# Patient Record
Sex: FEMALE | Race: OTHER | Employment: OTHER | URBAN - METROPOLITAN AREA
[De-identification: names, ages, dates, MRNs, and addresses within clinical notes are randomized per-mention and may not be internally consistent; named-entity substitution may affect disease eponyms.]

---

## 2017-10-23 ENCOUNTER — HOSPITAL ENCOUNTER (EMERGENCY)
Age: 52
Discharge: HOME | End: 2017-10-23
Attending: EMERGENCY MEDICINE
Payer: MEDICARE

## 2017-10-23 ENCOUNTER — APPOINTMENT (OUTPATIENT)
Dept: GENERAL RADIOLOGY | Age: 52
End: 2017-10-23
Attending: PHYSICIAN ASSISTANT
Payer: MEDICARE

## 2017-10-23 VITALS
DIASTOLIC BLOOD PRESSURE: 117 MMHG | SYSTOLIC BLOOD PRESSURE: 152 MMHG | HEIGHT: 64 IN | TEMPERATURE: 98.6 F | WEIGHT: 217 LBS | BODY MASS INDEX: 37.05 KG/M2 | HEART RATE: 85 BPM | OXYGEN SATURATION: 98 % | RESPIRATION RATE: 16 BRPM

## 2017-10-23 DIAGNOSIS — R03.0 ELEVATED BLOOD PRESSURE READING: ICD-10-CM

## 2017-10-23 DIAGNOSIS — M54.41 RIGHT-SIDED LOW BACK PAIN WITH RIGHT-SIDED SCIATICA, UNSPECIFIED CHRONICITY: ICD-10-CM

## 2017-10-23 DIAGNOSIS — M79.671 RIGHT FOOT PAIN: Primary | ICD-10-CM

## 2017-10-23 DIAGNOSIS — F17.200 NICOTINE DEPENDENCE, UNCOMPLICATED, UNSPECIFIED NICOTINE PRODUCT TYPE: ICD-10-CM

## 2017-10-23 PROCEDURE — 73630 X-RAY EXAM OF FOOT: CPT

## 2017-10-23 PROCEDURE — 72100 X-RAY EXAM L-S SPINE 2/3 VWS: CPT

## 2017-10-23 PROCEDURE — 99284 EMERGENCY DEPT VISIT MOD MDM: CPT

## 2017-10-23 PROCEDURE — 77030036687 HC SHOE PSTOP S2SG -A

## 2017-10-23 PROCEDURE — 74011250637 HC RX REV CODE- 250/637: Performed by: PHYSICIAN ASSISTANT

## 2017-10-23 PROCEDURE — 74011250637 HC RX REV CODE- 250/637: Performed by: EMERGENCY MEDICINE

## 2017-10-23 RX ORDER — HYDROCODONE BITARTRATE AND ACETAMINOPHEN 5; 325 MG/1; MG/1
1 TABLET ORAL
Qty: 6 TAB | Refills: 0 | Status: SHIPPED | OUTPATIENT
Start: 2017-10-23 | End: 2018-01-11

## 2017-10-23 RX ORDER — PREDNISONE 20 MG/1
60 TABLET ORAL DAILY
Qty: 15 TAB | Refills: 0 | Status: SHIPPED | OUTPATIENT
Start: 2017-10-23 | End: 2017-10-28

## 2017-10-23 RX ORDER — PREDNISONE 20 MG/1
60 TABLET ORAL DAILY
Qty: 15 TAB | Refills: 0 | Status: SHIPPED | OUTPATIENT
Start: 2017-10-23 | End: 2017-10-23

## 2017-10-23 RX ORDER — LISINOPRIL 10 MG/1
10 TABLET ORAL 3 TIMES DAILY
COMMUNITY
End: 2017-12-16 | Stop reason: CLARIF

## 2017-10-23 RX ORDER — METHOCARBAMOL 750 MG/1
750 TABLET, FILM COATED ORAL 4 TIMES DAILY
Qty: 20 TAB | Refills: 0 | Status: SHIPPED | OUTPATIENT
Start: 2017-10-23 | End: 2018-01-11

## 2017-10-23 RX ORDER — HYDROCODONE BITARTRATE AND ACETAMINOPHEN 5; 325 MG/1; MG/1
1 TABLET ORAL
Status: COMPLETED | OUTPATIENT
Start: 2017-10-23 | End: 2017-10-23

## 2017-10-23 RX ORDER — LISINOPRIL 5 MG/1
10 TABLET ORAL
Status: COMPLETED | OUTPATIENT
Start: 2017-10-23 | End: 2017-10-23

## 2017-10-23 RX ADMIN — LISINOPRIL 10 MG: 5 TABLET ORAL at 13:42

## 2017-10-23 RX ADMIN — HYDROCODONE BITARTRATE AND ACETAMINOPHEN 1 TABLET: 5; 325 TABLET ORAL at 13:48

## 2017-10-23 NOTE — DISCHARGE INSTRUCTIONS
Learning About Relief for Back Pain  What is back tension and strain? Back strain happens when you overstretch, or pull, a muscle in your back. You may hurt your back in an accident or when you exercise or lift something. Most back pain will get better with rest and time. You can take care of yourself at home to help your back heal.  What can you do first to relieve back pain? When you first feel back pain, try these steps:  · Walk. Take a short walk (10 to 20 minutes) on a level surface (no slopes, hills, or stairs) every 2 to 3 hours. Walk only distances you can manage without pain, especially leg pain. · Relax. Find a comfortable position for rest. Some people are comfortable on the floor or a medium-firm bed with a small pillow under their head and another under their knees. Some people prefer to lie on their side with a pillow between their knees. Don't stay in one position for too long. · Try heat or ice. Try using a heating pad on a low or medium setting, or take a warm shower, for 15 to 20 minutes every 2 to 3 hours. Or you can buy single-use heat wraps that last up to 8 hours. You can also try an ice pack for 10 to 15 minutes every 2 to 3 hours. You can use an ice pack or a bag of frozen vegetables wrapped in a thin towel. There is not strong evidence that either heat or ice will help, but you can try them to see if they help. You may also want to try switching between heat and cold. · Take pain medicine exactly as directed. ¨ If the doctor gave you a prescription medicine for pain, take it as prescribed. ¨ If you are not taking a prescription pain medicine, ask your doctor if you can take an over-the-counter medicine. What else can you do? · Stretch and exercise. Exercises that increase flexibility may relieve your pain and make it easier for your muscles to keep your spine in a good, neutral position. And don't forget to keep walking. · Do self-massage.  You can use self-massage to unwind after work or school or to energize yourself in the morning. You can easily massage your feet, hands, or neck. Self-massage works best if you are in comfortable clothes and are sitting or lying in a comfortable position. Use oil or lotion to massage bare skin. · Reduce stress. Back pain can lead to a vicious [place]: Distress about the pain tenses the muscles in your back, which in turn causes more pain. Learn how to relax your mind and your muscles to lower your stress. Where can you learn more? Go to http://molina-ofelia.info/. Enter I133 in the search box to learn more about \"Learning About Relief for Back Pain. \"  Current as of: March 21, 2017  Content Version: 11.3  © 8616-8352 [employer]. Care instructions adapted under license by [employer] (which disclaims liability or warranty for this information). If you have questions about a medical condition or this instruction, always ask your healthcare professional. [place] [address] any warranty or liability for your use of this information. Back Stretches: Exercises  Your Care Instructions  Here are some examples of exercises for stretching your back. Start each exercise slowly. Ease off the exercise if you start to have pain. Your doctor or physical therapist will tell you when you can start these exercises and which ones will work best for you. How to do the exercises  Overhead stretch    1. Stand comfortably with your feet shoulder-width apart. 2. Looking straight ahead, raise both arms over your head and reach toward the ceiling. Do not allow your head to tilt back. 3. Hold for 15 to 30 seconds, then lower your arms to your sides. 4. Repeat 2 to 4 times. Side stretch    1. Stand comfortably with your feet shoulder-width apart. 2. Raise one arm over your head, and then lean to the other side.   3. Slide your hand down your leg as you let the weight of your arm gently stretch your side muscles. Hold for 15 to 30 seconds. 4. Repeat 2 to 4 times on each side. Press-up    1. Lie on your stomach, supporting your body with your forearms. 2. Press your elbows down into the floor to raise your upper back. As you do this, relax your stomach muscles and allow your back to arch without using your back muscles. As your press up, do not let your hips or pelvis come off the floor. 3. Hold for 15 to 30 seconds, then relax. 4. Repeat 2 to 4 times. Relax and rest    1. Lie on your back with a rolled towel under your neck and a pillow under your knees. Extend your arms comfortably to your sides. 2. Relax and breathe normally. 3. Remain in this position for about 10 minutes. 4. If you can, do this 2 or 3 times each day. Follow-up care is a key part of your treatment and safety. Be sure to make and go to all appointments, and call your doctor if you are having problems. It's also a good idea to know your test results and keep a list of the medicines you take. Where can you learn more? Go to http://molinaAmiareofelia.info/. Enter D523 in the search box to learn more about \"Back Stretches: Exercises. \"  Current as of: March 21, 2017  Content Version: 11.3  © 9564-8189 [employer]. Care instructions adapted under license by [employer] (which disclaims liability or warranty for this information). If you have questions about a medical condition or this instruction, always ask your healthcare professional. [place] [address] any warranty or liability for your use of this information. Elevated Blood Pressure: Care Instructions  Your Care Instructions    Blood pressure is a measure of how hard the blood pushes against the walls of your arteries. It's normal for blood pressure to go up and down throughout the day. But if it stays up over time, you have high blood pressure. Two numbers tell you your blood pressure.  The first number is the systolic pressure. It shows how hard the blood pushes when your heart is pumping. The second number is the diastolic pressure. It shows how hard the blood pushes between heartbeats, when your heart is relaxed and filling with blood. An ideal blood pressure in adults is less than 120/80 (say \"120 over 80\"). High blood pressure is 140/90 or higher. You have high blood pressure if your top number is 140 or higher or your bottom number is 90 or higher, or both. The main test for high blood pressure is simple, fast, and painless. To diagnose high blood pressure, your doctor will test your blood pressure at different times. After testing your blood pressure, your doctor may ask you to test it again when you are home. If you are diagnosed with high blood pressure, you can work with your doctor to make a long-term plan to manage it. Follow-up care is a key part of your treatment and safety. Be sure to make and go to all appointments, and call your doctor if you are having problems. It's also a good idea to know your test results and keep a list of the medicines you take. How can you care for yourself at home? · Do not smoke. Smoking increases your risk for heart attack and stroke. If you need help quitting, talk to your doctor about stop-smoking programs and medicines. These can increase your chances of quitting for good. · Stay at a healthy weight. · Try to limit how much sodium you eat to less than 2,300 milligrams (mg) a day. Your doctor may ask you to try to eat less than 1,500 mg a day. · Be physically active. Get at least 30 minutes of exercise on most days of the week. Walking is a good choice. You also may want to do other activities, such as running, swimming, cycling, or playing tennis or team sports. · Avoid or limit alcohol. Talk to your doctor about whether you can drink any alcohol. · Eat plenty of fruits, vegetables, and low-fat dairy products. Eat less saturated and total fats.   · Learn how to check your blood pressure at home. When should you call for help? Call your doctor now or seek immediate medical care if:  · Your blood pressure is much higher than normal (such as 180/110 or higher). · You think high blood pressure is causing symptoms such as:  ¨ Severe headache. ¨ Blurry vision. Watch closely for changes in your health, and be sure to contact your doctor if:  · You do not get better as expected. Where can you learn more? Go to http://molina-ofelia.info/. Enter O163 in the search box to learn more about \"Elevated Blood Pressure: Care Instructions. \"  Current as of: April 3, 2017  Content Version: 11.3  © 4748-5750 [employer]. Care instructions adapted under license by [employer] (which disclaims liability or warranty for this information). If you have questions about a medical condition or this instruction, always ask your healthcare professional. [place] [address] any warranty or liability for your use of this information. Learning About Benefits From Quitting Smoking  How does quitting smoking make you healthier? If you're thinking about quitting smoking, you may have a few reasons to be smoke-free. Your health may be one of them. · When you quit smoking, you lower your risks for cancer, lung disease, heart attack, stroke, blood vessel disease, and blindness from macular degeneration. · When you're smoke-free, you get sick less often, and you heal faster. You are less likely to get colds, flu, bronchitis, and pneumonia. · As a nonsmoker, you may find that your mood is better and you are less stressed. When and how will you feel healthier? Quitting has real health benefits that start from day 1 of being smoke-free. And the longer you stay smoke-free, the healthier you get and the better you feel. The first hours  · After just 20 minutes, your blood pressure and heart rate go down.  That means there's less stress on your heart and blood vessels. · Within 12 hours, the level of carbon monoxide in your blood drops back to normal. That makes room for more oxygen. With more oxygen in your body, you may notice that you have more energy than when you smoked. After 2 weeks  · Your lungs start to work better. · Your risk of heart attack starts to drop. After 1 month  · When your lungs are clear, you cough less and breathe deeper, so it's easier to be active. · Your sense of taste and smell return. That means you can enjoy food more than you have since you started smoking. Over the years  · After 1 year, your risk of heart disease is half what it would be if you kept smoking. · After 5 years, your risk of stroke starts to shrink. Within a few years after that, it's about the same as if you'd never smoked. · After 10 years, your risk of dying from lung cancer is cut by about half. And your risk for many other types of cancer is lower too. How would quitting help others in your life? When you quit smoking, you improve the health of everyone who now breathes in your smoke. · Their heart, lung, and cancer risks drop, much like yours. · They are sick less. For babies and small children, living smoke-free means they're less likely to have ear infections, pneumonia, and bronchitis. · If you're a woman who is or will be pregnant someday, quitting smoking means a healthier . · Children who are close to you are less likely to become adult smokers. Where can you learn more? Go to http://molina-ofelia.info/. Enter [id number] [id number] [id number] in the search box to learn more about \"Learning About Benefits From Quitting Smoking. \"  Current as of: 2017  Content Version: 11.3  © 2245-9236 [employer]. Care instructions adapted under license by [employer] (which disclaims liability or warranty for this information).  If you have questions about a medical condition or this instruction, always ask your healthcare professional. [address] any warranty or liability for your use of this information. We hope that we have addressed all of your medical concerns. The examination and treatment you received in the Emergency Department were for an emergent problem and were not intended as complete care. It is important that you follow up with your healthcare provider(s) for ongoing care. If your symptoms worsen or do not improve as expected, and you are unable to reach your usual health care provider(s), you should return to the Emergency Department. Today's healthcare is undergoing tremendous change, and patient satisfaction surveys are one of the many tools to assess the quality of medical care. You may receive a survey from the [employer] regarding your experience in the Emergency Department. I hope that your experience has been completely positive, particularly the medical care that I provided. As such, please participate in the survey; anything less than excellent does not meet my expectations or intentions. [facility] and [address] [place] [address] participate in nationally recognized quality of care measures. If your blood pressure is greater than 120/80, as reported below, we urge that you seek medical care to address the potential of high blood pressure, commonly known as hypertension. Hypertension can be hereditary or can be caused by certain medical conditions, pain, stress, or \"white coat syndrome. \"       Please make an appointment with your health care provider(s) for follow up of your Emergency Department visit. VITALS:   Patient Vitals for the past 8 hrs:   Temp Pulse Resp BP SpO2   10/23/17 1330 - 85 16 (!) 152/117 98 %   10/23/17 1214 98.6 °F (37 °C) 93 18 (!) 143/103 96 %          Thank you for allowing us to provide you with medical care today. We realize that you have many choices for your emergency care needs.   Please choose us in the future for any continued health care needs. Simone Pepper, [address]: [phone]            No results found for this or any previous visit (from the past 24 hour(s)). Xr Spine Lumb 2 Or 3 V    Result Date: 10/23/2017  EXAM:  XR SPINE LUMB 2 OR 3 V INDICATION:  Right low back pain. COMPARISON: CT 9/29/2012. TECHNIQUE: 3 views lumbar spine. FINDINGS: Surgical clips are noted in the right upper abdomen. Vertebral body heights and intervertebral disc spaces are maintained. There is no abnormality in alignment. The sacroiliac joints are unremarkable. IMPRESSION: No acute abnormality. Xr Foot Rt Min 3 V    Result Date: 10/23/2017  EXAM:  XR FOOT RT MIN 3 V INDICATION:   Right foot third digit pain for one month. COMPARISON:  None. FINDINGS:  Three views of the right foot demonstrate no fracture or other acute osseous or articular abnormality. The soft tissues are within normal limits. IMPRESSION:  No acute abnormality. Foot Pain: Care Instructions  Your Care Instructions  Foot injuries that cause pain and swelling are fairly common. Almost all sports or home repair projects can cause a misstep that ends up as foot pain. Normal wear and tear, especially as you get older, also can cause foot pain. Most minor foot injuries will heal on their own, and home treatment is usually all you need to do. If you have a severe injury, you may need tests and treatment. Follow-up care is a key part of your treatment and safety. Be sure to make and go to all appointments, and call your doctor if you are having problems. Its also a good idea to know your test results and keep a list of the medicines you take. How can you care for yourself at home? · Take pain medicines exactly as directed. ¨ If the doctor gave you a prescription medicine for pain, take it as prescribed.   ¨ If you are not taking a prescription pain medicine, ask your doctor if you can take an over-the-counter medicine. · Rest and protect your foot. Take a break from any activity that may cause pain. · Put ice or a cold pack on your foot for 10 to 20 minutes at a time. Put a thin cloth between the ice and your skin. · Prop up the sore foot on a pillow when you ice it or anytime you sit or lie down during the next 3 days. Try to keep it above the level of your heart. This will help reduce swelling. · Your doctor may recommend that you wrap your foot with an elastic bandage. Keep your foot wrapped for as long as your doctor advises. · If your doctor recommends crutches, use them as directed. · Wear roomy footwear. · As soon as pain and swelling end, begin gentle exercises of your foot. Your doctor can tell you which exercises will help. When should you call for help? Call 911 anytime you think you may need emergency care. For example, call if:  · Your foot turns pale, white, blue, or cold. Call your doctor now or seek immediate medical care if:  · You cannot move or stand on your foot. · Your foot looks twisted or out of its normal position. · Your foot is not stable when you step down. · You have signs of infection, such as:  ¨ Increased pain, swelling, warmth, or redness. ¨ Red streaks leading from the sore area. ¨ Pus draining from a place on your foot. ¨ A fever. · Your foot is numb or tingly. Watch closely for changes in your health, and be sure to contact your doctor if:  · You do not get better as expected. · You have bruises from an injury that last longer than 2 weeks. Where can you learn more? Go to http://molina-ofelia.info/. Enter B666 in the search box to learn more about \"Foot Pain: Care Instructions. \"  Current as of: March 21, 2017  Content Version: 11.3  © 2394-1148 [employer].  Care instructions adapted under license by [employer] (which disclaims liability or warranty for this information). If you have questions about a medical condition or this instruction, always ask your healthcare professional. [address] any warranty or liability for your use of this information. Sciatica: Care Instructions  Your Care Instructions    Sciatica (say \"sbq-GB-dy-kuh\") is an irritation of one of the sciatic nerves, which come from the spinal cord in the lower back. The sciatic nerves and their branches extend down through the buttock to the foot. Sciatica can develop when an injured disc in the back presses against a spinal nerve root. Its main symptom is pain, numbness, or weakness that is often worse in the leg or foot than in the back. Sciatica often will improve and go away with time. Early treatment usually includes medicines and exercises to relieve pain. Follow-up care is a key part of your treatment and safety. Be sure to make and go to all appointments, and call your doctor if you are having problems. It's also a good idea to know your test results and keep a list of the medicines you take. How can you care for yourself at home? · Take pain medicines exactly as directed. ¨ If the doctor gave you a prescription medicine for pain, take it as prescribed. ¨ If you are not taking a prescription pain medicine, ask your doctor if you can take an over-the-counter medicine. · Use heat or ice to relieve pain. ¨ To apply heat, put a warm water bottle, heating pad set on low, or warm cloth on your back. Do not go to sleep with a heating pad on your skin. ¨ To use ice, put ice or a cold pack on the area for 10 to 20 minutes at a time. Put a thin cloth between the ice and your skin. · Avoid sitting if possible, unless it feels better than standing. · Alternate lying down with short walks. Increase your walking distance as you are able to without making your symptoms worse. · Do not do anything that makes your symptoms worse. When should you call for help?   Call [phone] anytime you think you may need emergency care. For example, call if:  · You are unable to move a leg at all. Call your doctor now or seek immediate medical care if:  · You have new or worse symptoms in your legs or buttocks. Symptoms may include:  ¨ Numbness or tingling. ¨ Weakness. ¨ Pain. · You lose bladder or bowel control. Watch closely for changes in your health, and be sure to contact your doctor if:  · You are not getting better as expected. Where can you learn more? Go to http://molina-ofelia.info/. Enter [phone] in the search box to learn more about \"Sciatica: Care Instructions. \"  Current as of: March 21, 2017  Content Version: 11.3  © 5422-6498 [employer]. Care instructions adapted under license by [employer] (which disclaims liability or warranty for this information). If you have questions about a medical condition or this instruction, always ask your healthcare professional. [place] [address] any warranty or liability for your use of this information.

## 2017-10-23 NOTE — ED PROVIDER NOTES
HPI Comments: 47 y.o. female with no significant past medical history who presents from home via private vehicle with chief complaint of right foot pain and back pain. Pt reports that for the past month she has had right foot pain and lower back pain. Her foot pain is located on the top of her foot and is exacerbated by weight baring and ambulation. She has tried over the counter Ibuprofen at home for her Sx with little relief. She denies numbness or tingling, weakness, urinary or stool incontinence, injury or trauma, fever, chills, nausea, vomiting, abdominal pain, CP, SOB. There are no other acute medical concerns at this time. Social Hx: Pt admits to smoking cigarettes daily and to smoking marijuana occasionally. PCP: Heather Nava MD  Note written by hayley Shipman, as dictated by Autumn Monterroso PA-C 12:30 PM          The history is provided by the patient.         Past Medical History:   Diagnosis Date    Cavernous hemangioma of intracranial structure ([facility] [address] 5/2/2011    Cavernous hemangiomas     Depressive disorder, not elsewhere classified 10/1/2013    anxiety    Hypertension     Hypertension     Ill-defined condition     fibromyalgia    Migraine 5/2/2011    Seizures ([facility] [address]     Unspecified sleep apnea     does not use c-pap       Past Surgical History:   Procedure Laterality Date    HX CHOLECYSTECTOMY      HX HEENT      brain surgery 1997/2006- h/o cavernous hemangiomas    HX ORTHOPAEDIC      disc fusion 2010, right knee cyst removed    HX OTHER SURGICAL      kidney stones removal - basket removal & lazer removal    NEUROLOGICAL PROCEDURE UNLISTED      SURGERY,BRAIN/SPINE,W/COMPUTER           Family History:   Problem Relation Age of Onset    Cancer Mother      breast    Cancer Father     Migraines Father     Diabetes Father        Social History     Social History    Marital status: SINGLE     Spouse name: N/A    Number of children: N/A    Years of education: N/A Occupational History    Not on file. Social History Main Topics    Smoking status: Current Every Day Smoker     Packs/day: 0.25    Smokeless tobacco: Never Used    Alcohol use No    Drug use: No    Sexual activity: Not on file     Other Topics Concern    Not on file     Social History Narrative         ALLERGIES: Latex, natural rubber; Codeine; Contrast agent [iodine]; Imitrex [sumatriptan]; and Percocet [oxycodone-acetaminophen]    Review of Systems   Constitutional: Negative for chills and fever. HENT: Negative for congestion, ear pain, rhinorrhea, sneezing and sore throat. Eyes: Negative for photophobia, pain, redness and visual disturbance. Respiratory: Negative for chest tightness and shortness of breath. Cardiovascular: Negative for chest pain and leg swelling. Gastrointestinal: Negative for abdominal pain, nausea and vomiting. Genitourinary: Negative for difficulty urinating, dysuria, flank pain and frequency. Musculoskeletal: Positive for back pain and myalgias. Negative for neck pain and neck stiffness. Skin: Negative for rash and wound. Neurological: Negative for dizziness, syncope, weakness, light-headedness and headaches. Hematological: Negative for adenopathy. Vitals:    10/23/17 1214 10/23/17 1330   BP: (!) 143/103 (!) 152/117   Pulse: 93 85   Resp: 18 16   Temp: 98.6 °F (37 °C)    SpO2: 96% 98%   Weight: 98.4 kg (217 lb)    Height: 5' 4\" (1.626 m)             Physical Exam   Constitutional: She is oriented to person, place, and time. She appears well-developed and well-nourished. No distress. Above average bmi female   HENT:   Head: Normocephalic and atraumatic. Right Ear: External ear normal.   Left Ear: External ear normal.   Nose: Nose normal.   Mouth/Throat: Oropharynx is clear and moist.   Eyes: EOM are normal. Pupils are equal, round, and reactive to light. Neck: Neck supple.    Cardiovascular: Normal rate, regular rhythm, normal heart sounds and intact distal pulses. Exam reveals no gallop and no friction rub. No murmur heard. Pulmonary/Chest: Effort normal and breath sounds normal. No stridor. No respiratory distress. She has no wheezes. She has no rales. She exhibits no tenderness. Abdominal: Soft. Bowel sounds are normal. She exhibits no distension and no mass. There is no tenderness. There is no rebound and no guarding. Musculoskeletal: Normal range of motion. She exhibits tenderness. She exhibits no edema or deformity. Back:diffuse right lumbar TTP  to midline and throughout no swelling or step off. No discoloration. No deformity or lesions. Neg SLR neg EHL neg GEORGE. Ambulates without assistance. Distal n/v intact. Cap refill brisk  Right foot with diffuse lateral foot TTP. No swelling. No discoloration or deformity range of motion intact. Cap refill brisk. Normothermic no lesions. Neurological: She is alert and oriented to person, place, and time. No cranial nerve deficit. Coordination normal.   Skin: No rash noted. No erythema. No pallor. Psychiatric: She has a normal mood and affect. Her behavior is normal.   Nursing note and vitals reviewed. MDM  Number of Diagnoses or Management Options  Elevated blood pressure reading:   Nicotine dependence, uncomplicated, unspecified nicotine product type:   Right foot pain:   Right-sided low back pain with right-sided sciatica, unspecified chronicity:      Amount and/or Complexity of Data Reviewed  Tests in the radiology section of CPT®: ordered and reviewed  Review and summarize past medical records: yes  Independent visualization of images, tracings, or specimens: yes    Patient Progress  Patient progress: stable    ED Course       Procedures    12:45 PM  Discussed pt, sx, hx and current findings with Dr Jd Nelson. He is in agreement with plan and will see pt  Jennifer Caldera.  DES Pepper    1:20 PM  Discussed with patient at length the need for blood pressure re-evaluation and management with primary care. Discussed the long term sequelae for elevated blood pressure including blindness, CVA, MI, and renal failure/ dialysis. Pt agrees to follow up as directed. Pt did not take her medications today. Will give dose of regular bp meds  ANAI Falcon       LABORATORY TESTS:  No results found for this or any previous visit (from the past 12 hour(s)). IMAGING RESULTS:    Xr Spine Lumb 2 Or 3 V    Result Date: 10/23/2017  EXAM:  XR SPINE LUMB 2 OR 3 V INDICATION:  Right low back pain. COMPARISON: CT 9/29/2012. TECHNIQUE: 3 views lumbar spine. FINDINGS: Surgical clips are noted in the right upper abdomen. Vertebral body heights and intervertebral disc spaces are maintained. There is no abnormality in alignment. The sacroiliac joints are unremarkable. IMPRESSION: No acute abnormality. Xr Foot Rt Min 3 V    Result Date: 10/23/2017  EXAM:  XR FOOT RT MIN 3 V INDICATION:   Right foot third digit pain for one month. COMPARISON:  None. FINDINGS:  Three views of the right foot demonstrate no fracture or other acute osseous or articular abnormality. The soft tissues are within normal limits. IMPRESSION:  No acute abnormality. MEDICATIONS GIVEN:  Medications   lisinopril (PRINIVIL, ZESTRIL) tablet 10 mg (10 mg Oral Given 10/23/17 1342)   HYDROcodone-acetaminophen (NORCO) 5-325 mg per tablet 1 Tab (1 Tab Oral Given 10/23/17 1348)       IMPRESSION:  1. Right foot pain    2. Right-sided low back pain with right-sided sciatica, unspecified chronicity    3. Nicotine dependence, uncomplicated, unspecified nicotine product type    4. Elevated blood pressure reading        PLAN:  1. Discharge Medication List as of 10/23/2017  1:33 PM      START taking these medications    Details   methocarbamol (ROBAXIN-750) 750 mg tablet Take 1 Tab by mouth four (4) times daily. , Print, Disp-20 Tab, R-0      HYDROcodone-acetaminophen (NORCO) 5-325 mg per tablet Take 1 Tab by mouth every four (4) hours as needed for Pain. Max Daily Amount: 6 Tabs., Print, Disp-6 Tab, R-0      predniSONE (DELTASONE) 20 mg tablet Take 3 Tabs by mouth daily for 5 days. , Normal, Disp-15 Tab, R-0         CONTINUE these medications which have NOT CHANGED    Details   lisinopril (PRINIVIL, ZESTRIL) 10 mg tablet Take 10 mg by mouth three (3) times daily. , Historical Med      traZODone (DESYREL) 100 mg tablet Take 200 mg by mouth nightly., Historical Med      ALPRAZolam (XANAX) 1 mg tablet Take 1 mg by mouth three (3) times daily as needed. Historical Med, 1 mg           2. Follow-up Information     Follow up With Details Comments Contact Info    Gilda Coley MD Schedule an appointment as soon as possible for a visit 2-4 days for recheck [address] [facility]  [phone]      Gwendolyn Hernández MD Schedule an appointment as soon as possible for a visit 2-4 days for recheck [address]  [phone]          Return to ED if worse   1:55 PM  Pt has been reexamined. Pt has no new complaints, changes or physical findings. Care plan outlined and precautions discussed. All available results were reviewed with pt. All medications were reviewed with pt. All of pt's questions and concerns were addressed. Pt agrees to F/U as instructed and agrees to return to ED upon further deterioration. Pt is ready to go home.   ANAI Andrews

## 2017-12-16 ENCOUNTER — HOSPITAL ENCOUNTER (EMERGENCY)
Age: 52
Discharge: HOME | End: 2017-12-16
Attending: EMERGENCY MEDICINE
Payer: MEDICARE

## 2017-12-16 VITALS
OXYGEN SATURATION: 99 % | RESPIRATION RATE: 16 BRPM | TEMPERATURE: 98.2 F | HEIGHT: 64 IN | HEART RATE: 97 BPM | BODY MASS INDEX: 39.44 KG/M2 | WEIGHT: 231 LBS | DIASTOLIC BLOOD PRESSURE: 90 MMHG | SYSTOLIC BLOOD PRESSURE: 131 MMHG

## 2017-12-16 DIAGNOSIS — L05.01 PILONIDAL ABSCESS: Primary | ICD-10-CM

## 2017-12-16 PROCEDURE — 75810000289 HC I&D ABSCESS SIMP/COMP/MULT

## 2017-12-16 PROCEDURE — 74011000250 HC RX REV CODE- 250: Performed by: PHYSICIAN ASSISTANT

## 2017-12-16 PROCEDURE — 74011250637 HC RX REV CODE- 250/637: Performed by: PHYSICIAN ASSISTANT

## 2017-12-16 PROCEDURE — 90715 TDAP VACCINE 7 YRS/> IM: CPT | Performed by: PHYSICIAN ASSISTANT

## 2017-12-16 PROCEDURE — 90471 IMMUNIZATION ADMIN: CPT

## 2017-12-16 PROCEDURE — 77030019895 HC PCKNG STRP IODO -A

## 2017-12-16 PROCEDURE — 74011250636 HC RX REV CODE- 250/636: Performed by: PHYSICIAN ASSISTANT

## 2017-12-16 PROCEDURE — 99283 EMERGENCY DEPT VISIT LOW MDM: CPT

## 2017-12-16 RX ORDER — VARENICLINE TARTRATE 1 MG/1
1 TABLET, FILM COATED ORAL DAILY
COMMUNITY
End: 2019-05-30

## 2017-12-16 RX ORDER — IBUPROFEN 800 MG/1
800 TABLET ORAL
COMMUNITY
End: 2018-05-23

## 2017-12-16 RX ORDER — HYDROCODONE BITARTRATE AND ACETAMINOPHEN 5; 325 MG/1; MG/1
1 TABLET ORAL
Qty: 10 TAB | Refills: 0 | Status: SHIPPED | OUTPATIENT
Start: 2017-12-16 | End: 2018-01-11

## 2017-12-16 RX ORDER — DULOXETIN HYDROCHLORIDE 30 MG/1
30 CAPSULE, DELAYED RELEASE ORAL
COMMUNITY
End: 2019-05-30

## 2017-12-16 RX ORDER — HYDROCODONE BITARTRATE AND ACETAMINOPHEN 5; 325 MG/1; MG/1
1 TABLET ORAL
Status: COMPLETED | OUTPATIENT
Start: 2017-12-16 | End: 2017-12-16

## 2017-12-16 RX ORDER — BUPIVACAINE HYDROCHLORIDE 5 MG/ML
10 INJECTION, SOLUTION EPIDURAL; INTRACAUDAL ONCE
Status: COMPLETED | OUTPATIENT
Start: 2017-12-16 | End: 2017-12-16

## 2017-12-16 RX ORDER — DOXYCYCLINE HYCLATE 100 MG
100 TABLET ORAL 2 TIMES DAILY
Qty: 20 TAB | Refills: 0 | Status: SHIPPED | OUTPATIENT
Start: 2017-12-16 | End: 2017-12-26

## 2017-12-16 RX ORDER — LOSARTAN POTASSIUM AND HYDROCHLOROTHIAZIDE 25; 100 MG/1; MG/1
1 TABLET ORAL
COMMUNITY
End: 2020-03-11

## 2017-12-16 RX ORDER — TOPIRAMATE 25 MG/1
25 TABLET ORAL
COMMUNITY

## 2017-12-16 RX ORDER — TIZANIDINE 4 MG/1
4 TABLET ORAL 3 TIMES DAILY
COMMUNITY
End: 2020-06-12

## 2017-12-16 RX ORDER — ONDANSETRON 4 MG/1
4 TABLET, ORALLY DISINTEGRATING ORAL
Status: COMPLETED | OUTPATIENT
Start: 2017-12-16 | End: 2017-12-16

## 2017-12-16 RX ORDER — DULOXETIN HYDROCHLORIDE 60 MG/1
120 CAPSULE, DELAYED RELEASE ORAL
COMMUNITY
End: 2022-04-21

## 2017-12-16 RX ADMIN — ONDANSETRON 4 MG: 4 TABLET, ORALLY DISINTEGRATING ORAL at 15:27

## 2017-12-16 RX ADMIN — TETANUS TOXOID, REDUCED DIPHTHERIA TOXOID AND ACELLULAR PERTUSSIS VACCINE, ADSORBED 0.5 ML: 5; 2.5; 8; 8; 2.5 SUSPENSION INTRAMUSCULAR at 15:27

## 2017-12-16 RX ADMIN — BUPIVACAINE HYDROCHLORIDE 50 MG: 5 INJECTION, SOLUTION EPIDURAL; INTRACAUDAL at 15:27

## 2017-12-16 RX ADMIN — HYDROCODONE BITARTRATE AND ACETAMINOPHEN 1 TABLET: 5; 325 TABLET ORAL at 15:27

## 2017-12-16 NOTE — ED PROVIDER NOTES
HPI Comments: 47 y.o. female with past medical history significant for Cavernous hemangiomas, migraines, HTN, seizures, and fibromyalgia who presents from home via private vehicle with chief complaint of skin problem. Pt reports a suspected boil in her sacral area that has worsened over the last 2 days. Pt states she has been doing warm compresses with no relief. Pt reports this morning the pain was causing nausea and vomiting, stating she can't sit anymore. Pt denies any history of similar symptoms. She specifically denies any fevers, chills, chest pain, abd pain, urinary sx, shortness of breath, headache, diarrhea, sweating or weight loss. She is unsure of her last tdap  There are no other acute medical concerns at this time. Social hx: Current smoker; No EtOH use  PCP: Suni Storey MD    Note written by Kenna Quick, as dictated by AANI Cardoza 2:52 PM        The history is provided by the patient. No  was used.         Past Medical History:   Diagnosis Date    Cavernous hemangioma of intracranial structure ([address] 5/2/2011    Cavernous hemangiomas     Depressive disorder, not elsewhere classified 10/1/2013    anxiety    Hypertension     Hypertension     Ill-defined condition     fibromyalgia    Migraine 5/2/2011    Seizures ([address]     Unspecified sleep apnea     does not use c-pap       Past Surgical History:   Procedure Laterality Date    HX CHOLECYSTECTOMY      HX HEENT      brain surgery 1997/2006- h/o cavernous hemangiomas    HX ORTHOPAEDIC      disc fusion 2010, right knee cyst removed    HX OTHER SURGICAL      kidney stones removal - basket removal & lazer removal    NEUROLOGICAL PROCEDURE UNLISTED      SURGERY,BRAIN/SPINE,W/COMPUTER           Family History:   Problem Relation Age of Onset    Cancer Mother      breast    Cancer Father     Migraines Father     Diabetes Father        Social History     Social History    Marital status: SINGLE Spouse name: N/A    Number of children: N/A    Years of education: N/A     Occupational History    Not on file. Social History Main Topics    Smoking status: Current Every Day Smoker     Packs/day: 0.25    Smokeless tobacco: Never Used    Alcohol use No    Drug use: No    Sexual activity: Not on file     Other Topics Concern    Not on file     Social History Narrative         ALLERGIES: Latex, natural rubber; Codeine; Contrast agent [iodine]; Imitrex [sumatriptan]; and Percocet [oxycodone-acetaminophen]    Review of Systems   Constitutional: Negative for chills and fever. HENT: Negative for congestion, rhinorrhea, sneezing and sore throat. Eyes: Negative for redness and visual disturbance. Respiratory: Negative for shortness of breath. Cardiovascular: Negative for chest pain and leg swelling. Gastrointestinal: Positive for nausea and vomiting. Negative for abdominal pain and diarrhea. Genitourinary: Negative for difficulty urinating and frequency. Musculoskeletal: Negative for back pain, myalgias and neck stiffness. Skin: Positive for color change and wound. Negative for rash. Neurological: Negative for dizziness, syncope, weakness and headaches. Hematological: Negative for adenopathy. Vitals:    12/16/17 1414 12/16/17 1631   BP: 124/86 131/90   Pulse: (!) 120 97   Resp: 14 16   Temp: 98.5 °F (36.9 °C) 98.2 °F (36.8 °C)   SpO2: 95% 99%   Weight: 104.8 kg (231 lb)    Height: 5' 4\" (1.626 m)             Physical Exam   Constitutional: She is oriented to person, place, and time. She appears well-developed and well-nourished. No distress. Markedly above average bmi female    HENT:   Head: Normocephalic and atraumatic. Right Ear: External ear normal.   Left Ear: External ear normal.   Eyes: EOM are normal. Pupils are equal, round, and reactive to light. Neck: Neck supple. Cardiovascular: Normal rate, regular rhythm, normal heart sounds and intact distal pulses.   Exam reveals no gallop and no friction rub. No murmur heard. Pulmonary/Chest: Effort normal and breath sounds normal. No stridor. No respiratory distress. She has no wheezes. She has no rales. She exhibits no tenderness. Abdominal: Soft. Bowel sounds are normal. She exhibits no distension and no mass. There is no tenderness. There is no rebound and no guarding. Musculoskeletal: Normal range of motion. She exhibits no edema, tenderness or deformity. Neurological: She is alert and oriented to person, place, and time. No cranial nerve deficit. Coordination normal.   Skin: No rash noted. There is erythema. No pallor. Swelling, erythema and fluctuance to sacral areas . + markedly ttp. No drainage currently   Psychiatric: She has a normal mood and affect. Her behavior is normal.   Nursing note and vitals reviewed. MDM  Number of Diagnoses or Management Options  Pilonidal abscess:      Amount and/or Complexity of Data Reviewed  Obtain history from someone other than the patient: yes (mother)  Review and summarize past medical records: yes  Independent visualization of images, tracings, or specimens: yes    Patient Progress  Patient progress: stable    ED Course       Procedures    3:00 Pm  Discussed pt, sx, hx and current findings with Dr Zenia Cummins. He is in agreement with plan. Will I&D abscess  Tierney Lucas. DES Pepper    Procedure Note - Incision and Drainage:   4:15 PM  Performed by: Tierney Lucas. DES Pepper  Complexity: complex  Skin prepped with Hibiclens. Sterile field established. Anesthesia achieved with 10 mLs of Bupivacaine 0.5% without epinephrine using a local infiltration. Abscess to sacral areas was incised with # 11 blade, and 5 mLs of bloody, purulent malodorous drainage was expressed. Wound probed and irrigated. Area was packed using 1/2 inch iodoform gauze. Sterile dressing applied.     Estimated blood loss: less than 1mL  The procedure took 18 minutes, and pt tolerated moderately. LABORATORY TESTS:  No results found for this or any previous visit (from the past 12 hour(s)). IMAGING RESULTS:    No results found. MEDICATIONS GIVEN:  Medications   HYDROcodone-acetaminophen (NORCO) 5-325 mg per tablet 1 Tab (1 Tab Oral Given 12/16/17 1527)   ondansetron (ZOFRAN ODT) tablet 4 mg (4 mg Oral Given 12/16/17 1527)   bupivacaine (PF) (MARCAINE) 0.5 % (5 mg/mL) injection 50 mg (50 mg Infiltration Given by Provider 12/16/17 1527)   diph,Pertuss(AC),Tet Vac-PF (BOOSTRIX) suspension 0.5 mL (0.5 mL IntraMUSCular Given 12/16/17 1527)       IMPRESSION:  1. Pilonidal abscess        PLAN:  1. Discharge Medication List as of 12/16/2017  4:24 PM      START taking these medications    Details   !! HYDROcodone-acetaminophen (NORCO) 5-325 mg per tablet Take 1 Tab by mouth every four (4) hours as needed for Pain. Max Daily Amount: 6 Tabs., Print, Disp-10 Tab, R-0      doxycycline (VIBRA-TABS) 100 mg tablet Take 1 Tab by mouth two (2) times a day for 10 days. , Print, Disp-20 Tab, R-0       !! - Potential duplicate medications found. Please discuss with provider. CONTINUE these medications which have NOT CHANGED    Details   !! DULoxetine (CYMBALTA) 30 mg capsule Take 30 mg by mouth daily. , Historical Med      !! DULoxetine (CYMBALTA) 60 mg capsule Take 60 mg by mouth daily. , Historical Med      losartan-hydroCHLOROthiazide (HYZAAR) 100-25 mg per tablet Take 1 Tab by mouth daily. , Historical Med      topiramate (TOPAMAX) 25 mg tablet Take 25 mg by mouth two (2) times a day., Historical Med      tiZANidine (ZANAFLEX) 4 mg capsule Take 4 mg by mouth three (3) times daily. , Historical Med      varenicline (CHANTIX) 1 mg tablet Take 1 mg by mouth two (2) times daily (after meals). , Historical Med      ibuprofen (MOTRIN) 800 mg tablet Take 800 mg by mouth., Historical Med      ALPRAZolam (XANAX) 1 mg tablet Take 1 mg by mouth three (3) times daily as needed. Historical Med, 1 mg methocarbamol (ROBAXIN-750) 750 mg tablet Take 1 Tab by mouth four (4) times daily. , Print, Disp-20 Tab, R-0      !! HYDROcodone-acetaminophen (NORCO) 5-325 mg per tablet Take 1 Tab by mouth every four (4) hours as needed for Pain. Max Daily Amount: 6 Tabs., Print, Disp-6 Tab, R-0      traZODone (DESYREL) 100 mg tablet Take 200 mg by mouth nightly., Historical Med       !! - Potential duplicate medications found. Please discuss with provider. 2.   Follow-up Information     Follow up With Details Comments Contact Info    Delfina Tineo MD Schedule an appointment as soon as possible for a visit 2 days for recheck and packing removal Eris Clarke MD Schedule an appointment as soon as possible for a visit 2-4 days for recheck and packing removal [address]   [phone]          Return to ED if worse     4:16 PM  Pt has been reexamined. Pt has no new complaints, changes or physical findings. Care plan outlined and precautions discussed. All available results were reviewed with pt. All medications were reviewed with pt. All of pt's questions and concerns were addressed. Pt agrees to F/U as instructed and agrees to return to ED upon further deterioration. Pt is ready to go home.   ANAI Reed

## 2017-12-16 NOTE — ED TRIAGE NOTES
Pt has a raised area on her sacral area and has been doing warm compresses, pt states it has gotten worse the past 2 days

## 2017-12-16 NOTE — ED NOTES
Pt resting comfortably. No change in status. VSS. Hourly rounding complete. Will continue to monitor patient. Pt instructed to call if they need assistance. Call bell within reach. Patient discharged by MD. Papers given and questions answered. Home with family.

## 2017-12-16 NOTE — DISCHARGE INSTRUCTIONS
Pilonidal Abscess: Care Instructions  Your Care Instructions    A pilonidal abscess is an infection caused by an ingrown hair. The abscess occurs in the area of the tailbone and the top of the buttocks. The infection causes a pocket of pus to form. It can be quite painful. Your doctor may have opened and drained the abscess. You can take care of yourself at home to help the area heal. In some cases, the abscess returns. Your doctor may suggest surgery to remove the site of the infection if it comes back. You may have had a sedative to help you relax. You may be unsteady after having sedation. It can take a few hours for the medicine's effects to wear off. Common side effects of sedation include nausea, vomiting, and feeling sleepy or tired. The doctor has checked you carefully, but problems can develop later. If you notice any problems or new symptoms, get medical treatment right away. Follow-up care is a key part of your treatment and safety. Be sure to make and go to all appointments, and call your doctor if you are having problems. It's also a good idea to know your test results and keep a list of the medicines you take. How can you care for yourself at home? · If the doctor gave you a sedative:  ¨ For 24 hours, don't do anything that requires attention to detail. It takes time for the medicine's effects to completely wear off. ¨ For your safety, do not drive or operate any machinery that could be dangerous. Wait until the medicine wears off and you can think clearly and react easily. · If your doctor prescribed antibiotics, take them exactly as directed. Do not stop taking them just because you feel better. You need to take the full course of antibiotics. · Be safe with medicines. Take pain medicines exactly as directed. ¨ If the doctor gave you a prescription medicine for pain, take it as prescribed.   ¨ If you are not taking a prescription pain medicine, ask your doctor if you can take an over-the-counter medicine. · If your doctor opened and drained your abscess, you may have gauze or other packing material inside your wound. Follow all instructions from your doctor on how to care for your wound. · Keep the area of your wound very clean. Use wet cotton balls, a warm washcloth, or baby wipes. Clean the area gently, especially after a bowel movement. When should you call for help? Call 911 anytime you think you may need emergency care. For example, call if:  ? · You have trouble breathing. ? · You passed out (lost consciousness). ?Call your doctor now or seek immediate medical care if:  ? · You have new or worse nausea or vomiting. ? · You have symptoms of infection, such as:  ¨ Increased pain, swelling, warmth, or redness. ¨ Red streaks leading from the area. ¨ Pus draining from the area. ¨ A fever. ? Watch closely for changes in your health, and be sure to contact your doctor if:  ? · You do not get better as expected. Where can you learn more? Go to http://molina-ofelia.info/. Enter [id number] in the search box to learn more about \"Pilonidal Abscess: Care Instructions. \"  Current as of: October 13, 2016  Content Version: 11.4  © 1809-2275 [employer]. Care instructions adapted under license by [employer] (which disclaims liability or warranty for this information). If you have questions about a medical condition or this instruction, always ask your healthcare professional. [place] [address] any warranty or liability for your use of this information. We hope that we have addressed all of your medical concerns. The examination and treatment you received in the Emergency Department were for an emergent problem and were not intended as complete care. It is important that you follow up with your healthcare provider(s) for ongoing care.  If your symptoms worsen or do not improve as expected, and you are unable to reach your usual health care provider(s), you should return to the Emergency Department. Today's healthcare is undergoing tremendous change, and patient satisfaction surveys are one of the many tools to assess the quality of medical care. You may receive a survey from the [employer] regarding your experience in the Emergency Department. I hope that your experience has been completely positive, particularly the medical care that I provided. As such, please participate in the survey; anything less than excellent does not meet my expectations or intentions. [facility] and [facility] participate in nationally recognized quality of care measures. If your blood pressure is greater than 120/80, as reported below, we urge that you seek medical care to address the potential of high blood pressure, commonly known as hypertension. Hypertension can be hereditary or can be caused by certain medical conditions, pain, stress, or \"white coat syndrome. \"       Please make an appointment with your health care provider(s) for follow up of your Emergency Department visit. VITALS:   Patient Vitals for the past 8 hrs:   Temp Pulse Resp BP SpO2   12/16/17 1414 98.5 °F (36.9 °C) (!) 120 14 124/86 95 %          Thank you for allowing us to provide you with medical care today. We realize that you have many choices for your emergency care needs. Please choose us in the future for any continued health care needs. Brett Pepper, [address]: [phone]            No results found for this or any previous visit (from the past 24 hour(s)). No results found.

## 2018-01-11 ENCOUNTER — HOSPITAL ENCOUNTER (OUTPATIENT)
Dept: PREADMISSION TESTING | Age: 53
Discharge: HOME | End: 2018-01-11
Payer: MEDICARE

## 2018-01-11 VITALS
SYSTOLIC BLOOD PRESSURE: 119 MMHG | HEART RATE: 101 BPM | OXYGEN SATURATION: 96 % | WEIGHT: 237.66 LBS | TEMPERATURE: 99.1 F | RESPIRATION RATE: 24 BRPM | DIASTOLIC BLOOD PRESSURE: 74 MMHG | BODY MASS INDEX: 40.57 KG/M2 | HEIGHT: 64 IN

## 2018-01-11 LAB
ANION GAP SERPL CALC-SCNC: 8 MMOL/L (ref 5–15)
ATRIAL RATE: 95 BPM
BUN SERPL-MCNC: 14 MG/DL (ref 6–20)
BUN/CREAT SERPL: 14 (ref 12–20)
CALCIUM SERPL-MCNC: 9.5 MG/DL (ref 8.5–10.1)
CALCULATED P AXIS, ECG09: 55 DEGREES
CALCULATED R AXIS, ECG10: -3 DEGREES
CALCULATED T AXIS, ECG11: 34 DEGREES
CHLORIDE SERPL-SCNC: 101 MMOL/L (ref 97–108)
CO2 SERPL-SCNC: 30 MMOL/L (ref 21–32)
CREAT SERPL-MCNC: 1.02 MG/DL (ref 0.55–1.02)
DIAGNOSIS, 93000: NORMAL
GLUCOSE SERPL-MCNC: 100 MG/DL (ref 65–100)
P-R INTERVAL, ECG05: 154 MS
POTASSIUM SERPL-SCNC: 4.2 MMOL/L (ref 3.5–5.1)
Q-T INTERVAL, ECG07: 382 MS
QRS DURATION, ECG06: 88 MS
QTC CALCULATION (BEZET), ECG08: 480 MS
SODIUM SERPL-SCNC: 139 MMOL/L (ref 136–145)
VENTRICULAR RATE, ECG03: 95 BPM

## 2018-01-11 PROCEDURE — 93005 ELECTROCARDIOGRAM TRACING: CPT

## 2018-01-11 PROCEDURE — 36415 COLL VENOUS BLD VENIPUNCTURE: CPT | Performed by: SURGERY

## 2018-01-11 PROCEDURE — 80048 BASIC METABOLIC PNL TOTAL CA: CPT | Performed by: SURGERY

## 2018-01-11 RX ORDER — ONDANSETRON 4 MG/1
4 TABLET, ORALLY DISINTEGRATING ORAL
COMMUNITY
End: 2018-05-10

## 2018-01-11 RX ORDER — HYDROCODONE BITARTRATE AND ACETAMINOPHEN 10; 325 MG/1; MG/1
1 TABLET ORAL
COMMUNITY
End: 2018-01-16

## 2018-01-11 RX ORDER — DOXYCYCLINE 100 MG/1
100 CAPSULE ORAL 2 TIMES DAILY
COMMUNITY
End: 2018-05-10

## 2018-01-11 RX ORDER — BUSPIRONE HYDROCHLORIDE 15 MG/1
TABLET ORAL
COMMUNITY
End: 2019-04-29

## 2018-01-11 NOTE — PERIOP NOTES
[facility] PREOPERATIVE INSTRUCTIONS    Surgery Date:   1/16/18      Surgery arrival time given by surgeon: NO  (If [facility] staff will call you between 3pm - 7pm the day before surgery with your arrival time. If your surgery is on a Monday, we will call you the preceding Friday. Please call [phone] after 7pm if you did not receive your arrival time.)  1. Report to the 2nd Floor Admitting Desk on the day of your surgery. Bring your insurance card, photo identification, and any copayment (if applicable). 2. You must have a responsible adult to drive you home and stay with you the first 24 hours after surgery if you are going home the same day of your surgery. 3. Nothing to eat or drink after midnight the night before surgery. This means NO water, gum, mints, coffee, juice, etc.    4. MEDICATIONS TO TAKE THE MORNING OF SURGERY WITH A SIP OF WATER:  Alprazolam, tizanidine, topiramate  5. If needed, take hydrocodone-acetaminophen & ondansetron   6. No alcoholic beverages 24 hours before and after your surgery. 7. If you are being admitted to the hospital, please leave personal belongings/luggage in your car until you have an assigned hospital room number. ( The hospital discharge time is 12 PM NOON. Your adult  should be at the hospital prior to the noon discharge time unless otherwise instructed.)   8. STOP Aspirin and/or any non-steroidal anti-inflammatory drugs (i.e. Ibuprofen, Naproxen, Advil, Aleve) as directed by your surgeon. Stop herbal supplements 1 week prior to surgery. 9. Wear comfortable clothes. Wear your glasses instead of contacts. Please leave all money, jewelry and valuables at home. No make-up, particularly mascara, the day of surgery. 10.  REMOVE ALL body piercings, rings, and jewelry and leave at home. Wear your hair loose or down, no pony-tails, buns, or any metal hair clips. 11. If you shower the morning of surgery, please do not apply any lotions, powders, or deodorants afterwards. Do not shave any body area within 24 hours of your surgery. 12. Please follow all instructions to avoid any potential surgical cancellation. 13. Should your physical condition change, (i.e. fever, cold, flu, etc.) please notify your surgeon as soon as possible. 14. It is important to be on time. If a situation occurs where you may be delayed, please call:  [phone] / [phone] on the day of surgery. 15. The [facility] staff can be reached at [phone]. 16. Special instructions: Free  parking 7am-5pm  17. PLEASE TAKE YOUR losartan-hydrochlorothiazide around 8pm on Monday (1/15)  The patient was contacted in person. She  verbalizes understanding of all instructions does not need reinforcement.

## 2018-01-15 ENCOUNTER — ANESTHESIA EVENT (OUTPATIENT)
Dept: SURGERY | Age: 53
End: 2018-01-15
Payer: MEDICARE

## 2018-01-15 NOTE — PERIOP NOTES
Pt verbalized concern during pre-op phone call re: latex allergy. Was told she would be first case to avoid potential contact with allergy causing substances. Pt re-assured OR uses latex free equipment.

## 2018-01-16 ENCOUNTER — ANESTHESIA (OUTPATIENT)
Dept: SURGERY | Age: 53
End: 2018-01-16
Payer: MEDICARE

## 2018-01-16 ENCOUNTER — HOSPITAL ENCOUNTER (OUTPATIENT)
Age: 53
Setting detail: OUTPATIENT SURGERY
Discharge: HOME | End: 2018-01-16
Attending: SURGERY | Admitting: SURGERY
Payer: MEDICARE

## 2018-01-16 VITALS
OXYGEN SATURATION: 96 % | SYSTOLIC BLOOD PRESSURE: 93 MMHG | TEMPERATURE: 98.1 F | HEART RATE: 91 BPM | DIASTOLIC BLOOD PRESSURE: 61 MMHG | RESPIRATION RATE: 11 BRPM

## 2018-01-16 DIAGNOSIS — L05.01 PILONIDAL CYST WITH ABSCESS: Primary | ICD-10-CM

## 2018-01-16 PROCEDURE — 76060000033 HC ANESTHESIA 1 TO 1.5 HR: Performed by: SURGERY

## 2018-01-16 PROCEDURE — 74011250636 HC RX REV CODE- 250/636

## 2018-01-16 PROCEDURE — 76010000149 HC OR TIME 1 TO 1.5 HR: Performed by: SURGERY

## 2018-01-16 PROCEDURE — 76210000026 HC REC RM PH II 1 TO 1.5 HR: Performed by: SURGERY

## 2018-01-16 PROCEDURE — 77030008684 HC TU ET CUF COVD -B: Performed by: NURSE ANESTHETIST, CERTIFIED REGISTERED

## 2018-01-16 PROCEDURE — 77030026438 HC STYL ET INTUB CARD -A: Performed by: NURSE ANESTHETIST, CERTIFIED REGISTERED

## 2018-01-16 PROCEDURE — 77030003029 HC SUT VCRL J&J -B: Performed by: SURGERY

## 2018-01-16 PROCEDURE — 77030032060 HC PWDR HEMSTAT ARISTA ASRB 3GM BARD -C: Performed by: SURGERY

## 2018-01-16 PROCEDURE — 77030003028 HC SUT VCRL J&J -A: Performed by: SURGERY

## 2018-01-16 PROCEDURE — 88304 TISSUE EXAM BY PATHOLOGIST: CPT | Performed by: SURGERY

## 2018-01-16 PROCEDURE — 74011250636 HC RX REV CODE- 250/636: Performed by: ANESTHESIOLOGY

## 2018-01-16 PROCEDURE — 74011250637 HC RX REV CODE- 250/637: Performed by: SURGERY

## 2018-01-16 PROCEDURE — 77030013079 HC BLNKT BAIR HGGR 3M -A: Performed by: ANESTHESIOLOGY

## 2018-01-16 PROCEDURE — 77030002916 HC SUT ETHLN J&J -A: Performed by: SURGERY

## 2018-01-16 PROCEDURE — 77030020782 HC GWN BAIR PAWS FLX 3M -B

## 2018-01-16 PROCEDURE — C9290 INJ, BUPIVACAINE LIPOSOME: HCPCS | Performed by: SURGERY

## 2018-01-16 PROCEDURE — 77030018836 HC SOL IRR NACL ICUM -A: Performed by: SURGERY

## 2018-01-16 PROCEDURE — 74011250636 HC RX REV CODE- 250/636: Performed by: SURGERY

## 2018-01-16 PROCEDURE — 74011000250 HC RX REV CODE- 250: Performed by: SURGERY

## 2018-01-16 PROCEDURE — 77030011640 HC PAD GRND REM COVD -A: Performed by: SURGERY

## 2018-01-16 PROCEDURE — 74011000250 HC RX REV CODE- 250

## 2018-01-16 PROCEDURE — 77030032490 HC SLV COMPR SCD KNE COVD -B: Performed by: SURGERY

## 2018-01-16 PROCEDURE — 77030019908 HC STETH ESOPH SIMS -A: Performed by: NURSE ANESTHETIST, CERTIFIED REGISTERED

## 2018-01-16 RX ORDER — DIPHENHYDRAMINE HYDROCHLORIDE 50 MG/ML
12.5 INJECTION, SOLUTION INTRAMUSCULAR; INTRAVENOUS AS NEEDED
Status: DISCONTINUED | OUTPATIENT
Start: 2018-01-16 | End: 2018-01-16 | Stop reason: HOSPADM

## 2018-01-16 RX ORDER — LIDOCAINE HYDROCHLORIDE 10 MG/ML
0.1 INJECTION, SOLUTION EPIDURAL; INFILTRATION; INTRACAUDAL; PERINEURAL AS NEEDED
Status: DISCONTINUED | OUTPATIENT
Start: 2018-01-16 | End: 2018-01-16 | Stop reason: HOSPADM

## 2018-01-16 RX ORDER — SODIUM CHLORIDE 0.9 % (FLUSH) 0.9 %
5-10 SYRINGE (ML) INJECTION AS NEEDED
Status: DISCONTINUED | OUTPATIENT
Start: 2018-01-16 | End: 2018-01-16 | Stop reason: HOSPADM

## 2018-01-16 RX ORDER — KETOROLAC TROMETHAMINE 30 MG/ML
INJECTION, SOLUTION INTRAMUSCULAR; INTRAVENOUS AS NEEDED
Status: DISCONTINUED | OUTPATIENT
Start: 2018-01-16 | End: 2018-01-16 | Stop reason: HOSPADM

## 2018-01-16 RX ORDER — SODIUM CHLORIDE 0.9 % (FLUSH) 0.9 %
5-10 SYRINGE (ML) INJECTION EVERY 8 HOURS
Status: DISCONTINUED | OUTPATIENT
Start: 2018-01-16 | End: 2018-01-16 | Stop reason: HOSPADM

## 2018-01-16 RX ORDER — HYDROMORPHONE HYDROCHLORIDE 2 MG/ML
0.5 INJECTION, SOLUTION INTRAMUSCULAR; INTRAVENOUS; SUBCUTANEOUS
Status: DISCONTINUED | OUTPATIENT
Start: 2018-01-16 | End: 2018-01-16 | Stop reason: HOSPADM

## 2018-01-16 RX ORDER — POLYETHYLENE GLYCOL 3350 17 G/17G
17 POWDER, FOR SOLUTION ORAL DAILY
Qty: 7 PACKET | Refills: 0 | Status: SHIPPED | OUTPATIENT
Start: 2018-01-16 | End: 2018-05-10

## 2018-01-16 RX ORDER — ROCURONIUM BROMIDE 10 MG/ML
INJECTION, SOLUTION INTRAVENOUS AS NEEDED
Status: DISCONTINUED | OUTPATIENT
Start: 2018-01-16 | End: 2018-01-16 | Stop reason: HOSPADM

## 2018-01-16 RX ORDER — SODIUM CHLORIDE, SODIUM LACTATE, POTASSIUM CHLORIDE, CALCIUM CHLORIDE 600; 310; 30; 20 MG/100ML; MG/100ML; MG/100ML; MG/100ML
100 INJECTION, SOLUTION INTRAVENOUS CONTINUOUS
Status: DISCONTINUED | OUTPATIENT
Start: 2018-01-16 | End: 2018-01-16 | Stop reason: HOSPADM

## 2018-01-16 RX ORDER — LIDOCAINE HYDROCHLORIDE 20 MG/ML
INJECTION, SOLUTION EPIDURAL; INFILTRATION; INTRACAUDAL; PERINEURAL AS NEEDED
Status: DISCONTINUED | OUTPATIENT
Start: 2018-01-16 | End: 2018-01-16 | Stop reason: HOSPADM

## 2018-01-16 RX ORDER — CEFAZOLIN SODIUM IN 0.9 % NACL 2 G/50 ML
2 INTRAVENOUS SOLUTION, PIGGYBACK (ML) INTRAVENOUS ONCE
Status: COMPLETED | OUTPATIENT
Start: 2018-01-16 | End: 2018-01-16

## 2018-01-16 RX ORDER — HYDROCODONE BITARTRATE AND ACETAMINOPHEN 5; 325 MG/1; MG/1
1-2 TABLET ORAL
Qty: 40 TAB | Refills: 0 | Status: SHIPPED | OUTPATIENT
Start: 2018-01-16 | End: 2018-05-10

## 2018-01-16 RX ORDER — PROPOFOL 10 MG/ML
INJECTION, EMULSION INTRAVENOUS AS NEEDED
Status: DISCONTINUED | OUTPATIENT
Start: 2018-01-16 | End: 2018-01-16 | Stop reason: HOSPADM

## 2018-01-16 RX ORDER — SUCCINYLCHOLINE CHLORIDE 20 MG/ML
INJECTION INTRAMUSCULAR; INTRAVENOUS AS NEEDED
Status: DISCONTINUED | OUTPATIENT
Start: 2018-01-16 | End: 2018-01-16 | Stop reason: HOSPADM

## 2018-01-16 RX ORDER — FENTANYL CITRATE 50 UG/ML
INJECTION, SOLUTION INTRAMUSCULAR; INTRAVENOUS AS NEEDED
Status: DISCONTINUED | OUTPATIENT
Start: 2018-01-16 | End: 2018-01-16 | Stop reason: HOSPADM

## 2018-01-16 RX ORDER — ONDANSETRON 2 MG/ML
INJECTION INTRAMUSCULAR; INTRAVENOUS AS NEEDED
Status: DISCONTINUED | OUTPATIENT
Start: 2018-01-16 | End: 2018-01-16 | Stop reason: HOSPADM

## 2018-01-16 RX ORDER — HYDROMORPHONE HYDROCHLORIDE 2 MG/ML
.25-1 INJECTION, SOLUTION INTRAMUSCULAR; INTRAVENOUS; SUBCUTANEOUS
Status: DISCONTINUED | OUTPATIENT
Start: 2018-01-16 | End: 2018-01-16 | Stop reason: HOSPADM

## 2018-01-16 RX ORDER — MIDAZOLAM HYDROCHLORIDE 1 MG/ML
INJECTION, SOLUTION INTRAMUSCULAR; INTRAVENOUS AS NEEDED
Status: DISCONTINUED | OUTPATIENT
Start: 2018-01-16 | End: 2018-01-16 | Stop reason: HOSPADM

## 2018-01-16 RX ORDER — ONDANSETRON 2 MG/ML
4 INJECTION INTRAMUSCULAR; INTRAVENOUS AS NEEDED
Status: DISCONTINUED | OUTPATIENT
Start: 2018-01-16 | End: 2018-01-16 | Stop reason: HOSPADM

## 2018-01-16 RX ORDER — HYDROCODONE BITARTRATE AND ACETAMINOPHEN 5; 325 MG/1; MG/1
2 TABLET ORAL ONCE
Status: COMPLETED | OUTPATIENT
Start: 2018-01-16 | End: 2018-01-16

## 2018-01-16 RX ORDER — HYDROMORPHONE HYDROCHLORIDE 2 MG/ML
INJECTION, SOLUTION INTRAMUSCULAR; INTRAVENOUS; SUBCUTANEOUS AS NEEDED
Status: DISCONTINUED | OUTPATIENT
Start: 2018-01-16 | End: 2018-01-16 | Stop reason: HOSPADM

## 2018-01-16 RX ORDER — SODIUM CHLORIDE, SODIUM LACTATE, POTASSIUM CHLORIDE, CALCIUM CHLORIDE 600; 310; 30; 20 MG/100ML; MG/100ML; MG/100ML; MG/100ML
125 INJECTION, SOLUTION INTRAVENOUS CONTINUOUS
Status: DISCONTINUED | OUTPATIENT
Start: 2018-01-16 | End: 2018-01-16 | Stop reason: HOSPADM

## 2018-01-16 RX ADMIN — HYDROMORPHONE HYDROCHLORIDE 1 MG: 2 INJECTION, SOLUTION INTRAMUSCULAR; INTRAVENOUS; SUBCUTANEOUS at 16:55

## 2018-01-16 RX ADMIN — LIDOCAINE HYDROCHLORIDE 40 MG: 20 INJECTION, SOLUTION EPIDURAL; INFILTRATION; INTRACAUDAL; PERINEURAL at 16:42

## 2018-01-16 RX ADMIN — SUCCINYLCHOLINE CHLORIDE 100 MG: 20 INJECTION INTRAMUSCULAR; INTRAVENOUS at 16:42

## 2018-01-16 RX ADMIN — MIDAZOLAM HYDROCHLORIDE 3 MG: 1 INJECTION, SOLUTION INTRAMUSCULAR; INTRAVENOUS at 16:32

## 2018-01-16 RX ADMIN — HYDROMORPHONE HYDROCHLORIDE 1 MG: 2 INJECTION, SOLUTION INTRAMUSCULAR; INTRAVENOUS; SUBCUTANEOUS at 17:57

## 2018-01-16 RX ADMIN — MIDAZOLAM HYDROCHLORIDE 2 MG: 1 INJECTION, SOLUTION INTRAMUSCULAR; INTRAVENOUS at 16:33

## 2018-01-16 RX ADMIN — KETOROLAC TROMETHAMINE 15 MG: 30 INJECTION, SOLUTION INTRAMUSCULAR; INTRAVENOUS at 17:34

## 2018-01-16 RX ADMIN — FENTANYL CITRATE 50 MCG: 50 INJECTION, SOLUTION INTRAMUSCULAR; INTRAVENOUS at 16:37

## 2018-01-16 RX ADMIN — ONDANSETRON 4 MG: 2 INJECTION INTRAMUSCULAR; INTRAVENOUS at 16:56

## 2018-01-16 RX ADMIN — ROCURONIUM BROMIDE 5 MG: 10 INJECTION, SOLUTION INTRAVENOUS at 16:42

## 2018-01-16 RX ADMIN — PROPOFOL 200 MG: 10 INJECTION, EMULSION INTRAVENOUS at 16:42

## 2018-01-16 RX ADMIN — FENTANYL CITRATE 100 MCG: 50 INJECTION, SOLUTION INTRAMUSCULAR; INTRAVENOUS at 16:32

## 2018-01-16 RX ADMIN — SODIUM CHLORIDE, SODIUM LACTATE, POTASSIUM CHLORIDE, AND CALCIUM CHLORIDE 100 ML/HR: 600; 310; 30; 20 INJECTION, SOLUTION INTRAVENOUS at 13:26

## 2018-01-16 RX ADMIN — CEFAZOLIN 2 G: 1 INJECTION, POWDER, FOR SOLUTION INTRAMUSCULAR; INTRAVENOUS; PARENTERAL at 16:50

## 2018-01-16 RX ADMIN — HYDROMORPHONE HYDROCHLORIDE 0.5 MG: 2 INJECTION, SOLUTION INTRAMUSCULAR; INTRAVENOUS; SUBCUTANEOUS at 18:10

## 2018-01-16 RX ADMIN — FENTANYL CITRATE 100 MCG: 50 INJECTION, SOLUTION INTRAMUSCULAR; INTRAVENOUS at 16:34

## 2018-01-16 RX ADMIN — HYDROMORPHONE HYDROCHLORIDE 0.5 MG: 2 INJECTION INTRAMUSCULAR; INTRAVENOUS; SUBCUTANEOUS at 14:50

## 2018-01-16 RX ADMIN — SODIUM CHLORIDE, SODIUM LACTATE, POTASSIUM CHLORIDE, AND CALCIUM CHLORIDE: 600; 310; 30; 20 INJECTION, SOLUTION INTRAVENOUS at 17:30

## 2018-01-16 RX ADMIN — HYDROMORPHONE HYDROCHLORIDE 1 MG: 2 INJECTION, SOLUTION INTRAMUSCULAR; INTRAVENOUS; SUBCUTANEOUS at 17:45

## 2018-01-16 RX ADMIN — HYDROCODONE BITARTRATE AND ACETAMINOPHEN 2 TABLET: 5; 325 TABLET ORAL at 19:20

## 2018-01-16 NOTE — ANESTHESIA PREPROCEDURE EVALUATION
Anesthetic History          Comments: Awareness during colonoscopy     Review of Systems / Medical History  Patient summary reviewed and pertinent labs reviewed    Pulmonary        Sleep apnea: No treatment  Smoker (recently quit)         Neuro/Psych     seizures (last seizure 4 years ago): well controlled    Headaches and psychiatric history (anxiety/depression)     Cardiovascular    Hypertension: well controlled              Exercise tolerance: <4 METS     GI/Hepatic/Renal     GERD (no symptoms recently)    Renal disease: stones       Endo/Other          Pertinent negatives: Diabetes: borderline.    Other Findings              Physical Exam    Airway  Mallampati: II  TM Distance: 4 - 6 cm  Neck ROM: normal range of motion   Mouth opening: Normal     Cardiovascular    Rhythm: regular  Rate: normal         Dental         Pulmonary  Breath sounds clear to auscultation               Abdominal         Other Findings            Anesthetic Plan    ASA: 2  Anesthesia type: general          Induction: Intravenous  Anesthetic plan and risks discussed with: Patient

## 2018-01-16 NOTE — ANESTHESIA POSTPROCEDURE EVALUATION
Post-Anesthesia Evaluation and Assessment    Patient: Suman Altamirano MRN: [id number]  SSN: [id number]    YOB: 1965  Age: 47 y.o. Sex: female       Cardiovascular Function/Vital Signs  Visit Vitals    BP 98/54    Pulse 91    Temp 36.7 °C (98.1 °F)    Resp 17    SpO2 99%       Patient is status post general anesthesia for Procedure(s):  PILONIDAL CYSTECTOMY ( LATEX ALLERGY). Nausea/Vomiting: None    Postoperative hydration reviewed and adequate. Pain:  Pain Scale 1: Numeric (0 - 10) (01/16/18 1839)  Pain Intensity 1: 5 (01/16/18 1839)   Managed    Neurological Status:   Neuro (WDL): Within Defined Limits (01/16/18 1828)  Neuro  Neurologic State: Alert (01/16/18 1828)  Orientation Level: Oriented X4 (01/16/18 1828)  Cognition: Follows commands (01/16/18 1828)  Speech: Clear (01/16/18 1828)  Size L Pupil (mm): 3 (01/16/18 1317)  Assessment R Pupil: Brisk (01/16/18 1317)  Size R Pupil (mm): 3 (01/16/18 1317)  LUE Motor Response: Purposeful (01/16/18 1828)  LLE Motor Response: Purposeful (01/16/18 1828)  RUE Motor Response: Purposeful (01/16/18 1828)  RLE Motor Response: Purposeful (01/16/18 1828)   At baseline    Mental Status and Level of Consciousness: Arousable    Pulmonary Status:   O2 Device: Room air (01/16/18 1828)   Adequate oxygenation and airway patent    Complications related to anesthesia: None    Post-anesthesia assessment completed.  No concerns    Signed By: Yusef Painting MD     January 16, 2018

## 2018-01-16 NOTE — IP AVS SNAPSHOT
[address] 
[phone] Patient: Phong Zaragoza MRN: [id number] :[id number] About your hospitalization You were admitted on:  2018 You last received care in the:  [facility] PACU You were discharged on:  2018 Why you were hospitalized Your primary diagnosis was:  Not on File Follow-up Information Follow up With Details Comments Contact Info Mamta Rodriguez MD Schedule an appointment as soon as possible for a visit  [address] [facility] [address] 
[phone] Discharge Orders None A check sandra indicates which time of day the medication should be taken. My Medications START taking these medications Instructions Each Dose to Equal  
 Morning Noon Evening Bedtime  
 docusate sodium 50 mg capsule Commonly known as:  Arlean Lavender Your last dose was: Your next dose is: Take 2 Caps by mouth two (2) times daily as needed for up to 90 days. Indications: constipation 100 mg HYDROcodone-acetaminophen 5-325 mg per tablet Commonly known as:  Geri Arrow Replaces:  HYDROcodone-acetaminophen  mg tablet Your last dose was: Your next dose is: Take 1-2 Tabs by mouth every four (4) hours as needed for Pain (Acute post-operative pain). Max Daily Amount: 12 Tabs. Indications: Pain, pilonidal cyst excision 1-2 Tab  
    
   
   
   
  
 polyethylene glycol 17 gram packet Commonly known as:  Ernestene Score Your last dose was: Your next dose is: Take 1 Packet by mouth daily. Indications: constipation, If constipation last more than 24-48 hours, despite colace use. 17 g CONTINUE taking these medications Instructions Each Dose to Equal  
 Morning Noon Evening Bedtime ALPRAZolam 1 mg tablet Commonly known as:  Julianna Lee Your last dose was: Your next dose is: Take 1 mg by mouth two (2) times a day. 1 mg  
    
   
   
   
  
 busPIRone 15 mg tablet Commonly known as:  BUSPAR Your last dose was: Your next dose is: Take 7.5 mg by mouth nightly. 7.5 mg  
    
   
   
   
  
 CHANTIX 1 mg tablet Generic drug:  varenicline Your last dose was: Your next dose is: Take 1 mg by mouth two (2) times daily (after meals). 1 mg  
    
   
   
   
  
 doxycycline 100 mg capsule Commonly known as:  Barbara Moss Your last dose was: Your next dose is: Take 100 mg by mouth two (2) times a day. 100 mg * DULoxetine 30 mg capsule Commonly known as:  CYMBALTA Your last dose was: Your next dose is: Take 30 mg by mouth nightly. 30 mg  
    
   
   
   
  
 * DULoxetine 60 mg capsule Commonly known as:  CYMBALTA Your last dose was: Your next dose is: Take 60 mg by mouth nightly. 60 mg  
    
   
   
   
  
 ibuprofen 800 mg tablet Commonly known as:  MOTRIN Your last dose was: Your next dose is: Take 800 mg by mouth. 800 mg  
    
   
   
   
  
 losartan-hydroCHLOROthiazide 100-25 mg per tablet Commonly known as:  HYZAAR Your last dose was: Your next dose is: Take 1 Tab by mouth nightly. 1 Tab  
    
   
   
   
  
 ondansetron 4 mg disintegrating tablet Commonly known as:  ZOFRAN ODT Your last dose was: Your next dose is: Take 4 mg by mouth every eight (8) hours as needed for Nausea. 4 mg  
    
   
   
   
  
 tiZANidine 4 mg capsule Commonly known as:  Trace Contreras Your last dose was: Your next dose is: Take 4 mg by mouth three (3) times daily. 4 mg TOPAMAX 25 mg tablet Generic drug:  topiramate Your last dose was: Your next dose is: Take 25 mg by mouth two (2) times a day. 25 mg  
    
   
   
   
  
 traZODone 100 mg tablet Commonly known as:  Chas Rosales Your last dose was: Your next dose is: Take 200 mg by mouth nightly. 1-3 tabs per night  
 200 mg * Notice: This list has 2 medication(s) that are the same as other medications prescribed for you. Read the directions carefully, and ask your doctor or other care provider to review them with you. STOP taking these medications HYDROcodone-acetaminophen  mg tablet Commonly known as:  Luster Payor Replaced by:  HYDROcodone-acetaminophen 5-325 mg per tablet Where to Get Your Medications Information on where to get these meds will be given to you by the nurse or doctor. ! Ask your nurse or doctor about these medications  
  docusate sodium 50 mg capsule HYDROcodone-acetaminophen 5-325 mg per tablet  
 polyethylene glycol 17 gram packet Discharge Instructions 450[address] [facility] 951:  [phone]. Call their office (or call my office) if there is no direct coordination/communication by the next morning. Gross wound Dimensions:  7.5cm total length (craniocaudal). No undermining. Clean surgical wound. Instructions:  Have the patient shower and get the wound and packing wet. The patient can clean their skin with mild soap and water (or dermaclens). Remove the midline dressings, if not removed by patient in the shower. After 30 minutes of wetting the dressings, perform the wound care. Be prepared to medicate the patient for pain control if needed. 1) Moisten new packing with normal saline. Wring out dressing, unravel it, and place it on a clean surface. 2) Remove the dressing that is in the wound. 3) With an cotton-tipped applicator, place saline moistened packing until the wound is full. 4) Cover the wound with 4x4s, an ABD and appropriate tape. Thank you in advance! Yahaira Yoon Pilonidal Cyst Excision: What to Expect at Home Your Recovery The amount of time it will take for you to heal depends on the way your surgery was done. If the cut (incision) was closed with stitches, it will probably take about 4 weeks to completely heal. If your incision is left open, it may take from a few weeks to several months to heal. After the incision has healed, you will have a scar where the cyst was removed. This will fade and become softer with time. Most people can go back to work and most activities after 2 to 4 weeks. Until you have completely healed, you will need to avoid strenuous exercise and activities that require long periods of sitting. This care sheet gives you a general idea about how long it will take for you to recover. But each person recovers at a different pace. Follow the steps below to get better as quickly as possible. How can you care for yourself at home? Activity ? · Rest when you feel tired. Getting enough sleep will help you recover. ? · Try to walk each day. Start by walking a little more than you did the day before. Bit by bit, increase the amount you walk. Walking boosts blood flow and helps prevent pneumonia and constipation. ? · Shower as usual. Rosy Jodie the area around your incision dry with a towel when you are done. Avoid baths until the wound is completely healed. Keep the area dry and clean. ? · Ask your doctor when you can drive again. ? · Avoid sitting for a long time or sitting on hard surfaces until your incision has healed. ? · Most people are able to return to work within 2 to 4 weeks after surgery. Diet ? · You can eat your normal diet. If your stomach is upset, try bland, low-fat foods like plain rice, broiled chicken, toast, and yogurt. ? · Drink plenty of fluids (unless your doctor tells you not to). ? · You may notice that your bowel movements are not regular right after your surgery. This is common. Try to avoid constipation and straining with bowel movements. You may want to take a fiber supplement every day. If you have not had a bowel movement after a couple of days, ask your doctor about taking a mild laxative. Medicines ? · Your doctor will tell you if and when you can restart your medicines. He or she will also give you instructions about taking any new medicines. ? · If you take blood thinners, such as warfarin (Coumadin), clopidogrel (Plavix), or aspirin, be sure to talk to your doctor. He or she will tell you if and when to start taking those medicines again. Make sure that you understand exactly what your doctor wants you to do. ? · Take pain medicines exactly as directed. ¨ If the doctor gave you a prescription medicine for pain, take it as prescribed. ¨ If you are not taking a prescription pain medicine, ask your doctor if you can take an over-the-counter medicine. ? · If your doctor prescribed antibiotics, take them as directed. Do not stop taking them just because you feel better. You need to take the full course of antibiotics. ? · If you think your pain medicine is making you sick to your stomach: 
¨ Take your medicine after meals (unless your doctor has told you not to). ¨ Ask your doctor for a different pain medicine. Incision care ? · If your incision was closed with stitches: 
¨ Wash the area daily with warm, soapy water and pat it dry. Don't use hydrogen peroxide or alcohol, which can slow healing. ¨ You may cover the area with a gauze bandage if it weeps or rubs against clothing. Change the bandage every day. ¨ Keep the area clean and dry. ? · If your incision was left open to heal, change the bandage, called a dressing, as instructed by your doctor. ¨ Dressing changes may hurt at first. Taking pain medicine about half an hour before you change the dressing can help. ¨ If your dressing sticks to your wound, try soaking the dressing in warm water for about 10 minutes before you remove it. You can do this in the shower or by placing a wet washcloth over the dressing. ¨ You may notice greenish gray fluid from your wound as you start to heal. This is normal. It is a sign that your wound is healing. Other instructions ? · Use a doughnut cushion if sitting is uncomfortable. ? · Keep the area around your wound free from hair. Ask your doctor what method of hair removal will work best.  
Follow-up care is a key part of your treatment and safety. Be sure to make and go to all appointments, and call your doctor if you are having problems. It's also a good idea to know your test results and keep a list of the medicines you take. When should you call for help? Call 911 anytime you think you may need emergency care. For example, call if: 
? · You passed out (lost consciousness). ? · You have sudden chest pain and shortness of breath, or you cough up blood. ? · You have severe belly pain. ?Call your doctor now or seek immediate medical care if: 
? · You have pain that does not get better after you take your pain medicine. ? · Your incision was closed with stitches and the stitches come loose, or your incision comes open. ? · Bright red blood has soaked through the bandage over your incision. ? · You have signs of infection, such as: 
¨ Increased pain, swelling, warmth, or redness. ¨ Red streaks leading from the incision. ¨ Pus draining from the incision. ¨ A fever. ? Watch closely for changes in your health, and be sure to contact your doctor if you have any problems. Where can you learn more? Go to http://molina-ofelia.info/. Enter C004 in the search box to learn more about \"Pilonidal Cyst Excision: What to Expect at Home. \" 
 Current as of: October 13, 2016 Content Version: 11.4 © 9718-8121 [address] [employer]. Care instructions adapted under license by [employer] (which disclaims liability or warranty for this information). If you have questions about a medical condition or this instruction, always ask your healthcare professional. [address] any warranty or liability for your use of this information. DISCHARGE SUMMARY from your Nurse The following personal items collected during your admission are returned to you:  
Dental Appliance: Dental Appliances: None Vision: Visual Aid: None Hearing Aid:   
Jewelry: Jewelry: None Clothing: Clothing:  (street clothes to preop locker) Other Valuables: Other Valuables: None Valuables sent to safe:   
 
PATIENT INSTRUCTIONS: 
 
After general anesthesia or intravenous sedation, for 24 hours or while taking prescription Narcotics: · Limit your activities · Do not drive and operate hazardous machinery · Do not make important personal or business decisions · Do  not drink alcoholic beverages · If you have not urinated within 8 hours after discharge, please contact your surgeon on call. Report the following to your surgeon: 
· Excessive pain, swelling, redness or odor of or around the surgical area · Temperature over 100.5 · Nausea and vomiting lasting longer than 4 hours or if unable to take medications · Any signs of decreased circulation or nerve impairment to extremity: change in color, persistent  numbness, tingling, coldness or increase pain · Any questions [address] Breathing deep and coughing are very important exercises to do after surgery. Deep breathing and coughing open the little air tubes and air sacks in your lungs. You take deep breaths every day. You may not even notice - it is just something you do when you sigh or yawn. It is a natural exercise you do to keep these air passages open. After surgery, take deep breaths and cough, on purpose. Coughing and deep breathing help prevent bronchitis and pneumonia after surgery. If you had chest or belly surgery, use a pillow as a \"hug sara\" and hold it tightly to your chest or belly when you cough. DIRECTIONS: 
6. Take 10 to 15 slow deep breaths every hour while awake. 7. Breathe in deeply, and hold it for 2 seconds. 8. Exhale slowly through puckered lips, like blowing up a balloon. 9. After every 4th or 5th deep breath, hug your pillow to your chest or belly and give a hard, deep cough. Yes, it will probably hurt. But doing this exercise is very important part of healing after surgery. Take your pain medicine to help you do this exercise without too much pain. IF YOU HAVE BEEN DIAGNOSED WITH SLEEP APNEA, PLEASE USE YOUR SLEEP APNEA DEVICE OR CPAP MACHINE WHEN YOU INTEND TO NAP AFTER TAKING PAIN MEDICATION. Ankle Pumps Ankle pumps increase the circulation of oxygenated blood to your lower extremities and decrease your risk for circulation problems such as blood clots. They also stretch the muscles, tendons and ligaments in your foot and ankle, and prevent joint contracture in the ankle and foot, especially after surgeries on the legs. It is important to do ankle pump exercises regularly after surgery because immobility increases your risk for developing a blood clot. Your doctor may also have you take an Aspirin for the next few days as well. If your doctor did not ask you to take an Aspirin, consult with him before starting Aspirin therapy on your own. Slowly point your foot forward, feeling the muscles on the top of your lower leg stretch, and hold this position for 5 seconds. Next, pull your foot back toward you as far as possible, stretching the calf muscles, and hold that position for 5 seconds. Repeat with the other foot. Perform 10 repetitions every hour while awake for both ankles if possible (down and then up with the foot once is one repetition). You should feel gentle stretching of the muscles in your lower leg when doing this exercise. If you feel pain, or your range of motion is limited, don't  Push too hard. Only go the limit your joint and muscles will let you go. If you have increasing pain, progressively worsening leg warmth or swelling, STOP the exercise and call your doctor. Below is information about the medications your doctor is prescribing after your visit: 
 
 
1. In your internet browser, go to [email or web address] 2. Click on the First Time User? Click Here link in the Sign In box. You will see the New Member Sign Up page. 3. Enter your [employer] Access Code exactly as it appears below. You will not need to use this code after youve completed the sign-up process. If you do not sign up before the expiration date, you must request a new code. · [employer] Access Code: [email or web address] Expires: 1/21/2018 12:33 PM 
 
4. Enter the last four digits of your Social Security Number (xxxx) and Date of Birth (mm/dd/yyyy) as indicated and click Submit. You will be taken to the next sign-up page. 5. Create a Organically Maidt ID. This will be your [employer] login ID and cannot be changed, so think of one that is secure and easy to remember. 6. Create a [employer] password. You can change your password at any time. 7. Enter your Password Reset Question and Answer. This can be used at a later time if you forget your password. 8. Enter your e-mail address. You will receive e-mail notification when new information is available in [address] 9. Click Sign Up. You can now view and download portions of your medical record. 10. Click the Download Summary menu link to download a portable copy of your medical information. If you have questions, please visit the Frequently Asked Questions section of the [employer] website. Remember, [employer] is NOT to be used for urgent needs. For medical emergencies, dial 911. Now available from your iPhone and Android! Providers Seen During Your Hospitalization Provider Specialty Primary office phone Luis Eduardo Peter, [address] [facility] [phone] Your Primary Care Physician (PCP) Primary Care Physician Office Phone Office Fax Bear, 0[address] [facility] [phone] You are allergic to the following Allergen Reactions Latex, Natural Rubber Rash Aspirin Other (comments) Excessive bleeding Codeine Palpitations Contrast Agent (Iodine) Rash Imitrex (Sumatriptan) Palpitations Other (comments)  
 thougt she was having an MI Percocet (Oxycodone-Acetaminophen) Palpitations Recent Documentation OB Status Smoking Status Postmenopausal Current Every Day Smoker Emergency Contacts Name Discharge Info Relation Home Work Mobile Bessy Ochoa CAREGIVER [3] Daughter [21] [phone] Patient Belongings The following personal items are in your possession at time of discharge: 
  Dental Appliances: None  Visual Aid: None      Home Medications: None   Jewelry: None  Clothing:  (street clothes to preop locker)    Other Valuables: None Please provide this summary of care documentation to your next provider. Signatures-by signing, you are acknowledging that this After Visit Summary has been reviewed with you and you have received a copy. Patient Signature:  ____________________________________________________________ Date:  ____________________________________________________________  
  
[facility] Justin Provider Signature:  ____________________________________________________________ Date:  ____________________________________________________________

## 2018-01-16 NOTE — H&P
Assessment:     Pilonidal cyst with recurrent abscess    Plan:     Excision of pilonidal cyst     January 16, 2018          General Surgery History and Physical    Subjective:      Xavier Zamora is a 47 y.o.  female who presents with pilonidal cyst, abscess. Please see prior paper h/p for full details.       Past Medical History:   Diagnosis Date    Adverse effect of anesthesia     pt states she could feel colonoscopy & tonsillectomy but could not tell providers she could feel it    Autoimmune disease ([facility] [address]     fibromyalgia    Cavernous hemangioma of intracranial structure ([facility] [address] 5/2/2011    Cavernous hemangiomas     Depressive disorder, not elsewhere classified 10/1/2013    anxiety    Hypertension     Migraine 5/2/2011    Seizures ([facility] [address] 2012    last seizure grand mal (born w/seizure disorder)    Unspecified sleep apnea     does not use c-pap     Past Surgical History:   Procedure Laterality Date    HX CHOLECYSTECTOMY      HX HEENT  2012    tonsillectomy    HX ORTHOPAEDIC      disc fusion 2010, right knee cyst removed    HX OTHER SURGICAL      kidney stones removal - basket removal & lazer removal    NEUROLOGICAL PROCEDURE UNLISTED  1996, 2006    Cavernous brain angioma(s) removed    SURGERY,BRAIN/SPINE,W/COMPUTER        Family History   Problem Relation Age of Onset    Cancer Mother      breast    Cancer Father     Migraines Father     Diabetes Father      Social History     Social History    Marital status: SINGLE     Spouse name: N/A    Number of children: N/A    Years of education: N/A     Social History Main Topics    Smoking status: Current Every Day Smoker     Packs/day: 0.25    Smokeless tobacco: Never Used      Comment: on chantix, smokes 1-2 cigarettes a day    Alcohol use No    Drug use: No    Sexual activity: Not Asked     Other Topics Concern    None     Social History Narrative      Current Facility-Administered Medications   Medication Dose Route Frequency    lidocaine (PF) (XYLOCAINE) 10 mg/mL (1 %) injection 0.1 mL  0.1 mL SubCUTAneous PRN    lactated Ringers infusion  100 mL/hr IntraVENous CONTINUOUS    sodium chloride (NS) flush 5-10 mL  5-10 mL IntraVENous Q8H    sodium chloride (NS) flush 5-10 mL  5-10 mL IntraVENous PRN    sodium chloride (NS) flush 5-10 mL  5-10 mL IntraVENous Q8H    sodium chloride (NS) flush 5-10 mL  5-10 mL IntraVENous PRN    lidocaine (PF) (XYLOCAINE) 10 mg/mL (1 %) injection 0.1 mL  0.1 mL SubCUTAneous PRN    ceFAZolin in 0.9% NS (ANCEF) IVPB soln 2 g  2 g IntraVENous ONCE    HYDROmorphone (DILAUDID) injection 0.5 mg  0.5 mg IntraVENous Q20MIN PRN      Allergies   Allergen Reactions    Latex, Natural Rubber Rash    Aspirin Other (comments)     Excessive bleeding      Codeine Palpitations    Contrast Agent [Iodine] Rash    Imitrex [Sumatriptan] Palpitations and Other (comments)     thougt she was having an MI    Percocet [Oxycodone-Acetaminophen] Palpitations       Review of Systems:     []     Unable to obtain  ROS due to  []    mental status change  []    sedated   []    intubated   [x]    Total of 12 system negative, unless specified below or in HPI:  Constitutional: negative fever, negative chills, negative weight loss  Eyes:   negative visual changes  ENT:   negative sore throat, tongue or lip swelling  Respiratory:  negative cough, negative dyspnea  Cards:  negative for chest pain, palpitations, lower extremity edema  GI:   negative for nausea, vomiting, diarrhea, and abdominal pain  :  negative for frequency, dysuria  Integument:  See HPI  Heme:  negative for easy bruising and gum/nose bleeding  Musculoskel: negative for myalgias,  back pain and muscle weakness  Neuro:  negative for headaches, dizziness, vertigo  Psych:  negative for feelings of anxiety, depression     Objective:      Patient Vitals for the past 8 hrs:   BP Temp Pulse Resp SpO2   18 1311 113/62 97.5 °F (36.4 °C) 99 13 99 %       Temp (24hrs), Av.5 °F (36.4 °C), Min:97.5 °F (36.4 °C), Max:97.5 °F (36.4 °C)      Physical Exam:  General:  Alert, cooperative, no distress, appears stated age. Eyes:  Conjunctivae/corneas clear. PERRL, EOMs intact. Nose: Nares normal. Septum midline. Mucosa normal. No drainage or sinus tenderness. Mouth/Throat: Lips, mucosa, and tongue normal. Teeth and gums normal.   Neck: Supple, symmetrical, trachea midline, no adenopathy, thyroid: no enlargment/tenderness/nodules, no carotid bruit and no JVD. Back:   Symmetric, no curvature. ROM normal. No CVA tenderness. Lungs:   Clear to auscultation bilaterally. Heart:  Regular rate and rhythm, S1, S2 normal, no murmur, click, rub or gallop. Abdomen:   Soft, non-tender. Bowel sounds normal. No masses,  No organomegaly. Extremities: Extremities normal, atraumatic, no cyanosis or edema. Pulses: 2+ and symmetric all extremities. Skin: Skin color, texture, turgor normal.  Stigmata of drainage of gluteal abscess. Midline pilonidal sinus. Lymph nodes: Cervical, supraclavicular, and axillary nodes normal.     Labs: No results for input(s): WBC, HGB, HCT, PLT, HGBEXT, HCTEXT, PLTEXT in the last 72 hours. No results for input(s): NA, K, CL, CO2, GLU, BUN, CREA, CA, MG, PHOS, ALB, TBIL, TBILI, SGOT, ALT in the last 72 hours. No results for input(s): INR in the last 72 hours.     No lab exists for component: INREXT

## 2018-01-16 NOTE — IP AVS SNAPSHOT
[facility] 
 
 
 [address] [place] [address] 
[phone] Patient: Kemi Damon MRN: [id number] :1965 A check sandra indicates which time of day the medication should be taken. My Medications START taking these medications Instructions Each Dose to Equal  
 Morning Noon Evening Bedtime  
 docusate sodium 50 mg capsule Commonly known as:  Malena Yancey Your last dose was: Your next dose is: Take 2 Caps by mouth two (2) times daily as needed for up to 90 days. Indications: constipation 100 mg HYDROcodone-acetaminophen 5-325 mg per tablet Commonly known as:  1463 Saad Cook Replaces:  HYDROcodone-acetaminophen  mg tablet Your last dose was: Your next dose is: Take 1-2 Tabs by mouth every four (4) hours as needed for Pain (Acute post-operative pain). Max Daily Amount: 12 Tabs. Indications: Pain, pilonidal cyst excision 1-2 Tab  
    
   
   
   
  
 polyethylene glycol 17 gram packet Commonly known as:  Bryan Mcrae Your last dose was: Your next dose is: Take 1 Packet by mouth daily. Indications: constipation, If constipation last more than 24-48 hours, despite colace use. 17 g CONTINUE taking these medications Instructions Each Dose to Equal  
 Morning Noon Evening Bedtime ALPRAZolam 1 mg tablet Commonly known as:  Rannie Manual Your last dose was: Your next dose is: Take 1 mg by mouth two (2) times a day. 1 mg  
    
   
   
   
  
 busPIRone 15 mg tablet Commonly known as:  BUSPAR Your last dose was: Your next dose is: Take 7.5 mg by mouth nightly. 7.5 mg  
    
   
   
   
  
 CHANTIX 1 mg tablet Generic drug:  varenicline Your last dose was: Your next dose is: Take 1 mg by mouth two (2) times daily (after meals). 1 mg  
    
   
   
   
  
 doxycycline 100 mg capsule Commonly known as:  Leroy Vieyra Your last dose was: Your next dose is: Take 100 mg by mouth two (2) times a day. 100 mg * DULoxetine 30 mg capsule Commonly known as:  CYMBALTA Your last dose was: Your next dose is: Take 30 mg by mouth nightly. 30 mg  
    
   
   
   
  
 * DULoxetine 60 mg capsule Commonly known as:  CYMBALTA Your last dose was: Your next dose is: Take 60 mg by mouth nightly. 60 mg  
    
   
   
   
  
 ibuprofen 800 mg tablet Commonly known as:  MOTRIN Your last dose was: Your next dose is: Take 800 mg by mouth. 800 mg  
    
   
   
   
  
 losartan-hydroCHLOROthiazide 100-25 mg per tablet Commonly known as:  HYZAAR Your last dose was: Your next dose is: Take 1 Tab by mouth nightly. 1 Tab  
    
   
   
   
  
 ondansetron 4 mg disintegrating tablet Commonly known as:  ZOFRAN ODT Your last dose was: Your next dose is: Take 4 mg by mouth every eight (8) hours as needed for Nausea. 4 mg  
    
   
   
   
  
 tiZANidine 4 mg capsule Commonly known as:  Norberto Dela Cruz Your last dose was: Your next dose is: Take 4 mg by mouth three (3) times daily. 4 mg  
    
   
   
   
  
 TOPAMAX 25 mg tablet Generic drug:  topiramate Your last dose was: Your next dose is: Take 25 mg by mouth two (2) times a day. 25 mg  
    
   
   
   
  
 traZODone 100 mg tablet Commonly known as:  Jeane Friedman Your last dose was: Your next dose is: Take 200 mg by mouth nightly. 1-3 tabs per night  
 200 mg * Notice: This list has 2 medication(s) that are the same as other medications prescribed for you.  Read the directions carefully, and ask your doctor or other care provider to review them with you. STOP taking these medications HYDROcodone-acetaminophen  mg tablet Commonly known as:  Clemetine Trey Replaced by:  HYDROcodone-acetaminophen 5-325 mg per tablet Where to Get Your Medications Information on where to get these meds will be given to you by the nurse or doctor. ! Ask your nurse or doctor about these medications  
  docusate sodium 50 mg capsule HYDROcodone-acetaminophen 5-325 mg per tablet  
 polyethylene glycol 17 gram packet

## 2018-01-17 NOTE — DISCHARGE INSTRUCTIONS
[address] [address]:  [phone]. Call their office (or call my office) if there is no direct coordination/communication by the next morning. Gross wound Dimensions:  7.5cm total length (craniocaudal). No undermining. Clean surgical wound. Instructions:  Have the patient shower and get the wound and packing wet. The patient can clean their skin with mild soap and water (or dermaclens). Remove the midline dressings, if not removed by patient in the shower. After 30 minutes of wetting the dressings, perform the wound care. Be prepared to medicate the patient for pain control if needed. 1) Moisten new packing with normal saline. Wring out dressing, unravel it, and place it on a clean surface. 2) Remove the dressing that is in the wound. 3) With an cotton-tipped applicator, place saline moistened packing until the wound is full. 4) Cover the wound with 4x4s, an ABD and appropriate tape. Thank you in advance! Jarvis Telles    Pilonidal Cyst Excision: What to Expect at Home  Your Recovery  The amount of time it will take for you to heal depends on the way your surgery was done. If the cut (incision) was closed with stitches, it will probably take about 4 weeks to completely heal. If your incision is left open, it may take from a few weeks to several months to heal. After the incision has healed, you will have a scar where the cyst was removed. This will fade and become softer with time. Most people can go back to work and most activities after 2 to 4 weeks. Until you have completely healed, you will need to avoid strenuous exercise and activities that require long periods of sitting. This care sheet gives you a general idea about how long it will take for you to recover. But each person recovers at a different pace. Follow the steps below to get better as quickly as possible. How can you care for yourself at home? Activity  ? · Rest when you feel tired.  Getting enough sleep will help you recover. ? · Try to walk each day. Start by walking a little more than you did the day before. Bit by bit, increase the amount you walk. Walking boosts blood flow and helps prevent pneumonia and constipation. ? · Shower as usual. Yuri Chandler the area around your incision dry with a towel when you are done. Avoid baths until the wound is completely healed. Keep the area dry and clean. ? · Ask your doctor when you can drive again. ? · Avoid sitting for a long time or sitting on hard surfaces until your incision has healed. ? · Most people are able to return to work within 2 to 4 weeks after surgery. Diet  ? · You can eat your normal diet. If your stomach is upset, try bland, low-fat foods like plain rice, broiled chicken, toast, and yogurt. ? · Drink plenty of fluids (unless your doctor tells you not to). ? · You may notice that your bowel movements are not regular right after your surgery. This is common. Try to avoid constipation and straining with bowel movements. You may want to take a fiber supplement every day. If you have not had a bowel movement after a couple of days, ask your doctor about taking a mild laxative. Medicines  ? · Your doctor will tell you if and when you can restart your medicines. He or she will also give you instructions about taking any new medicines. ? · If you take blood thinners, such as warfarin (Coumadin), clopidogrel (Plavix), or aspirin, be sure to talk to your doctor. He or she will tell you if and when to start taking those medicines again. Make sure that you understand exactly what your doctor wants you to do. ? · Take pain medicines exactly as directed. ¨ If the doctor gave you a prescription medicine for pain, take it as prescribed. ¨ If you are not taking a prescription pain medicine, ask your doctor if you can take an over-the-counter medicine. ? · If your doctor prescribed antibiotics, take them as directed.  Do not stop taking them just because you feel better. You need to take the full course of antibiotics. ? · If you think your pain medicine is making you sick to your stomach:  ¨ Take your medicine after meals (unless your doctor has told you not to). ¨ Ask your doctor for a different pain medicine. Incision care  ? · If your incision was closed with stitches:  ¨ Wash the area daily with warm, soapy water and pat it dry. Don't use hydrogen peroxide or alcohol, which can slow healing. ¨ You may cover the area with a gauze bandage if it weeps or rubs against clothing. Change the bandage every day. ¨ Keep the area clean and dry. ? · If your incision was left open to heal, change the bandage, called a dressing, as instructed by your doctor. ¨ Dressing changes may hurt at first. Taking pain medicine about half an hour before you change the dressing can help. ¨ If your dressing sticks to your wound, try soaking the dressing in warm water for about 10 minutes before you remove it. You can do this in the shower or by placing a wet washcloth over the dressing. ¨ You may notice greenish gray fluid from your wound as you start to heal. This is normal. It is a sign that your wound is healing. Other instructions  ? · Use a doughnut cushion if sitting is uncomfortable. ? · Keep the area around your wound free from hair. Ask your doctor what method of hair removal will work best.   Follow-up care is a key part of your treatment and safety. Be sure to make and go to all appointments, and call your doctor if you are having problems. It's also a good idea to know your test results and keep a list of the medicines you take. When should you call for help? Call 911 anytime you think you may need emergency care. For example, call if:  ? · You passed out (lost consciousness). ? · You have sudden chest pain and shortness of breath, or you cough up blood. ? · You have severe belly pain.    ?Call your doctor now or seek immediate medical care if:  ? · You have pain that does not get better after you take your pain medicine. ? · Your incision was closed with stitches and the stitches come loose, or your incision comes open. ? · Bright red blood has soaked through the bandage over your incision. ? · You have signs of infection, such as:  ¨ Increased pain, swelling, warmth, or redness. ¨ Red streaks leading from the incision. ¨ Pus draining from the incision. ¨ A fever. ? Watch closely for changes in your health, and be sure to contact your doctor if you have any problems. Where can you learn more? Go to http://molina-ofelia.info/. Enter C004 in the search box to learn more about \"Pilonidal Cyst Excision: What to Expect at Home. \"  Current as of: October 13, 2016  Content Version: 11.4  © 2925-0162 [employer]. Care instructions adapted under license by [employer] (which disclaims liability or warranty for this information). If you have questions about a medical condition or this instruction, always ask your healthcare professional. [address] [address] any warranty or liability for your use of this information. DISCHARGE SUMMARY from your Nurse    The following personal items collected during your admission are returned to you:   Dental Appliance: Dental Appliances: None  Vision: Visual Aid: None  Hearing Aid:    Jewelry: Jewelry: None  Clothing: Clothing:  (street clothes to preop locker)  Other Valuables: Other Valuables: None  Valuables sent to safe:      PATIENT INSTRUCTIONS:    After general anesthesia or intravenous sedation, for 24 hours or while taking prescription Narcotics:  · Limit your activities  · Do not drive and operate hazardous machinery  · Do not make important personal or business decisions  · Do  not drink alcoholic beverages  · If you have not urinated within 8 hours after discharge, please contact your surgeon on call.     Report the following to your surgeon:  · Excessive pain, swelling, redness or odor of or around the surgical area  · Temperature over 100.5  · Nausea and vomiting lasting longer than 4 hours or if unable to take medications  · Any signs of decreased circulation or nerve impairment to extremity: change in color, persistent  numbness, tingling, coldness or increase pain  · Any questions    COUGH AND DEEP BREATHE    Breathing deep and coughing are very important exercises to do after surgery. Deep breathing and coughing open the little air tubes and air sacks in your lungs. You take deep breaths every day. You may not even notice - it is just something you do when you sigh or yawn. It is a natural exercise you do to keep these air passages open. After surgery, take deep breaths and cough, on purpose. Coughing and deep breathing help prevent bronchitis and pneumonia after surgery. If you had chest or belly surgery, use a pillow as a \"hug sara\" and hold it tightly to your chest or belly when you cough. DIRECTIONS:  6. Take 10 to 15 slow deep breaths every hour while awake. 7. Breathe in deeply, and hold it for 2 seconds. 8. Exhale slowly through puckered lips, like blowing up a balloon. 9. After every 4th or 5th deep breath, hug your pillow to your chest or belly and give a hard, deep cough. Yes, it will probably hurt. But doing this exercise is very important part of healing after surgery. Take your pain medicine to help you do this exercise without too much pain. IF YOU HAVE BEEN DIAGNOSED WITH SLEEP APNEA, PLEASE USE YOUR SLEEP APNEA DEVICE OR CPAP MACHINE WHEN YOU INTEND TO NAP AFTER TAKING PAIN MEDICATION. Ankle Pumps    Ankle pumps increase the circulation of oxygenated blood to your lower extremities and decrease your risk for circulation problems such as blood clots.  They also stretch the muscles, tendons and ligaments in your foot and ankle, and prevent joint contracture in the ankle and foot, especially after surgeries on the legs.    It is important to do ankle pump exercises regularly after surgery because immobility increases your risk for developing a blood clot. Your doctor may also have you take an Aspirin for the next few days as well. If your doctor did not ask you to take an Aspirin, consult with him before starting Aspirin therapy on your own. Slowly point your foot forward, feeling the muscles on the top of your lower leg stretch, and hold this position for 5 seconds. Next, pull your foot back toward you as far as possible, stretching the calf muscles, and hold that position for 5 seconds. Repeat with the other foot. Perform 10 repetitions every hour while awake for both ankles if possible (down and then up with the foot once is one repetition). You should feel gentle stretching of the muscles in your lower leg when doing this exercise. If you feel pain, or your range of motion is limited, don't  Push too hard. Only go the limit your joint and muscles will let you go. If you have increasing pain, progressively worsening leg warmth or swelling, STOP the exercise and call your doctor. Below is information about the medications your doctor is prescribing after your visit:    Other information in your discharge envelope:  []     PRESCRIPTIONS  []     PHYSICAL THERAPY PRESCRIPTION  []     APPOINTMENT CARDS  []     Regional Anesthesia Pamphlet for block or block with On-Q Catheter from Anesthesia Service  []     Medical device information sheets/pamphlets from their    []     School/work excuse note. []     /parent work excuse note. These are general instructions for a healthy lifestyle:    *  Please give a list of your current medications to your Primary Care Provider. *  Please update this list whenever your medications are discontinued, doses are      changed, or new medications (including over-the-counter products) are added.   *  Please carry medication information at all times in case of emergency situations. About Smoking  No smoking / No tobacco products / Avoid exposure to second hand smoke    Surgeon General's Warning:  Quitting smoking now greatly reduces serious risk to your health. Obesity, smoking, and sedentary lifestyle greatly increases your risk for illness and disease. A healthy diet, regular physical exercise & weight monitoring are important for maintaining a healthy lifestyle. Congestive Heart Failure  You may be retaining fluid if you have a history of heart failure or if you experience any of the following symptoms:  Weight gain of 3 pounds or more overnight or 5 pounds in a week, increased swelling in our hands or feet or shortness of breath while lying flat in bed. Please call your doctor as soon as you notice any of these symptoms; do not wait until your next office visit. Recognize signs and symptoms of STROKE:  F - face looks uneven  A - arms unable to move or move even  S - speech slurred or non-existent  T - time-call 911 as soon as signs and symptoms begin-DO NOT go         Back to bed or wait to see if you get better-TIME IS BRAIN. Warning signs of HEART ATTACK  Call 911 if you have these symptoms    · Chest discomfort. Most heart attacks involve discomfort in the center of the chest that lasts more than a few minutes, or that goes away and comes back. It can feel like uncomfortable pressure, squeezing, fullness, or pain. · Discomfort in other areas of the upper body. Symptoms can include pain or discomfort in one or both        Arms, the back, neck, jaw, or stomach. ·  Shortness of breath with or without chest discomfort. · Other signs may include breaking out in a cold sweat, nausea, or lightheadedness    Don't wait more than five minutes to call 911 - MINUTES MATTER! Fast action can save your life. Calling 911 is almost always the fastest way to get lifesaving treatment.   Emergency Medical Services staff can begin treatment when they arrive - up to an hour sooner than if someone gets to the hospital by car. [facility] MEDICATION AND SIDE EFFECT GUIDE    The Anshul Brogeronimo MEDICATION AND SIDE EFFECT GUIDE was provided to the PATIENT AND CARE PROVIDER.   Information provided includes instruction about drug purpose and common side effects for the following medications:    · Geri Arrow  · Ernmadelineene Score  · Delano Duarte

## 2018-01-19 RX ORDER — HYDROCODONE BITARTRATE AND ACETAMINOPHEN 10; 325 MG/1; MG/1
1-2 TABLET ORAL
Qty: 40 TAB | Refills: 0 | Status: SHIPPED | OUTPATIENT
Start: 2018-01-19 | End: 2018-05-10

## 2018-01-24 NOTE — BRIEF OP NOTE
BRIEF OPERATIVE NOTE    Date of Procedure: 1/16/2018   Preoperative Diagnosis: PILONIDAL CYST  Postoperative Diagnosis: PILONIDAL CYST    Procedure(s):  PILONIDAL CYSTECTOMY ( LATEX ALLERGY)  Surgeon(s) and Role:     * Bridgette Mccormick MD - Primary         Assistant Staff: None      Surgical Staff:  Circ-1: Paolo Kc RN  Circ-Relief: Dalila Dexter RN  Scrub Tech-1: Thierno Stoner  Scrub Tech-Relief: Rj Pralissett  Surg Asst-1: Franca Dill  Surg Asst-Relief: Nemesio Morales  Float Staff: Bette Galicia  Event Time In   Incision Start 1704   Incision Close 5745     Anesthesia: General   Estimated Blood Loss: MIn  Specimens:   ID Type Source Tests Collected by Time Destination   1 : PILONADAL CYST Preservative Buttock  Bridgette Mccormick MD 1/16/2018 1717 Pathology      Findings: large single midline sinus with chronic midline cyst.  Complications: none  Implants: * No implants in log *

## 2018-01-24 NOTE — OP NOTES
OPERATIVE NOTE    Date of Procedure: 1/16/2018   Preoperative Diagnosis: PILONIDAL CYST  Postoperative Diagnosis: PILONIDAL CYST    Procedure(s):  PILONIDAL CYSTECTOMY ( LATEX ALLERGY)  Surgeon(s) and Role:     * Laci Coates MD - Primary         Assistant Staff: None      Surgical Staff:  Circ-1: Pal Mead RN  Circ-Relief: Belgica Dexter RN  Scrub Tech-1: Saw Ware  Scrub Tech-Relief: Rolyshahla Azalia  Surg Asst-1: Ciera Payne  Surg Asst-Relief: Jose Alejandro Dudley  Float Staff: Isis Gomes  Event Time In   Incision Start 1704   Incision Close 6629     Anesthesia: General   Estimated Blood Loss: MIn  Specimens:   ID Type Source Tests Collected by Time Destination   1 : Mookie Lou MD 1/16/2018 1713 Pathology      Findings: large single midline sinus with chronic midline cyst.  Complications: none  Implants: * No implants in log *    Indication:  Please see paper H/P. Descriptio nof procedure: The patient was brought to the operating room and underwent general anesthesia and endotracheal intubation. All appropriate monitoring devices were placed. The patient was placed in the prone jackknife position and the buttocks were gently spread with tape. All pressure points were padded and then the presacral region was prepped and draped in the usual sterile fashion. A time out was completed verifying patient, procedure, site, positioning and any needed special equipment prior to beginning this procedure. Pre-operative antibiotics were administered within 30 minutes of skin incision. A flexible metal probe was inserted and the midline tract was readily identified. 4 cc of hydrogen peroxide mixed with methylene blue was injected into the sinus and allowed to instill for a few minutes. An off-midine elliptical skin incision was made with a knife around the opening and the entire tract. This was deepened through subcutaneous tissues using a knife.  The incision was continued down to presacral fascia, ensuring no dye was identified outside of the specimen. Tract excision was based on the preferential infusion of methylene blue and hydrogen peroxide to the left of his midline. The pilonidal sinus tract and all blue stained tissue was excised cleanly in its entirety. Hemostasis was achieved with electrocautery. The wound measured approximately 7.5 cm craniocaudal, 4 cm down to presacral fascia, and 3cm in horizontal dimension. A local advancement z- flap was made from right side gluteal tissue to close the lower pole off midline. The adjacent subcutaneous dead space was gently apposed to completely close the dead space with three 2-0 vicryl interrupted sutures. The remaining upper pole wound was marsupialized to abbreviate dead space. The wound was dressed 2 inch yao packing, 4x4s and cloth tape. The patient tolerated the procedure well, reversed from general anesthesia and extubated. She was taken to the postanesthesia care unit in stable condition. I went directly to the waiting area to discuss my findings with her mother in the waiting area.     lAen Galindo MD

## 2018-03-02 ENCOUNTER — HOSPITAL ENCOUNTER (OUTPATIENT)
Dept: MRI IMAGING | Age: 53
Discharge: HOME | End: 2018-03-02
Attending: FAMILY MEDICINE
Payer: MEDICARE

## 2018-03-02 VITALS — BODY MASS INDEX: 38.96 KG/M2 | WEIGHT: 227 LBS

## 2018-03-02 DIAGNOSIS — R41.3 MEMORY LOSS: ICD-10-CM

## 2018-03-02 PROCEDURE — 70553 MRI BRAIN STEM W/O & W/DYE: CPT

## 2018-03-02 PROCEDURE — A9576 INJ PROHANCE MULTIPACK: HCPCS | Performed by: RADIOLOGY

## 2018-03-02 PROCEDURE — 74011250636 HC RX REV CODE- 250/636: Performed by: RADIOLOGY

## 2018-03-02 RX ADMIN — GADOTERIDOL 20 ML: 279.3 INJECTION, SOLUTION INTRAVENOUS at 16:09

## 2018-04-16 ENCOUNTER — HOSPITAL ENCOUNTER (OUTPATIENT)
Dept: ULTRASOUND IMAGING | Age: 53
Discharge: HOME | End: 2018-04-16
Attending: FAMILY MEDICINE
Payer: MEDICARE

## 2018-04-16 DIAGNOSIS — E04.1 THYROID NODULE: ICD-10-CM

## 2018-04-16 PROCEDURE — 76536 US EXAM OF HEAD AND NECK: CPT

## 2018-04-18 ENCOUNTER — HOSPITAL ENCOUNTER (OUTPATIENT)
Dept: MAMMOGRAPHY | Age: 53
Discharge: HOME | End: 2018-04-18
Attending: FAMILY MEDICINE
Payer: MEDICARE

## 2018-04-18 DIAGNOSIS — Z12.39 BREAST SCREENING: ICD-10-CM

## 2018-04-18 PROCEDURE — 77067 SCR MAMMO BI INCL CAD: CPT

## 2018-04-27 ENCOUNTER — HOSPITAL ENCOUNTER (OUTPATIENT)
Dept: GENERAL RADIOLOGY | Age: 53
Discharge: HOME | End: 2018-04-27
Attending: SURGERY
Payer: MEDICARE

## 2018-04-27 DIAGNOSIS — K44.9 HIATAL HERNIA: ICD-10-CM

## 2018-04-27 PROCEDURE — 74247 XR UPPER GI W KUB AIR CONT: CPT

## 2018-05-09 NOTE — PERIOP NOTES
Notified Cherelle Adams at [facility] scheduling of Latex allergy. DOS: 5/18/18.

## 2018-05-10 ENCOUNTER — HOSPITAL ENCOUNTER (OUTPATIENT)
Dept: PREADMISSION TESTING | Age: 53
Discharge: HOME | End: 2018-05-10
Payer: MEDICARE

## 2018-05-10 VITALS
SYSTOLIC BLOOD PRESSURE: 134 MMHG | DIASTOLIC BLOOD PRESSURE: 93 MMHG | HEART RATE: 103 BPM | HEIGHT: 64 IN | OXYGEN SATURATION: 96 % | BODY MASS INDEX: 41.06 KG/M2 | TEMPERATURE: 99 F | RESPIRATION RATE: 22 BRPM | WEIGHT: 240.52 LBS

## 2018-05-10 LAB
ALBUMIN SERPL-MCNC: 3.7 G/DL (ref 3.5–5)
ALBUMIN/GLOB SERPL: 0.9 {RATIO} (ref 1.1–2.2)
ALP SERPL-CCNC: 105 U/L (ref 45–117)
ALT SERPL-CCNC: 27 U/L (ref 12–78)
ANION GAP SERPL CALC-SCNC: 8 MMOL/L (ref 5–15)
AST SERPL-CCNC: 18 U/L (ref 15–37)
ATRIAL RATE: 92 BPM
BASOPHILS # BLD: 0.1 K/UL (ref 0–0.1)
BASOPHILS NFR BLD: 1 % (ref 0–1)
BILIRUB SERPL-MCNC: 0.1 MG/DL (ref 0.2–1)
BUN SERPL-MCNC: 15 MG/DL (ref 6–20)
BUN/CREAT SERPL: 14 (ref 12–20)
CALCIUM SERPL-MCNC: 9.3 MG/DL (ref 8.5–10.1)
CALCULATED P AXIS, ECG09: 48 DEGREES
CALCULATED R AXIS, ECG10: -10 DEGREES
CALCULATED T AXIS, ECG11: 19 DEGREES
CHLORIDE SERPL-SCNC: 100 MMOL/L (ref 97–108)
CO2 SERPL-SCNC: 30 MMOL/L (ref 21–32)
CREAT SERPL-MCNC: 1.08 MG/DL (ref 0.55–1.02)
DIAGNOSIS, 93000: NORMAL
DIFFERENTIAL METHOD BLD: NORMAL
EOSINOPHIL # BLD: 0.3 K/UL (ref 0–0.4)
EOSINOPHIL NFR BLD: 4 % (ref 0–7)
ERYTHROCYTE [DISTWIDTH] IN BLOOD BY AUTOMATED COUNT: 11.7 % (ref 11.5–14.5)
GLOBULIN SER CALC-MCNC: 3.9 G/DL (ref 2–4)
GLUCOSE SERPL-MCNC: 114 MG/DL (ref 65–100)
HCT VFR BLD AUTO: 40.1 % (ref 35–47)
HGB BLD-MCNC: 12.9 G/DL (ref 11.5–16)
IMM GRANULOCYTES # BLD: 0 K/UL (ref 0–0.04)
IMM GRANULOCYTES NFR BLD AUTO: 0 % (ref 0–0.5)
LYMPHOCYTES # BLD: 2.3 K/UL (ref 0.8–3.5)
LYMPHOCYTES NFR BLD: 29 % (ref 12–49)
MCH RBC QN AUTO: 29.7 PG (ref 26–34)
MCHC RBC AUTO-ENTMCNC: 32.2 G/DL (ref 30–36.5)
MCV RBC AUTO: 92.4 FL (ref 80–99)
MONOCYTES # BLD: 0.6 K/UL (ref 0–1)
MONOCYTES NFR BLD: 8 % (ref 5–13)
NEUTS SEG # BLD: 4.6 K/UL (ref 1.8–8)
NEUTS SEG NFR BLD: 58 % (ref 32–75)
NRBC # BLD: 0 K/UL (ref 0–0.01)
NRBC BLD-RTO: 0 PER 100 WBC
P-R INTERVAL, ECG05: 172 MS
PLATELET # BLD AUTO: 296 K/UL (ref 150–400)
PMV BLD AUTO: 10.2 FL (ref 8.9–12.9)
POTASSIUM SERPL-SCNC: 3.5 MMOL/L (ref 3.5–5.1)
PROT SERPL-MCNC: 7.6 G/DL (ref 6.4–8.2)
Q-T INTERVAL, ECG07: 392 MS
QRS DURATION, ECG06: 94 MS
QTC CALCULATION (BEZET), ECG08: 484 MS
RBC # BLD AUTO: 4.34 M/UL (ref 3.8–5.2)
SODIUM SERPL-SCNC: 138 MMOL/L (ref 136–145)
VENTRICULAR RATE, ECG03: 92 BPM
WBC # BLD AUTO: 7.8 K/UL (ref 3.6–11)

## 2018-05-10 PROCEDURE — 93005 ELECTROCARDIOGRAM TRACING: CPT

## 2018-05-10 PROCEDURE — 80053 COMPREHEN METABOLIC PANEL: CPT | Performed by: ANESTHESIOLOGY

## 2018-05-10 PROCEDURE — 36415 COLL VENOUS BLD VENIPUNCTURE: CPT | Performed by: ANESTHESIOLOGY

## 2018-05-10 PROCEDURE — 85025 COMPLETE CBC W/AUTO DIFF WBC: CPT | Performed by: ANESTHESIOLOGY

## 2018-05-10 RX ORDER — PANTOPRAZOLE SODIUM 40 MG/1
40 GRANULE, DELAYED RELEASE ORAL 2 TIMES DAILY
COMMUNITY
End: 2018-07-17

## 2018-05-10 RX ORDER — OMEPRAZOLE 20 MG/1
20 CAPSULE, DELAYED RELEASE ORAL DAILY
Status: ON HOLD | COMMUNITY
End: 2018-05-18

## 2018-05-10 NOTE — PERIOP NOTES
[address] [place], [place] [address]  Pre-Admission Testing [phone]    Pre-Operative Instructions    Your procedure is scheduled for Friday, May 18th. We will call you the afternoon before surgery with your arrival time. If you have not received your arrival time by 7p.m., you may call us at [phone].  Please report to the 2nd Floor Admitting Desk on the day of your surgery. Bring your insurance card, photo identification, and applicable copayment.  If you are being admitted to the hospital, please leave your overnight bag in your car until you are taken to your hospital room. On the day you go home, expect to be discharged by noon. Please have your  arrive prior to noon so you are not delayed.  You may not have anything to eat or drink after midnight the night before surgery.  With as little water as possible, take these medications the morning of surgery:  Xanax, tizanidine, topamax, & chantix   Do not drink alcoholic beverages 24 hours before and after your surgery.  Do not take Aspirin and/or any non-steroidal anti-inflammatory drugs beginning today until after your surgery. This includes Ibuprofen, Motrin, Advil, Naproxen, & Aleve. You may resume post-operatively as directed by your surgeon.  You may take Tylenol for pain or discomfort.  Wear comfortable clothes. Please leave all money, jewelry and valuables at home. No make-up, particularly mascara, the day of surgery.  Remove all jewelry, including body piercings, and leave at home. Wear your hair loose or down, no pony-tails, buns, or any metal hair clips. If you shower the morning of surgery, please do not apply any lotions, powders, or deodorants afterwards. Do not shave any part of your body within 24 hours of surgery.  Should you become ill in the days before your surgery, even with a simple cold, please notify your surgeons office immediately.    Please follow all instructions to avoid any potential surgical cancellation.  If a situation occurs where you may be delayed the day of your surgery, please call us at [phone].  We offer free  parking 7a. m.-5p.m.  Please bring the Medication Sheet with you on the day of your procedure.  The patient was contacted in person. She verbalized understanding of all instructions does not need reinforcement.     Please call PAT when you get home today to complete medication list.

## 2018-05-10 NOTE — PERIOP NOTES
Pt called back to update me on the medications that she takes. She confirmed that her losartan is accurate. She is also taking 2 PPIs. I instructed her to call her PCP & ask if she should continue taking both. I told her to take the protonix the DOS & depending on what her  PCP decides, she can take both. Pt verbalizes understanding.

## 2018-05-11 NOTE — PERIOP NOTES
I spoke to Rohit Lama at the [address] [place] [address] desk and she is now aware of the patient having an EGD with  in the OR and will coordinate obtaining the equipment needed with Endoscopy.

## 2018-05-17 ENCOUNTER — ANESTHESIA EVENT (OUTPATIENT)
Dept: SURGERY | Age: 53
DRG: 327 | End: 2018-05-17
Payer: MEDICARE

## 2018-05-18 ENCOUNTER — ANESTHESIA (OUTPATIENT)
Dept: SURGERY | Age: 53
DRG: 327 | End: 2018-05-18
Payer: MEDICARE

## 2018-05-18 ENCOUNTER — HOSPITAL ENCOUNTER (INPATIENT)
Age: 53
LOS: 5 days | Discharge: HOME | DRG: 327 | End: 2018-05-23
Attending: SURGERY | Admitting: SURGERY
Payer: MEDICARE

## 2018-05-18 DIAGNOSIS — K44.9 HIATAL HERNIA: Primary | ICD-10-CM

## 2018-05-18 PROCEDURE — 77030036733 HC ENDOLP LIG VCRL SUT J&J -C: Performed by: SURGERY

## 2018-05-18 PROCEDURE — 77030016151 HC PROTCTR LNS DFOG COVD -B: Performed by: SURGERY

## 2018-05-18 PROCEDURE — 36415 COLL VENOUS BLD VENIPUNCTURE: CPT | Performed by: ANESTHESIOLOGY

## 2018-05-18 PROCEDURE — 77030018390 HC SPNG HEMSTAT2 J&J -B: Performed by: SURGERY

## 2018-05-18 PROCEDURE — 77030018684: Performed by: SURGERY

## 2018-05-18 PROCEDURE — 76060000036 HC ANESTHESIA 2.5 TO 3 HR: Performed by: SURGERY

## 2018-05-18 PROCEDURE — 0BUT4JZ SUPPLEMENT DIAPHRAGM WITH SYNTHETIC SUBSTITUTE, PERCUTANEOUS ENDOSCOPIC APPROACH: ICD-10-PCS | Performed by: SURGERY

## 2018-05-18 PROCEDURE — 77030034849: Performed by: SURGERY

## 2018-05-18 PROCEDURE — 77030011640 HC PAD GRND REM COVD -A: Performed by: SURGERY

## 2018-05-18 PROCEDURE — 77030002912 HC SUT ETHBND J&J -A: Performed by: SURGERY

## 2018-05-18 PROCEDURE — 74011250636 HC RX REV CODE- 250/636: Performed by: ANESTHESIOLOGY

## 2018-05-18 PROCEDURE — 77030020747 HC TU INSUF ENDOSC TELE -A: Performed by: SURGERY

## 2018-05-18 PROCEDURE — 77030008606 HC TRCR ENDOSC KII AMR -B: Performed by: SURGERY

## 2018-05-18 PROCEDURE — 76010000132 HC OR TIME 2.5 TO 3 HR: Performed by: SURGERY

## 2018-05-18 PROCEDURE — 76210000017 HC OR PH I REC 1.5 TO 2 HR: Performed by: SURGERY

## 2018-05-18 PROCEDURE — 77030022703 HC LIGASURE  BLNT LAPSCP SEAL COVD -E: Performed by: SURGERY

## 2018-05-18 PROCEDURE — 77030008771 HC TU NG SALEM SUMP -A: Performed by: ANESTHESIOLOGY

## 2018-05-18 PROCEDURE — 77030012406 HC DRN WND PENRS BARD -A: Performed by: SURGERY

## 2018-05-18 PROCEDURE — 77030002933 HC SUT MCRYL J&J -A: Performed by: SURGERY

## 2018-05-18 PROCEDURE — 77030025927 HC STPLR FIX SECURSTRP J&J -F: Performed by: SURGERY

## 2018-05-18 PROCEDURE — 74011250636 HC RX REV CODE- 250/636: Performed by: SURGERY

## 2018-05-18 PROCEDURE — 74011000250 HC RX REV CODE- 250: Performed by: SURGERY

## 2018-05-18 PROCEDURE — 74011250637 HC RX REV CODE- 250/637: Performed by: SURGERY

## 2018-05-18 PROCEDURE — 74011250636 HC RX REV CODE- 250/636

## 2018-05-18 PROCEDURE — 77030019908 HC STETH ESOPH SIMS -A: Performed by: ANESTHESIOLOGY

## 2018-05-18 PROCEDURE — 65270000029 HC RM PRIVATE

## 2018-05-18 PROCEDURE — 74011000250 HC RX REV CODE- 250

## 2018-05-18 PROCEDURE — 77030031139 HC SUT VCRL2 J&J -A: Performed by: SURGERY

## 2018-05-18 PROCEDURE — 77030037186 HC VLV ENDOSC STRL DEFENDO DISP MVAT -A: Performed by: SURGERY

## 2018-05-18 PROCEDURE — C9113 INJ PANTOPRAZOLE SODIUM, VIA: HCPCS | Performed by: SURGERY

## 2018-05-18 PROCEDURE — 77030008684 HC TU ET CUF COVD -B: Performed by: ANESTHESIOLOGY

## 2018-05-18 PROCEDURE — 77030020256 HC SOL INJ NACL 0.9%  500ML: Performed by: SURGERY

## 2018-05-18 PROCEDURE — C9290 INJ, BUPIVACAINE LIPOSOME: HCPCS | Performed by: SURGERY

## 2018-05-18 PROCEDURE — 0DV44ZZ RESTRICTION OF ESOPHAGOGASTRIC JUNCTION, PERCUTANEOUS ENDOSCOPIC APPROACH: ICD-10-PCS | Performed by: SURGERY

## 2018-05-18 PROCEDURE — 0DJ08ZZ INSPECTION OF UPPER INTESTINAL TRACT, VIA NATURAL OR ARTIFICIAL OPENING ENDOSCOPIC: ICD-10-PCS | Performed by: SURGERY

## 2018-05-18 PROCEDURE — 86900 BLOOD TYPING SEROLOGIC ABO: CPT | Performed by: ANESTHESIOLOGY

## 2018-05-18 PROCEDURE — 77030013079 HC BLNKT BAIR HGGR 3M -A: Performed by: ANESTHESIOLOGY

## 2018-05-18 PROCEDURE — 77030008756 HC TU IRR SUC STRY -B: Performed by: SURGERY

## 2018-05-18 PROCEDURE — C1781 MESH (IMPLANTABLE): HCPCS | Performed by: SURGERY

## 2018-05-18 PROCEDURE — 77030020263 HC SOL INJ SOD CL0.9% LFCR 1000ML: Performed by: SURGERY

## 2018-05-18 PROCEDURE — 77030035043 HC TRCR ENDOSC OPTCL BLDLSS COVD -B: Performed by: SURGERY

## 2018-05-18 PROCEDURE — 77030020782 HC GWN BAIR PAWS FLX 3M -B

## 2018-05-18 DEVICE — BIO-A TISSUE REINFORCEMENT 7CMX10CM
Type: IMPLANTABLE DEVICE | Site: ESOPHAGUS | Status: FUNCTIONAL
Brand: GORE BIO-A TISSUE REINFORCEMENT

## 2018-05-18 RX ORDER — TOPIRAMATE 25 MG/1
25 TABLET ORAL 2 TIMES DAILY
Status: DISCONTINUED | OUTPATIENT
Start: 2018-05-18 | End: 2018-05-23 | Stop reason: HOSPADM

## 2018-05-18 RX ORDER — SODIUM CHLORIDE, SODIUM LACTATE, POTASSIUM CHLORIDE, CALCIUM CHLORIDE 600; 310; 30; 20 MG/100ML; MG/100ML; MG/100ML; MG/100ML
INJECTION, SOLUTION INTRAVENOUS
Status: DISCONTINUED | OUTPATIENT
Start: 2018-05-18 | End: 2018-05-18 | Stop reason: HOSPADM

## 2018-05-18 RX ORDER — SODIUM CHLORIDE 0.9 % (FLUSH) 0.9 %
5-10 SYRINGE (ML) INJECTION AS NEEDED
Status: DISCONTINUED | OUTPATIENT
Start: 2018-05-18 | End: 2018-05-18 | Stop reason: HOSPADM

## 2018-05-18 RX ORDER — ROCURONIUM BROMIDE 10 MG/ML
INJECTION, SOLUTION INTRAVENOUS AS NEEDED
Status: DISCONTINUED | OUTPATIENT
Start: 2018-05-18 | End: 2018-05-18 | Stop reason: HOSPADM

## 2018-05-18 RX ORDER — LIDOCAINE HYDROCHLORIDE 10 MG/ML
0.1 INJECTION, SOLUTION EPIDURAL; INFILTRATION; INTRACAUDAL; PERINEURAL AS NEEDED
Status: DISCONTINUED | OUTPATIENT
Start: 2018-05-18 | End: 2018-05-18 | Stop reason: HOSPADM

## 2018-05-18 RX ORDER — SODIUM CHLORIDE, SODIUM LACTATE, POTASSIUM CHLORIDE, CALCIUM CHLORIDE 600; 310; 30; 20 MG/100ML; MG/100ML; MG/100ML; MG/100ML
125 INJECTION, SOLUTION INTRAVENOUS CONTINUOUS
Status: DISCONTINUED | OUTPATIENT
Start: 2018-05-18 | End: 2018-05-18 | Stop reason: HOSPADM

## 2018-05-18 RX ORDER — ONDANSETRON 2 MG/ML
INJECTION INTRAMUSCULAR; INTRAVENOUS AS NEEDED
Status: DISCONTINUED | OUTPATIENT
Start: 2018-05-18 | End: 2018-05-18 | Stop reason: HOSPADM

## 2018-05-18 RX ORDER — FENTANYL CITRATE 50 UG/ML
INJECTION, SOLUTION INTRAMUSCULAR; INTRAVENOUS AS NEEDED
Status: DISCONTINUED | OUTPATIENT
Start: 2018-05-18 | End: 2018-05-18 | Stop reason: HOSPADM

## 2018-05-18 RX ORDER — SODIUM CHLORIDE, SODIUM LACTATE, POTASSIUM CHLORIDE, CALCIUM CHLORIDE 600; 310; 30; 20 MG/100ML; MG/100ML; MG/100ML; MG/100ML
75 INJECTION, SOLUTION INTRAVENOUS CONTINUOUS
Status: DISCONTINUED | OUTPATIENT
Start: 2018-05-18 | End: 2018-05-19

## 2018-05-18 RX ORDER — DIPHENHYDRAMINE HCL 25 MG
25 CAPSULE ORAL
Status: DISCONTINUED | OUTPATIENT
Start: 2018-05-18 | End: 2018-05-23 | Stop reason: HOSPADM

## 2018-05-18 RX ORDER — NEOSTIGMINE METHYLSULFATE 1 MG/ML
INJECTION INTRAVENOUS AS NEEDED
Status: DISCONTINUED | OUTPATIENT
Start: 2018-05-18 | End: 2018-05-18 | Stop reason: HOSPADM

## 2018-05-18 RX ORDER — HYDROMORPHONE HYDROCHLORIDE 2 MG/ML
.5-1 INJECTION, SOLUTION INTRAMUSCULAR; INTRAVENOUS; SUBCUTANEOUS
Status: DISCONTINUED | OUTPATIENT
Start: 2018-05-18 | End: 2018-05-18 | Stop reason: HOSPADM

## 2018-05-18 RX ORDER — CEFAZOLIN SODIUM/WATER 2 G/20 ML
2 SYRINGE (ML) INTRAVENOUS ONCE
Status: COMPLETED | OUTPATIENT
Start: 2018-05-18 | End: 2018-05-18

## 2018-05-18 RX ORDER — TRAZODONE HYDROCHLORIDE 100 MG/1
300 TABLET ORAL
Status: DISCONTINUED | OUTPATIENT
Start: 2018-05-18 | End: 2018-05-23 | Stop reason: HOSPADM

## 2018-05-18 RX ORDER — MIDAZOLAM HYDROCHLORIDE 1 MG/ML
INJECTION, SOLUTION INTRAMUSCULAR; INTRAVENOUS AS NEEDED
Status: DISCONTINUED | OUTPATIENT
Start: 2018-05-18 | End: 2018-05-18 | Stop reason: HOSPADM

## 2018-05-18 RX ORDER — ENOXAPARIN SODIUM 100 MG/ML
40 INJECTION SUBCUTANEOUS EVERY 24 HOURS
Status: DISCONTINUED | OUTPATIENT
Start: 2018-05-18 | End: 2018-05-18 | Stop reason: DRUGHIGH

## 2018-05-18 RX ORDER — KETOROLAC TROMETHAMINE 30 MG/ML
15 INJECTION, SOLUTION INTRAMUSCULAR; INTRAVENOUS EVERY 6 HOURS
Status: DISCONTINUED | OUTPATIENT
Start: 2018-05-19 | End: 2018-05-23 | Stop reason: HOSPADM

## 2018-05-18 RX ORDER — LIDOCAINE HYDROCHLORIDE 20 MG/ML
INJECTION, SOLUTION EPIDURAL; INFILTRATION; INTRACAUDAL; PERINEURAL AS NEEDED
Status: DISCONTINUED | OUTPATIENT
Start: 2018-05-18 | End: 2018-05-18 | Stop reason: HOSPADM

## 2018-05-18 RX ORDER — DULOXETIN HYDROCHLORIDE 30 MG/1
60 CAPSULE, DELAYED RELEASE ORAL
Status: DISCONTINUED | OUTPATIENT
Start: 2018-05-18 | End: 2018-05-23 | Stop reason: HOSPADM

## 2018-05-18 RX ORDER — DOCUSATE SODIUM 100 MG/1
100 CAPSULE, LIQUID FILLED ORAL 2 TIMES DAILY
Status: DISCONTINUED | OUTPATIENT
Start: 2018-05-18 | End: 2018-05-23 | Stop reason: HOSPADM

## 2018-05-18 RX ORDER — VARENICLINE TARTRATE 0.5 MG/1
1 TABLET, FILM COATED ORAL
Status: DISCONTINUED | OUTPATIENT
Start: 2018-05-19 | End: 2018-05-23 | Stop reason: HOSPADM

## 2018-05-18 RX ORDER — DULOXETIN HYDROCHLORIDE 30 MG/1
30 CAPSULE, DELAYED RELEASE ORAL
Status: DISCONTINUED | OUTPATIENT
Start: 2018-05-18 | End: 2018-05-23 | Stop reason: HOSPADM

## 2018-05-18 RX ORDER — TIZANIDINE 2 MG/1
4 TABLET ORAL 3 TIMES DAILY
Status: DISCONTINUED | OUTPATIENT
Start: 2018-05-18 | End: 2018-05-23 | Stop reason: HOSPADM

## 2018-05-18 RX ORDER — ONDANSETRON 2 MG/ML
4 INJECTION INTRAMUSCULAR; INTRAVENOUS AS NEEDED
Status: DISCONTINUED | OUTPATIENT
Start: 2018-05-18 | End: 2018-05-18 | Stop reason: HOSPADM

## 2018-05-18 RX ORDER — BUSPIRONE HYDROCHLORIDE 5 MG/1
15 TABLET ORAL
Status: DISCONTINUED | OUTPATIENT
Start: 2018-05-18 | End: 2018-05-23 | Stop reason: HOSPADM

## 2018-05-18 RX ORDER — HYDROMORPHONE HCL IN 0.9% NACL 15 MG/30ML
PATIENT CONTROLLED ANALGESIA VIAL INTRAVENOUS CONTINUOUS
Status: DISCONTINUED | OUTPATIENT
Start: 2018-05-18 | End: 2018-05-21

## 2018-05-18 RX ORDER — EPHEDRINE SULFATE 50 MG/ML
INJECTION, SOLUTION INTRAVENOUS AS NEEDED
Status: DISCONTINUED | OUTPATIENT
Start: 2018-05-18 | End: 2018-05-18 | Stop reason: HOSPADM

## 2018-05-18 RX ORDER — DIAZEPAM 2 MG/1
2 TABLET ORAL
Status: DISCONTINUED | OUTPATIENT
Start: 2018-05-18 | End: 2018-05-23 | Stop reason: HOSPADM

## 2018-05-18 RX ORDER — KETOROLAC TROMETHAMINE 30 MG/ML
30 INJECTION, SOLUTION INTRAMUSCULAR; INTRAVENOUS
Status: COMPLETED | OUTPATIENT
Start: 2018-05-18 | End: 2018-05-18

## 2018-05-18 RX ORDER — SODIUM CHLORIDE 0.9 % (FLUSH) 0.9 %
5-10 SYRINGE (ML) INJECTION EVERY 8 HOURS
Status: DISCONTINUED | OUTPATIENT
Start: 2018-05-18 | End: 2018-05-18 | Stop reason: HOSPADM

## 2018-05-18 RX ORDER — HYDROMORPHONE HYDROCHLORIDE 2 MG/ML
INJECTION, SOLUTION INTRAMUSCULAR; INTRAVENOUS; SUBCUTANEOUS AS NEEDED
Status: DISCONTINUED | OUTPATIENT
Start: 2018-05-18 | End: 2018-05-18 | Stop reason: HOSPADM

## 2018-05-18 RX ORDER — ALPRAZOLAM 0.5 MG/1
1 TABLET ORAL
Status: DISCONTINUED | OUTPATIENT
Start: 2018-05-18 | End: 2018-05-23 | Stop reason: HOSPADM

## 2018-05-18 RX ORDER — GLYCOPYRROLATE 0.2 MG/ML
INJECTION INTRAMUSCULAR; INTRAVENOUS AS NEEDED
Status: DISCONTINUED | OUTPATIENT
Start: 2018-05-18 | End: 2018-05-18 | Stop reason: HOSPADM

## 2018-05-18 RX ORDER — CALCIUM CARBONATE 200(500)MG
200 TABLET,CHEWABLE ORAL
Status: DISCONTINUED | OUTPATIENT
Start: 2018-05-18 | End: 2018-05-23 | Stop reason: HOSPADM

## 2018-05-18 RX ORDER — SUCCINYLCHOLINE CHLORIDE 20 MG/ML
INJECTION INTRAMUSCULAR; INTRAVENOUS AS NEEDED
Status: DISCONTINUED | OUTPATIENT
Start: 2018-05-18 | End: 2018-05-18 | Stop reason: HOSPADM

## 2018-05-18 RX ORDER — ENOXAPARIN SODIUM 100 MG/ML
40 INJECTION SUBCUTANEOUS EVERY 12 HOURS
Status: DISCONTINUED | OUTPATIENT
Start: 2018-05-18 | End: 2018-05-21

## 2018-05-18 RX ORDER — PROPOFOL 10 MG/ML
INJECTION, EMULSION INTRAVENOUS AS NEEDED
Status: DISCONTINUED | OUTPATIENT
Start: 2018-05-18 | End: 2018-05-18 | Stop reason: HOSPADM

## 2018-05-18 RX ADMIN — LIDOCAINE HYDROCHLORIDE 100 MG: 20 INJECTION, SOLUTION EPIDURAL; INFILTRATION; INTRACAUDAL; PERINEURAL at 13:19

## 2018-05-18 RX ADMIN — TIZANIDINE 4 MG: 2 TABLET ORAL at 21:27

## 2018-05-18 RX ADMIN — BUSPIRONE HYDROCHLORIDE 15 MG: 5 TABLET ORAL at 21:27

## 2018-05-18 RX ADMIN — HYDROMORPHONE HYDROCHLORIDE 0.5 MG: 2 INJECTION INTRAMUSCULAR; INTRAVENOUS; SUBCUTANEOUS at 16:46

## 2018-05-18 RX ADMIN — DULOXETINE HYDROCHLORIDE 60 MG: 30 CAPSULE, DELAYED RELEASE ORAL at 21:29

## 2018-05-18 RX ADMIN — PANTOPRAZOLE SODIUM 40 MG: 40 INJECTION, POWDER, FOR SOLUTION INTRAVENOUS at 21:25

## 2018-05-18 RX ADMIN — SUCCINYLCHOLINE CHLORIDE 100 MG: 20 INJECTION INTRAMUSCULAR; INTRAVENOUS at 13:19

## 2018-05-18 RX ADMIN — SODIUM CHLORIDE, SODIUM LACTATE, POTASSIUM CHLORIDE, AND CALCIUM CHLORIDE 125 ML/HR: 600; 310; 30; 20 INJECTION, SOLUTION INTRAVENOUS at 11:16

## 2018-05-18 RX ADMIN — EPHEDRINE SULFATE 10 MG: 50 INJECTION, SOLUTION INTRAVENOUS at 13:30

## 2018-05-18 RX ADMIN — SODIUM CHLORIDE, SODIUM LACTATE, POTASSIUM CHLORIDE, CALCIUM CHLORIDE: 600; 310; 30; 20 INJECTION, SOLUTION INTRAVENOUS at 13:24

## 2018-05-18 RX ADMIN — HYDROMORPHONE HYDROCHLORIDE 0.5 MG: 2 INJECTION, SOLUTION INTRAMUSCULAR; INTRAVENOUS; SUBCUTANEOUS at 14:57

## 2018-05-18 RX ADMIN — FENTANYL CITRATE 50 MCG: 50 INJECTION, SOLUTION INTRAMUSCULAR; INTRAVENOUS at 13:40

## 2018-05-18 RX ADMIN — SODIUM CHLORIDE, SODIUM LACTATE, POTASSIUM CHLORIDE, AND CALCIUM CHLORIDE: 600; 310; 30; 20 INJECTION, SOLUTION INTRAVENOUS at 15:16

## 2018-05-18 RX ADMIN — FENTANYL CITRATE 50 MCG: 50 INJECTION, SOLUTION INTRAMUSCULAR; INTRAVENOUS at 13:19

## 2018-05-18 RX ADMIN — TOPIRAMATE 25 MG: 25 TABLET, FILM COATED ORAL at 22:13

## 2018-05-18 RX ADMIN — Medication 2 G: at 13:23

## 2018-05-18 RX ADMIN — HYDROMORPHONE HYDROCHLORIDE 0.5 MG: 2 INJECTION, SOLUTION INTRAMUSCULAR; INTRAVENOUS; SUBCUTANEOUS at 15:12

## 2018-05-18 RX ADMIN — DOCUSATE SODIUM 100 MG: 100 CAPSULE, LIQUID FILLED ORAL at 21:27

## 2018-05-18 RX ADMIN — ROCURONIUM BROMIDE 10 MG: 10 INJECTION, SOLUTION INTRAVENOUS at 13:19

## 2018-05-18 RX ADMIN — HYDROMORPHONE HYDROCHLORIDE 0.5 MG: 2 INJECTION INTRAMUSCULAR; INTRAVENOUS; SUBCUTANEOUS at 16:18

## 2018-05-18 RX ADMIN — NEOSTIGMINE METHYLSULFATE 3 MG: 1 INJECTION INTRAVENOUS at 15:52

## 2018-05-18 RX ADMIN — EPHEDRINE SULFATE 10 MG: 50 INJECTION, SOLUTION INTRAVENOUS at 13:23

## 2018-05-18 RX ADMIN — EPHEDRINE SULFATE 20 MG: 50 INJECTION, SOLUTION INTRAVENOUS at 13:58

## 2018-05-18 RX ADMIN — SODIUM CHLORIDE, SODIUM LACTATE, POTASSIUM CHLORIDE, AND CALCIUM CHLORIDE: 600; 310; 30; 20 INJECTION, SOLUTION INTRAVENOUS at 13:59

## 2018-05-18 RX ADMIN — DULOXETINE HYDROCHLORIDE 30 MG: 30 CAPSULE, DELAYED RELEASE ORAL at 21:27

## 2018-05-18 RX ADMIN — MIDAZOLAM HYDROCHLORIDE 2 MG: 1 INJECTION, SOLUTION INTRAMUSCULAR; INTRAVENOUS at 13:11

## 2018-05-18 RX ADMIN — FENTANYL CITRATE 50 MCG: 50 INJECTION, SOLUTION INTRAMUSCULAR; INTRAVENOUS at 14:13

## 2018-05-18 RX ADMIN — PROPOFOL 160 MG: 10 INJECTION, EMULSION INTRAVENOUS at 13:19

## 2018-05-18 RX ADMIN — PROPOFOL 50 MG: 10 INJECTION, EMULSION INTRAVENOUS at 15:12

## 2018-05-18 RX ADMIN — ROCURONIUM BROMIDE 20 MG: 10 INJECTION, SOLUTION INTRAVENOUS at 13:26

## 2018-05-18 RX ADMIN — Medication: at 17:10

## 2018-05-18 RX ADMIN — TRAZODONE HYDROCHLORIDE 300 MG: 100 TABLET ORAL at 21:28

## 2018-05-18 RX ADMIN — SODIUM CHLORIDE, SODIUM LACTATE, POTASSIUM CHLORIDE, AND CALCIUM CHLORIDE 75 ML/HR: 600; 310; 30; 20 INJECTION, SOLUTION INTRAVENOUS at 21:26

## 2018-05-18 RX ADMIN — PROPOFOL 50 MG: 10 INJECTION, EMULSION INTRAVENOUS at 15:17

## 2018-05-18 RX ADMIN — FENTANYL CITRATE 50 MCG: 50 INJECTION, SOLUTION INTRAMUSCULAR; INTRAVENOUS at 13:50

## 2018-05-18 RX ADMIN — ALPRAZOLAM 1 MG: 0.5 TABLET ORAL at 21:27

## 2018-05-18 RX ADMIN — ONDANSETRON 4 MG: 2 INJECTION INTRAMUSCULAR; INTRAVENOUS at 15:06

## 2018-05-18 RX ADMIN — PROPOFOL 50 MG: 10 INJECTION, EMULSION INTRAVENOUS at 15:23

## 2018-05-18 RX ADMIN — PROPOFOL 30 MG: 10 INJECTION, EMULSION INTRAVENOUS at 15:34

## 2018-05-18 RX ADMIN — KETOROLAC TROMETHAMINE 30 MG: 30 INJECTION, SOLUTION INTRAMUSCULAR; INTRAVENOUS at 17:41

## 2018-05-18 RX ADMIN — GLYCOPYRROLATE 0.4 MG: 0.2 INJECTION INTRAMUSCULAR; INTRAVENOUS at 15:52

## 2018-05-18 RX ADMIN — SODIUM CHLORIDE, POTASSIUM CHLORIDE, SODIUM LACTATE AND CALCIUM CHLORIDE 125 ML/HR: 600; 310; 30; 20 INJECTION, SOLUTION INTRAVENOUS at 17:00

## 2018-05-18 RX ADMIN — FENTANYL CITRATE 50 MCG: 50 INJECTION, SOLUTION INTRAMUSCULAR; INTRAVENOUS at 13:11

## 2018-05-18 NOTE — PERIOP NOTES
1757  Pt awake, ON bed, HOB 40. Pt off monitors for transport to room 421. All belongings sent. TRANSFER - OUT REPORT:    Verbal report given to Fabiola Duval RN on Tere Weiner  being transferred to [facility] for routine post - op       Report consisted of patients Situation, Background, Assessment and   Recommendations(SBAR). Information from the following report(s) SBAR, OR Summary, Intake/Output and MAR was reviewed with the receiving nurse. Lines:   Peripheral IV 05/18/18 Left Forearm (Active)   Site Assessment Clean, dry, & intact 5/18/2018  5:38 PM   Phlebitis Assessment 0 5/18/2018  5:38 PM   Infiltration Assessment 0 5/18/2018  5:38 PM   Dressing Status Clean, dry, & intact 5/18/2018  5:38 PM   Dressing Type Transparent 5/18/2018  5:38 PM   Hub Color/Line Status Pink;Clotted 5/18/2018  5:38 PM   Action Taken Open ports on tubing capped 5/18/2018 11:15 AM   Alcohol Cap Used Yes 5/18/2018  4:13 PM       Peripheral IV 05/18/18 Right Arm (Active)   Site Assessment Clean, dry, & intact 5/18/2018  5:38 PM   Phlebitis Assessment 0 5/18/2018  5:38 PM   Infiltration Assessment 0 5/18/2018  5:38 PM   Dressing Status Clean, dry, & intact 5/18/2018  4:13 PM   Dressing Type Transparent 5/18/2018  5:38 PM   Hub Color/Line Status Green; Infusing 5/18/2018  5:38 PM   Alcohol Cap Used Yes 5/18/2018  5:38 PM        Opportunity for questions and clarification was provided.       Patient transported with:   Registered Nurse

## 2018-05-18 NOTE — OP NOTES
OPERATIVE NOTE    Date of Procedure: 5/18/2018   Preoperative Diagnosis: PARAESOPHAGEAL HERNIA  Postoperative Diagnosis: PARAESOPHAGEAL HERNIA, GASTRITIS    Procedure(s):  LAPAROSCOPIC PARAESOPHAGEAL HERNIA  REPAIR WITH MESH, NISSEN FUNDOPLICATION,EGD (LATEX ALLERGY)  ESOPHAGOGASTRODUODENOSCOPY (EGD)  Surgeon(s) and Role:  Panel 1:     * Henrry Posadas MD - Primary    Panel 2:     * Grant Moreira MD - Primary    Surgical Staff:  Circ-1: Fredrick Perez RN  Endoscopy Technician-1: Ankita Roth  Scrub Tech-1: Carolyn Roldan. Trev  Surg Asst-1: Tanner Thompson  Endoscopy RN-1: Shana Titus RN  Float Staff: Gabriella Pa  Event Time In   Incision Start 1346   Incision Close 1558     Anesthesia: General   Estimated Blood Loss: 100cc  Specimens: * No specimens in log *   Findings: Sliding hiatal hernia, gastritis in stomach   Complications: none  Implants:     Implant Name Type Inv. Item Serial No.  Lot No. LRB No. Used Action   MESH SHT BIO ABSORBABLE 7X10CM --  - [id number]   MESH SHT BIO ABSORBABLE 7X10CM --  [id number] [employer] NA N/A 1 Implanted       INDICATION:  See Paper H/P. PROCEDURE:  The patient was placed on the operating table and secured in supine position. Sequential compression devices were applied.  General anesthesia was induced successfully. A time-out completed, verifying the correct the patient, procedure site, positioning, implants, and/or special equipment prior to beginning the case.    Orogastric tube was used to decompress the stomach and urinary catheter placed.  A foot board was placed and the patient was secure to the bed protecting a soft bend position of the knees.  The abdomen was prepped and draped in the usual sterile fashion.   The bed anchor of the liver retractor was placed under the right axilla of the patient, allowing visual clearance of the liver retractor post prior to draping.  At no time was the retractor in contact with the patient.  Through a left side, mid-axillary subcostal 5mm incision, Veress needle insufflation was established and maintained at 15mm Hg. The abdomen was then entered under direct camera vision with a 5 mm blunt tissue dissecting port.  The remaining ports were placed in the following fashion under direct endoscopic vision: Left side, midclavicular subcostal 5 mm port, subxiphoid 5 mm port, 8 mm midline port directly adjacent to the base of the falciform ligament, hands breath caudal from the subxiphoid port, and right side 5 mm midaxillary subcostal port.  No injuries were noted during port placement or during initial entry.       The patient was placed in steep reverse Trendelenburg position. Liver retractor was introduced in the right subcostal port to elevate the left lobe of liver and expose the hiatus. The hiatus revealed anterior dimple and posterior body of the stomach within the hiatus.  Pars flaccida and gastrohepatic ligament were opened with electrocautery. She appeared to have a replaced hepatic artery within the substance of pars flaccida which was not transected. The right heena was identified, opened and the plane between the right heena - esophagus and the crural desiccation visualized. Dissection continued to peritoneum anterior over phrenoesophageal hiatus to left heena. Avascular plane was opened bluntly. Dissection was then carried to crux of the crura. Short gastric vessels adjacent to the reduced cardia were taken gently with a vessel sealing device, being careful not to tear vascular connections to the spleen. This plane was opened to the posterior gastroesophageal junction and met with the previous right sided dissection. The left heena was then similarly dissected, periotoneal covering left intact and the phrenoesophageal ligament was completely divided anteriorly.  At this point, the paraesophageal hernia contents were gently pulled down from the chest below the diaphragm.  The vagus nerves were identified and protected. The esophagus was gently elevated with a closed grasper and dissection continued until the esophagus was fully mobilized. The orogastric tube was removed. A penrose catheter was then passed under the esophagus and controlled with a Vicryl endo-loop. The esophagus was encircled to include the vagus nerves. At least 2 cm of intraperitoneal esophagus was reduced and laid in the peritoneal cavity without tension. Dr. Ashly Morton performed intraoperative endoscopy and will dictate his portion of the case separately. 42F Bougie was passed by Anesthesia transorally into the stomach. The crura was re-approximated posteriorly with three 0 Ethibond sutures. The bioabsorbable mesh was prepared and passed into the field. The mesh was tacked into place around the crura with absorbable tacks avoiding vena cava and aorta. Fundus was passed through the retroesophageal window, crossing midline for a 360 degree loose wrap. A shoe-shine technique was performed, allowing the proposed wrap position to lie in position without manipulation. A 2.5 cm wrap was secured in place with three interrupted 0 Ethi-bond sutures, with the first and second sutures incorporating some esophageal muscle. The bougie was removed under direct vision. The liver retractor was removed under direct visualization and the liver inspected for any bleeding. There was venous ooze from a rent in the upper pole of the spleen, which was controlled with pressure. Hemostasis was satisfactory. Ports were removed and replaced sequentially looking for bleeding. The gallbladder, instruments and ports were removed from the field and pneumoperitoneum terminally released. The skin incisions were all closed with 4-0 monocryl, steristrips and tegaderm.  The patient tolerated the procedure well and was taken to the postanesthesia care unit in stable and satisfactory condition.  I discussed the findings of the surgery with her mother in the recovery area.     Conception MD Reynaldo

## 2018-05-18 NOTE — PROGRESS NOTES
[facility] Dosing Services:     Enoxaparin by Dr. Gallo Him made for this 47 y.o. female, for prophylaxis. Wt Readings from Last 1 Encounters:   05/10/18 109.1 kg (240 lb 8.4 oz)       Ht Readings from Last 1 Encounters:   05/10/18 162.6 cm (64\")      Previous Dose 40 mg every 24 hours   Creatinine Clearance   >100 ml/min    Creatinine Lab Results   Component Value Date/Time    Creatinine 1.08 (H) 05/10/2018 02:15 PM    Creatinine (POC) 0.7 09/30/2013 12:55 PM       Platelet Lab Results   Component Value Date/Time    PLATELET 827 49/13/1971 02:15 PM      H/H Lab Results   Component Value Date/Time    HGB 12.9 05/10/2018 02:15 PM          Pharmacist made change to enoxaparin therapy based on:  [ X ] BMI: dose changed to: 40 mg every 12 hours  BMI 41.3    - Pharmacy to automatically make dose adjustment for renal dysfunction (creatinine clearance less than 30 mL/min)  - Pharmacy to automatically make dose adjustment for obesity (BMI greater than 40)  - Pharmacy to make dose rounding adjustments per [facility] dose adjustment scale. Pharmacy to monitor patients progress. Will make dose adjustment as needed per changing renal function. Will communicate further recommendations regarding patients anticoagulation therapy with prescriber. Signed Matilde Santana .  Contact information: [phone]

## 2018-05-18 NOTE — ANESTHESIA POSTPROCEDURE EVALUATION
Post-Anesthesia Evaluation and Assessment    Patient: Slick Jorgensen MRN: [id number]  SSN: [id number]    YOB: 1965  Age: 47 y.o. Sex: female       Cardiovascular Function/Vital Signs  Visit Vitals    BP (!) 141/94    Pulse (!) 104    Temp 36.9 °C (98.4 °F)    Resp 19    SpO2 94%       Patient is status post general anesthesia for Procedure(s):  LAPAROSCOPIC PARAESOPHAGEAL HERNIA  REPAIR WITH MESH, NISSEN FUNDOPLICATION,EGD (LATEX ALLERGY)  ESOPHAGOGASTRODUODENOSCOPY (EGD). Nausea/Vomiting: None    Postoperative hydration reviewed and adequate. Pain:  Pain Scale 1: Numeric (0 - 10) (05/18/18 1621)  Pain Intensity 1: 8 (05/18/18 1621)   Managed    Neurological Status:   Neuro (WDL): Within Defined Limits (05/18/18 1621)  Neuro  LUE Motor Response: Purposeful (05/18/18 1621)  LLE Motor Response: Purposeful (05/18/18 1621)  RUE Motor Response: Purposeful (05/18/18 1621)  RLE Motor Response: Purposeful (05/18/18 1621)   At baseline    Mental Status and Level of Consciousness: Arousable    Pulmonary Status:   O2 Device: Nasal cannula (05/18/18 1621)   Adequate oxygenation and airway patent    Complications related to anesthesia: None    Post-anesthesia assessment completed.  No concerns    Signed By: Carolyn Dai MD     May 18, 2018

## 2018-05-18 NOTE — BRIEF OP NOTE
BRIEF OPERATIVE NOTE    Date of Procedure: 5/18/2018   Preoperative Diagnosis: PARAESOPHAGEAL HERNIA  Postoperative Diagnosis: PARAESOPHAGEAL HERNIA, GASTRITIS    Procedure(s):  LAPAROSCOPIC PARAESOPHAGEAL HERNIA  REPAIR WITH MESH, NISSEN FUNDOPLICATION,EGD (LATEX ALLERGY)  ESOPHAGOGASTRODUODENOSCOPY (EGD)  Surgeon(s) and Role:  Panel 1:     * Belgica Meidna MD - Primary    Panel 2:     * Carolyn Nielsen MD - Primary    Surgical Staff:  Circ-1: Keren Villarreal RN  Endoscopy Technician-1: Inna Antonio  Scrub Tech-1: Molly Null. Trev  Surg Asst-1: Altagracia Goyal  Endoscopy RN-1: Dragan Alfonso RN  Float Staff: Zoe Prudent  Event Time In   Incision Start 1346   Incision Close 1558     Anesthesia: General   Estimated Blood Loss: 100cc  Specimens: * No specimens in log *   Findings: Sliding hiatal hernia, gastritis in stomach   Complications: none  Implants:   Implant Name Type Inv.  Item Serial No.  Lot No. LRB No. Used Action   MESH SHT BIO ABSORBABLE 7X10CM --  - [id number]   MESH SHT BIO ABSORBABLE 7X10CM --  [id number]  [employer] NA N/A 1 Implanted

## 2018-05-18 NOTE — OP NOTES
Date: 05/18/18     Pre-operative Diagnosis: Hiatal hernia     Post-operative Diagnosis:  Hiatal hernia     Procedure: Esophagogastroduodenoscopy     Surgeon: Fang Momin MD     Assistant:  None     Anesthesia: General     Estimated Blood Loss: 0 cc     Findings: Gastritis in the cardia and body of the stomach. No lesions in the esophagus or duodenum.      Specimen: None         Indication: 47year-old female with a hiatal hernia. EGD was requested at time of surgery by Dr. Lynnette Shultz.          Procedure: The patient was in the OR with Dr. Lynnette Shultz laparoscopic repair of a hiatal hernia and Nissen fundoplication. The gastroscope was passed under direct vision into the mouth and esophagus. The esophagus was normal without ulcers or esophagitis. The scope was passed into the stomach and the stomach was insufflated. There was evidence of gastritis in the cardia and body of the stomach. The scope was advanced into the duodenum, no ulcers were visible. The stomach was suctioned and the gastroscope was withdrawn.  The patient remained in the OR with Dr. Daniel Reynoso MD

## 2018-05-18 NOTE — IP AVS SNAPSHOT
[address] [facility] 
 
 
 [address] 
[phone] Patient: Julianna Marroquin MRN: [id number] :1965 About your hospitalization You were admitted on:  May 18, 2018 You last received care in the:  [facility] 4M POST SURG ORT 2 You were discharged on:  May 23, 2018 Why you were hospitalized Your primary diagnosis was:  Not on File Your diagnoses also included:  Hiatal Hernia Follow-up Information Follow up With Details Comments Contact Info [place]   [address] [address] 
[phone] Callie Thorpe MD Schedule an appointment as soon as possible for a visit in 2 weeks  [address] [facility] [address] 
[phone] Discharge Orders None A check sandra indicates which time of day the medication should be taken. My Medications START taking these medications Instructions Each Dose to Equal  
 Morning Noon Evening Bedtime HYDROmorphone 2 mg tablet Commonly known as:  DILAUDID Your last dose was: Your next dose is: Take 1-2 Tabs by mouth every four (4) hours as needed. Max Daily Amount: 24 mg.  
 2-4 mg CONTINUE taking these medications Instructions Each Dose to Equal  
 Morning Noon Evening Bedtime ALPRAZolam 1 mg tablet Commonly known as:  Will Julian Your last dose was: Your next dose is: Take 1 mg by mouth three (3) times daily as needed. 1 mg  
    
   
   
   
  
 busPIRone 15 mg tablet Commonly known as:  BUSPAR Your last dose was: Your next dose is: Take  by mouth nightly. CHANTIX 1 mg tablet Generic drug:  varenicline Your last dose was: Your next dose is: Take 1 mg by mouth two (2) times daily (after meals). 1 mg * DULoxetine 30 mg capsule Commonly known as:  CYMBALTA Your last dose was: Your next dose is: Take 30 mg by mouth nightly. 30 mg  
    
   
   
   
  
 * DULoxetine 60 mg capsule Commonly known as:  CYMBALTA Your last dose was: Your next dose is: Take 60 mg by mouth nightly. 60 mg  
    
   
   
   
  
 losartan-hydroCHLOROthiazide 100-25 mg per tablet Commonly known as:  HYZAAR Your last dose was: Your next dose is: Take 1 Tab by mouth nightly. 1 Tab  
    
   
   
   
  
 pantoprazole 40 mg granules for oral suspension Commonly known as:  PROTONIX Your last dose was: Your next dose is:    
   
   
 40 mg two (2) times a day. 40 mg STOOL SOFTENER 50 mg capsule Generic drug:  docusate sodium Your last dose was: Your next dose is: Take 50 mg by mouth nightly. 50 mg  
    
   
   
   
  
 tiZANidine 4 mg capsule Commonly known as:  Eugena Babinski Your last dose was: Your next dose is: Take 4 mg by mouth three (3) times daily. 4 mg  
    
   
   
   
  
 TOPAMAX 25 mg tablet Generic drug:  topiramate Your last dose was: Your next dose is: Take 25 mg by mouth two (2) times a day. 25 mg  
    
   
   
   
  
 traZODone 100 mg tablet Commonly known as:  Kat Lohman Your last dose was: Your next dose is: Take 300 mg by mouth nightly. 1-3 tabs per night  
 300 mg * Notice: This list has 2 medication(s) that are the same as other medications prescribed for you. Read the directions carefully, and ask your doctor or other care provider to review them with you. STOP taking these medications   
 ibuprofen 800 mg tablet Commonly known as:  MOTRIN Where to Get Your Medications Information on where to get these meds will be given to you by the nurse or doctor. ! Ask your nurse or doctor about these medications HYDROmorphone 2 mg tablet Opioid Education Prescription Opioids: What You Need to Know: 
 
Prescription opioids can be used to help relieve moderate-to-severe pain and are often prescribed following a surgery or injury, or for certain health conditions. These medications can be an important part of treatment but also come with serious risks. Opioids are strong pain medicines. Examples include hydrocodone, oxycodone, fentanyl, and morphine. Heroin is an example of an illegal opioid. It is important to work with your health care provider to make sure you are getting the safest, most effective care. WHAT ARE THE RISKS AND SIDE EFFECTS OF OPIOID USE? Prescription opioids carry serious risks of addiction and overdose, especially with prolonged use. An opioid overdose, often marked by slow breathing, can cause sudden death. The use of prescription opioids can have a number of side effects as well, even when taken as directed. · Tolerance-meaning you might need to take more of a medication for the same pain relief · Physical dependence-meaning you have symptoms of withdrawal when the medication is stopped. Withdrawal symptoms can include nausea, sweating, chills, diarrhea, stomach cramps, and muscle aches. Withdrawal can last up to several weeks, depending on which drug you took and how long you took it. · Increased sensitivity to pain · Constipation · Nausea, vomiting, and dry mouth · Sleepiness and dizziness · Confusion · Depression · Low levels of testosterone that can result in lower sex drive, energy, and strength · Itching and sweating RISKS ARE GREATER WITH:      
· History of drug misuse, substance use disorder, or overdose · Mental health conditions (such as depression or anxiety) · Sleep apnea · Older age (72 years or older) · Pregnancy Avoid alcohol while taking prescription opioids. Also, unless specifically advised by your health care provider, medications to avoid include: · Benzodiazepines (such as Xanax or Valium) · Muscle relaxants (such as Soma or Flexeril) · Hypnotics (such as Ambien or Lunesta) · Other prescription opioids KNOW YOUR OPTIONS Talk to your health care provider about ways to manage your pain that don't involve prescription opioids. Some of these options may actually work better and have fewer risks and side effects. Options may include: 
· Pain relievers such as acetaminophen, ibuprofen, and naproxen · Some medications that are also used for depression or seizures · Physical therapy and exercise · Counseling to help patients learn how to cope better with triggers of pain and stress. · Application of heat or cold compress · Massage therapy · Relaxation techniques Be Informed Make sure you know the name of your medication, how much and how often to take it, and its potential risks & side effects. IF YOU ARE PRESCRIBED OPIOIDS FOR PAIN: 
· Never take opioids in greater amounts or more often than prescribed. Remember the goal is not to be pain-free but to manage your pain at a tolerable level. · Follow up with your primary care provider to: · Work together to create a plan on how to manage your pain. · Talk about ways to help manage your pain that don't involve prescription opioids. · Talk about any and all concerns and side effects. · Help prevent misuse and abuse. · Never sell or share prescription opioids · Help prevent misuse and abuse. · Store prescription opioids in a secure place and out of reach of others (this may include visitors, children, friends, and family).  
· Safely dispose of unused/unwanted prescription opioids: Find your community drug take-back program or your pharmacy mail-back program, or flush them down the toilet, following guidance from the Food and Drug Administration (www.fda.gov/Drugs/ResourcesForYou). · Visit www.cdc.gov/drugoverdose to learn about the risks of opioid abuse and overdose. · If you believe you may be struggling with addiction, tell your health care provider and ask for guidance or call Balbina Inman at 3-474-155-WFKP. Discharge Instructions Patient Discharge Instructions [facility] / [id number] : 1965 Admitted 2018 Discharged: 2018 Take Home Medications · It is important that you take the medication exactly as they are prescribed. · Keep your medication in the bottles provided by the pharmacist and keep a list of the medication names, dosages, and times to be taken in your wallet. · Do not take other medications without consulting your doctor. · Do not drive, drink alcohol, or operate machinery when taking sedating pain medication. · Take colace daily while on pain medication to help prevent constipation. You may take over the counter laxatives such as Dulcolax or Miralax as needed for constipation What to do at [facility] Recommended diet: Full liquid diet Recommended activity: No heavy lifting or strenuous activity for 6 weeks. You may shower. You may drive once pain has improved and you no longer require pain medication. Follow up with Dr. Dajuan Ferraro in 2 weeks. Call [facility] to make an appointment. Call Dr. Dajuan Ferraro or go to the ER if you develop worsening pain, fever, vomiting, or any other symptoms that concern you. [facility] Announce[facility] We are excited to announce that we are making your provider's discharge notes available to you in [place].   You will see these notes when they are completed and signed by the physician that discharged you from your recent hospital stay. If you have any questions or concerns about any information you see in [employer], please call the Health Information Department where you were seen or reach out to your Primary Care Provider for more information about your plan of care. Introducing [facility]! Roxana Masters introduces [employer] [place] patient portal. Now you can access parts of your medical record, email your doctor's office, and request medication refills online. 1. In your internet browser, go to [email or web address] 2. Click on the First Time User? Click Here link in the Sign In box. You will see the New Member Sign Up page. 3. Enter your [employer] [place] Access Code exactly as it appears below. You will not need to use this code after youve completed the sign-up process. If you do not sign up before the expiration date, you must request a new code. · [employer] [place] Access Code: [email or web address] Expires: 7/31/2018  5:31 AM 
 
4. Enter the last four digits of your Social Security Number (xxxx) and Date of Birth (mm/dd/yyyy) as indicated and click Submit. You will be taken to the next sign-up page. 5. Create a [employer] [place] ID. This will be your [employer] [place] login ID and cannot be changed, so think of one that is secure and easy to remember. 6. Create a [employer] [place] password. You can change your password at any time. 7. Enter your Password Reset Question and Answer. This can be used at a later time if you forget your password. 8. Enter your e-mail address. You will receive e-mail notification when new information is available in [address] 9. Click Sign Up. You can now view and download portions of your medical record. 10. Click the Download Summary menu link to download a portable copy of your medical information. If you have questions, please visit the Frequently Asked Questions section of the [employer] [place] website. Remember, [employer] [place] is NOT to be used for urgent needs. For medical emergencies, dial 911. Now available from your iPhone and Android! Introducing [employer] As a [facility] patient, I wanted to make you aware of our electronic visit tool called [employer]. [employer] 24/7 allows you to connect within minutes with a medical provider 24 hours a day, seven days a week via a mobile device or tablet or logging into a secure website from your computer. You can access [employer] from anywhere in the [place]. A virtual visit might be right for you when you have a simple condition and feel like you just dont want to get out of bed, or cant get away from work for an appointment, when your regular [facility] provider is not available (evenings, weekends or holidays), or when youre out of town and need minor care. Electronic visits cost only $49 and if the [employer] 24/7 provider determines a prescription is needed to treat your condition, one can be electronically transmitted to a nearby pharmacy*. Please take a moment to enroll today if you have not already done so. The enrollment process is free and takes just a few minutes. To enroll, please download the [facility] 24/7 jacquelin to your tablet or phone, or visit [email or web address] to enroll on your computer. And, as an [address] [place] [address] patient with a [employer] account, the results of your visits will be scanned into your electronic medical record and your primary care provider will be able to view the scanned results. We urge you to continue to see your regular [facility] provider for your ongoing medical care. And while your primary care provider may not be the one available when you seek a [employer] virtual visit, the peace of mind you get from getting a real diagnosis real time can be priceless. For more information on [employer], view our Frequently Asked Questions (FAQs) at [email or web address]. Sincerely, 
 
Amita Taylor MD 
Chief Medical Officer Ziyad Cook *:  certain medications cannot be prescribed via [employer] Providers Seen During Your Hospitalization Provider Specialty Primary office phone Dustin Mckeon, [address] [facility] Surgery [phone] Your Primary Care Physician (PCP) Primary Care Physician Office Phone Office Fabouchra Sifuentes, [address] [facility] [phone] You are allergic to the following Allergen Reactions Latex, Natural Rubber Rash Aspirin Other (comments) Excessive bleeding Codeine Palpitations Contrast Agent (Iodine) Rash Imitrex (Sumatriptan) Palpitations Other (comments)  
 thougt she was having an MI Percocet (Oxycodone-Acetaminophen) Palpitations Recent Documentation Weight BMI OB Status Smoking Status 117.7 kg 44.54 kg/m2 Postmenopausal Current Every Day Smoker Emergency Contacts Name Discharge Info Relation Home Work Mobile Trisha Suarez DISCHARGE CAREGIVER [3] Mother [14] [phone] Patient Belongings The following personal items are in your possession at time of discharge: 
  Dental Appliances: None  Visual Aid: Glasses, At bedside, With patient   Hearing Aids/Status: Does not own  Home Medications: None   Jewelry: None  Clothing: Other (comment) (street clothes to PACU)    Other Valuables: None Please provide this summary of care documentation to your next provider. Signatures-by signing, you are acknowledging that this After Visit Summary has been reviewed with you and you have received a copy. Patient Signature:  ____________________________________________________________ Date:  ____________________________________________________________  
  
Lainey Villanueva Provider Signature:  ____________________________________________________________ Date:  ____________________________________________________________

## 2018-05-19 LAB
ABO + RH BLD: NORMAL
ANION GAP SERPL CALC-SCNC: 6 MMOL/L (ref 5–15)
BASOPHILS # BLD: 0 K/UL (ref 0–0.1)
BASOPHILS NFR BLD: 0 % (ref 0–1)
BLOOD GROUP ANTIBODIES SERPL: NORMAL
BUN SERPL-MCNC: 22 MG/DL (ref 6–20)
BUN/CREAT SERPL: 19 (ref 12–20)
CALCIUM SERPL-MCNC: 8.5 MG/DL (ref 8.5–10.1)
CHLORIDE SERPL-SCNC: 98 MMOL/L (ref 97–108)
CO2 SERPL-SCNC: 31 MMOL/L (ref 21–32)
CREAT SERPL-MCNC: 1.17 MG/DL (ref 0.55–1.02)
DIFFERENTIAL METHOD BLD: ABNORMAL
EOSINOPHIL # BLD: 0.1 K/UL (ref 0–0.4)
EOSINOPHIL NFR BLD: 1 % (ref 0–7)
ERYTHROCYTE [DISTWIDTH] IN BLOOD BY AUTOMATED COUNT: 12.3 % (ref 11.5–14.5)
GLUCOSE SERPL-MCNC: 132 MG/DL (ref 65–100)
HCT VFR BLD AUTO: 34.7 % (ref 35–47)
HGB BLD-MCNC: 11.3 G/DL (ref 11.5–16)
IMM GRANULOCYTES # BLD: 0 K/UL (ref 0–0.04)
IMM GRANULOCYTES NFR BLD AUTO: 0 % (ref 0–0.5)
LYMPHOCYTES # BLD: 2.4 K/UL (ref 0.8–3.5)
LYMPHOCYTES NFR BLD: 26 % (ref 12–49)
MAGNESIUM SERPL-MCNC: 1.7 MG/DL (ref 1.6–2.4)
MCH RBC QN AUTO: 30.4 PG (ref 26–34)
MCHC RBC AUTO-ENTMCNC: 32.6 G/DL (ref 30–36.5)
MCV RBC AUTO: 93.3 FL (ref 80–99)
MONOCYTES # BLD: 0.7 K/UL (ref 0–1)
MONOCYTES NFR BLD: 7 % (ref 5–13)
NEUTS SEG # BLD: 5.9 K/UL (ref 1.8–8)
NEUTS SEG NFR BLD: 65 % (ref 32–75)
NRBC # BLD: 0 K/UL (ref 0–0.01)
NRBC BLD-RTO: 0 PER 100 WBC
PLATELET # BLD AUTO: 245 K/UL (ref 150–400)
PMV BLD AUTO: 9.8 FL (ref 8.9–12.9)
POTASSIUM SERPL-SCNC: 2.7 MMOL/L (ref 3.5–5.1)
POTASSIUM SERPL-SCNC: 2.7 MMOL/L (ref 3.5–5.1)
RBC # BLD AUTO: 3.72 M/UL (ref 3.8–5.2)
SODIUM SERPL-SCNC: 135 MMOL/L (ref 136–145)
SPECIMEN EXP DATE BLD: NORMAL
WBC # BLD AUTO: 9.1 K/UL (ref 3.6–11)

## 2018-05-19 PROCEDURE — 85025 COMPLETE CBC W/AUTO DIFF WBC: CPT | Performed by: SURGERY

## 2018-05-19 PROCEDURE — 84132 ASSAY OF SERUM POTASSIUM: CPT | Performed by: SURGERY

## 2018-05-19 PROCEDURE — 74011250636 HC RX REV CODE- 250/636: Performed by: SURGERY

## 2018-05-19 PROCEDURE — 77030027138 HC INCENT SPIROMETER -A

## 2018-05-19 PROCEDURE — 83735 ASSAY OF MAGNESIUM: CPT | Performed by: SURGERY

## 2018-05-19 PROCEDURE — 74011250637 HC RX REV CODE- 250/637: Performed by: SURGERY

## 2018-05-19 PROCEDURE — C9113 INJ PANTOPRAZOLE SODIUM, VIA: HCPCS | Performed by: SURGERY

## 2018-05-19 PROCEDURE — 65270000029 HC RM PRIVATE

## 2018-05-19 PROCEDURE — 36415 COLL VENOUS BLD VENIPUNCTURE: CPT | Performed by: SURGERY

## 2018-05-19 PROCEDURE — 80048 BASIC METABOLIC PNL TOTAL CA: CPT | Performed by: SURGERY

## 2018-05-19 RX ORDER — DEXTROSE, SODIUM CHLORIDE, AND POTASSIUM CHLORIDE 5; .9; .15 G/100ML; G/100ML; G/100ML
75 INJECTION INTRAVENOUS CONTINUOUS
Status: DISCONTINUED | OUTPATIENT
Start: 2018-05-19 | End: 2018-05-22

## 2018-05-19 RX ORDER — POTASSIUM CHLORIDE 14.9 MG/ML
20 INJECTION INTRAVENOUS
Status: COMPLETED | OUTPATIENT
Start: 2018-05-19 | End: 2018-05-19

## 2018-05-19 RX ORDER — MAGNESIUM SULFATE HEPTAHYDRATE 40 MG/ML
2 INJECTION, SOLUTION INTRAVENOUS ONCE
Status: COMPLETED | OUTPATIENT
Start: 2018-05-19 | End: 2018-05-19

## 2018-05-19 RX ADMIN — ALPRAZOLAM 1 MG: 0.5 TABLET ORAL at 21:58

## 2018-05-19 RX ADMIN — VARENICLINE TARTRATE 1 MG: 0.5 TABLET, FILM COATED ORAL at 08:30

## 2018-05-19 RX ADMIN — MAGNESIUM SULFATE HEPTAHYDRATE 2 G: 40 INJECTION, SOLUTION INTRAVENOUS at 15:11

## 2018-05-19 RX ADMIN — TIZANIDINE 4 MG: 2 TABLET ORAL at 21:50

## 2018-05-19 RX ADMIN — TRAZODONE HYDROCHLORIDE 300 MG: 100 TABLET ORAL at 23:04

## 2018-05-19 RX ADMIN — KETOROLAC TROMETHAMINE 15 MG: 30 INJECTION, SOLUTION INTRAMUSCULAR; INTRAVENOUS at 05:21

## 2018-05-19 RX ADMIN — KETOROLAC TROMETHAMINE 15 MG: 30 INJECTION, SOLUTION INTRAMUSCULAR; INTRAVENOUS at 12:11

## 2018-05-19 RX ADMIN — POTASSIUM CHLORIDE 20 MEQ: 200 INJECTION, SOLUTION INTRAVENOUS at 15:11

## 2018-05-19 RX ADMIN — KETOROLAC TROMETHAMINE 15 MG: 30 INJECTION, SOLUTION INTRAMUSCULAR; INTRAVENOUS at 18:14

## 2018-05-19 RX ADMIN — TIZANIDINE 4 MG: 2 TABLET ORAL at 17:56

## 2018-05-19 RX ADMIN — DULOXETINE HYDROCHLORIDE 60 MG: 30 CAPSULE, DELAYED RELEASE ORAL at 21:47

## 2018-05-19 RX ADMIN — DOCUSATE SODIUM 100 MG: 100 CAPSULE, LIQUID FILLED ORAL at 08:30

## 2018-05-19 RX ADMIN — PANTOPRAZOLE SODIUM 40 MG: 40 INJECTION, POWDER, FOR SOLUTION INTRAVENOUS at 08:32

## 2018-05-19 RX ADMIN — DEXTROSE MONOHYDRATE, SODIUM CHLORIDE, AND POTASSIUM CHLORIDE 125 ML/HR: 50; 9; 1.49 INJECTION, SOLUTION INTRAVENOUS at 15:09

## 2018-05-19 RX ADMIN — VARENICLINE TARTRATE 1 MG: 0.5 TABLET, FILM COATED ORAL at 17:57

## 2018-05-19 RX ADMIN — ALPRAZOLAM 1 MG: 0.5 TABLET ORAL at 08:31

## 2018-05-19 RX ADMIN — BUSPIRONE HYDROCHLORIDE 15 MG: 5 TABLET ORAL at 21:49

## 2018-05-19 RX ADMIN — POTASSIUM CHLORIDE 20 MEQ: 200 INJECTION, SOLUTION INTRAVENOUS at 19:02

## 2018-05-19 RX ADMIN — DIAZEPAM 2 MG: 2 TABLET ORAL at 17:56

## 2018-05-19 RX ADMIN — TOPIRAMATE 25 MG: 25 TABLET, FILM COATED ORAL at 08:30

## 2018-05-19 RX ADMIN — POTASSIUM CHLORIDE 20 MEQ: 200 INJECTION, SOLUTION INTRAVENOUS at 17:09

## 2018-05-19 RX ADMIN — TIZANIDINE 4 MG: 2 TABLET ORAL at 08:29

## 2018-05-19 RX ADMIN — TOPIRAMATE 25 MG: 25 TABLET, FILM COATED ORAL at 21:50

## 2018-05-19 RX ADMIN — DULOXETINE HYDROCHLORIDE 30 MG: 30 CAPSULE, DELAYED RELEASE ORAL at 21:47

## 2018-05-19 RX ADMIN — SODIUM CHLORIDE 1000 ML: 900 INJECTION, SOLUTION INTRAVENOUS at 10:04

## 2018-05-19 RX ADMIN — KETOROLAC TROMETHAMINE 15 MG: 30 INJECTION, SOLUTION INTRAMUSCULAR; INTRAVENOUS at 23:03

## 2018-05-19 RX ADMIN — DOCUSATE SODIUM 100 MG: 100 CAPSULE, LIQUID FILLED ORAL at 17:58

## 2018-05-19 RX ADMIN — DIAZEPAM 2 MG: 2 TABLET ORAL at 10:34

## 2018-05-19 NOTE — PROGRESS NOTES
Primary Nurse Rylee Steiner and Bridgette Edwards, RN performed a dual skin assessment on this patient No impairment noted  Tyree score is 23

## 2018-05-19 NOTE — PROGRESS NOTES
Hypokalemia Note:    Notified by lab of critical result potassium 2.7, however results obtained while saline running at 250 mL/hr. Charge recommended retaking potassium after 1 hour SL. Recheck yielded same results. Recheck obtained 12:41, resulted at 1402 after phone call to lab by JESSIE BRUNO notified. Orders obtained for IV potassium X3 doses, IV magnesium, change in IV fluids and follow up labs tomorrow AM. See orders.

## 2018-05-19 NOTE — PROGRESS NOTES
Bedside and Verbal shift change report given to Rubi Clark (oncoming nurse) by Kelsey Palomino RN (offgoing nurse). Report included the following information SBAR, Kardex, Procedure Summary, Intake/Output, MAR and Recent Results.

## 2018-05-19 NOTE — PROGRESS NOTES
Bedside and Verbal shift change report given to SNOW Brice (oncoming nurse) by Ashley Del Cid RN (offgoing nurse). Report included the following information SBAR, Kardex, Procedure Summary, Intake/Output, MAR and Recent Results.

## 2018-05-19 NOTE — PROGRESS NOTES
General Surgery Progress Note      S: Having left neck/shoulder pain. No nausea, feeling thirsty. Patient Vitals for the past 24 hrs:   Temp Pulse Resp BP SpO2   05/19/18 0729 99.7 °F (37.6 °C) 93 15 93/59 98 %   05/19/18 0305 98.6 °F (37 °C) 85 18 93/64 94 %   05/18/18 2355 98.1 °F (36.7 °C) 86 18 (!) 89/55 92 %   05/18/18 2122 98.3 °F (36.8 °C) 94 16 92/66 91 %   05/18/18 2021 98.1 °F (36.7 °C) 98 16 (!) 87/57 93 %   05/18/18 1924 98.4 °F (36.9 °C) 100 16 90/54 94 %   05/18/18 1814 98.1 °F (36.7 °C) 99 16 (!) 84/53 98 %   05/18/18 1740 - 99 15 101/59 93 %   05/18/18 1730 - (!) 103 17 98/70 93 %   05/18/18 1725 - 97 9 104/63 94 %   05/18/18 1721 98 °F (36.7 °C) - - - -   05/18/18 1715 - (!) 104 19 (!) 141/94 94 %   05/18/18 1710 - (!) 101 11 101/56 96 %   05/18/18 1705 - 98 10 98/62 95 %   05/18/18 1700 - 97 12 102/56 95 %   05/18/18 1645 - 98 11 - 90 %   05/18/18 1640 - (!) 102 18 115/69 94 %   05/18/18 1635 - 95 9 (!) 83/52 95 %   05/18/18 1630 - 98 11 107/59 95 %   05/18/18 1625 - 93 10 113/60 95 %   05/18/18 1620 - 98 17 114/64 94 %   05/18/18 1615 - (!) 101 17 126/70 96 %   05/18/18 1610 98.4 °F (36.9 °C) 100 18 124/63 95 %   05/18/18 1605 - - - 107/68 92 %   05/18/18 1604 - - - 122/76 -   05/18/18 1123 98.9 °F (37.2 °C) (!) 102 16 125/73 96 %           Date 05/18/18 0700 - 05/19/18 0659 05/19/18 0700 - 05/20/18 0659   Shift 9690-6623 7570-9815 24 Hour Total 7549-5240 2384-4953 24 Hour Total   I  N  T  A  K  E   I.V. 2000 2000         Volume (lactated Ringers infusion) 2000 2000       Shift Total 2000 2000      O  U  T  P  U  T   Urine 315 300 615         Urine Output 165  165         Urine Output (mL) (Urinary Catheter 05/18/18 [facility]) 150 300 450       Blood 20  20         Estimated Blood Loss 20  20       Shift Total 335 300 635      NET 1665 -300 1365      Weight (kg)                 Physical Exam:      General: NAD, A&Ox3  Resp: CTAB  CV: RRR  Abdomen: soft, appropriately tender, non-distended. Incisions with dressing from OR in place, no soilage  Extremity: No edema    Lab Results   Component Value Date/Time    WBC 9.1 05/19/2018 02:11 AM    Hemoglobin (POC) 15.0 09/30/2013 12:55 PM    HGB 11.3 (L) 05/19/2018 02:11 AM    Hematocrit (POC) 44 09/30/2013 12:55 PM    HCT 34.7 (L) 05/19/2018 02:11 AM    PLATELET 165 18/10/8197 02:11 AM    MCV 93.3 05/19/2018 02:11 AM       Lab Results   Component Value Date/Time    Sodium 138 05/10/2018 02:15 PM    Potassium 3.5 05/10/2018 02:15 PM    Chloride 100 05/10/2018 02:15 PM    CO2 30 05/10/2018 02:15 PM    Anion gap 8 05/10/2018 02:15 PM    Glucose 114 (H) 05/10/2018 02:15 PM    BUN 15 05/10/2018 02:15 PM    Creatinine 1.08 (H) 05/10/2018 02:15 PM    BUN/Creatinine ratio 14 05/10/2018 02:15 PM    GFR est AA >60 05/10/2018 02:15 PM    GFR est non-AA 53 (L) 05/10/2018 02:15 PM    Calcium 9.3 05/10/2018 02:15 PM        Lab Results   Component Value Date/Time    INR 1.0 06/22/2013 07:00 PM    INR 1.0 09/28/2012 10:55 PM    Prothrombin time 10.4 06/22/2013 07:00 PM    Prothrombin time 10.4 09/28/2012 10:55 PM           A/P:  47year-old female POD 1 s/p laparoscopic repair of a paraesophageal hernia with mesh, Nissen fundoplication, EGD. Continue current pain regimen  IS  Clear liquid diet  Maintain garcia for monitoring urine output, oliguria overnight.  Will bolus 1L NS now  BMP ordered  PT ordered  Lovenox for DVT chemoprophylaxis      Eduardo Del Rio MD

## 2018-05-20 LAB
ANION GAP SERPL CALC-SCNC: 6 MMOL/L (ref 5–15)
BUN SERPL-MCNC: 19 MG/DL (ref 6–20)
BUN/CREAT SERPL: 20 (ref 12–20)
CALCIUM SERPL-MCNC: 8.4 MG/DL (ref 8.5–10.1)
CHLORIDE SERPL-SCNC: 100 MMOL/L (ref 97–108)
CO2 SERPL-SCNC: 28 MMOL/L (ref 21–32)
CREAT SERPL-MCNC: 0.93 MG/DL (ref 0.55–1.02)
GLUCOSE SERPL-MCNC: 120 MG/DL (ref 65–100)
MAGNESIUM SERPL-MCNC: 2.3 MG/DL (ref 1.6–2.4)
POTASSIUM SERPL-SCNC: 3.5 MMOL/L (ref 3.5–5.1)
SODIUM SERPL-SCNC: 134 MMOL/L (ref 136–145)

## 2018-05-20 PROCEDURE — 97161 PT EVAL LOW COMPLEX 20 MIN: CPT | Performed by: PHYSICAL THERAPIST

## 2018-05-20 PROCEDURE — 74011250636 HC RX REV CODE- 250/636: Performed by: SURGERY

## 2018-05-20 PROCEDURE — 65270000029 HC RM PRIVATE

## 2018-05-20 PROCEDURE — 80048 BASIC METABOLIC PNL TOTAL CA: CPT | Performed by: SURGERY

## 2018-05-20 PROCEDURE — 74011250637 HC RX REV CODE- 250/637: Performed by: SURGERY

## 2018-05-20 PROCEDURE — C9113 INJ PANTOPRAZOLE SODIUM, VIA: HCPCS | Performed by: SURGERY

## 2018-05-20 PROCEDURE — 36415 COLL VENOUS BLD VENIPUNCTURE: CPT | Performed by: SURGERY

## 2018-05-20 PROCEDURE — 97116 GAIT TRAINING THERAPY: CPT | Performed by: PHYSICAL THERAPIST

## 2018-05-20 PROCEDURE — 83735 ASSAY OF MAGNESIUM: CPT | Performed by: SURGERY

## 2018-05-20 PROCEDURE — 77010033678 HC OXYGEN DAILY

## 2018-05-20 RX ADMIN — KETOROLAC TROMETHAMINE 15 MG: 30 INJECTION, SOLUTION INTRAMUSCULAR; INTRAVENOUS at 23:20

## 2018-05-20 RX ADMIN — TIZANIDINE 4 MG: 2 TABLET ORAL at 22:07

## 2018-05-20 RX ADMIN — PANTOPRAZOLE SODIUM 40 MG: 40 INJECTION, POWDER, FOR SOLUTION INTRAVENOUS at 09:22

## 2018-05-20 RX ADMIN — BUSPIRONE HYDROCHLORIDE 15 MG: 5 TABLET ORAL at 22:07

## 2018-05-20 RX ADMIN — DEXTROSE MONOHYDRATE, SODIUM CHLORIDE, AND POTASSIUM CHLORIDE 125 ML/HR: 50; 9; 1.49 INJECTION, SOLUTION INTRAVENOUS at 01:44

## 2018-05-20 RX ADMIN — KETOROLAC TROMETHAMINE 15 MG: 30 INJECTION, SOLUTION INTRAMUSCULAR; INTRAVENOUS at 05:28

## 2018-05-20 RX ADMIN — DOCUSATE SODIUM 100 MG: 100 CAPSULE, LIQUID FILLED ORAL at 17:31

## 2018-05-20 RX ADMIN — TRAZODONE HYDROCHLORIDE 300 MG: 100 TABLET ORAL at 22:29

## 2018-05-20 RX ADMIN — KETOROLAC TROMETHAMINE 15 MG: 30 INJECTION, SOLUTION INTRAMUSCULAR; INTRAVENOUS at 17:31

## 2018-05-20 RX ADMIN — TIZANIDINE 4 MG: 2 TABLET ORAL at 17:31

## 2018-05-20 RX ADMIN — DEXTROSE MONOHYDRATE, SODIUM CHLORIDE, AND POTASSIUM CHLORIDE 125 ML/HR: 50; 9; 1.49 INJECTION, SOLUTION INTRAVENOUS at 11:29

## 2018-05-20 RX ADMIN — DEXTROSE MONOHYDRATE, SODIUM CHLORIDE, AND POTASSIUM CHLORIDE 125 ML/HR: 50; 9; 1.49 INJECTION, SOLUTION INTRAVENOUS at 22:31

## 2018-05-20 RX ADMIN — DOCUSATE SODIUM 100 MG: 100 CAPSULE, LIQUID FILLED ORAL at 09:24

## 2018-05-20 RX ADMIN — VARENICLINE TARTRATE 1 MG: 0.5 TABLET, FILM COATED ORAL at 17:31

## 2018-05-20 RX ADMIN — DULOXETINE HYDROCHLORIDE 30 MG: 30 CAPSULE, DELAYED RELEASE ORAL at 22:14

## 2018-05-20 RX ADMIN — TOPIRAMATE 25 MG: 25 TABLET, FILM COATED ORAL at 09:23

## 2018-05-20 RX ADMIN — Medication: at 02:06

## 2018-05-20 RX ADMIN — TIZANIDINE 4 MG: 2 TABLET ORAL at 09:24

## 2018-05-20 RX ADMIN — ALPRAZOLAM 1 MG: 0.5 TABLET ORAL at 22:29

## 2018-05-20 RX ADMIN — VARENICLINE TARTRATE 1 MG: 0.5 TABLET, FILM COATED ORAL at 09:24

## 2018-05-20 RX ADMIN — ANTACID TABLETS 200 MG: 500 TABLET, CHEWABLE ORAL at 22:04

## 2018-05-20 RX ADMIN — DULOXETINE HYDROCHLORIDE 60 MG: 30 CAPSULE, DELAYED RELEASE ORAL at 22:14

## 2018-05-20 RX ADMIN — TOPIRAMATE 25 MG: 25 TABLET, FILM COATED ORAL at 22:10

## 2018-05-20 RX ADMIN — KETOROLAC TROMETHAMINE 15 MG: 30 INJECTION, SOLUTION INTRAMUSCULAR; INTRAVENOUS at 11:29

## 2018-05-20 NOTE — PROGRESS NOTES
Problem: Mobility Impaired (Adult and Pediatric)  Goal: *Acute Goals and Plan of Care (Insert Text)  Physical Therapy Goals  Initiated 5/20/2018  1. Patient will move from supine to sit and sit to supine  in bed with modified independence within 7 day(s). 2.  Patient will transfer from bed to chair and chair to bed with modified independence using the least restrictive device within 7 day(s). 3.  Patient will perform sit to stand with modified independence within 7 day(s). 4.  Patient will ambulate with modified independence for 150 feet with the least restrictive device within 7 day(s). 5.  Patient will ascend/descend 4 stairs with single handrail(s) with modified independence within 7 day(s). physical Therapy EVALUATION  Patient: Mary Webber (55 y.o. female)  Date: 5/20/2018  Primary Diagnosis: PARAESOPHAGEAL HERNIA  Hiatal hernia  Procedure(s) (LRB):  LAPAROSCOPIC PARAESOPHAGEAL HERNIA  REPAIR WITH MESH, NISSEN FUNDOPLICATION,EGD (LATEX ALLERGY) (N/A)  ESOPHAGOGASTRODUODENOSCOPY (EGD) (N/A) 2 Days Post-Op   Precautions: latex allergy      ASSESSMENT :  Based on the objective data described below, the patient presents with reservation for mobility but proceeds with encouragement. POD#2 s/p hernia repair, and pt also endorses acute right shoulder pain post-operatively. ( 9/10 incisional pain, 10/10 right shoulder pain, and 8/10 migraine headache post session). She is able to complete bed mobility/transition to EOB with CGA/min assist with cue for breathing, and posture. Able to come to stance with CGA using RW. Ambulated ~ 80 feet with RW without difficulty with directional cues. Received on 2LNCO2 SPO2 97%, ambulated on RN with SPO2 >93% during ambulation. HR 79--> 90 bpm. Pt agreed to remain OOB in chair for a short duration due to buttocks hair removal  procedure several months ago ( healing). Provided cushion for comfort, call bell and phone within reach.  Continues to be on clear liquid diet at this time, but breakfast ordered while sitting. 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 Anticipate progressive mobilization and encouraged pulmonary IS use while OOB. No anticipated DME, and pt has no step entrance with GLF apt access. Will need to consider assistance with care of her mother if she is not at baseline functional capacity at discharge. Patient will benefit from skilled intervention to address the above impairments. Patients rehabilitation potential is considered to be Good  Factors which may influence rehabilitation potential include:   []         None noted  []         Mental ability/status  []         Medical condition  [x]         Home/family situation and support systems  []         Safety awareness  [x]         Pain tolerance/management  []         Other:      PLAN :  Recommendations and Planned Interventions:  [x]           Bed Mobility Training             []    Neuromuscular Re-Education  [x]           Transfer Training                   []    Orthotic/Prosthetic Training  [x]           Gait Training                         []    Modalities  []           Therapeutic Exercises           []    Edema Management/Control  [x]           Therapeutic Activities            [x]    Patient and Family Training/Education  []           Other (comment):    Frequency/Duration: Patient will be followed by physical therapy  5 times a week to address goals. Discharge Recommendations: Home  Further Equipment Recommendations for Discharge: None new     SUBJECTIVE:   Patient stated I walked to the bathroom yesterday with the RN, but I didn't sit up due to short term comfort with sitting due to buttocks sx. Rosa Calzada    OBJECTIVE DATA SUMMARY:   HISTORY:    Past Medical History:   Diagnosis Date    Cavernous hemangioma of intracranial structure ([facility] [address] 5/2/2011    Depressive disorder, not elsewhere classified 10/1/2013    anxiety    Fibromyalgia     fibromyalgia    Hiatal hernia with gastroesophageal reflux 04/2018    Hypertension     Incomplete right bundle branch block (RBBB) determined by electrocardiography 05/10/2018    Migraine 5/2/2011    Seizures ([address] 2012    last seizure grand mal (born w/seizure disorder)    Thyroid nodule     Unspecified sleep apnea     does not use c-pap     Past Surgical History:   Procedure Laterality Date    HX CHOLECYSTECTOMY      HX HEENT  2012    tonsillectomy    HX LITHOTRIPSY      HX ORTHOPAEDIC      disc fusion 2010, right knee cyst removed   [address] [facility], 2006    Cavernous brain angioma(s) removed    SURGERY,BRAIN/SPINE,W/COMPUTER       Prior Level of Function/Home Situation: Independent  Personal factors and/or comorbidities impacting plan of care: Pt moving slow, but anticipate return to functional baseline. 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     Home Situation  Home Environment: Apartment  # Steps to Enter: 0  Rails to Enter: No  Wheelchair Ramp: No  One/Two Story Residence: One story  Living Alone: No  Support Systems: Family member(s)  Patient Expects to be Discharged to[de-identified] Apartment  Current DME Used/Available at Home: Grab bars, Crutches, Cane, straight  Tub or Shower Type: Tub/Shower combination    EXAMINATION/PRESENTATION/DECISION MAKING:   Critical Behavior:  Neurologic State: Alert, Appropriate for age  Orientation Level: Appropriate for age, Oriented X4  Cognition: Appropriate decision making, Appropriate for age attention/concentration, Appropriate safety awareness  Safety/Judgement: Awareness of environment, Insight into deficits  Hearing:   Auditory  Auditory Impairment: None  Hearing Aids/Status: Does not own  Skin:  Intact exposed areas  Edema: none significant  Range Of Motion:  AROM: Generally decreased, functional      Strength:    Strength: Generally decreased, functional   Tone & Sensation:   Tone: Normal   Sensation: Intact    Coordination:  Coordination: Within functional limits  Vision:   Tracking: Able to track stimulus in all quadrants w/o difficulty  Functional Mobility:  Bed Mobility:  Rolling: Contact guard assistance  Supine to Sit: Moderate assistance;Assist x1;Additional time  Scooting: Contact guard assistance; Additional time  Transfers:  Sit to Stand: Minimum assistance; Additional time;Assist x1  Stand to Sit: Minimum assistance  Bed to Chair: Minimum assistance  Balance:   Sitting: Intact  Standing: Intact; With support  Ambulation/Gait Training:  Distance (ft): 80 Feet (ft)  Assistive Device: Walker, rolling;Gait belt  Ambulation - Level of Assistance: Contact guard assistance  Gait Description (WDL): Exceptions to WDL  Gait Abnormalities: Antalgic;Decreased step clearance  Base of Support: Narrowed  Speed/Rossana: Slow  Step Length: Left shortened;Right shortened                  Stairs: Deferred        Functional Measure:  Tinetti test:    Sitting Balance: 1  Arises: 1  Attempts to Rise: 2  Immediate Standing Balance: 1  Standing Balance: 1  Nudged: 1  Eyes Closed: 1  Turn 360 Degrees - Continuous/Discontinuous: 0  Turn 360 Degrees - Steady/Unsteady: 0  Sitting Down: 1  Balance Score: 9  Indication of Gait: 1  R Step Length/Height: 1  L Step Length/Height: 1  R Foot Clearance: 1  L Foot Clearance: 1  Step Symmetry: 1  Step Continuity: 1  Path: 1  Trunk: 0  Walking Time: 1  Gait Score: 9  Total Score: 18       Tinetti Test and G-code impairment scale:  Percentage of Impairment CH    0%   CI    1-19% CJ    20-39% CK    40-59% CL    60-79% CM    80-99% CN     100%   Tinetti  Score 0-28 28 23-27 17-22 12-16 6-11 1-5 0       Tinetti Tool Score Risk of Falls  <19 = High Fall Risk  19-24 = Moderate Fall Risk  25-28 = Low Fall Risk  Tinetti ME. Performance-Oriented Assessment of Mobility Problems in Elderly Patients. [address] 67; [id number].  (Scoring Description: PT Bulletin Feb. 10, 1993)    Older adults: Sheila Navas al, 2009; n = [address] [facility] elderly evaluated with ABC, MAUREEN, ADL, and IADL)  · Mean MAUREEN score for males aged 69-68 years = 26.21(3.40)  · Mean MAUREEN score for females age 69-68 years = 25.16(4.30)  · Mean MAUREEN score for males over 80 years = 23.29(6.02)  · Mean MAUREEN score for females over 80 years = 17.20(8.32)       G codes: In compliance with CMSs Claims Based Outcome Reporting, the following G-code set was chosen for this patient based on their primary functional limitation being treated: The outcome measure chosen to determine the severity of the functional limitation was the Tinetti Testwith a score of 18/28 which was correlated with the impairment scale. ? Mobility - Walking and Moving Around:     - CURRENT STATUS: CJ - 20%-39% impaired, limited or restricted    - GOAL STATUS: CI - 1%-19% impaired, limited or restricted    - D/C STATUS:  ---------------To be determined---------------      Physical Therapy Evaluation Charge Determination   History Examination Presentation Decision-Making   LOW Complexity : Zero comorbidities / personal factors that will impact the outcome / POC LOW Complexity : 1-2 Standardized tests and measures addressing body structure, function, activity limitation and / or participation in recreation  LOW Complexity : Stable, uncomplicated  LOW Complexity : FOTO score of       Based on the above components, the patient evaluation is determined to be of the following complexity level: LOW     Pain:  Pain Scale 1: Numeric (0 - 10)  Pain Intensity 1: 6  Pain Location 1: Abdomen  Pain Orientation 1: Anterior  Pain Description 1: Aching  Pain Intervention(s) 1: Encouraged PCA  Activity Tolerance: Tolerated activity fairly well despite pain. Required prompts to remain attentive to task due to conversation. Please refer to the flowsheet for vital signs taken during this treatment.   After treatment:   [x]         Patient left in no apparent distress sitting up in chair  [] Patient left in no apparent distress in bed  [x]         Call bell left within reach  [x]         Nursing notified  []         Caregiver present  []         Bed alarm activated    COMMUNICATION/EDUCATION:   The patients plan of care was discussed with: Registered Nurse. [x]         Fall prevention education was provided and the patient/caregiver indicated understanding. [x]         Patient/family have participated as able in goal setting and plan of care. [x]         Patient/family agree to work toward stated goals and plan of care. []         Patient understands intent and goals of therapy, but is neutral about his/her participation. []         Patient is unable to participate in goal setting and plan of care.     Thank you for this referral.  Chiqui Tavares, PT   Time Calculation: 30 mins

## 2018-05-20 NOTE — PROGRESS NOTES
Tolerating liquids, no reflux. Bloated. Passing flatus. Urine output low and dark per nursing. Visit Vitals    /58 (BP 1 Location: Left arm, BP Patient Position: At rest)    Pulse 78    Temp 97.9 °F (36.6 °C)    Resp 16    Wt 117.7 kg (259 lb 7.7 oz)    SpO2 94%    BMI 44.54 kg/m2     Abdomen with steristrips distended with tympany. POD2 Paraesophageal hernia, gastritis. Hypokalemia. Obesity. Clears  Continue clear liquids oral intake with pills. Ambulate. If urine ouput increases to 30/h over next several hours, d/c garcia this afternoon.     Maria Esther Regalado MD

## 2018-05-21 LAB
ANION GAP SERPL CALC-SCNC: 5 MMOL/L (ref 5–15)
BUN SERPL-MCNC: 15 MG/DL (ref 6–20)
BUN/CREAT SERPL: 17 (ref 12–20)
CALCIUM SERPL-MCNC: 8.3 MG/DL (ref 8.5–10.1)
CHLORIDE SERPL-SCNC: 104 MMOL/L (ref 97–108)
CO2 SERPL-SCNC: 27 MMOL/L (ref 21–32)
CREAT SERPL-MCNC: 0.88 MG/DL (ref 0.55–1.02)
GLUCOSE SERPL-MCNC: 112 MG/DL (ref 65–100)
MAGNESIUM SERPL-MCNC: 2.1 MG/DL (ref 1.6–2.4)
POTASSIUM SERPL-SCNC: 3.7 MMOL/L (ref 3.5–5.1)
SODIUM SERPL-SCNC: 136 MMOL/L (ref 136–145)

## 2018-05-21 PROCEDURE — 65270000029 HC RM PRIVATE

## 2018-05-21 PROCEDURE — 74011250636 HC RX REV CODE- 250/636: Performed by: SURGERY

## 2018-05-21 PROCEDURE — 97116 GAIT TRAINING THERAPY: CPT

## 2018-05-21 PROCEDURE — 80048 BASIC METABOLIC PNL TOTAL CA: CPT | Performed by: SURGERY

## 2018-05-21 PROCEDURE — 74011250637 HC RX REV CODE- 250/637: Performed by: SURGERY

## 2018-05-21 PROCEDURE — 83735 ASSAY OF MAGNESIUM: CPT | Performed by: SURGERY

## 2018-05-21 PROCEDURE — 74011250636 HC RX REV CODE- 250/636: Performed by: PHYSICIAN ASSISTANT

## 2018-05-21 PROCEDURE — 97530 THERAPEUTIC ACTIVITIES: CPT

## 2018-05-21 PROCEDURE — 74011250637 HC RX REV CODE- 250/637: Performed by: PHYSICIAN ASSISTANT

## 2018-05-21 RX ORDER — HYDROCODONE BITARTRATE AND ACETAMINOPHEN 7.5; 325 MG/1; MG/1
1 TABLET ORAL
Status: DISCONTINUED | OUTPATIENT
Start: 2018-05-21 | End: 2018-05-22

## 2018-05-21 RX ORDER — HYDROMORPHONE HYDROCHLORIDE 2 MG/ML
0.5 INJECTION, SOLUTION INTRAMUSCULAR; INTRAVENOUS; SUBCUTANEOUS
Status: DISCONTINUED | OUTPATIENT
Start: 2018-05-21 | End: 2018-05-23 | Stop reason: HOSPADM

## 2018-05-21 RX ORDER — HYDROCODONE BITARTRATE AND ACETAMINOPHEN 7.5; 325 MG/1; MG/1
2 TABLET ORAL
Status: DISCONTINUED | OUTPATIENT
Start: 2018-05-21 | End: 2018-05-22

## 2018-05-21 RX ADMIN — KETOROLAC TROMETHAMINE 15 MG: 30 INJECTION, SOLUTION INTRAMUSCULAR; INTRAVENOUS at 12:50

## 2018-05-21 RX ADMIN — HYDROCODONE BITARTRATE AND ACETAMINOPHEN 1 TABLET: 7.5; 325 TABLET ORAL at 12:53

## 2018-05-21 RX ADMIN — TIZANIDINE 4 MG: 2 TABLET ORAL at 17:11

## 2018-05-21 RX ADMIN — VARENICLINE TARTRATE 1 MG: 0.5 TABLET, FILM COATED ORAL at 18:47

## 2018-05-21 RX ADMIN — LOSARTAN POTASSIUM: 50 TABLET, FILM COATED ORAL at 21:34

## 2018-05-21 RX ADMIN — DULOXETINE HYDROCHLORIDE 60 MG: 30 CAPSULE, DELAYED RELEASE ORAL at 21:34

## 2018-05-21 RX ADMIN — DEXTROSE MONOHYDRATE, SODIUM CHLORIDE, AND POTASSIUM CHLORIDE 75 ML/HR: 50; 9; 1.49 INJECTION, SOLUTION INTRAVENOUS at 17:15

## 2018-05-21 RX ADMIN — HYDROCODONE BITARTRATE AND ACETAMINOPHEN 2 TABLET: 7.5; 325 TABLET ORAL at 17:11

## 2018-05-21 RX ADMIN — VARENICLINE TARTRATE 1 MG: 0.5 TABLET, FILM COATED ORAL at 12:49

## 2018-05-21 RX ADMIN — HYDROCODONE BITARTRATE AND ACETAMINOPHEN 2 TABLET: 7.5; 325 TABLET ORAL at 21:34

## 2018-05-21 RX ADMIN — DEXTROSE MONOHYDRATE, SODIUM CHLORIDE, AND POTASSIUM CHLORIDE 125 ML/HR: 50; 9; 1.49 INJECTION, SOLUTION INTRAVENOUS at 07:25

## 2018-05-21 RX ADMIN — DOCUSATE SODIUM 100 MG: 100 CAPSULE, LIQUID FILLED ORAL at 17:11

## 2018-05-21 RX ADMIN — TIZANIDINE 4 MG: 2 TABLET ORAL at 10:18

## 2018-05-21 RX ADMIN — DULOXETINE HYDROCHLORIDE 30 MG: 30 CAPSULE, DELAYED RELEASE ORAL at 21:33

## 2018-05-21 RX ADMIN — KETOROLAC TROMETHAMINE 15 MG: 30 INJECTION, SOLUTION INTRAMUSCULAR; INTRAVENOUS at 06:19

## 2018-05-21 RX ADMIN — TOPIRAMATE 25 MG: 25 TABLET, FILM COATED ORAL at 10:18

## 2018-05-21 RX ADMIN — TRAZODONE HYDROCHLORIDE 300 MG: 100 TABLET ORAL at 21:46

## 2018-05-21 RX ADMIN — TIZANIDINE 4 MG: 2 TABLET ORAL at 21:33

## 2018-05-21 RX ADMIN — ALPRAZOLAM 1 MG: 0.5 TABLET ORAL at 21:40

## 2018-05-21 RX ADMIN — BUSPIRONE HYDROCHLORIDE 15 MG: 5 TABLET ORAL at 21:34

## 2018-05-21 RX ADMIN — KETOROLAC TROMETHAMINE 15 MG: 30 INJECTION, SOLUTION INTRAMUSCULAR; INTRAVENOUS at 18:50

## 2018-05-21 RX ADMIN — TOPIRAMATE 25 MG: 25 TABLET, FILM COATED ORAL at 21:34

## 2018-05-21 NOTE — PROGRESS NOTES
NUTRITION education       Nutrition Assessment:   Received MD consult for Post nissen diet education. Author and patient discussed texture restrictions, potential gastric irritants, acceptable foods, methods for texture modification and nutrition supplement. Answered all patient questions. Provided patient with pureed diet handout (with Nissen modifications). Patient was understanding and receptive. Anticipate good compliance. Nutrition Diagnoses:  Nutrition/food-related knowledge deficit RT new esophageal surgery AEB patient needing diet education     Intervention:   1. Brief Nutrition education (E - 1.2) including identification of full liquids/purreed foods, food to avoid and acceptable food substitutions . Monitoring/Evaluation: Pt expressed understanding of education topics and acknowledged ability in applying diet goals. Pt answered questions posed to demonstrate learning.      Kaylee Laguerre, [address] [place] [address]  Pager [phone]   Office [phone]

## 2018-05-21 NOTE — PROGRESS NOTES
General Surgery Daily Progress Note    Patient: Arcelia Sinclair MRN: [id number]  SSN: [id number]    YOB: 1965  Age: 47 y.o. Sex: female      Admit Date: 5/18/2018    Subjective:   Pain controlled with PCA. No N/V. Tolerating clears.  + flatus, no BM      Current Facility-Administered Medications   Medication Dose Route Frequency    HYDROcodone-acetaminophen (NORCO) 7.5-325 mg per tablet 1 Tab  1 Tab Oral Q4H PRN    HYDROcodone-acetaminophen (NORCO) 7.5-325 mg per tablet 2 Tab  2 Tab Oral Q4H PRN    HYDROmorphone (DILAUDID) injection 0.5 mg  0.5 mg IntraVENous Q4H PRN    dextrose 5% - 0.9% NaCl with KCl 20 mEq/L infusion  75 mL/hr IntraVENous CONTINUOUS    ALPRAZolam (XANAX) tablet 1 mg  1 mg Oral TID PRN    busPIRone (BUSPAR) tablet 15 mg  15 mg Oral QHS    docusate sodium (COLACE) capsule 100 mg  100 mg Oral BID    DULoxetine (CYMBALTA) capsule 30 mg  30 mg Oral QHS    DULoxetine (CYMBALTA) capsule 60 mg  60 mg Oral QHS    tiZANidine (ZANAFLEX) tablet 4 mg  4 mg Oral TID    topiramate (TOPAMAX) tablet 25 mg  25 mg Oral BID    traZODone (DESYREL) tablet 300 mg  300 mg Oral DAILY PRN    varenicline (CHANTIX) 0.5 mg tablet 1 mg  1 mg Oral BIDPC    calcium carbonate (TUMS) chewable tablet 200 mg [elemental]  200 mg Oral Q6H PRN    ketorolac (TORADOL) injection 15 mg  15 mg IntraVENous Q6H    diphenhydrAMINE (BENADRYL) capsule 25 mg  25 mg Oral Q6H PRN    pantoprazole (PROTONIX) 40 mg in sodium chloride 0.9% 10 mL injection  40 mg IntraVENous DAILY    diazePAM (VALIUM) tablet 2 mg  2 mg Oral Q6H PRN    losartan/hydroCHLOROthiazide (HYZAAR) 100/25 mg   Oral QHS        Objective:   05/21 0701 - 05/21 1900  In: -   Out: 900 [Urine:900]  05/19 1901 - 05/21 0700  In: 5983 [I.V.:5983]  Out: 1900 [Urine:1900]  Patient Vitals for the past 8 hrs:   BP Temp Pulse Resp SpO2   05/21/18 1226 131/89 98.3 °F (36.8 °C) 86 16 94 %   05/21/18 0733 134/85 98.4 °F (36.9 °C) 93 16 94 %       Physical Exam:  General: Alert, cooperative, NAD  Lungs: Unlabored  Heart:  Regular rate and  rhythm  Abdomen: Soft, ATTPender, distended. + bowel sounds. Incisions c/d/i. Extremities: Warm, moves all, no edema  Skin:  Warm and dry, no rash    Labs: Recent Labs      05/19/18   0211   WBC  9.1   HGB  11.3*   HCT  34.7*   PLT  245     Recent Labs      05/21/18   0240   NA  136   K  3.7   CL  104   CO2  27   GLU  112*   BUN  15   CREA  0.88   CA  8.3*   MG  2.1       Assessment / Plan:   · POD#3 lap paraesophageal hernia repair with mesh, lap Nissen, intra-op EGD  · Advance to full liquids which she will continues for 3 weeks. Nutritionist consult for diet education  · D/c PCA, PO pain meds  · Excellent UOP, d/c garcia. Continue I/O monitoring. · OOB, encourage IS  · Patient declines Lovenox. We discussed indication and risks/benefits. She states she does not want it. Advised her to get OOB often, use SCDs. She verbalized understanding. · Possible d/c tomorrow if tolerating diet     Dr. Malik Erm updated. Plan discussed.

## 2018-05-21 NOTE — PROGRESS NOTES
Problem: Mobility Impaired (Adult and Pediatric)  Goal: *Acute Goals and Plan of Care (Insert Text)  Physical Therapy Goals  Initiated 5/20/2018  1. Patient will move from supine to sit and sit to supine  in bed with modified independence within 7 day(s). 2.  Patient will transfer from bed to chair and chair to bed with modified independence using the least restrictive device within 7 day(s). 3.  Patient will perform sit to stand with modified independence within 7 day(s). 4.  Patient will ambulate with modified independence for 150 feet with the least restrictive device within 7 day(s). 5.  Patient will ascend/descend 4 stairs with single handrail(s) with modified independence within 7 day(s). physical Therapy TREATMENT  Patient: Jorge Huang (84 y.o. female)  Date: 5/21/2018  Diagnosis: PARAESOPHAGEAL HERNIA  Hiatal hernia <principal problem not specified>  Procedure(s) (LRB):  LAPAROSCOPIC PARAESOPHAGEAL HERNIA  REPAIR WITH MESH, NISSEN FUNDOPLICATION,EGD (LATEX ALLERGY) (N/A)  ESOPHAGOGASTRODUODENOSCOPY (EGD) (N/A) 3 Days Post-Op  Precautions:    Chart, physical therapy assessment, plan of care and goals were reviewed. ASSESSMENT:  Pt comes to sit with CGA. Pt to stand with CGA. Pt ambulated 200ft with RW CGA with second person for IV pole. Pt requested back to bed needing CGA to min assist.Pt progressing with mobility. Continue goals. Progression toward goals:  [x]    Improving appropriately and progressing toward goals  []    Improving slowly and progressing toward goals  []    Not making progress toward goals and plan of care will be adjusted     PLAN:  Patient continues to benefit from skilled intervention to address the above impairments. Continue treatment per established plan of care.   Discharge Recommendations:  Home Health  Further Equipment Recommendations for Discharge:  TBD     SUBJECTIVE:       OBJECTIVE DATA SUMMARY:   Critical Behavior:  Neurologic State: Alert  Orientation Level: Oriented X4  Cognition: Appropriate decision making, Appropriate for age attention/concentration, Appropriate safety awareness, Follows commands  Safety/Judgement: Awareness of environment, Insight into deficits  Functional Mobility Training:  Bed Mobility:     Supine to Sit: Contact guard assistance  Sit to Supine: Contact guard assistance;Minimum assistance           Transfers:  Sit to Stand: Contact guard assistance  Stand to Sit: Contact guard assistance                             Balance:  Sitting: Intact  Standing: Intact; With support  Ambulation/Gait Training:  Distance (ft): 200 Feet (ft)  Assistive Device: Walker, rolling;Gait belt  Ambulation - Level of Assistance: Contact guard assistance                 Base of Support: Narrowed     Speed/Rossana: Pace decreased (<100 feet/min)  Step Length: Right shortened;Left shortened                Pain:  Pain Scale 1: Numeric (0 - 10)  Pain Intensity 1: 0  Pain Location 1: Abdomen  Pain Orientation 1: Anterior  Pain Description 1: Aching  Pain Intervention(s) 1: Medication (see MAR)  Activity Tolerance:   Pt tolerated treatment well. Please refer to the flowsheet for vital signs taken during this treatment.   After treatment:   []    Patient left in no apparent distress sitting up in chair  [x]    Patient left in no apparent distress in bed  []    Call bell left within reach  []    Nursing notified  []    Caregiver present  []    Bed alarm activated    COMMUNICATION/COLLABORATION:   The patients plan of care was discussed with: Physical Therapist    Glenn Amin PTA   Time Calculation: 23 mins

## 2018-05-21 NOTE — PROGRESS NOTES
Bedside and Verbal shift change report given to Devi Gomez (oncoming nurse) by Linda Francis (offgoing nurse). Report included the following information SBAR and Kardex.

## 2018-05-21 NOTE — PROGRESS NOTES
5/21/2018  4:07 PM  Reason for Admission:   Hiatal hernia                   RRAT Score:          8           Plan for utilizing home health:     None indicated                     Likelihood of Readmission:  green                         Transition of Care Plan:                   CM met with pt for assessment. Demographics and PCP were confirmed. Pt is a 47year old,  female who lives in an apartment with her mother (0 interior or exterior steps). PTA, pt was able to complete ADLs without the use of DME. Pt has prescription drug coverage, and prefers [facility] [address]. Pt indicated no concerns re returning home during recovery. No discharge service needs indicated. Janet Baez MA    Care Management Interventions  PCP Verified by CM: Yes José Faustin  Palliative Care Criteria Met (RRAT>21 & CHF Dx)?: No  Mode of Transport at Discharge:  Other (see comment) (Mother)  MyChart Signup: No  Discharge Durable Medical Equipment: No  Physical Therapy Consult: Yes  Occupational Therapy Consult: No  Speech Therapy Consult: No  Current Support Network: Relative's Home  Confirm Follow Up Transport: Family  Plan discussed with Pt/Family/Caregiver: Yes  Discharge Location  Discharge Placement: Home with family assistance

## 2018-05-21 NOTE — PROGRESS NOTES
Problem: Falls - Risk of  Goal: *Absence of Falls  Document Olga Fall Risk and appropriate interventions in the flowsheet.    Outcome: Progressing Towards Goal  Fall Risk Interventions:  Mobility Interventions: Bed/chair exit alarm, Patient to call before getting OOB, PT Consult for mobility concerns, Utilize walker, cane, or other assitive device         Medication Interventions: Bed/chair exit alarm, Patient to call before getting OOB    Elimination Interventions: Bed/chair exit alarm, Call light in reach, Patient to call for help with toileting needs

## 2018-05-22 ENCOUNTER — APPOINTMENT (OUTPATIENT)
Dept: GENERAL RADIOLOGY | Age: 53
DRG: 327 | End: 2018-05-22
Attending: PHYSICIAN ASSISTANT
Payer: MEDICARE

## 2018-05-22 LAB
ANION GAP SERPL CALC-SCNC: 5 MMOL/L (ref 5–15)
BUN SERPL-MCNC: 11 MG/DL (ref 6–20)
BUN/CREAT SERPL: 11 (ref 12–20)
CALCIUM SERPL-MCNC: 8.6 MG/DL (ref 8.5–10.1)
CHLORIDE SERPL-SCNC: 107 MMOL/L (ref 97–108)
CO2 SERPL-SCNC: 27 MMOL/L (ref 21–32)
CREAT SERPL-MCNC: 1.02 MG/DL (ref 0.55–1.02)
ERYTHROCYTE [DISTWIDTH] IN BLOOD BY AUTOMATED COUNT: 12 % (ref 11.5–14.5)
GLUCOSE SERPL-MCNC: 108 MG/DL (ref 65–100)
HCT VFR BLD AUTO: 29.5 % (ref 35–47)
HGB BLD-MCNC: 9.3 G/DL (ref 11.5–16)
MAGNESIUM SERPL-MCNC: 2 MG/DL (ref 1.6–2.4)
MCH RBC QN AUTO: 30.1 PG (ref 26–34)
MCHC RBC AUTO-ENTMCNC: 31.5 G/DL (ref 30–36.5)
MCV RBC AUTO: 95.5 FL (ref 80–99)
NRBC # BLD: 0 K/UL (ref 0–0.01)
NRBC BLD-RTO: 0 PER 100 WBC
PLATELET # BLD AUTO: 231 K/UL (ref 150–400)
PMV BLD AUTO: 9.6 FL (ref 8.9–12.9)
POTASSIUM SERPL-SCNC: 3.7 MMOL/L (ref 3.5–5.1)
RBC # BLD AUTO: 3.09 M/UL (ref 3.8–5.2)
SODIUM SERPL-SCNC: 139 MMOL/L (ref 136–145)
WBC # BLD AUTO: 5.5 K/UL (ref 3.6–11)

## 2018-05-22 PROCEDURE — 74011250636 HC RX REV CODE- 250/636: Performed by: PHYSICIAN ASSISTANT

## 2018-05-22 PROCEDURE — 65270000029 HC RM PRIVATE

## 2018-05-22 PROCEDURE — 97530 THERAPEUTIC ACTIVITIES: CPT

## 2018-05-22 PROCEDURE — 85027 COMPLETE CBC AUTOMATED: CPT | Performed by: PHYSICIAN ASSISTANT

## 2018-05-22 PROCEDURE — 51798 US URINE CAPACITY MEASURE: CPT

## 2018-05-22 PROCEDURE — 73000 X-RAY EXAM OF COLLAR BONE: CPT

## 2018-05-22 PROCEDURE — 83735 ASSAY OF MAGNESIUM: CPT | Performed by: PHYSICIAN ASSISTANT

## 2018-05-22 PROCEDURE — 80048 BASIC METABOLIC PNL TOTAL CA: CPT | Performed by: PHYSICIAN ASSISTANT

## 2018-05-22 PROCEDURE — 97116 GAIT TRAINING THERAPY: CPT

## 2018-05-22 PROCEDURE — 74011250636 HC RX REV CODE- 250/636: Performed by: SURGERY

## 2018-05-22 PROCEDURE — 36415 COLL VENOUS BLD VENIPUNCTURE: CPT | Performed by: PHYSICIAN ASSISTANT

## 2018-05-22 PROCEDURE — 74011250637 HC RX REV CODE- 250/637: Performed by: SURGERY

## 2018-05-22 PROCEDURE — 74011250637 HC RX REV CODE- 250/637: Performed by: PHYSICIAN ASSISTANT

## 2018-05-22 RX ORDER — FACIAL-BODY WIPES
10 EACH TOPICAL DAILY
Status: DISCONTINUED | OUTPATIENT
Start: 2018-05-22 | End: 2018-05-23 | Stop reason: HOSPADM

## 2018-05-22 RX ORDER — HYDROMORPHONE HYDROCHLORIDE 2 MG/1
4 TABLET ORAL
Status: DISCONTINUED | OUTPATIENT
Start: 2018-05-22 | End: 2018-05-23 | Stop reason: HOSPADM

## 2018-05-22 RX ORDER — PANTOPRAZOLE SODIUM 40 MG/1
40 TABLET, DELAYED RELEASE ORAL
Status: DISCONTINUED | OUTPATIENT
Start: 2018-05-23 | End: 2018-05-23 | Stop reason: HOSPADM

## 2018-05-22 RX ORDER — LIDOCAINE 50 MG/G
1 PATCH TOPICAL EVERY 24 HOURS
Status: DISCONTINUED | OUTPATIENT
Start: 2018-05-22 | End: 2018-05-23 | Stop reason: HOSPADM

## 2018-05-22 RX ORDER — HYDROMORPHONE HYDROCHLORIDE 2 MG/1
2 TABLET ORAL
Status: DISCONTINUED | OUTPATIENT
Start: 2018-05-22 | End: 2018-05-23 | Stop reason: HOSPADM

## 2018-05-22 RX ADMIN — KETOROLAC TROMETHAMINE 15 MG: 30 INJECTION, SOLUTION INTRAMUSCULAR; INTRAVENOUS at 01:01

## 2018-05-22 RX ADMIN — DULOXETINE HYDROCHLORIDE 30 MG: 30 CAPSULE, DELAYED RELEASE ORAL at 22:07

## 2018-05-22 RX ADMIN — HYDROMORPHONE HYDROCHLORIDE 2 MG: 2 TABLET ORAL at 13:49

## 2018-05-22 RX ADMIN — HYDROCODONE BITARTRATE AND ACETAMINOPHEN 2 TABLET: 7.5; 325 TABLET ORAL at 01:06

## 2018-05-22 RX ADMIN — TOPIRAMATE 25 MG: 25 TABLET, FILM COATED ORAL at 09:51

## 2018-05-22 RX ADMIN — VARENICLINE TARTRATE 1 MG: 0.5 TABLET, FILM COATED ORAL at 18:00

## 2018-05-22 RX ADMIN — HYDROCODONE BITARTRATE AND ACETAMINOPHEN 2 TABLET: 7.5; 325 TABLET ORAL at 05:40

## 2018-05-22 RX ADMIN — BUSPIRONE HYDROCHLORIDE 15 MG: 5 TABLET ORAL at 22:07

## 2018-05-22 RX ADMIN — DULOXETINE HYDROCHLORIDE 60 MG: 30 CAPSULE, DELAYED RELEASE ORAL at 22:11

## 2018-05-22 RX ADMIN — DOCUSATE SODIUM 100 MG: 100 CAPSULE, LIQUID FILLED ORAL at 09:51

## 2018-05-22 RX ADMIN — HYDROMORPHONE HYDROCHLORIDE 2 MG: 2 TABLET ORAL at 09:52

## 2018-05-22 RX ADMIN — TIZANIDINE 4 MG: 2 TABLET ORAL at 22:07

## 2018-05-22 RX ADMIN — HYDROMORPHONE HYDROCHLORIDE 4 MG: 2 TABLET ORAL at 22:06

## 2018-05-22 RX ADMIN — HYDROMORPHONE HYDROCHLORIDE 2 MG: 2 TABLET ORAL at 18:00

## 2018-05-22 RX ADMIN — TIZANIDINE 4 MG: 2 TABLET ORAL at 17:06

## 2018-05-22 RX ADMIN — TIZANIDINE 4 MG: 2 TABLET ORAL at 09:51

## 2018-05-22 RX ADMIN — VARENICLINE TARTRATE 1 MG: 0.5 TABLET, FILM COATED ORAL at 09:51

## 2018-05-22 RX ADMIN — HYDROMORPHONE HYDROCHLORIDE 2 MG: 2 TABLET ORAL at 18:07

## 2018-05-22 RX ADMIN — LOSARTAN POTASSIUM: 50 TABLET, FILM COATED ORAL at 22:06

## 2018-05-22 RX ADMIN — DEXTROSE MONOHYDRATE, SODIUM CHLORIDE, AND POTASSIUM CHLORIDE 75 ML/HR: 50; 9; 1.49 INJECTION, SOLUTION INTRAVENOUS at 07:02

## 2018-05-22 RX ADMIN — TOPIRAMATE 25 MG: 25 TABLET, FILM COATED ORAL at 22:08

## 2018-05-22 RX ADMIN — METHYLNALTREXONE BROMIDE 12 MG: 12 INJECTION, SOLUTION SUBCUTANEOUS at 11:28

## 2018-05-22 RX ADMIN — BISACODYL 10 MG: 10 SUPPOSITORY RECTAL at 11:28

## 2018-05-22 RX ADMIN — ALPRAZOLAM 1 MG: 0.5 TABLET ORAL at 09:57

## 2018-05-22 RX ADMIN — KETOROLAC TROMETHAMINE 15 MG: 30 INJECTION, SOLUTION INTRAMUSCULAR; INTRAVENOUS at 05:37

## 2018-05-22 NOTE — PROGRESS NOTES
General Surgery Daily Progress Note    Patient: Arcelia Sinclair MRN: [id number]  SSN: [id number]    YOB: 1965  Age: 47 y.o. Sex: female      Admit Date: 5/18/2018    Subjective:   States pain is not well controlled with Norco. + flatus, no BM. Tolerating full liquids. C/o mild left clavicle pain. Denies any injury.      Current Facility-Administered Medications   Medication Dose Route Frequency    [START ON 5/23/2018] pantoprazole (PROTONIX) tablet 40 mg  40 mg Oral ACB    bisacodyl (DULCOLAX) suppository 10 mg  10 mg Rectal DAILY    HYDROmorphone (DILAUDID) tablet 2 mg  2 mg Oral Q4H PRN    HYDROmorphone (DILAUDID) tablet 4 mg  4 mg Oral Q4H PRN    methylnaltrexone (RELISTOR) injection 12 mg  12 mg SubCUTAneous DAILY    HYDROmorphone (DILAUDID) injection 0.5 mg  0.5 mg IntraVENous Q4H PRN    ALPRAZolam (XANAX) tablet 1 mg  1 mg Oral TID PRN    busPIRone (BUSPAR) tablet 15 mg  15 mg Oral QHS    docusate sodium (COLACE) capsule 100 mg  100 mg Oral BID    DULoxetine (CYMBALTA) capsule 30 mg  30 mg Oral QHS    DULoxetine (CYMBALTA) capsule 60 mg  60 mg Oral QHS    tiZANidine (ZANAFLEX) tablet 4 mg  4 mg Oral TID    topiramate (TOPAMAX) tablet 25 mg  25 mg Oral BID    traZODone (DESYREL) tablet 300 mg  300 mg Oral DAILY PRN    varenicline (CHANTIX) 0.5 mg tablet 1 mg  1 mg Oral BIDPC    calcium carbonate (TUMS) chewable tablet 200 mg [elemental]  200 mg Oral Q6H PRN    ketorolac (TORADOL) injection 15 mg  15 mg IntraVENous Q6H    diphenhydrAMINE (BENADRYL) capsule 25 mg  25 mg Oral Q6H PRN    diazePAM (VALIUM) tablet 2 mg  2 mg Oral Q6H PRN    losartan/hydroCHLOROthiazide (HYZAAR) 100/25 mg   Oral QHS        Objective:   05/22 0701 - 05/22 1900  In: 113.8 [I.V.:113.8]  Out: 100 [Urine:100]  05/20 1901 - 05/22 0700  In: 8203.4 [I.V.:8203.4]  Out: 1575 [Urine:1575]  Patient Vitals for the past 8 hrs:   BP Temp Pulse Resp SpO2   05/22/18 0808 114/87 97.6 °F (36.4 °C) 71 16 96 %   05/22/18 0354 112/78 97.6 °F (36.4 °C) 71 16 95 %       Physical Exam:  General: Alert, cooperative, NAD. Mild tenderness left medial clavicle. No swelling, redness, induration, deformity. Lungs: Unlabored  Heart:  Regular rate and  rhythm  Abdomen: Soft, ATTP, distended. + bowel sounds. Incisions c/d/i. Extremities: Warm, moves all, no edema  Skin:  Warm and dry, no rash    Labs:   Recent Labs      05/22/18   0356   WBC  5.5   HGB  9.3*   HCT  29.5*   PLT  231     Recent Labs      05/22/18   0356   NA  139   K  3.7   CL  107   CO2  27   GLU  108*   BUN  11   CREA  1.02   CA  8.6   MG  2.0       Assessment / Plan:   · POD#4 lap paraesophageal hernia repair with mesh, lap Nissen, intra-op EGD  · Full liquid diet x 3 weeks  · Change PO pain meds to Dilaudid   · OOB, encourage IS  · Continue colace. Add Relistor, MS Dulcolax. · D/c today vs tomorrow provided pain is controlled. Dr. Graceann Jeans updated. Plan discussed.

## 2018-05-22 NOTE — PROGRESS NOTES
Problem: Mobility Impaired (Adult and Pediatric)  Goal: *Acute Goals and Plan of Care (Insert Text)  Physical Therapy Goals  Initiated 5/20/2018  1. Patient will move from supine to sit and sit to supine  in bed with modified independence within 7 day(s). 2.  Patient will transfer from bed to chair and chair to bed with modified independence using the least restrictive device within 7 day(s). 3.  Patient will perform sit to stand with modified independence within 7 day(s). 4.  Patient will ambulate with modified independence for 150 feet with the least restrictive device within 7 day(s). 5.  Patient will ascend/descend 4 stairs with single handrail(s) with modified independence within 7 day(s). physical Therapy TREATMENT  Patient: Josefina Abbott (14 y.o. female)  Date: 5/22/2018  Diagnosis: PARAESOPHAGEAL HERNIA  Hiatal hernia <principal problem not specified>  Procedure(s) (LRB):  LAPAROSCOPIC PARAESOPHAGEAL HERNIA  REPAIR WITH MESH, NISSEN FUNDOPLICATION,EGD (LATEX ALLERGY) (N/A)  ESOPHAGOGASTRODUODENOSCOPY (EGD) (N/A) 4 Days Post-Op  Precautions:   falls  Chart, physical therapy assessment, plan of care and goals were reviewed. ASSESSMENT:  Pt Amelia Brunson ambulates increased distance and requires SBA to contact guard assist for flat surface gait, transfers, and bed mobility. She demonstrates good motivation to improve mobility and tolerates treatment without complaints. She continues to report L clavicle pain that is more severe than incisional pain. Pt educated regarding recommendation for RW use at home. Progression toward goals:  []    Improving appropriately and progressing toward goals  [x]    Improving slowly and progressing toward goals  []    Not making progress toward goals and plan of care will be adjusted     PLAN:  Patient continues to benefit from skilled intervention to address the above impairments. Continue treatment per established plan of care.   Discharge Recommendations:  Home Health  Further Equipment Recommendations for Discharge:  Rolling walker with 5\" wheels     SUBJECTIVE:   Patient stated I want to keep going, re: increased ambulation distance    OBJECTIVE DATA SUMMARY:   Critical Behavior:  Neurologic State: Alert  Orientation Level: Oriented X4  Cognition: Appropriate decision making, Appropriate for age attention/concentration, Appropriate safety awareness, Follows commands  Safety/Judgement: Awareness of environment, Insight into deficits  Functional Mobility Training:  Bed Mobility:     Supine to Sit: Supervision  Sit to Supine: Supervision  Scooting: Supervision        Transfers:  Sit to Stand: Stand-by assistance  Stand to Sit: Stand-by assistance                             Balance:  Sitting: Intact  Standing: With support  Standing - Static: Good  Standing - Dynamic : Good  Ambulation/Gait Training:  Distance (ft): 250 Feet (ft)  Assistive Device: Walker, rolling;Gait belt  Ambulation - Level of Assistance: Stand-by assistance        Gait Abnormalities: Antalgic              Speed/Rossana: Pace decreased (<100 feet/min)                     No loss of balance; appropriate obstacle negotiation. Stairs: reports no stairs to enter or within home. Pain:  Pain Scale 1: Numeric (0 - 10)  Pain Intensity 1: 7  Pain Location 1: L clavicle/shoulder. Reports unchanged with RW use. Pain Orientation 1: Mid;Upper  Pain Description 1: Aching  Pain Intervention(s) 1: Medication (see MAR)  Activity Tolerance:   SpO2 98% with activity using room air. Please refer to the flowsheet for vital signs taken during this treatment.   After treatment:   []    Patient left in no apparent distress sitting up in chair  [x]    Patient left in no apparent distress in bed  [x]    Call bell left within reach  [x]    Nursing notified  []    Caregiver present  [x]    Bed alarm activated    COMMUNICATION/COLLABORATION:   The patients plan of care was discussed with: Registered Nurse    Gil Wang Lester, PT, DPT   Time Calculation: 25 mins

## 2018-05-22 NOTE — PROGRESS NOTES
5/22/2018 5:30PM Order for home health received. Met with pt to discuss. Pt reported she has had home health in the past through [facility] and requested to use to this agency again. Referral sent to [facility] via [employer]. Pt reported her mother would be able to transport her home in the evening of 5/23.     5/22/2018  2:22 PM Order for rw received. Pt's rw order will need to come from pt's pcp due to pt's insurance, [employer] [employer]. CM contacted Dr. Potter  office and requested rw order. CM will follow.  RENE Jordan

## 2018-05-22 NOTE — PROGRESS NOTES
Bedside shift change report given to Nicholas Rice RN (oncoming nurse) by Juan Paul RN (offgoing nurse). Report included the following information SBAR, Kardex, Procedure Summary, Intake/Output, MAR and Recent Results.

## 2018-05-23 VITALS
DIASTOLIC BLOOD PRESSURE: 99 MMHG | HEART RATE: 94 BPM | BODY MASS INDEX: 44.54 KG/M2 | RESPIRATION RATE: 17 BRPM | OXYGEN SATURATION: 98 % | WEIGHT: 259.48 LBS | TEMPERATURE: 98.6 F | SYSTOLIC BLOOD PRESSURE: 171 MMHG

## 2018-05-23 PROCEDURE — 74011250636 HC RX REV CODE- 250/636: Performed by: PHYSICIAN ASSISTANT

## 2018-05-23 PROCEDURE — 74011250637 HC RX REV CODE- 250/637: Performed by: SURGERY

## 2018-05-23 PROCEDURE — 97116 GAIT TRAINING THERAPY: CPT

## 2018-05-23 PROCEDURE — 97530 THERAPEUTIC ACTIVITIES: CPT

## 2018-05-23 PROCEDURE — 74011250636 HC RX REV CODE- 250/636: Performed by: SURGERY

## 2018-05-23 PROCEDURE — 74011250637 HC RX REV CODE- 250/637: Performed by: PHYSICIAN ASSISTANT

## 2018-05-23 RX ORDER — HYDROMORPHONE HYDROCHLORIDE 2 MG/1
2-4 TABLET ORAL
Qty: 30 TAB | Refills: 0 | Status: SHIPPED | OUTPATIENT
Start: 2018-05-23 | End: 2019-04-29

## 2018-05-23 RX ADMIN — PANTOPRAZOLE SODIUM 40 MG: 40 TABLET, DELAYED RELEASE ORAL at 06:46

## 2018-05-23 RX ADMIN — ALPRAZOLAM 1 MG: 0.5 TABLET ORAL at 01:06

## 2018-05-23 RX ADMIN — HYDROMORPHONE HYDROCHLORIDE 4 MG: 2 TABLET ORAL at 16:54

## 2018-05-23 RX ADMIN — BISACODYL 10 MG: 10 SUPPOSITORY RECTAL at 10:43

## 2018-05-23 RX ADMIN — HYDROMORPHONE HYDROCHLORIDE 4 MG: 2 TABLET ORAL at 12:34

## 2018-05-23 RX ADMIN — TIZANIDINE 4 MG: 2 TABLET ORAL at 08:16

## 2018-05-23 RX ADMIN — HYDROMORPHONE HYDROCHLORIDE 4 MG: 2 TABLET ORAL at 08:16

## 2018-05-23 RX ADMIN — HYDROMORPHONE HYDROCHLORIDE 4 MG: 2 TABLET ORAL at 03:47

## 2018-05-23 RX ADMIN — DOCUSATE SODIUM 100 MG: 100 CAPSULE, LIQUID FILLED ORAL at 08:16

## 2018-05-23 RX ADMIN — TRAZODONE HYDROCHLORIDE 300 MG: 100 TABLET ORAL at 01:05

## 2018-05-23 RX ADMIN — VARENICLINE TARTRATE 1 MG: 0.5 TABLET, FILM COATED ORAL at 08:16

## 2018-05-23 RX ADMIN — TOPIRAMATE 25 MG: 25 TABLET, FILM COATED ORAL at 08:16

## 2018-05-23 RX ADMIN — KETOROLAC TROMETHAMINE 15 MG: 30 INJECTION, SOLUTION INTRAMUSCULAR; INTRAVENOUS at 01:05

## 2018-05-23 RX ADMIN — METHYLNALTREXONE BROMIDE 12 MG: 12 INJECTION, SOLUTION SUBCUTANEOUS at 10:43

## 2018-05-23 NOTE — PROGRESS NOTES
General Surgery Daily Progress Note    Patient: Ggii Kemp MRN: [id number]  SSN: [id number]    YOB: 1965  Age: 47 y.o. Sex: female      Admit Date: 5/18/2018    Subjective:   Pain controlled, clavicle pain improved. Tolerating fulls. BM yesterday.      Current Facility-Administered Medications   Medication Dose Route Frequency    pantoprazole (PROTONIX) tablet 40 mg  40 mg Oral ACB    bisacodyl (DULCOLAX) suppository 10 mg  10 mg Rectal DAILY    HYDROmorphone (DILAUDID) tablet 2 mg  2 mg Oral Q4H PRN    HYDROmorphone (DILAUDID) tablet 4 mg  4 mg Oral Q4H PRN    methylnaltrexone (RELISTOR) injection 12 mg  12 mg SubCUTAneous DAILY    lidocaine (LIDODERM) 5 % patch 1 Patch  1 Patch TransDERmal Q24H    HYDROmorphone (DILAUDID) injection 0.5 mg  0.5 mg IntraVENous Q4H PRN    ALPRAZolam (XANAX) tablet 1 mg  1 mg Oral TID PRN    busPIRone (BUSPAR) tablet 15 mg  15 mg Oral QHS    docusate sodium (COLACE) capsule 100 mg  100 mg Oral BID    DULoxetine (CYMBALTA) capsule 30 mg  30 mg Oral QHS    DULoxetine (CYMBALTA) capsule 60 mg  60 mg Oral QHS    tiZANidine (ZANAFLEX) tablet 4 mg  4 mg Oral TID    topiramate (TOPAMAX) tablet 25 mg  25 mg Oral BID    traZODone (DESYREL) tablet 300 mg  300 mg Oral DAILY PRN    varenicline (CHANTIX) 0.5 mg tablet 1 mg  1 mg Oral BIDPC    calcium carbonate (TUMS) chewable tablet 200 mg [elemental]  200 mg Oral Q6H PRN    ketorolac (TORADOL) injection 15 mg  15 mg IntraVENous Q6H    diphenhydrAMINE (BENADRYL) capsule 25 mg  25 mg Oral Q6H PRN    diazePAM (VALIUM) tablet 2 mg  2 mg Oral Q6H PRN    losartan/hydroCHLOROthiazide (HYZAAR) 100/25 mg   Oral QHS        Objective:   05/23 0701 - 05/23 1900  In: -   Out: 700 [Urine:700]  05/21 1901 - 05/23 0700  In: 1316.3 [I.V.:1316.3]  Out: 1000 [Urine:1000]  Patient Vitals for the past 8 hrs:   BP Temp Pulse Resp SpO2   05/23/18 0937 (!) 151/102 - - - -   05/23/18 0840 (!) 167/102 98.2 °F (36.8 °C) 88 17 94 % 05/23/18 0507 139/86 98 °F (36.7 °C) 83 16 97 %       Physical Exam:  General: Alert, cooperative, NAD  Lungs: Unlabored  Heart:  Regular rate and  rhythm  Abdomen: Soft, ATTP, distended. + bowel sounds. Incisions c/d/i. Extremities: Warm, moves all, no edema  Skin:  Warm and dry, no rash    Labs:   Recent Labs      05/22/18   0356   WBC  5.5   HGB  9.3*   HCT  29.5*   PLT  231     Recent Labs      05/22/18   0356   NA  139   K  3.7   CL  107   CO2  27   GLU  108*   BUN  11   CREA  1.02   CA  8.6   MG  2.0       Assessment / Plan:   · POD#5 lap paraesophageal hernia repair with mesh, lap Nissen, intra-op EGD  · Full liquid diet x 3 weeks  · PO Dilaudid   · OOB, encourage IS  · Clavicle X-ray negative, no s/s infection at site. ?sternoclavicular arthritis. F/u with PCP   · Continue bowel regimen   · D/c today     Dr. Jovan Stein updated. Plan discussed.

## 2018-05-23 NOTE — ADT AUTH CERT NOTES
Per message from : There was another h&P scanned in today at 1006. That has the past medical history on it. I have left a message with Dr. Melina Carrera office to see what CPT codes were used. Thanks   Jaimecortes De Jo Webster Name: [address]                              [address] [place], [address] [facility]                          [id number]                     Patient Demographics      Patient Name Sex  Address Phone     Dolores Precise Female 1965 [address]  [address] [place] [address] [phone] (Home) *[facility] Account      Name Acct ID Class Status Primary Coverage     Dolores Precise [id number] INPATIENT Open HU[employer]NA MEDICARE - [id number] [facility] PPO/PFFS              Guarantor Account (for Hospital Account [de-identified])      Name Relation to Pt Service Area Active? Acct Type     Dolores Precise Self [facility] Yes Personal/Family     Address Phone           [address]  [address], [address] [facility] [phone](S)                Coverage Information (for Hospital Account [de-identified])      F/O Payor/Plan Subscriber  Subscriber Sex Precert #     HU[employer]NA MEDICARE/BSHSI HUMANA MEDICARE CHOICE PPO/PFFS 67 [id number]      Subscriber Subscriber #     Dolores Lobato [id number]     [facility] # Group Name     [id number] [facility] PFFS     Address Phone     [address] [facility] [address] [facility]      Policy Number Status Effective Date Benefits Phone     [id number] -  -     Auth/Cert     [facility]#[id number]              Admission Information - Patient Record Only      Arrival Date/Time:  Admit Date/Time: 2018 1035 IP Adm.  Date/Time: 2018 1643     Admission Type: Elective Point of Origin: Non-health Care Facility/self Admit Category: Unknown     Means of Arrival:  Primary Service: Surgery Secondary Service: N/A     Transfer Source:  Service Area: [address] [place] [address] Unit: [facility] 4M POST SURG ORT 2     Admit Provider: Julissa Oliveira Silvia Bolden MD Attending Provider: Martín Duque MD Referring Provider:        Admission Information      Attending Provider Admission Dx Admitted On     Martín Duque MD  05/18/18     Service Isolation Code Status     SURGERY  Prior     Allergies           Latex, Natural Rubber, Aspirin, Codeine, Contrast Agent [Iodine], Imitrex [Sumatriptan], Percocet [Oxycodone-acetaminophen]         Admission Information      Unit/Bed: [facility] 4M POST SURG ORT 2/01 Service: SURGERY     Admitting provider: Martín Duque MD Phone: [phone]     Attending provider: Martín Duque MD Phone: [phone]     PCP: Jade Bryant MD Phone: [phone]     Admission dx: PARAESOPHAGEAL HERNIA Patient class:  I     Admission type: EL

## 2018-05-23 NOTE — PROGRESS NOTES
5/23/2018 5:07 PM RW order received and sent to [facility]. 5/23/2018 4:54 PM Spoke with Kelly Cabrera at Dr. [place] office rw order will be faxed over but pt must see Dr. Smitha Evans within 30 days. CM made pt's appt for 6/11 at 11:30am, appt added to pt's avs and discussed with pt.     5/23/2018 4:48 PM Spoke with Helga at [facility] office, rw order will be sent. RW was delivered to pt as pt is ready for discharge today. 5/23/2018  3:06 PM Spoke with Kelly Cabrera at Dr. Kain Guerra office again, who requested explanation for why pt needs rw. CM explained PT recommendations, Kelly Cabrera reported she will fax over rw order to CM.      5/23/2018 1:40 PM Called and spoke with Leonardo Hernández at Dr. Kain Guerra office and left message again regarding pt's rw. CM will follow up.     5/23/2018 11:59 AM [facility] accepted pt. Awaiting rw order from Dr. Kain Guerra office. 5/23/2018  8:53 AM RW order not received from Dr. Kain Guerra office. CM called again and left message for Dr. Smitha Evans requesting rw order. CM will follow up.  RENE Hudson

## 2018-05-23 NOTE — PROGRESS NOTES
Bedside shift change report given to dallas johnson RN (oncoming nurse) by Abhinav Trevizo RN (offgoing nurse). Report included the following information SBAR, Kardex, Procedure Summary, Intake/Output, MAR and Recent Results.

## 2018-05-23 NOTE — DISCHARGE SUMMARY
Surgery Discharge Summary     Patient ID:  Slick Jorgensen  [id number]  28 y.o.  1965    Admit date: 5/18/2018    Discharge date and time: 5/23/2018    Admission Diagnoses:    Patient Active Problem List   Diagnosis Code    Seizure ([facility] [address] R56.9    Migraine G43.909    Cavernous hemangioma of intracranial structure ([facility] [address] D18.02    Slurred speech R47.81    Depressive disorder, not elsewhere classified F32.9    Leukocytosis, unspecified D72.829    Hypokalemia E87.6    Hiatal hernia K44.9       Discharge Diagnoses: There are no discharge diagnoses documented for the most recent discharge. Patient Active Problem List   Diagnosis Code    Seizure ([facility] [address] R56.9    Migraine G43.909    Cavernous hemangioma of intracranial structure ([facility] [address] D18.02    Slurred speech R47.81    Depressive disorder, not elsewhere classified F32.9    Leukocytosis, unspecified D72.829    Hypokalemia E87.6    Hiatal hernia K44.9       Procedures for this admission: Procedure(s):  LAPAROSCOPIC PARAESOPHAGEAL HERNIA  REPAIR WITH MESH, NISSEN FUNDOPLICATION,EGD (LATEX ALLERGY)  ESOPHAGOGASTRODUODENOSCOPY (EGD)    Hospital Course: Ms. Rosaura Bolden underwent lap paraesophageal hernia repair with mesh, Nissen, EGD on DOA without apparent complication. She had an uneventful postoperative course and was discharged home on full liquid diet. Disposition: home    Discharged Condition : stable    Instructions: Follow-up in office  in 1-2 weeks.               Signed:  ANAI Lehman  5/23/2018  3:50 PM

## 2018-05-23 NOTE — PROGRESS NOTES
Bedside and Verbal shift change report given to Iris Meneses RN (oncoming nurse) by BINU Stahl (offgoing nurse). Report given with SBAR, Kardex, OR Summary, Intake/Output, MAR and Recent Results.

## 2018-05-23 NOTE — DISCHARGE INSTRUCTIONS
Patient Discharge Instructions    [facility] / [id number] : 1965    Admitted 2018 Discharged: 2018     Take Home Medications       · It is important that you take the medication exactly as they are prescribed. · Keep your medication in the bottles provided by the pharmacist and keep a list of the medication names, dosages, and times to be taken in your wallet. · Do not take other medications without consulting your doctor. · Do not drive, drink alcohol, or operate machinery when taking sedating pain medication. · Take colace daily while on pain medication to help prevent constipation. You may take over the counter laxatives such as Dulcolax or Miralax as needed for constipation      What to do at Home    Recommended diet: Full liquid diet    Recommended activity: No heavy lifting or strenuous activity for 6 weeks. You may shower. You may drive once pain has improved and you no longer require pain medication. Follow up with Dr. Abbey Oropeza in 2 weeks. Call [facility] to make an appointment. Call Dr. Abbey Oropeza or go to the ER if you develop worsening pain, fever, vomiting, or any other symptoms that concern you.

## 2018-05-23 NOTE — PROGRESS NOTES
Problem: Mobility Impaired (Adult and Pediatric)  Goal: *Acute Goals and Plan of Care (Insert Text)  Physical Therapy Goals  Initiated 5/20/2018  1. Patient will move from supine to sit and sit to supine  in bed with modified independence within 7 day(s). 2.  Patient will transfer from bed to chair and chair to bed with modified independence using the least restrictive device within 7 day(s). 3.  Patient will perform sit to stand with modified independence within 7 day(s). 4.  Patient will ambulate with modified independence for 150 feet with the least restrictive device within 7 day(s). 5.  Patient will ascend/descend 4 stairs with single handrail(s) with modified independence within 7 day(s). physical Therapy TREATMENT  Patient: Stepan Jorge (38 y.o. female)  Date: 5/23/2018  Diagnosis: PARAESOPHAGEAL HERNIA  Hiatal hernia <principal problem not specified>  Procedure(s) (LRB):  LAPAROSCOPIC PARAESOPHAGEAL HERNIA  REPAIR WITH MESH, NISSEN FUNDOPLICATION,EGD (LATEX ALLERGY) (N/A)  ESOPHAGOGASTRODUODENOSCOPY (EGD) (N/A) 5 Days Post-Op  Precautions:   Universal  Chart, physical therapy assessment, plan of care and goals were reviewed. ASSESSMENT:  Pt Amelia Brunson ambulates increased distance, requires no more that supervision for functional mobility as below, and tolerates activity without complaints. She demonstrates appropriate assistive device use and mobility techniques for transfers, bed mobility, and ambulation. Pt cleared for discharge from PT perspective. Progression toward goals:  [x]    Improving appropriately and progressing toward goals  []    Improving slowly and progressing toward goals  []    Not making progress toward goals and plan of care will be adjusted     PLAN:  Patient continues to benefit from skilled intervention to address the above impairments. Continue treatment per established plan of care.   Discharge Recommendations:  Home Health  Further Equipment Recommendations for Discharge:  No changes     SUBJECTIVE:   Patient stated I can't wait to sleep in my own bed.     OBJECTIVE DATA SUMMARY:   Critical Behavior:  Neurologic State: Alert  Orientation Level: Oriented X4  Cognition: Appropriate decision making, Appropriate safety awareness, Appropriate for age attention/concentration, Follows commands  Safety/Judgement: Awareness of environment, Insight into deficits  Functional Mobility Training:  Bed Mobility:     Supine to Sit: Modified independent (flat head of bed; log roll)  Sit to Supine: Modified independent  Scooting: Modified independent        Transfers:  Sit to Stand: Modified independent  Stand to Sit: Modified independent                             Balance:  Sitting: Intact  Standing: With support  Standing - Static: Good  Standing - Dynamic : Good  Ambulation/Gait Training:  Distance (ft): 350 Feet (ft)  Assistive Device: Walker, rolling;Gait belt  Ambulation - Level of Assistance: Supervision;Modified independent                                             Appropriate device techniques, no loss of balance. Pain:  Pain Scale 1: Numeric (0 - 10)  Pain Intensity 1: 7  Pain Location 1: Abdomen  Pain Orientation 1: Anterior  Pain Description 1: Aching;Constant  Pain Intervention(s) 1: Medication (see MAR) ; rest, repositioned, RN aware  Activity Tolerance:   Good for activities above. SpO2 95% using room air with activity. Please refer to the flowsheet for vital signs taken during this treatment.   After treatment:   []    Patient left in no apparent distress sitting up in chair  [x]    Patient left in no apparent distress in bed  [x]    Call bell left within reach  [x]    Nursing notified  [x]    Caregiver present  []    Bed alarm activated    COMMUNICATION/COLLABORATION:   The patients plan of care was discussed with: Registered Nurse    Madyson Santiago, PT, DPT   Time Calculation: 28 mins

## 2018-07-17 ENCOUNTER — HOSPITAL ENCOUNTER (EMERGENCY)
Age: 53
Discharge: HOME | End: 2018-07-17
Attending: EMERGENCY MEDICINE
Payer: MEDICARE

## 2018-07-17 VITALS
SYSTOLIC BLOOD PRESSURE: 131 MMHG | HEART RATE: 96 BPM | WEIGHT: 259 LBS | HEIGHT: 64 IN | BODY MASS INDEX: 44.22 KG/M2 | DIASTOLIC BLOOD PRESSURE: 91 MMHG | OXYGEN SATURATION: 95 % | TEMPERATURE: 98.8 F | RESPIRATION RATE: 18 BRPM

## 2018-07-17 DIAGNOSIS — K62.5 RECTAL BLEEDING: Primary | ICD-10-CM

## 2018-07-17 LAB
ABO + RH BLD: NORMAL
ALBUMIN SERPL-MCNC: 3.5 G/DL (ref 3.5–5)
ALBUMIN/GLOB SERPL: 0.8 {RATIO} (ref 1.1–2.2)
ALP SERPL-CCNC: 98 U/L (ref 45–117)
ALT SERPL-CCNC: 28 U/L (ref 12–78)
ANION GAP SERPL CALC-SCNC: 10 MMOL/L (ref 5–15)
APPEARANCE UR: CLEAR
APTT PPP: 30.3 SEC (ref 22.1–32)
AST SERPL-CCNC: 17 U/L (ref 15–37)
BACTERIA URNS QL MICRO: NEGATIVE /HPF
BASOPHILS # BLD: 0.1 K/UL (ref 0–0.1)
BASOPHILS NFR BLD: 1 % (ref 0–1)
BILIRUB SERPL-MCNC: 0.2 MG/DL (ref 0.2–1)
BILIRUB UR QL: NEGATIVE
BLOOD GROUP ANTIBODIES SERPL: NORMAL
BUN SERPL-MCNC: 15 MG/DL (ref 6–20)
BUN/CREAT SERPL: 16 (ref 12–20)
CALCIUM SERPL-MCNC: 9.4 MG/DL (ref 8.5–10.1)
CHLORIDE SERPL-SCNC: 100 MMOL/L (ref 97–108)
CO2 SERPL-SCNC: 27 MMOL/L (ref 21–32)
COLOR UR: NORMAL
CREAT SERPL-MCNC: 0.91 MG/DL (ref 0.55–1.02)
DIFFERENTIAL METHOD BLD: NORMAL
EOSINOPHIL # BLD: 0.3 K/UL (ref 0–0.4)
EOSINOPHIL NFR BLD: 3 % (ref 0–7)
EPITH CASTS URNS QL MICRO: NORMAL /LPF
ERYTHROCYTE [DISTWIDTH] IN BLOOD BY AUTOMATED COUNT: 13 % (ref 11.5–14.5)
GLOBULIN SER CALC-MCNC: 4.6 G/DL (ref 2–4)
GLUCOSE SERPL-MCNC: 88 MG/DL (ref 65–100)
GLUCOSE UR STRIP.AUTO-MCNC: NEGATIVE MG/DL
HCT VFR BLD AUTO: 36.6 % (ref 35–47)
HEMOCCULT STL QL: NEGATIVE
HGB BLD-MCNC: 11.9 G/DL (ref 11.5–16)
HGB UR QL STRIP: NEGATIVE
HYALINE CASTS URNS QL MICRO: NORMAL /LPF (ref 0–5)
IMM GRANULOCYTES # BLD: 0 K/UL (ref 0–0.04)
IMM GRANULOCYTES NFR BLD AUTO: 0 % (ref 0–0.5)
INR PPP: 1 (ref 0.9–1.1)
KETONES UR QL STRIP.AUTO: NEGATIVE MG/DL
LEUKOCYTE ESTERASE UR QL STRIP.AUTO: NEGATIVE
LYMPHOCYTES # BLD: 3.1 K/UL (ref 0.8–3.5)
LYMPHOCYTES NFR BLD: 30 % (ref 12–49)
MCH RBC QN AUTO: 29.4 PG (ref 26–34)
MCHC RBC AUTO-ENTMCNC: 32.5 G/DL (ref 30–36.5)
MCV RBC AUTO: 90.4 FL (ref 80–99)
MONOCYTES # BLD: 0.8 K/UL (ref 0–1)
MONOCYTES NFR BLD: 7 % (ref 5–13)
NEUTS SEG # BLD: 6 K/UL (ref 1.8–8)
NEUTS SEG NFR BLD: 58 % (ref 32–75)
NITRITE UR QL STRIP.AUTO: NEGATIVE
NRBC # BLD: 0 K/UL (ref 0–0.01)
NRBC BLD-RTO: 0 PER 100 WBC
PH UR STRIP: 6.5 [PH] (ref 5–8)
PLATELET # BLD AUTO: 321 K/UL (ref 150–400)
PMV BLD AUTO: 10.2 FL (ref 8.9–12.9)
POTASSIUM SERPL-SCNC: 3.5 MMOL/L (ref 3.5–5.1)
PROT SERPL-MCNC: 8.1 G/DL (ref 6.4–8.2)
PROT UR STRIP-MCNC: NEGATIVE MG/DL
PROTHROMBIN TIME: 9.8 SEC (ref 9–11.1)
RBC # BLD AUTO: 4.05 M/UL (ref 3.8–5.2)
RBC #/AREA URNS HPF: NORMAL /HPF (ref 0–5)
SODIUM SERPL-SCNC: 137 MMOL/L (ref 136–145)
SP GR UR REFRACTOMETRY: 1.02 (ref 1–1.03)
SPECIMEN EXP DATE BLD: NORMAL
THERAPEUTIC RANGE,PTTT: NORMAL SECS (ref 58–77)
UR CULT HOLD, URHOLD: NORMAL
UROBILINOGEN UR QL STRIP.AUTO: 0.2 EU/DL (ref 0.2–1)
WBC # BLD AUTO: 10.2 K/UL (ref 3.6–11)
WBC URNS QL MICRO: NORMAL /HPF (ref 0–4)

## 2018-07-17 PROCEDURE — 74011250636 HC RX REV CODE- 250/636: Performed by: PHYSICIAN ASSISTANT

## 2018-07-17 PROCEDURE — 80053 COMPREHEN METABOLIC PANEL: CPT | Performed by: PHYSICIAN ASSISTANT

## 2018-07-17 PROCEDURE — 99283 EMERGENCY DEPT VISIT LOW MDM: CPT

## 2018-07-17 PROCEDURE — 36415 COLL VENOUS BLD VENIPUNCTURE: CPT | Performed by: PHYSICIAN ASSISTANT

## 2018-07-17 PROCEDURE — 82272 OCCULT BLD FECES 1-3 TESTS: CPT | Performed by: PHYSICIAN ASSISTANT

## 2018-07-17 PROCEDURE — 85730 THROMBOPLASTIN TIME PARTIAL: CPT | Performed by: PHYSICIAN ASSISTANT

## 2018-07-17 PROCEDURE — 85025 COMPLETE CBC W/AUTO DIFF WBC: CPT | Performed by: PHYSICIAN ASSISTANT

## 2018-07-17 PROCEDURE — 96374 THER/PROPH/DIAG INJ IV PUSH: CPT

## 2018-07-17 PROCEDURE — 86900 BLOOD TYPING SEROLOGIC ABO: CPT | Performed by: PHYSICIAN ASSISTANT

## 2018-07-17 PROCEDURE — 74011250637 HC RX REV CODE- 250/637: Performed by: PHYSICIAN ASSISTANT

## 2018-07-17 PROCEDURE — 85610 PROTHROMBIN TIME: CPT | Performed by: PHYSICIAN ASSISTANT

## 2018-07-17 PROCEDURE — 81001 URINALYSIS AUTO W/SCOPE: CPT | Performed by: PHYSICIAN ASSISTANT

## 2018-07-17 RX ORDER — MORPHINE SULFATE 4 MG/ML
4 INJECTION INTRAVENOUS
Status: COMPLETED | OUTPATIENT
Start: 2018-07-17 | End: 2018-07-17

## 2018-07-17 RX ORDER — BUTALBITAL, ACETAMINOPHEN AND CAFFEINE 50; 325; 40 MG/1; MG/1; MG/1
1 TABLET ORAL
Status: COMPLETED | OUTPATIENT
Start: 2018-07-17 | End: 2018-07-17

## 2018-07-17 RX ORDER — PANTOPRAZOLE SODIUM 20 MG/1
20 TABLET, DELAYED RELEASE ORAL DAILY
Qty: 20 TAB | Refills: 0 | Status: SHIPPED | OUTPATIENT
Start: 2018-07-17 | End: 2019-05-30

## 2018-07-17 RX ADMIN — MORPHINE SULFATE 4 MG: 4 INJECTION INTRAVENOUS at 20:23

## 2018-07-17 RX ADMIN — BUTALBITAL, ACETAMINOPHEN, AND CAFFEINE 1 TABLET: 50; 325; 40 TABLET ORAL at 19:37

## 2018-07-17 NOTE — ED TRIAGE NOTES
\"I have hemorrhoids and I'm taking stool softeners and miralax. I had a hiatal hernia surgery done here in May. My stool is very black, but for the past two days when I go to the bathroom I have seen bright read blood with clots. I also have a migraine headache because I'm stressing myself out. \"

## 2018-07-17 NOTE — ED PROVIDER NOTES
HPI Comments: Edison Hu is a 47 y.o. female  who presents by private vehicle to ER with c/o Patient presents with:  Rectal Bleeding. Patient presents with complaints of rectal bleeding x 2 days. Patient reports history of hemorrhoids, patient reports hemorrhoids have improved with use of preparation H wipes, Patient with bright red blood with clots for 2 days, patient also reports stools have been dark. Patient has been taking miralax and stool softeners since hiatal hernia surgery in May. Patient reports LUQ abdominal pain. Denies nausea, vomiting or fever. She specifically denies any fevers, chills, nausea, vomiting, chest pain, shortness of breath, headache, rash, diarrhea,  Urinary changes, sweating or weight loss. 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)  No date: Thyroid nodule  No date: Unspecified sleep apnea      Comment: does not use c-pap   PSHx significant for: Past Surgical History:  No date: HX CHOLECYSTECTOMY  2012: HX HEENT      Comment: tonsillectomy  No date: HX LITHOTRIPSY  No date: HX ORTHOPAEDIC      Comment: disc fusion 2010, right knee cyst removed  1996, 2006: NEUROLOGICAL PROCEDURE UNLISTED      Comment: Cavernous brain angioma(s) removed  No date: SURGERY,BRAIN/SPINE,W/COMPUTER  Social Hx: Tobacco use: Smoking status: Current Every Day Smoker                                                   Packs/day: 0.00      Years: 0.00      Smokeless status: Never Used                      Comment: on chantix, smokes 1-2 cigarettes a day  ; EtOH use: The patient states she drinks 0 per week.; Illicit Drug use: Allergies:   -- Latex, Natural Rubber -- Rash   -- Aspirin -- Other (comments)    --  Excessive bleeding   -- Codeine -- Palpitations   -- Contrast Agent (Iodine) -- Rash   -- Imitrex (Sumatriptan) -- Palpitations and Other (comments)    --  thougt she was having an MI   -- Percocet (Oxycodone-Acetaminophen) -- Palpitations    There are no other complaints, changes or physical findings at this time. Patient is a 47 y.o. female presenting with anal bleeding. The history is provided by the patient. Rectal Bleeding    This is a new problem. The current episode started 2 days ago. The stool is described as blood tinged and formed. Associated symptoms include abdominal pain. Pertinent negatives include no flatus, no dysuria, no abdominal distention, no chills, no fever, no nausea, no back pain, no vomiting, no diarrhea and no constipation. Risk factors include obesity. She has tried oral laxatives for the symptoms. Her past medical history is significant for abdominal surgery. Her past medical history does not include dementia, neuromuscular disease, irritable bowel syndrome, neurologic disease, small bowel obstruction, nursing home resident, endocrine disease or metabolic disease.         Past Medical History:   Diagnosis Date    Cavernous hemangioma of intracranial structure ([facility] [address] 5/2/2011    Depressive disorder, not elsewhere classified 10/1/2013    anxiety    Fibromyalgia     fibromyalgia    Hiatal hernia with gastroesophageal reflux 04/2018    Hypertension     Incomplete right bundle branch block (RBBB) determined by electrocardiography 05/10/2018    Migraine 5/2/2011    Seizures ([facility] [address] 2012    last seizure grand mal (born w/seizure disorder)    Thyroid nodule     Unspecified sleep apnea     does not use c-pap       Past Surgical History:   Procedure Laterality Date    HX CHOLECYSTECTOMY      HX HEENT  2012 tonsillectomy    HX LITHOTRIPSY      HX ORTHOPAEDIC      disc fusion 2010, right knee cyst removed    NEUROLOGICAL PROCEDURE UNLISTED  1996, 2006    Cavernous brain angioma(s) removed    SURGERY,BRAIN/SPINE,W/COMPUTER           Family History:   Problem Relation Age of Onset    Cancer Mother      breast    Breast Cancer Mother     Cancer Father     Migraines Father     Diabetes Father        Social History     Social History    Marital status: SINGLE     Spouse name: N/A    Number of children: N/A    Years of education: N/A     Occupational History    Not on file. Social History Main Topics    Smoking status: Current Every Day Smoker    Smokeless tobacco: Never Used      Comment: on chantix, smokes 1-2 cigarettes a day    Alcohol use No    Drug use: No    Sexual activity: Not on file     Other Topics Concern    Not on file     Social History Narrative         ALLERGIES: Latex, natural rubber; Aspirin; Codeine; Contrast agent [iodine]; Imitrex [sumatriptan]; and Percocet [oxycodone-acetaminophen]    Review of Systems   Constitutional: Negative. Negative for chills and fever. HENT: Negative. Eyes: Negative. Respiratory: Negative. Cardiovascular: Negative. Gastrointestinal: Positive for abdominal pain and anal bleeding. Negative for abdominal distention, constipation, diarrhea, flatus, nausea and vomiting. Endocrine: Negative. Genitourinary: Negative. Negative for dysuria. Musculoskeletal: Negative. Negative for back pain. Skin: Negative. Allergic/Immunologic: Negative. Neurological: Negative. Hematological: Negative. Psychiatric/Behavioral: Negative. All other systems reviewed and are negative. Vitals:    07/17/18 1806   BP: 123/82   Pulse: 96   Resp: 18   Temp: 98.8 °F (37.1 °C)   SpO2: 96%   Weight: 117.5 kg (259 lb)   Height: 5' 4\" (1.626 m)            Physical Exam   Constitutional: She is oriented to person, place, and time.  She appears well-developed. HENT:   Head: Normocephalic and atraumatic. Right Ear: External ear normal.   Left Ear: External ear normal.   Nose: Nose normal.   Mouth/Throat: Oropharynx is clear and moist. No oropharyngeal exudate. Eyes: Conjunctivae, EOM and lids are normal. Right eye exhibits no discharge. Left eye exhibits no discharge. Neck: Normal range of motion. No tracheal deviation present. No thyromegaly present. Cardiovascular: Normal rate, regular rhythm, normal heart sounds and intact distal pulses. Pulmonary/Chest: Effort normal and breath sounds normal.   Abdominal: Soft. Normal appearance and bowel sounds are normal. There is tenderness in the left upper quadrant. Genitourinary: Rectal exam shows external hemorrhoid. Pelvic exam was performed with patient in the knee-chest position. Genitourinary Comments: No stool in rectal vault on ALFREDO   Musculoskeletal: Normal range of motion. Neurological: She is alert and oriented to person, place, and time. Skin: Skin is warm and dry. Psychiatric: She has a normal mood and affect. Judgment normal.        MDM  Number of Diagnoses or Management Options  Rectal bleeding:   Diagnosis management comments: Assesment/Plan- 47 y.o. Patient presents with:  Rectal Bleeding  differential includes: hemorrhoids, anal fissure, gi bleed. Labs and imaging reviewed with no acute findings, negative hemoccult, stable hemoglobin. Patient is well appearing, afebrile and tolerating PO. Discharge home with strict return precautions. Recommend GI, PCP follow up. Patient educated on reasons to return to the ED.          Amount and/or Complexity of Data Reviewed  Clinical lab tests: reviewed and ordered  Tests in the medicine section of CPT®: ordered and reviewed  Discuss the patient with other providers: yes (Attending- Dr. Stanley Vidal who also saw patient and agrees with plan)          ED Course       Procedures

## 2018-07-18 NOTE — PROGRESS NOTES
7/18/2018  9:29 AM  Case management note    Received referral for GI follow up. Made appointment for July 30 with  Dr. Isaiah Bach, patient had been seen in office before. When notified patient she stated she did not have money for copay. We discussed setting up payment plans, she declined and stated she would call them  Patient had Hgb of 11.9.  Cancel appointment due to patient stating she could not afford, that's why she came to ER thinking she could get colonoscopy and take to MD.  Tray Shelton, YOLIRT

## 2018-07-18 NOTE — DISCHARGE INSTRUCTIONS
Lower Gastrointestinal Bleeding: Care Instructions  Your Care Instructions    The digestive or gastrointestinal tract goes from the mouth to the anus. It is often called the GI tract. Bleeding in the lower GI tract can happen anywhere in your small or large intestine. It can also happen in your rectum or anus. In some cases, it is caused by an infection, cancer, or inflammatory bowel disease. Or it may be caused by hemorrhoids, diverticulitis, or clotting problems. Light bleeding may not cause any symptoms at first. But if you continue to bleed for a while, you may feel very weak or tired. Sudden, heavy bleeding means you need to see a doctor right away. This kind of bleeding can be very dangerous. But it can usually be cured or controlled. The doctor may do some tests to find the cause of your bleeding. Follow-up care is a key part of your treatment and safety. Be sure to make and go to all appointments, and call your doctor if you are having problems. It's also a good idea to know your test results and keep a list of the medicines you take. How can you care for yourself at home? · Be safe with medicines. Take your medicines exactly as prescribed. Call your doctor if you think you are having a problem with your medicine. You will get more details on the specific medicines your doctor prescribes. · Do not take aspirin or other anti-inflammatory medicines, such as naproxen (Aleve) or ibuprofen (Advil, Motrin), without talking to your doctor first. Ask your doctor if it is okay to use acetaminophen (Tylenol). · Do not drink alcohol. · The bleeding may make you lose iron. So it's important to eat foods that have a lot of iron. These include red meat, shellfish, poultry, and eggs. They also include beans, raisins, whole-grain breads, and leafy green vegetables. If you want help planning meals, you can meet with a dietitian. When should you call for help?   Call 911 anytime you think you may need emergency care. For example, call if:    · You have sudden, severe belly pain.     · You vomit blood or what looks like coffee grounds.     · You passed out (lost consciousness).     · Your stools are maroon or very bloody.    Call your doctor now or seek immediate medical care if:    · You are dizzy or lightheaded, or you feel like you may faint.     · Your stools are black and look like tar, or they have streaks of blood.     · You have belly pain.     · You vomit or have nausea.    Watch closely for changes in your health, and be sure to contact your doctor if you do not get better as expected. Where can you learn more? Go to http://molina-ofelia.info/. Enter F521 in the search box to learn more about \"Lower Gastrointestinal Bleeding: Care Instructions. \"  Current as of: November 20, 2017  Content Version: 11.7  © 8858-[address]. Care instructions adapted under license by [employer] (which disclaims liability or warranty for this information). If you have questions about a medical condition or this instruction, always ask your healthcare professional. [place] [address] any warranty or liability for your use of this information. We hope that we have addressed all of your medical concerns. The examination and treatment you received in the Emergency Department were for an emergent problem and were not intended as complete care. It is important that you follow up with your healthcare provider(s) for ongoing care. If your symptoms worsen or do not improve as expected, and you are unable to reach your usual health care provider(s), you should return to the Emergency Department. Today's healthcare is undergoing tremendous change, and patient satisfaction surveys are one of the many tools to assess the quality of medical care. You may receive a survey from the [employer] regarding your experience in the Emergency Department.   I hope that your experience has been completely positive, particularly the medical care that I provided. As such, please participate in the survey; anything less than excellent does not meet my expectations or intentions. [facility] participate in nationally recognized quality of care measures. If your blood pressure is greater than 120/80, as reported below, we urge that you seek medical care to address the potential of high blood pressure, commonly known as hypertension. Hypertension can be hereditary or can be caused by certain medical conditions, pain, stress, or \"white coat syndrome. \"       Please make an appointment with your health care provider(s) for follow up of your Emergency Department visit. VITALS:   Patient Vitals for the past 8 hrs:   Temp Pulse Resp BP SpO2   07/17/18 1936 - - - 127/90 96 %   07/17/18 1806 98.8 °F (37.1 °C) 96 18 123/82 96 %          Thank you for allowing us to provide you with medical care today. We realize that you have many choices for your emergency care needs. Please choose us in the future for any continued health care needs. Apoorva Rodriguez, [employer].   Office: [phone]            Recent Results (from the past 24 hour(s))   CBC WITH AUTOMATED DIFF    Collection Time: 07/17/18  6:56 PM   Result Value Ref Range    WBC 10.2 3.6 - 11.0 K/uL    RBC 4.05 3.80 - 5.20 M/uL    HGB 11.9 11.5 - 16.0 g/dL    HCT 36.6 35.0 - 47.0 %    MCV 90.4 80.0 - 99.0 FL    MCH 29.4 26.0 - 34.0 PG    MCHC 32.5 30.0 - 36.5 g/dL    RDW 13.0 11.5 - 14.5 %    PLATELET 105 161 - 705 K/uL    MPV 10.2 8.9 - 12.9 FL    NRBC 0.0 0  WBC    ABSOLUTE NRBC 0.00 0.00 - 0.01 K/uL    NEUTROPHILS 58 32 - 75 %    LYMPHOCYTES 30 12 - 49 %    MONOCYTES 7 5 - 13 %    EOSINOPHILS 3 0 - 7 %    BASOPHILS 1 0 - 1 %    IMMATURE GRANULOCYTES 0 0.0 - 0.5 %    ABS.  NEUTROPHILS 6.0 1.8 - 8.0 K/UL ABS. LYMPHOCYTES 3.1 0.8 - 3.5 K/UL    ABS. MONOCYTES 0.8 0.0 - 1.0 K/UL    ABS. EOSINOPHILS 0.3 0.0 - 0.4 K/UL    ABS. BASOPHILS 0.1 0.0 - 0.1 K/UL    ABS. IMM. GRANS. 0.0 0.00 - 0.04 K/UL    DF AUTOMATED     METABOLIC PANEL, COMPREHENSIVE    Collection Time: 07/17/18  6:56 PM   Result Value Ref Range    Sodium 137 136 - 145 mmol/L    Potassium 3.5 3.5 - 5.1 mmol/L    Chloride 100 97 - 108 mmol/L    CO2 27 21 - 32 mmol/L    Anion gap 10 5 - 15 mmol/L    Glucose 88 65 - 100 mg/dL    BUN 15 6 - 20 MG/DL    Creatinine 0.91 0.55 - 1.02 MG/DL    BUN/Creatinine ratio 16 12 - 20      GFR est AA >60 >60 ml/min/1.73m2    GFR est non-AA >60 >60 ml/min/1.73m2    Calcium 9.4 8.5 - 10.1 MG/DL    Bilirubin, total 0.2 0.2 - 1.0 MG/DL    ALT (SGPT) 28 12 - 78 U/L    AST (SGOT) 17 15 - 37 U/L    Alk.  phosphatase 98 45 - 117 U/L    Protein, total 8.1 6.4 - 8.2 g/dL    Albumin 3.5 3.5 - 5.0 g/dL    Globulin 4.6 (H) 2.0 - 4.0 g/dL    A-G Ratio 0.8 (L) 1.1 - 2.2     PROTHROMBIN TIME + INR    Collection Time: 07/17/18  6:56 PM   Result Value Ref Range    INR 1.0 0.9 - 1.1      Prothrombin time 9.8 9.0 - 11.1 sec   PTT    Collection Time: 07/17/18  6:56 PM   Result Value Ref Range    aPTT 30.3 22.1 - 32.0 sec    aPTT, therapeutic range     58.0 - 77.0 SECS   OCCULT BLOOD, STOOL    Collection Time: 07/17/18  7:03 PM   Result Value Ref Range    Occult blood, stool NEGATIVE  NEG     URINALYSIS W/MICROSCOPIC    Collection Time: 07/17/18  7:03 PM   Result Value Ref Range    Color YELLOW/STRAW      Appearance CLEAR CLEAR      Specific gravity 1.017 1.003 - 1.030      pH (UA) 6.5 5.0 - 8.0      Protein NEGATIVE  NEG mg/dL    Glucose NEGATIVE  NEG mg/dL    Ketone NEGATIVE  NEG mg/dL    Bilirubin NEGATIVE  NEG      Blood NEGATIVE  NEG      Urobilinogen 0.2 0.2 - 1.0 EU/dL    Nitrites NEGATIVE  NEG      Leukocyte Esterase NEGATIVE  NEG      WBC 0-4 0 - 4 /hpf    RBC 0-5 0 - 5 /hpf    Epithelial cells FEW FEW /lpf    Bacteria NEGATIVE  NEG /hpf Hyaline cast 2-5 0 - 5 /lpf   URINE CULTURE HOLD SAMPLE    Collection Time: 07/17/18  7:03 PM   Result Value Ref Range    Urine culture hold        URINE ON HOLD IN MICROBIOLOGY DEPT FOR 3 DAYS. IF UNPRESERVED URINE IS SUBMITTED, IT CANNOT BE USED FOR ADDITIONAL TESTING AFTER 24 HRS, RECOLLECTION WILL BE REQUIRED. No results found.

## 2019-04-18 ENCOUNTER — HOSPITAL ENCOUNTER (EMERGENCY)
Age: 54
Discharge: HOME | End: 2019-04-18
Attending: EMERGENCY MEDICINE
Payer: MEDICARE

## 2019-04-18 ENCOUNTER — APPOINTMENT (OUTPATIENT)
Dept: CT IMAGING | Age: 54
End: 2019-04-18
Attending: EMERGENCY MEDICINE
Payer: MEDICARE

## 2019-04-18 VITALS
RESPIRATION RATE: 12 BRPM | WEIGHT: 260 LBS | BODY MASS INDEX: 44.39 KG/M2 | DIASTOLIC BLOOD PRESSURE: 98 MMHG | OXYGEN SATURATION: 99 % | SYSTOLIC BLOOD PRESSURE: 141 MMHG | HEART RATE: 76 BPM | HEIGHT: 64 IN

## 2019-04-18 DIAGNOSIS — E87.6 HYPOKALEMIA: ICD-10-CM

## 2019-04-18 DIAGNOSIS — G43.809 OTHER MIGRAINE WITHOUT STATUS MIGRAINOSUS, NOT INTRACTABLE: Primary | ICD-10-CM

## 2019-04-18 LAB
ANION GAP SERPL CALC-SCNC: 0 MMOL/L (ref 5–15)
BASOPHILS # BLD: 0.1 K/UL (ref 0–0.1)
BASOPHILS NFR BLD: 1 % (ref 0–1)
BUN SERPL-MCNC: 14 MG/DL (ref 6–20)
BUN/CREAT SERPL: 10 (ref 12–20)
CALCIUM SERPL-MCNC: 9.5 MG/DL (ref 8.5–10.1)
CHLORIDE SERPL-SCNC: 104 MMOL/L (ref 97–108)
CO2 SERPL-SCNC: 32 MMOL/L (ref 21–32)
COMMENT, HOLDF: NORMAL
CREAT SERPL-MCNC: 1.38 MG/DL (ref 0.55–1.02)
DIFFERENTIAL METHOD BLD: NORMAL
EOSINOPHIL # BLD: 0.1 K/UL (ref 0–0.4)
EOSINOPHIL NFR BLD: 2 % (ref 0–7)
ERYTHROCYTE [DISTWIDTH] IN BLOOD BY AUTOMATED COUNT: 12.7 % (ref 11.5–14.5)
GLUCOSE SERPL-MCNC: 90 MG/DL (ref 65–100)
HCT VFR BLD AUTO: 40.7 % (ref 35–47)
HGB BLD-MCNC: 13.1 G/DL (ref 11.5–16)
IMM GRANULOCYTES # BLD AUTO: 0 K/UL (ref 0–0.04)
IMM GRANULOCYTES NFR BLD AUTO: 0 % (ref 0–0.5)
LYMPHOCYTES # BLD: 2.7 K/UL (ref 0.8–3.5)
LYMPHOCYTES NFR BLD: 33 % (ref 12–49)
MAGNESIUM SERPL-MCNC: 2.1 MG/DL (ref 1.6–2.4)
MCH RBC QN AUTO: 28.3 PG (ref 26–34)
MCHC RBC AUTO-ENTMCNC: 32.2 G/DL (ref 30–36.5)
MCV RBC AUTO: 87.9 FL (ref 80–99)
MONOCYTES # BLD: 0.6 K/UL (ref 0–1)
MONOCYTES NFR BLD: 7 % (ref 5–13)
NEUTS SEG # BLD: 4.6 K/UL (ref 1.8–8)
NEUTS SEG NFR BLD: 57 % (ref 32–75)
NRBC # BLD: 0 K/UL (ref 0–0.01)
NRBC BLD-RTO: 0 PER 100 WBC
PLATELET # BLD AUTO: 326 K/UL (ref 150–400)
PMV BLD AUTO: 9.9 FL (ref 8.9–12.9)
POTASSIUM SERPL-SCNC: 3.1 MMOL/L (ref 3.5–5.1)
RBC # BLD AUTO: 4.63 M/UL (ref 3.8–5.2)
SAMPLES BEING HELD,HOLD: NORMAL
SODIUM SERPL-SCNC: 136 MMOL/L (ref 136–145)
TROPONIN I SERPL-MCNC: <0.05 NG/ML
WBC # BLD AUTO: 8 K/UL (ref 3.6–11)

## 2019-04-18 PROCEDURE — 99284 EMERGENCY DEPT VISIT MOD MDM: CPT

## 2019-04-18 PROCEDURE — 94762 N-INVAS EAR/PLS OXIMTRY CONT: CPT

## 2019-04-18 PROCEDURE — 93005 ELECTROCARDIOGRAM TRACING: CPT

## 2019-04-18 PROCEDURE — 80048 BASIC METABOLIC PNL TOTAL CA: CPT

## 2019-04-18 PROCEDURE — 96374 THER/PROPH/DIAG INJ IV PUSH: CPT

## 2019-04-18 PROCEDURE — 70450 CT HEAD/BRAIN W/O DYE: CPT

## 2019-04-18 PROCEDURE — 36415 COLL VENOUS BLD VENIPUNCTURE: CPT

## 2019-04-18 PROCEDURE — 84484 ASSAY OF TROPONIN QUANT: CPT

## 2019-04-18 PROCEDURE — 85025 COMPLETE CBC W/AUTO DIFF WBC: CPT

## 2019-04-18 PROCEDURE — 96361 HYDRATE IV INFUSION ADD-ON: CPT

## 2019-04-18 PROCEDURE — 96375 TX/PRO/DX INJ NEW DRUG ADDON: CPT

## 2019-04-18 PROCEDURE — 74011250637 HC RX REV CODE- 250/637: Performed by: PHYSICIAN ASSISTANT

## 2019-04-18 PROCEDURE — 74011250636 HC RX REV CODE- 250/636: Performed by: PHYSICIAN ASSISTANT

## 2019-04-18 PROCEDURE — 83735 ASSAY OF MAGNESIUM: CPT

## 2019-04-18 RX ORDER — PROCHLORPERAZINE EDISYLATE 5 MG/ML
10 INJECTION INTRAMUSCULAR; INTRAVENOUS
Status: COMPLETED | OUTPATIENT
Start: 2019-04-18 | End: 2019-04-18

## 2019-04-18 RX ORDER — KETOROLAC TROMETHAMINE 30 MG/ML
30 INJECTION, SOLUTION INTRAMUSCULAR; INTRAVENOUS
Status: COMPLETED | OUTPATIENT
Start: 2019-04-18 | End: 2019-04-18

## 2019-04-18 RX ORDER — POTASSIUM CHLORIDE 750 MG/1
40 TABLET, FILM COATED, EXTENDED RELEASE ORAL
Status: COMPLETED | OUTPATIENT
Start: 2019-04-18 | End: 2019-04-18

## 2019-04-18 RX ORDER — BUTALBITAL, ACETAMINOPHEN AND CAFFEINE 300; 40; 50 MG/1; MG/1; MG/1
1-2 CAPSULE ORAL
Qty: 15 CAP | Refills: 0 | Status: SHIPPED | OUTPATIENT
Start: 2019-04-18 | End: 2019-04-29

## 2019-04-18 RX ORDER — DIPHENHYDRAMINE HYDROCHLORIDE 50 MG/ML
25 INJECTION, SOLUTION INTRAMUSCULAR; INTRAVENOUS
Status: COMPLETED | OUTPATIENT
Start: 2019-04-18 | End: 2019-04-18

## 2019-04-18 RX ORDER — POTASSIUM CHLORIDE 750 MG/1
20 TABLET, FILM COATED, EXTENDED RELEASE ORAL 2 TIMES DAILY
Qty: 8 TAB | Refills: 0 | Status: SHIPPED | OUTPATIENT
Start: 2019-04-18 | End: 2019-04-20

## 2019-04-18 RX ADMIN — SODIUM CHLORIDE 1000 ML: 900 INJECTION, SOLUTION INTRAVENOUS at 20:34

## 2019-04-18 RX ADMIN — PROCHLORPERAZINE EDISYLATE 10 MG: 5 INJECTION INTRAMUSCULAR; INTRAVENOUS at 20:34

## 2019-04-18 RX ADMIN — DIPHENHYDRAMINE HYDROCHLORIDE 25 MG: 50 INJECTION, SOLUTION INTRAMUSCULAR; INTRAVENOUS at 20:34

## 2019-04-18 RX ADMIN — KETOROLAC TROMETHAMINE 30 MG: 30 INJECTION, SOLUTION INTRAMUSCULAR at 20:34

## 2019-04-18 RX ADMIN — POTASSIUM CHLORIDE 40 MEQ: 750 TABLET, EXTENDED RELEASE ORAL at 20:43

## 2019-04-18 NOTE — ED PROVIDER NOTES
 
 
Pt has a hx of migraines, has had 2 AVM Hemorrhages,  Woke up this AM having blurred vision, balance problems, HA, dizziness and generalized weakness. She slept 2:30 AM this morning and woke up with sx at 10:30 AM. Pt is under extreme stress and has chest palpitations. Hx of hernia repair last summer. -------------------------------------------------------------------- 
 
47 y.o. female with past medical history significant for Migraines, Cavernous hemangioma, HTN, Anxiety, who presents from home accompanied by her Mother with chief complaint of Migraine. Pt presents with multiple complaints. She reports a gradually worsening, constant migraine since this morning. Pt reports a history of migraines, but notes her current pain feels different than that compared to previous migraines. Pt associated her migraine with dizziness, nausea and photophobia. She reports taking 800 mg Ibuprofen at home with out any relief, not currently on any chronic medications for management of her migraines. Pt notes history of two brain surgeries to remove cavernous brain angiomas that were performed in [place], last surgery was ~12 years ago. She also complains of left sided back pain that has been progressively worsening for the past 2.5 weeks.  Pt notes she has told her PCP about the pain after onset, but notes she was unable to prescribe her \"pain medication. \" Pt was referred to a specialist, but states she does not have funds to be seen by specialists at this time and states she is unhappy with her care from her PCP. She notes the pain is exacerbated with movement, but denies any recent heavy lifting or injuries to contribute to the pain. She also complains of recent intermittent heart palpitations \"for years\" which is associated with stress and admits to increased stressors in her life currently, but denies SI/HI or self-harmful thoughts and does not wish to speak to a counselor at this time. She has chronic R leg weakness/tingling since her last brain surgery and because of this does not drive. She denies new numbness, tingling or weakness, bowel/bladder dysfunction or saddle anesthesia. Pt specifically denies any fever, chills, cough, congestion, shortness of breath, chest pain, vomiting, diarrhea, dysuria, or urinary frequency. There are no other acute medical concerns at this time. PMHx: Significant for Migraines, Cavernous hemangioma, sleep apnea, HTN, Anxiety, hiatal hernia, GERD, fibromyalgia PSHx: Significant for brain surgery x2, cholecystectomy, spinal fusion in 2010, Cavernous brain angioma(s) removed in 1996 and 2006, tonsillectomy Social Hx: positive tobacco use, negative EtOH use, negative Illicit Drug use PCP: Parul Aceves MD 
 
Note written by Sabrina Pichardo, as dictated by Sasha Freedman PA-C 8:07 PM 
 
The history is provided by the patient. No  was used. Past Medical History:  
Diagnosis Date  Cavernous hemangioma of intracranial structure ([facility] [address] 5/2/2011  Depressive disorder, not elsewhere classified 10/1/2013  
 anxiety  Fibromyalgia   
 fibromyalgia  Hiatal hernia with gastroesophageal reflux 04/2018  Hypertension  Incomplete right bundle branch block (RBBB) determined by electrocardiography 05/10/2018  Migraine 5/2/2011  Seizures ([facility] [address] 2012  
 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   
 does not use c-pap Past Surgical History:  
Procedure Laterality Date  HX CHOLECYSTECTOMY  HX HEENT  2012  
 tonsillectomy  HX LITHOTRIPSY  HX ORTHOPAEDIC    
 disc fusion 2010, right knee cyst removed [address], 2006 Cavernous brain angioma(s) removed  SURGERY,BRAIN/SPINE,W/COMPUTER Family History:  
Problem Relation Age of Onset  Cancer Mother   
     breast  
 Breast Cancer Mother  Cancer Father  Migraines Father  Diabetes Father Social History Socioeconomic History  Marital status: SINGLE Spouse name: Not on file  Number of children: Not on file  Years of education: Not on file  Highest education level: Not on file Occupational History  Not on file Social Needs  Financial resource strain: Not on file  Food insecurity:  
  Worry: Not on file Inability: Not on file  Transportation needs:  
  Medical: Not on file Non-medical: Not on file Tobacco Use  Smoking status: Current Every Day Smoker  Smokeless tobacco: Never Used  Tobacco comment: on chantix, smokes 1-2 cigarettes a day Substance and Sexual Activity  Alcohol use: No  
 Drug use: No  
 Sexual activity: Not on file Lifestyle  Physical activity:  
  Days per week: Not on file Minutes per session: Not on file  Stress: Not on file Relationships  Social connections:  
  Talks on phone: Not on file Gets together: Not on file Attends [employer] service: Not on file Active member of club or organization: Not on file Attends meetings of clubs or organizations: Not on file Relationship status: Not on file  Intimate partner violence:  
  Fear of current or ex partner: Not on file Emotionally abused: Not on file Physically abused: Not on file Forced sexual activity: Not on file Other Topics Concern  Not on file Social History Narrative  Not on file ALLERGIES: Latex, natural rubber; Aspirin; Codeine; Contrast agent [iodine]; Imitrex [sumatriptan]; and Percocet [oxycodone-acetaminophen] Review of Systems Constitutional: Negative for chills and fever. HENT: Negative for congestion. Eyes: Positive for photophobia. Negative for visual disturbance. Respiratory: Negative for cough and shortness of breath. Cardiovascular: Positive for palpitations. Negative for chest pain. Gastrointestinal: Positive for abdominal distention, anal bleeding and nausea. Negative for abdominal pain, diarrhea and vomiting. Genitourinary: Negative for dysuria and frequency. Musculoskeletal: Positive for back pain. Neurological: Positive for dizziness and headaches. Negative for weakness and numbness. All other systems reviewed and are negative. There were no vitals filed for this visit. Physical Exam  
Constitutional: She is oriented to person, place, and time. She appears well-developed and well-nourished. She is active. Non-toxic appearance. No distress. WF, elevated BMI HENT:  
Head: Normocephalic and atraumatic. Eyes: Pupils are equal, round, and reactive to light. Conjunctivae and EOM are normal. Right eye exhibits no discharge. Left eye exhibits no discharge. + photophobia Neck: Normal range of motion and full passive range of motion without pain. Neck supple. No tracheal tenderness present. No meningismus Cardiovascular: Normal rate, regular rhythm, normal heart sounds, intact distal pulses and normal pulses. Exam reveals no gallop and no friction rub. No murmur heard. Pulmonary/Chest: Effort normal and breath sounds normal. No respiratory distress. She has no wheezes. She has no rales. She exhibits no tenderness. Abdominal: Soft. Bowel sounds are normal. She exhibits no distension. There is no tenderness. There is no rebound and no guarding. Musculoskeletal: Normal range of motion. She exhibits no edema or tenderness. Neurological: She is alert and oriented to person, place, and time. She has normal strength. No cranial nerve deficit or sensory deficit. Coordination normal.  
Skin: Skin is warm, dry and intact. Capillary refill takes less than 2 seconds. No abrasion and no rash noted. She is not diaphoretic. No erythema. Psychiatric: She has a normal mood and affect. Her speech is normal and behavior is normal. Cognition and memory are normal.  
Nursing note and vitals reviewed. MDM Number of Diagnoses or Management Options Hypokalemia:  
Other migraine without status migrainosus, not intractable:  
Diagnosis management comments:  
Ddx: migraine, tension headache, ICH Amount and/or Complexity of Data Reviewed Review and summarize past medical records: yes Discuss the patient with other providers: yes Patient Progress Patient progress: stable Procedures The patient was seen by the supervising physician who was the provider in triage. I discussed patient's PMH, exam findings as well as careplan with the attending who agrees with care plan. Glendy Cealya PA-C 
 
EKG interpretation:  
Rhythm: normal sinus rhythm; and regular . Rate (approx.): 80; Axis: normal; P wave: normal; QRS interval: normal ; ST/T wave: non-specific changes; Other findings: abnormal ekg. Interpreted by Dr. Naveed Santana PROGRESS NOTE 
9:20 PM 
Pt reevaluated. Pt states her headaches are normally a 6-7/10 on a regular day. States her headache now is a 8/10. She feels comfortable going home, does not want any other medication intervention. Currently laughing.  
 
9:21 PM 
Patient's results have been reviewed with them.   Patient and/or family have verbally conveyed their understanding and agreement of the patient's signs, symptoms, diagnosis, treatment and prognosis and additionally agree to follow up as recommended or return to the Emergency Room should their condition change prior to follow-up. Discharge instructions have also been provided to the patient with some educational information regarding their diagnosis as well a list of reasons why they would want to return to the ER prior to their follow-up appointment should their condition change.

## 2019-04-18 NOTE — ED TRIAGE NOTES
Pt arrives with the c/o of \"migraine\" that started this morning, pt states she is also having dizziness as well. Pt is also having double vision and blurred vision with nausea.

## 2019-04-19 LAB
ATRIAL RATE: 80 BPM
CALCULATED P AXIS, ECG09: 32 DEGREES
CALCULATED R AXIS, ECG10: -14 DEGREES
CALCULATED T AXIS, ECG11: 17 DEGREES
DIAGNOSIS, 93000: NORMAL
P-R INTERVAL, ECG05: 174 MS
Q-T INTERVAL, ECG07: 420 MS
QRS DURATION, ECG06: 86 MS
QTC CALCULATION (BEZET), ECG08: 484 MS
VENTRICULAR RATE, ECG03: 80 BPM

## 2019-04-19 NOTE — DISCHARGE INSTRUCTIONS
Patient Education        Migraine Headache: Care Instructions  Your Care Instructions  Migraines are painful, throbbing headaches that often start on one side of the head. They may cause nausea and vomiting and make you sensitive to light, sound, or smell. Without treatment, migraines can last from 4 hours to a few days. Medicines can help prevent migraines or stop them after they have started. Your doctor can help you find which ones work best for you. Follow-up care is a key part of your treatment and safety. Be sure to make and go to all appointments, and call your doctor if you are having problems. It's also a good idea to know your test results and keep a list of the medicines you take. How can you care for yourself at home? · Do not drive if you have taken a prescription pain medicine. · Rest in a quiet, dark room until your headache is gone. Close your eyes, and try to relax or go to sleep. Don't watch TV or read. · Put a cold, moist cloth or cold pack on the painful area for 10 to 20 minutes at a time. Put a thin cloth between the cold pack and your skin. · Use a warm, moist towel or a heating pad set on low to relax tight shoulder and neck muscles. · Have someone gently massage your neck and shoulders. · Take your medicines exactly as prescribed. Call your doctor if you think you are having a problem with your medicine. You will get more details on the specific medicines your doctor prescribes. · Be careful not to take pain medicine more often than the instructions allow. You could get worse or more frequent headaches when the medicine wears off. To prevent migraines  · Keep a headache diary so you can figure out what triggers your headaches. Avoiding triggers may help you prevent headaches. Record when each headache began, how long it lasted, and what the pain was like.  (Was it throbbing, aching, stabbing, or dull?) Write down any other symptoms you had with the headache, such as nausea, flashing lights or dark spots, or sensitivity to bright light or loud noise. Note if the headache occurred near your period. List anything that might have triggered the headache. Triggers may include certain foods (chocolate, cheese, wine) or odors, smoke, bright light, stress, or lack of sleep. · If your doctor has prescribed medicine for your migraines, take it as directed. You may have medicine that you take only when you get a migraine and medicine that you take all the time to help prevent migraines. ? If your doctor has prescribed medicine for when you get a headache, take it at the first sign of a migraine, unless your doctor has given you other instructions. ? If your doctor has prescribed medicine to prevent migraines, take it exactly as prescribed. Call your doctor if you think you are having a problem with your medicine. · Find healthy ways to deal with stress. Migraines are most common during or right after stressful times. Take time to relax before and after you do something that has caused a migraine in the past.  · Try to keep your muscles relaxed by keeping good posture. Check your jaw, face, neck, and shoulder muscles for tension. Try to relax them. When you sit at a desk, change positions often. And make sure to stretch for 30 seconds each hour. · Get plenty of sleep and exercise. · Eat meals on a regular schedule. Avoid foods and drinks that often trigger migraines. These include chocolate, alcohol (especially red wine and port), aspartame, monosodium glutamate (MSG), and some additives found in foods (such as hot dogs, ibanez, cold cuts, aged cheeses, and pickled foods). · Limit caffeine. Don't drink too much coffee, tea, or soda. But don't quit caffeine suddenly. That can also give you migraines. · Do not smoke or allow others to smoke around you. If you need help quitting, talk to your doctor about stop-smoking programs and medicines.  These can increase your chances of quitting for good.  · If you are taking birth control pills or hormone therapy, talk to your doctor about whether they are triggering your migraines. When should you call for help? Call 911 anytime you think you may need emergency care. For example, call if:    · You have signs of a stroke. These may include:  ? Sudden numbness, paralysis, or weakness in your face, arm, or leg, especially on only one side of your body. ? Sudden vision changes. ? Sudden trouble speaking. ? Sudden confusion or trouble understanding simple statements. ? Sudden problems with walking or balance. ? A sudden, severe headache that is different from past headaches.    Call your doctor now or seek immediate medical care if:    · You have new or worse nausea and vomiting.     · You have a new or higher fever.     · Your headache gets much worse.    Watch closely for changes in your health, and be sure to contact your doctor if:    · You are not getting better after 2 days (48 hours). Where can you learn more? Go to http://molina-ofelia.info/. Enter W917 in the search box to learn more about \"Migraine Headache: Care Instructions. \"  Current as of: Tatum 3, 2018  Content Version: 11.9  © 3448-6669 [employer]. Care instructions adapted under license by [employer] (which disclaims liability or warranty for this information). If you have questions about a medical condition or this instruction, always ask your healthcare professional. [place] [address] any warranty or liability for your use of this information. Patient Education        Hypokalemia: Care Instructions  Your Care Instructions    Hypokalemia (say \"ms-ig-[id number]-RICHAR-renée-uh\") is a low level of potassium. The heart, muscles, kidneys, and nervous system all need potassium to work well. This problem has many different causes. Kidney problems, diet, and medicines like diuretics and laxatives can cause it.  So can vomiting or diarrhea. In some cases, cancer is the cause. Your doctor may do tests to find the cause of your low potassium levels. You may need medicines to bring your potassium levels back to normal. You may also need regular blood tests to check your potassium. If you have very low potassium, you may need intravenous (IV) medicines. You also may need tests to check the electrical activity of your heart. Heart problems caused by low potassium levels can be very serious. Follow-up care is a key part of your treatment and safety. Be sure to make and go to all appointments, and call your doctor if you are having problems. It's also a good idea to know your test results and keep a list of the medicines you take. How can you care for yourself at home? · If your doctor recommends it, eat foods that have a lot of potassium. These include fresh fruits, juices, and vegetables. They also include nuts, beans, and milk. · Be safe with medicines. If your doctor prescribes medicines or potassium supplements, take them exactly as directed. Call your doctor if you have any problems with your medicines. · Get your potassium levels tested as often as your doctor tells you. When should you call for help? Call 911 anytime you think you may need emergency care. For example, call if:    · You feel like your heart is missing beats.  Heart problems caused by low potassium can cause death.     · You passed out (lost consciousness).     · You have a seizure.    Call your doctor now or seek immediate medical care if:    · You feel weak or unusually tired.     · You have severe arm or leg cramps.     · You have tingling or numbness.     · You feel sick to your stomach, or you vomit.     · You have belly cramps.     · You feel bloated or constipated.     · You have to urinate a lot.     · You feel very thirsty most of the time.     · You are dizzy or lightheaded, or you feel like you may faint.     · You feel depressed, or you lose touch with reality.    Watch closely for changes in your health, and be sure to contact your doctor if:    · You do not get better as expected. Where can you learn more? Go to http://molina-ofelia.info/. Enter G358 in the search box to learn more about \"Hypokalemia: Care Instructions. \"  Current as of: March 14, 2018  Content Version: 11.9  © 8079-9740 [employer]. Care instructions adapted under license by [email or web address] (which disclaims liability or warranty for this information). If you have questions about a medical condition or this instruction, always ask your healthcare professional. [address] any warranty or liability for your use of this information.

## 2019-04-29 ENCOUNTER — OFFICE VISIT (OUTPATIENT)
Dept: NEUROLOGY | Age: 54
End: 2019-04-29

## 2019-04-29 ENCOUNTER — HOSPITAL ENCOUNTER (EMERGENCY)
Age: 54
Discharge: LEFT BEFORE DISCHARGE | End: 2019-04-29
Attending: EMERGENCY MEDICINE
Payer: MEDICARE

## 2019-04-29 VITALS
BODY MASS INDEX: 39.61 KG/M2 | OXYGEN SATURATION: 96 % | RESPIRATION RATE: 20 BRPM | HEART RATE: 93 BPM | WEIGHT: 232 LBS | HEIGHT: 64 IN | DIASTOLIC BLOOD PRESSURE: 92 MMHG | SYSTOLIC BLOOD PRESSURE: 132 MMHG

## 2019-04-29 VITALS
SYSTOLIC BLOOD PRESSURE: 132 MMHG | DIASTOLIC BLOOD PRESSURE: 92 MMHG | OXYGEN SATURATION: 99 % | BODY MASS INDEX: 39.61 KG/M2 | WEIGHT: 232 LBS | RESPIRATION RATE: 20 BRPM | HEART RATE: 107 BPM | TEMPERATURE: 98.5 F | HEIGHT: 64 IN

## 2019-04-29 DIAGNOSIS — Z98.890 S/P CRANIOTOMY: ICD-10-CM

## 2019-04-29 DIAGNOSIS — R42 DIZZINESS: Primary | ICD-10-CM

## 2019-04-29 DIAGNOSIS — R20.0 NUMBNESS: ICD-10-CM

## 2019-04-29 DIAGNOSIS — R26.89 IMBALANCE: ICD-10-CM

## 2019-04-29 DIAGNOSIS — R42 DIZZY: Primary | ICD-10-CM

## 2019-04-29 DIAGNOSIS — R41.3 MEMORY LOSS: ICD-10-CM

## 2019-04-29 DIAGNOSIS — Q28.2 CEREBRAL ARTERIOVENOUS MALFORMATION (AVM): ICD-10-CM

## 2019-04-29 DIAGNOSIS — R51.9 ACUTE INTRACTABLE HEADACHE, UNSPECIFIED HEADACHE TYPE: ICD-10-CM

## 2019-04-29 DIAGNOSIS — H53.9 VISUAL DISTURBANCE: ICD-10-CM

## 2019-04-29 PROBLEM — E66.01 SEVERE OBESITY (HCC): Status: ACTIVE | Noted: 2019-04-29

## 2019-04-29 LAB
ANION GAP SERPL CALC-SCNC: 6 MMOL/L (ref 5–15)
BASOPHILS # BLD: 0.1 K/UL (ref 0–0.1)
BASOPHILS NFR BLD: 1 % (ref 0–1)
BUN SERPL-MCNC: 12 MG/DL (ref 6–20)
BUN/CREAT SERPL: 11 (ref 12–20)
CALCIUM SERPL-MCNC: 9.1 MG/DL (ref 8.5–10.1)
CHLORIDE SERPL-SCNC: 103 MMOL/L (ref 97–108)
CO2 SERPL-SCNC: 28 MMOL/L (ref 21–32)
CREAT SERPL-MCNC: 1.08 MG/DL (ref 0.55–1.02)
DIFFERENTIAL METHOD BLD: NORMAL
EOSINOPHIL # BLD: 0.3 K/UL (ref 0–0.4)
EOSINOPHIL NFR BLD: 3 % (ref 0–7)
ERYTHROCYTE [DISTWIDTH] IN BLOOD BY AUTOMATED COUNT: 13.1 % (ref 11.5–14.5)
GLUCOSE SERPL-MCNC: 117 MG/DL (ref 65–100)
HCT VFR BLD AUTO: 40.1 % (ref 35–47)
HGB BLD-MCNC: 13 G/DL (ref 11.5–16)
IMM GRANULOCYTES # BLD AUTO: 0 K/UL (ref 0–0.04)
IMM GRANULOCYTES NFR BLD AUTO: 0 % (ref 0–0.5)
LYMPHOCYTES # BLD: 2.9 K/UL (ref 0.8–3.5)
LYMPHOCYTES NFR BLD: 30 % (ref 12–49)
MCH RBC QN AUTO: 29 PG (ref 26–34)
MCHC RBC AUTO-ENTMCNC: 32.4 G/DL (ref 30–36.5)
MCV RBC AUTO: 89.3 FL (ref 80–99)
MONOCYTES # BLD: 0.5 K/UL (ref 0–1)
MONOCYTES NFR BLD: 5 % (ref 5–13)
NEUTS SEG # BLD: 5.9 K/UL (ref 1.8–8)
NEUTS SEG NFR BLD: 61 % (ref 32–75)
NRBC # BLD: 0 K/UL (ref 0–0.01)
NRBC BLD-RTO: 0 PER 100 WBC
PLATELET # BLD AUTO: 325 K/UL (ref 150–400)
PMV BLD AUTO: 9.9 FL (ref 8.9–12.9)
POTASSIUM SERPL-SCNC: 3 MMOL/L (ref 3.5–5.1)
RBC # BLD AUTO: 4.49 M/UL (ref 3.8–5.2)
SODIUM SERPL-SCNC: 137 MMOL/L (ref 136–145)
WBC # BLD AUTO: 9.7 K/UL (ref 3.6–11)

## 2019-04-29 PROCEDURE — 99281 EMR DPT VST MAYX REQ PHY/QHP: CPT

## 2019-04-29 PROCEDURE — 96374 THER/PROPH/DIAG INJ IV PUSH: CPT

## 2019-04-29 PROCEDURE — 96361 HYDRATE IV INFUSION ADD-ON: CPT

## 2019-04-29 PROCEDURE — 74011250636 HC RX REV CODE- 250/636: Performed by: EMERGENCY MEDICINE

## 2019-04-29 PROCEDURE — 85025 COMPLETE CBC W/AUTO DIFF WBC: CPT

## 2019-04-29 PROCEDURE — 80048 BASIC METABOLIC PNL TOTAL CA: CPT

## 2019-04-29 PROCEDURE — 96375 TX/PRO/DX INJ NEW DRUG ADDON: CPT

## 2019-04-29 PROCEDURE — 36415 COLL VENOUS BLD VENIPUNCTURE: CPT

## 2019-04-29 RX ORDER — VALPROATE SODIUM 100 MG/ML
500 INJECTION INTRAVENOUS
Status: DISCONTINUED | OUTPATIENT
Start: 2019-04-29 | End: 2019-04-29

## 2019-04-29 RX ORDER — KETOROLAC TROMETHAMINE 30 MG/ML
30 INJECTION, SOLUTION INTRAMUSCULAR; INTRAVENOUS
Status: DISCONTINUED | OUTPATIENT
Start: 2019-04-29 | End: 2019-04-29

## 2019-04-29 RX ORDER — DIPHENHYDRAMINE HYDROCHLORIDE 50 MG/ML
25 INJECTION, SOLUTION INTRAMUSCULAR; INTRAVENOUS ONCE
Status: COMPLETED | OUTPATIENT
Start: 2019-04-29 | End: 2019-04-29

## 2019-04-29 RX ORDER — ONDANSETRON 2 MG/ML
4 INJECTION INTRAMUSCULAR; INTRAVENOUS
Status: COMPLETED | OUTPATIENT
Start: 2019-04-29 | End: 2019-04-29

## 2019-04-29 RX ADMIN — SODIUM CHLORIDE 1000 ML: 900 INJECTION, SOLUTION INTRAVENOUS at 15:36

## 2019-04-29 RX ADMIN — ONDANSETRON 4 MG: 2 INJECTION INTRAMUSCULAR; INTRAVENOUS at 15:43

## 2019-04-29 RX ADMIN — DIPHENHYDRAMINE HYDROCHLORIDE 25 MG: 50 INJECTION INTRAMUSCULAR; INTRAVENOUS at 15:43

## 2019-04-29 NOTE — ED NOTES
Patient refusing toradol at this time stating \"this did not work last time, it won't work this time. \" Will follow-up with MD with possible alternative. Patient also states she is concerned with the fact that she cannot get her MRI while she is here in the ER. Adds that she is concerned with numbness in her legs R>L.

## 2019-04-29 NOTE — ED PROVIDER NOTES
Pt presents ambulatory to ED with c/o headache. Pt was seen here in [facility] ED on 4/18/19 (11 days ago) for similar symptoms which have persisted since being seen. Pt reports migraine, dizziness (onset around 4/16/19 - described sensation as if room is spinning), also lightheadedness, bilateral LE numbness, nausea, photosensitivity, intermittent chest pain, and confusion. Pt also reports rectal bleeding for past couple of weeks and notes that she has been having panic attacks as well. Pt recently saw gastroenterologist following ED visit who gave her a prescription and told her to return to ED for further eval if needed. Pt also reports that she saw Dr. Eva Majano for Neurology earlier today who told pt should come to ED for further eval if she felt necessary. Pt notes Dr. Eva Majano recommended that she get an MRI with and without contrast in near future. Pt reports she has taken migraine medication which provides her no alleviation of symptoms. Old chart review: Pt had a normal CT scan done on 4/18/19 Note written by Sabrina Trent, as dictated by Lynnette Gilliam MD 2:48 PM 
 
 
 
The history is provided by the patient. No  was used. Past Medical History:  
Diagnosis Date  Cavernous hemangioma of intracranial structure ([address] 5/2/2011  Depressive disorder, not elsewhere classified 10/1/2013  
 anxiety  Fibromyalgia   
 fibromyalgia  Hiatal hernia with gastroesophageal reflux 04/2018  Hypertension  Incomplete right bundle branch block (RBBB) determined by electrocardiography 05/10/2018  Migraine 5/2/2011  Seizures ([address] 2012  
 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   
 does not use c-pap Past Surgical History:  
Procedure Laterality Date  HX CHOLECYSTECTOMY  HX HEENT  2012  
 tonsillectomy  HX LITHOTRIPSY  HX ORTHOPAEDIC    
 disc fusion 2010, right knee cyst removed [address] [place] [address], 2006 Cavernous brain angioma(s) removed  SURGERY,BRAIN/SPINE,W/COMPUTER Family History:  
Problem Relation Age of Onset  Cancer Mother   
     breast  
 Breast Cancer Mother  Cancer Father  Migraines Father  Diabetes Father Social History Socioeconomic History  Marital status: SINGLE Spouse name: Not on file  Number of children: Not on file  Years of education: Not on file  Highest education level: Not on file Occupational History  Not on file Social Needs  Financial resource strain: Not on file  Food insecurity:  
  Worry: Not on file Inability: Not on file  Transportation needs:  
  Medical: Not on file Non-medical: Not on file Tobacco Use  Smoking status: Former Smoker  Smokeless tobacco: Never Used  Tobacco comment: on chantix, Substance and Sexual Activity  Alcohol use: No  
 Drug use: No  
 Sexual activity: Not on file Lifestyle  Physical activity:  
  Days per week: Not on file Minutes per session: Not on file  Stress: Not on file Relationships  Social connections:  
  Talks on phone: Not on file Gets together: Not on file Attends Yarsani service: Not on file Active member of club or organization: Not on file Attends meetings of clubs or organizations: Not on file Relationship status: Not on file  Intimate partner violence:  
  Fear of current or ex partner: Not on file Emotionally abused: Not on file Physically abused: Not on file Forced sexual activity: Not on file Other Topics Concern  Not on file Social History Narrative  Not on file ALLERGIES: Latex, natural rubber; Aspirin; Codeine; Contrast agent [iodine]; Imitrex [sumatriptan]; and Percocet [oxycodone-acetaminophen] Review of Systems Eyes: Positive for photophobia. Cardiovascular: Positive for chest pain. Gastrointestinal: Positive for anal bleeding and nausea. Neurological: Positive for dizziness, light-headedness, numbness and headaches. Psychiatric/Behavioral: Positive for confusion. The patient is nervous/anxious. All other systems reviewed and are negative. There were no vitals filed for this visit. Physical Exam  
Constitutional: She is oriented to person, place, and time. She appears well-developed and well-nourished. No distress. HENT:  
Head: Normocephalic and atraumatic. Eyes: Conjunctivae are normal. No scleral icterus. Neck: Neck supple. No tracheal deviation present. Cardiovascular: Normal rate, regular rhythm, normal heart sounds and intact distal pulses. Exam reveals no gallop and no friction rub. No murmur heard. Pulmonary/Chest: Effort normal and breath sounds normal. She has no wheezes. She has no rales. Abdominal: Soft. She exhibits no distension. There is no tenderness. There is no rebound and no guarding. Musculoskeletal: She exhibits no edema. Neurological: She is alert and oriented to person, place, and time. Skin: Skin is warm and dry. No rash noted. Psychiatric: She has a normal mood and affect. Nursing note and vitals reviewed. Note written by Sabrina Elizondo, as dictated by Marybeth Velasquez MD 2:48 PM 
 
MDM Procedures Progress Note: 
Results, treatment, and follow up plan have been discussed with patient. Questions were answered. She decided to leave abruptly after being told an emergent MRI was not indicated; she took out her own IV.   Dom Ortega MD 
 
A/P: multiple chronic symptoms including HA, dizziness, bilateral LE numbness, CP, confusion, BRBPR - unclear etiology; reassuring appearance and exam; VSS; saw neuro earlier today; they've ordered some outpatient studies; she became upset and left abruptly after being told we could not do the MRI from the ED today; she declined all the medications I recommended to treat her DOS SANTOS.   Maria Luisa De La Cruz MD

## 2019-04-29 NOTE — ED NOTES
Updated patient that [facility] ordered instead of toradol. Patient again refusing medication. She states \"I took this in my 20's & 30's and it did not work. No, I am not taking that. I hate that I have to be my own doctor. This is an emergency to me. \" 
Explained to patient I would speak with the MD again for another alternative. Will have customer service liaison speak with patient, and determine further needs/requests of patient.

## 2019-04-29 NOTE — ED NOTES
Customer service liaison meeting with patient due to patient's frustration with inability to obtain MRI at this time.

## 2019-04-29 NOTE — ED TRIAGE NOTES
Pt reports ongoing migraine with dizziness, reports several months of rectal bleeding, for which she was seen by GI. She was recently seen here for migraine.

## 2019-05-06 ENCOUNTER — HOSPITAL ENCOUNTER (OUTPATIENT)
Dept: MAMMOGRAPHY | Age: 54
Discharge: HOME | End: 2019-05-06
Attending: FAMILY MEDICINE
Payer: MEDICARE

## 2019-05-06 DIAGNOSIS — Z12.31 SCREENING MAMMOGRAM, ENCOUNTER FOR: ICD-10-CM

## 2019-05-06 DIAGNOSIS — N64.4 BREAST PAIN, LEFT: ICD-10-CM

## 2019-05-06 PROCEDURE — 77066 DX MAMMO INCL CAD BI: CPT

## 2019-05-06 PROCEDURE — 77067 SCR MAMMO BI INCL CAD: CPT

## 2019-05-30 ENCOUNTER — APPOINTMENT (OUTPATIENT)
Dept: CT IMAGING | Age: 54
End: 2019-05-30
Attending: PHYSICIAN ASSISTANT
Payer: MEDICARE

## 2019-05-30 ENCOUNTER — HOSPITAL ENCOUNTER (OUTPATIENT)
Age: 54
Setting detail: OBSERVATION
Discharge: HOME | End: 2019-06-07
Attending: EMERGENCY MEDICINE | Admitting: INTERNAL MEDICINE
Payer: MEDICARE

## 2019-05-30 DIAGNOSIS — E87.6 HYPOKALEMIA: ICD-10-CM

## 2019-05-30 DIAGNOSIS — M79.7 FIBROMYALGIA: ICD-10-CM

## 2019-05-30 DIAGNOSIS — K62.5 RECTAL BLEEDING: Primary | ICD-10-CM

## 2019-05-30 DIAGNOSIS — K64.3 GRADE IV HEMORRHOIDS: ICD-10-CM

## 2019-05-30 DIAGNOSIS — G47.33 OSA (OBSTRUCTIVE SLEEP APNEA): ICD-10-CM

## 2019-05-30 PROBLEM — D64.9 ANEMIA: Status: ACTIVE | Noted: 2019-05-30

## 2019-05-30 PROBLEM — K21.9 HIATAL HERNIA WITH GASTROESOPHAGEAL REFLUX: Status: ACTIVE | Noted: 2018-04-01

## 2019-05-30 PROBLEM — I45.10 INCOMPLETE RIGHT BUNDLE BRANCH BLOCK (RBBB) DETERMINED BY ELECTROCARDIOGRAPHY: Status: ACTIVE | Noted: 2018-05-10

## 2019-05-30 PROBLEM — R79.89 ELEVATED LACTIC ACID LEVEL: Status: ACTIVE | Noted: 2019-05-30

## 2019-05-30 PROBLEM — E86.0 DEHYDRATION: Status: ACTIVE | Noted: 2019-05-30

## 2019-05-30 PROBLEM — K44.9 HIATAL HERNIA WITH GASTROESOPHAGEAL REFLUX: Status: ACTIVE | Noted: 2018-04-01

## 2019-05-30 LAB
ABO + RH BLD: NORMAL
ALBUMIN SERPL-MCNC: 3.8 G/DL (ref 3.5–5)
ALBUMIN/GLOB SERPL: 0.9 {RATIO} (ref 1.1–2.2)
ALP SERPL-CCNC: 112 U/L (ref 45–117)
ALT SERPL-CCNC: 29 U/L (ref 12–78)
ANION GAP SERPL CALC-SCNC: 8 MMOL/L (ref 5–15)
APPEARANCE UR: CLEAR
AST SERPL-CCNC: 20 U/L (ref 15–37)
BACTERIA URNS QL MICRO: NEGATIVE /HPF
BASOPHILS # BLD: 0.1 K/UL (ref 0–0.1)
BASOPHILS NFR BLD: 1 % (ref 0–1)
BILIRUB SERPL-MCNC: 0.3 MG/DL (ref 0.2–1)
BILIRUB UR QL: NEGATIVE
BLOOD GROUP ANTIBODIES SERPL: NORMAL
BUN SERPL-MCNC: 19 MG/DL (ref 6–20)
BUN/CREAT SERPL: 15 (ref 12–20)
CALCIUM SERPL-MCNC: 9.2 MG/DL (ref 8.5–10.1)
CHLORIDE SERPL-SCNC: 100 MMOL/L (ref 97–108)
CO2 SERPL-SCNC: 28 MMOL/L (ref 21–32)
COLOR UR: ABNORMAL
CREAT SERPL-MCNC: 1.25 MG/DL (ref 0.55–1.02)
DIFFERENTIAL METHOD BLD: ABNORMAL
EOSINOPHIL # BLD: 0.5 K/UL (ref 0–0.4)
EOSINOPHIL NFR BLD: 5 % (ref 0–7)
EPITH CASTS URNS QL MICRO: ABNORMAL /LPF
ERYTHROCYTE [DISTWIDTH] IN BLOOD BY AUTOMATED COUNT: 13.2 % (ref 11.5–14.5)
ETHANOL SERPL-MCNC: <10 MG/DL
FERRITIN SERPL-MCNC: 15 NG/ML (ref 8–252)
FOLATE SERPL-MCNC: 18.9 NG/ML (ref 5–21)
GLOBULIN SER CALC-MCNC: 4.2 G/DL (ref 2–4)
GLUCOSE SERPL-MCNC: 198 MG/DL (ref 65–100)
GLUCOSE UR STRIP.AUTO-MCNC: NEGATIVE MG/DL
HAPTOGLOB SERPL-MCNC: 228 MG/DL (ref 30–200)
HCT VFR BLD AUTO: 32.6 % (ref 35–47)
HEMOCCULT STL QL: NEGATIVE
HGB BLD-MCNC: 10.8 G/DL (ref 11.5–16)
HGB UR QL STRIP: ABNORMAL
IMM GRANULOCYTES # BLD AUTO: 0 K/UL (ref 0–0.04)
IMM GRANULOCYTES NFR BLD AUTO: 0 % (ref 0–0.5)
IRON SATN MFR SERPL: 13 % (ref 20–50)
IRON SERPL-MCNC: 49 UG/DL (ref 35–150)
KETONES UR QL STRIP.AUTO: NEGATIVE MG/DL
LACTATE BLD-SCNC: 3.17 MMOL/L (ref 0.4–2)
LDH SERPL L TO P-CCNC: 178 U/L (ref 81–246)
LEUKOCYTE ESTERASE UR QL STRIP.AUTO: NEGATIVE
LYMPHOCYTES # BLD: 2.4 K/UL (ref 0.8–3.5)
LYMPHOCYTES NFR BLD: 23 % (ref 12–49)
MAGNESIUM SERPL-MCNC: 2 MG/DL (ref 1.6–2.4)
MCH RBC QN AUTO: 29.5 PG (ref 26–34)
MCHC RBC AUTO-ENTMCNC: 33.1 G/DL (ref 30–36.5)
MCV RBC AUTO: 89.1 FL (ref 80–99)
MONOCYTES # BLD: 0.4 K/UL (ref 0–1)
MONOCYTES NFR BLD: 4 % (ref 5–13)
NEUTS SEG # BLD: 7 K/UL (ref 1.8–8)
NEUTS SEG NFR BLD: 67 % (ref 32–75)
NITRITE UR QL STRIP.AUTO: NEGATIVE
NRBC # BLD: 0 K/UL (ref 0–0.01)
NRBC BLD-RTO: 0 PER 100 WBC
PH UR STRIP: 6 [PH] (ref 5–8)
PLATELET # BLD AUTO: 307 K/UL (ref 150–400)
PMV BLD AUTO: 10.3 FL (ref 8.9–12.9)
POTASSIUM SERPL-SCNC: 2.3 MMOL/L (ref 3.5–5.1)
PROT SERPL-MCNC: 8 G/DL (ref 6.4–8.2)
PROT UR STRIP-MCNC: NEGATIVE MG/DL
RBC # BLD AUTO: 3.66 M/UL (ref 3.8–5.2)
RBC #/AREA URNS HPF: ABNORMAL /HPF (ref 0–5)
RETICS # AUTO: 0.07 M/UL (ref 0.02–0.08)
RETICS/RBC NFR AUTO: 2.2 % (ref 0.7–2.1)
SODIUM SERPL-SCNC: 136 MMOL/L (ref 136–145)
SP GR UR REFRACTOMETRY: 1.02 (ref 1–1.03)
SPECIMEN EXP DATE BLD: NORMAL
TIBC SERPL-MCNC: 378 UG/DL (ref 250–450)
TROPONIN I SERPL-MCNC: <0.05 NG/ML
TSH SERPL DL<=0.05 MIU/L-ACNC: 1.38 UIU/ML (ref 0.36–3.74)
UA: UC IF INDICATED,UAUC: ABNORMAL
UROBILINOGEN UR QL STRIP.AUTO: 0.2 EU/DL (ref 0.2–1)
VIT B12 SERPL-MCNC: 428 PG/ML (ref 193–986)
WBC # BLD AUTO: 10.5 K/UL (ref 3.6–11)
WBC URNS QL MICRO: ABNORMAL /HPF (ref 0–4)

## 2019-05-30 PROCEDURE — 99218 HC RM OBSERVATION: CPT

## 2019-05-30 PROCEDURE — 74011250636 HC RX REV CODE- 250/636: Performed by: EMERGENCY MEDICINE

## 2019-05-30 PROCEDURE — 80053 COMPREHEN METABOLIC PANEL: CPT

## 2019-05-30 PROCEDURE — 93005 ELECTROCARDIOGRAM TRACING: CPT

## 2019-05-30 PROCEDURE — 83615 LACTATE (LD) (LDH) ENZYME: CPT

## 2019-05-30 PROCEDURE — 83540 ASSAY OF IRON: CPT

## 2019-05-30 PROCEDURE — 83010 ASSAY OF HAPTOGLOBIN QUANT: CPT

## 2019-05-30 PROCEDURE — 96374 THER/PROPH/DIAG INJ IV PUSH: CPT

## 2019-05-30 PROCEDURE — 99284 EMERGENCY DEPT VISIT MOD MDM: CPT

## 2019-05-30 PROCEDURE — 82607 VITAMIN B-12: CPT

## 2019-05-30 PROCEDURE — 86900 BLOOD TYPING SEROLOGIC ABO: CPT

## 2019-05-30 PROCEDURE — 74011250636 HC RX REV CODE- 250/636: Performed by: INTERNAL MEDICINE

## 2019-05-30 PROCEDURE — 84484 ASSAY OF TROPONIN QUANT: CPT

## 2019-05-30 PROCEDURE — 36415 COLL VENOUS BLD VENIPUNCTURE: CPT

## 2019-05-30 PROCEDURE — 81001 URINALYSIS AUTO W/SCOPE: CPT

## 2019-05-30 PROCEDURE — 96361 HYDRATE IV INFUSION ADD-ON: CPT

## 2019-05-30 PROCEDURE — 74011250636 HC RX REV CODE- 250/636: Performed by: PHYSICIAN ASSISTANT

## 2019-05-30 PROCEDURE — 74011250637 HC RX REV CODE- 250/637: Performed by: INTERNAL MEDICINE

## 2019-05-30 PROCEDURE — 74011636320 HC RX REV CODE- 636/320: Performed by: RADIOLOGY

## 2019-05-30 PROCEDURE — 85045 AUTOMATED RETICULOCYTE COUNT: CPT

## 2019-05-30 PROCEDURE — 74177 CT ABD & PELVIS W/CONTRAST: CPT

## 2019-05-30 PROCEDURE — 85025 COMPLETE CBC W/AUTO DIFF WBC: CPT

## 2019-05-30 PROCEDURE — 80307 DRUG TEST PRSMV CHEM ANLYZR: CPT

## 2019-05-30 PROCEDURE — 82746 ASSAY OF FOLIC ACID SERUM: CPT

## 2019-05-30 PROCEDURE — 83605 ASSAY OF LACTIC ACID: CPT

## 2019-05-30 PROCEDURE — 84443 ASSAY THYROID STIM HORMONE: CPT

## 2019-05-30 PROCEDURE — 83735 ASSAY OF MAGNESIUM: CPT

## 2019-05-30 PROCEDURE — 82728 ASSAY OF FERRITIN: CPT

## 2019-05-30 PROCEDURE — 82272 OCCULT BLD FECES 1-3 TESTS: CPT

## 2019-05-30 RX ORDER — POTASSIUM CHLORIDE 7.45 MG/ML
10 INJECTION INTRAVENOUS
Status: DISCONTINUED | OUTPATIENT
Start: 2019-05-30 | End: 2019-05-30

## 2019-05-30 RX ORDER — TRAZODONE HYDROCHLORIDE 100 MG/1
100 TABLET ORAL
COMMUNITY
End: 2019-06-07

## 2019-05-30 RX ORDER — ACETAMINOPHEN 325 MG/1
650 TABLET ORAL
Status: DISCONTINUED | OUTPATIENT
Start: 2019-05-30 | End: 2019-06-07 | Stop reason: HOSPADM

## 2019-05-30 RX ORDER — IBUPROFEN 200 MG
200 TABLET ORAL
Status: DISCONTINUED | OUTPATIENT
Start: 2019-05-30 | End: 2019-06-06 | Stop reason: ALTCHOICE

## 2019-05-30 RX ORDER — DIPHENHYDRAMINE HCL 25 MG
25 CAPSULE ORAL
Status: DISCONTINUED | OUTPATIENT
Start: 2019-05-30 | End: 2019-06-07 | Stop reason: SDUPTHER

## 2019-05-30 RX ORDER — ONDANSETRON 2 MG/ML
4 INJECTION INTRAMUSCULAR; INTRAVENOUS
Status: DISCONTINUED | OUTPATIENT
Start: 2019-05-30 | End: 2019-06-07 | Stop reason: HOSPADM

## 2019-05-30 RX ORDER — BUSPIRONE HYDROCHLORIDE 15 MG/1
15 TABLET ORAL 2 TIMES DAILY
Status: ON HOLD | COMMUNITY
End: 2021-10-28

## 2019-05-30 RX ORDER — TRAZODONE HYDROCHLORIDE 100 MG/1
100-200 TABLET ORAL
COMMUNITY
End: 2020-06-12

## 2019-05-30 RX ORDER — PANTOPRAZOLE SODIUM 40 MG/1
40 TABLET, DELAYED RELEASE ORAL
COMMUNITY

## 2019-05-30 RX ORDER — POTASSIUM CHLORIDE 750 MG/1
40 TABLET, FILM COATED, EXTENDED RELEASE ORAL EVERY 4 HOURS
Status: DISPENSED | OUTPATIENT
Start: 2019-05-30 | End: 2019-05-31

## 2019-05-30 RX ORDER — POTASSIUM CHLORIDE 7.45 MG/ML
10 INJECTION INTRAVENOUS
Status: DISPENSED | OUTPATIENT
Start: 2019-05-30 | End: 2019-05-30

## 2019-05-30 RX ORDER — POLYETHYLENE GLYCOL 3350 17 G/17G
17 POWDER, FOR SOLUTION ORAL 2 TIMES DAILY
Status: DISCONTINUED | OUTPATIENT
Start: 2019-05-30 | End: 2019-06-06 | Stop reason: SDUPTHER

## 2019-05-30 RX ORDER — TRAMADOL HYDROCHLORIDE 50 MG/1
50 TABLET ORAL
Status: COMPLETED | OUTPATIENT
Start: 2019-05-30 | End: 2019-05-30

## 2019-05-30 RX ORDER — POTASSIUM CHLORIDE AND SODIUM CHLORIDE 900; 300 MG/100ML; MG/100ML
INJECTION, SOLUTION INTRAVENOUS CONTINUOUS
Status: DISCONTINUED | OUTPATIENT
Start: 2019-05-30 | End: 2019-06-06

## 2019-05-30 RX ORDER — IBUPROFEN 800 MG/1
800 TABLET ORAL
COMMUNITY
End: 2020-03-11

## 2019-05-30 RX ADMIN — TRAMADOL HYDROCHLORIDE 50 MG: 50 TABLET, FILM COATED ORAL at 21:12

## 2019-05-30 RX ADMIN — SODIUM CHLORIDE 1000 ML: 900 INJECTION, SOLUTION INTRAVENOUS at 19:04

## 2019-05-30 RX ADMIN — POTASSIUM CHLORIDE 40 MEQ: 750 TABLET, FILM COATED, EXTENDED RELEASE ORAL at 20:20

## 2019-05-30 RX ADMIN — SODIUM CHLORIDE AND POTASSIUM CHLORIDE: 9; 2.98 INJECTION, SOLUTION INTRAVENOUS at 20:17

## 2019-05-30 RX ADMIN — IOPAMIDOL 100 ML: 755 INJECTION, SOLUTION INTRAVENOUS at 19:31

## 2019-05-30 RX ADMIN — POTASSIUM CHLORIDE 10 MEQ: 10 INJECTION, SOLUTION INTRAVENOUS at 19:48

## 2019-05-30 NOTE — ED TRIAGE NOTES
Pt presents with cc of large amounts of bright red blood from rectum x2 weeks. Pt also reports right lower back pain. Pt reports bleeding even when she doesn't have to have a bowel movement.

## 2019-05-30 NOTE — ED PROVIDER NOTES
47 y.o. female with past medical history significant for HTN, seizures, incomplete RBBB, fibromyalgia, migraine, cavernous hemangioma of intracranial structure, depressive disorder, anxiety, and thyroid nausea who presents from home with chief complaint of rectal bleeding x 1.5-2 weeks with new onset shortness of breath with exertion and feeling her \"heart racing\" and abd cramping since yesterday and yesterday having 3 \"large\" bright red bloody movements with clots (no stool) per patient. Also c/o diffuse abd cramping mostly in LLQ and BL flank regions. She states that her pain began to her left flank and is now radiating around to the front of her abd and around to her back. Pt states it began with BRB in stool but the past few days there has been bright red movements without stool present, no bloody movement yet today. No hx of diverticulitis. She showed her mom photos and felt more short of breath with exertion today which prompted ER evaluation now. No fever, urinary sx's, rectal pain, N/V/D. Pt is 1 year s/p hiatal hernia repair. Pt denies fever, CP, SOB, or diarrhea. There are no other acute medical concerns at this time. Social hx: former tobacco smoker; no EtOH use   PCP: Maryjo Starkey MD    I have evaluated the patient as the Provider in Triage. I have reviewed Her vital signs and the triage nurse assessment. I have talked with the patient and any available family and advised that I am the provider in triage and have ordered the appropriate study to initiate their work up based on the clinical presentation during my assessment. I have advised that the patient will be accommodated in the Main ED as soon as possible. I have also requested to contact the triage nurse or myself immediately if the patient experiences any changes in their condition during this brief waiting period.     Note written by Sabrina Chatman, as dictated by Kelsi Rico MD 5:31 PM    The history is provided by the patient. No  was used.         Past Medical History:   Diagnosis Date    Cavernous hemangioma of intracranial structure ([facility] [address] 5/2/2011    Depressive disorder, not elsewhere classified 10/1/2013    anxiety    Fibromyalgia     fibromyalgia    Hiatal hernia with gastroesophageal reflux 04/2018    Hypertension     Incomplete right bundle branch block (RBBB) determined by electrocardiography 05/10/2018    Migraine 5/2/2011    Seizures ([facility] [address] 2012    last seizure grand mal (born w/seizure disorder)    Thyroid nodule     Unspecified sleep apnea     does not use c-pap       Past Surgical History:   Procedure Laterality Date    HX CHOLECYSTECTOMY      HX HEENT  2012    tonsillectomy    HX LITHOTRIPSY      HX ORTHOPAEDIC      disc fusion 2010, right knee cyst removed    NEUROLOGICAL PROCEDURE UNLISTED  1996, 2006    Cavernous brain angioma(s) removed    SURGERY,BRAIN/SPINE,W/COMPUTER           Family History:   Problem Relation Age of Onset    Cancer Mother         breast    Breast Cancer Mother 76    Cancer Father     Migraines Father     Diabetes Father     Breast Cancer Sister 48    Ovarian Cancer Maternal Aunt        Social History     Socioeconomic History    Marital status: SINGLE     Spouse name: Not on file    Number of children: Not on file    Years of education: Not on file    Highest education level: Not on file   Occupational History    Not on file   Social Needs    Financial resource strain: Not on file    Food insecurity:     Worry: Not on file     Inability: Not on file    Transportation needs:     Medical: Not on file     Non-medical: Not on file   Tobacco Use    Smoking status: Former Smoker    Smokeless tobacco: Never Used    Tobacco comment: on chantix,   Substance and Sexual Activity    Alcohol use: No    Drug use: No    Sexual activity: Not on file   Lifestyle    Physical activity:     Days per week: Not on file     Minutes per session: Not on file  Stress: Not on file   Relationships    Social connections:     Talks on phone: Not on file     Gets together: Not on file     Attends Holiness service: Not on file     Active member of club or organization: Not on file     Attends meetings of clubs or organizations: Not on file     Relationship status: Not on file    Intimate partner violence:     Fear of current or ex partner: Not on file     Emotionally abused: Not on file     Physically abused: Not on file     Forced sexual activity: Not on file   Other Topics Concern    Not on file   Social History Narrative    Not on file         ALLERGIES: Latex, natural rubber; Aspirin; Codeine; Contrast agent [iodine]; Imitrex [sumatriptan]; and Percocet [oxycodone-acetaminophen]    Review of Systems    Vitals:    05/30/19 1733   BP: 132/76   Pulse: (!) 122   Resp: 20   Temp: 99.1 °F (37.3 °C)   SpO2: 97%   Weight: 106.1 kg (234 lb)   Height: 5' 4\" (1.626 m)            Physical Exam   Constitutional: She is oriented to person, place, and time. She appears well-developed and well-nourished. She is active. Non-toxic appearance. No distress. HENT:   Head: Normocephalic and atraumatic. Eyes: Pupils are equal, round, and reactive to light. Conjunctivae are normal. Right eye exhibits no discharge. Left eye exhibits no discharge. Neck: Normal range of motion and full passive range of motion without pain. No tracheal tenderness present. Cardiovascular: Regular rhythm, normal heart sounds, intact distal pulses and normal pulses. Exam reveals no gallop and no friction rub. No murmur heard. tachycardic   Pulmonary/Chest: Effort normal and breath sounds normal. No respiratory distress. She has no wheezes. She has no rales. She exhibits no tenderness. Abdominal: Soft. Bowel sounds are normal. She exhibits no distension. There is tenderness (L mid-LLQ TTP). There is no rebound and no guarding. Musculoskeletal: Normal range of motion.  She exhibits no edema or tenderness. Neurological: She is alert and oriented to person, place, and time. She has normal strength. No cranial nerve deficit or sensory deficit. Coordination normal.   Skin: Skin is warm, dry and intact. Capillary refill takes less than 2 seconds. No abrasion and no rash noted. She is not diaphoretic. No erythema. Psychiatric: Her speech is normal. Cognition and memory are normal.   Anxious appearing   Nursing note and vitals reviewed. MDM  Number of Diagnoses or Management Options  Diagnosis management comments:   Ddx: GI bleed       Amount and/or Complexity of Data Reviewed  Clinical lab tests: ordered and reviewed  Tests in the radiology section of CPT®: ordered and reviewed  Review and summarize past medical records: yes  Discuss the patient with other providers: yes  Independent visualization of images, tracings, or specimens: yes           Procedures      The patient was seen by the supervising physician who was the provider in triage. I discussed patient's PMH, exam findings as well as careplan with the attending who agrees with care plan. Dr. Gallo Medrano recommends ALFREDO, labs, CT abd/pelvis w/o contrast (has contrast dye allergy). Iliana Hartmann PA-C    Procedure Note - Rectal Exam:   6:21 PM  Performed by: Iliana Hartmann PA-C  Chaperoned by: primary nurse  Rectal exam performed. No stool noted in rectal vault. Secretions sent for hemoccult testing as no other sample available. Other findings: no hemorrhoid or pain on ALFREDO  The procedure took 1-15 minutes, and pt tolerated well. EKG interpretation:   Rhythm: sinus tachycardia; and regular . Rate (approx.): 105; Axis: normal; P wave: normal; QRS interval: normal ; ST/T wave: non-specific changes; Reviewed by Dr. Maximino Shafer      Patient with c/o rectal bleeding x 1.5-2 weeks. She is tachycardic, and hgb has dropped from 13-10 in the past month which based on her complaints is most likely acute.  She has become symptomatic since yesterday c/o dyspnea with exertion and feeling her heart race with exertion and had 3 bright red bloody movements with clots (no stool) yesterday. None today so far, she is guaiac negative- no stool in rectal vault however. K also low, admits to poor PO intake due to her abd pain for the past few days. No diarrhea. IV K ordered, CT pending. Will admit for further management and GI consultation.    Gregory Phelan PA-C

## 2019-05-31 LAB
ANION GAP SERPL CALC-SCNC: 3 MMOL/L (ref 5–15)
ATRIAL RATE: 105 BPM
BUN SERPL-MCNC: 15 MG/DL (ref 6–20)
BUN/CREAT SERPL: 16 (ref 12–20)
CALCIUM SERPL-MCNC: 8.8 MG/DL (ref 8.5–10.1)
CALCULATED P AXIS, ECG09: 39 DEGREES
CALCULATED R AXIS, ECG10: -9 DEGREES
CALCULATED T AXIS, ECG11: 29 DEGREES
CHLORIDE SERPL-SCNC: 103 MMOL/L (ref 97–108)
CO2 SERPL-SCNC: 32 MMOL/L (ref 21–32)
CREAT SERPL-MCNC: 0.95 MG/DL (ref 0.55–1.02)
DIAGNOSIS, 93000: NORMAL
ERYTHROCYTE [DISTWIDTH] IN BLOOD BY AUTOMATED COUNT: 13.3 % (ref 11.5–14.5)
GLUCOSE SERPL-MCNC: 102 MG/DL (ref 65–100)
HCT VFR BLD AUTO: 28 % (ref 35–47)
HGB BLD-MCNC: 9.1 G/DL (ref 11.5–16)
MAGNESIUM SERPL-MCNC: 2.3 MG/DL (ref 1.6–2.4)
MCH RBC QN AUTO: 29.4 PG (ref 26–34)
MCHC RBC AUTO-ENTMCNC: 32.5 G/DL (ref 30–36.5)
MCV RBC AUTO: 90.3 FL (ref 80–99)
NRBC # BLD: 0 K/UL (ref 0–0.01)
NRBC BLD-RTO: 0 PER 100 WBC
P-R INTERVAL, ECG05: 128 MS
PLATELET # BLD AUTO: 271 K/UL (ref 150–400)
PMV BLD AUTO: 10.6 FL (ref 8.9–12.9)
POTASSIUM SERPL-SCNC: 3 MMOL/L (ref 3.5–5.1)
Q-T INTERVAL, ECG07: 394 MS
QRS DURATION, ECG06: 88 MS
QTC CALCULATION (BEZET), ECG08: 520 MS
RBC # BLD AUTO: 3.1 M/UL (ref 3.8–5.2)
SODIUM SERPL-SCNC: 138 MMOL/L (ref 136–145)
VENTRICULAR RATE, ECG03: 105 BPM
WBC # BLD AUTO: 8.5 K/UL (ref 3.6–11)

## 2019-05-31 PROCEDURE — 96376 TX/PRO/DX INJ SAME DRUG ADON: CPT

## 2019-05-31 PROCEDURE — 74011250637 HC RX REV CODE- 250/637: Performed by: INTERNAL MEDICINE

## 2019-05-31 PROCEDURE — 36415 COLL VENOUS BLD VENIPUNCTURE: CPT

## 2019-05-31 PROCEDURE — 99218 HC RM OBSERVATION: CPT

## 2019-05-31 PROCEDURE — 96375 TX/PRO/DX INJ NEW DRUG ADDON: CPT

## 2019-05-31 PROCEDURE — 74011250637 HC RX REV CODE- 250/637: Performed by: FAMILY MEDICINE

## 2019-05-31 PROCEDURE — 80048 BASIC METABOLIC PNL TOTAL CA: CPT

## 2019-05-31 PROCEDURE — 83735 ASSAY OF MAGNESIUM: CPT

## 2019-05-31 PROCEDURE — 85027 COMPLETE CBC AUTOMATED: CPT

## 2019-05-31 PROCEDURE — 74011250636 HC RX REV CODE- 250/636: Performed by: INTERNAL MEDICINE

## 2019-05-31 PROCEDURE — 74011250636 HC RX REV CODE- 250/636: Performed by: FAMILY MEDICINE

## 2019-05-31 RX ORDER — DULOXETIN HYDROCHLORIDE 30 MG/1
120 CAPSULE, DELAYED RELEASE ORAL
Status: DISCONTINUED | OUTPATIENT
Start: 2019-05-31 | End: 2019-06-07 | Stop reason: HOSPADM

## 2019-05-31 RX ORDER — DULOXETIN HYDROCHLORIDE 30 MG/1
60 CAPSULE, DELAYED RELEASE ORAL
Status: DISCONTINUED | OUTPATIENT
Start: 2019-05-31 | End: 2019-05-31 | Stop reason: DRUGHIGH

## 2019-05-31 RX ORDER — POTASSIUM CHLORIDE 750 MG/1
20 TABLET, FILM COATED, EXTENDED RELEASE ORAL 2 TIMES DAILY
Status: COMPLETED | OUTPATIENT
Start: 2019-05-31 | End: 2019-06-01

## 2019-05-31 RX ORDER — ALPRAZOLAM 0.5 MG/1
1 TABLET ORAL
Status: DISCONTINUED | OUTPATIENT
Start: 2019-05-31 | End: 2019-06-07 | Stop reason: HOSPADM

## 2019-05-31 RX ORDER — TOPIRAMATE 25 MG/1
25 TABLET ORAL 2 TIMES DAILY
Status: DISCONTINUED | OUTPATIENT
Start: 2019-05-31 | End: 2019-06-03

## 2019-05-31 RX ORDER — PANTOPRAZOLE SODIUM 40 MG/1
40 TABLET, DELAYED RELEASE ORAL DAILY
Status: DISCONTINUED | OUTPATIENT
Start: 2019-05-31 | End: 2019-06-07 | Stop reason: HOSPADM

## 2019-05-31 RX ORDER — VARENICLINE TARTRATE 0.5 MG/1
1 TABLET, FILM COATED ORAL DAILY
Status: DISCONTINUED | OUTPATIENT
Start: 2019-05-31 | End: 2019-05-31

## 2019-05-31 RX ORDER — MORPHINE SULFATE 4 MG/ML
1 INJECTION INTRAVENOUS
Status: DISCONTINUED | OUTPATIENT
Start: 2019-05-31 | End: 2019-06-01

## 2019-05-31 RX ORDER — DOCUSATE SODIUM 50 MG/5ML
50 LIQUID ORAL
Status: DISCONTINUED | OUTPATIENT
Start: 2019-05-31 | End: 2019-06-07 | Stop reason: HOSPADM

## 2019-05-31 RX ORDER — TIZANIDINE 2 MG/1
4 TABLET ORAL 3 TIMES DAILY
Status: DISCONTINUED | OUTPATIENT
Start: 2019-05-31 | End: 2019-06-07 | Stop reason: HOSPADM

## 2019-05-31 RX ADMIN — MORPHINE SULFATE 1 MG: 4 INJECTION, SOLUTION INTRAMUSCULAR; INTRAVENOUS at 09:47

## 2019-05-31 RX ADMIN — POLYETHYLENE GLYCOL 3350 17 G: 17 POWDER, FOR SOLUTION ORAL at 01:25

## 2019-05-31 RX ADMIN — TIZANIDINE 4 MG: 2 TABLET ORAL at 01:01

## 2019-05-31 RX ADMIN — POLYETHYLENE GLYCOL 3350 17 G: 17 POWDER, FOR SOLUTION ORAL at 08:27

## 2019-05-31 RX ADMIN — PANTOPRAZOLE SODIUM 40 MG: 40 TABLET, DELAYED RELEASE ORAL at 08:27

## 2019-05-31 RX ADMIN — DULOXETINE HYDROCHLORIDE 120 MG: 30 CAPSULE, DELAYED RELEASE ORAL at 21:00

## 2019-05-31 RX ADMIN — MORPHINE SULFATE 1 MG: 4 INJECTION, SOLUTION INTRAMUSCULAR; INTRAVENOUS at 16:33

## 2019-05-31 RX ADMIN — LOSARTAN POTASSIUM: 50 TABLET, FILM COATED ORAL at 01:01

## 2019-05-31 RX ADMIN — DIPHENHYDRAMINE HYDROCHLORIDE 25 MG: 25 CAPSULE ORAL at 21:00

## 2019-05-31 RX ADMIN — DOCUSATE SODIUM 50 MG: 50 LIQUID ORAL at 21:00

## 2019-05-31 RX ADMIN — TIZANIDINE 4 MG: 2 TABLET ORAL at 16:33

## 2019-05-31 RX ADMIN — TOPIRAMATE 25 MG: 25 TABLET, FILM COATED ORAL at 08:27

## 2019-05-31 RX ADMIN — POTASSIUM CHLORIDE 20 MEQ: 750 TABLET, FILM COATED, EXTENDED RELEASE ORAL at 09:48

## 2019-05-31 RX ADMIN — POLYETHYLENE GLYCOL 3350 17 G: 17 POWDER, FOR SOLUTION ORAL at 18:23

## 2019-05-31 RX ADMIN — ALPRAZOLAM 1 MG: 0.5 TABLET ORAL at 01:01

## 2019-05-31 RX ADMIN — SODIUM CHLORIDE AND POTASSIUM CHLORIDE: 9; 2.98 INJECTION, SOLUTION INTRAVENOUS at 20:57

## 2019-05-31 RX ADMIN — ALPRAZOLAM 1 MG: 0.5 TABLET ORAL at 08:34

## 2019-05-31 RX ADMIN — MORPHINE SULFATE 1 MG: 4 INJECTION, SOLUTION INTRAMUSCULAR; INTRAVENOUS at 21:00

## 2019-05-31 RX ADMIN — TIZANIDINE 4 MG: 2 TABLET ORAL at 08:27

## 2019-05-31 RX ADMIN — DOCUSATE SODIUM 50 MG: 50 LIQUID ORAL at 01:00

## 2019-05-31 RX ADMIN — SODIUM CHLORIDE AND POTASSIUM CHLORIDE: 9; 2.98 INJECTION, SOLUTION INTRAVENOUS at 08:26

## 2019-05-31 RX ADMIN — POTASSIUM CHLORIDE 20 MEQ: 750 TABLET, FILM COATED, EXTENDED RELEASE ORAL at 18:23

## 2019-05-31 RX ADMIN — TOPIRAMATE 25 MG: 25 TABLET, FILM COATED ORAL at 18:23

## 2019-05-31 RX ADMIN — DULOXETINE HYDROCHLORIDE 120 MG: 30 CAPSULE, DELAYED RELEASE ORAL at 01:00

## 2019-05-31 RX ADMIN — POTASSIUM CHLORIDE 40 MEQ: 750 TABLET, FILM COATED, EXTENDED RELEASE ORAL at 01:25

## 2019-05-31 NOTE — PROGRESS NOTES
Bedside shift change report given to Lucho (oncoming nurse) by Celestino Wen (offgoing nurse). Report included the following information SBAR, Procedure Summary, MAR and Recent Results.

## 2019-05-31 NOTE — PROGRESS NOTES
Admission Medication Reconciliation:    Comments/Recommendations:  -Medication history obtained in the emergency department (room 12)  -Patient did not want to review allergy history with me at this time. Updated preferred pharmacy on file.  -RxQuery shows buspirone 7.5 mg tablets filled 5/25/19 for quantity of 360 for 90 day supply; however patient states she only takes one tablet once per day at bedtime. Medications added: Buspirone, ibuprofen 800 mg PRN, trazodone  Medications removed: Docusate, Cymbalta 30 mg dose, Chantix  Medications changed: Alprazolam from TID prn to TID, Cymbalta 60 mg daily to 120 mg daily, Protonix from 20 mg to 40 mg, tizanidine from capsule to tablet    Information obtained from: Patient and review of RxQuery    Significant PMH/Disease States:   Past Medical History:   Diagnosis Date    Anxiety and depression     Cavernous hemangioma of intracranial structure ([facility] [address] 5/2/2011    Fibromyalgia     fibromyalgia    Hemorrhoids     Hiatal hernia with gastroesophageal reflux 04/2018    Hx of migraines     Hx of seizure disorder     Hypertension     Incomplete right bundle branch block (RBBB) determined by electrocardiography 05/10/2018    CARLOS (obstructive sleep apnea)     no cpap    Thyroid nodule        Chief Complaint for this Admission:    Chief Complaint   Patient presents with    Rectal Bleeding       Allergies:  Latex, natural rubber; Aspirin; Codeine; Imitrex [sumatriptan]; and Percocet [oxycodone-acetaminophen]    Prior to Admission Medications:   Prior to Admission Medications   Prescriptions Last Dose Informant Patient Reported? Taking? ALPRAZolam (XANAX) 1 mg tablet 5/29/2019 at Unknown time Other Yes Yes   Sig: Take 1 mg by mouth three (3) times daily. DULoxetine (CYMBALTA) 60 mg capsule 5/29/2019 at bedtime  Yes Yes   Sig: Take 120 mg by mouth nightly. busPIRone (BUSPAR) 7.5 mg tablet 5/29/2019 at bedtime  Yes Yes   Sig: Take 7.5 mg by mouth nightly.    ibuprofen (MOTRIN) 800 mg tablet 5/29/2019 at Unknown time  Yes Yes   Sig: Take 800 mg by mouth every eight (8) hours as needed for Pain.   losartan-hydroCHLOROthiazide (HYZAAR) 100-25 mg per tablet 5/29/2019 at bedtime  Yes Yes   Sig: Take 1 Tab by mouth nightly. pantoprazole (PROTONIX) 40 mg tablet 5/29/2019 at bedtime  Yes Yes   Sig: Take 40 mg by mouth nightly. tiZANidine (ZANAFLEX) 4 mg tablet 5/29/2019 at Unknown time  Yes Yes   Sig: Take 4 mg by mouth three (3) times daily. topiramate (TOPAMAX) 25 mg tablet 5/29/2019 at Unknown time  Yes Yes   Sig: Take 25 mg by mouth two (2) times a day. traZODone (DESYREL) 100 mg tablet 5/29/2019 at bedtime  Yes Yes   Sig: Take 100 mg by mouth nightly. traZODone (DESYREL) 100 mg tablet 5/29/2019 at Unknown time  Yes Yes   Sig: Take 100-200 mg by mouth nightly as needed for Sleep (Will take 1-2 tabs in addition to first 100 mg tab if unable to sleep).       Facility-Administered Medications: None       Thank you,  Lee Petty, PHARMD

## 2019-05-31 NOTE — PROGRESS NOTES
Daily Progress Note: 5/31/2019  Martin West MD    Assessment/Plan:   Anemia - POA, new, likely due to GI blood loss. Check serologies. Transfuse if Hb<7     Hypokalemia / Dehydration / Elevated lactic acid level - POA, due to poor PO intake. Hydrate and replete K.     Rectal bleeding / Hx hemorroids / Hx Hiatal hernia - POA, very likely hemorrhoid bleeding. Consulted GI. Clear diet. PPI     Anxiety and depression / Fibromyalgia - POA, poorly controlled. This is contributing to her sensation of pain. Continue cymbalta, chatix and xanax.      Morbid obesity / CARLOS (obstructive sleep apnea) - Non compliant with cpap. Advised weight loss     Hypertension / Incomplete right bundle branch block (RBBB) determined by electrocardiography - Adequate control on losartan HCTZ     Hx of seizure disorder - None recent.   Continue topamax       Problem List:  Problem List as of 5/31/2019 Date Reviewed: 5/30/2019          Codes Class Noted - Resolved    Elevated lactic acid level ICD-10-CM: R79.89  ICD-9-CM: 276.2  5/30/2019 - Present        Rectal bleeding ICD-10-CM: K62.5  ICD-9-CM: 569.3  5/30/2019 - Present        CARLOS (obstructive sleep apnea) ICD-10-CM: G47.33  ICD-9-CM: 327.23  Unknown - Present    Overview Signed 5/30/2019  7:55 PM by Emery South MD     no cpap             Hypertension ICD-10-CM: I10  ICD-9-CM: 401.9  Unknown - Present        Hx of seizure disorder ICD-10-CM: Z86.69  ICD-9-CM: V12.49  Unknown - Present        Fibromyalgia ICD-10-CM: M79.7  ICD-9-CM: 729.1  Unknown - Present    Overview Signed 5/30/2019  7:55 PM by Emery South MD     fibromyalgia             Anxiety and depression ICD-10-CM: F41.9, F32.9  ICD-9-CM: 300.00, 311  Unknown - Present        Dehydration ICD-10-CM: E86.0  ICD-9-CM: 276.51  5/30/2019 - Present        * (Principal) Anemia ICD-10-CM: D64.9  ICD-9-CM: 285.9  5/30/2019 - Present        Hemorrhoids ICD-10-CM: K64.9  ICD-9-CM: 455.6  Unknown - Present        Hx of migraines ICD-10-CM: Z86.69  ICD-9-CM: V12.49  Unknown - Present        Severe obesity ([facility] [address] ICD-10-CM: E66.01  ICD-9-CM: 278.01  4/29/2019 - Present        Hiatal hernia ICD-10-CM: K44.9  ICD-9-CM: 553.3  5/18/2018 - Present        Incomplete right bundle branch block (RBBB) determined by electrocardiography ICD-10-CM: I45.10  ICD-9-CM: 426.4  5/10/2018 - Present        Hiatal hernia with gastroesophageal reflux ICD-10-CM: K21.9, K44.9  ICD-9-CM: 530.81, 553.3  4/1/2018 - Present        Hypokalemia ICD-10-CM: E87.6  ICD-9-CM: 276.8  10/1/2013 - Present        Cavernous hemangioma of intracranial structure (HCC) (Chronic) ICD-10-CM: D18.02  ICD-9-CM: 228.02  5/2/2011 - Present        RESOLVED: Depressive disorder, not elsewhere classified ICD-10-CM: F32.9  ICD-9-CM: 798  10/1/2013 - 5/30/2019        RESOLVED: Leukocytosis, unspecified ICD-10-CM: R56.194  ICD-9-CM: 288.60  10/1/2013 - 5/30/2019        RESOLVED: Slurred speech ICD-10-CM: R47.81  ICD-9-CM: 784.59  9/30/2013 - 5/30/2019        RESOLVED: Seizure ([facility] [address] (Chronic) ICD-10-CM: R56.9  ICD-9-CM: 780.39  5/2/2011 - 5/30/2019        RESOLVED: Migraine (Chronic) ICD-10-CM: O63.975  ICD-9-CM: 346.90  5/2/2011 - 5/30/2019            Subjective:    47 y.o.  female who presented to the Emergency Department complaining of BRBPR. She is a poor historian due to anxiety. He reports 10 days of seeing red toilet water and clots on toilet paper. She reports crampy abdominal pain. She reports a hx of hemorrhoids, and had her latest colonoscopy about 1 year ago, with finding of hemorrhoids and polyps. She reports non compliance wiwth hemorrhoid med recommended by GI.  ER finds anemia, but hemoccult was negative today. We will admit her for observation. (Dr Laure Lovelace)     5/31:  Still c/o \"severe\" ab pain but conversing well, does not appear in pain. Pain is constant and in mid ab between quadrants bilat and periumbicaly.   She wants something \"stronger\" for pain. She reports she has taken Morphine in past without side effects. No N/V today. K+ sl better today after she agreed to take the med. GI to see today. Review of Systems:   A comprehensive review of systems was negative except for that written in the HPI. Objective:   Physical Exam:     Visit Vitals  /68 (BP 1 Location: Right arm, BP Patient Position: At rest)   Pulse 89   Temp 98.2 °F (36.8 °C)   Resp 18   Ht 5' 4\" (1.626 m)   Wt 106.1 kg (234 lb)   LMP 2011   SpO2 98%   BMI 40.17 kg/m²      O2 Device: Room air  Temp (24hrs), Av.6 °F (37 °C), Min:98 °F (36.7 °C), Max:99.1 °F (37.3 °C)    No intake/output data recorded.  1901 -  0700  In: 210 [I.V.:210]  Out: -     General:  Alert, morbidly obese, cooperative, no distress, appears stated age. Head:  Normocephalic, without obvious abnormality, atraumatic. Eyes:  Conjunctivae/corneas clear. PERRL, EOMs intact. Nose: Nares normal. Septum midline. Mucosa normal. No drainage or sinus tenderness. Throat: Lips, mucosa, and tongue moist..   Neck: Supple, symmetrical, trachea midline, no adenopathy, thyroid: no enlargement/tenderness/nodules, no carotid bruit and no JVD. Back:   Symmetric, no curvature. ROM normal. No CVA tenderness. Lungs:   Clear to auscultation bilaterally. Chest wall:  No tenderness or deformity. Heart:  Regular rate and rhythm, S1, S2 normal, no murmur, click, rub or gallop. Abdomen:   Soft, obese, with mild generalized tenderness without rebound/guarding. Bowel sounds normal. No masses,  No organomegaly. Extremities: no cyanosis or edema. No calf tenderness or cords. Skin: turgor normal.   Neurologic: Alert and oriented X 3. Fine motor of hands and fingers normal.   equal.  No cogwheeling or rigidity. Gait not tested at this time. Sensation grossly normal to touch.   Gross motor of extremities normal.        Data Review:    CT AB and Pelvis 19   FINDINGS: LUNG BASES: Clear. INCIDENTALLY IMAGED HEART AND MEDIASTINUM: Unremarkable. LIVER: No mass or biliary dilatation. GALLBLADDER: Surgically absent  SPLEEN: No mass. PANCREAS: No mass or ductal dilatation. ADRENALS: Unremarkable. KIDNEYS: No mass, calculus, or hydronephrosis. STOMACH: Post fundoplication  SMALL BOWEL: No dilatation or wall thickening. COLON: No dilatation or wall thickening. APPENDIX: Unremarkable. PERITONEUM: No ascites or pneumoperitoneum. RETROPERITONEUM: No lymphadenopathy or aortic aneurysm. Mild vascular  calcifications  REPRODUCTIVE ORGANS: Within normal limits  URINARY BLADDER: No mass or calculus. BONES: No destructive bone lesion. ADDITIONAL COMMENTS: N/A  IMPRESSION:  A source of GI bleeding is not identified  Recent Days:  Recent Labs     05/31/19  0503 05/30/19  1756   WBC 8.5 10.5   HGB 9.1* 10.8*   HCT 28.0* 32.6*    307     Recent Labs     05/31/19  0503 05/30/19  1756    136   K 3.0* 2.3*    100   CO2 32 28   * 198*   BUN 15 19   CREA 0.95 1.25*   CA 8.8 9.2   MG 2.3 2.0   ALB  --  3.8   TBILI  --  0.3   SGOT  --  20   ALT  --  29     No results for input(s): PH, PCO2, PO2, HCO3, FIO2 in the last 72 hours. 24 Hour Results:  Recent Results (from the past 24 hour(s))   CBC WITH AUTOMATED DIFF    Collection Time: 05/30/19  5:56 PM   Result Value Ref Range    WBC 10.5 3.6 - 11.0 K/uL    RBC 3.66 (L) 3.80 - 5.20 M/uL    HGB 10.8 (L) 11.5 - 16.0 g/dL    HCT 32.6 (L) 35.0 - 47.0 %    MCV 89.1 80.0 - 99.0 FL    MCH 29.5 26.0 - 34.0 PG    MCHC 33.1 30.0 - 36.5 g/dL    RDW 13.2 11.5 - 14.5 %    PLATELET 894 591 - 565 K/uL    MPV 10.3 8.9 - 12.9 FL    NRBC 0.0 0  WBC    ABSOLUTE NRBC 0.00 0.00 - 0.01 K/uL    NEUTROPHILS 67 32 - 75 %    LYMPHOCYTES 23 12 - 49 %    MONOCYTES 4 (L) 5 - 13 %    EOSINOPHILS 5 0 - 7 %    BASOPHILS 1 0 - 1 %    IMMATURE GRANULOCYTES 0 0.0 - 0.5 %    ABS. NEUTROPHILS 7.0 1.8 - 8.0 K/UL    ABS.  LYMPHOCYTES 2.4 0.8 - 3.5 K/UL    ABS. MONOCYTES 0.4 0.0 - 1.0 K/UL    ABS. EOSINOPHILS 0.5 (H) 0.0 - 0.4 K/UL    ABS. BASOPHILS 0.1 0.0 - 0.1 K/UL    ABS. IMM. GRANS. 0.0 0.00 - 0.04 K/UL    DF AUTOMATED     METABOLIC PANEL, COMPREHENSIVE    Collection Time: 05/30/19  5:56 PM   Result Value Ref Range    Sodium 136 136 - 145 mmol/L    Potassium 2.3 (LL) 3.5 - 5.1 mmol/L    Chloride 100 97 - 108 mmol/L    CO2 28 21 - 32 mmol/L    Anion gap 8 5 - 15 mmol/L    Glucose 198 (H) 65 - 100 mg/dL    BUN 19 6 - 20 MG/DL    Creatinine 1.25 (H) 0.55 - 1.02 MG/DL    BUN/Creatinine ratio 15 12 - 20      GFR est AA 55 (L) >60 ml/min/1.73m2    GFR est non-AA 45 (L) >60 ml/min/1.73m2    Calcium 9.2 8.5 - 10.1 MG/DL    Bilirubin, total 0.3 0.2 - 1.0 MG/DL    ALT (SGPT) 29 12 - 78 U/L    AST (SGOT) 20 15 - 37 U/L    Alk.  phosphatase 112 45 - 117 U/L    Protein, total 8.0 6.4 - 8.2 g/dL    Albumin 3.8 3.5 - 5.0 g/dL    Globulin 4.2 (H) 2.0 - 4.0 g/dL    A-G Ratio 0.9 (L) 1.1 - 2.2     TYPE & SCREEN    Collection Time: 05/30/19  5:56 PM   Result Value Ref Range    Crossmatch Expiration 06/02/2019     ABO/Rh(D) Antonette Stai POSITIVE     Antibody screen NEG    MAGNESIUM    Collection Time: 05/30/19  5:56 PM   Result Value Ref Range    Magnesium 2.0 1.6 - 2.4 mg/dL   TROPONIN I    Collection Time: 05/30/19  5:56 PM   Result Value Ref Range    Troponin-I, Qt. <0.05 <0.05 ng/mL   URINALYSIS W/ REFLEX CULTURE    Collection Time: 05/30/19  6:03 PM   Result Value Ref Range    Color YELLOW/STRAW      Appearance CLEAR CLEAR      Specific gravity 1.020 1.003 - 1.030      pH (UA) 6.0 5.0 - 8.0      Protein NEGATIVE  NEG mg/dL    Glucose NEGATIVE  NEG mg/dL    Ketone NEGATIVE  NEG mg/dL    Bilirubin NEGATIVE  NEG      Blood TRACE (A) NEG      Urobilinogen 0.2 0.2 - 1.0 EU/dL    Nitrites NEGATIVE  NEG      Leukocyte Esterase NEGATIVE  NEG      WBC 0-4 0 - 4 /hpf    RBC 20-50 0 - 5 /hpf    Epithelial cells FEW FEW /lpf    Bacteria NEGATIVE  NEG /hpf    UA:UC IF INDICATED CULTURE NOT INDICATED BY UA RESULT CNI     EKG, 12 LEAD, INITIAL    Collection Time: 05/30/19  6:52 PM   Result Value Ref Range    Ventricular Rate 105 BPM    Atrial Rate 105 BPM    P-R Interval 128 ms    QRS Duration 88 ms    Q-T Interval 394 ms    QTC Calculation (Bezet) 520 ms    Calculated P Axis 39 degrees    Calculated R Axis -9 degrees    Calculated T Axis 29 degrees    Diagnosis       Sinus tachycardia  Nonspecific ST abnormality  Abnormal ECG  When compared with ECG of 18-APR-2019 20:33,  ST no longer depressed in Inferior leads  T wave inversion no longer evident in Anterior leads  Nonspecific T wave abnormality now evident in Lateral leads     POC LACTIC ACID    Collection Time: 05/30/19  7:02 PM   Result Value Ref Range    Lactic Acid (POC) 3.17 (HH) 0.40 - 2.00 mmol/L   OCCULT BLOOD, STOOL    Collection Time: 05/30/19  7:06 PM   Result Value Ref Range    Occult blood, stool NEGATIVE  NEG     ETHYL ALCOHOL    Collection Time: 05/30/19  8:20 PM   Result Value Ref Range    ALCOHOL(ETHYL),SERUM <10 <10 MG/DL   RETICULOCYTE COUNT    Collection Time: 05/30/19  8:20 PM   Result Value Ref Range    Reticulocyte count 2.2 (H) 0.7 - 2.1 %    Absolute Retic Cnt. 0.0730 0.0164 - 0.0776 M/ul   LD    Collection Time: 05/30/19  8:20 PM   Result Value Ref Range     81 - 246 U/L   TSH 3RD GENERATION    Collection Time: 05/30/19  8:20 PM   Result Value Ref Range    TSH 1.38 0.36 - 3.74 uIU/mL   FERRITIN    Collection Time: 05/30/19  9:18 PM   Result Value Ref Range    Ferritin 15 8 - 252 NG/ML   FOLATE    Collection Time: 05/30/19  9:18 PM   Result Value Ref Range    Folate 18.9 5.0 - 21.0 ng/mL   HAPTOGLOBIN    Collection Time: 05/30/19  9:18 PM   Result Value Ref Range    Haptoglobin 228 (H) 30 - 200 mg/dL   IRON PROFILE    Collection Time: 05/30/19  9:18 PM   Result Value Ref Range    Iron 49 35 - 150 ug/dL    TIBC 378 250 - 450 ug/dL    Iron % saturation 13 (L) 20 - 50 %   VITAMIN B12    Collection Time: 05/30/19  9:18 PM Result Value Ref Range    Vitamin B12 428 193 - 986 pg/mL   CBC W/O DIFF    Collection Time: 05/31/19  5:03 AM   Result Value Ref Range    WBC 8.5 3.6 - 11.0 K/uL    RBC 3.10 (L) 3.80 - 5.20 M/uL    HGB 9.1 (L) 11.5 - 16.0 g/dL    HCT 28.0 (L) 35.0 - 47.0 %    MCV 90.3 80.0 - 99.0 FL    MCH 29.4 26.0 - 34.0 PG    MCHC 32.5 30.0 - 36.5 g/dL    RDW 13.3 11.5 - 14.5 %    PLATELET 855 690 - 219 K/uL    MPV 10.6 8.9 - 12.9 FL    NRBC 0.0 0  WBC    ABSOLUTE NRBC 0.00 0.00 - 0.01 K/uL   MAGNESIUM    Collection Time: 05/31/19  5:03 AM   Result Value Ref Range    Magnesium 2.3 1.6 - 2.4 mg/dL   METABOLIC PANEL, BASIC    Collection Time: 05/31/19  5:03 AM   Result Value Ref Range    Sodium 138 136 - 145 mmol/L    Potassium 3.0 (L) 3.5 - 5.1 mmol/L    Chloride 103 97 - 108 mmol/L    CO2 32 21 - 32 mmol/L    Anion gap 3 (L) 5 - 15 mmol/L    Glucose 102 (H) 65 - 100 mg/dL    BUN 15 6 - 20 MG/DL    Creatinine 0.95 0.55 - 1.02 MG/DL    BUN/Creatinine ratio 16 12 - 20      GFR est AA >60 >60 ml/min/1.73m2    GFR est non-AA >60 >60 ml/min/1.73m2    Calcium 8.8 8.5 - 10.1 MG/DL       Medications reviewed  Current Facility-Administered Medications   Medication Dose Route Frequency    ALPRAZolam (XANAX) tablet 1 mg  1 mg Oral QID PRN    docusate (COLACE) 50 mg/5 mL oral liquid 50 mg  50 mg Oral QHS    DULoxetine (CYMBALTA) capsule 120 mg  120 mg Oral QHS    pantoprazole (PROTONIX) tablet 40 mg  40 mg Oral DAILY    topiramate (TOPAMAX) tablet 25 mg  25 mg Oral BID    tiZANidine (ZANAFLEX) tablet 4 mg  4 mg Oral TID    losartan/hydroCHLOROthiazide (HYZAAR) 100/25 mg   Oral QHS    0.9% sodium chloride with KCl 40 mEq/L infusion   IntraVENous CONTINUOUS    potassium chloride SR (KLOR-CON 10) tablet 40 mEq  40 mEq Oral Q4H    acetaminophen (TYLENOL) tablet 650 mg  650 mg Oral Q6H PRN    ibuprofen (MOTRIN) tablet 200 mg  200 mg Oral Q6H PRN    diphenhydrAMINE (BENADRYL) capsule 25 mg  25 mg Oral Q4H PRN    ondansetron [facility]) injection 4 mg  4 mg IntraVENous Q4H PRN    polyethylene glycol (MIRALAX) packet 17 g  17 g Oral BID       Care Plan discussed with: Patient/Family and Nurse  Total time spent with patient: 30 minutes.   Susie Kulkarni MD

## 2019-05-31 NOTE — PROGRESS NOTES
2220:  Pt c/o pain, LUQ abdomen, 7/10. Wants pain medication. 2230: Called Dr. Valery Pradhan re patient's pain and request for pain medication. MD advised pt should take tylenol or motrin first. Notified pt. Patient refuses, says 'It will not help for this level of pain.'    2300: Pt refuses miralax, states 'I'm not constipated,' and refuses potassium tablets because she is receiving potassium via IV continuous fluids. 2350:  Pt c/o heart 'fluttering' and occasional mild pain in L chest that extends to mid-upper left arm. VS normal (113/69, HR 93). 0004:  Notified Dr. Valery Pradhan. No orders given at this time. Relayed information to pt and advised her to call out if she begins to feel worse. 0100: Pt's additional medications (cymbalta, xanax, etc. Administered. Pt agrees to take potassium tablets and miralax she refused earlier.

## 2019-05-31 NOTE — ROUTINE PROCESS
TRANSFER - OUT REPORT:    Verbal report given to Norberto Smith RN(name) on Alisha Das (R)  being transferred to 5th floor(unit) for routine progression of care       Report consisted of patients Situation, Background, Assessment and   Recommendations(SBAR). Information from the following report(s) SBAR, ED Summary and MAR was reviewed with the receiving nurse. Lines:   Peripheral IV 05/30/19 Left Wrist (Active)   Site Assessment Clean, dry, & intact 5/30/2019  5:59 PM   Phlebitis Assessment 0 5/30/2019  5:59 PM   Infiltration Assessment 0 5/30/2019  5:59 PM   Dressing Status Clean, dry, & intact 5/30/2019  5:59 PM   Dressing Type Transparent;Tape 5/30/2019  5:59 PM   Hub Color/Line Status Blue;Flushed;Patent 5/30/2019  5:59 PM        Opportunity for questions and clarification was provided.       Patient transported with:   [employer]

## 2019-05-31 NOTE — H&P
[facility]    Hospitalist Admission Note      NAME:  Rosaura Hawkins   :   1965   MRN:  [id number]     PCP:  Elias Lafleur MD     Date/Time:  2019 8:18 PM          Subjective:     CHIEF COMPLAINT: BRBPR     HISTORY OF PRESENT ILLNESS:     Ms. Nannette Beebe is a 47 y.o.  female who presented to the Emergency Department complaining of BRBPR. She is a poor historian due to anxiety. He reports 10 days of seeing red toilet water and clots on toilet paper. She reports crampy abdominal pain. She reports a hx of hemorrhoids, and had her latest colonoscopy about 1 year ago, with finding of hemorrhoids and polyps. She reports non compliance wiwth hemorrhoid med recommended by GI.  ER finds anemia, but hemoccult was negative today. We will admit her for observation.     Past Medical History:   Diagnosis Date    Anxiety and depression     Cavernous hemangioma of intracranial structure ([address] 2011    Fibromyalgia     fibromyalgia    Hemorrhoids     Hiatal hernia with gastroesophageal reflux 2018    Hx of migraines     Hx of seizure disorder     Hypertension     Incomplete right bundle branch block (RBBB) determined by electrocardiography 05/10/2018    CARLOS (obstructive sleep apnea)     no cpap    Thyroid nodule         Past Surgical History:   Procedure Laterality Date    HX CHOLECYSTECTOMY      HX HEENT      tonsillectomy    HX LITHOTRIPSY      HX ORTHOPAEDIC      disc fusion , right knee cyst removed   [address] [place] [address], 2006    Cavernous brain angioma(s) removed    SURGERY,BRAIN/SPINE,W/COMPUTER         Social History     Tobacco Use    Smoking status: Former Smoker    Smokeless tobacco: Never Used    Tobacco comment: on chantix,   Substance Use Topics    Alcohol use: No        Family History   Problem Relation Age of Onset    Cancer Mother         breast    Breast Cancer Mother 76    Cancer Father     Migraines Father     Diabetes Father  Breast Cancer Sister 48    Ovarian Cancer Maternal Aunt       Allergies   Allergen Reactions    Latex, Natural Rubber Rash    Aspirin Other (comments)     Excessive bleeding      Codeine Palpitations    Imitrex [Sumatriptan] Palpitations and Other (comments)     thougt she was having an MI    Percocet [Oxycodone-Acetaminophen] Palpitations        Prior to Admission medications    Medication Sig Start Date End Date Taking? Authorizing Provider   pantoprazole (PROTONIX) 20 mg tablet Take 1 Tab by mouth daily. 7/17/18   Tony Rodriguez PA   docusate sodium (STOOL SOFTENER) 50 mg capsule Take 50 mg by mouth nightly. Provider, Historical   DULoxetine (CYMBALTA) 30 mg capsule Take 30 mg by mouth nightly. Nirmal Villarreal MD   DULoxetine (CYMBALTA) 60 mg capsule Take 60 mg by mouth nightly. Nirmal Villarreal MD   losartan-hydroCHLOROthiazide (HYZAAR) 100-25 mg per tablet Take 1 Tab by mouth nightly. Nirmal Villarreal MD   topiramate (TOPAMAX) 25 mg tablet Take 25 mg by mouth two (2) times a day. Nirmal Villarreal MD   tiZANidine (ZANAFLEX) 4 mg capsule Take 4 mg by mouth three (3) times daily. Nirmal Villarreal MD   varenicline (CHANTIX) 1 mg tablet Take 1 mg by mouth daily. Nirmal Villarreal MD   ALPRAZolam Jessi Sanes) 1 mg tablet Take 1 mg by mouth three (3) times daily as needed.     Provider, Historical       Review of Systems:  (bold if positive, if negative)    Gen:  Eyes:  ENT:  CVS:  Pulm:  GI:  Abdominal pain,BRBPRGU:  MS:  Skin:  Psych:  depression, anxietyEndo:  Hem:  Renal:  Neuro:        Objective:      VITALS:    Vital signs reviewed; most recent are:    Visit Vitals  /76 (BP 1 Location: Right arm, BP Patient Position: At rest)   Pulse (!) 122   Temp 99.1 °F (37.3 °C)   Resp 20   Ht 5' 4\" (1.626 m)   Wt 106.1 kg (234 lb)   SpO2 97%   BMI 40.17 kg/m²     SpO2 Readings from Last 6 Encounters:   05/30/19 97%   04/29/19 99%   04/29/19 96%   04/18/19 99%   07/17/18 95%   05/23/18 98%            Intake/Output Summary (Last 24 hours) at 5/30/2019 2018  Last data filed at 5/30/2019 2000  Gross per 24 hour   Intake 210 ml   Output --   Net 210 ml        Exam:     Physical Exam:    Gen:  Morbid obese, in no acute distress  HEENT:  Pink conjunctivae, PERRL, hearing intact to voice, moist mucous membranes  Neck:  Supple, without masses, thyroid non-tender  Resp:  No accessory muscle use, clear breath sounds without wheezes rales or rhonchi  Card:  No murmurs, normal S1, S2 without thrills, bruits or peripheral edema  Abd:  Soft, tender, non-distended, normoactive bowel sounds are present, no mass  Lymph:  No cervical or inguinal adenopathy  Musc:  No cyanosis or clubbing  Skin:  No rashes or ulcers, skin turgor is good  Neuro:  Cranial nerves are grossly intact, no focal motor weakness, follows commands poorly  Psych:  Poor insight, oriented to person, place and time, agitated, tangential, argumentative     Labs:    Recent Labs     05/30/19  1756   WBC 10.5   HGB 10.8*   HCT 32.6*        Recent Labs     05/30/19  1756      K 2.3*      CO2 28   *   BUN 19   CREA 1.25*   CA 9.2   MG 2.0   ALB 3.8   TBILI 0.3   SGOT 20   ALT 29     Lab Results   Component Value Date/Time    Glucose (POC) 93 09/30/2013 12:55 PM     No results for input(s): PH, PCO2, PO2, HCO3, FIO2 in the last 72 hours. No results for input(s): INR in the last 72 hours. No lab exists for component: INREXT  All Micro Results     None          I have reviewed previous records       Assessment and Plan:      Anemia - POA, new, likely due to GI blood loss. Check serologies. Transfuse if Hb<7    Hypokalemia / Dehydration / Elevated lactic acid level - POA, due to poor PO intake. Hydrate and replete K. Rectal bleeding / Hx hemorroids / Hx Hiatal hernia - POA, very likely hemorrhoid bleeding. Consult GI. Clear diet. Unlikely to need endoscopy. PPI    Anxiety and depression / Fibromyalgia - POA, poorly controlled.   This is contributing to her sensation of pain. Continue cymbalta, chatix and xanax. Flexeril is a poor choice for fibro, but continue it. Morbid obesity / CARLOS (obstructive sleep apnea) - Non compliant with cpap. Advise weight loss    Hypertension / Incomplete right bundle branch block (RBBB) determined by electrocardiography - Adequate control on losartan HCTZ    Hx of seizure disorder - None recent. Continue topamax    Telemetry reviewed:   normal sinus rhythm    Risk of deterioration: high  Signed out to Dr Leyla Garnica at 10PM    Total time spent with patient: 79 Minutes I reviewed chart, notes, data and current medications in the medical record. I have examined and treated the patient at bedside during this period.                  Care Plan discussed with: Patient, Family, Nursing Staff and >50% of time spent in counseling and coordination of care    Discussed:  Care Plan       ___________________________________________________    Attending Physician: Devang Li MD

## 2019-05-31 NOTE — ED NOTES
Pt Throughput: Charge Nurse on 5th floor  made aware of patient's bed assignment, room 513. Carlos Brunson RN  Emergency Dept Charge RN.

## 2019-05-31 NOTE — PROGRESS NOTES
Assigned RN had to waste Morphing 1 mg due to accidentally pushing syringe in pocket and spilling it. Waste witnessed, new dose was drawn.

## 2019-05-31 NOTE — CONSULTS
Gastroenterology Consultation Note      Admit Date: 5/30/2019  Consult Date: 5/31/2019   I greatly appreciate your asking me to see Vitor Siegel, thank you very much for the opportunity to participate in her care. Narrative Assessment and Plan   · Bright red blood per rectum  · Normocytic Anemia - Hgb 9.1  · No significant iron deficiency but suspect due to blood loss  · Hgb April 2019 was 13.0  · Hx of internal hemorrhoids    46yo female presents with complaints of bright red blood per rectum. CT scan negative for acute process or source of bleeding. Suspect her bleeding is hemorrhoidal but given increase amount of bleeding (not associated with BM) and new onset anemia would be wise to ensure no alternative etiology such as colonic AVM, diverticular bleeding, colitis, malignancy. Plan for colonoscopy Monday June 3rd. Sunday June 2nd she will be on clear liquids and start bowel prep. Continue to monitor frequency and characteristic of stool  Monitor H/H and transfuse as needed  If any changes and patient has significant bleeding or become hemodynamically unstable then obtain bleeding scan or CT angio (low suspicion that this will occur)  Encourage patient to continue daily bowel regimen    Reviewed plan with my attending physician Dr. Rc Franz    -----------------  Gonzalez Chowdhury. Rc Franz MD  [phone] office  [phone] voicemail   I have personally reviewed the history, interviewed the patient, and independently examined the patient. I have reviewed the chart and agree with the documentation recorded by the Mid Level Provider, including the assessment, treatment plan, and disposition; with the addition of:    ASSESSMENT AND PLAN:  Significant reported GI blood loss and mild anemia  Plan for observation, serial H/H, and endoscopic evaluation on Monday by Dr. Quintana Headings  We reviewed the indciations, risks, benefits and alternatives and she's asked we proceed.     Arvind Bowie MD        Subjective: Chief Complaint: \"I'm hungry\"    History of Present Illness: Patient is a 47 y.o. female admitted on 5/30/2019 with several episodes of hematochezia over the past 2 weeks. Patient reports associated abdominal pain located on left side and mid-abdomen. Has been lightheaded and dizzy. States that she is passing bright red blood with clots. She showed me pictures on her phone. Bleeding occurs with and without bowel movements. Does admit to constipation and that she's not following a high fiber diet and no longer on a daily bowel regimen. Patient states that she's had bleeding in the past but usually associated with bowel movements and noticed with wiping. Reports this is different. Denies hematemesis, melena, or weight loss. No NSAIDs or daily anticoagulation. Last episode was Wednesday night. Main complaint today is that she wants to eat and she wants to find out what is going on. Last office visit August 2018 (Dr. Pastora Briggs) for rectal bleeding. Recommendations: fiber supplement, Miralax, sitz bath, Anusol suppositories, and referral to surgeon for hemorrhoidectomy.      Last colonoscopy 2016 - 5mm polyp and internal hemorrhoids    PCP:  Yosi Adams MD    Past Medical History:   Diagnosis Date    Anxiety and depression     Cavernous hemangioma of intracranial structure ([facility] [address] 5/2/2011    Fibromyalgia     fibromyalgia    Hemorrhoids     Hiatal hernia with gastroesophageal reflux 04/2018    Hx of migraines     Hx of seizure disorder     Hypertension     Incomplete right bundle branch block (RBBB) determined by electrocardiography 05/10/2018    CARLOS (obstructive sleep apnea)     no cpap    Thyroid nodule         Past Surgical History:   Procedure Laterality Date    HX CHOLECYSTECTOMY      HX HEENT  2012    tonsillectomy    HX LITHOTRIPSY      HX ORTHOPAEDIC      disc fusion 2010, right knee cyst removed   [facility], 2006    Cavernous brain angioma(s) removed    SURGERY,BRAIN/SPINE,W/COMPUTER         Social History     Tobacco Use    Smoking status: Former Smoker    Smokeless tobacco: Never Used    Tobacco comment: on chantix,   Substance Use Topics    Alcohol use: No        Family History   Problem Relation Age of Onset    Cancer Mother         breast    Breast Cancer Mother 76    Cancer Father     Migraines Father     Diabetes Father     Breast Cancer Sister 48    Ovarian Cancer Maternal Aunt         Allergies   Allergen Reactions    Latex, Natural Rubber Rash    Aspirin Other (comments)     Excessive bleeding      Codeine Palpitations    Imitrex [Sumatriptan] Palpitations and Other (comments)     thougt she was having an MI    Percocet [Oxycodone-Acetaminophen] Palpitations            Home Medications:  Prior to Admission Medications   Prescriptions Last Dose Informant Patient Reported? Taking? ALPRAZolam (XANAX) 1 mg tablet 5/29/2019 at Unknown time Other Yes Yes   Sig: Take 1 mg by mouth three (3) times daily. DULoxetine (CYMBALTA) 60 mg capsule 5/29/2019 at bedtime  Yes Yes   Sig: Take 120 mg by mouth nightly. busPIRone (BUSPAR) 7.5 mg tablet 5/29/2019 at bedtime  Yes Yes   Sig: Take 7.5 mg by mouth nightly. ibuprofen (MOTRIN) 800 mg tablet 5/29/2019 at Unknown time  Yes Yes   Sig: Take 800 mg by mouth every eight (8) hours as needed for Pain.   losartan-hydroCHLOROthiazide (HYZAAR) 100-25 mg per tablet 5/29/2019 at bedtime  Yes Yes   Sig: Take 1 Tab by mouth nightly. pantoprazole (PROTONIX) 40 mg tablet 5/29/2019 at bedtime  Yes Yes   Sig: Take 40 mg by mouth nightly. tiZANidine (ZANAFLEX) 4 mg tablet 5/29/2019 at Unknown time  Yes Yes   Sig: Take 4 mg by mouth three (3) times daily. topiramate (TOPAMAX) 25 mg tablet 5/29/2019 at Unknown time  Yes Yes   Sig: Take 25 mg by mouth two (2) times a day. traZODone (DESYREL) 100 mg tablet 5/29/2019 at bedtime  Yes Yes   Sig: Take 100 mg by mouth nightly.    traZODone (DESYREL) 100 mg tablet 5/29/2019 at Unknown time  Yes Yes   Sig: Take 100-200 mg by mouth nightly as needed for Sleep (Will take 1-2 tabs in addition to first 100 mg tab if unable to sleep).       Facility-Administered Medications: None       Hospital Medications:  Current Facility-Administered Medications   Medication Dose Route Frequency    ALPRAZolam (XANAX) tablet 1 mg  1 mg Oral QID PRN    docusate (COLACE) 50 mg/5 mL oral liquid 50 mg  50 mg Oral QHS    DULoxetine (CYMBALTA) capsule 120 mg  120 mg Oral QHS    pantoprazole (PROTONIX) tablet 40 mg  40 mg Oral DAILY    topiramate (TOPAMAX) tablet 25 mg  25 mg Oral BID    tiZANidine (ZANAFLEX) tablet 4 mg  4 mg Oral TID    losartan/hydroCHLOROthiazide (HYZAAR) 100/25 mg   Oral QHS    potassium chloride SR (KLOR-CON 10) tablet 20 mEq  20 mEq Oral BID    morphine injection 1 mg  1 mg IntraVENous Q4H PRN    0.9% sodium chloride with KCl 40 mEq/L infusion   IntraVENous CONTINUOUS    acetaminophen (TYLENOL) tablet 650 mg  650 mg Oral Q6H PRN    ibuprofen (MOTRIN) tablet 200 mg  200 mg Oral Q6H PRN    diphenhydrAMINE (BENADRYL) capsule 25 mg  25 mg Oral Q4H PRN    ondansetron (ZOFRAN) injection 4 mg  4 mg IntraVENous Q4H PRN    polyethylene glycol (MIRALAX) packet 17 g  17 g Oral BID       Review of Systems: Admission ROS by Charon Ormond, MD from 5/30/2019 were reviewed with the patient and changes (other than per HPI) include: none      Objective:     Physical Exam:  Visit Vitals  BP 90/62 (BP 1 Location: Right arm, BP Patient Position: At rest)   Pulse 69   Temp 98 °F (36.7 °C)   Resp 16   Ht 5' 4\" (1.626 m)   Wt 106.1 kg (234 lb)   SpO2 97%   BMI 40.17 kg/m²     SpO2 Readings from Last 6 Encounters:   05/31/19 97%   04/29/19 99%   04/29/19 96%   04/18/19 99%   07/17/18 95%   05/23/18 98%            Intake/Output Summary (Last 24 hours) at 5/31/2019 1403  Last data filed at 5/30/2019 2000  Gross per 24 hour   Intake 210 ml   Output --   Net 210 ml      General: no distress, comfortable  Skin:  No rash or palpable dermatologic mass lesions  HEENT: Pupils equal, sclera anicteric, oropharynx with no gross lesions  Cardiovascular: No abnormal audible heart sounds, well perfused, no edema  Respiratory:  No abnormal audible breath sounds, normal respiratory effort, no throacic deformity  GI:  Bowel sounds present, soft, nondistended, periumbilical and left side discomfort. No rebound or guarding. Musculoskeletal:  No skeletal deformity nor acute arthritis noted. Neurological:  Motor and sensory function intact in upper extremeties  Psychiatric:  Normal affect, memory intact, appears to have insight into current illness  Lymphatic:  No cervical, supraclavicular, or periumbilic lymphadenopathy    Laboratory:    Recent Results (from the past 24 hour(s))   CBC WITH AUTOMATED DIFF    Collection Time: 05/30/19  5:56 PM   Result Value Ref Range    WBC 10.5 3.6 - 11.0 K/uL    RBC 3.66 (L) 3.80 - 5.20 M/uL    HGB 10.8 (L) 11.5 - 16.0 g/dL    HCT 32.6 (L) 35.0 - 47.0 %    MCV 89.1 80.0 - 99.0 FL    MCH 29.5 26.0 - 34.0 PG    MCHC 33.1 30.0 - 36.5 g/dL    RDW 13.2 11.5 - 14.5 %    PLATELET 772 715 - 513 K/uL    MPV 10.3 8.9 - 12.9 FL    NRBC 0.0 0  WBC    ABSOLUTE NRBC 0.00 0.00 - 0.01 K/uL    NEUTROPHILS 67 32 - 75 %    LYMPHOCYTES 23 12 - 49 %    MONOCYTES 4 (L) 5 - 13 %    EOSINOPHILS 5 0 - 7 %    BASOPHILS 1 0 - 1 %    IMMATURE GRANULOCYTES 0 0.0 - 0.5 %    ABS. NEUTROPHILS 7.0 1.8 - 8.0 K/UL    ABS. LYMPHOCYTES 2.4 0.8 - 3.5 K/UL    ABS. MONOCYTES 0.4 0.0 - 1.0 K/UL    ABS. EOSINOPHILS 0.5 (H) 0.0 - 0.4 K/UL    ABS. BASOPHILS 0.1 0.0 - 0.1 K/UL    ABS. IMM.  GRANS. 0.0 0.00 - 0.04 K/UL    DF AUTOMATED     METABOLIC PANEL, COMPREHENSIVE    Collection Time: 05/30/19  5:56 PM   Result Value Ref Range    Sodium 136 136 - 145 mmol/L    Potassium 2.3 (LL) 3.5 - 5.1 mmol/L    Chloride 100 97 - 108 mmol/L    CO2 28 21 - 32 mmol/L    Anion gap 8 5 - 15 mmol/L    Glucose 198 (H) 65 - 100 mg/dL    BUN 19 6 - 20 MG/DL    Creatinine 1.25 (H) 0.55 - 1.02 MG/DL    BUN/Creatinine ratio 15 12 - 20      GFR est AA 55 (L) >60 ml/min/1.73m2    GFR est non-AA 45 (L) >60 ml/min/1.73m2    Calcium 9.2 8.5 - 10.1 MG/DL    Bilirubin, total 0.3 0.2 - 1.0 MG/DL    ALT (SGPT) 29 12 - 78 U/L    AST (SGOT) 20 15 - 37 U/L    Alk.  phosphatase 112 45 - 117 U/L    Protein, total 8.0 6.4 - 8.2 g/dL    Albumin 3.8 3.5 - 5.0 g/dL    Globulin 4.2 (H) 2.0 - 4.0 g/dL    A-G Ratio 0.9 (L) 1.1 - 2.2     TYPE & SCREEN    Collection Time: 05/30/19  5:56 PM   Result Value Ref Range    Crossmatch Expiration 06/02/2019     ABO/Rh(D) Jd Mcadams POSITIVE     Antibody screen NEG    MAGNESIUM    Collection Time: 05/30/19  5:56 PM   Result Value Ref Range    Magnesium 2.0 1.6 - 2.4 mg/dL   TROPONIN I    Collection Time: 05/30/19  5:56 PM   Result Value Ref Range    Troponin-I, Qt. <0.05 <0.05 ng/mL   URINALYSIS W/ REFLEX CULTURE    Collection Time: 05/30/19  6:03 PM   Result Value Ref Range    Color YELLOW/STRAW      Appearance CLEAR CLEAR      Specific gravity 1.020 1.003 - 1.030      pH (UA) 6.0 5.0 - 8.0      Protein NEGATIVE  NEG mg/dL    Glucose NEGATIVE  NEG mg/dL    Ketone NEGATIVE  NEG mg/dL    Bilirubin NEGATIVE  NEG      Blood TRACE (A) NEG      Urobilinogen 0.2 0.2 - 1.0 EU/dL    Nitrites NEGATIVE  NEG      Leukocyte Esterase NEGATIVE  NEG      WBC 0-4 0 - 4 /hpf    RBC 20-50 0 - 5 /hpf    Epithelial cells FEW FEW /lpf    Bacteria NEGATIVE  NEG /hpf    UA:UC IF INDICATED CULTURE NOT INDICATED BY UA RESULT CNI     EKG, 12 LEAD, INITIAL    Collection Time: 05/30/19  6:52 PM   Result Value Ref Range    Ventricular Rate 105 BPM    Atrial Rate 105 BPM    P-R Interval 128 ms    QRS Duration 88 ms    Q-T Interval 394 ms    QTC Calculation (Bezet) 520 ms    Calculated P Axis 39 degrees    Calculated R Axis -9 degrees    Calculated T Axis 29 degrees    Diagnosis       Sinus tachycardia  RSR' pattern in V1  Nonspecific ST abnormality  Abnormal ECG  When compared with ECG of 18-APR-2019 20:33,  ST no longer depressed in Inferior leads  T wave inversion no longer evident in Anterior leads  Nonspecific T wave abnormality now evident in Lateral leads  Confirmed by Fauzia Villareal MD, [id number] ([id number]) on 5/31/2019 10:29:15 AM     POC LACTIC ACID    Collection Time: 05/30/19  7:02 PM   Result Value Ref Range    Lactic Acid (POC) 3.17 (HH) 0.40 - 2.00 mmol/L   OCCULT BLOOD, STOOL    Collection Time: 05/30/19  7:06 PM   Result Value Ref Range    Occult blood, stool NEGATIVE  NEG     ETHYL ALCOHOL    Collection Time: 05/30/19  8:20 PM   Result Value Ref Range    ALCOHOL(ETHYL),SERUM <10 <10 MG/DL   RETICULOCYTE COUNT    Collection Time: 05/30/19  8:20 PM   Result Value Ref Range    Reticulocyte count 2.2 (H) 0.7 - 2.1 %    Absolute Retic Cnt. 0.0730 0.0164 - 0.0776 M/ul   LD    Collection Time: 05/30/19  8:20 PM   Result Value Ref Range     81 - 246 U/L   TSH 3RD GENERATION    Collection Time: 05/30/19  8:20 PM   Result Value Ref Range    TSH 1.38 0.36 - 3.74 uIU/mL   FERRITIN    Collection Time: 05/30/19  9:18 PM   Result Value Ref Range    Ferritin 15 8 - 252 NG/ML   FOLATE    Collection Time: 05/30/19  9:18 PM   Result Value Ref Range    Folate 18.9 5.0 - 21.0 ng/mL   HAPTOGLOBIN    Collection Time: 05/30/19  9:18 PM   Result Value Ref Range    Haptoglobin 228 (H) 30 - 200 mg/dL   IRON PROFILE    Collection Time: 05/30/19  9:18 PM   Result Value Ref Range    Iron 49 35 - 150 ug/dL    TIBC 378 250 - 450 ug/dL    Iron % saturation 13 (L) 20 - 50 %   VITAMIN B12    Collection Time: 05/30/19  9:18 PM   Result Value Ref Range    Vitamin B12 428 193 - 986 pg/mL   CBC W/O DIFF    Collection Time: 05/31/19  5:03 AM   Result Value Ref Range    WBC 8.5 3.6 - 11.0 K/uL    RBC 3.10 (L) 3.80 - 5.20 M/uL    HGB 9.1 (L) 11.5 - 16.0 g/dL    HCT 28.0 (L) 35.0 - 47.0 %    MCV 90.3 80.0 - 99.0 FL    MCH 29.4 26.0 - 34.0 PG    MCHC 32.5 30.0 - 36.5 g/dL    RDW 13.3 11.5 - 14.5 % PLATELET 264 604 - 546 K/uL    MPV 10.6 8.9 - 12.9 FL    NRBC 0.0 0  WBC    ABSOLUTE NRBC 0.00 0.00 - 0.01 K/uL   MAGNESIUM    Collection Time: 05/31/19  5:03 AM   Result Value Ref Range    Magnesium 2.3 1.6 - 2.4 mg/dL   METABOLIC PANEL, BASIC    Collection Time: 05/31/19  5:03 AM   Result Value Ref Range    Sodium 138 136 - 145 mmol/L    Potassium 3.0 (L) 3.5 - 5.1 mmol/L    Chloride 103 97 - 108 mmol/L    CO2 32 21 - 32 mmol/L    Anion gap 3 (L) 5 - 15 mmol/L    Glucose 102 (H) 65 - 100 mg/dL    BUN 15 6 - 20 MG/DL    Creatinine 0.95 0.55 - 1.02 MG/DL    BUN/Creatinine ratio 16 12 - 20      GFR est AA >60 >60 ml/min/1.73m2    GFR est non-AA >60 >60 ml/min/1.73m2    Calcium 8.8 8.5 - 10.1 MG/DL         Assessment/Plan:     Principal Problem:    Anemia (5/30/2019)    Active Problems:    Cavernous hemangioma of intracranial structure (HCC) (5/2/2011)      Hypokalemia (10/1/2013)      Hiatal hernia (5/18/2018)      Severe obesity (HCC) (4/29/2019)      Elevated lactic acid level (5/30/2019)      Rectal bleeding (5/30/2019)      CARLOS (obstructive sleep apnea) ()      Overview: no cpap      Incomplete right bundle branch block (RBBB) determined by electrocardiography (5/10/2018)      Hypertension ()      Hx of seizure disorder ()      Fibromyalgia ()      Overview: fibromyalgia      Anxiety and depression ()      Dehydration (5/30/2019)         See above narrative for full detail.     Tayo Philip PA-C  05/31/19  2:03 PM

## 2019-05-31 NOTE — PROGRESS NOTES
5/31/2019   CARE MANAGEMENT NOTE:  CM explained Medicare Outpt OBS and State OBS letters. Signed copies were placed onto pt's chart. Full assessment not completed at this time.   Albert

## 2019-05-31 NOTE — PROGRESS NOTES
Bedside shift change report given to Timothy Alejandra RN (oncoming nurse) by Reshma Wasserman RN (offgoing nurse). Report included the following information SBAR, MAR, Accordion and Recent Results.

## 2019-06-01 LAB
ALBUMIN SERPL-MCNC: 3.2 G/DL (ref 3.5–5)
ALBUMIN/GLOB SERPL: 0.9 {RATIO} (ref 1.1–2.2)
ALP SERPL-CCNC: 95 U/L (ref 45–117)
ALT SERPL-CCNC: 31 U/L (ref 12–78)
ANION GAP SERPL CALC-SCNC: 3 MMOL/L (ref 5–15)
AST SERPL-CCNC: 24 U/L (ref 15–37)
BASOPHILS # BLD: 0.1 K/UL (ref 0–0.1)
BASOPHILS NFR BLD: 1 % (ref 0–1)
BILIRUB SERPL-MCNC: 0.2 MG/DL (ref 0.2–1)
BUN SERPL-MCNC: 14 MG/DL (ref 6–20)
BUN/CREAT SERPL: 16 (ref 12–20)
CALCIUM SERPL-MCNC: 8.8 MG/DL (ref 8.5–10.1)
CHLORIDE SERPL-SCNC: 108 MMOL/L (ref 97–108)
CO2 SERPL-SCNC: 29 MMOL/L (ref 21–32)
COMMENT, HOLDF: NORMAL
CREAT SERPL-MCNC: 0.9 MG/DL (ref 0.55–1.02)
DIFFERENTIAL METHOD BLD: ABNORMAL
EOSINOPHIL # BLD: 0.4 K/UL (ref 0–0.4)
EOSINOPHIL NFR BLD: 6 % (ref 0–7)
ERYTHROCYTE [DISTWIDTH] IN BLOOD BY AUTOMATED COUNT: 13.6 % (ref 11.5–14.5)
GLOBULIN SER CALC-MCNC: 3.5 G/DL (ref 2–4)
GLUCOSE SERPL-MCNC: 110 MG/DL (ref 65–100)
HCT VFR BLD AUTO: 29.1 % (ref 35–47)
HGB BLD-MCNC: 9.1 G/DL (ref 11.5–16)
IMM GRANULOCYTES # BLD AUTO: 0 K/UL (ref 0–0.04)
IMM GRANULOCYTES NFR BLD AUTO: 0 % (ref 0–0.5)
LYMPHOCYTES # BLD: 3.2 K/UL (ref 0.8–3.5)
LYMPHOCYTES NFR BLD: 46 % (ref 12–49)
MAGNESIUM SERPL-MCNC: 2.3 MG/DL (ref 1.6–2.4)
MCH RBC QN AUTO: 29.3 PG (ref 26–34)
MCHC RBC AUTO-ENTMCNC: 31.3 G/DL (ref 30–36.5)
MCV RBC AUTO: 93.6 FL (ref 80–99)
MONOCYTES # BLD: 0.5 K/UL (ref 0–1)
MONOCYTES NFR BLD: 8 % (ref 5–13)
NEUTS SEG # BLD: 2.7 K/UL (ref 1.8–8)
NEUTS SEG NFR BLD: 39 % (ref 32–75)
NRBC # BLD: 0 K/UL (ref 0–0.01)
NRBC BLD-RTO: 0 PER 100 WBC
PLATELET # BLD AUTO: 283 K/UL (ref 150–400)
PMV BLD AUTO: 10.6 FL (ref 8.9–12.9)
POTASSIUM SERPL-SCNC: 3.6 MMOL/L (ref 3.5–5.1)
PROT SERPL-MCNC: 6.7 G/DL (ref 6.4–8.2)
RBC # BLD AUTO: 3.11 M/UL (ref 3.8–5.2)
SAMPLES BEING HELD,HOLD: NORMAL
SODIUM SERPL-SCNC: 140 MMOL/L (ref 136–145)
WBC # BLD AUTO: 6.9 K/UL (ref 3.6–11)

## 2019-06-01 PROCEDURE — 83735 ASSAY OF MAGNESIUM: CPT

## 2019-06-01 PROCEDURE — 74011250636 HC RX REV CODE- 250/636: Performed by: FAMILY MEDICINE

## 2019-06-01 PROCEDURE — 74011250636 HC RX REV CODE- 250/636: Performed by: INTERNAL MEDICINE

## 2019-06-01 PROCEDURE — 74011250637 HC RX REV CODE- 250/637: Performed by: INTERNAL MEDICINE

## 2019-06-01 PROCEDURE — 99218 HC RM OBSERVATION: CPT

## 2019-06-01 PROCEDURE — 80053 COMPREHEN METABOLIC PANEL: CPT

## 2019-06-01 PROCEDURE — 36415 COLL VENOUS BLD VENIPUNCTURE: CPT

## 2019-06-01 PROCEDURE — 96376 TX/PRO/DX INJ SAME DRUG ADON: CPT

## 2019-06-01 PROCEDURE — 85025 COMPLETE CBC W/AUTO DIFF WBC: CPT

## 2019-06-01 PROCEDURE — 74011250637 HC RX REV CODE- 250/637: Performed by: FAMILY MEDICINE

## 2019-06-01 RX ORDER — MORPHINE SULFATE 4 MG/ML
2 INJECTION INTRAVENOUS
Status: DISCONTINUED | OUTPATIENT
Start: 2019-06-01 | End: 2019-06-06

## 2019-06-01 RX ORDER — MAGNESIUM CITRATE
296 SOLUTION, ORAL ORAL 2 TIMES DAILY
Status: COMPLETED | OUTPATIENT
Start: 2019-06-02 | End: 2019-06-02

## 2019-06-01 RX ADMIN — MORPHINE SULFATE 2 MG: 4 INJECTION, SOLUTION INTRAMUSCULAR; INTRAVENOUS at 18:01

## 2019-06-01 RX ADMIN — MORPHINE SULFATE 1 MG: 4 INJECTION, SOLUTION INTRAMUSCULAR; INTRAVENOUS at 01:58

## 2019-06-01 RX ADMIN — POLYETHYLENE GLYCOL 3350 17 G: 17 POWDER, FOR SOLUTION ORAL at 09:18

## 2019-06-01 RX ADMIN — ALPRAZOLAM 1 MG: 0.5 TABLET ORAL at 21:58

## 2019-06-01 RX ADMIN — MORPHINE SULFATE 1 MG: 4 INJECTION, SOLUTION INTRAMUSCULAR; INTRAVENOUS at 06:00

## 2019-06-01 RX ADMIN — POTASSIUM CHLORIDE 20 MEQ: 750 TABLET, FILM COATED, EXTENDED RELEASE ORAL at 17:06

## 2019-06-01 RX ADMIN — DOCUSATE SODIUM 50 MG: 50 LIQUID ORAL at 21:49

## 2019-06-01 RX ADMIN — DIPHENHYDRAMINE HYDROCHLORIDE 25 MG: 25 CAPSULE ORAL at 21:49

## 2019-06-01 RX ADMIN — TIZANIDINE 4 MG: 2 TABLET ORAL at 21:51

## 2019-06-01 RX ADMIN — PANTOPRAZOLE SODIUM 40 MG: 40 TABLET, DELAYED RELEASE ORAL at 09:18

## 2019-06-01 RX ADMIN — MORPHINE SULFATE 2 MG: 4 INJECTION, SOLUTION INTRAMUSCULAR; INTRAVENOUS at 13:51

## 2019-06-01 RX ADMIN — SODIUM CHLORIDE AND POTASSIUM CHLORIDE: 9; 2.98 INJECTION, SOLUTION INTRAVENOUS at 04:54

## 2019-06-01 RX ADMIN — POLYETHYLENE GLYCOL 3350 17 G: 17 POWDER, FOR SOLUTION ORAL at 17:06

## 2019-06-01 RX ADMIN — SODIUM CHLORIDE AND POTASSIUM CHLORIDE: 9; 2.98 INJECTION, SOLUTION INTRAVENOUS at 18:07

## 2019-06-01 RX ADMIN — MORPHINE SULFATE 2 MG: 4 INJECTION, SOLUTION INTRAMUSCULAR; INTRAVENOUS at 21:52

## 2019-06-01 RX ADMIN — DULOXETINE HYDROCHLORIDE 120 MG: 30 CAPSULE, DELAYED RELEASE ORAL at 21:50

## 2019-06-01 RX ADMIN — MORPHINE SULFATE 2 MG: 4 INJECTION, SOLUTION INTRAMUSCULAR; INTRAVENOUS at 09:28

## 2019-06-01 RX ADMIN — POTASSIUM CHLORIDE 20 MEQ: 750 TABLET, FILM COATED, EXTENDED RELEASE ORAL at 09:18

## 2019-06-01 NOTE — PROGRESS NOTES
Bedside and Verbal shift change report given to Kaden rn  (oncoming nurse) by Stephanie Kolb  (offgoing nurse). Report included the following information SBAR and Kardex.

## 2019-06-01 NOTE — PROGRESS NOTES
Malik Land M.D.  [phone]           GI PROGRESS NOTE        NAME: Santhosh Ramos   :  1965   MRN:  [id number]       Subjective:   Reports she had one bowel movement with bright red blood this morning. Denies abdominal pain. Reports dysphagia to solids lately. Denies recurrent heartburn symptoms. Objective: In NAD      VITALS:   Last 24hrs VS reviewed since prior progress note. Most recent are:  Visit Vitals  /86 (BP 1 Location: Right arm, BP Patient Position: Sitting)   Pulse 99   Temp 97.9 °F (36.6 °C)   Resp 18   Ht 5' 4\" (1.626 m)   Wt 106.1 kg (234 lb)   SpO2 96%   BMI 40.17 kg/m²     No intake or output data in the 24 hours ending 19 1159    PHYSICAL EXAM:  General: Alert, in no acute distress    HEENT: Anicteric sclerae. Lungs:            CTA Bilaterally.    Heart:  Regular  rhythm,    Abdomen: Soft, Non distended, Non tender, obese.  (+)Bowel sounds, no HSM  Extremities: No c/c/e    Lab Data Reviewed:   Recent Labs     19  0430 19  0503   WBC 6.9 8.5   HGB 9.1* 9.1*   HCT 29.1* 28.0*    271     Recent Labs     19  0430 19  0503    138   K 3.6 3.0*    103   CO2 29 32   BUN 14 15   CREA 0.90 0.95   * 102*   CA 8.8 8.8     Recent Labs     19  0430 19  1756   SGOT 24 20   AP 95 112   TP 6.7 8.0   ALB 3.2* 3.8   GLOB 3.5 4.2*       ________________________________________________________________________  Patient Active Problem List   Diagnosis Code    Cavernous hemangioma of intracranial structure (HCC) D18.02    Hypokalemia E87.6    Hiatal hernia K44.9    Severe obesity (HCC) E66.01    Elevated lactic acid level R79.89    Rectal bleeding K62.5    CARLOS (obstructive sleep apnea) G47.33    Incomplete right bundle branch block (RBBB) determined by electrocardiography I45.10    Hypertension I10    Hx of seizure disorder Z86.69    Fibromyalgia M79.7    Anxiety and depression F41.9, F32.9    Dehydration E86.0    Anemia D64.9    Hemorrhoids K64.9    Hx of migraines Z86.69    Hiatal hernia with gastroesophageal reflux K21.9, K44.9         Assessment and Plan:  Bright red blood per rectum, new onset anemia, hemoglobin stable at this point. Planned for colonoscopy by Dr. Gloria Vaughn on Monday. New onset dysphagia to solids, will add pantoprazole and add EGD on Monday. Soft diet today, clears and prepp tomorrow.        Signed By: Amparo Kenney MD     6/1/2019  11:59 AM

## 2019-06-01 NOTE — PROGRESS NOTES
Daily Progress Note: 6/1/2019  Babita Mendez MD    Assessment/Plan:   Anemia - POA, new, likely due to GI blood loss. Check serologies. Transfuse if Hb<7     Hypokalemia / Dehydration / Elevated lactic acid level - POA, due to poor PO intake. Hydrate and replete K.     Rectal bleeding / Hx hemorroids / Hx Hiatal hernia - POA, very likely hemorrhoid bleeding. Consulted GI. Clear diet. PPI     Anxiety and depression / Fibromyalgia - POA, poorly controlled. This is contributing to her sensation of pain. Continue cymbalta, chatix and xanax.      Morbid obesity / CARLOS (obstructive sleep apnea) - Non compliant with cpap. Advised weight loss     Hypertension / Incomplete right bundle branch block (RBBB) determined by electrocardiography - Adequate control on losartan HCTZ     Hx of seizure disorder - None recent.   Continue topamax       Problem List:  Problem List as of 6/1/2019 Date Reviewed: 5/30/2019          Codes Class Noted - Resolved    Elevated lactic acid level ICD-10-CM: R79.89  ICD-9-CM: 276.2  5/30/2019 - Present        Rectal bleeding ICD-10-CM: K62.5  ICD-9-CM: 569.3  5/30/2019 - Present        CARLOS (obstructive sleep apnea) ICD-10-CM: G47.33  ICD-9-CM: 327.23  Unknown - Present    Overview Signed 5/30/2019  7:55 PM by Abhinav Knapp MD     no cpap             Hypertension ICD-10-CM: I10  ICD-9-CM: 401.9  Unknown - Present        Hx of seizure disorder ICD-10-CM: Z86.69  ICD-9-CM: V12.49  Unknown - Present        Fibromyalgia ICD-10-CM: M79.7  ICD-9-CM: 729.1  Unknown - Present    Overview Signed 5/30/2019  7:55 PM by Abhinav Knapp MD     fibromyalgia             Anxiety and depression ICD-10-CM: F41.9, F32.9  ICD-9-CM: 300.00, 311  Unknown - Present        Dehydration ICD-10-CM: E86.0  ICD-9-CM: 276.51  5/30/2019 - Present        * (Principal) Anemia ICD-10-CM: D64.9  ICD-9-CM: 285.9  5/30/2019 - Present        Hemorrhoids ICD-10-CM: K64.9  ICD-9-CM: 455.6  Unknown - Present Hx of migraines ICD-10-CM: Z86.69  ICD-9-CM: V12.49  Unknown - Present        Severe obesity ([facility] ICD-10-CM: E66.01  ICD-9-CM: 278.01  4/29/2019 - Present        Hiatal hernia ICD-10-CM: K44.9  ICD-9-CM: 553.3  5/18/2018 - Present        Incomplete right bundle branch block (RBBB) determined by electrocardiography ICD-10-CM: I45.10  ICD-9-CM: 426.4  5/10/2018 - Present        Hiatal hernia with gastroesophageal reflux ICD-10-CM: K21.9, K44.9  ICD-9-CM: 530.81, 553.3  4/1/2018 - Present        Hypokalemia ICD-10-CM: E87.6  ICD-9-CM: 276.8  10/1/2013 - Present        Cavernous hemangioma of intracranial structure (HCC) (Chronic) ICD-10-CM: D18.02  ICD-9-CM: 228.02  5/2/2011 - Present        RESOLVED: Depressive disorder, not elsewhere classified ICD-10-CM: F32.9  ICD-9-CM: 171  10/1/2013 - 5/30/2019        RESOLVED: Leukocytosis, unspecified ICD-10-CM: I91.563  ICD-9-CM: 288.60  10/1/2013 - 5/30/2019        RESOLVED: Slurred speech ICD-10-CM: R47.81  ICD-9-CM: 784.59  9/30/2013 - 5/30/2019        RESOLVED: Seizure ([facility] (Chronic) ICD-10-CM: R56.9  ICD-9-CM: 780.39  5/2/2011 - 5/30/2019        RESOLVED: Migraine (Chronic) ICD-10-CM: O18.276  ICD-9-CM: 346.90  5/2/2011 - 5/30/2019            Subjective:    47 y.o.  female who presented to the Emergency Department complaining of BRBPR. She is a poor historian due to anxiety. He reports 10 days of seeing red toilet water and clots on toilet paper. She reports crampy abdominal pain. She reports a hx of hemorrhoids, and had her latest colonoscopy about 1 year ago, with finding of hemorrhoids and polyps. She reports non compliance wiwth hemorrhoid med recommended by GI.  ER finds anemia, but hemoccult was negative today. We will admit her for observation. (Dr Breanne Yi)     5/31:  Still c/o \"severe\" ab pain but conversing well, does not appear in pain. Pain is constant and in mid ab between quadrants bilat and periumbicaly.   She wants something \"stronger\" for pain. She reports she has taken Morphine in past without side effects. No N/V today. K+ sl better today after she agreed to take the med. GI to see today. :She is still  having  Pain and some rectal bleeding. GI has seen and is planning colonoscopy for Monday. Review of Systems:   A comprehensive review of systems was negative except for that written in the HPI. Objective:   Physical Exam:     Visit Vitals  /73 (BP 1 Location: Right arm, BP Patient Position: At rest)   Pulse 81   Temp 97.9 °F (36.6 °C)   Resp 17   Ht 5' 4\" (1.626 m)   Wt 234 lb (106.1 kg)   LMP 2011   SpO2 95%   BMI 40.17 kg/m²      O2 Device: Room air  Temp (24hrs), Av.1 °F (36.7 °C), Min:97.7 °F (36.5 °C), Max:98.5 °F (36.9 °C)    No intake/output data recorded.  1901 -  0700  In: 210 [I.V.:210]  Out: -     General:  Alert, morbidly obese, cooperative, no distress, appears stated age. Head:  Normocephalic, without obvious abnormality, atraumatic. Eyes:  Conjunctivae/corneas clear. PERRL, EOMs intact. Nose: Nares normal. Septum midline. Mucosa normal. No drainage or sinus tenderness. Throat: Lips, mucosa, and tongue moist..   Neck: Supple, symmetrical, trachea midline, no adenopathy, thyroid: no enlargement/tenderness/nodules, no carotid bruit and no JVD. Back:   Symmetric, no curvature. ROM normal. No CVA tenderness. Lungs:   Clear to auscultation bilaterally. Chest wall:  No tenderness or deformity. Heart:  Regular rate and rhythm, S1, S2 normal, no murmur, click, rub or gallop. Abdomen:   Soft, obese, with mild generalized tenderness without rebound/guarding. Bowel sounds normal. No masses,  No organomegaly. Extremities: no cyanosis or edema. No calf tenderness or cords. Skin: turgor normal.   Neurologic: Alert and oriented X 3. Fine motor of hands and fingers normal.   equal.  No cogwheeling or rigidity. Gait not tested at this time.   Sensation grossly normal to touch. Gross motor of extremities normal.        Data Review:    CT AB and Pelvis 5/30/19   FINDINGS:   LUNG BASES: Clear. INCIDENTALLY IMAGED HEART AND MEDIASTINUM: Unremarkable. LIVER: No mass or biliary dilatation. GALLBLADDER: Surgically absent  SPLEEN: No mass. PANCREAS: No mass or ductal dilatation. ADRENALS: Unremarkable. KIDNEYS: No mass, calculus, or hydronephrosis. STOMACH: Post fundoplication  SMALL BOWEL: No dilatation or wall thickening. COLON: No dilatation or wall thickening. APPENDIX: Unremarkable. PERITONEUM: No ascites or pneumoperitoneum. RETROPERITONEUM: No lymphadenopathy or aortic aneurysm. Mild vascular  calcifications  REPRODUCTIVE ORGANS: Within normal limits  URINARY BLADDER: No mass or calculus. BONES: No destructive bone lesion. ADDITIONAL COMMENTS: N/A  IMPRESSION:  A source of GI bleeding is not identified  Recent Days:  Recent Labs     06/01/19  0430 05/31/19  0503 05/30/19  1756   WBC 6.9 8.5 10.5   HGB 9.1* 9.1* 10.8*   HCT 29.1* 28.0* 32.6*    271 307     Recent Labs     06/01/19  0430 05/31/19  0503 05/30/19  1756    138 136   K 3.6 3.0* 2.3*    103 100   CO2 29 32 28   * 102* 198*   BUN 14 15 19   CREA 0.90 0.95 1.25*   CA 8.8 8.8 9.2   MG 2.3 2.3 2.0   ALB 3.2*  --  3.8   TBILI 0.2  --  0.3   SGOT 24  --  20   ALT 31  --  29     No results for input(s): PH, PCO2, PO2, HCO3, FIO2 in the last 72 hours.     24 Hour Results:  Recent Results (from the past 24 hour(s))   MAGNESIUM    Collection Time: 06/01/19  4:30 AM   Result Value Ref Range    Magnesium 2.3 1.6 - 2.4 mg/dL   METABOLIC PANEL, COMPREHENSIVE    Collection Time: 06/01/19  4:30 AM   Result Value Ref Range    Sodium 140 136 - 145 mmol/L    Potassium 3.6 3.5 - 5.1 mmol/L    Chloride 108 97 - 108 mmol/L    CO2 29 21 - 32 mmol/L    Anion gap 3 (L) 5 - 15 mmol/L    Glucose 110 (H) 65 - 100 mg/dL    BUN 14 6 - 20 MG/DL    Creatinine 0.90 0.55 - 1.02 MG/DL    BUN/Creatinine ratio 16 12 - 20      GFR est AA >60 >60 ml/min/1.73m2    GFR est non-AA >60 >60 ml/min/1.73m2    Calcium 8.8 8.5 - 10.1 MG/DL    Bilirubin, total 0.2 0.2 - 1.0 MG/DL    ALT (SGPT) 31 12 - 78 U/L    AST (SGOT) 24 15 - 37 U/L    Alk. phosphatase 95 45 - 117 U/L    Protein, total 6.7 6.4 - 8.2 g/dL    Albumin 3.2 (L) 3.5 - 5.0 g/dL    Globulin 3.5 2.0 - 4.0 g/dL    A-G Ratio 0.9 (L) 1.1 - 2.2     CBC WITH AUTOMATED DIFF    Collection Time: 06/01/19  4:30 AM   Result Value Ref Range    WBC 6.9 3.6 - 11.0 K/uL    RBC 3.11 (L) 3.80 - 5.20 M/uL    HGB 9.1 (L) 11.5 - 16.0 g/dL    HCT 29.1 (L) 35.0 - 47.0 %    MCV 93.6 80.0 - 99.0 FL    MCH 29.3 26.0 - 34.0 PG    MCHC 31.3 30.0 - 36.5 g/dL    RDW 13.6 11.5 - 14.5 %    PLATELET 162 747 - 803 K/uL    MPV 10.6 8.9 - 12.9 FL    NRBC 0.0 0  WBC    ABSOLUTE NRBC 0.00 0.00 - 0.01 K/uL    NEUTROPHILS 39 32 - 75 %    LYMPHOCYTES 46 12 - 49 %    MONOCYTES 8 5 - 13 %    EOSINOPHILS 6 0 - 7 %    BASOPHILS 1 0 - 1 %    IMMATURE GRANULOCYTES 0 0.0 - 0.5 %    ABS. NEUTROPHILS 2.7 1.8 - 8.0 K/UL    ABS. LYMPHOCYTES 3.2 0.8 - 3.5 K/UL    ABS. MONOCYTES 0.5 0.0 - 1.0 K/UL    ABS. EOSINOPHILS 0.4 0.0 - 0.4 K/UL    ABS. BASOPHILS 0.1 0.0 - 0.1 K/UL    ABS. IMM. GRANS. 0.0 0.00 - 0.04 K/UL    DF AUTOMATED     SAMPLES BEING HELD    Collection Time: 06/01/19  4:30 AM   Result Value Ref Range    SAMPLES BEING HELD LAV     COMMENT        Add-on orders for these samples will be processed based on acceptable specimen integrity and analyte stability, which may vary by analyte.        Medications reviewed  Current Facility-Administered Medications   Medication Dose Route Frequency    ALPRAZolam (XANAX) tablet 1 mg  1 mg Oral QID PRN    docusate (COLACE) 50 mg/5 mL oral liquid 50 mg  50 mg Oral QHS    DULoxetine (CYMBALTA) capsule 120 mg  120 mg Oral QHS    pantoprazole (PROTONIX) tablet 40 mg  40 mg Oral DAILY    topiramate (TOPAMAX) tablet 25 mg  25 mg Oral BID    tiZANidine (ZANAFLEX) tablet 4 mg  4 mg Oral TID    losartan/hydroCHLOROthiazide (HYZAAR) 100/25 mg   Oral QHS    potassium chloride SR (KLOR-CON 10) tablet 20 mEq  20 mEq Oral BID    morphine injection 1 mg  1 mg IntraVENous Q4H PRN    [START ON 6/2/2019] peg 3350-electrolytes (COLYTE) 4000 mL  2,000 mL Oral ONCE    [START ON 6/2/2019] peg 3350-electrolytes (COLYTE) 4000 mL  2,000 mL Oral ONCE    0.9% sodium chloride with KCl 40 mEq/L infusion   IntraVENous CONTINUOUS    acetaminophen (TYLENOL) tablet 650 mg  650 mg Oral Q6H PRN    ibuprofen (MOTRIN) tablet 200 mg  200 mg Oral Q6H PRN    diphenhydrAMINE (BENADRYL) capsule 25 mg  25 mg Oral Q4H PRN    ondansetron (ZOFRAN) injection 4 mg  4 mg IntraVENous Q4H PRN    polyethylene glycol (MIRALAX) packet 17 g  17 g Oral BID       Care Plan discussed with: Patient/Family and Nurse  Total time spent with patient: 30 minutes.     Bethany Phalen, MD

## 2019-06-02 ENCOUNTER — ANESTHESIA EVENT (OUTPATIENT)
Dept: ENDOSCOPY | Age: 54
End: 2019-06-02
Payer: MEDICARE

## 2019-06-02 ENCOUNTER — APPOINTMENT (OUTPATIENT)
Dept: ULTRASOUND IMAGING | Age: 54
End: 2019-06-02
Attending: FAMILY MEDICINE
Payer: MEDICARE

## 2019-06-02 LAB
ALBUMIN SERPL-MCNC: 3.2 G/DL (ref 3.5–5)
ALBUMIN/GLOB SERPL: 0.9 {RATIO} (ref 1.1–2.2)
ALP SERPL-CCNC: 152 U/L (ref 45–117)
ALT SERPL-CCNC: 97 U/L (ref 12–78)
ANION GAP SERPL CALC-SCNC: 3 MMOL/L (ref 5–15)
AST SERPL-CCNC: 102 U/L (ref 15–37)
BILIRUB SERPL-MCNC: 0.3 MG/DL (ref 0.2–1)
BUN SERPL-MCNC: 11 MG/DL (ref 6–20)
BUN/CREAT SERPL: 13 (ref 12–20)
CALCIUM SERPL-MCNC: 8.3 MG/DL (ref 8.5–10.1)
CHLORIDE SERPL-SCNC: 111 MMOL/L (ref 97–108)
CO2 SERPL-SCNC: 26 MMOL/L (ref 21–32)
CREAT SERPL-MCNC: 0.84 MG/DL (ref 0.55–1.02)
ERYTHROCYTE [DISTWIDTH] IN BLOOD BY AUTOMATED COUNT: 14 % (ref 11.5–14.5)
GLOBULIN SER CALC-MCNC: 3.7 G/DL (ref 2–4)
GLUCOSE SERPL-MCNC: 102 MG/DL (ref 65–100)
HAV IGM SER QL: NONREACTIVE
HBV CORE IGM SER QL: NONREACTIVE
HBV SURFACE AG SER QL: <0.1 INDEX
HBV SURFACE AG SER QL: NEGATIVE
HCT VFR BLD AUTO: 28.7 % (ref 35–47)
HCV AB SERPL QL IA: NONREACTIVE
HCV COMMENT,HCGAC: NORMAL
HGB BLD-MCNC: 9 G/DL (ref 11.5–16)
MCH RBC QN AUTO: 29.4 PG (ref 26–34)
MCHC RBC AUTO-ENTMCNC: 31.4 G/DL (ref 30–36.5)
MCV RBC AUTO: 93.8 FL (ref 80–99)
NRBC # BLD: 0 K/UL (ref 0–0.01)
NRBC BLD-RTO: 0 PER 100 WBC
PLATELET # BLD AUTO: 246 K/UL (ref 150–400)
PMV BLD AUTO: 10.5 FL (ref 8.9–12.9)
POTASSIUM SERPL-SCNC: 3.7 MMOL/L (ref 3.5–5.1)
PROT SERPL-MCNC: 6.9 G/DL (ref 6.4–8.2)
RBC # BLD AUTO: 3.06 M/UL (ref 3.8–5.2)
SODIUM SERPL-SCNC: 140 MMOL/L (ref 136–145)
SP1: NORMAL
SP2: NORMAL
SP3: NORMAL
WBC # BLD AUTO: 6.3 K/UL (ref 3.6–11)

## 2019-06-02 PROCEDURE — 96375 TX/PRO/DX INJ NEW DRUG ADDON: CPT

## 2019-06-02 PROCEDURE — 76700 US EXAM ABDOM COMPLETE: CPT

## 2019-06-02 PROCEDURE — 74011250637 HC RX REV CODE- 250/637: Performed by: INTERNAL MEDICINE

## 2019-06-02 PROCEDURE — 80074 ACUTE HEPATITIS PANEL: CPT

## 2019-06-02 PROCEDURE — 80053 COMPREHEN METABOLIC PANEL: CPT

## 2019-06-02 PROCEDURE — 96376 TX/PRO/DX INJ SAME DRUG ADON: CPT

## 2019-06-02 PROCEDURE — 74011250636 HC RX REV CODE- 250/636: Performed by: FAMILY MEDICINE

## 2019-06-02 PROCEDURE — 74011250636 HC RX REV CODE- 250/636: Performed by: INTERNAL MEDICINE

## 2019-06-02 PROCEDURE — 85027 COMPLETE CBC AUTOMATED: CPT

## 2019-06-02 PROCEDURE — 74011000250 HC RX REV CODE- 250: Performed by: PHYSICIAN ASSISTANT

## 2019-06-02 PROCEDURE — 36415 COLL VENOUS BLD VENIPUNCTURE: CPT

## 2019-06-02 PROCEDURE — 99218 HC RM OBSERVATION: CPT

## 2019-06-02 RX ADMIN — MORPHINE SULFATE 2 MG: 4 INJECTION, SOLUTION INTRAMUSCULAR; INTRAVENOUS at 03:10

## 2019-06-02 RX ADMIN — POLYETHYLENE GLYCOL-3350 AND ELECTROLYTES 2000 ML: 236; 6.74; 5.86; 2.97; 22.74 POWDER, FOR SOLUTION ORAL at 17:08

## 2019-06-02 RX ADMIN — POLYETHYLENE GLYCOL 3350 17 G: 17 POWDER, FOR SOLUTION ORAL at 17:08

## 2019-06-02 RX ADMIN — TIZANIDINE 4 MG: 2 TABLET ORAL at 23:54

## 2019-06-02 RX ADMIN — TOPIRAMATE 25 MG: 25 TABLET, FILM COATED ORAL at 08:21

## 2019-06-02 RX ADMIN — PANTOPRAZOLE SODIUM 40 MG: 40 TABLET, DELAYED RELEASE ORAL at 08:21

## 2019-06-02 RX ADMIN — POLYETHYLENE GLYCOL 3350 17 G: 17 POWDER, FOR SOLUTION ORAL at 08:23

## 2019-06-02 RX ADMIN — SODIUM CHLORIDE AND POTASSIUM CHLORIDE: 9; 2.98 INJECTION, SOLUTION INTRAVENOUS at 07:07

## 2019-06-02 RX ADMIN — MAGNESIUM CITRATE 296 ML: 1.75 LIQUID ORAL at 08:21

## 2019-06-02 RX ADMIN — DULOXETINE HYDROCHLORIDE 120 MG: 30 CAPSULE, DELAYED RELEASE ORAL at 22:24

## 2019-06-02 RX ADMIN — POLYETHYLENE GLYCOL-3350 AND ELECTROLYTES 2000 ML: 236; 6.74; 5.86; 2.97; 22.74 POWDER, FOR SOLUTION ORAL at 08:23

## 2019-06-02 RX ADMIN — MORPHINE SULFATE 2 MG: 4 INJECTION, SOLUTION INTRAMUSCULAR; INTRAVENOUS at 16:00

## 2019-06-02 RX ADMIN — ONDANSETRON 4 MG: 2 INJECTION INTRAMUSCULAR; INTRAVENOUS at 20:45

## 2019-06-02 RX ADMIN — MORPHINE SULFATE 2 MG: 4 INJECTION, SOLUTION INTRAMUSCULAR; INTRAVENOUS at 08:20

## 2019-06-02 RX ADMIN — TIZANIDINE 4 MG: 2 TABLET ORAL at 08:20

## 2019-06-02 RX ADMIN — MORPHINE SULFATE 2 MG: 4 INJECTION, SOLUTION INTRAMUSCULAR; INTRAVENOUS at 12:18

## 2019-06-02 RX ADMIN — ALPRAZOLAM 1 MG: 0.5 TABLET ORAL at 22:25

## 2019-06-02 RX ADMIN — DOCUSATE SODIUM 50 MG: 50 LIQUID ORAL at 22:24

## 2019-06-02 RX ADMIN — MORPHINE SULFATE 2 MG: 4 INJECTION, SOLUTION INTRAMUSCULAR; INTRAVENOUS at 20:45

## 2019-06-02 RX ADMIN — MAGNESIUM CITRATE 296 ML: 1.75 LIQUID ORAL at 17:08

## 2019-06-02 NOTE — ANESTHESIA PREPROCEDURE EVALUATION
Relevant Problems   No relevant active problems   Anesthetic History          Comments: Awareness during colonoscopy     Review of Systems / Medical History  Patient summary reviewed and pertinent labs reviewed    Pulmonary        Sleep apnea: No treatment  Smoker (recently quit)         Neuro/Psych     seizures (last seizure 4 years ago): well controlled    Headaches and psychiatric history (anxiety/depression)     Cardiovascular    Hypertension: well controlled              Exercise tolerance: <4 METS     GI/Hepatic/Renal     GERD (no symptoms recently)    Renal disease: stones       Endo/Other          Pertinent negatives: Diabetes: borderline. Other Findings              Physical Exam    Airway  Mallampati: II  TM Distance: 4 - 6 cm  Neck ROM: normal range of motion   Mouth opening: Normal     Cardiovascular    Rhythm: regular  Rate: normal         Dental         Pulmonary  Breath sounds clear to auscultation               Abdominal         Other Findings            Anesthetic Plan    ASA: 2  Anesthesia type: general          Induction: Intravenous  Anesthetic plan and risks discussed with: Patient              Anesthetic History   No history of anesthetic complications            Review of Systems / Medical History  Patient summary reviewed, nursing notes reviewed and pertinent labs reviewed    Pulmonary        Sleep apnea: No treatment           Neuro/Psych   Within defined limits           Cardiovascular    Hypertension: well controlled              Exercise tolerance: >4 METS     GI/Hepatic/Renal           Hiatal hernia    Comments: Lower GI Bleeding. ..fresh blood Endo/Other      Hypothyroidism  Morbid obesity and anemia     Other Findings            Physical Exam    Airway  Mallampati: II  TM Distance: 4 - 6 cm  Neck ROM: normal range of motion   Mouth opening: Normal     Cardiovascular    Rhythm: regular  Rate: normal         Dental    Dentition: Poor dentition     Pulmonary  Breath sounds clear to auscultation               Abdominal  GI exam deferred       Other Findings            Anesthetic Plan    ASA: 3  Anesthesia type: MAC          Induction: Intravenous  Anesthetic plan and risks discussed with: Patient

## 2019-06-02 NOTE — PROGRESS NOTES
Bedside and Verbal shift change report given to Alan Guzman  (oncoming nurse) by Clayton Jon  (offgoing nurse). Report included the following information SBAR and Kardex.

## 2019-06-02 NOTE — PROGRESS NOTES
Daily Progress Note: 6/2/2019  Edna Luevano MD    Assessment/Plan:   Anemia - POA, new, likely due to GI blood loss. --Check serologies. Transfuse if Hb<7     Hypokalemia / Dehydration / Elevated lactic acid level - POA, due to poor PO intake. --Hydrate and replete K.     Rectal bleeding / Hx hemorroids / Hx Hiatal hernia - POA, very likely hemorrhoid bleeding. --Consulted GI.    --Clear diet. --PPI     Anxiety and depression / Fibromyalgia - POA, poorly controlled. This is contributing to her sensation of pain. --Continue cymbalta, chatix and xanax.      Morbid obesity / CARLOS (obstructive sleep apnea) - Non compliant with cpap. Advised weight loss     Hypertension / Incomplete right bundle branch block (RBBB) determined by electrocardiography - Adequate control on losartan HCTZ     Hx of seizure disorder - None recent.     --Continue topamax    Elevated liver enzymes  --Add hepatitis profile  --US abd       Problem List:  Problem List as of 6/2/2019 Date Reviewed: 5/30/2019          Codes Class Noted - Resolved    Elevated lactic acid level ICD-10-CM: R79.89  ICD-9-CM: 276.2  5/30/2019 - Present        Rectal bleeding ICD-10-CM: K62.5  ICD-9-CM: 569.3  5/30/2019 - Present        CARLOS (obstructive sleep apnea) ICD-10-CM: G47.33  ICD-9-CM: 327.23  Unknown - Present    Overview Signed 5/30/2019  7:55 PM by Abhijit Hess MD     no cpap             Hypertension ICD-10-CM: I10  ICD-9-CM: 401.9  Unknown - Present        Hx of seizure disorder ICD-10-CM: Z86.69  ICD-9-CM: V12.49  Unknown - Present        Fibromyalgia ICD-10-CM: M79.7  ICD-9-CM: 729.1  Unknown - Present    Overview Signed 5/30/2019  7:55 PM by Abhijit Hess MD     fibromyalgia             Anxiety and depression ICD-10-CM: F41.9, F32.9  ICD-9-CM: 300.00, 311  Unknown - Present        Dehydration ICD-10-CM: E86.0  ICD-9-CM: 276.51  5/30/2019 - Present        * (Principal) Anemia ICD-10-CM: D64.9  ICD-9-CM: 285.9 5/30/2019 - Present        Hemorrhoids ICD-10-CM: K64.9  ICD-9-CM: 455.6  Unknown - Present        Hx of migraines ICD-10-CM: Z86.69  ICD-9-CM: V12.49  Unknown - Present        Severe obesity ([facility] ICD-10-CM: E66.01  ICD-9-CM: 278.01  4/29/2019 - Present        Hiatal hernia ICD-10-CM: K44.9  ICD-9-CM: 553.3  5/18/2018 - Present        Incomplete right bundle branch block (RBBB) determined by electrocardiography ICD-10-CM: I45.10  ICD-9-CM: 426.4  5/10/2018 - Present        Hiatal hernia with gastroesophageal reflux ICD-10-CM: K21.9, K44.9  ICD-9-CM: 530.81, 553.3  4/1/2018 - Present        Hypokalemia ICD-10-CM: E87.6  ICD-9-CM: 276.8  10/1/2013 - Present        Cavernous hemangioma of intracranial structure (HCC) (Chronic) ICD-10-CM: D18.02  ICD-9-CM: 228.02  5/2/2011 - Present        RESOLVED: Depressive disorder, not elsewhere classified ICD-10-CM: F32.9  ICD-9-CM: 602  10/1/2013 - 5/30/2019        RESOLVED: Leukocytosis, unspecified ICD-10-CM: Q50.760  ICD-9-CM: 288.60  10/1/2013 - 5/30/2019        RESOLVED: Slurred speech ICD-10-CM: R47.81  ICD-9-CM: 784.59  9/30/2013 - 5/30/2019        RESOLVED: Seizure ([facility] (Chronic) ICD-10-CM: R56.9  ICD-9-CM: 780.39  5/2/2011 - 5/30/2019        RESOLVED: Migraine (Chronic) ICD-10-CM: N09.189  ICD-9-CM: 346.90  5/2/2011 - 5/30/2019            Subjective:    47 y.o.  female who presented to the Emergency Department complaining of BRBPR. She is a poor historian due to anxiety. He reports 10 days of seeing red toilet water and clots on toilet paper. She reports crampy abdominal pain. She reports a hx of hemorrhoids, and had her latest colonoscopy about 1 year ago, with finding of hemorrhoids and polyps. She reports non compliance wiwth hemorrhoid med recommended by GI.  ER finds anemia, but hemoccult was negative today. We will admit her for observation. (Dr Preeti Lemos)     5/31:  Still c/o \"severe\" ab pain but conversing well, does not appear in pain.   Pain is constant and in mid ab between quadrants bilat and periumbicaly. She wants something \"stronger\" for pain. She reports she has taken Morphine in past without side effects. No N/V today. K+ sl better today after she agreed to take the med. GI to see today. :She is still  having  Pain and some rectal bleeding. GI has seen and is planning colonoscopy for Monday. : Less pain. Will be on clears today and starting bowel prep. LFTs are elevated this am. Will add hepatitis profile and obtain US. No nausea. Hb stable. Review of Systems:   A comprehensive review of systems was negative except for that written in the HPI. Objective:   Physical Exam:     Visit Vitals  /82 (BP 1 Location: Right arm, BP Patient Position: At rest)   Pulse 81   Temp 97.9 °F (36.6 °C)   Resp 17   Ht 5' 4\" (1.626 m)   Wt 234 lb (106.1 kg)   LMP 2011   SpO2 99%   BMI 40.17 kg/m²      O2 Device: Room air  Temp (24hrs), Av.1 °F (36.7 °C), Min:97.9 °F (36.6 °C), Max:98.4 °F (36.9 °C)    No intake/output data recorded. No intake/output data recorded. General:  Alert, morbidly obese, cooperative, no distress, appears stated age. Head:  Normocephalic, without obvious abnormality, atraumatic. Eyes:  Conjunctivae/corneas clear. PERRL, EOMs intact. Nose: Nares normal. Septum midline. Mucosa normal. No drainage or sinus tenderness. Throat: Lips, mucosa, and tongue moist..   Neck: Supple, symmetrical, trachea midline, no adenopathy, thyroid: no enlargement/tenderness/nodules, no carotid bruit and no JVD. Back:   Symmetric, no curvature. ROM normal. No CVA tenderness. Lungs:   Clear to auscultation bilaterally. Chest wall:  No tenderness or deformity. Heart:  Regular rate and rhythm, S1, S2 normal, no murmur, click, rub or gallop. Abdomen:   Soft, obese, with mild generalized tenderness without rebound/guarding. Bowel sounds normal. No masses,  No organomegaly. Extremities: no cyanosis or edema.  No calf tenderness or cords. Skin: turgor normal.   Neurologic: Alert and oriented X 3. Fine motor of hands and fingers normal.   equal.  No cogwheeling or rigidity. Gait not tested at this time. Sensation grossly normal to touch. Gross motor of extremities normal.        Data Review:    CT AB and Pelvis 5/30/19   FINDINGS:   LUNG BASES: Clear. INCIDENTALLY IMAGED HEART AND MEDIASTINUM: Unremarkable. LIVER: No mass or biliary dilatation. GALLBLADDER: Surgically absent  SPLEEN: No mass. PANCREAS: No mass or ductal dilatation. ADRENALS: Unremarkable. KIDNEYS: No mass, calculus, or hydronephrosis. STOMACH: Post fundoplication  SMALL BOWEL: No dilatation or wall thickening. COLON: No dilatation or wall thickening. APPENDIX: Unremarkable. PERITONEUM: No ascites or pneumoperitoneum. RETROPERITONEUM: No lymphadenopathy or aortic aneurysm. Mild vascular  calcifications  REPRODUCTIVE ORGANS: Within normal limits  URINARY BLADDER: No mass or calculus. BONES: No destructive bone lesion. ADDITIONAL COMMENTS: N/A  IMPRESSION:  A source of GI bleeding is not identified  Recent Days:  Recent Labs     06/02/19  0548 06/01/19  0430 05/31/19  0503   WBC 6.3 6.9 8.5   HGB 9.0* 9.1* 9.1*   HCT 28.7* 29.1* 28.0*    283 271     Recent Labs     06/02/19  0548 06/01/19  0430 05/31/19  0503 05/30/19  1756    140 138 136   K 3.7 3.6 3.0* 2.3*   * 108 103 100   CO2 26 29 32 28   * 110* 102* 198*   BUN 11 14 15 19   CREA 0.84 0.90 0.95 1.25*   CA 8.3* 8.8 8.8 9.2   MG  --  2.3 2.3 2.0   ALB 3.2* 3.2*  --  3.8   TBILI 0.3 0.2  --  0.3   SGOT 102* 24  --  20   ALT 97* 31  --  29     No results for input(s): PH, PCO2, PO2, HCO3, FIO2 in the last 72 hours.     24 Hour Results:  Recent Results (from the past 24 hour(s))   METABOLIC PANEL, COMPREHENSIVE    Collection Time: 06/02/19  5:48 AM   Result Value Ref Range    Sodium 140 136 - 145 mmol/L    Potassium 3.7 3.5 - 5.1 mmol/L    Chloride 111 (H) 97 - 108 mmol/L    CO2 26 21 - 32 mmol/L    Anion gap 3 (L) 5 - 15 mmol/L    Glucose 102 (H) 65 - 100 mg/dL    BUN 11 6 - 20 MG/DL    Creatinine 0.84 0.55 - 1.02 MG/DL    BUN/Creatinine ratio 13 12 - 20      GFR est AA >60 >60 ml/min/1.73m2    GFR est non-AA >60 >60 ml/min/1.73m2    Calcium 8.3 (L) 8.5 - 10.1 MG/DL    Bilirubin, total 0.3 0.2 - 1.0 MG/DL    ALT (SGPT) 97 (H) 12 - 78 U/L    AST (SGOT) 102 (H) 15 - 37 U/L    Alk.  phosphatase 152 (H) 45 - 117 U/L    Protein, total 6.9 6.4 - 8.2 g/dL    Albumin 3.2 (L) 3.5 - 5.0 g/dL    Globulin 3.7 2.0 - 4.0 g/dL    A-G Ratio 0.9 (L) 1.1 - 2.2     CBC W/O DIFF    Collection Time: 06/02/19  5:48 AM   Result Value Ref Range    WBC 6.3 3.6 - 11.0 K/uL    RBC 3.06 (L) 3.80 - 5.20 M/uL    HGB 9.0 (L) 11.5 - 16.0 g/dL    HCT 28.7 (L) 35.0 - 47.0 %    MCV 93.8 80.0 - 99.0 FL    MCH 29.4 26.0 - 34.0 PG    MCHC 31.4 30.0 - 36.5 g/dL    RDW 14.0 11.5 - 14.5 %    PLATELET 354 093 - 073 K/uL    MPV 10.5 8.9 - 12.9 FL    NRBC 0.0 0  WBC    ABSOLUTE NRBC 0.00 0.00 - 0.01 K/uL       Medications reviewed  Current Facility-Administered Medications   Medication Dose Route Frequency    morphine injection 2 mg  2 mg IntraVENous Q4H PRN    magnesium citrate solution 296 mL  296 mL Oral BID    ALPRAZolam (XANAX) tablet 1 mg  1 mg Oral QID PRN    docusate (COLACE) 50 mg/5 mL oral liquid 50 mg  50 mg Oral QHS    DULoxetine (CYMBALTA) capsule 120 mg  120 mg Oral QHS    pantoprazole (PROTONIX) tablet 40 mg  40 mg Oral DAILY    topiramate (TOPAMAX) tablet 25 mg  25 mg Oral BID    tiZANidine (ZANAFLEX) tablet 4 mg  4 mg Oral TID    losartan/hydroCHLOROthiazide (HYZAAR) 100/25 mg   Oral QHS    peg 3350-electrolytes (COLYTE) 4000 mL  2,000 mL Oral ONCE    0.9% sodium chloride with KCl 40 mEq/L infusion   IntraVENous CONTINUOUS    acetaminophen (TYLENOL) tablet 650 mg  650 mg Oral Q6H PRN    ibuprofen (MOTRIN) tablet 200 mg  200 mg Oral Q6H PRN    diphenhydrAMINE (BENADRYL) capsule 25 mg  25 mg Oral Q4H PRN    ondansetron (ZOFRAN) injection 4 mg  4 mg IntraVENous Q4H PRN    polyethylene glycol (MIRALAX) packet 17 g  17 g Oral BID       Care Plan discussed with: Patient/Family and Nurse  Total time spent with patient: 30 minutes.     Sarwat Painting MD

## 2019-06-02 NOTE — ROUTINE PROCESS
Bedside and Verbal shift change report given to Lowanda Gottron, RN by Elis Sutton RN. Report included the following information SBAR and Kardex.

## 2019-06-03 ENCOUNTER — ANESTHESIA (OUTPATIENT)
Dept: ENDOSCOPY | Age: 54
End: 2019-06-03
Payer: MEDICARE

## 2019-06-03 LAB
ALBUMIN SERPL-MCNC: 2.9 G/DL (ref 3.5–5)
ALBUMIN/GLOB SERPL: 0.9 {RATIO} (ref 1.1–2.2)
ALP SERPL-CCNC: 128 U/L (ref 45–117)
ALT SERPL-CCNC: 77 U/L (ref 12–78)
ANION GAP SERPL CALC-SCNC: 3 MMOL/L (ref 5–15)
AST SERPL-CCNC: 55 U/L (ref 15–37)
BILIRUB SERPL-MCNC: 0.3 MG/DL (ref 0.2–1)
BUN SERPL-MCNC: 8 MG/DL (ref 6–20)
BUN/CREAT SERPL: 11 (ref 12–20)
CALCIUM SERPL-MCNC: 7.9 MG/DL (ref 8.5–10.1)
CHLORIDE SERPL-SCNC: 112 MMOL/L (ref 97–108)
CO2 SERPL-SCNC: 26 MMOL/L (ref 21–32)
CREAT SERPL-MCNC: 0.75 MG/DL (ref 0.55–1.02)
ERYTHROCYTE [DISTWIDTH] IN BLOOD BY AUTOMATED COUNT: 14.1 % (ref 11.5–14.5)
GLOBULIN SER CALC-MCNC: 3.4 G/DL (ref 2–4)
GLUCOSE SERPL-MCNC: 99 MG/DL (ref 65–100)
H PYLORI FROM TISSUE: NEGATIVE
HCT VFR BLD AUTO: 25.5 % (ref 35–47)
HGB BLD-MCNC: 7.9 G/DL (ref 11.5–16)
KIT LOT NO., HCLOLOT: NORMAL
MCH RBC QN AUTO: 29.5 PG (ref 26–34)
MCHC RBC AUTO-ENTMCNC: 31 G/DL (ref 30–36.5)
MCV RBC AUTO: 95.1 FL (ref 80–99)
NEGATIVE CONTROL: NEGATIVE
NRBC # BLD: 0 K/UL (ref 0–0.01)
NRBC BLD-RTO: 0 PER 100 WBC
PLATELET # BLD AUTO: 250 K/UL (ref 150–400)
PMV BLD AUTO: 10.8 FL (ref 8.9–12.9)
POSITIVE CONTROL: POSITIVE
POTASSIUM SERPL-SCNC: 4.2 MMOL/L (ref 3.5–5.1)
PROT SERPL-MCNC: 6.3 G/DL (ref 6.4–8.2)
RBC # BLD AUTO: 2.68 M/UL (ref 3.8–5.2)
SODIUM SERPL-SCNC: 141 MMOL/L (ref 136–145)
WBC # BLD AUTO: 7 K/UL (ref 3.6–11)

## 2019-06-03 PROCEDURE — 36415 COLL VENOUS BLD VENIPUNCTURE: CPT

## 2019-06-03 PROCEDURE — 88305 TISSUE EXAM BY PATHOLOGIST: CPT

## 2019-06-03 PROCEDURE — 74011250637 HC RX REV CODE- 250/637: Performed by: FAMILY MEDICINE

## 2019-06-03 PROCEDURE — 74011250636 HC RX REV CODE- 250/636

## 2019-06-03 PROCEDURE — 99218 HC RM OBSERVATION: CPT

## 2019-06-03 PROCEDURE — 74011250636 HC RX REV CODE- 250/636: Performed by: INTERNAL MEDICINE

## 2019-06-03 PROCEDURE — 76060000032 HC ANESTHESIA 0.5 TO 1 HR: Performed by: INTERNAL MEDICINE

## 2019-06-03 PROCEDURE — 76040000007: Performed by: INTERNAL MEDICINE

## 2019-06-03 PROCEDURE — 87077 CULTURE AEROBIC IDENTIFY: CPT | Performed by: PHYSICIAN ASSISTANT

## 2019-06-03 PROCEDURE — 85027 COMPLETE CBC AUTOMATED: CPT

## 2019-06-03 PROCEDURE — 96376 TX/PRO/DX INJ SAME DRUG ADON: CPT

## 2019-06-03 PROCEDURE — 77030021593 HC FCPS BIOP ENDOSC BSC -A: Performed by: INTERNAL MEDICINE

## 2019-06-03 PROCEDURE — 74011250636 HC RX REV CODE- 250/636: Performed by: FAMILY MEDICINE

## 2019-06-03 PROCEDURE — 80053 COMPREHEN METABOLIC PANEL: CPT

## 2019-06-03 PROCEDURE — 94760 N-INVAS EAR/PLS OXIMETRY 1: CPT

## 2019-06-03 PROCEDURE — 74011250637 HC RX REV CODE- 250/637: Performed by: INTERNAL MEDICINE

## 2019-06-03 RX ORDER — TOPIRAMATE 25 MG/1
25 TABLET ORAL
Status: DISCONTINUED | OUTPATIENT
Start: 2019-06-03 | End: 2019-06-07 | Stop reason: HOSPADM

## 2019-06-03 RX ORDER — ATROPINE SULFATE 0.1 MG/ML
0.5 INJECTION INTRAVENOUS
Status: DISCONTINUED | OUTPATIENT
Start: 2019-06-03 | End: 2019-06-03 | Stop reason: HOSPADM

## 2019-06-03 RX ORDER — PROPOFOL 10 MG/ML
INJECTION, EMULSION INTRAVENOUS
Status: DISCONTINUED | OUTPATIENT
Start: 2019-06-03 | End: 2019-06-03 | Stop reason: HOSPADM

## 2019-06-03 RX ORDER — FLUMAZENIL 0.1 MG/ML
0.2 INJECTION INTRAVENOUS
Status: DISCONTINUED | OUTPATIENT
Start: 2019-06-03 | End: 2019-06-03 | Stop reason: HOSPADM

## 2019-06-03 RX ORDER — FENTANYL CITRATE 50 UG/ML
25 INJECTION, SOLUTION INTRAMUSCULAR; INTRAVENOUS AS NEEDED
Status: DISCONTINUED | OUTPATIENT
Start: 2019-06-03 | End: 2019-06-03 | Stop reason: HOSPADM

## 2019-06-03 RX ORDER — EPINEPHRINE 0.1 MG/ML
1 INJECTION INTRACARDIAC; INTRAVENOUS
Status: DISCONTINUED | OUTPATIENT
Start: 2019-06-03 | End: 2019-06-03 | Stop reason: HOSPADM

## 2019-06-03 RX ORDER — MIDAZOLAM HYDROCHLORIDE 1 MG/ML
.25-5 INJECTION, SOLUTION INTRAMUSCULAR; INTRAVENOUS AS NEEDED
Status: DISCONTINUED | OUTPATIENT
Start: 2019-06-03 | End: 2019-06-03 | Stop reason: HOSPADM

## 2019-06-03 RX ORDER — PROPOFOL 10 MG/ML
INJECTION, EMULSION INTRAVENOUS AS NEEDED
Status: DISCONTINUED | OUTPATIENT
Start: 2019-06-03 | End: 2019-06-03 | Stop reason: HOSPADM

## 2019-06-03 RX ORDER — DEXTROMETHORPHAN/PSEUDOEPHED 2.5-7.5/.8
1.2 DROPS ORAL
Status: DISCONTINUED | OUTPATIENT
Start: 2019-06-03 | End: 2019-06-03 | Stop reason: HOSPADM

## 2019-06-03 RX ORDER — SODIUM CHLORIDE 9 MG/ML
50 INJECTION, SOLUTION INTRAVENOUS CONTINUOUS
Status: DISPENSED | OUTPATIENT
Start: 2019-06-03 | End: 2019-06-03

## 2019-06-03 RX ORDER — TRAZODONE HYDROCHLORIDE 50 MG/1
100 TABLET ORAL
Status: DISCONTINUED | OUTPATIENT
Start: 2019-06-03 | End: 2019-06-07 | Stop reason: HOSPADM

## 2019-06-03 RX ORDER — SODIUM CHLORIDE 9 MG/ML
INJECTION, SOLUTION INTRAVENOUS
Status: DISCONTINUED | OUTPATIENT
Start: 2019-06-03 | End: 2019-06-03 | Stop reason: HOSPADM

## 2019-06-03 RX ORDER — NALOXONE HYDROCHLORIDE 0.4 MG/ML
0.4 INJECTION, SOLUTION INTRAMUSCULAR; INTRAVENOUS; SUBCUTANEOUS
Status: DISCONTINUED | OUTPATIENT
Start: 2019-06-03 | End: 2019-06-03 | Stop reason: HOSPADM

## 2019-06-03 RX ADMIN — TIZANIDINE 4 MG: 2 TABLET ORAL at 22:51

## 2019-06-03 RX ADMIN — SODIUM CHLORIDE AND POTASSIUM CHLORIDE: 9; 2.98 INJECTION, SOLUTION INTRAVENOUS at 20:38

## 2019-06-03 RX ADMIN — MORPHINE SULFATE 2 MG: 4 INJECTION, SOLUTION INTRAMUSCULAR; INTRAVENOUS at 22:50

## 2019-06-03 RX ADMIN — PROPOFOL 120 MCG/KG/MIN: 10 INJECTION, EMULSION INTRAVENOUS at 08:55

## 2019-06-03 RX ADMIN — MORPHINE SULFATE 2 MG: 4 INJECTION, SOLUTION INTRAMUSCULAR; INTRAVENOUS at 03:14

## 2019-06-03 RX ADMIN — POLYETHYLENE GLYCOL 3350 17 G: 17 POWDER, FOR SOLUTION ORAL at 18:21

## 2019-06-03 RX ADMIN — TIZANIDINE 4 MG: 2 TABLET ORAL at 16:27

## 2019-06-03 RX ADMIN — PROPOFOL 100 MG: 10 INJECTION, EMULSION INTRAVENOUS at 08:55

## 2019-06-03 RX ADMIN — TRAZODONE HYDROCHLORIDE 100 MG: 50 TABLET ORAL at 22:52

## 2019-06-03 RX ADMIN — BUSPIRONE HYDROCHLORIDE 15 MG: 10 TABLET ORAL at 22:53

## 2019-06-03 RX ADMIN — ONDANSETRON 4 MG: 2 INJECTION INTRAMUSCULAR; INTRAVENOUS at 22:59

## 2019-06-03 RX ADMIN — TIZANIDINE 4 MG: 2 TABLET ORAL at 10:34

## 2019-06-03 RX ADMIN — PANTOPRAZOLE SODIUM 40 MG: 40 TABLET, DELAYED RELEASE ORAL at 10:32

## 2019-06-03 RX ADMIN — MORPHINE SULFATE 2 MG: 4 INJECTION, SOLUTION INTRAMUSCULAR; INTRAVENOUS at 14:19

## 2019-06-03 RX ADMIN — ALPRAZOLAM 1 MG: 0.5 TABLET ORAL at 16:27

## 2019-06-03 RX ADMIN — DOCUSATE SODIUM 50 MG: 50 LIQUID ORAL at 22:49

## 2019-06-03 RX ADMIN — ONDANSETRON 4 MG: 2 INJECTION INTRAMUSCULAR; INTRAVENOUS at 14:19

## 2019-06-03 RX ADMIN — MORPHINE SULFATE 2 MG: 4 INJECTION, SOLUTION INTRAMUSCULAR; INTRAVENOUS at 18:22

## 2019-06-03 RX ADMIN — ONDANSETRON 4 MG: 2 INJECTION INTRAMUSCULAR; INTRAVENOUS at 03:13

## 2019-06-03 RX ADMIN — SODIUM CHLORIDE: 9 INJECTION, SOLUTION INTRAVENOUS at 08:27

## 2019-06-03 RX ADMIN — DULOXETINE HYDROCHLORIDE 120 MG: 30 CAPSULE, DELAYED RELEASE ORAL at 22:51

## 2019-06-03 RX ADMIN — TOPIRAMATE 25 MG: 25 TABLET, FILM COATED ORAL at 22:51

## 2019-06-03 NOTE — PROGRESS NOTES
Bedside and Verbal shift change report given to [address] [place] [address] rn  rn  (oncoming nurse) by Lani Sagastume  (offgoing nurse). Report included the following information SBAR and Kardex.

## 2019-06-03 NOTE — PERIOP NOTES
TRANSFER - OUT REPORT:    Verbal report given to Bee Norwood RN on [employer]  being transferred to [address] [place] [address]  for routine progression of care       Report consisted of patients Situation, Background, Assessment and   Recommendations(SBAR). Information from the following report(s) SBAR, Procedure Summary and Recent Results was reviewed with the receiving nurse. Lines:   Peripheral IV 05/30/19 Left Wrist (Active)   Site Assessment Clean, dry, & intact 6/3/2019  8:37 AM   Phlebitis Assessment 0 6/3/2019  8:37 AM   Infiltration Assessment 0 6/3/2019  8:37 AM   Dressing Status Clean, dry, & intact 6/3/2019  8:37 AM   Dressing Type Transparent 6/3/2019  8:37 AM   Hub Color/Line Status Blue; Infusing 6/3/2019  8:37 AM   Action Taken Open ports on tubing capped 6/3/2019  8:37 AM   Alcohol Cap Used Yes 6/3/2019  8:37 AM        Opportunity for questions and clarification was provided.       Patient transported with:   [employer]

## 2019-06-03 NOTE — PROCEDURES
[address]  [phone]      Tatum 3, 2019    Colonoscopy Procedure Note  Harper Colon  :  1965  [facility] Medical Record Number: [id number]    Indications:     blood loss anemia  PCP:  Tammy Maki MD  Anesthesia/Sedation: Conscious Sedation/Moderate Sedation  Endoscopist:  Dr. Ebonie Rosenthal  Assistants: None  Complications:  None  Estimate Blood Loss:  None    Permit:  The indications, risks, benefits and alternatives were reviewed with the patient or their decision maker who was provided an opportunity to ask questions and all questions were answered. The specific risks of colonoscopy with conscious sedation were reviewed, including but not limited to anesthetic complication, bleeding, adverse drug reaction, missed lesion, infection, IV site reactions, and intestinal perforation which would lead to the need for surgical repair. Alternatives to colonoscopy including radiographic imaging, observation without testing, or laboratory testing were reviewed including the limitations of those alternatives. After considering the options and having all their questions answered, the patient or their decision maker provided both verbal and written consent to proceed. Procedure in Detail:  After obtaining informed consent, positioning of the patient in the left lateral decubitus position, and conduction of a pre-procedure pause or \"time out\" the endoscope was introduced into the anus and advanced to the cecum, which was identified by the ileocecal valve and appendiceal orifice. The quality of the colonic preparation was inadequate to exclude sessile lesions several locations. Ileum can not be selectively intubated because of preparation limitations. A careful inspection was made as the colonoscope was withdrawn, findings and interventions are described below.     Appendiceal orifice photographed    Findings:   Area of granular appearing mucosa hepatic flexure; not bleeding; biopsies taken  Grade 4 currently not bleeding hemorrhoids  No blood seen any location today's exams    Specimens:    hepatic flexure biopsies    Implants: none    Complications:   None; patient tolerated the procedure well. Estimated blood loss: none    Impression:  large hemorhhoids; this fits pattern blood loss described as being proximate cause of visible hematochezia    Recommendations:     - For colon cancer screening in this average-risk patient, colonoscopy may be repeated in 10 years. - offered surgical referral for hemorrhoids    Thank you for entrusting me with this patient's care. Please do not hesitate to contact me with any questions or if I can be of assistance with any of your other patients' GI needs.     Signed By: Kecia Bustos MD                        Tatum 3, 2019

## 2019-06-03 NOTE — ANESTHESIA POSTPROCEDURE EVALUATION
Procedure(s):  COLONOSCOPY  ESOPHAGOGASTRODUODENOSCOPY (EGD)  ESOPHAGOGASTRODUODENAL (EGD) BIOPSY  COLON BIOPSY. MAC    Anesthesia Post Evaluation      Multimodal analgesia: multimodal analgesia not used between 6 hours prior to anesthesia start to PACU discharge  Patient location during evaluation: bedside  Patient participation: complete - patient participated  Level of consciousness: awake  Pain management: adequate  Airway patency: patent  Anesthetic complications: no  Cardiovascular status: acceptable  Respiratory status: acceptable, nonlabored ventilation and spontaneous ventilation  Hydration status: acceptable  Post anesthesia nausea and vomiting:  controlled      No vitals data found for the desired time range.

## 2019-06-03 NOTE — PROCEDURES
Jonna Ann M.D. Tatum 3, 2019    Esophagogastroduodenoscopy (EGD) Procedure Note  Gilford Rover  : 1965  [address] [place] [address] Medical Record Number: [id number]      Indications:   blood loss anemia  Referring Physician:  Harmeet Bucio MD  Anesthesia/Sedation: Conscious Sedation/Moderate Sedation  Endoscopist:  Dr. Amada Vaz  Assistants: None  Permit:  The indications, risks, benefits and alternatives were reviewed with the patient or their decision maker who was provided an opportunity to ask questions and all questions were answered. The specific risks of esophagogastroduodenoscopy with conscious sedation were reviewed, including but not limited to anesthetic complication, bleeding, adverse drug reaction, missed lesion, infection, IV site reactions, and intestinal perforation which would lead to the need for surgical repair. Alternatives to EGD including radiographic imaging, observation without testing, or laboratory testing were reviewed as well as the limitations of those alternatives discussed. After considering the options and having all their questions answered, the patient or their decision maker provided both verbal and written consent to proceed. Procedure in Detail:  After obtaining informed consent, positioning of the patient in the left lateral decubitus position, and conduction of a pre-procedure pause or \"time out\" the endoscope was introduced into the mouth and advanced to the third portion duodenum. A careful inspection was made, and findings or interventions are described below. Findings:   Esophagus:normal  Stomach: minor antral erythema; otherwise normal  Duodenum/jejunum: normal    Complications/estimated blood loss: none    Therapies:  Agnes test biopsy    Specimens: agnes test    Implants:none           Recommendations:  -colonoscopy    Thank you for entrusting me with this patient's care. Please do not hesitate to contact me with any questions or if I can be of assistance with any of your other patients' GI needs.   Signed By: Cherise Mane MD                        Tatum 3, 2019

## 2019-06-03 NOTE — ROUTINE PROCESS
Kendenise Ware  1965  [id number]    Situation:  Verbal report received from: Tracie Morton RN  Procedure: Procedure(s):  COLONOSCOPY  ESOPHAGOGASTRODUODENOSCOPY (EGD)  ESOPHAGOGASTRODUODENAL (EGD) BIOPSY  COLON BIOPSY    Background:    Preoperative diagnosis: Anemia  rectal bleeding  Postoperative diagnosis: * No post-op diagnosis entered *    :  Dr. Chinyere Reynolds  Assistant(s): Endoscopy Technician-1: Benito Frederick  Endoscopy RN-1: Maryam Oconnor RN    Specimens:   ID Type Source Tests Collected by Time Destination   1 : hepatic flexure biopsy Preservative Hepatic Flexure  Comfort Mai MD 6/3/2019 0315 Pathology     H. Pylori  yes    Assessment:  Intra-procedure medications     Anesthesia gave intra-procedure sedation and medications, see anesthesia flow sheet yes    Intravenous fluids: NS@ KVO     Vital signs stable     Abdominal assessment: round and soft     Recommendation:  Discharge patient per MD order.   Return to floor  Family or Friend   Permission to share finding with family or friend yes

## 2019-06-03 NOTE — PERIOP NOTES
TRANSFER - IN REPORT:    Verbal report received from [facility] Endoscopy Clinical Coordinator on Jules Claros  for routine progression of care      Report consisted of patients Situation, Background, Assessment and   Recommendations(SBAR). Information from the following report(s) SBAR and Recent Results was reviewed with the receiving nurse. Opportunity for questions and clarification was provided.

## 2019-06-03 NOTE — PERIOP NOTES
Tessy Pump  1965  [id number]    Situation:    Scheduled Procedure: Procedure(s):  COLONOSCOPY  Verbal report received from: Irineo Nowak RN  Preoperative diagnosis: Anemia  rectal bleeding    Background:    Procedure: Procedure(s):  COLONOSCOPY  Physician performing procedure; Dr. Solo Medina RN    NPO Status/Last PO Intake: midnight    Pregnancy Test:No If yes, result: none    Is the patient taking Blood Thinners: NO If yes, list:  and last taken   Is the patient diabetic:no       If yes, what was the last BS:    Time taken? Anything given? no           Does the patient have a Pacemaker/Defibrillator in place?: no   Does the patient need antibiotics before/during/after procedure: no   If the patient is having a colon, How much prep was drank? all   What were the Colon prep results? Yellow/clear   Does the patient have SCD in place:no   Is patient on CONTACT precautions:no        If yes, what kind of CONTACT precautions:     Assessment:  Are the vital signs stable prior to patient coming to ENDO?  yes  Is the patient alert/oriented and able to sign consent for the procedures:yes    Does the patient have a patient IV in place?  yes     Recommendation:  Family or Friend present no     Permission to share finding with Family or Friend

## 2019-06-03 NOTE — PROGRESS NOTES
GI    Endoscopic evaluation implicates large hemorrhoids as source visible blood loss and anemia.     No active bleeding at this time    She wishes address hemorrhoids surgically while here; will consult

## 2019-06-03 NOTE — ROUTINE PROCESS
Bedside and Verbal shift change report given to Kimani Lee RN (oncoming nurse) by Shane Calzada RN (offgoing nurse). Report included the following information SBAR, Kardex, MAR and Accordion.

## 2019-06-03 NOTE — PROGRESS NOTES
Daily Progress Note: 6/3/2019  Pau Gonzales MD    Assessment/Plan:   Anemia - POA, new, due to GI blood loss. -- Transfuse as needed     Hypokalemia / Dehydration / Elevated lactic acid level - POA, due to poor PO intake. --Hydrate and replete K.     Rectal bleeding / Hx hemorroids / Hx Hiatal hernia - POA, very likely hemorrhoid bleeding. --Consulted GI.    --Clear diet. --PPI     Anxiety and depression / Fibromyalgia - POA, poorly controlled. This is contributing to her sensation of pain. --Continue cymbalta, chatix and xanax.      Morbid obesity / CARLOS (obstructive sleep apnea) - Non compliant with cpap. Advised weight loss     Hypertension / Incomplete right bundle branch block (RBBB) determined by electrocardiography - Adequate control on losartan HCTZ     Hx of seizure disorder - None recent.     --Continue topamax    Elevated liver enzymes  -- hepatitis profile negative  --US abd       Problem List:  Problem List as of 6/3/2019 Date Reviewed: 5/30/2019          Codes Class Noted - Resolved    Elevated lactic acid level ICD-10-CM: R79.89  ICD-9-CM: 276.2  5/30/2019 - Present        Rectal bleeding ICD-10-CM: K62.5  ICD-9-CM: 569.3  5/30/2019 - Present        CARLOS (obstructive sleep apnea) ICD-10-CM: G47.33  ICD-9-CM: 327.23  Unknown - Present    Overview Signed 5/30/2019  7:55 PM by Mahendra Orozco MD     no cpap             Hypertension ICD-10-CM: I10  ICD-9-CM: 401.9  Unknown - Present        Hx of seizure disorder ICD-10-CM: Z86.69  ICD-9-CM: V12.49  Unknown - Present        Fibromyalgia ICD-10-CM: M79.7  ICD-9-CM: 729.1  Unknown - Present    Overview Signed 5/30/2019  7:55 PM by Mahendra Orozco MD     fibromyalgia             Anxiety and depression ICD-10-CM: F41.9, F32.9  ICD-9-CM: 300.00, 311  Unknown - Present        Dehydration ICD-10-CM: E86.0  ICD-9-CM: 276.51  5/30/2019 - Present        * (Principal) Anemia ICD-10-CM: D64.9  ICD-9-CM: 285.9  5/30/2019 - Present Hemorrhoids ICD-10-CM: K64.9  ICD-9-CM: 455.6  Unknown - Present        Hx of migraines ICD-10-CM: Z86.69  ICD-9-CM: V12.49  Unknown - Present        Se[facility] ([facility] ICD-10-CM: E66.01  ICD-9-CM: 278.01  4/29/2019 - Present        Hiatal hernia ICD-10-CM: K44.9  ICD-9-CM: 553.3  5/18/2018 - Present        Incomplete right bundle branch block (RBBB) determined by electrocardiography ICD-10-CM: I45.10  ICD-9-CM: 426.4  5/10/2018 - Present        Hiatal hernia with gastroesophageal reflux ICD-10-CM: K21.9, K44.9  ICD-9-CM: 530.81, 553.3  4/1/2018 - Present        Hypokalemia ICD-10-CM: E87.6  ICD-9-CM: 276.8  10/1/2013 - Present        Cavernous hemangioma of intracranial structure (HCC) (Chronic) ICD-10-CM: D18.02  ICD-9-CM: 228.02  5/2/2011 - Present        RESOLVED: Depressive disorder, not elsewhere classified ICD-10-CM: F32.9  ICD-9-CM: 516  10/1/2013 - 5/30/2019        RESOLVED: Leukocytosis, unspecified ICD-10-CM: B62.444  ICD-9-CM: 288.60  10/1/2013 - 5/30/2019        RESOLVED: Slurred speech ICD-10-CM: R47.81  ICD-9-CM: 784.59  9/30/2013 - 5/30/2019        RESOLVED: Seizure ([facility] (Chronic) ICD-10-CM: R56.9  ICD-9-CM: 780.39  5/2/2011 - 5/30/2019        RESOLVED: Migraine (Chronic) ICD-10-CM: H77.754  ICD-9-CM: 346.90  5/2/2011 - 5/30/2019            Subjective:    47 y.o.  female who presented to the Emergency Department complaining of BRBPR. She is a poor historian due to anxiety. He reports 10 days of seeing red toilet water and clots on toilet paper. She reports crampy abdominal pain. She reports a hx of hemorrhoids, and had her latest colonoscopy about 1 year ago, with finding of hemorrhoids and polyps. She reports non compliance wiwth hemorrhoid med recommended by GI.  ER finds anemia, but hemoccult was negative today. We will admit her for observation. (Dr Duane Velazco)     5/31:  Still c/o \"severe\" ab pain but conversing well, does not appear in pain.   Pain is constant and in mid ab between quadrants bilat and periumbicaly. She wants something \"stronger\" for pain. She reports she has taken Morphine in past without side effects. No N/V today. K+ sl better today after she agreed to take the med. GI to see today. :She is still  having  Pain and some rectal bleeding. GI has seen and is planning colonoscopy for Monday. : Less pain. Will be on clears today and starting bowel prep. LFTs are elevated this am. Will add hepatitis profile and obtain US. No nausea. Hb stable. 6/3:  Less Ab pain. She reports prep has helped more than anything so far. Hepatitis profile negative. LFTs better. AB US as under Data Review showing dilated CBD and duct dilation - may have passed a gallstone. Colonoscopy planned for 6/3. Review of Systems:   A comprehensive review of systems was negative except for that written in the HPI. Objective:   Physical Exam:     Visit Vitals  /75 (BP 1 Location: Right arm, BP Patient Position: At rest)   Pulse 76   Temp 97.4 °F (36.3 °C)   Resp 20   Ht 5' 4\" (1.626 m)   Wt 106.1 kg (234 lb)   LMP 2011   SpO2 97%   BMI 40.17 kg/m²      O2 Device: Room air  Temp (24hrs), Av.6 °F (36.4 °C), Min:97.3 °F (36.3 °C), Max:97.9 °F (36.6 °C)    No intake/output data recorded.  1901 -  0700  In: 823.8 [I.V.:823.8]  Out: 1 [Urine:1]    General:  Alert, morbidly obese, cooperative, no distress, appears stated age. Head:  Normocephalic, without obvious abnormality, atraumatic. Eyes:  Conjunctivae/corneas clear. EOMs intact. Throat: Lips, mucosa, and tongue moist..   Neck: Supple, symmetrical, trachea midline, no adenopathy, thyroid: no enlargement/tenderness/nodules, no carotid bruit and no JVD. Back:   Symmetric, no curvature. ROM normal. No CVA tenderness. Lungs:   Clear to auscultation bilaterally. Chest wall:  No tenderness or deformity. Heart:  Regular rate and rhythm, S1, S2 normal, no murmur, click, rub or gallop. Abdomen:   Soft, obese, with mild generalized tenderness without rebound/guarding. Bowel sounds normal. No masses,  No organomegaly. Extremities: no cyanosis or edema. No calf tenderness or cords. Skin: turgor normal.   Neurologic: Alert and oriented X 3. Fine motor of hands and fingers normal.   equal.  No cogwheeling or rigidity. Gait not tested at this time. Sensation grossly normal to touch. Gross motor of extremities normal.        Data Review:    CT AB and Pelvis 5/30/19   FINDINGS:   LUNG BASES: Clear. INCIDENTALLY IMAGED HEART AND MEDIASTINUM: Unremarkable. LIVER: No mass or biliary dilatation. GALLBLADDER: Surgically absent  SPLEEN: No mass. PANCREAS: No mass or ductal dilatation. ADRENALS: Unremarkable. KIDNEYS: No mass, calculus, or hydronephrosis. STOMACH: Post fundoplication  SMALL BOWEL: No dilatation or wall thickening. COLON: No dilatation or wall thickening. APPENDIX: Unremarkable. PERITONEUM: No ascites or pneumoperitoneum. RETROPERITONEUM: No lymphadenopathy or aortic aneurysm. Mild vascular  calcifications  REPRODUCTIVE ORGANS: Within normal limits  URINARY BLADDER: No mass or calculus. BONES: No destructive bone lesion. ADDITIONAL COMMENTS: N/A  IMPRESSION:  A source of GI bleeding is not identified    US of AB  EXAM: US ABD COMP   INDICATION: Elevated liver function enzymes. Peggyann Gang FINDINGS:  LIVER:   The liver is normal in echotexture with a single homogeneous 1.6 x 1.3 x 1.5 cm  lesion. There are dilated intrahepatic ducts. LIVER VASCULATURE:   The portal vein flow is . GALLBLADDER:  The gallbladder is surgically absent. COMMON BILE DUCT:  There is positive intrahepatic biliary duct dilatation and the common duct  measures 13 mm in diameter. PANCREAS:  The visualized pancreas is normal.  SPLEEN:  The spleen is normal in echotexture and size and measures 9.3 cm in length. The  spleen measures 247 mL.   RIGHT KIDNEY:  The right kidney demonstrates normal echogenicity with no mass, stone or  hydronephrosis. The right kidney measures 11.7 cm in length. LEFT KIDNEY:  The left kidney demonstrates normal echogenicity with no mass, stone or  hydronephrosis. The left kidney measures 13.5 cm in length. The left kidney  contains a 1.2 x 1.3 x 1.2 cm anechoic cyst.  RETROPERITONEUM:  The aorta tapers normally. The IVC is normal.  No retroperitoneal mass is identified. IMPRESSION:   Intrahepatic and extrahepatic biliary dilatation. Recommend MRCP. Probable hepatic hemangioma    Recent Days:  Recent Labs     06/03/19  0349 06/02/19  0548 06/01/19  0430   WBC 7.0 6.3 6.9   HGB 7.9* 9.0* 9.1*   HCT 25.5* 28.7* 29.1*    246 283     Recent Labs     06/03/19  0349 06/02/19  0548 06/01/19  0430    140 140   K 4.2 3.7 3.6   * 111* 108   CO2 26 26 29   GLU 99 102* 110*   BUN 8 11 14   CREA 0.75 0.84 0.90   CA 7.9* 8.3* 8.8   MG  --   --  2.3   ALB 2.9* 3.2* 3.2*   TBILI 0.3 0.3 0.2   SGOT 55* 102* 24   ALT 77 97* 31     No results for input(s): PH, PCO2, PO2, HCO3, FIO2 in the last 72 hours. 24 Hour Results:  Recent Results (from the past 24 hour(s))   HEPATITIS PANEL, ACUTE    Collection Time: 06/02/19  9:47 AM   Result Value Ref Range    Hepatitis A, IgM NONREACTIVE NR      __          Hepatitis B surface Ag <0.10 Index    Hep B surface Ag Interp. NEGATIVE  NEG      __          Hepatitis B core, IgM NONREACTIVE NR      __          Hep C  virus Ab Interp.  NONREACTIVE NR      Hep C  virus Ab comment Method used is Siemens Advia Exaprotectaur     METABOLIC PANEL, COMPREHENSIVE    Collection Time: 06/03/19  3:49 AM   Result Value Ref Range    Sodium 141 136 - 145 mmol/L    Potassium 4.2 3.5 - 5.1 mmol/L    Chloride 112 (H) 97 - 108 mmol/L    CO2 26 21 - 32 mmol/L    Anion gap 3 (L) 5 - 15 mmol/L    Glucose 99 65 - 100 mg/dL    BUN 8 6 - 20 MG/DL    Creatinine 0.75 0.55 - 1.02 MG/DL    BUN/Creatinine ratio 11 (L) 12 - 20      GFR est AA >60 >60 ml/min/1.73m2    GFR est non-AA >60 >60 ml/min/1.73m2    Calcium 7.9 (L) 8.5 - 10.1 MG/DL    Bilirubin, total 0.3 0.2 - 1.0 MG/DL    ALT (SGPT) 77 12 - 78 U/L    AST (SGOT) 55 (H) 15 - 37 U/L    Alk. phosphatase 128 (H) 45 - 117 U/L    Protein, total 6.3 (L) 6.4 - 8.2 g/dL    Albumin 2.9 (L) 3.5 - 5.0 g/dL    Globulin 3.4 2.0 - 4.0 g/dL    A-G Ratio 0.9 (L) 1.1 - 2.2     CBC W/O DIFF    Collection Time: 06/03/19  3:49 AM   Result Value Ref Range    WBC 7.0 3.6 - 11.0 K/uL    RBC 2.68 (L) 3.80 - 5.20 M/uL    HGB 7.9 (L) 11.5 - 16.0 g/dL    HCT 25.5 (L) 35.0 - 47.0 %    MCV 95.1 80.0 - 99.0 FL    MCH 29.5 26.0 - 34.0 PG    MCHC 31.0 30.0 - 36.5 g/dL    RDW 14.1 11.5 - 14.5 %    PLATELET 877 080 - 847 K/uL    MPV 10.8 8.9 - 12.9 FL    NRBC 0.0 0  WBC    ABSOLUTE NRBC 0.00 0.00 - 0.01 K/uL       Medications reviewed  Current Facility-Administered Medications   Medication Dose Route Frequency    morphine injection 2 mg  2 mg IntraVENous Q4H PRN    ALPRAZolam (XANAX) tablet 1 mg  1 mg Oral QID PRN    docusate (COLACE) 50 mg/5 mL oral liquid 50 mg  50 mg Oral QHS    DULoxetine (CYMBALTA) capsule 120 mg  120 mg Oral QHS    pantoprazole (PROTONIX) tablet 40 mg  40 mg Oral DAILY    topiramate (TOPAMAX) tablet 25 mg  25 mg Oral BID    tiZANidine (ZANAFLEX) tablet 4 mg  4 mg Oral TID    losartan/hydroCHLOROthiazide (HYZAAR) 100/25 mg   Oral QHS    0.9% sodium chloride with KCl 40 mEq/L infusion   IntraVENous CONTINUOUS    acetaminophen (TYLENOL) tablet 650 mg  650 mg Oral Q6H PRN    ibuprofen (MOTRIN) tablet 200 mg  200 mg Oral Q6H PRN    diphenhydrAMINE (BENADRYL) capsule 25 mg  25 mg Oral Q4H PRN    ondansetron (ZOFRAN) injection 4 mg  4 mg IntraVENous Q4H PRN    polyethylene glycol (MIRALAX) packet 17 g  17 g Oral BID     Care Plan discussed with: Patient and Nurse  Total time spent with patient: 30 minutes.     Nicolas Vanegas MD

## 2019-06-03 NOTE — PROGRESS NOTES
Patient complains of abdominal aching, 8/10. Patient medicated with Morphine and Zofran per order. Mary Beauchamp, primary RN, made aware.

## 2019-06-03 NOTE — CONSULTS
Assessment:     Grade 4 Hemorrhoids with visible blood loss    Plan:     Dental soft diet now. Clears after midnight, up to 10 tomorrow morning. NPO afterwards. 2 Fleets enemas in the morning, an hour apart from each other. OR for surgical three column hemmorhoidectomy ~1400 tomorrow. Signed By: Carmen Agosto MD  [address] [facility]  Office:  [phone]  Fax:  [phone]             General Surgery Consult    Subjective:      Abhi Salas is a 47 y.o.  female who presents with blood loss anemia, likely secondary to large hemorrhoids seen on endoscopy. Denies symptoms of blood loss anemia. Has had large clots in toilet and on paper when wiping after BM x 2 weeks. Was urged to come to ER by mother. She is known to me from prior hiatal hernia repair, doing well and tolerating diet. CT unremarkable. Dr. Hector Williamson endoscopy unremarkable for upper GI bleeding. Colonoscopy reveals large hemorrhoids with visible bleeding.      Past Medical History:   Diagnosis Date    Anxiety and depression     Cavernous hemangioma of intracranial structure ([facility] [address] 5/2/2011    Fibromyalgia     fibromyalgia    Hemorrhoids     Hiatal hernia with gastroesophageal reflux 04/2018    Hx of migraines     Hx of seizure disorder     Hypertension     Incomplete right bundle branch block (RBBB) determined by electrocardiography 05/10/2018    CARLOS (obstructive sleep apnea)     no cpap    Thyroid nodule      Past Surgical History:   Procedure Laterality Date    HX CHOLECYSTECTOMY      HX HEENT  2012    tonsillectomy    HX LITHOTRIPSY      HX ORTHOPAEDIC      disc fusion 2010, right knee cyst removed    NEUROLOGICAL PROCEDURE UNLISTED  1996, 2006    Cavernous brain angioma(s) removed    SURGERY,BRAIN/SPINE,W/COMPUTER        Family History   Problem Relation Age of Onset    Cancer Mother         breast    Breast Cancer Mother 76    Cancer Father     Migraines Father     Diabetes Father     Breast Cancer Sister 48    Ovarian Cancer Maternal Aunt      Social History     Socioeconomic History    Marital status: SINGLE     Spouse name: Not on file    Number of children: Not on file    Years of education: Not on file    Highest education level: Not on file   Tobacco Use    Smoking status: Former Smoker    Smokeless tobacco: Never Used    Tobacco comment: on chantix,   Substance and Sexual Activity    Alcohol use: No    Drug use: No      Current Facility-Administered Medications   Medication Dose Route Frequency    topiramate (TOPAMAX) tablet 25 mg  25 mg Oral QHS    busPIRone (BUSPAR) tablet 15 mg  15 mg Oral QHS    traZODone (DESYREL) tablet 100 mg  100 mg Oral QHS    traZODone (DESYREL) tablet 100 mg  100 mg Oral QHS PRN    morphine injection 2 mg  2 mg IntraVENous Q4H PRN    ALPRAZolam (XANAX) tablet 1 mg  1 mg Oral QID PRN    docusate (COLACE) 50 mg/5 mL oral liquid 50 mg  50 mg Oral QHS    DULoxetine (CYMBALTA) capsule 120 mg  120 mg Oral QHS    pantoprazole (PROTONIX) tablet 40 mg  40 mg Oral DAILY    tiZANidine (ZANAFLEX) tablet 4 mg  4 mg Oral TID    losartan/hydroCHLOROthiazide (HYZAAR) 100/25 mg   Oral QHS    0.9% sodium chloride with KCl 40 mEq/L infusion   IntraVENous CONTINUOUS    acetaminophen (TYLENOL) tablet 650 mg  650 mg Oral Q6H PRN    ibuprofen (MOTRIN) tablet 200 mg  200 mg Oral Q6H PRN    diphenhydrAMINE (BENADRYL) capsule 25 mg  25 mg Oral Q4H PRN    ondansetron (ZOFRAN) injection 4 mg  4 mg IntraVENous Q4H PRN    polyethylene glycol (MIRALAX) packet 17 g  17 g Oral BID      Allergies   Allergen Reactions    Latex, Natural Rubber Rash    Aspirin Other (comments)     Excessive bleeding      Codeine Palpitations    Imitrex [Sumatriptan] Palpitations and Other (comments)     thougt she was having an MI    Percocet [Oxycodone-Acetaminophen] Palpitations       Review of Systems:     []     Unable to obtain  ROS due to  []    mental status change  []    sedated   [] intubated   [x]    Total of 12 system negative, unless specified below or in HPI:  Constitutional: negative fever, negative chills, negative weight loss  Eyes:   negative visual changes  ENT:   + sore throat, no tongue or lip swelling  Respiratory:  negative cough, negative dyspnea  Cards:  negative for chest pain, palpitations, lower extremity edema  GI:   negative for nausea, vomiting, diarrhea.  + abdominal pain  :  negative for frequency, dysuria  Integument:  negative for rash and pruritus  Heme:  negative for easy bruising and gum/nose bleeding  Musculoskel: negative for myalgias,  back pain and muscle weakness  Neuro:  negative for headaches, dizziness, vertigo  Psych:  negative for feelings of anxiety, depression     Objective:        Patient Vitals for the past 8 hrs:   BP Temp Pulse Resp SpO2 Height Weight   19 1508 93/62 97.9 °F (36.6 °C) 78 16 94 % -- --   19 1146 100/65 97.8 °F (36.6 °C) 69 15 97 % -- --   19 0943 122/75 -- 73 13 100 % -- --   19 0937 118/86 -- 83 21 94 % -- --   19 0932 98/66 -- 79 15 94 % -- --   19 0928 98/60 97.4 °F (36.3 °C) 84 21 97 % -- --   19 0831 131/66 98.2 °F (36.8 °C) 81 21 99 % 5' 4\" (1.626 m) 106.1 kg (234 lb)       Temp (24hrs), Av.7 °F (36.5 °C), Min:97.3 °F (36.3 °C), Max:98.2 °F (36.8 °C)      Physical Exam:  General:  Alert, cooperative, no distress, appears stated age. Eyes:  Conjunctivae/corneas clear. PERRL, EOMs intact. Nose: Nares normal. Septum midline. Mucosa normal. No drainage or sinus tenderness. Mouth/Throat: Lips, mucosa, and tongue normal. Teeth and gums normal.   Neck: Supple, symmetrical, trachea midline, no adenopathy, thyroid: no enlargment/tenderness/nodules, no carotid bruit and no JVD. Back:   Symmetric, no curvature. ROM normal. No CVA tenderness. Lungs:   Clear to auscultation bilaterally. Heart:  Regular rate and rhythm, S1, S2 normal, no murmur, click, rub or gallop.    Abdomen: Soft, non-tender. Bowel sounds normal. No masses,  No organomegaly. Rectal:  Reducible hemorrhoids. Extremities: Extremities normal, atraumatic, no cyanosis or edema. Pulses: 2+ and symmetric all extremities.    Skin: Skin color, texture, turgor normal. No rashes or lesions   Lymph nodes: Cervical, supraclavicular, and axillary nodes normal.     BMP:   Lab Results   Component Value Date/Time     06/03/2019 03:49 AM    K 4.2 06/03/2019 03:49 AM     (H) 06/03/2019 03:49 AM    CO2 26 06/03/2019 03:49 AM    AGAP 3 (L) 06/03/2019 03:49 AM    GLU 99 06/03/2019 03:49 AM    BUN 8 06/03/2019 03:49 AM    CREA 0.75 06/03/2019 03:49 AM    GFRAA >60 06/03/2019 03:49 AM    GFRNA >60 06/03/2019 03:49 AM     CMP:   Lab Results   Component Value Date/Time     06/03/2019 03:49 AM    K 4.2 06/03/2019 03:49 AM     (H) 06/03/2019 03:49 AM    CO2 26 06/03/2019 03:49 AM    AGAP 3 (L) 06/03/2019 03:49 AM    GLU 99 06/03/2019 03:49 AM    BUN 8 06/03/2019 03:49 AM    CREA 0.75 06/03/2019 03:49 AM    GFRAA >60 06/03/2019 03:49 AM    GFRNA >60 06/03/2019 03:49 AM    CA 7.9 (L) 06/03/2019 03:49 AM    ALB 2.9 (L) 06/03/2019 03:49 AM    TP 6.3 (L) 06/03/2019 03:49 AM    GLOB 3.4 06/03/2019 03:49 AM    AGRAT 0.9 (L) 06/03/2019 03:49 AM    SGOT 55 (H) 06/03/2019 03:49 AM    ALT 77 06/03/2019 03:49 AM     CBC:   Lab Results   Component Value Date/Time    WBC 7.0 06/03/2019 03:49 AM    HGB 7.9 (L) 06/03/2019 03:49 AM    HCT 25.5 (L) 06/03/2019 03:49 AM     06/03/2019 03:49 AM     All Cardiac Markers in the last 24 hours: No results found for: CPK, CK, CKMMB, CKMB, RCK3, CKMBT, CKNDX, CKND1, BLAKE, TROPT, TROIQ, MAURICIO, TROPT, TNIPOC, BNP, BNPP  ABG: No results found for: PH, PHI, PCO2, PCO2I, PO2, PO2I, HCO3, HCO3I, FIO2, FIO2I  COAGS: No results found for: APTT, PTP, INR  Pancreatic Markers: No results found for: AMYLPOCT, AML, LIPPOCT, LPSE

## 2019-06-04 ENCOUNTER — ANESTHESIA EVENT (OUTPATIENT)
Dept: SURGERY | Age: 54
End: 2019-06-04
Payer: MEDICARE

## 2019-06-04 ENCOUNTER — ANESTHESIA (OUTPATIENT)
Dept: SURGERY | Age: 54
End: 2019-06-04
Payer: MEDICARE

## 2019-06-04 LAB
ALBUMIN SERPL-MCNC: 2.9 G/DL (ref 3.5–5)
ALBUMIN/GLOB SERPL: 0.9 {RATIO} (ref 1.1–2.2)
ALP SERPL-CCNC: 122 U/L (ref 45–117)
ALT SERPL-CCNC: 59 U/L (ref 12–78)
ANION GAP SERPL CALC-SCNC: 1 MMOL/L (ref 5–15)
AST SERPL-CCNC: 29 U/L (ref 15–37)
BILIRUB DIRECT SERPL-MCNC: 0.1 MG/DL (ref 0–0.2)
BILIRUB SERPL-MCNC: 0.2 MG/DL (ref 0.2–1)
BUN SERPL-MCNC: 8 MG/DL (ref 6–20)
BUN/CREAT SERPL: 9 (ref 12–20)
CALCIUM SERPL-MCNC: 8.2 MG/DL (ref 8.5–10.1)
CHLORIDE SERPL-SCNC: 115 MMOL/L (ref 97–108)
CO2 SERPL-SCNC: 26 MMOL/L (ref 21–32)
CREAT SERPL-MCNC: 0.9 MG/DL (ref 0.55–1.02)
ERYTHROCYTE [DISTWIDTH] IN BLOOD BY AUTOMATED COUNT: 14.4 % (ref 11.5–14.5)
GLOBULIN SER CALC-MCNC: 3.4 G/DL (ref 2–4)
GLUCOSE SERPL-MCNC: 113 MG/DL (ref 65–100)
HCT VFR BLD AUTO: 26.4 % (ref 35–47)
HGB BLD-MCNC: 8.2 G/DL (ref 11.5–16)
MCH RBC QN AUTO: 29.6 PG (ref 26–34)
MCHC RBC AUTO-ENTMCNC: 31.1 G/DL (ref 30–36.5)
MCV RBC AUTO: 95.3 FL (ref 80–99)
NRBC # BLD: 0 K/UL (ref 0–0.01)
NRBC BLD-RTO: 0 PER 100 WBC
PLATELET # BLD AUTO: 264 K/UL (ref 150–400)
PMV BLD AUTO: 10.1 FL (ref 8.9–12.9)
POTASSIUM SERPL-SCNC: 4.2 MMOL/L (ref 3.5–5.1)
PROT SERPL-MCNC: 6.3 G/DL (ref 6.4–8.2)
RBC # BLD AUTO: 2.77 M/UL (ref 3.8–5.2)
SODIUM SERPL-SCNC: 142 MMOL/L (ref 136–145)
WBC # BLD AUTO: 6.9 K/UL (ref 3.6–11)

## 2019-06-04 PROCEDURE — 76060000063 HC AMB SURG ANES 1.5 TO 2 HR: Performed by: SURGERY

## 2019-06-04 PROCEDURE — 77030032490 HC SLV COMPR SCD KNE COVD -B

## 2019-06-04 PROCEDURE — 99218 HC RM OBSERVATION: CPT

## 2019-06-04 PROCEDURE — 76030000003 HC AMB SURG OR TIME 1.5 TO 2: Performed by: SURGERY

## 2019-06-04 PROCEDURE — 74011000272 HC RX REV CODE- 272: Performed by: SURGERY

## 2019-06-04 PROCEDURE — 74011250636 HC RX REV CODE- 250/636

## 2019-06-04 PROCEDURE — 74011000250 HC RX REV CODE- 250

## 2019-06-04 PROCEDURE — 36415 COLL VENOUS BLD VENIPUNCTURE: CPT

## 2019-06-04 PROCEDURE — 80053 COMPREHEN METABOLIC PANEL: CPT

## 2019-06-04 PROCEDURE — 77030026438 HC STYL ET INTUB CARD -A: Performed by: ANESTHESIOLOGY

## 2019-06-04 PROCEDURE — 74011250636 HC RX REV CODE- 250/636: Performed by: INTERNAL MEDICINE

## 2019-06-04 PROCEDURE — 74011250637 HC RX REV CODE- 250/637: Performed by: SURGERY

## 2019-06-04 PROCEDURE — 74011250636 HC RX REV CODE- 250/636: Performed by: FAMILY MEDICINE

## 2019-06-04 PROCEDURE — 74011250636 HC RX REV CODE- 250/636: Performed by: ANESTHESIOLOGY

## 2019-06-04 PROCEDURE — 77030033269 HC SLV COMPR SCD KNE2 CARD -B

## 2019-06-04 PROCEDURE — 77030011640 HC PAD GRND REM COVD -A: Performed by: SURGERY

## 2019-06-04 PROCEDURE — 77030020782 HC GWN BAIR PAWS FLX 3M -B

## 2019-06-04 PROCEDURE — 77030018836 HC SOL IRR NACL ICUM -A: Performed by: SURGERY

## 2019-06-04 PROCEDURE — 77030008684 HC TU ET CUF COVD -B: Performed by: ANESTHESIOLOGY

## 2019-06-04 PROCEDURE — 82248 BILIRUBIN DIRECT: CPT

## 2019-06-04 PROCEDURE — 74011250637 HC RX REV CODE- 250/637: Performed by: INTERNAL MEDICINE

## 2019-06-04 PROCEDURE — 88304 TISSUE EXAM BY PATHOLOGIST: CPT

## 2019-06-04 PROCEDURE — 74011250636 HC RX REV CODE- 250/636: Performed by: SURGERY

## 2019-06-04 PROCEDURE — 74011000250 HC RX REV CODE- 250: Performed by: SURGERY

## 2019-06-04 PROCEDURE — 77030031753 HC SHR ENDO COAG HARM J&J -E: Performed by: SURGERY

## 2019-06-04 PROCEDURE — C9290 INJ, BUPIVACAINE LIPOSOME: HCPCS | Performed by: SURGERY

## 2019-06-04 PROCEDURE — 85027 COMPLETE CBC AUTOMATED: CPT

## 2019-06-04 PROCEDURE — 77030002888 HC SUT CHRMC J&J -A: Performed by: SURGERY

## 2019-06-04 PROCEDURE — 76210000035 HC AMBSU PH I REC 1 TO 1.5 HR: Performed by: SURGERY

## 2019-06-04 RX ORDER — BACITRACIN ZINC 500 UNIT/G
OINTMENT (GRAM) TOPICAL AS NEEDED
Status: DISCONTINUED | OUTPATIENT
Start: 2019-06-04 | End: 2019-06-04 | Stop reason: HOSPADM

## 2019-06-04 RX ORDER — PROPOFOL 10 MG/ML
INJECTION, EMULSION INTRAVENOUS AS NEEDED
Status: DISCONTINUED | OUTPATIENT
Start: 2019-06-04 | End: 2019-06-04 | Stop reason: HOSPADM

## 2019-06-04 RX ORDER — FENTANYL CITRATE 50 UG/ML
INJECTION, SOLUTION INTRAMUSCULAR; INTRAVENOUS AS NEEDED
Status: DISCONTINUED | OUTPATIENT
Start: 2019-06-04 | End: 2019-06-04 | Stop reason: HOSPADM

## 2019-06-04 RX ORDER — SODIUM CHLORIDE, SODIUM LACTATE, POTASSIUM CHLORIDE, CALCIUM CHLORIDE 600; 310; 30; 20 MG/100ML; MG/100ML; MG/100ML; MG/100ML
125 INJECTION, SOLUTION INTRAVENOUS CONTINUOUS
Status: DISCONTINUED | OUTPATIENT
Start: 2019-06-04 | End: 2019-06-04 | Stop reason: HOSPADM

## 2019-06-04 RX ORDER — POLYETHYLENE GLYCOL 3350 17 G/17G
17 POWDER, FOR SOLUTION ORAL DAILY
Status: DISCONTINUED | OUTPATIENT
Start: 2019-06-05 | End: 2019-06-07 | Stop reason: HOSPADM

## 2019-06-04 RX ORDER — FLUMAZENIL 0.1 MG/ML
0.2 INJECTION INTRAVENOUS
Status: CANCELLED | OUTPATIENT
Start: 2019-06-04

## 2019-06-04 RX ORDER — OXYCODONE HYDROCHLORIDE 5 MG/1
5 TABLET ORAL
Status: DISCONTINUED | OUTPATIENT
Start: 2019-06-04 | End: 2019-06-07 | Stop reason: HOSPADM

## 2019-06-04 RX ORDER — CEFAZOLIN SODIUM/WATER 2 G/20 ML
2 SYRINGE (ML) INTRAVENOUS ONCE
Status: DISCONTINUED | OUTPATIENT
Start: 2019-06-04 | End: 2019-06-04 | Stop reason: HOSPADM

## 2019-06-04 RX ORDER — LIDOCAINE HYDROCHLORIDE 10 MG/ML
0.1 INJECTION, SOLUTION EPIDURAL; INFILTRATION; INTRACAUDAL; PERINEURAL AS NEEDED
Status: CANCELLED | OUTPATIENT
Start: 2019-06-04

## 2019-06-04 RX ORDER — MIDAZOLAM HYDROCHLORIDE 1 MG/ML
INJECTION, SOLUTION INTRAMUSCULAR; INTRAVENOUS AS NEEDED
Status: DISCONTINUED | OUTPATIENT
Start: 2019-06-04 | End: 2019-06-04 | Stop reason: HOSPADM

## 2019-06-04 RX ORDER — SUCCINYLCHOLINE CHLORIDE 20 MG/ML
INJECTION INTRAMUSCULAR; INTRAVENOUS AS NEEDED
Status: DISCONTINUED | OUTPATIENT
Start: 2019-06-04 | End: 2019-06-04 | Stop reason: HOSPADM

## 2019-06-04 RX ORDER — DIPHENHYDRAMINE HCL 25 MG
25 CAPSULE ORAL
Status: DISCONTINUED | OUTPATIENT
Start: 2019-06-04 | End: 2019-06-05 | Stop reason: SDUPTHER

## 2019-06-04 RX ORDER — SODIUM CHLORIDE, SODIUM LACTATE, POTASSIUM CHLORIDE, CALCIUM CHLORIDE 600; 310; 30; 20 MG/100ML; MG/100ML; MG/100ML; MG/100ML
125 INJECTION, SOLUTION INTRAVENOUS CONTINUOUS
Status: CANCELLED | OUTPATIENT
Start: 2019-06-04 | End: 2019-06-05

## 2019-06-04 RX ORDER — ROCURONIUM BROMIDE 10 MG/ML
INJECTION, SOLUTION INTRAVENOUS AS NEEDED
Status: DISCONTINUED | OUTPATIENT
Start: 2019-06-04 | End: 2019-06-04 | Stop reason: HOSPADM

## 2019-06-04 RX ORDER — SODIUM CHLORIDE, SODIUM LACTATE, POTASSIUM CHLORIDE, CALCIUM CHLORIDE 600; 310; 30; 20 MG/100ML; MG/100ML; MG/100ML; MG/100ML
INJECTION, SOLUTION INTRAVENOUS
Status: DISCONTINUED | OUTPATIENT
Start: 2019-06-04 | End: 2019-06-04 | Stop reason: HOSPADM

## 2019-06-04 RX ORDER — ONDANSETRON 2 MG/ML
INJECTION INTRAMUSCULAR; INTRAVENOUS AS NEEDED
Status: DISCONTINUED | OUTPATIENT
Start: 2019-06-04 | End: 2019-06-04 | Stop reason: HOSPADM

## 2019-06-04 RX ORDER — DEXAMETHASONE SODIUM PHOSPHATE 4 MG/ML
INJECTION, SOLUTION INTRA-ARTICULAR; INTRALESIONAL; INTRAMUSCULAR; INTRAVENOUS; SOFT TISSUE AS NEEDED
Status: DISCONTINUED | OUTPATIENT
Start: 2019-06-04 | End: 2019-06-04 | Stop reason: HOSPADM

## 2019-06-04 RX ORDER — CEFAZOLIN SODIUM 1 G/3ML
INJECTION, POWDER, FOR SOLUTION INTRAMUSCULAR; INTRAVENOUS AS NEEDED
Status: DISCONTINUED | OUTPATIENT
Start: 2019-06-04 | End: 2019-06-04 | Stop reason: HOSPADM

## 2019-06-04 RX ORDER — DIPHENHYDRAMINE HYDROCHLORIDE 50 MG/ML
12.5 INJECTION, SOLUTION INTRAMUSCULAR; INTRAVENOUS AS NEEDED
Status: DISCONTINUED | OUTPATIENT
Start: 2019-06-04 | End: 2019-06-04 | Stop reason: HOSPADM

## 2019-06-04 RX ORDER — NALOXONE HYDROCHLORIDE 0.4 MG/ML
0.2 INJECTION, SOLUTION INTRAMUSCULAR; INTRAVENOUS; SUBCUTANEOUS
Status: CANCELLED | OUTPATIENT
Start: 2019-06-04

## 2019-06-04 RX ORDER — ACETAMINOPHEN 500 MG
1000 TABLET ORAL EVERY 8 HOURS
Status: DISCONTINUED | OUTPATIENT
Start: 2019-06-04 | End: 2019-06-07 | Stop reason: HOSPADM

## 2019-06-04 RX ORDER — HYDROMORPHONE HYDROCHLORIDE 1 MG/ML
.25-1 INJECTION, SOLUTION INTRAMUSCULAR; INTRAVENOUS; SUBCUTANEOUS
Status: DISCONTINUED | OUTPATIENT
Start: 2019-06-04 | End: 2019-06-04 | Stop reason: HOSPADM

## 2019-06-04 RX ORDER — LIDOCAINE HYDROCHLORIDE 20 MG/ML
INJECTION, SOLUTION EPIDURAL; INFILTRATION; INTRACAUDAL; PERINEURAL AS NEEDED
Status: DISCONTINUED | OUTPATIENT
Start: 2019-06-04 | End: 2019-06-04 | Stop reason: HOSPADM

## 2019-06-04 RX ORDER — POVIDONE-IODINE 10 %
SOLUTION, NON-ORAL TOPICAL AS NEEDED
Status: DISCONTINUED | OUTPATIENT
Start: 2019-06-04 | End: 2019-06-04 | Stop reason: HOSPADM

## 2019-06-04 RX ADMIN — FENTANYL CITRATE 100 MCG: 50 INJECTION, SOLUTION INTRAMUSCULAR; INTRAVENOUS at 14:20

## 2019-06-04 RX ADMIN — TIZANIDINE 4 MG: 2 TABLET ORAL at 08:23

## 2019-06-04 RX ADMIN — ACETAMINOPHEN 1000 MG: 500 TABLET ORAL at 21:47

## 2019-06-04 RX ADMIN — ONDANSETRON 4 MG: 2 INJECTION INTRAMUSCULAR; INTRAVENOUS at 03:02

## 2019-06-04 RX ADMIN — ONDANSETRON 4 MG: 2 INJECTION INTRAMUSCULAR; INTRAVENOUS at 18:45

## 2019-06-04 RX ADMIN — ONDANSETRON 4 MG: 2 INJECTION INTRAMUSCULAR; INTRAVENOUS at 14:31

## 2019-06-04 RX ADMIN — POLYETHYLENE GLYCOL 3350 17 G: 17 POWDER, FOR SOLUTION ORAL at 17:44

## 2019-06-04 RX ADMIN — DEXAMETHASONE SODIUM PHOSPHATE 4 MG: 4 INJECTION, SOLUTION INTRA-ARTICULAR; INTRALESIONAL; INTRAMUSCULAR; INTRAVENOUS; SOFT TISSUE at 14:31

## 2019-06-04 RX ADMIN — TOPIRAMATE 25 MG: 25 TABLET, FILM COATED ORAL at 21:47

## 2019-06-04 RX ADMIN — LIDOCAINE HYDROCHLORIDE 100 MG: 20 INJECTION, SOLUTION EPIDURAL; INFILTRATION; INTRACAUDAL; PERINEURAL at 14:20

## 2019-06-04 RX ADMIN — TIZANIDINE 4 MG: 2 TABLET ORAL at 17:44

## 2019-06-04 RX ADMIN — HYDROMORPHONE HYDROCHLORIDE 0.5 MG: 1 INJECTION, SOLUTION INTRAMUSCULAR; INTRAVENOUS; SUBCUTANEOUS at 16:33

## 2019-06-04 RX ADMIN — ALPRAZOLAM 1 MG: 0.5 TABLET ORAL at 08:23

## 2019-06-04 RX ADMIN — TIZANIDINE 4 MG: 2 TABLET ORAL at 21:48

## 2019-06-04 RX ADMIN — ACETAMINOPHEN 1000 MG: 500 TABLET ORAL at 17:44

## 2019-06-04 RX ADMIN — MORPHINE SULFATE 2 MG: 4 INJECTION, SOLUTION INTRAMUSCULAR; INTRAVENOUS at 07:07

## 2019-06-04 RX ADMIN — ROCURONIUM BROMIDE 20 MG: 10 INJECTION, SOLUTION INTRAVENOUS at 14:20

## 2019-06-04 RX ADMIN — SUCCINYLCHOLINE CHLORIDE 100 MG: 20 INJECTION INTRAMUSCULAR; INTRAVENOUS at 14:20

## 2019-06-04 RX ADMIN — MORPHINE SULFATE 2 MG: 4 INJECTION, SOLUTION INTRAMUSCULAR; INTRAVENOUS at 18:40

## 2019-06-04 RX ADMIN — CEFAZOLIN SODIUM 3 G: 1 INJECTION, POWDER, FOR SOLUTION INTRAMUSCULAR; INTRAVENOUS at 14:30

## 2019-06-04 RX ADMIN — HYDROMORPHONE HYDROCHLORIDE 0.5 MG: 1 INJECTION, SOLUTION INTRAMUSCULAR; INTRAVENOUS; SUBCUTANEOUS at 16:50

## 2019-06-04 RX ADMIN — MORPHINE SULFATE 2 MG: 4 INJECTION, SOLUTION INTRAMUSCULAR; INTRAVENOUS at 02:55

## 2019-06-04 RX ADMIN — POLYETHYLENE GLYCOL 3350 17 G: 17 POWDER, FOR SOLUTION ORAL at 08:23

## 2019-06-04 RX ADMIN — DOCUSATE SODIUM 50 MG: 50 LIQUID ORAL at 21:47

## 2019-06-04 RX ADMIN — SODIUM CHLORIDE AND POTASSIUM CHLORIDE: 9; 2.98 INJECTION, SOLUTION INTRAVENOUS at 17:44

## 2019-06-04 RX ADMIN — BUSPIRONE HYDROCHLORIDE 15 MG: 10 TABLET ORAL at 21:48

## 2019-06-04 RX ADMIN — PROPOFOL 150 MG: 10 INJECTION, EMULSION INTRAVENOUS at 14:20

## 2019-06-04 RX ADMIN — FENTANYL CITRATE 50 MCG: 50 INJECTION, SOLUTION INTRAMUSCULAR; INTRAVENOUS at 15:06

## 2019-06-04 RX ADMIN — SODIUM CHLORIDE, SODIUM LACTATE, POTASSIUM CHLORIDE, CALCIUM CHLORIDE: 600; 310; 30; 20 INJECTION, SOLUTION INTRAVENOUS at 13:49

## 2019-06-04 RX ADMIN — MIDAZOLAM HYDROCHLORIDE 5 MG: 1 INJECTION, SOLUTION INTRAMUSCULAR; INTRAVENOUS at 14:16

## 2019-06-04 RX ADMIN — DIPHENHYDRAMINE HYDROCHLORIDE 25 MG: 25 CAPSULE ORAL at 17:44

## 2019-06-04 RX ADMIN — PANTOPRAZOLE SODIUM 40 MG: 40 TABLET, DELAYED RELEASE ORAL at 08:23

## 2019-06-04 RX ADMIN — DULOXETINE HYDROCHLORIDE 120 MG: 30 CAPSULE, DELAYED RELEASE ORAL at 21:48

## 2019-06-04 RX ADMIN — PROPOFOL 50 MG: 10 INJECTION, EMULSION INTRAVENOUS at 15:06

## 2019-06-04 RX ADMIN — ONDANSETRON 4 MG: 2 INJECTION INTRAMUSCULAR; INTRAVENOUS at 08:25

## 2019-06-04 NOTE — PROGRESS NOTES
GI Note    Colonoscopy yesterday revealed hemorrhoids as the source of bleeding    Scheduled for hemorrhoidectomy today    Will sign off  Please reconsult if needed    Pina Rick PA-C  06/04/19  9:32 AM

## 2019-06-04 NOTE — PROGRESS NOTES
Bedside shift change report given to Blossom Lam (oncoming nurse) by Andrew Faria (offgoing nurse). Report included the following information SBAR, Kardex, MAR and Recent Results.

## 2019-06-04 NOTE — ANESTHESIA POSTPROCEDURE EVALUATION
Procedure(s): HEMORRHOIDECTOMY 3 Column. general    Anesthesia Post Evaluation      Multimodal analgesia: multimodal analgesia not used between 6 hours prior to anesthesia start to PACU discharge  Patient location during evaluation: ED  Patient participation: complete - patient participated  Level of consciousness: awake  Pain management: adequate  Airway patency: patent  Anesthetic complications: no  Cardiovascular status: acceptable  Respiratory status: acceptable  Hydration status: acceptable  Post anesthesia nausea and vomiting:  controlled      Vitals Value Taken Time   /92 6/4/2019  4:50 PM   Temp 36.3 °C (97.4 °F) 6/4/2019  4:27 PM   Pulse 83 6/4/2019  4:55 PM   Resp 12 6/4/2019  4:55 PM   SpO2 90 % 6/4/2019  4:55 PM   Vitals shown include unvalidated device data.

## 2019-06-04 NOTE — PROGRESS NOTES
Spiritual Care Assessment/Progress Note  [address]      NAME: Higinio Ware      MRN: [id number]  AGE: 47 y.o. SEX: female  Restorationism Affiliation: Orthodoxy   Language: English     6/4/2019     Total Time (in minutes): 5     Spiritual Assessment begun in [facility] 2 AMB SURG UNIT through conversation with:         []Patient        [] Family    [] Friend(s)        Reason for Consult: Initial/Spiritual assessment, patient floor     Spiritual beliefs: (Please include comment if needed)     [] Identifies with a joey tradition:         [] Supported by a joey community:            [] Claims no spiritual orientation:           [] Seeking spiritual identity:                [] Adheres to an individual form of spirituality:           [x] Not able to assess:                           Identified resources for coping:      [] Prayer                               [] Music                  [] Guided Imagery     [] Family/friends                 [] Pet visits     [] Devotional reading                         [x] Unknown     [] Other:                                               Interventions offered during this visit: (See comments for more details)                Plan of Care:     [] Support spiritual and/or cultural needs    [] Support AMD and/or advance care planning process      [] Support grieving process   [] Coordinate Rites and/or Rituals    [] Coordination with community clergy   [] No spiritual needs identified at this time   [] Detailed Plan of Care below (See Comments)  [] Make referral to Music Therapy  [] Make referral to Pet Therapy     [] Make referral to Addiction services  [] Make referral to [facility]  [] Make referral to Spiritual Care Partner  [] No future visits requested        [x] Follow up visits as needed     Comments:  visit for initial spiritual assessment. Patient not in room at the time of visit, but having procedure performed.   Will continue to follow up as needed and upon request as able. Visited by Rev. Archie Bourne, [address] [place] [address] Road paging service: [phone])

## 2019-06-04 NOTE — PROGRESS NOTES
Daily Progress Note: 6/4/2019  Sukhjinder Curtis MD    Assessment/Plan:   Anemia - POA, new, due to GI blood loss. -- Transfuse as needed     Hypokalemia / Dehydration / Elevated lactic acid level - POA, due to poor PO intake. --Hydrate and replete K.     Rectal bleeding / Hx hemorroids / Hx Hiatal hernia - POA, very likely hemorrhoid bleeding. --Consulted GI.    --Clear diet. --PPI     Anxiety and depression / Fibromyalgia - POA, poorly controlled. This is contributing to her sensation of pain. --Continue cymbalta, chatix and xanax.      Morbid obesity / CARLOS (obstructive sleep apnea) - Non compliant with cpap. Advised weight loss     Hypertension / Incomplete right bundle branch block (RBBB) determined by electrocardiography - Adequate control on losartan HCTZ     Hx of seizure disorder - None recent.     --Continue topamax    Elevated liver enzymes  -- hepatitis profile negative  --US abd       Problem List:  Problem List as of 6/4/2019 Date Reviewed: 5/30/2019          Codes Class Noted - Resolved    Elevated lactic acid level ICD-10-CM: R79.89  ICD-9-CM: 276.2  5/30/2019 - Present        Rectal bleeding ICD-10-CM: K62.5  ICD-9-CM: 569.3  5/30/2019 - Present        CARLOS (obstructive sleep apnea) ICD-10-CM: G47.33  ICD-9-CM: 327.23  Unknown - Present    Overview Signed 5/30/2019  7:55 PM by Rubin Lyons MD     no cpap             Hypertension ICD-10-CM: I10  ICD-9-CM: 401.9  Unknown - Present        Hx of seizure disorder ICD-10-CM: Z86.69  ICD-9-CM: V12.49  Unknown - Present        Fibromyalgia ICD-10-CM: M79.7  ICD-9-CM: 729.1  Unknown - Present    Overview Signed 5/30/2019  7:55 PM by Rubin Lyons MD     fibromyalgia             Anxiety and depression ICD-10-CM: F41.9, F32.9  ICD-9-CM: 300.00, 311  Unknown - Present        Dehydration ICD-10-CM: E86.0  ICD-9-CM: 276.51  5/30/2019 - Present        * (Principal) Anemia ICD-10-CM: D64.9  ICD-9-CM: 285.9  5/30/2019 - Present Hemorrhoids ICD-10-CM: K64.9  ICD-9-CM: 455.6  Unknown - Present        Hx of migraines ICD-10-CM: Z86.69  ICD-9-CM: V12.49  Unknown - Present        Severe obesity ([facility] ICD-10-CM: E66.01  ICD-9-CM: 278.01  4/29/2019 - Present        Hiatal hernia ICD-10-CM: K44.9  ICD-9-CM: 553.3  5/18/2018 - Present        Incomplete right bundle branch block (RBBB) determined by electrocardiography ICD-10-CM: I45.10  ICD-9-CM: 426.4  5/10/2018 - Present        Hiatal hernia with gastroesophageal reflux ICD-10-CM: K21.9, K44.9  ICD-9-CM: 530.81, 553.3  4/1/2018 - Present        Hypokalemia ICD-10-CM: E87.6  ICD-9-CM: 276.8  10/1/2013 - Present        Cavernous hemangioma of intracranial structure (HCC) (Chronic) ICD-10-CM: D18.02  ICD-9-CM: 228.02  5/2/2011 - Present        RESOLVED: Depressive disorder, not elsewhere classified ICD-10-CM: F32.9  ICD-9-CM: 883  10/1/2013 - 5/30/2019        RESOLVED: Leukocytosis, unspecified ICD-10-CM: I17.036  ICD-9-CM: 288.60  10/1/2013 - 5/30/2019        RESOLVED: Slurred speech ICD-10-CM: R47.81  ICD-9-CM: 784.59  9/30/2013 - 5/30/2019        RESOLVED: Seizure ([facility] [address] (Chronic) ICD-10-CM: R56.9  ICD-9-CM: 780.39  5/2/2011 - 5/30/2019        RESOLVED: Migraine (Chronic) ICD-10-CM: U91.155  ICD-9-CM: 346.90  5/2/2011 - 5/30/2019            Subjective:    47 y.o.  female who presented to the Emergency Department complaining of BRBPR. She is a poor historian due to anxiety. He reports 10 days of seeing red toilet water and clots on toilet paper. She reports crampy abdominal pain. She reports a hx of hemorrhoids, and had her latest colonoscopy about 1 year ago, with finding of hemorrhoids and polyps. She reports non compliance wiwth hemorrhoid med recommended by GI.  ER finds anemia, but hemoccult was negative today. We will admit her for observation. (Dr Valery Pradhan)     5/31:  Still c/o \"severe\" ab pain but conversing well, does not appear in pain.   Pain is constant and in mid ab between quadrants bilat and periumbicaly. She wants something \"stronger\" for pain. She reports she has taken Morphine in past without side effects. No N/V today. K+ sl better today after she agreed to take the med. GI to see today. :She is still  having  Pain and some rectal bleeding. GI has seen and is planning colonoscopy for Monday. : Less pain. Will be on clears today and starting bowel prep. LFTs are elevated this am. Will add hepatitis profile and obtain US. No nausea. Hb stable. 6/3:  Less Ab pain. She reports prep has helped more than anything so far. Hepatitis profile negative. LFTs better. AB US as under Data Review showing dilated CBD and duct dilation - may have passed a gallstone. Colonoscopy planned for 6/3.      :  Reports \"feel anxious all over\" but no ab pain. Colonoscopy with likely hemorrhoids as source for bleeding - Surg has seen and hemorrhoidectomy later today. LFTs back to normal.      Review of Systems:   A comprehensive review of systems was negative except for that written in the HPI. Objective:   Physical Exam:     Visit Vitals  /70 (BP 1 Location: Left arm, BP Patient Position: At rest)   Pulse 79   Temp 97.8 °F (36.6 °C)   Resp 16   Ht 5' 4\" (1.626 m)   Wt 106.1 kg (234 lb)   LMP 2011   SpO2 93%   BMI 40.17 kg/m²      O2 Device: Room air  Temp (24hrs), Av.8 °F (36.6 °C), Min:97.4 °F (36.3 °C), Max:98.3 °F (36.8 °C)    No intake/output data recorded. 701 -  1900  In: 1923.8 [P.O.:480; I.V.:1443.8]  Out: 1 [Urine:1]    General:  Alert, morbidly obese, cooperative, no distress, appears stated age. Head:  Normocephalic, without obvious abnormality, atraumatic. Eyes:  Conjunctivae/corneas clear. EOMs intact. Throat: Lips, mucosa, and tongue moist..   Neck: Supple, symmetrical, trachea midline, no adenopathy, thyroid: no enlargement/tenderness/nodules, no carotid bruit and no JVD. Back:   Symmetric, no curvature.  ROM normal. No CVA tenderness. Lungs:   Clear to auscultation bilaterally. Chest wall:  No tenderness or deformity. Heart:  Regular rate and rhythm, S1, S2 normal, no murmur, click, rub or gallop. Abdomen:   Soft, obese, with mild generalized tenderness without rebound/guarding. Bowel sounds normal. No masses,  No organomegaly. Extremities: no cyanosis or edema. No calf tenderness or cords. Skin: turgor normal.   Neurologic: Alert and oriented X 3. Fine motor of hands and fingers normal.   equal.  No cogwheeling or rigidity. Gait not tested at this time. Sensation grossly normal to touch. Gross motor of extremities normal.        Data Review:    CT AB and Pelvis 5/30/19   FINDINGS:   LUNG BASES: Clear. INCIDENTALLY IMAGED HEART AND MEDIASTINUM: Unremarkable. LIVER: No mass or biliary dilatation. GALLBLADDER: Surgically absent  SPLEEN: No mass. PANCREAS: No mass or ductal dilatation. ADRENALS: Unremarkable. KIDNEYS: No mass, calculus, or hydronephrosis. STOMACH: Post fundoplication  SMALL BOWEL: No dilatation or wall thickening. COLON: No dilatation or wall thickening. APPENDIX: Unremarkable. PERITONEUM: No ascites or pneumoperitoneum. RETROPERITONEUM: No lymphadenopathy or aortic aneurysm. Mild vascular  calcifications  REPRODUCTIVE ORGANS: Within normal limits  URINARY BLADDER: No mass or calculus. BONES: No destructive bone lesion. ADDITIONAL COMMENTS: N/A  IMPRESSION:  A source of GI bleeding is not identified    US of AB  EXAM: US ABD COMP   INDICATION: Elevated liver function enzymes. Rosa King FINDINGS:  LIVER:   The liver is normal in echotexture with a single homogeneous 1.6 x 1.3 x 1.5 cm  lesion. There are dilated intrahepatic ducts. LIVER VASCULATURE:   The portal vein flow is . GALLBLADDER:  The gallbladder is surgically absent. COMMON BILE DUCT:  There is positive intrahepatic biliary duct dilatation and the common duct  measures 13 mm in diameter.    PANCREAS:  The visualized pancreas is normal.  SPLEEN:  The spleen is normal in echotexture and size and measures 9.3 cm in length. The  spleen measures 247 mL. RIGHT KIDNEY:  The right kidney demonstrates normal echogenicity with no mass, stone or  hydronephrosis. The right kidney measures 11.7 cm in length. LEFT KIDNEY:  The left kidney demonstrates normal echogenicity with no mass, stone or  hydronephrosis. The left kidney measures 13.5 cm in length. The left kidney  contains a 1.2 x 1.3 x 1.2 cm anechoic cyst.  RETROPERITONEUM:  The aorta tapers normally. The IVC is normal.  No retroperitoneal mass is identified. IMPRESSION:   Intrahepatic and extrahepatic biliary dilatation. Recommend MRCP. Probable hepatic hemangioma    Recent Days:  Recent Labs     06/04/19  0343 06/03/19  0349 06/02/19  0548   WBC 6.9 7.0 6.3   HGB 8.2* 7.9* 9.0*   HCT 26.4* 25.5* 28.7*    250 246     Recent Labs     06/04/19  0343 06/03/19  0349 06/02/19  0548    141 140   K 4.2 4.2 3.7   * 112* 111*   CO2 26 26 26   * 99 102*   BUN 8 8 11   CREA 0.90 0.75 0.84   CA 8.2* 7.9* 8.3*   ALB 2.9* 2.9* 3.2*   TBILI 0.2 0.3 0.3   SGOT 29 55* 102*   ALT 59 77 97*     No results for input(s): PH, PCO2, PO2, HCO3, FIO2 in the last 72 hours.     24 Hour Results:  Recent Results (from the past 24 hour(s))   POC H. PYLORI, TISSUE    Collection Time: 06/03/19  8:58 AM   Result Value Ref Range    H. pylori from tissue Negative Negative    Positive control Positive     Negative control Negative     Lot no. [id number]    CBC W/O DIFF    Collection Time: 06/04/19  3:43 AM   Result Value Ref Range    WBC 6.9 3.6 - 11.0 K/uL    RBC 2.77 (L) 3.80 - 5.20 M/uL    HGB 8.2 (L) 11.5 - 16.0 g/dL    HCT 26.4 (L) 35.0 - 47.0 %    MCV 95.3 80.0 - 99.0 FL    MCH 29.6 26.0 - 34.0 PG    MCHC 31.1 30.0 - 36.5 g/dL    RDW 14.4 11.5 - 14.5 %    PLATELET 856 848 - 177 K/uL    MPV 10.1 8.9 - 12.9 FL    NRBC 0.0 0  WBC    ABSOLUTE NRBC 0.00 0.00 - 3.88 K/uL   METABOLIC PANEL, COMPREHENSIVE    Collection Time: 06/04/19  3:43 AM   Result Value Ref Range    Sodium 142 136 - 145 mmol/L    Potassium 4.2 3.5 - 5.1 mmol/L    Chloride 115 (H) 97 - 108 mmol/L    CO2 26 21 - 32 mmol/L    Anion gap 1 (L) 5 - 15 mmol/L    Glucose 113 (H) 65 - 100 mg/dL    BUN 8 6 - 20 MG/DL    Creatinine 0.90 0.55 - 1.02 MG/DL    BUN/Creatinine ratio 9 (L) 12 - 20      GFR est AA >60 >60 ml/min/1.73m2    GFR est non-AA >60 >60 ml/min/1.73m2    Calcium 8.2 (L) 8.5 - 10.1 MG/DL    Bilirubin, total 0.2 0.2 - 1.0 MG/DL    ALT (SGPT) 59 12 - 78 U/L    AST (SGOT) 29 15 - 37 U/L    Alk.  phosphatase 122 (H) 45 - 117 U/L    Protein, total 6.3 (L) 6.4 - 8.2 g/dL    Albumin 2.9 (L) 3.5 - 5.0 g/dL    Globulin 3.4 2.0 - 4.0 g/dL    A-G Ratio 0.9 (L) 1.1 - 2.2     BILIRUBIN, DIRECT    Collection Time: 06/04/19  3:43 AM   Result Value Ref Range    Bilirubin, direct 0.1 0.0 - 0.2 MG/DL       Medications reviewed  Current Facility-Administered Medications   Medication Dose Route Frequency    topiramate (TOPAMAX) tablet 25 mg  25 mg Oral QHS    busPIRone (BUSPAR) tablet 15 mg  15 mg Oral QHS    traZODone (DESYREL) tablet 100 mg  100 mg Oral QHS    traZODone (DESYREL) tablet 100 mg  100 mg Oral QHS PRN    sodium phosphate (FLEET'S) enema 1 Enema  1 Enema Rectal NOW    sodium phosphate (FLEET'S) enema 1 Enema  1 Enema Rectal NOW    morphine injection 2 mg  2 mg IntraVENous Q4H PRN    ALPRAZolam (XANAX) tablet 1 mg  1 mg Oral QID PRN    docusate (COLACE) 50 mg/5 mL oral liquid 50 mg  50 mg Oral QHS    DULoxetine (CYMBALTA) capsule 120 mg  120 mg Oral QHS    pantoprazole (PROTONIX) tablet 40 mg  40 mg Oral DAILY    tiZANidine (ZANAFLEX) tablet 4 mg  4 mg Oral TID    losartan/hydroCHLOROthiazide (HYZAAR) 100/25 mg   Oral QHS    0.9% sodium chloride with KCl 40 mEq/L infusion   IntraVENous CONTINUOUS    acetaminophen (TYLENOL) tablet 650 mg  650 mg Oral Q6H PRN    ibuprofen (MOTRIN) tablet 200 mg  200 mg Oral Q6H PRN  diphenhydrAMINE (BENADRYL) capsule 25 mg  25 mg Oral Q4H PRN    ondansetron (ZOFRAN) injection 4 mg  4 mg IntraVENous Q4H PRN    polyethylene glycol (MIRALAX) packet 17 g  17 g Oral BID     Care Plan discussed with: Patient and Nurse  Total time spent with patient: 30 minutes.     Lorraine Jaquez MD

## 2019-06-04 NOTE — ROUTINE PROCESS
Bedside and Verbal shift change report given to SNOW Nj (oncoming nurse) by Brandie Del Angel RN (offgoing nurse). Report included the following information SBAR, Kardex, ED Summary, Intake/Output, MAR and Recent Results.

## 2019-06-04 NOTE — ANESTHESIA PREPROCEDURE EVALUATION
Relevant Problems   No relevant active problems   Anesthetic History          Comments: Awareness during colonoscopy     Review of Systems / Medical History  Patient summary reviewed and pertinent labs reviewed    Pulmonary        Sleep apnea: No treatment  Smoker (recently quit)         Neuro/Psych     seizures (last seizure 4 years ago): well controlled    Headaches and psychiatric history (anxiety/depression)     Cardiovascular    Hypertension: well controlled              Exercise tolerance: <4 METS     GI/Hepatic/Renal     GERD (no symptoms recently)    Renal disease: stones       Endo/Other          Pertinent negatives: Diabetes: borderline.    Other Findings              Physical Exam    Airway  Mallampati: II  TM Distance: 4 - 6 cm  Neck ROM: normal range of motion   Mouth opening: Normal     Cardiovascular    Rhythm: regular  Rate: normal         Dental         Pulmonary  Breath sounds clear to auscultation               Abdominal         Other Findings            Anesthetic Plan    ASA: 2  Anesthesia type: general          Induction: Intravenous  Anesthetic plan and risks discussed with: Patient              Anesthetic History   No history of anesthetic complications            Review of Systems / Medical History  Patient summary reviewed, nursing notes reviewed and pertinent labs reviewed    Pulmonary        Sleep apnea: No treatment           Neuro/Psych     seizures (last seizure 5 years ago, questionable seizure 3 weeks ago)    Headaches and psychiatric history     Cardiovascular    Hypertension: well controlled          Hyperlipidemia    Exercise tolerance: <4 METS     GI/Hepatic/Renal     GERD      Hiatal hernia     Endo/Other      Hypothyroidism  Morbid obesity and anemia     Other Findings   Comments: H/o cavernous hemangioma s/p resection  fibromyalgia           Physical Exam    Airway  Mallampati: II  TM Distance: 4 - 6 cm  Neck ROM: normal range of motion   Mouth opening: Normal Cardiovascular    Rhythm: regular  Rate: normal         Dental    Dentition: Poor dentition     Pulmonary  Breath sounds clear to auscultation               Abdominal  GI exam deferred       Other Findings            Anesthetic Plan    ASA: 3  Anesthesia type: general          Induction: Intravenous  Anesthetic plan and risks discussed with: Patient

## 2019-06-04 NOTE — PROGRESS NOTES
4788  Dr. Anjali Monzon notified that pt is upset with having 2 enemas ordered before her procedure this morning. Per Dr. Anjali Monzon, okay to do one enema on the floor instead of 2.    0750  Bedside and Verbal shift change report given to Liban Wong RN (oncoming nurse) by Juan Clayton RN (offgoing nurse). Report included the following information SBAR, Kardex, MAR and Recent Results.

## 2019-06-04 NOTE — PROGRESS NOTES
Patient informed that Dr. Nata Mathew would like patient to have at least one Enema administered and the second one can be given during the procedure. Patient refused enema and Dr. Rehana Stockton assistant was left a voicemail regarding above. Will continue to monitor.

## 2019-06-04 NOTE — BRIEF OP NOTE
BRIEF OPERATIVE NOTE    Date of Procedure: 6/4/2019   Preoperative Diagnosis: Hemorrhoid  Postoperative Diagnosis: Hemorrhoid    Procedure(s):   HEMORRHOIDECTOMY 3 Column  Surgeon(s) and Role:     * Dalia Chavarria MD - Primary    Surgical Staff:  Circ-1: Micha Marin RN  Scrub Tech-Relief: Jessica Valadez  Scrub RN-1: Hussain Casanova RN  Float Staff: Jeremy Aguero Time In Time Out   Incision Start 06/04/2019 1442    Incision Close 06/04/2019 1553      Anesthesia: General   Estimated Blood Loss: 50cc  Specimens:   ID Type Source Tests Collected by Time Destination   1 : Left lateral internal and external hemorrhoids Preservative Rectum  Dalia Chavarria MD 6/4/2019 1514 Pathology   2 : Right Posterior Internal and External Hemorrhoids Preservative Rectum  Dalia Chavarria MD 6/4/2019 1518 Pathology   3 : Right Anterior internal and external hemorrhoids Preservative Rectum  Dalia Chavarria MD 6/4/2019 1540 Pathology      Findings: external and internal disease   Complications: none  Implants: * No implants in log *

## 2019-06-04 NOTE — PERIOP NOTES
1710  Pt awake, VSS. Pt reports pain/pressure improving. 1718  Pt off monitors for transport to room. TRANSFER - OUT REPORT:    Verbal report given to Maria De Jesus Smith RN on Bryon Roberson  being transferred to [facility] [phone] for routine post - op       Report consisted of patients Situation, Background, Assessment and   Recommendations(SBAR). Information from the following report(s) SBAR, OR Summary, Intake/Output and Cardiac Rhythm NSR was reviewed with the receiving nurse. Lines:   Peripheral IV 05/30/19 Left Wrist (Active)   Site Assessment Clean, dry, & intact 6/4/2019  4:20 PM   Phlebitis Assessment 0 6/4/2019  4:20 PM   Infiltration Assessment 0 6/4/2019  4:20 PM   Dressing Status Clean, dry, & intact 6/4/2019  4:20 PM   Dressing Type Transparent 6/4/2019  4:20 PM   Hub Color/Line Status Blue; Infusing 6/4/2019  4:20 PM   Action Taken Open ports on tubing capped 6/4/2019  2:55 AM   Alcohol Cap Used Yes 6/4/2019  4:20 PM        Opportunity for questions and clarification was provided.       Patient transported with:   Registered Nurse

## 2019-06-05 LAB
ALBUMIN SERPL-MCNC: 2.8 G/DL (ref 3.5–5)
ALBUMIN/GLOB SERPL: 0.8 {RATIO} (ref 1.1–2.2)
ALP SERPL-CCNC: 105 U/L (ref 45–117)
ALT SERPL-CCNC: 47 U/L (ref 12–78)
ANION GAP SERPL CALC-SCNC: 3 MMOL/L (ref 5–15)
AST SERPL-CCNC: 20 U/L (ref 15–37)
BILIRUB SERPL-MCNC: 0.2 MG/DL (ref 0.2–1)
BUN SERPL-MCNC: 8 MG/DL (ref 6–20)
BUN/CREAT SERPL: 8 (ref 12–20)
CALCIUM SERPL-MCNC: 8.1 MG/DL (ref 8.5–10.1)
CHLORIDE SERPL-SCNC: 115 MMOL/L (ref 97–108)
CO2 SERPL-SCNC: 24 MMOL/L (ref 21–32)
CREAT SERPL-MCNC: 0.97 MG/DL (ref 0.55–1.02)
ERYTHROCYTE [DISTWIDTH] IN BLOOD BY AUTOMATED COUNT: 14.9 % (ref 11.5–14.5)
GLOBULIN SER CALC-MCNC: 3.3 G/DL (ref 2–4)
GLUCOSE SERPL-MCNC: 129 MG/DL (ref 65–100)
HCT VFR BLD AUTO: 23.6 % (ref 35–47)
HCT VFR BLD AUTO: 26.6 % (ref 35–47)
HGB BLD-MCNC: 7 G/DL (ref 11.5–16)
HGB BLD-MCNC: 8.2 G/DL (ref 11.5–16)
MCH RBC QN AUTO: 29.2 PG (ref 26–34)
MCHC RBC AUTO-ENTMCNC: 29.7 G/DL (ref 30–36.5)
MCV RBC AUTO: 98.3 FL (ref 80–99)
NRBC # BLD: 0 K/UL (ref 0–0.01)
NRBC BLD-RTO: 0 PER 100 WBC
PLATELET # BLD AUTO: 244 K/UL (ref 150–400)
PMV BLD AUTO: 11 FL (ref 8.9–12.9)
POTASSIUM SERPL-SCNC: 5 MMOL/L (ref 3.5–5.1)
PROT SERPL-MCNC: 6.1 G/DL (ref 6.4–8.2)
RBC # BLD AUTO: 2.4 M/UL (ref 3.8–5.2)
SODIUM SERPL-SCNC: 142 MMOL/L (ref 136–145)
WBC # BLD AUTO: 10.8 K/UL (ref 3.6–11)

## 2019-06-05 PROCEDURE — 86923 COMPATIBILITY TEST ELECTRIC: CPT

## 2019-06-05 PROCEDURE — 74011250636 HC RX REV CODE- 250/636: Performed by: SURGERY

## 2019-06-05 PROCEDURE — 96376 TX/PRO/DX INJ SAME DRUG ADON: CPT

## 2019-06-05 PROCEDURE — 74011250637 HC RX REV CODE- 250/637: Performed by: FAMILY MEDICINE

## 2019-06-05 PROCEDURE — 80053 COMPREHEN METABOLIC PANEL: CPT

## 2019-06-05 PROCEDURE — 36430 TRANSFUSION BLD/BLD COMPNT: CPT

## 2019-06-05 PROCEDURE — 74011250636 HC RX REV CODE- 250/636: Performed by: FAMILY MEDICINE

## 2019-06-05 PROCEDURE — 36415 COLL VENOUS BLD VENIPUNCTURE: CPT

## 2019-06-05 PROCEDURE — 86900 BLOOD TYPING SEROLOGIC ABO: CPT

## 2019-06-05 PROCEDURE — 74011250637 HC RX REV CODE- 250/637: Performed by: SURGERY

## 2019-06-05 PROCEDURE — P9016 RBC LEUKOCYTES REDUCED: HCPCS

## 2019-06-05 PROCEDURE — 85027 COMPLETE CBC AUTOMATED: CPT

## 2019-06-05 PROCEDURE — 85018 HEMOGLOBIN: CPT

## 2019-06-05 PROCEDURE — 99218 HC RM OBSERVATION: CPT

## 2019-06-05 RX ORDER — SODIUM CHLORIDE 9 MG/ML
250 INJECTION, SOLUTION INTRAVENOUS AS NEEDED
Status: DISCONTINUED | OUTPATIENT
Start: 2019-06-05 | End: 2019-06-07 | Stop reason: HOSPADM

## 2019-06-05 RX ORDER — DIPHENHYDRAMINE HCL 25 MG
25 CAPSULE ORAL
Status: DISCONTINUED | OUTPATIENT
Start: 2019-06-05 | End: 2019-06-07 | Stop reason: HOSPADM

## 2019-06-05 RX ORDER — SIMETHICONE 80 MG
80 TABLET,CHEWABLE ORAL
Status: DISCONTINUED | OUTPATIENT
Start: 2019-06-05 | End: 2019-06-07 | Stop reason: HOSPADM

## 2019-06-05 RX ADMIN — MORPHINE SULFATE 2 MG: 4 INJECTION, SOLUTION INTRAMUSCULAR; INTRAVENOUS at 08:57

## 2019-06-05 RX ADMIN — BUSPIRONE HYDROCHLORIDE 15 MG: 10 TABLET ORAL at 22:14

## 2019-06-05 RX ADMIN — ALPRAZOLAM 1 MG: 0.5 TABLET ORAL at 23:51

## 2019-06-05 RX ADMIN — OXYCODONE HYDROCHLORIDE 5 MG: 5 TABLET ORAL at 23:51

## 2019-06-05 RX ADMIN — OXYCODONE HYDROCHLORIDE 5 MG: 5 TABLET ORAL at 20:08

## 2019-06-05 RX ADMIN — TIZANIDINE 4 MG: 2 TABLET ORAL at 22:15

## 2019-06-05 RX ADMIN — OXYCODONE HYDROCHLORIDE 5 MG: 5 TABLET ORAL at 11:06

## 2019-06-05 RX ADMIN — MORPHINE SULFATE 2 MG: 4 INJECTION, SOLUTION INTRAMUSCULAR; INTRAVENOUS at 02:26

## 2019-06-05 RX ADMIN — MORPHINE SULFATE 2 MG: 4 INJECTION, SOLUTION INTRAMUSCULAR; INTRAVENOUS at 22:17

## 2019-06-05 RX ADMIN — SODIUM CHLORIDE AND POTASSIUM CHLORIDE: 9; 2.98 INJECTION, SOLUTION INTRAVENOUS at 05:55

## 2019-06-05 RX ADMIN — SIMETHICONE CHEW TAB 80 MG 80 MG: 80 TABLET ORAL at 23:38

## 2019-06-05 RX ADMIN — ACETAMINOPHEN 1000 MG: 500 TABLET ORAL at 05:52

## 2019-06-05 RX ADMIN — ONDANSETRON 4 MG: 2 INJECTION INTRAMUSCULAR; INTRAVENOUS at 11:06

## 2019-06-05 RX ADMIN — TIZANIDINE 4 MG: 2 TABLET ORAL at 15:38

## 2019-06-05 RX ADMIN — SODIUM CHLORIDE 1000 ML: 900 INJECTION, SOLUTION INTRAVENOUS at 00:15

## 2019-06-05 RX ADMIN — MORPHINE SULFATE 2 MG: 4 INJECTION, SOLUTION INTRAMUSCULAR; INTRAVENOUS at 18:22

## 2019-06-05 RX ADMIN — MORPHINE SULFATE 2 MG: 4 INJECTION, SOLUTION INTRAMUSCULAR; INTRAVENOUS at 13:14

## 2019-06-05 RX ADMIN — ACETAMINOPHEN 1000 MG: 500 TABLET ORAL at 22:15

## 2019-06-05 RX ADMIN — DULOXETINE HYDROCHLORIDE 120 MG: 30 CAPSULE, DELAYED RELEASE ORAL at 22:15

## 2019-06-05 RX ADMIN — DOCUSATE SODIUM 50 MG: 50 LIQUID ORAL at 22:15

## 2019-06-05 RX ADMIN — ACETAMINOPHEN 1000 MG: 500 TABLET ORAL at 15:37

## 2019-06-05 RX ADMIN — PANTOPRAZOLE SODIUM 40 MG: 40 TABLET, DELAYED RELEASE ORAL at 08:57

## 2019-06-05 RX ADMIN — TOPIRAMATE 25 MG: 25 TABLET, FILM COATED ORAL at 22:16

## 2019-06-05 RX ADMIN — OXYCODONE HYDROCHLORIDE 5 MG: 5 TABLET ORAL at 15:46

## 2019-06-05 NOTE — PROGRESS NOTES
Rounded on Jainism patients and provided Anointing  of the Sick at request of patient    Fr. Dajuan Smith

## 2019-06-05 NOTE — PROGRESS NOTES
Daily Progress Note: 6/5/2019  Heriberto Elkins MD    Assessment/Plan:   Anemia - POA, new, due to GI blood loss. -- Transfuse as needed  -- Give 1 unit this am     Hypokalemia / Dehydration / Elevated lactic acid level - POA, due to poor PO intake. --Hydrate and replete K.     Rectal bleeding / Hx hemorroids / Hx Hiatal hernia - POA, very likely hemorrhoid bleeding. --Consulted GI.    --PPI  --Hemorrhoidectomy 6/4     Anxiety and depression / Fibromyalgia - POA, poorly controlled. This is contributing to her sensation of pain. --Continue cymbalta, chatix and xanax.      Morbid obesity / CARLOS (obstructive sleep apnea) - Non compliant with cpap. Advised weight loss     Hypertension / Incomplete right bundle branch block (RBBB) determined by electrocardiography - Adequate control on losartan HCTZ     Hx of seizure disorder - None recent.     --Continue topamax    Elevated liver enzymes  -- hepatitis profile negative  --US abd       Problem List:  Problem List as of 6/5/2019 Date Reviewed: 5/30/2019          Codes Class Noted - Resolved    Elevated lactic acid level ICD-10-CM: R79.89  ICD-9-CM: 276.2  5/30/2019 - Present        Rectal bleeding ICD-10-CM: K62.5  ICD-9-CM: 569.3  5/30/2019 - Present        CARLOS (obstructive sleep apnea) ICD-10-CM: G47.33  ICD-9-CM: 327.23  Unknown - Present    Overview Signed 5/30/2019  7:55 PM by Catherine Frankel MD     no cpap             Hypertension ICD-10-CM: I10  ICD-9-CM: 401.9  Unknown - Present        Hx of seizure disorder ICD-10-CM: Z86.69  ICD-9-CM: V12.49  Unknown - Present        Fibromyalgia ICD-10-CM: M79.7  ICD-9-CM: 729.1  Unknown - Present    Overview Signed 5/30/2019  7:55 PM by Catherine Frankel MD     fibromyalgia             Anxiety and depression ICD-10-CM: F41.9, F32.9  ICD-9-CM: 300.00, 311  Unknown - Present        Dehydration ICD-10-CM: E86.0  ICD-9-CM: 276.51  5/30/2019 - Present        * (Principal) Anemia ICD-10-CM: D64.9  ICD-9-CM: 285.9  5/30/2019 - Present        Hemorrhoids ICD-10-CM: K64.9  ICD-9-CM: 455.6  Unknown - Present        Hx of migraines ICD-10-CM: Z86.69  ICD-9-CM: V12.49  Unknown - Present        [facility] ([facility] ICD-10-CM: E66.01  ICD-9-CM: 278.01  4/29/2019 - Present        Hiatal hernia ICD-10-CM: K44.9  ICD-9-CM: 553.3  5/18/2018 - Present        Incomplete right bundle branch block (RBBB) determined by electrocardiography ICD-10-CM: I45.10  ICD-9-CM: 426.4  5/10/2018 - Present        Hiatal hernia with gastroesophageal reflux ICD-10-CM: K21.9, K44.9  ICD-9-CM: 530.81, 553.3  4/1/2018 - Present        Hypokalemia ICD-10-CM: E87.6  ICD-9-CM: 276.8  10/1/2013 - Present        Cavernous hemangioma of intracranial structure (HCC) (Chronic) ICD-10-CM: D18.02  ICD-9-CM: 228.02  5/2/2011 - Present        RESOLVED: Depressive disorder, not elsewhere classified ICD-10-CM: F32.9  ICD-9-CM: 265  10/1/2013 - 5/30/2019        RESOLVED: Leukocytosis, unspecified ICD-10-CM: Q86.976  ICD-9-CM: 288.60  10/1/2013 - 5/30/2019        RESOLVED: Slurred speech ICD-10-CM: R47.81  ICD-9-CM: 784.59  9/30/2013 - 5/30/2019        RESOLVED: Seizure ([facility] (Chronic) ICD-10-CM: R56.9  ICD-9-CM: 780.39  5/2/2011 - 5/30/2019        RESOLVED: Migraine (Chronic) ICD-10-CM: H25.832  ICD-9-CM: 346.90  5/2/2011 - 5/30/2019            Subjective:    47 y.o.  female who presented to the Emergency Department complaining of BRBPR. She is a poor historian due to anxiety. He reports 10 days of seeing red toilet water and clots on toilet paper. She reports crampy abdominal pain. She reports a hx of hemorrhoids, and had her latest colonoscopy about 1 year ago, with finding of hemorrhoids and polyps. She reports non compliance wiwth hemorrhoid med recommended by GI.  ER finds anemia, but hemoccult was negative today. We will admit her for observation. (Dr Joana Perez)     5/31:  Still c/o \"severe\" ab pain but conversing well, does not appear in pain.   Pain is constant and in mid ab between quadrants bilat and periumbicaly. She wants something \"stronger\" for pain. She reports she has taken Morphine in past without side effects. No N/V today. K+ sl better today after she agreed to take the med. GI to see today. :She is still  having  Pain and some rectal bleeding. GI has seen and is planning colonoscopy for Monday. : Less pain. Will be on clears today and starting bowel prep. LFTs are elevated this am. Will add hepatitis profile and obtain US. No nausea. Hb stable. 6/3:  Less Ab pain. She reports prep has helped more than anything so far. Hepatitis profile negative. LFTs better. AB US as under Data Review showing dilated CBD and duct dilation - may have passed a gallstone. Colonoscopy planned for 6/3.      :  Reports \"feel anxious all over\" but no ab pain. Colonoscopy with likely hemorrhoids as source for bleeding - Surg has seen and hemorrhoidectomy later today. LFTs back to normal.      :Hemorrhoidectomy yesterday. Pain manageable. Hb down to 7 and she is hypotensive. Had bolus during the night. Discussed transfusion and she is agreeable to blood. Discussed risks and benefits. Review of Systems:   A comprehensive review of systems was negative except for that written in the HPI. Objective:   Physical Exam:     Visit Vitals  BP 95/66 (BP 1 Location: Left arm, BP Patient Position: At rest;Supine)   Pulse 62   Temp 97.8 °F (36.6 °C)   Resp 18   Ht 5' 4\" (1.626 m)   Wt 234 lb (106.1 kg)   LMP 2011   SpO2 96%   BMI 40.17 kg/m²    O2 Flow Rate (L/min): 6 l/min O2 Device: Room air  Temp (24hrs), Av.7 °F (36.5 °C), Min:97.4 °F (36.3 °C), Max:98.6 °F (37 °C)    No intake/output data recorded.  1901 -  0700  In: 220 [P.O.:120; I.V.:100]  Out: -     General:  Alert, morbidly obese, cooperative, no distress, appears stated age. Head:  Normocephalic, without obvious abnormality, atraumatic.    Eyes: Conjunctivae/corneas clear. EOMs intact. Throat: Lips, mucosa, and tongue moist..   Neck: Supple, symmetrical, trachea midline, no adenopathy, thyroid: no enlargement/tenderness/nodules, no carotid bruit and no JVD. Back:   Symmetric, no curvature. ROM normal. No CVA tenderness. Lungs:   Clear to auscultation bilaterally. Chest wall:  No tenderness or deformity. Heart:  Regular rate and rhythm, S1, S2 normal, no murmur, click, rub or gallop. Abdomen:   Soft, obese, with mild generalized tenderness without rebound/guarding. Bowel sounds normal. No masses,  No organomegaly. Extremities: no cyanosis or edema. No calf tenderness or cords. Skin: turgor normal.   Neurologic: Alert and oriented X 3. Fine motor of hands and fingers normal.   equal.  No cogwheeling or rigidity. Gait not tested at this time. Sensation grossly normal to touch. Gross motor of extremities normal.        Data Review:    CT AB and Pelvis 5/30/19   FINDINGS:   LUNG BASES: Clear. INCIDENTALLY IMAGED HEART AND MEDIASTINUM: Unremarkable. LIVER: No mass or biliary dilatation. GALLBLADDER: Surgically absent  SPLEEN: No mass. PANCREAS: No mass or ductal dilatation. ADRENALS: Unremarkable. KIDNEYS: No mass, calculus, or hydronephrosis. STOMACH: Post fundoplication  SMALL BOWEL: No dilatation or wall thickening. COLON: No dilatation or wall thickening. APPENDIX: Unremarkable. PERITONEUM: No ascites or pneumoperitoneum. RETROPERITONEUM: No lymphadenopathy or aortic aneurysm. Mild vascular  calcifications  REPRODUCTIVE ORGANS: Within normal limits  URINARY BLADDER: No mass or calculus. BONES: No destructive bone lesion. ADDITIONAL COMMENTS: N/A  IMPRESSION:  A source of GI bleeding is not identified    US of AB  EXAM: US ABD COMP   INDICATION: Elevated liver function enzymes. Mliss Santos FINDINGS:  LIVER:   The liver is normal in echotexture with a single homogeneous 1.6 x 1.3 x 1.5 cm  lesion.  There are dilated intrahepatic ducts.  LIVER VASCULATURE:   The portal vein flow is . GALLBLADDER:  The gallbladder is surgically absent. COMMON BILE DUCT:  There is positive intrahepatic biliary duct dilatation and the common duct  measures 13 mm in diameter. PANCREAS:  The visualized pancreas is normal.  SPLEEN:  The spleen is normal in echotexture and size and measures 9.3 cm in length. The  spleen measures 247 mL. RIGHT KIDNEY:  The right kidney demonstrates normal echogenicity with no mass, stone or  hydronephrosis. The right kidney measures 11.7 cm in length. LEFT KIDNEY:  The left kidney demonstrates normal echogenicity with no mass, stone or  hydronephrosis. The left kidney measures 13.5 cm in length. The left kidney  contains a 1.2 x 1.3 x 1.2 cm anechoic cyst.  RETROPERITONEUM:  The aorta tapers normally. The IVC is normal.  No retroperitoneal mass is identified. IMPRESSION:   Intrahepatic and extrahepatic biliary dilatation. Recommend MRCP. Probable hepatic hemangioma    Recent Days:  Recent Labs     06/05/19  0336 06/04/19  0343 06/03/19  0349   WBC 10.8 6.9 7.0   HGB 7.0* 8.2* 7.9*   HCT 23.6* 26.4* 25.5*    264 250     Recent Labs     06/05/19  0336 06/04/19  0343 06/03/19  0349    142 141   K 5.0 4.2 4.2   * 115* 112*   CO2 24 26 26   * 113* 99   BUN 8 8 8   CREA 0.97 0.90 0.75   CA 8.1* 8.2* 7.9*   ALB 2.8* 2.9* 2.9*   TBILI 0.2 0.2 0.3   SGOT 20 29 55*   ALT 47 59 77     No results for input(s): PH, PCO2, PO2, HCO3, FIO2 in the last 72 hours.     24 Hour Results:  Recent Results (from the past 24 hour(s))   CBC W/O DIFF    Collection Time: 06/05/19  3:36 AM   Result Value Ref Range    WBC 10.8 3.6 - 11.0 K/uL    RBC 2.40 (L) 3.80 - 5.20 M/uL    HGB 7.0 (L) 11.5 - 16.0 g/dL    HCT 23.6 (L) 35.0 - 47.0 %    MCV 98.3 80.0 - 99.0 FL    MCH 29.2 26.0 - 34.0 PG    MCHC 29.7 (L) 30.0 - 36.5 g/dL    RDW 14.9 (H) 11.5 - 14.5 %    PLATELET 844 596 - 038 K/uL    MPV 11.0 8.9 - 12.9 FL    NRBC 0.0 0 PER 100 WBC    ABSOLUTE NRBC 0.00 0.00 - 3.52 K/uL   METABOLIC PANEL, COMPREHENSIVE    Collection Time: 06/05/19  3:36 AM   Result Value Ref Range    Sodium 142 136 - 145 mmol/L    Potassium 5.0 3.5 - 5.1 mmol/L    Chloride 115 (H) 97 - 108 mmol/L    CO2 24 21 - 32 mmol/L    Anion gap 3 (L) 5 - 15 mmol/L    Glucose 129 (H) 65 - 100 mg/dL    BUN 8 6 - 20 MG/DL    Creatinine 0.97 0.55 - 1.02 MG/DL    BUN/Creatinine ratio 8 (L) 12 - 20      GFR est AA >60 >60 ml/min/1.73m2    GFR est non-AA >60 >60 ml/min/1.73m2    Calcium 8.1 (L) 8.5 - 10.1 MG/DL    Bilirubin, total 0.2 0.2 - 1.0 MG/DL    ALT (SGPT) 47 12 - 78 U/L    AST (SGOT) 20 15 - 37 U/L    Alk.  phosphatase 105 45 - 117 U/L    Protein, total 6.1 (L) 6.4 - 8.2 g/dL    Albumin 2.8 (L) 3.5 - 5.0 g/dL    Globulin 3.3 2.0 - 4.0 g/dL    A-G Ratio 0.8 (L) 1.1 - 2.2         Medications reviewed  Current Facility-Administered Medications   Medication Dose Route Frequency    oxyCODONE IR (ROXICODONE) tablet 5 mg  5 mg Oral Q4H PRN    acetaminophen (TYLENOL) tablet 1,000 mg  1,000 mg Oral Q8H    diphenhydrAMINE (BENADRYL) capsule 25 mg  25 mg Oral Q6H PRN    polyethylene glycol (MIRALAX) packet 17 g  17 g Oral DAILY    topiramate (TOPAMAX) tablet 25 mg  25 mg Oral QHS    busPIRone (BUSPAR) tablet 15 mg  15 mg Oral QHS    traZODone (DESYREL) tablet 100 mg  100 mg Oral QHS    traZODone (DESYREL) tablet 100 mg  100 mg Oral QHS PRN    morphine injection 2 mg  2 mg IntraVENous Q4H PRN    ALPRAZolam (XANAX) tablet 1 mg  1 mg Oral QID PRN    docusate (COLACE) 50 mg/5 mL oral liquid 50 mg  50 mg Oral QHS    DULoxetine (CYMBALTA) capsule 120 mg  120 mg Oral QHS    pantoprazole (PROTONIX) tablet 40 mg  40 mg Oral DAILY    tiZANidine (ZANAFLEX) tablet 4 mg  4 mg Oral TID    losartan/hydroCHLOROthiazide (HYZAAR) 100/25 mg   Oral QHS    0.9% sodium chloride with KCl 40 mEq/L infusion   IntraVENous CONTINUOUS    acetaminophen (TYLENOL) tablet 650 mg  650 mg Oral Q6H PRN    ibuprofen (MOTRIN) tablet 200 mg  200 mg Oral Q6H PRN    diphenhydrAMINE (BENADRYL) capsule 25 mg  25 mg Oral Q4H PRN    ondansetron (ZOFRAN) injection 4 mg  4 mg IntraVENous Q4H PRN    polyethylene glycol (MIRALAX) packet 17 g  17 g Oral BID     Care Plan discussed with: Patient and Nurse  Total time spent with patient: 30 minutes.     Goldy Buchanan MD

## 2019-06-05 NOTE — PROGRESS NOTES
5    Dr. Kelsey Rodriguez notified of pt's BP of 86/50. 1 liter normal saline bolus ordered and maintenance fluid rate changed to 125ml/hr. Will continue to monitor. 0730    Bedside and Verbal shift change report given to Lucas Dai RN (oncoming nurse) by Yvette Peterson RN (offgoing nurse). Report included the following information SBAR, Kardex, MAR and Recent Results.

## 2019-06-05 NOTE — PROGRESS NOTES
Bedside and Verbal shift change report given to Yunior Tapia RN (oncoming nurse) by Summer Clark RN and Wing Dejesus, student nurse (offgoing nurse). Report included the following information SBAR, Kardex, Accordion, Recent Results and Med Rec Status.

## 2019-06-06 LAB
ABO + RH BLD: NORMAL
ALBUMIN SERPL-MCNC: 3.1 G/DL (ref 3.5–5)
ALBUMIN/GLOB SERPL: 1 {RATIO} (ref 1.1–2.2)
ALP SERPL-CCNC: 114 U/L (ref 45–117)
ALT SERPL-CCNC: 35 U/L (ref 12–78)
ANION GAP SERPL CALC-SCNC: 7 MMOL/L (ref 5–15)
AST SERPL-CCNC: 16 U/L (ref 15–37)
BILIRUB SERPL-MCNC: 0.3 MG/DL (ref 0.2–1)
BLD PROD TYP BPU: NORMAL
BLOOD GROUP ANTIBODIES SERPL: NORMAL
BPU ID: NORMAL
BUN SERPL-MCNC: 8 MG/DL (ref 6–20)
BUN/CREAT SERPL: 8 (ref 12–20)
CALCIUM SERPL-MCNC: 8.2 MG/DL (ref 8.5–10.1)
CHLORIDE SERPL-SCNC: 111 MMOL/L (ref 97–108)
CO2 SERPL-SCNC: 26 MMOL/L (ref 21–32)
CREAT SERPL-MCNC: 1.06 MG/DL (ref 0.55–1.02)
CROSSMATCH RESULT,%XM: NORMAL
ERYTHROCYTE [DISTWIDTH] IN BLOOD BY AUTOMATED COUNT: 15.9 % (ref 11.5–14.5)
GLOBULIN SER CALC-MCNC: 3.2 G/DL (ref 2–4)
GLUCOSE SERPL-MCNC: 94 MG/DL (ref 65–100)
HCT VFR BLD AUTO: 24.2 % (ref 35–47)
HGB BLD-MCNC: 7.5 G/DL (ref 11.5–16)
MCH RBC QN AUTO: 29.3 PG (ref 26–34)
MCHC RBC AUTO-ENTMCNC: 31 G/DL (ref 30–36.5)
MCV RBC AUTO: 94.5 FL (ref 80–99)
NRBC # BLD: 0 K/UL (ref 0–0.01)
NRBC BLD-RTO: 0 PER 100 WBC
PLATELET # BLD AUTO: 268 K/UL (ref 150–400)
PMV BLD AUTO: 9.7 FL (ref 8.9–12.9)
POTASSIUM SERPL-SCNC: 3.8 MMOL/L (ref 3.5–5.1)
PROT SERPL-MCNC: 6.3 G/DL (ref 6.4–8.2)
RBC # BLD AUTO: 2.56 M/UL (ref 3.8–5.2)
SODIUM SERPL-SCNC: 144 MMOL/L (ref 136–145)
SPECIMEN EXP DATE BLD: NORMAL
STATUS OF UNIT,%ST: NORMAL
UNIT DIVISION, %UDIV: 0
WBC # BLD AUTO: 8.9 K/UL (ref 3.6–11)

## 2019-06-06 PROCEDURE — 74011250636 HC RX REV CODE- 250/636: Performed by: SURGERY

## 2019-06-06 PROCEDURE — 74011250637 HC RX REV CODE- 250/637: Performed by: SURGERY

## 2019-06-06 PROCEDURE — 74011250637 HC RX REV CODE- 250/637: Performed by: FAMILY MEDICINE

## 2019-06-06 PROCEDURE — 96376 TX/PRO/DX INJ SAME DRUG ADON: CPT

## 2019-06-06 PROCEDURE — 80053 COMPREHEN METABOLIC PANEL: CPT

## 2019-06-06 PROCEDURE — 74011250637 HC RX REV CODE- 250/637: Performed by: INTERNAL MEDICINE

## 2019-06-06 PROCEDURE — 92610 EVALUATE SWALLOWING FUNCTION: CPT

## 2019-06-06 PROCEDURE — 74011250636 HC RX REV CODE- 250/636: Performed by: FAMILY MEDICINE

## 2019-06-06 PROCEDURE — 85027 COMPLETE CBC AUTOMATED: CPT

## 2019-06-06 PROCEDURE — 99218 HC RM OBSERVATION: CPT

## 2019-06-06 PROCEDURE — 36415 COLL VENOUS BLD VENIPUNCTURE: CPT

## 2019-06-06 RX ORDER — CYCLOBENZAPRINE HCL 10 MG
5 TABLET ORAL
Status: DISCONTINUED | OUTPATIENT
Start: 2019-06-06 | End: 2019-06-07 | Stop reason: HOSPADM

## 2019-06-06 RX ORDER — NAPROXEN SODIUM 220 MG
220 TABLET ORAL
Status: DISCONTINUED | OUTPATIENT
Start: 2019-06-06 | End: 2019-06-07 | Stop reason: HOSPADM

## 2019-06-06 RX ORDER — MORPHINE SULFATE 4 MG/ML
1 INJECTION INTRAVENOUS
Status: DISCONTINUED | OUTPATIENT
Start: 2019-06-06 | End: 2019-06-07 | Stop reason: HOSPADM

## 2019-06-06 RX ADMIN — MORPHINE SULFATE 2 MG: 4 INJECTION, SOLUTION INTRAMUSCULAR; INTRAVENOUS at 03:59

## 2019-06-06 RX ADMIN — BUSPIRONE HYDROCHLORIDE 15 MG: 10 TABLET ORAL at 22:51

## 2019-06-06 RX ADMIN — TIZANIDINE 4 MG: 2 TABLET ORAL at 15:56

## 2019-06-06 RX ADMIN — POLYETHYLENE GLYCOL 3350 17 G: 17 POWDER, FOR SOLUTION ORAL at 08:55

## 2019-06-06 RX ADMIN — PANTOPRAZOLE SODIUM 40 MG: 40 TABLET, DELAYED RELEASE ORAL at 08:52

## 2019-06-06 RX ADMIN — ACETAMINOPHEN 1000 MG: 500 TABLET ORAL at 13:19

## 2019-06-06 RX ADMIN — MORPHINE SULFATE 1 MG: 4 INJECTION, SOLUTION INTRAMUSCULAR; INTRAVENOUS at 08:52

## 2019-06-06 RX ADMIN — SIMETHICONE CHEW TAB 80 MG 80 MG: 80 TABLET ORAL at 18:20

## 2019-06-06 RX ADMIN — MORPHINE SULFATE 1 MG: 4 INJECTION, SOLUTION INTRAMUSCULAR; INTRAVENOUS at 20:45

## 2019-06-06 RX ADMIN — OXYCODONE HYDROCHLORIDE 5 MG: 5 TABLET ORAL at 18:20

## 2019-06-06 RX ADMIN — OXYCODONE HYDROCHLORIDE 5 MG: 5 TABLET ORAL at 06:12

## 2019-06-06 RX ADMIN — DULOXETINE HYDROCHLORIDE 120 MG: 30 CAPSULE, DELAYED RELEASE ORAL at 22:49

## 2019-06-06 RX ADMIN — ALPRAZOLAM 1 MG: 0.5 TABLET ORAL at 23:01

## 2019-06-06 RX ADMIN — SIMETHICONE CHEW TAB 80 MG 80 MG: 80 TABLET ORAL at 10:20

## 2019-06-06 RX ADMIN — GABAPENTIN 400 MG: 100 CAPSULE ORAL at 22:48

## 2019-06-06 RX ADMIN — DOCUSATE SODIUM 50 MG: 50 LIQUID ORAL at 22:48

## 2019-06-06 RX ADMIN — ACETAMINOPHEN 1000 MG: 500 TABLET ORAL at 22:49

## 2019-06-06 RX ADMIN — GABAPENTIN 400 MG: 100 CAPSULE ORAL at 08:52

## 2019-06-06 RX ADMIN — TOPIRAMATE 25 MG: 25 TABLET, FILM COATED ORAL at 22:50

## 2019-06-06 RX ADMIN — MORPHINE SULFATE 1 MG: 4 INJECTION, SOLUTION INTRAMUSCULAR; INTRAVENOUS at 14:43

## 2019-06-06 RX ADMIN — OXYCODONE HYDROCHLORIDE 5 MG: 5 TABLET ORAL at 10:56

## 2019-06-06 RX ADMIN — ACETAMINOPHEN 1000 MG: 500 TABLET ORAL at 06:09

## 2019-06-06 RX ADMIN — TIZANIDINE 4 MG: 2 TABLET ORAL at 22:49

## 2019-06-06 RX ADMIN — GABAPENTIN 400 MG: 100 CAPSULE ORAL at 15:56

## 2019-06-06 NOTE — PROGRESS NOTES
Spiritual Care Assessment/Progress Note  [address]      NAME: Seymour Boxer      MRN: [id number]  AGE: 47 y.o. SEX: female  Jewish Affiliation: Alevism   Language: English     6/6/2019     Total Time (in minutes): 5     Spiritual Assessment begun in [facility] 5M1 MED SURG 1 through conversation with:         []Patient        [] Family    [] Friend(s)        Reason for Consult: Initial/Spiritual assessment, patient floor     Spiritual beliefs Per notes: (Please include comment if needed)     [x] Identifies with a joey tradition:   Alevism      [] Supported by a joey community:            [] Claims no spiritual orientation:           [] Seeking spiritual identity:                [] Adheres to an individual form of spirituality:           [] Not able to assess:                           Identified resources for coping:      [] Prayer                               [] Music                  [] Guided Imagery     [] Family/friends                 [] Pet visits     [] Devotional reading                         [] Unknown     [] Other:                                           Interventions offered during this visit: (See comments for more details)    Patient Interventions: Initial visit           Plan of Care:     [] Support spiritual and/or cultural needs    [] Support AMD and/or advance care planning process      [] Support grieving process   [] Coordinate Rites and/or Rituals    [] Coordination with community clergy   [] No spiritual needs identified at this time   [] Detailed Plan of Care below (See Comments)  [] Make referral to Music Therapy  [] Make referral to Pet Therapy     [] Make referral to Addiction services  [] Make referral to [facility]  [] Make referral to Spiritual Care Partner  [] No future visits requested        [x] Follow up visits as needed     Comments: Attempted Initial Spiritual Assessment in 5 Med Surg. Miss Abi Dolan was not in her room at the time.   Noticed Father Julianna Caceres has visited and anointed Miss Adela Graves. Chaplains available as able and/or needed.  Visited by: Jenna Arauz., MS., [address] ([id number])

## 2019-06-06 NOTE — PROGRESS NOTES
Events reviewed. Still has abdominal pain. Tolerating some diet. Evaluated by Dr. Jacinta Sofia, further workup likely minimal yield. Sitz bath TID, bowel regimen. Diet as tolerated.      Luis Lebron MD

## 2019-06-06 NOTE — PROGRESS NOTES
Events reviewed from overnight. Still has significant bloating, upper abdominal and back pain. Perirectal pain unremarkable. Vitals stable. BM liquid. Soft. Will discuss with GI team possible MRCP for dilated common duct. Patient reports \"white, constipated\" stool prior to admission. Sitz bath TID, bowel regimen. Diet as tolerated.      Stefany Lama MD

## 2019-06-06 NOTE — PROGRESS NOTES
Speech Pathology bedside swallow evaluation/discharge  Patient: Bryon Roberson (5 y.o. female)  Date: 6/6/2019  Primary Diagnosis: Anemia [D64.9]  Procedure(s) (LRB):  HEMORRHOIDECTOMY 3 Column (N/A) 2 Days Post-Op   Precautions: aspiration       ASSESSMENT :  Based on the objective data described below, the patient presents with c/o pharyngeal residue. However, she can talk and describe her symptoms while claiming that food was stuck. Suspect her symptoms correlate more with esophageal issues. She has a h/o paraesophageal hernia repair 1 year ago with Nissen fundiplication. She also reports a h/o UPPP 13 years ago with subsequent naso-pharyngeal regurgitation at times. THis symptoms is no longer present. Patient c/o increased dyspahgia in the last few weeks. Admitted with rectal bleeding. She had hemorroidecomty 6/4/19 and is anemic. PMH: HTN,k sz, RBBB, firbomyalgia, migraines, ICH with AVM,  A+D, +h/o tobacco,  Hemorrhoids and polyps, thyroid nodules. .  Skilled acute therapy provided by a speech-language pathologist is not indicated at this time. PLAN :  Recommendations:  Ok for dental soft. , thins . Consider BARIUM ESOPHAGRAM based on h/o surgery on esophagus. Discharge Recommendations: None     SUBJECTIVE:   Patient stated I have to take small bites and chew well and eat slow\"\"it's gotten worse in the last week or so. \". C/o pill dysphagia, solid dysphagia.    OBJECTIVE:     Past Medical History:   Diagnosis Date    Anxiety and depression     Cavernous hemangioma of intracranial structure ([facility] [address] 5/2/2011    Fibromyalgia     fibromyalgia    Hemorrhoids     Hiatal hernia with gastroesophageal reflux 04/2018    Hx of migraines     Hx of seizure disorder     Hypertension     Incomplete right bundle branch block (RBBB) determined by electrocardiography 05/10/2018    CARLOS (obstructive sleep apnea)     no cpap    Thyroid nodule      Past Surgical History:   Procedure Laterality Date    COLONOSCOPY N/A 6/3/2019    COLONOSCOPY performed by Patsy Eaton MD at [address] HX HEENT  2012    tonsillectomy    HX LITHOTRIPSY      HX ORTHOPAEDIC      disc fusion 2010, right knee cyst removed   [address], 2006    Cavernous brain angioma(s) removed    SURGERY,BRAIN/SPINE,W/COMPUTER       Prior Level of Function/Home Situation:   Home Situation  Home Environment: Apartment  One/Two Story Residence: One story  Living Alone: Yes  Support Systems: Family member(s)  Patient Expects to be Discharged to[de-identified] Apartment  Current DME Used/Available at Home: None  Diet prior to admission: regular, thins  Current Diet:   Dental soft, thins   Cognitive and Communication Status:  Neurologic State: Alert  Orientation Level: Oriented X4  Cognition: Follows commands  Perception: Appears intact     Safety/Judgement: Insight into deficits  Oral Assessment:  Oral Assessment  Labial: No impairment  Dentition: Natural  Oral Hygiene: WFL  Lingual: No impairment  Velum: (She has minimal soft palate due to her report of h/o UPPP around 2006)  Mandible: No impairment  P.O. Trials:  Patient Position: upright in bed  Vocal quality prior to P.O.: No impairment  Consistency Presented: Thin liquid; Solid  How Presented: Self-fed/presented;Straw;Successive swallows      ORAL PHASE:   Mildly reduced chew  Suspectes she swallows some solids whole  PHARYGNEAL PHASE:   She swallowed 4x/bolus, but was talking and c/o residue, but could not hawk up. Suspect esophageal residue. She had an UGI April 2018 that showed small HH  With some reflux. NOMS:   The NOMS functional outcome measure was used to quantify this patient's level of swallowing impairment.   Based on the NOMS, the patient was determined to be at level 6 for swallow function     NOMS Swallowing Levels:  Level 1 (CN): NPO  Level 2 (CM): NPO but takes consistency in therapy  Level 3 (CL): Takes less than 50% of nutrition p.o. and continues with nonoral feedings; and/or safe with mod cues; and/or max diet restriction  Level 4 (CK): Safe swallow but needs mod cues; and/or mod diet restriction; and/or still requires some nonoral feeding/supplements  Level 5 (CJ): Safe swallow with min diet restriction; and/or needs min cues  Level 6 (CI): Independent with p.o.; rare cues; usually self cues; may need to avoid some foods or needs extra time  Level 7 ([address] [place] [address]): Independent for all p.o.  ALEXANDER. (2003). National Outcomes Measurement System (NOMS): Adult Speech-Language Pathology User's Guide. Pain:  Pain Scale 1: Numeric (0 - 10)  Pain Intensity 1: 8  Pain Location 1: Abdomen  After treatment:   [] Patient left in no apparent distress sitting up in chair  [x] Patient left in no apparent distress in bed  [] Call bell left within reach  [x] Nursing notified  [] Caregiver present  [] Bed alarm activated    COMMUNICATION/EDUCATION:   The patients plan of care including findings, recommendations, and recommended diet changes were discussed with: Registered Nurse. Patient was educated regarding Her deficit(s) of dysphagia  as this relates to Her diagnosis of anemia, h/o paraesopahgeal hernia repair with nissen fundiplication. Dashawn blanton He demonstrated Good understanding as evidenced by discussion. .  [] Posted safety precautions in patient's room. [x] Patient/family have participated as able and agree with findings and recommendations. [] Patient is unable to participate in plan of care at this time.     Thank you for this referral.  Jackie Schlatter, SLP  Time Calculation: 15 mins

## 2019-06-06 NOTE — PROGRESS NOTES
Gastrointestinal Progress Note    6/6/2019    Admit Date: 5/30/2019    Subjective:     New Complaints Today:  Yes. Pain: Patient complains of abdominal pain no. The pain is located lower chest. The pain is described as sharp, and is 6/10 in intensity. Pains correlate with motion of her torso.     She does not have an ASA, NSAID allergy; she has GI upset with these meds; this should not occur when also given pantoprazole    Reviewed CT: I do not see any pathology in area complaint or focal rib lesion  Bowel Movements: streaks blood    Bleeding:  streaks    Current Facility-Administered Medications   Medication Dose Route Frequency    0.9% sodium chloride infusion 250 mL  250 mL IntraVENous PRN    diphenhydrAMINE (BENADRYL) capsule 25 mg  25 mg Oral Q6H PRN    simethicone (MYLICON) tablet 80 mg  80 mg Oral QID PRN    oxyCODONE IR (ROXICODONE) tablet 5 mg  5 mg Oral Q4H PRN    acetaminophen (TYLENOL) tablet 1,000 mg  1,000 mg Oral Q8H    polyethylene glycol (MIRALAX) packet 17 g  17 g Oral DAILY    topiramate (TOPAMAX) tablet 25 mg  25 mg Oral QHS    busPIRone (BUSPAR) tablet 15 mg  15 mg Oral QHS    traZODone (DESYREL) tablet 100 mg  100 mg Oral QHS    traZODone (DESYREL) tablet 100 mg  100 mg Oral QHS PRN    morphine injection 2 mg  2 mg IntraVENous Q4H PRN    ALPRAZolam (XANAX) tablet 1 mg  1 mg Oral QID PRN    docusate (COLACE) 50 mg/5 mL oral liquid 50 mg  50 mg Oral QHS    DULoxetine (CYMBALTA) capsule 120 mg  120 mg Oral QHS    pantoprazole (PROTONIX) tablet 40 mg  40 mg Oral DAILY    tiZANidine (ZANAFLEX) tablet 4 mg  4 mg Oral TID    losartan/hydroCHLOROthiazide (HYZAAR) 100/25 mg   Oral QHS    acetaminophen (TYLENOL) tablet 650 mg  650 mg Oral Q6H PRN    ibuprofen (MOTRIN) tablet 200 mg  200 mg Oral Q6H PRN    diphenhydrAMINE (BENADRYL) capsule 25 mg  25 mg Oral Q4H PRN    ondansetron (ZOFRAN) injection 4 mg  4 mg IntraVENous Q4H PRN    polyethylene glycol (MIRALAX) packet 17 g  17 g Oral BID        Objective:     Blood pressure 105/74, pulse 76, temperature 98 °F (36.7 °C), resp. rate 18, height 5' 4\" (1.626 m), weight 106.1 kg (234 lb), last menstrual period 05/04/2011, SpO2 95 %. No intake/output data recorded. 06/04 1901 - 06/06 0700  In: 240 [P.O.:240]  Out: -     EXAM:  GENERAL: alert, cooperative, no distress, HEART: regular rate and rhythm, LUNGS: chest clear, no wheezing, rales, normal symmetric air entry, Heart exam - S1, S2 normal, no murmur, no gallop, rate regular, ABDOMEN:  Bowel sounds are normal, liver is not enlarged, spleen is not enlarged; no abdominal tenderness and EXTREMITY: extremities normal, atraumatic, no cyanosis or edema, no edema  Rectal: skin tag present.   Very sensitive ribs more so at area left costal margin where pain complaint is worse    Data Review    Recent Results (from the past 24 hour(s))   TYPE + CROSSMATCH    Collection Time: 06/05/19  8:12 AM   Result Value Ref Range    Crossmatch Expiration 06/08/2019     ABO/Rh(D) O POSITIVE     Antibody screen NEG     Unit number [id number]     Blood component type RC LR,1     Unit division 00     Status of unit ISSUED     Crossmatch result Compatible    HGB & HCT    Collection Time: 06/05/19  4:05 PM   Result Value Ref Range    HGB 8.2 (L) 11.5 - 16.0 g/dL    HCT 26.6 (L) 35.0 - 47.0 %   CBC W/O DIFF    Collection Time: 06/06/19  4:28 AM   Result Value Ref Range    WBC 8.9 3.6 - 11.0 K/uL    RBC 2.56 (L) 3.80 - 5.20 M/uL    HGB 7.5 (L) 11.5 - 16.0 g/dL    HCT 24.2 (L) 35.0 - 47.0 %    MCV 94.5 80.0 - 99.0 FL    MCH 29.3 26.0 - 34.0 PG    MCHC 31.0 30.0 - 36.5 g/dL    RDW 15.9 (H) 11.5 - 14.5 %    PLATELET 250 516 - 258 K/uL    MPV 9.7 8.9 - 12.9 FL    NRBC 0.0 0  WBC    ABSOLUTE NRBC 0.00 0.00 - 7.83 K/uL   METABOLIC PANEL, COMPREHENSIVE    Collection Time: 06/06/19  4:28 AM   Result Value Ref Range    Sodium 144 136 - 145 mmol/L    Potassium 3.8 3.5 - 5.1 mmol/L    Chloride 111 (H) 97 - 108 mmol/L CO2 26 21 - 32 mmol/L    Anion gap 7 5 - 15 mmol/L    Glucose 94 65 - 100 mg/dL    BUN 8 6 - 20 MG/DL    Creatinine 1.06 (H) 0.55 - 1.02 MG/DL    BUN/Creatinine ratio 8 (L) 12 - 20      GFR est AA >60 >60 ml/min/1.73m2    GFR est non-AA 54 (L) >60 ml/min/1.73m2    Calcium 8.2 (L) 8.5 - 10.1 MG/DL    Bilirubin, total 0.3 0.2 - 1.0 MG/DL    ALT (SGPT) 35 12 - 78 U/L    AST (SGOT) 16 15 - 37 U/L    Alk. phosphatase 114 45 - 117 U/L    Protein, total 6.3 (L) 6.4 - 8.2 g/dL    Albumin 3.1 (L) 3.5 - 5.0 g/dL    Globulin 3.2 2.0 - 4.0 g/dL    A-G Ratio 1.0 (L) 1.1 - 2.2         Assessment:     Principal Problem:    Anemia (5/30/2019)    Active Problems:    Cavernous hemangioma of intracranial structure ([facility] [address] (5/2/2011)      Hypokalemia (10/1/2013)      Hiatal hernia (5/18/2018)      Severe obesity (HCC) (4/29/2019)      Elevated lactic acid level (5/30/2019)      Rectal bleeding (5/30/2019)      CARLOS (obstructive sleep apnea) ()      Overview: no cpap      Incomplete right bundle branch block (RBBB) determined by electrocardiography (5/10/2018)      Hypertension ()      Hx of seizure disorder ()      Fibromyalgia ()      Overview: fibromyalgia      Anxiety and depression ()      Dehydration (5/30/2019)    rib pains; advised pains of bone origin; advised usually not given long term narcotics for the issue. She is not fully accepting diagnosis of bone pain. Advised her endoscopics and CT do not demonstrate GI pain source    Bleeding much improved    Plan:     1.   Simultaneous naproxen, gabapentin, cyclobenzaprine; advised she may decline treatment of she wishes, but have no alternative recommendation

## 2019-06-06 NOTE — PROGRESS NOTES
Daily Progress Note: 6/6/2019  Linda Menard MD    Assessment/Plan:   Anemia - POA, new, due to GI blood loss. -- Transfuse as needed  -- Gave 1 unit 6/5     Hypokalemia / Dehydration / Elevated lactic acid level - POA, due to poor PO intake. --Hydrate and replete K.     Rectal bleeding / Hx hemorroids / Hx Hiatal hernia - POA, very likely hemorrhoid bleeding. --Consulted GI.    --PPI  --Hemorrhoidectomy 6/4     Anxiety and depression / Fibromyalgia - POA, poorly controlled. This is contributing to her sensation of pain. --Continue cymbalta, chatix and xanax.      Morbid obesity / CARLOS (obstructive sleep apnea) - Non compliant with cpap. Advised weight loss     Hypertension / Incomplete right bundle branch block (RBBB) determined by electrocardiography - Adequate control on losartan HCTZ     Hx of seizure disorder - None recent.     --Continue topamax    Elevated liver enzymes  -- hepatitis profile negative  --US abd    Abd Pain/Bloating  --Reconsult GI     Problem List:  Problem List as of 6/6/2019 Date Reviewed: 5/30/2019          Codes Class Noted - Resolved    Elevated lactic acid level ICD-10-CM: R79.89  ICD-9-CM: 276.2  5/30/2019 - Present        Rectal bleeding ICD-10-CM: K62.5  ICD-9-CM: 569.3  5/30/2019 - Present        CARLOS (obstructive sleep apnea) ICD-10-CM: G47.33  ICD-9-CM: 327.23  Unknown - Present    Overview Signed 5/30/2019  7:55 PM by Kirk Davis MD     no cpap             Hypertension ICD-10-CM: I10  ICD-9-CM: 401.9  Unknown - Present        Hx of seizure disorder ICD-10-CM: Z86.69  ICD-9-CM: V12.49  Unknown - Present        Fibromyalgia ICD-10-CM: M79.7  ICD-9-CM: 729.1  Unknown - Present    Overview Signed 5/30/2019  7:55 PM by Kirk Davis MD     fibromyalgia             Anxiety and depression ICD-10-CM: F41.9, F32.9  ICD-9-CM: 300.00, 311  Unknown - Present        Dehydration ICD-10-CM: E86.0  ICD-9-CM: 276.51  5/30/2019 - Present        * (Principal) Anemia ICD-10-CM: D64.9  ICD-9-CM: 285.9  5/30/2019 - Present        Hemorrhoids ICD-10-CM: K64.9  ICD-9-CM: 455.6  Unknown - Present        Hx of migraines ICD-10-CM: Z86.69  ICD-9-CM: V12.49  Unknown - Present        Severe obesity ([facility] [address] ICD-10-CM: E66.01  ICD-9-CM: 278.01  4/29/2019 - Present        Hiatal hernia ICD-10-CM: K44.9  ICD-9-CM: 553.3  5/18/2018 - Present        Incomplete right bundle branch block (RBBB) determined by electrocardiography ICD-10-CM: I45.10  ICD-9-CM: 426.4  5/10/2018 - Present        Hiatal hernia with gastroesophageal reflux ICD-10-CM: K21.9, K44.9  ICD-9-CM: 530.81, 553.3  4/1/2018 - Present        Hypokalemia ICD-10-CM: E87.6  ICD-9-CM: 276.8  10/1/2013 - Present        Cavernous hemangioma of intracranial structure (HCC) (Chronic) ICD-10-CM: D18.02  ICD-9-CM: 228.02  5/2/2011 - Present        RESOLVED: Depressive disorder, not elsewhere classified ICD-10-CM: F32.9  ICD-9-CM: 341  10/1/2013 - 5/30/2019        RESOLVED: Leukocytosis, unspecified ICD-10-CM: U36.733  ICD-9-CM: 288.60  10/1/2013 - 5/30/2019        RESOLVED: Slurred speech ICD-10-CM: R47.81  ICD-9-CM: 784.59  9/30/2013 - 5/30/2019        RESOLVED: Seizure ([facility] [address] (Chronic) ICD-10-CM: R56.9  ICD-9-CM: 780.39  5/2/2011 - 5/30/2019        RESOLVED: Migraine (Chronic) ICD-10-CM: K58.964  ICD-9-CM: 346.90  5/2/2011 - 5/30/2019            Subjective:    47 y.o.  female who presented to the Emergency Department complaining of BRBPR. She is a poor historian due to anxiety. He reports 10 days of seeing red toilet water and clots on toilet paper. She reports crampy abdominal pain. She reports a hx of hemorrhoids, and had her latest colonoscopy about 1 year ago, with finding of hemorrhoids and polyps. She reports non compliance wiwth hemorrhoid med recommended by GI.  ER finds anemia, but hemoccult was negative today. We will admit her for observation.   (Dr Candi Betancourt)     5/31:  Still c/o \"severe\" ab pain but conversing well, does not appear in pain. Pain is constant and in mid ab between quadrants bilat and periumbicaly. She wants something \"stronger\" for pain. She reports she has taken Morphine in past without side effects. No N/V today. K+ sl better today after she agreed to take the med. GI to see today. :She is still  having  Pain and some rectal bleeding. GI has seen and is planning colonoscopy for Monday. : Less pain. Will be on clears today and starting bowel prep. LFTs are elevated this am. Will add hepatitis profile and obtain US. No nausea. Hb stable. 6/3:  Less Ab pain. She reports prep has helped more than anything so far. Hepatitis profile negative. LFTs better. AB US as under Data Review showing dilated CBD and duct dilation - may have passed a gallstone. Colonoscopy planned for 6/3.      :  Reports \"feel anxious all over\" but no ab pain. Colonoscopy with likely hemorrhoids as source for bleeding - Surg has seen and hemorrhoidectomy later today. LFTs back to normal.      :Hemorrhoidectomy yesterday. Pain manageable. Hb down to 7 and she is hypotensive. Had bolus during the night. Discussed transfusion and she is agreeable to blood. Discussed risks and benefits. : Hb 7.5 this am. She is c/o LUQ and epigastric pain. Meds are not helping. Feels bloated. Some nausea. Still having bright red rectal bleeding. Will ask GI to see again. May need another unit of blood. Review of Systems:   A comprehensive review of systems was negative except for that written in the HPI.     Objective:   Physical Exam:     Visit Vitals  /74 (BP 1 Location: Left arm, BP Patient Position: At rest)   Pulse 76   Temp 98 °F (36.7 °C)   Resp 18   Ht 5' 4\" (1.626 m)   Wt 234 lb (106.1 kg)   LMP 2011   SpO2 95%   BMI 40.17 kg/m²    O2 Flow Rate (L/min): 6 l/min O2 Device: Room air  Temp (24hrs), Av.4 °F (36.9 °C), Min:97.7 °F (36.5 °C), Max:99.4 °F (37.4 °C)    1901 -  07  In: 240 [P.O.:240]  Out: -    06/04 0701 - 06/05 1900  In: 220 [P.O.:120; I.V.:100]  Out: -     General:  Alert, morbidly obese, cooperative, no distress, appears stated age. Head:  Normocephalic, without obvious abnormality, atraumatic. Eyes:  Conjunctivae/corneas clear. EOMs intact. Throat: Lips, mucosa, and tongue moist..   Neck: Supple, symmetrical, trachea midline, no adenopathy, thyroid: no enlargement/tenderness/nodules, no carotid bruit and no JVD. Back:   Symmetric, no curvature. ROM normal. No CVA tenderness. Lungs:   Clear to auscultation bilaterally. Chest wall:  No tenderness or deformity. Heart:  Regular rate and rhythm, S1, S2 normal, no murmur, click, rub or gallop. Abdomen:   Soft, obese, with mild tenderness left upper quadrant without rebound/guarding. Bowel sounds normal. No masses,  No organomegaly. Extremities: no cyanosis or edema. No calf tenderness or cords. Skin: turgor normal.   Neurologic: Alert and oriented X 3. Fine motor of hands and fingers normal.   equal.  No cogwheeling or rigidity. Gait not tested at this time. Sensation grossly normal to touch. Gross motor of extremities normal.        Data Review:    CT AB and Pelvis 5/30/19   FINDINGS:   LUNG BASES: Clear. INCIDENTALLY IMAGED HEART AND MEDIASTINUM: Unremarkable. LIVER: No mass or biliary dilatation. GALLBLADDER: Surgically absent  SPLEEN: No mass. PANCREAS: No mass or ductal dilatation. ADRENALS: Unremarkable. KIDNEYS: No mass, calculus, or hydronephrosis. STOMACH: Post fundoplication  SMALL BOWEL: No dilatation or wall thickening. COLON: No dilatation or wall thickening. APPENDIX: Unremarkable. PERITONEUM: No ascites or pneumoperitoneum. RETROPERITONEUM: No lymphadenopathy or aortic aneurysm. Mild vascular  calcifications  REPRODUCTIVE ORGANS: Within normal limits  URINARY BLADDER: No mass or calculus. BONES: No destructive bone lesion.   ADDITIONAL COMMENTS: N/A  IMPRESSION:  A source of GI bleeding is not identified    US of AB  EXAM: US ABD COMP   INDICATION: Elevated liver function enzymes. Nilsa Ruts FINDINGS:  LIVER:   The liver is normal in echotexture with a single homogeneous 1.6 x 1.3 x 1.5 cm  lesion. There are dilated intrahepatic ducts. LIVER VASCULATURE:   The portal vein flow is . GALLBLADDER:  The gallbladder is surgically absent. COMMON BILE DUCT:  There is positive intrahepatic biliary duct dilatation and the common duct  measures 13 mm in diameter. PANCREAS:  The visualized pancreas is normal.  SPLEEN:  The spleen is normal in echotexture and size and measures 9.3 cm in length. The  spleen measures 247 mL. RIGHT KIDNEY:  The right kidney demonstrates normal echogenicity with no mass, stone or  hydronephrosis. The right kidney measures 11.7 cm in length. LEFT KIDNEY:  The left kidney demonstrates normal echogenicity with no mass, stone or  hydronephrosis. The left kidney measures 13.5 cm in length. The left kidney  contains a 1.2 x 1.3 x 1.2 cm anechoic cyst.  RETROPERITONEUM:  The aorta tapers normally. The IVC is normal.  No retroperitoneal mass is identified. IMPRESSION:   Intrahepatic and extrahepatic biliary dilatation. Recommend MRCP. Probable hepatic hemangioma    Recent Days:  Recent Labs     06/06/19  0428 06/05/19  1605 06/05/19  0336 06/04/19  0343   WBC 8.9  --  10.8 6.9   HGB 7.5* 8.2* 7.0* 8.2*   HCT 24.2* 26.6* 23.6* 26.4*     --  244 264     Recent Labs     06/06/19  0428 06/05/19  0336 06/04/19  0343    142 142   K 3.8 5.0 4.2   * 115* 115*   CO2 26 24 26   GLU 94 129* 113*   BUN 8 8 8   CREA 1.06* 0.97 0.90   CA 8.2* 8.1* 8.2*   ALB 3.1* 2.8* 2.9*   TBILI 0.3 0.2 0.2   SGOT 16 20 29   ALT 35 47 59     No results for input(s): PH, PCO2, PO2, HCO3, FIO2 in the last 72 hours.     24 Hour Results:  Recent Results (from the past 24 hour(s))   TYPE + CROSSMATCH    Collection Time: 06/05/19  8:12 AM   Result Value Ref Range    Crossmatch Expiration 06/08/2019     ABO/Rh(D) O POSITIVE     Antibody screen NEG     Unit number [id number]     Blood component type RC LR,1     Unit division 00     Status of unit ISSUED     Crossmatch result Compatible    HGB & HCT    Collection Time: 06/05/19  4:05 PM   Result Value Ref Range    HGB 8.2 (L) 11.5 - 16.0 g/dL    HCT 26.6 (L) 35.0 - 47.0 %   CBC W/O DIFF    Collection Time: 06/06/19  4:28 AM   Result Value Ref Range    WBC 8.9 3.6 - 11.0 K/uL    RBC 2.56 (L) 3.80 - 5.20 M/uL    HGB 7.5 (L) 11.5 - 16.0 g/dL    HCT 24.2 (L) 35.0 - 47.0 %    MCV 94.5 80.0 - 99.0 FL    MCH 29.3 26.0 - 34.0 PG    MCHC 31.0 30.0 - 36.5 g/dL    RDW 15.9 (H) 11.5 - 14.5 %    PLATELET 782 333 - 076 K/uL    MPV 9.7 8.9 - 12.9 FL    NRBC 0.0 0  WBC    ABSOLUTE NRBC 0.00 0.00 - 5.51 K/uL   METABOLIC PANEL, COMPREHENSIVE    Collection Time: 06/06/19  4:28 AM   Result Value Ref Range    Sodium 144 136 - 145 mmol/L    Potassium 3.8 3.5 - 5.1 mmol/L    Chloride 111 (H) 97 - 108 mmol/L    CO2 26 21 - 32 mmol/L    Anion gap 7 5 - 15 mmol/L    Glucose 94 65 - 100 mg/dL    BUN 8 6 - 20 MG/DL    Creatinine 1.06 (H) 0.55 - 1.02 MG/DL    BUN/Creatinine ratio 8 (L) 12 - 20      GFR est AA >60 >60 ml/min/1.73m2    GFR est non-AA 54 (L) >60 ml/min/1.73m2    Calcium 8.2 (L) 8.5 - 10.1 MG/DL    Bilirubin, total 0.3 0.2 - 1.0 MG/DL    ALT (SGPT) 35 12 - 78 U/L    AST (SGOT) 16 15 - 37 U/L    Alk.  phosphatase 114 45 - 117 U/L    Protein, total 6.3 (L) 6.4 - 8.2 g/dL    Albumin 3.1 (L) 3.5 - 5.0 g/dL    Globulin 3.2 2.0 - 4.0 g/dL    A-G Ratio 1.0 (L) 1.1 - 2.2         Medications reviewed  Current Facility-Administered Medications   Medication Dose Route Frequency    0.9% sodium chloride infusion 250 mL  250 mL IntraVENous PRN    diphenhydrAMINE (BENADRYL) capsule 25 mg  25 mg Oral Q6H PRN    simethicone (MYLICON) tablet 80 mg  80 mg Oral QID PRN    oxyCODONE IR (ROXICODONE) tablet 5 mg  5 mg Oral Q4H PRN    acetaminophen (TYLENOL) tablet 1,000 mg 1,000 mg Oral Q8H    polyethylene glycol (MIRALAX) packet 17 g  17 g Oral DAILY    topiramate (TOPAMAX) tablet 25 mg  25 mg Oral QHS    busPIRone (BUSPAR) tablet 15 mg  15 mg Oral QHS    traZODone (DESYREL) tablet 100 mg  100 mg Oral QHS    traZODone (DESYREL) tablet 100 mg  100 mg Oral QHS PRN    morphine injection 2 mg  2 mg IntraVENous Q4H PRN    ALPRAZolam (XANAX) tablet 1 mg  1 mg Oral QID PRN    docusate (COLACE) 50 mg/5 mL oral liquid 50 mg  50 mg Oral QHS    DULoxetine (CYMBALTA) capsule 120 mg  120 mg Oral QHS    pantoprazole (PROTONIX) tablet 40 mg  40 mg Oral DAILY    tiZANidine (ZANAFLEX) tablet 4 mg  4 mg Oral TID    losartan/hydroCHLOROthiazide (HYZAAR) 100/25 mg   Oral QHS    acetaminophen (TYLENOL) tablet 650 mg  650 mg Oral Q6H PRN    ibuprofen (MOTRIN) tablet 200 mg  200 mg Oral Q6H PRN    diphenhydrAMINE (BENADRYL) capsule 25 mg  25 mg Oral Q4H PRN    ondansetron (ZOFRAN) injection 4 mg  4 mg IntraVENous Q4H PRN    polyethylene glycol (MIRALAX) packet 17 g  17 g Oral BID     Care Plan discussed with: Patient and Nurse  Total time spent with patient: 30 minutes.     Jesenia Vaughn MD

## 2019-06-06 NOTE — PROGRESS NOTES
6/6/2019   CARE MANAGEMENT NOTE: CM reviewed EMR for clinical updates. Pt remains in OBS status. Pt is POD #2 s/p hemroidectomy still with 6/10 pain. Hgb 7.5. Transition Plan of Care:  1. Plan is to return home once medically stable and pain controlled. CM continues to follow from [facility] and will assist with any post discharge needs.   Albert

## 2019-06-06 NOTE — PROGRESS NOTES
0745- Bedside and Verbal shift change report given to [facility] (oncoming nurse) by Denita Armendariz (offgoing nurse). Report included the following information SBAR, Kardex, Intake/Output, MAR and Recent Results. Pt observed in bed, resting quietly, on room air, complaining of abd pain and requesting pain medication, also mentioned that she has been having difficulty swallowing \"feels like it gets stuck\" pointing at upper throat. Will call Dr. Noe Mello. Will assess. 6416- Call placed to Dr. Noe Mello and updated, stated to place order for speech consult, OK with patient eating/drinking/PO meds at this time. Will continue to monitor. New orders in [facility]. 1050- This RN called to room, patient complaining of 8.5/10 upper abdominal pain, patient pointing to lower rib and epigastric region, states pain in a pressure pain \"that moves all around\", denies any chest pain. Will update MD. Pt also concerned that \"Dr. Fam Heart said I need an MRI\". 36- Dr. Fam Heart on unit and updated, MD stated that patient does not need MRI at this time. Stated will assess patient and pain. 1930- Bedside and Verbal shift change report given to [facility] (oncoming nurse) by [facility] (offgoing nurse). Report included the following information SBAR, Kardex, Intake/Output, MAR and Recent Results.

## 2019-06-07 VITALS
WEIGHT: 234 LBS | TEMPERATURE: 98 F | OXYGEN SATURATION: 99 % | DIASTOLIC BLOOD PRESSURE: 92 MMHG | SYSTOLIC BLOOD PRESSURE: 160 MMHG | HEART RATE: 87 BPM | RESPIRATION RATE: 20 BRPM | BODY MASS INDEX: 39.95 KG/M2 | HEIGHT: 64 IN

## 2019-06-07 LAB
ANION GAP SERPL CALC-SCNC: 4 MMOL/L (ref 5–15)
BUN SERPL-MCNC: 7 MG/DL (ref 6–20)
BUN/CREAT SERPL: 8 (ref 12–20)
CALCIUM SERPL-MCNC: 8.6 MG/DL (ref 8.5–10.1)
CHLORIDE SERPL-SCNC: 110 MMOL/L (ref 97–108)
CO2 SERPL-SCNC: 27 MMOL/L (ref 21–32)
CREAT SERPL-MCNC: 0.9 MG/DL (ref 0.55–1.02)
ERYTHROCYTE [DISTWIDTH] IN BLOOD BY AUTOMATED COUNT: 15.8 % (ref 11.5–14.5)
GLUCOSE SERPL-MCNC: 107 MG/DL (ref 65–100)
HCT VFR BLD AUTO: 24.8 % (ref 35–47)
HGB BLD-MCNC: 7.7 G/DL (ref 11.5–16)
MCH RBC QN AUTO: 29.6 PG (ref 26–34)
MCHC RBC AUTO-ENTMCNC: 31 G/DL (ref 30–36.5)
MCV RBC AUTO: 95.4 FL (ref 80–99)
NRBC # BLD: 0 K/UL (ref 0–0.01)
NRBC BLD-RTO: 0 PER 100 WBC
PLATELET # BLD AUTO: 274 K/UL (ref 150–400)
PMV BLD AUTO: 10 FL (ref 8.9–12.9)
POTASSIUM SERPL-SCNC: 3.7 MMOL/L (ref 3.5–5.1)
RBC # BLD AUTO: 2.6 M/UL (ref 3.8–5.2)
SODIUM SERPL-SCNC: 141 MMOL/L (ref 136–145)
WBC # BLD AUTO: 7 K/UL (ref 3.6–11)

## 2019-06-07 PROCEDURE — 74011250636 HC RX REV CODE- 250/636: Performed by: FAMILY MEDICINE

## 2019-06-07 PROCEDURE — 74011250637 HC RX REV CODE- 250/637: Performed by: SURGERY

## 2019-06-07 PROCEDURE — 74011250637 HC RX REV CODE- 250/637: Performed by: INTERNAL MEDICINE

## 2019-06-07 PROCEDURE — 96376 TX/PRO/DX INJ SAME DRUG ADON: CPT

## 2019-06-07 PROCEDURE — 80048 BASIC METABOLIC PNL TOTAL CA: CPT

## 2019-06-07 PROCEDURE — 36415 COLL VENOUS BLD VENIPUNCTURE: CPT

## 2019-06-07 PROCEDURE — 85027 COMPLETE CBC AUTOMATED: CPT

## 2019-06-07 PROCEDURE — 99218 HC RM OBSERVATION: CPT

## 2019-06-07 RX ORDER — GABAPENTIN 400 MG/1
400 CAPSULE ORAL 3 TIMES DAILY
Qty: 90 CAP | Refills: 0 | Status: SHIPPED | OUTPATIENT
Start: 2019-06-07 | End: 2020-06-12

## 2019-06-07 RX ORDER — POLYETHYLENE GLYCOL 3350 17 G/17G
17 POWDER, FOR SOLUTION ORAL DAILY
Qty: 10 EACH | Refills: 0 | Status: SHIPPED | OUTPATIENT
Start: 2019-06-07 | End: 2020-06-12

## 2019-06-07 RX ORDER — POLYETHYLENE GLYCOL 3350 17 G/17G
17 POWDER, FOR SOLUTION ORAL DAILY
Qty: 238 G | Refills: 0 | Status: SHIPPED | OUTPATIENT
Start: 2019-06-07 | End: 2020-03-11

## 2019-06-07 RX ORDER — OXYCODONE HYDROCHLORIDE 10 MG/1
10 TABLET ORAL
Qty: 30 TAB | Refills: 0 | Status: SHIPPED | OUTPATIENT
Start: 2019-06-07 | End: 2019-06-14

## 2019-06-07 RX ADMIN — POLYETHYLENE GLYCOL 3350 17 G: 17 POWDER, FOR SOLUTION ORAL at 10:40

## 2019-06-07 RX ADMIN — MORPHINE SULFATE 1 MG: 4 INJECTION, SOLUTION INTRAMUSCULAR; INTRAVENOUS at 07:07

## 2019-06-07 RX ADMIN — OXYCODONE HYDROCHLORIDE 5 MG: 5 TABLET ORAL at 10:40

## 2019-06-07 RX ADMIN — ALPRAZOLAM 1 MG: 0.5 TABLET ORAL at 10:40

## 2019-06-07 RX ADMIN — ACETAMINOPHEN 1000 MG: 500 TABLET ORAL at 06:00

## 2019-06-07 RX ADMIN — TIZANIDINE 4 MG: 2 TABLET ORAL at 10:40

## 2019-06-07 RX ADMIN — OXYCODONE HYDROCHLORIDE 5 MG: 5 TABLET ORAL at 05:03

## 2019-06-07 RX ADMIN — GABAPENTIN 400 MG: 100 CAPSULE ORAL at 10:40

## 2019-06-07 RX ADMIN — PANTOPRAZOLE SODIUM 40 MG: 40 TABLET, DELAYED RELEASE ORAL at 10:40

## 2019-06-07 NOTE — PROGRESS NOTES
6/7/2019   CARE MANAGEMENT NOTE:  CM reviewed EMR and noted pt's discharge for today. CM met with pt and confirmed her plan to return home with her mother in a ground floor apt. She expressed some concerns about her mother's failing health as well as her decreased mobility. We discussed outpt PT but she declined. Her mother will transport her home via car at unspecified time. Pt remained in OBS status throughout her hospital admission.   Albert

## 2019-06-07 NOTE — PROGRESS NOTES
Daily Progress Note: 6/7/2019  Zahira Nguyen MD    Assessment/Plan:   Anemia - POA, new, due to GI blood loss. -- Transfuse as needed  -- Gave 1 unit 6/5  - Hb stable at 7.7     Hypokalemia / Dehydration / Elevated lactic acid level - POA, due to poor PO intake. --Hydrate and replete K.     Rectal bleeding / Hx hemorroids / Hx Hiatal hernia - POA, very likely hemorrhoid bleeding. --Consulted GI.    --PPI  --Hemorrhoidectomy 6/4     Anxiety and depression / Fibromyalgia - POA, poorly controlled. This is contributing to her sensation of pain. --Continue cymbalta, chatix and xanax.      Morbid obesity / CARLOS (obstructive sleep apnea) - Non compliant with cpap. Advised weight loss     Hypertension / Incomplete right bundle branch block (RBBB) determined by electrocardiography - Adequate control on losartan HCTZ     Hx of seizure disorder - None recent.     --Continue topamax    Elevated liver enzymes  -- hepatitis profile negative  --US abd    Abd Pain/Bloating  --Reconsult GI- feels more MS in nature    Dysphagia  --Speech consult feels more esophageal in nature- has HH     Problem List:  Problem List as of 6/7/2019 Date Reviewed: 5/30/2019          Codes Class Noted - Resolved    Elevated lactic acid level ICD-10-CM: R79.89  ICD-9-CM: 276.2  5/30/2019 - Present        Rectal bleeding ICD-10-CM: K62.5  ICD-9-CM: 569.3  5/30/2019 - Present        CARLOS (obstructive sleep apnea) ICD-10-CM: G47.33  ICD-9-CM: 327.23  Unknown - Present    Overview Signed 5/30/2019  7:55 PM by Jodi Costello MD     no cpap             Hypertension ICD-10-CM: I10  ICD-9-CM: 401.9  Unknown - Present        Hx of seizure disorder ICD-10-CM: Z86.69  ICD-9-CM: V12.49  Unknown - Present        Fibromyalgia ICD-10-CM: M79.7  ICD-9-CM: 729.1  Unknown - Present    Overview Signed 5/30/2019  7:55 PM by Jodi Costello MD     fibromyalgia             Anxiety and depression ICD-10-CM: F41.9, F32.9  ICD-9-CM: 300.00, 311 Unknown - Present        Dehydration ICD-10-CM: E86.0  ICD-9-CM: 276.51  5/30/2019 - Present        * (Principal) Anemia ICD-10-CM: D64.9  ICD-9-CM: 285.9  5/30/2019 - Present        Hemorrhoids ICD-10-CM: K64.9  ICD-9-CM: 455.6  Unknown - Present        Hx of migraines ICD-10-CM: Z86.69  ICD-9-CM: V12.49  Unknown - Present        Se[facility] ICD-10-CM: E66.01  ICD-9-CM: 278.01  4/29/2019 - Present        Hiatal hernia ICD-10-CM: K44.9  ICD-9-CM: 553.3  5/18/2018 - Present        Incomplete right bundle branch block (RBBB) determined by electrocardiography ICD-10-CM: I45.10  ICD-9-CM: 426.4  5/10/2018 - Present        Hiatal hernia with gastroesophageal reflux ICD-10-CM: K21.9, K44.9  ICD-9-CM: 530.81, 553.3  4/1/2018 - Present        Hypokalemia ICD-10-CM: E87.6  ICD-9-CM: 276.8  10/1/2013 - Present        Cavernous hemangioma of intracranial structure (HCC) (Chronic) ICD-10-CM: D18.02  ICD-9-CM: 228.02  5/2/2011 - Present        RESOLVED: Depressive disorder, not elsewhere classified ICD-10-CM: F32.9  ICD-9-CM: 863  10/1/2013 - 5/30/2019        RESOLVED: Leukocytosis, unspecified ICD-10-CM: S26.914  ICD-9-CM: 288.60  10/1/2013 - 5/30/2019        RESOLVED: Slurred speech ICD-10-CM: R47.81  ICD-9-CM: 784.59  9/30/2013 - 5/30/2019        RESOLVED: Seizure ([facility] (Chronic) ICD-10-CM: R56.9  ICD-9-CM: 780.39  5/2/2011 - 5/30/2019        RESOLVED: Migraine (Chronic) ICD-10-CM: N51.415  ICD-9-CM: 346.90  5/2/2011 - 5/30/2019            Subjective:    47 y.o.  female who presented to the Emergency Department complaining of BRBPR. She is a poor historian due to anxiety. He reports 10 days of seeing red toilet water and clots on toilet paper. She reports crampy abdominal pain. She reports a hx of hemorrhoids, and had her latest colonoscopy about 1 year ago, with finding of hemorrhoids and polyps.   She reports non compliance wiwth hemorrhoid med recommended by GI.  ER finds anemia, but hemoccult was negative today. We will admit her for observation. (Dr Homero Dance)     5/31:  Still c/o \"severe\" ab pain but conversing well, does not appear in pain. Pain is constant and in mid ab between quadrants bilat and periumbicaly. She wants something \"stronger\" for pain. She reports she has taken Morphine in past without side effects. No N/V today. K+ sl better today after she agreed to take the med. GI to see today. 6/1:She is still  having  Pain and some rectal bleeding. GI has seen and is planning colonoscopy for Monday. 6/2: Less pain. Will be on clears today and starting bowel prep. LFTs are elevated this am. Will add hepatitis profile and obtain US. No nausea. Hb stable. 6/3:  Less Ab pain. She reports prep has helped more than anything so far. Hepatitis profile negative. LFTs better. AB US as under Data Review showing dilated CBD and duct dilation - may have passed a gallstone. Colonoscopy planned for 6/3.      6/4:  Reports \"feel anxious all over\" but no ab pain. Colonoscopy with likely hemorrhoids as source for bleeding - Surg has seen and hemorrhoidectomy later today. LFTs back to normal.      6/5:Hemorrhoidectomy yesterday. Pain manageable. Hb down to 7 and she is hypotensive. Had bolus during the night. Discussed transfusion and she is agreeable to blood. Discussed risks and benefits. 6/6: Hb 7.5 this am. She is c/o LUQ and epigastric pain. Meds are not helping. Feels bloated. Some nausea. Still having bright red rectal bleeding. Will ask GI to see again. May need another unit of blood. 6/7: Hb stable at 7.7. Having Left sided pain at intercostal region. Was re evaluated by GI who feels more MS in nature. Seen by speech for dysphagia who feels this is related to her HH. She is moving her bowels. Minimal bleeding. Appetite normal. Ready to be discharged. Review of Systems:   A comprehensive review of systems was negative except for that written in the HPI.     Objective:   Physical Exam:     Visit Vitals  /70 (BP 1 Location: Right arm, BP Patient Position: At rest)   Pulse 70   Temp 97.6 °F (36.4 °C)   Resp 18   Ht 5' 4\" (1.626 m)   Wt 234 lb (106.1 kg)   LMP 2011   SpO2 93%   BMI 40.17 kg/m²    O2 Flow Rate (L/min): 6 l/min O2 Device: Room air  Temp (24hrs), Av.5 °F (36.9 °C), Min:97.6 °F (36.4 °C), Max:99.3 °F (37.4 °C)    No intake/output data recorded.  07 -  1900  In: 640 [P.O.:640]  Out: -     General:  Alert, morbidly obese, cooperative, no distress, appears stated age. Head:  Normocephalic, without obvious abnormality, atraumatic. Eyes:  Conjunctivae/corneas clear. EOMs intact. Throat: Lips, mucosa, and tongue moist..   Neck: Supple, symmetrical, trachea midline, no adenopathy, thyroid: no enlargement/tenderness/nodules, no carotid bruit and no JVD. Back:   Symmetric, no curvature. ROM normal. No CVA tenderness. Lungs:   Clear to auscultation bilaterally. Chest wall:  No tenderness or deformity. Heart:  Regular rate and rhythm, S1, S2 normal, no murmur, click, rub or gallop. Abdomen:   Soft, obese, with mild tenderness left upper abd and intercostal spaces. Bowel sounds normal. No masses,  No organomegaly. Extremities: no cyanosis or edema. No calf tenderness or cords. Skin: turgor normal.   Neurologic: Alert and oriented X 3. Fine motor of hands and fingers normal.   equal.  No cogwheeling or rigidity. Gait not tested at this time. Sensation grossly normal to touch. Gross motor of extremities normal.        Data Review:    CT AB and Pelvis 19   FINDINGS:   LUNG BASES: Clear. INCIDENTALLY IMAGED HEART AND MEDIASTINUM: Unremarkable. LIVER: No mass or biliary dilatation. GALLBLADDER: Surgically absent  SPLEEN: No mass. PANCREAS: No mass or ductal dilatation. ADRENALS: Unremarkable. KIDNEYS: No mass, calculus, or hydronephrosis. STOMACH: Post fundoplication  SMALL BOWEL: No dilatation or wall thickening.   COLON: No dilatation or wall thickening. APPENDIX: Unremarkable. PERITONEUM: No ascites or pneumoperitoneum. RETROPERITONEUM: No lymphadenopathy or aortic aneurysm. Mild vascular  calcifications  REPRODUCTIVE ORGANS: Within normal limits  URINARY BLADDER: No mass or calculus. BONES: No destructive bone lesion. ADDITIONAL COMMENTS: N/A  IMPRESSION:  A source of GI bleeding is not identified    US of AB  EXAM: US ABD COMP   INDICATION: Elevated liver function enzymes. Anastacio Regan FINDINGS:  LIVER:   The liver is normal in echotexture with a single homogeneous 1.6 x 1.3 x 1.5 cm  lesion. There are dilated intrahepatic ducts. LIVER VASCULATURE:   The portal vein flow is . GALLBLADDER:  The gallbladder is surgically absent. COMMON BILE DUCT:  There is positive intrahepatic biliary duct dilatation and the common duct  measures 13 mm in diameter. PANCREAS:  The visualized pancreas is normal.  SPLEEN:  The spleen is normal in echotexture and size and measures 9.3 cm in length. The  spleen measures 247 mL. RIGHT KIDNEY:  The right kidney demonstrates normal echogenicity with no mass, stone or  hydronephrosis. The right kidney measures 11.7 cm in length. LEFT KIDNEY:  The left kidney demonstrates normal echogenicity with no mass, stone or  hydronephrosis. The left kidney measures 13.5 cm in length. The left kidney  contains a 1.2 x 1.3 x 1.2 cm anechoic cyst.  RETROPERITONEUM:  The aorta tapers normally. The IVC is normal.  No retroperitoneal mass is identified. IMPRESSION:   Intrahepatic and extrahepatic biliary dilatation. Recommend MRCP.   Probable hepatic hemangioma    Recent Days:  Recent Labs     06/07/19  0455 06/06/19  0428 06/05/19  1605 06/05/19  0336   WBC 7.0 8.9  --  10.8   HGB 7.7* 7.5* 8.2* 7.0*   HCT 24.8* 24.2* 26.6* 23.6*    268  --  244     Recent Labs     06/07/19  0455 06/06/19  0428 06/05/19  0336    144 142   K 3.7 3.8 5.0   * 111* 115*   CO2 27 26 24   * 94 129*   BUN 7 8 8   CREA 0. 90 1.06* 0.97   CA 8.6 8.2* 8.1*   ALB  --  3.1* 2.8*   TBILI  --  0.3 0.2   SGOT  --  16 20   ALT  --  35 47     No results for input(s): PH, PCO2, PO2, HCO3, FIO2 in the last 72 hours.     24 Hour Results:  Recent Results (from the past 24 hour(s))   CBC W/O DIFF    Collection Time: 06/07/19  4:55 AM   Result Value Ref Range    WBC 7.0 3.6 - 11.0 K/uL    RBC 2.60 (L) 3.80 - 5.20 M/uL    HGB 7.7 (L) 11.5 - 16.0 g/dL    HCT 24.8 (L) 35.0 - 47.0 %    MCV 95.4 80.0 - 99.0 FL    MCH 29.6 26.0 - 34.0 PG    MCHC 31.0 30.0 - 36.5 g/dL    RDW 15.8 (H) 11.5 - 14.5 %    PLATELET 809 290 - 120 K/uL    MPV 10.0 8.9 - 12.9 FL    NRBC 0.0 0  WBC    ABSOLUTE NRBC 0.00 0.00 - 3.30 K/uL   METABOLIC PANEL, BASIC    Collection Time: 06/07/19  4:55 AM   Result Value Ref Range    Sodium 141 136 - 145 mmol/L    Potassium 3.7 3.5 - 5.1 mmol/L    Chloride 110 (H) 97 - 108 mmol/L    CO2 27 21 - 32 mmol/L    Anion gap 4 (L) 5 - 15 mmol/L    Glucose 107 (H) 65 - 100 mg/dL    BUN 7 6 - 20 MG/DL    Creatinine 0.90 0.55 - 1.02 MG/DL    BUN/Creatinine ratio 8 (L) 12 - 20      GFR est AA >60 >60 ml/min/1.73m2    GFR est non-AA >60 >60 ml/min/1.73m2    Calcium 8.6 8.5 - 10.1 MG/DL       Medications reviewed  Current Facility-Administered Medications   Medication Dose Route Frequency    cyclobenzaprine (FLEXERIL) tablet 5 mg  5 mg Oral TID PRN    naproxen sodium (NAPROSYN) tablet 220 mg  220 mg Oral TID WITH MEALS    gabapentin (NEURONTIN) capsule 400 mg  400 mg Oral TID    morphine injection 1 mg  1 mg IntraVENous Q6H PRN    0.9% sodium chloride infusion 250 mL  250 mL IntraVENous PRN    diphenhydrAMINE (BENADRYL) capsule 25 mg  25 mg Oral Q6H PRN    simethicone (MYLICON) tablet 80 mg  80 mg Oral QID PRN    oxyCODONE IR (ROXICODONE) tablet 5 mg  5 mg Oral Q4H PRN    acetaminophen (TYLENOL) tablet 1,000 mg  1,000 mg Oral Q8H    polyethylene glycol (MIRALAX) packet 17 g  17 g Oral DAILY    topiramate (TOPAMAX) tablet 25 mg  25 mg Oral QHS    busPIRone (BUSPAR) tablet 15 mg  15 mg Oral QHS    traZODone (DESYREL) tablet 100 mg  100 mg Oral QHS    traZODone (DESYREL) tablet 100 mg  100 mg Oral QHS PRN    ALPRAZolam (XANAX) tablet 1 mg  1 mg Oral QID PRN    docusate (COLACE) 50 mg/5 mL oral liquid 50 mg  50 mg Oral QHS    DULoxetine (CYMBALTA) capsule 120 mg  120 mg Oral QHS    pantoprazole (PROTONIX) tablet 40 mg  40 mg Oral DAILY    tiZANidine (ZANAFLEX) tablet 4 mg  4 mg Oral TID    losartan/hydroCHLOROthiazide (HYZAAR) 100/25 mg   Oral QHS    acetaminophen (TYLENOL) tablet 650 mg  650 mg Oral Q6H PRN    diphenhydrAMINE (BENADRYL) capsule 25 mg  25 mg Oral Q4H PRN    ondansetron (ZOFRAN) injection 4 mg  4 mg IntraVENous Q4H PRN     Care Plan discussed with: Patient and Nurse  Total time spent with patient: 30 minutes.     Álvaro Fernández MD

## 2019-06-07 NOTE — PROGRESS NOTES
I have reviewed discharge instructions with the patient. The patient verbalized understanding. IV removed. Patient tolerated procedure well. Patient wheeled out to car.

## 2019-06-07 NOTE — PROGRESS NOTES
7357    Dr. Noel Tapia notified that pt does not have any labs ordered for tonight. Telephone order with readback obtained for routine BMP and CBC.    0750    Bedside and Verbal shift change report given to Kwame Turpin RN (oncoming nurse) by Thaddeus Chan RN (offgoing nurse). Report included the following information SBAR, Kardex, MAR and Recent Results.

## 2019-06-07 NOTE — PROGRESS NOTES
PCP ASMITA appt scheduled with Dr. Alex Blanchard on 6/14/2019 at 2:15pm  Appt added to AVS. ODALYS Beauchamp CM Specialist

## 2019-06-07 NOTE — DISCHARGE INSTRUCTIONS
Patient Discharge Instructions    Nasir Asif / [id number] : 1965    Admitted 2019 Discharged: 2019 6:57 AM     ACUTE DIAGNOSES:  Anemia [D64.9]    CHRONIC MEDICAL DIAGNOSES:  Problem List as of 2019 Date Reviewed: 2019          Codes Class Noted - Resolved    Elevated lactic acid level ICD-10-CM: R79.89  ICD-9-CM: 276.2  2019 - Present        Rectal bleeding ICD-10-CM: K62.5  ICD-9-CM: 569.3  2019 - Present        CARLOS (obstructive sleep apnea) ICD-10-CM: G47.33  ICD-9-CM: 327.23  Unknown - Present    Overview Signed 2019  7:55 PM by Natalia Pérez MD     no cpap             Hypertension ICD-10-CM: I10  ICD-9-CM: 401.9  Unknown - Present        Hx of seizure disorder ICD-10-CM: Z86.69  ICD-9-CM: V12.49  Unknown - Present        Fibromyalgia ICD-10-CM: M79.7  ICD-9-CM: 729.1  Unknown - Present    Overview Signed 2019  7:55 PM by Natalia Pérez MD     fibromyalgia             Anxiety and depression ICD-10-CM: F41.9, F32.9  ICD-9-CM: 300.00, 311  Unknown - Present        Dehydration ICD-10-CM: E86.0  ICD-9-CM: 276.51  2019 - Present        * (Principal) Anemia ICD-10-CM: D64.9  ICD-9-CM: 285.9  2019 - Present        Hemorrhoids ICD-10-CM: K64.9  ICD-9-CM: 455.6  Unknown - Present        Hx of migraines ICD-10-CM: Z86.69  ICD-9-CM: V12.49  Unknown - Present        Severe obesity ([address] ICD-10-CM: E66.01  ICD-9-CM: 278.01  2019 - Present        Hiatal hernia ICD-10-CM: K44.9  ICD-9-CM: 553.3  2018 - Present        Incomplete right bundle branch block (RBBB) determined by electrocardiography ICD-10-CM: I45.10  ICD-9-CM: 426.4  5/10/2018 - Present        Hiatal hernia with gastroesophageal reflux ICD-10-CM: K21.9, K44.9  ICD-9-CM: 530.81, 553.3  2018 - Present        Hypokalemia ICD-10-CM: E87.6  ICD-9-CM: 276.8  10/1/2013 - Present        Cavernous hemangioma of intracranial structure ([facility] (Chronic) ICD-10-CM: T77.78  ICD-9-CM: 228.02  2011 - Present        RESOLVED: Depressive disorder, not elsewhere classified ICD-10-CM: F32.9  ICD-9-CM: 945  10/1/2013 - 5/30/2019        RESOLVED: Leukocytosis, unspecified ICD-10-CM: Y53.092  ICD-9-CM: 288.60  10/1/2013 - 5/30/2019        RESOLVED: Slurred speech ICD-10-CM: R47.81  ICD-9-CM: 784.59  9/30/2013 - 5/30/2019        RESOLVED: Seizure ([address] (Chronic) ICD-10-CM: R56.9  ICD-9-CM: 780.39  5/2/2011 - 5/30/2019        RESOLVED: Migraine (Chronic) ICD-10-CM: C72.383  ICD-9-CM: 346.90  5/2/2011 - 5/30/2019              DISCHARGE MEDICATIONS:         · It is important that you take the medication exactly as they are prescribed. · Keep your medication in the bottles provided by the pharmacist and keep a list of the medication names, dosages, and times to be taken in your wallet. · Do not take other medications without consulting your doctor. DIET:  Regular Diet    ACTIVITY: Activity as tolerated    ADDITIONAL INFORMATION: If you experience any of the following symptoms then please call your primary care physician or return to the emergency room if you cannot get hold of your doctor: Fever, chills, nausea, vomiting, diarrhea, change in mentation, falling, bleeding, shortness of breath. FOLLOW UP CARE:  Dr. Tamara Mercedes MD  you are to call and set up an appointment to see them with in 1 week. Follow-up with specialists at directed by them      Information obtained by :  I understand that if any problems occur once I am at home I am to contact my physician. I understand and acknowledge receipt of the instructions indicated above.                                                                                                                                            Physician's or R.N.'s Signature                                                                  Date/Time Patient or Representative Signature                                                          Date/Time    Patient Education        Hemorrhoidectomy: What to Expect at Home  Your Recovery    After you have hemorrhoids removed, you can expect to feel better each day. Your anal area will be painful or ache for 2 to 4 weeks. And you may need pain medicine. It is common to have some light bleeding and clear or yellow fluids from your anus. This is most likely when you have a bowel movement. These symptoms may last for 1 to 2 months after surgery. After 1 to 2 weeks, you should be able to do most of your normal activities. But don't do things that require a lot of effort. It is important to avoid heavy lifting and straining with bowel movements while you recover. This care sheet gives you a general idea about how long it will take for you to recover. But each person recovers at a different pace. Follow the steps below to get better as quickly as possible. How can you care for yourself at home? Activity    · Rest when you feel tired.     · Be active. Walking is a good choice.     · Allow your body to heal. Don't move quickly or lift anything heavy until you are feeling better.     · You may take showers and baths as usual. Pat your anal area dry when you are done.     · You will probably need to take 1 to 2 weeks off work. It depends on the type of work you do and how you feel. Diet    · Follow your doctor's instructions about eating after surgery.     · Start adding high-fiber foods to your diet 2 or 3 days after your surgery. This will make bowel movements easier. And it lowers the chance that you will get hemorrhoids again.     · If your bowel movements are not regular right after surgery, try to avoid constipation and straining. Drink plenty of water.  Your doctor may suggest fiber, a stool softener, or a mild laxative.    Medications    · Your doctor will tell you if and when you can restart your medicines. He or she will also give you instructions about taking any new medicines.     · If you take blood thinners, such as warfarin (Coumadin), clopidogrel (Plavix), or aspirin, be sure to talk to your doctor. He or she will tell you if and when to start taking those medicines again. Make sure that you understand exactly what your doctor wants you to do.     · Be safe with medicines. Read and follow all instructions on the label. ? If the doctor gave you a prescription medicine for pain, take it as prescribed. ? If you are not taking a prescription pain medicine, ask your doctor if you can take an over-the-counter medicine.     · If your doctor prescribed antibiotics, take them as directed. Do not stop taking them just because you feel better. You need to take the full course of antibiotics.     · You may apply numbing medicines before and after bowel movements to relieve pain. Other instructions    · Sit in a few inches of warm water (sitz bath) for 15 to 20 minutes 3 times a day and after bowel movements. Then pat the area dry. Do this as long as you have pain in your anal area.     · Avoid sitting on the toilet for long periods of time or straining during bowel movements.     · Keep your anal area clean.     · Support your feet with a small step stool when you sit on the toilet. This helps flex your hips and places your pelvis in a squatting position. This can make bowel movements easier after surgery.     · Use baby wipes or medicated pads, such as Tucks, instead of toilet paper after a bowel movement. These products do not irritate the anus.     · If your doctor recommends it, use an over-the-counter hydrocortisone cream on the skin in your anal area. This can reduce pain and itching after surgery.     · Apply ice several times a day for 10 minutes at a time.     · Try lying on your stomach with a pillow under your hips to decrease swelling. Follow-up care is a key part of your treatment and safety. Be sure to make and go to all appointments, and call your doctor if you are having problems. It's also a good idea to know your test results and keep a list of the medicines you take. When should you call for help? Call 911 anytime you think you may need emergency care. For example, call if:    · You passed out (lost consciousness).     · You are short of breath.    Call your doctor now or seek immediate medical care if:    · You have signs of infection, such as:  ? Increased pain, swelling, warmth, or redness. ? Red streaks leading from the area. ? Pus draining from the area. ? A fever.     · You have pain that does not get better after you take your pain medicine.     · You are sick to your stomach and cannot keep fluids down.     · You have signs of a blood clot in your leg (called a deep vein thrombosis), such as:  ? Pain in your calf, back of the knee, thigh, or groin. ? Redness and swelling in your leg or groin.     · You cannot pass stools or gas.    Watch closely for changes in your health, and be sure to contact your doctor if you have any problems. Where can you learn more? Go to http://molina-ofelia.info/. Enter 96 [id number] in the search box to learn more about \"Hemorrhoidectomy: What to Expect at Home. \"  Current as of: March 27, 2018  Content Version: 11.9  © 2822-4955 [employer]. Care instructions adapted under license by [employer] (which disclaims liability or warranty for this information). If you have questions about a medical condition or this instruction, always ask your healthcare professional. [place] [address] any warranty or liability for your use of this information.

## 2019-06-07 NOTE — DISCHARGE SUMMARY
Physician Discharge Summary     Patient ID:    Seymour Boxer  [id number]  89 y.o.  1965  Gagan Carl MD    Admit date: 5/30/2019    Discharge date and time: 6/7/2019    Admission Diagnoses: Anemia [D64.9]    Chronic Diagnoses:    Problem List as of 6/7/2019 Date Reviewed: 5/30/2019          Codes Class Noted - Resolved    Elevated lactic acid level ICD-10-CM: R79.89  ICD-9-CM: 276.2  5/30/2019 - Present        Rectal bleeding ICD-10-CM: K62.5  ICD-9-CM: 569.3  5/30/2019 - Present        CARLOS (obstructive sleep apnea) ICD-10-CM: G47.33  ICD-9-CM: 327.23  Unknown - Present    Overview Signed 5/30/2019  7:55 PM by Mahendra Orozco MD     no cpap             Hypertension ICD-10-CM: I10  ICD-9-CM: 401.9  Unknown - Present        Hx of seizure disorder ICD-10-CM: Z86.69  ICD-9-CM: V12.49  Unknown - Present        Fibromyalgia ICD-10-CM: M79.7  ICD-9-CM: 729.1  Unknown - Present    Overview Signed 5/30/2019  7:55 PM by Mahendra Orozco MD     fibromyalgia             Anxiety and depression ICD-10-CM: F41.9, F32.9  ICD-9-CM: 300.00, 311  Unknown - Present        Dehydration ICD-10-CM: E86.0  ICD-9-CM: 276.51  5/30/2019 - Present        * (Principal) Anemia ICD-10-CM: D64.9  ICD-9-CM: 285.9  5/30/2019 - Present        Hemorrhoids ICD-10-CM: K64.9  ICD-9-CM: 455.6  Unknown - Present        Hx of migraines ICD-10-CM: Z86.69  ICD-9-CM: V12.49  Unknown - Present        Severe obesity ([address] ICD-10-CM: E66.01  ICD-9-CM: 278.01  4/29/2019 - Present        Hiatal hernia ICD-10-CM: K44.9  ICD-9-CM: 553.3  5/18/2018 - Present        Incomplete right bundle branch block (RBBB) determined by electrocardiography ICD-10-CM: I45.10  ICD-9-CM: 426.4  5/10/2018 - Present        Hiatal hernia with gastroesophageal reflux ICD-10-CM: K21.9, K44.9  ICD-9-CM: 530.81, 553.3  4/1/2018 - Present        Hypokalemia ICD-10-CM: E87.6  ICD-9-CM: 276.8  10/1/2013 - Present        Cavernous hemangioma of intracranial structure ([facility]) (Chronic) ICD-10-CM: D18.02  ICD-9-CM: 228.02  5/2/2011 - Present        RESOLVED: Depressive disorder, not elsewhere classified ICD-10-CM: F32.9  ICD-9-CM: 292  10/1/2013 - 5/30/2019        RESOLVED: Leukocytosis, unspecified ICD-10-CM: P41.701  ICD-9-CM: 288.60  10/1/2013 - 5/30/2019        RESOLVED: Slurred speech ICD-10-CM: R47.81  ICD-9-CM: 784.59  9/30/2013 - 5/30/2019        RESOLVED: Seizure ([address] (Chronic) ICD-10-CM: R56.9  ICD-9-CM: 780.39  5/2/2011 - 5/30/2019        RESOLVED: Migraine (Chronic) ICD-10-CM: O71.637  ICD-9-CM: 346.90  5/2/2011 - 5/30/2019              Discharge Medications:   Current Discharge Medication List      START taking these medications    Details   gabapentin (NEURONTIN) 400 mg capsule Take 1 Cap by mouth three (3) times daily. Qty: 90 Cap, Refills: 0      polyethylene glycol (MIRALAX) 17 gram packet Take 1 Packet by mouth daily. Qty: 10 Each, Refills: 0         CONTINUE these medications which have NOT CHANGED    Details   pantoprazole (PROTONIX) 40 mg tablet Take 40 mg by mouth nightly. busPIRone (BUSPAR) 7.5 mg tablet Take 15 mg by mouth nightly. ibuprofen (MOTRIN) 800 mg tablet Take 800 mg by mouth every eight (8) hours as needed for Pain. traZODone (DESYREL) 100 mg tablet Take 100-200 mg by mouth nightly as needed for Sleep (Will take 1-2 tabs in addition to first 100 mg tab if unable to sleep). DULoxetine (CYMBALTA) 60 mg capsule Take 120 mg by mouth nightly. losartan-hydroCHLOROthiazide (HYZAAR) 100-25 mg per tablet Take 1 Tab by mouth nightly. topiramate (TOPAMAX) 25 mg tablet Take 25 mg by mouth nightly. tiZANidine (ZANAFLEX) 4 mg tablet Take 4 mg by mouth three (3) times daily. ALPRAZolam (XANAX) 1 mg tablet Take 1 mg by mouth three (3) times daily. STOP taking these medications       docusate sodium (STOOL SOFTENER) 50 mg capsule Comments:   Reason for Stopping: Follow up Care:    1.  Wilver William MD with in 1 weeks  2. Surg and GI specialists as directed. Diet:  Regular Diet    Disposition:  Home. Advanced Directive:    Discharge Exam:  [See today's progress note.]    CONSULTATIONS: GI and General Surgery    Significant Diagnostic Studies:   Recent Labs     06/07/19 0455 06/06/19 0428   WBC 7.0 8.9   HGB 7.7* 7.5*   HCT 24.8* 24.2*    268     Recent Labs     06/07/19 0455 06/06/19 0428 06/05/19 0336    144 142   K 3.7 3.8 5.0   * 111* 115*   CO2 27 26 24   BUN 7 8 8   CREA 0.90 1.06* 0.97   * 94 129*   CA 8.6 8.2* 8.1*     Recent Labs     06/06/19 0428 06/05/19 0336   SGOT 16 20   ALT 35 47    105   TBILI 0.3 0.2   TP 6.3* 6.1*   ALB 3.1* 2.8*   GLOB 3.2 3.3       Lab Results   Component Value Date/Time    Glucose (POC) 93 09/30/2013 12:55 PM     Lab Results   Component Value Date/Time    TSH 1.38 05/30/2019 08:20 PM       HOSPITAL COURSE & TREATMENT RENDERED:   [facility], due to GI blood loss.    -- Transfuse as needed  -- Gave 1 unit 6/5  - Hb stable at 7.7     Hypokalemia / Dehydration / Elevated lactic acid level - POA, due to poor PO intake.    --Hydrate and replete K.     Rectal bleeding / Hx hemorroids / Hx Hiatal hernia - POA, very likely hemorrhoid bleeding. --Consulted GI.    --PPI  --Hemorrhoidectomy 6/4     Anxiety and depression / Fibromyalgia - POA, poorly controlled.  This is contributing to her sensation of pain. --Continue cymbalta, chatix and xanax.      Morbid obesity / CARLOS (obstructive sleep apnea) - Non compliant with cpap.  Advised weight loss     Hypertension / Incomplete right bundle branch block (RBBB) determined by electrocardiography - Adequate control on losartan HCTZ     Hx of seizure disorder - None recent.    --Continue topamax     Elevated liver enzymes  -- hepatitis profile negative  --US abd     Abd Pain/Bloating  --Reconsult GI- feels more MS in nature     Dysphagia  --Speech consult feels more esophageal in nature- has HH      Subjective:    47 y. o.   female who presented to the Emergency Department complaining of BRBPR. Sabine Lopez is a poor historian due to anxiety.  He reports 10 days of seeing red toilet water and clots on toilet paper.  She reports crampy abdominal pain. She reports a hx of hemorrhoids, and had her latest colonoscopy about 1 year ago, with finding of hemorrhoids and polyps.  She reports non compliance wiwth hemorrhoid med recommended by GI.  ER finds anemia, but hemoccult was negative today. We will admit her for observation. (Dr Katharine Wu)     5/31:  Still c/o \"severe\" ab pain but conversing well, does not appear in pain. Pain is constant and in mid ab between quadrants bilat and periumbicaly. She wants something \"stronger\" for pain. She reports she has taken Morphine in past without side effects. No N/V today. K+ sl better today after she agreed to take the med. GI to see today.       6/1:She is still  having  Pain and some rectal bleeding. GI has seen and is planning colonoscopy for Monday.     6/2: Less pain. Will be on clears today and starting bowel prep. LFTs are elevated this am. Will add hepatitis profile and obtain US. No nausea. Hb stable.      6/3:  Less Ab pain. She reports prep has helped more than anything so far. Hepatitis profile negative. LFTs better. AB US as under Data Review showing dilated CBD and duct dilation - may have passed a gallstone. Colonoscopy planned for 6/3.       6/4:  Reports \"feel anxious all over\" but no ab pain. Colonoscopy with likely hemorrhoids as source for bleeding - Surg has seen and hemorrhoidectomy later today. LFTs back to normal.       6/5:Hemorrhoidectomy yesterday. Pain manageable. Hb down to 7 and she is hypotensive. Had bolus during the night. Discussed transfusion and she is agreeable to blood. Discussed risks and benefits.      6/6: Hb 7.5 this am. She is c/o LUQ and epigastric pain. Meds are not helping. Feels bloated. Some nausea.  Still having bright red rectal bleeding. Will ask GI to see again. May need another unit of blood.      : Hb stable at 7.7. Having Left sided pain at intercostal region. Was re evaluated by GI who feels more MS in nature. Seen by speech for dysphagia who feels this is related to her HH. She is moving her bowels. Minimal bleeding. Appetite normal. Ready to be discharged. Review of Systems:   A comprehensive review of systems was negative except for that written in the HPI.     Objective:   Physical Exam:      Visit Vitals  /70 (BP 1 Location: Right arm, BP Patient Position: At rest)   Pulse 70   Temp 97.6 °F (36.4 °C)   Resp 18   Ht 5' 4\" (1.626 m)   Wt 234 lb (106.1 kg)   LMP 2011   SpO2 93%   BMI 40.17 kg/m²    O2 Flow Rate (L/min): 6 l/min O2 Device: Room air  Temp (24hrs), Av.5 °F (36.9 °C), Min:97.6 °F (36.4 °C), Max:99.3 °F (37.4 °C)    No intake/output data recorded.  0701 -  1900  In: 0 [P.O.:640]  Out: -      General:  Alert, morbidly obese, cooperative, no distress, appears stated age. Head:  Normocephalic, without obvious abnormality, atraumatic. Eyes:  Conjunctivae/corneas clear. EOMs intact. Throat: Lips, mucosa, and tongue moist..   Neck: Supple, symmetrical, trachea midline, no adenopathy, thyroid: no enlargement/tenderness/nodules, no carotid bruit and no JVD. Back:   Symmetric, no curvature. ROM normal. No CVA tenderness. Lungs:   Clear to auscultation bilaterally. Chest wall:  No tenderness or deformity. Heart:  Regular rate and rhythm, S1, S2 normal, no murmur, click, rub or gallop. Abdomen:   Soft, obese, with mild tenderness left upper abd and intercostal spaces. Bowel sounds normal. No masses,  No organomegaly. Extremities: no cyanosis or edema. No calf tenderness or cords. Skin: turgor normal.   Neurologic: Alert and oriented X 3. Fine motor of hands and fingers normal.   equal.  No cogwheeling or rigidity. Gait not tested at this time. Sensation grossly normal to touch. Gross motor of extremities normal.          Data Review:    CT AB and Pelvis 5/30/19   FINDINGS:   LUNG BASES: Clear. INCIDENTALLY IMAGED HEART AND MEDIASTINUM: Unremarkable. LIVER: No mass or biliary dilatation. GALLBLADDER: Surgically absent  SPLEEN: No mass. PANCREAS: No mass or ductal dilatation. ADRENALS: Unremarkable. KIDNEYS: No mass, calculus, or hydronephrosis. STOMACH: Post fundoplication  SMALL BOWEL: No dilatation or wall thickening. COLON: No dilatation or wall thickening. APPENDIX: Unremarkable. PERITONEUM: No ascites or pneumoperitoneum. RETROPERITONEUM: No lymphadenopathy or aortic aneurysm. Mild vascular  calcifications  REPRODUCTIVE ORGANS: Within normal limits  URINARY BLADDER: No mass or calculus. BONES: No destructive bone lesion. ADDITIONAL COMMENTS: N/A  IMPRESSION:  A source of GI bleeding is not identified     US of AB  EXAM: US ABD COMP   INDICATION: Elevated liver function enzymes. Dillon Mas FINDINGS:  LIVER:   The liver is normal in echotexture with a single homogeneous 1.6 x 1.3 x 1.5 cm  lesion. There are dilated intrahepatic ducts. LIVER VASCULATURE:   The portal vein flow is . GALLBLADDER:  The gallbladder is surgically absent. COMMON BILE DUCT:  There is positive intrahepatic biliary duct dilatation and the common duct  measures 13 mm in diameter. PANCREAS:  The visualized pancreas is normal.  SPLEEN:  The spleen is normal in echotexture and size and measures 9.3 cm in length. The  spleen measures 247 mL. RIGHT KIDNEY:  The right kidney demonstrates normal echogenicity with no mass, stone or  hydronephrosis. The right kidney measures 11.7 cm in length. LEFT KIDNEY:  The left kidney demonstrates normal echogenicity with no mass, stone or  hydronephrosis.  The left kidney measures 13.5 cm in length. The left kidney  contains a 1.2 x 1.3 x 1.2 cm anechoic cyst.  RETROPERITONEUM:  The aorta tapers normally.   The IVC is normal.  No retroperitoneal mass is identified. IMPRESSION:   Intrahepatic and extrahepatic biliary dilatation. Recommend MRCP.   Probable hepatic hemangioma         Signed:  Demario Hart MD  6/7/2019  6:58 AM

## 2019-06-10 ENCOUNTER — APPOINTMENT (OUTPATIENT)
Dept: VASCULAR SURGERY | Age: 54
End: 2019-06-10
Attending: EMERGENCY MEDICINE
Payer: MEDICARE

## 2019-06-10 ENCOUNTER — HOSPITAL ENCOUNTER (EMERGENCY)
Age: 54
Discharge: HOME | End: 2019-06-10
Attending: EMERGENCY MEDICINE | Admitting: EMERGENCY MEDICINE
Payer: MEDICARE

## 2019-06-10 ENCOUNTER — APPOINTMENT (OUTPATIENT)
Dept: CT IMAGING | Age: 54
End: 2019-06-10
Attending: EMERGENCY MEDICINE
Payer: MEDICARE

## 2019-06-10 VITALS
HEART RATE: 95 BPM | SYSTOLIC BLOOD PRESSURE: 156 MMHG | RESPIRATION RATE: 18 BRPM | HEIGHT: 64 IN | WEIGHT: 239 LBS | OXYGEN SATURATION: 100 % | BODY MASS INDEX: 40.8 KG/M2 | DIASTOLIC BLOOD PRESSURE: 102 MMHG | TEMPERATURE: 98.6 F

## 2019-06-10 DIAGNOSIS — Z98.890 H/O HEMORRHOIDECTOMY: Primary | ICD-10-CM

## 2019-06-10 DIAGNOSIS — R10.84 ABDOMINAL PAIN, GENERALIZED: ICD-10-CM

## 2019-06-10 LAB
ALBUMIN SERPL-MCNC: 3.7 G/DL (ref 3.5–5)
ALBUMIN/GLOB SERPL: 0.8 {RATIO} (ref 1.1–2.2)
ALP SERPL-CCNC: 141 U/L (ref 45–117)
ALT SERPL-CCNC: 62 U/L (ref 12–78)
ANION GAP SERPL CALC-SCNC: 5 MMOL/L (ref 5–15)
APPEARANCE UR: CLEAR
AST SERPL-CCNC: 219 U/L (ref 15–37)
BACTERIA URNS QL MICRO: ABNORMAL /HPF
BASOPHILS # BLD: 0.1 K/UL (ref 0–0.1)
BASOPHILS NFR BLD: 1 % (ref 0–1)
BILIRUB SERPL-MCNC: 0.4 MG/DL (ref 0.2–1)
BILIRUB UR QL: NEGATIVE
BUN SERPL-MCNC: 11 MG/DL (ref 6–20)
BUN/CREAT SERPL: 11 (ref 12–20)
CALCIUM SERPL-MCNC: 9.3 MG/DL (ref 8.5–10.1)
CHLORIDE SERPL-SCNC: 103 MMOL/L (ref 97–108)
CO2 SERPL-SCNC: 30 MMOL/L (ref 21–32)
COLOR UR: ABNORMAL
COMMENT, HOLDF: NORMAL
CREAT SERPL-MCNC: 1.04 MG/DL (ref 0.55–1.02)
DIFFERENTIAL METHOD BLD: ABNORMAL
EOSINOPHIL # BLD: 0.3 K/UL (ref 0–0.4)
EOSINOPHIL NFR BLD: 3 % (ref 0–7)
EPITH CASTS URNS QL MICRO: ABNORMAL /LPF
ERYTHROCYTE [DISTWIDTH] IN BLOOD BY AUTOMATED COUNT: 14.8 % (ref 11.5–14.5)
GLOBULIN SER CALC-MCNC: 4.6 G/DL (ref 2–4)
GLUCOSE SERPL-MCNC: 109 MG/DL (ref 65–100)
GLUCOSE UR STRIP.AUTO-MCNC: NEGATIVE MG/DL
HCT VFR BLD AUTO: 33.2 % (ref 35–47)
HGB BLD-MCNC: 10.5 G/DL (ref 11.5–16)
HGB UR QL STRIP: ABNORMAL
IMM GRANULOCYTES # BLD AUTO: 0 K/UL (ref 0–0.04)
IMM GRANULOCYTES NFR BLD AUTO: 0 % (ref 0–0.5)
KETONES UR QL STRIP.AUTO: NEGATIVE MG/DL
LEUKOCYTE ESTERASE UR QL STRIP.AUTO: ABNORMAL
LYMPHOCYTES # BLD: 2.6 K/UL (ref 0.8–3.5)
LYMPHOCYTES NFR BLD: 26 % (ref 12–49)
MCH RBC QN AUTO: 28.8 PG (ref 26–34)
MCHC RBC AUTO-ENTMCNC: 31.6 G/DL (ref 30–36.5)
MCV RBC AUTO: 91.2 FL (ref 80–99)
MONOCYTES # BLD: 0.7 K/UL (ref 0–1)
MONOCYTES NFR BLD: 7 % (ref 5–13)
NEUTS SEG # BLD: 6.4 K/UL (ref 1.8–8)
NEUTS SEG NFR BLD: 63 % (ref 32–75)
NITRITE UR QL STRIP.AUTO: NEGATIVE
NRBC # BLD: 0 K/UL (ref 0–0.01)
NRBC BLD-RTO: 0 PER 100 WBC
PH UR STRIP: 7 [PH] (ref 5–8)
PLATELET # BLD AUTO: 431 K/UL (ref 150–400)
PMV BLD AUTO: 9.5 FL (ref 8.9–12.9)
POTASSIUM SERPL-SCNC: 3.3 MMOL/L (ref 3.5–5.1)
PROT SERPL-MCNC: 8.3 G/DL (ref 6.4–8.2)
PROT UR STRIP-MCNC: NEGATIVE MG/DL
RBC # BLD AUTO: 3.64 M/UL (ref 3.8–5.2)
RBC #/AREA URNS HPF: ABNORMAL /HPF (ref 0–5)
SAMPLES BEING HELD,HOLD: NORMAL
SODIUM SERPL-SCNC: 138 MMOL/L (ref 136–145)
SP GR UR REFRACTOMETRY: 1.02 (ref 1–1.03)
UR CULT HOLD, URHOLD: NORMAL
UROBILINOGEN UR QL STRIP.AUTO: 0.2 EU/DL (ref 0.2–1)
WBC # BLD AUTO: 10 K/UL (ref 3.6–11)
WBC URNS QL MICRO: ABNORMAL /HPF (ref 0–4)

## 2019-06-10 PROCEDURE — 80053 COMPREHEN METABOLIC PANEL: CPT

## 2019-06-10 PROCEDURE — 85025 COMPLETE CBC W/AUTO DIFF WBC: CPT

## 2019-06-10 PROCEDURE — 96361 HYDRATE IV INFUSION ADD-ON: CPT

## 2019-06-10 PROCEDURE — 74011250637 HC RX REV CODE- 250/637: Performed by: NURSE PRACTITIONER

## 2019-06-10 PROCEDURE — 74011636320 HC RX REV CODE- 636/320: Performed by: RADIOLOGY

## 2019-06-10 PROCEDURE — 74177 CT ABD & PELVIS W/CONTRAST: CPT

## 2019-06-10 PROCEDURE — 74011250636 HC RX REV CODE- 250/636: Performed by: EMERGENCY MEDICINE

## 2019-06-10 PROCEDURE — 81001 URINALYSIS AUTO W/SCOPE: CPT

## 2019-06-10 PROCEDURE — 93970 EXTREMITY STUDY: CPT

## 2019-06-10 PROCEDURE — 99283 EMERGENCY DEPT VISIT LOW MDM: CPT

## 2019-06-10 PROCEDURE — 96360 HYDRATION IV INFUSION INIT: CPT

## 2019-06-10 RX ORDER — HYDROCODONE BITARTRATE AND ACETAMINOPHEN 7.5; 325 MG/1; MG/1
1 TABLET ORAL ONCE
Status: COMPLETED | OUTPATIENT
Start: 2019-06-10 | End: 2019-06-10

## 2019-06-10 RX ADMIN — IOPAMIDOL 100 ML: 755 INJECTION, SOLUTION INTRAVENOUS at 20:20

## 2019-06-10 RX ADMIN — HYDROCODONE BITARTRATE AND ACETAMINOPHEN 1 TABLET: 7.5; 325 TABLET ORAL at 21:57

## 2019-06-10 RX ADMIN — SODIUM CHLORIDE 1000 ML: 900 INJECTION, SOLUTION INTRAVENOUS at 20:52

## 2019-06-10 NOTE — ED TRIAGE NOTES
Pt. States had surgery last week for hemmorrhoids, states that has pain in bilateral lower back, bilateral legs. Swelling noted to both feet.

## 2019-06-10 NOTE — ED PROVIDER NOTES
47 y.o. female with past medical history significant for HTN, hemorrhoids, hiatal hernia with GERD, fibromyalgia, seizure disorder, CARLOS, migraines, anxiety, depression, RBBB, an cavernous hemangioma of intracranial structure who presents from home with chief complaint of leg pain. Pt had a hemorrhoidectomy with Dr. Raul Ortiz and was discharged home 3 days ago. She states that over the past couple days she has been experiencing pain radiating down her legs bilaterally with bilateral leg swelling. She states that she isn't able to lie on either side and has been experiencing a difficult time getting out of bed. Pt also c/o some slight back pain as well as increased urinary urgency and retention. She reports some constipation and decreased appetite as well. Pt denies any h/o this pain. There are no other acute medical concerns at this time. I have evaluated the patient as the Provider in Triage. I have reviewed Her vital signs and the triage nurse assessment. I have talked with the patient and any available family and advised that I am the provider in triage and have ordered the appropriate study to initiate their work up based on the clinical presentation during my assessment. I have advised that the patient will be accommodated in the Main ED as soon as possible. I have also requested to contact the triage nurse or myself immediately if the patient experiences any changes in their condition during this brief waiting period. Note written by Sabrina Azul, as dictated by Ny Caballero MD 7:14 PM    ---------------------------------------------------------------    47 y.o. female with past medical history significant for HTN, hiatal hernia, GERD, CARLOS,  Hemorrhoids who presents from home accompanied by her Mother with chief complaint of bilateral leg pain s/p surgery. Pt states she recently underwent a hemorrhoidectomy with Surgeon, Dr. Raul Ortiz on 6/4, and was discharged home on 6/7.  She reports increasing bilateral pain radiating down her legs with associated pain since the surgery. Pt notes difficultly walking and maneuvering secondary to the pain, prompting her visit to the ED. She has not contacted her surgeon about the symptoms, stating \"I would have another night of pain\" because she slept in this morning and could not make the office hours. She has not had her post op follow up appointment, it is scheduled for 2 weeks post surgery. She was prescribed Oxycodone for pain at discharge, she has had no relief. She reports constipation s/p surgery, but has been taking Miralax once a day. Pt specifically denies any fever, chills, nausea, vomiting. There are no other acute medical concerns at this time. PMHx: Significant for Cavernous hemangioma of intracranial structure, HTN, hiatal hernia, GERD, CARLOS, seizures, migraines, anxiety and depression, Hemorrhoids   PSHx: Significant for hemorrhoidectomy, cholecystectomy, Orthopedic, colonoscopy, lithotripsy   Social Hx: negative tobacco use, negative EtOH use, negative Illicit Drug use    PCP: Davin Medina MD    Note written by Sabrina Pena, as dictated by Ledy Shah NP 9:43 PM    The history is provided by the patient. No  was used.         Past Medical History:   Diagnosis Date    Anxiety and depression     Cavernous hemangioma of intracranial structure ([facility] [address] 5/2/2011    Fibromyalgia     fibromyalgia    Hemorrhoids     Hiatal hernia with gastroesophageal reflux 04/2018    Hx of migraines     Hx of seizure disorder     Hypertension     Incomplete right bundle branch block (RBBB) determined by electrocardiography 05/10/2018    CARLOS (obstructive sleep apnea)     no cpap    Thyroid nodule        Past Surgical History:   Procedure Laterality Date    COLONOSCOPY N/A 6/3/2019    COLONOSCOPY performed by Patsy Eaton MD at [address] HX HEENT  2012    tonsillectomy    HX LITHOTRIPSY      HX ORTHOPAEDIC      disc fusion 2010, right knee cyst removed    NEUROLOGICAL PROCEDURE UNLISTED  1996, 2006    Cavernous brain angioma(s) removed    SURGERY,BRAIN/SPINE,W/COMPUTER           Family History:   Problem Relation Age of Onset   Dulcyrian.Jaime Cancer Mother         breast    Breast Cancer Mother 76    Cancer Father     Migraines Father     Diabetes Father     Breast Cancer Sister 48    Ovarian Cancer Maternal Aunt        Social History     Socioeconomic History    Marital status: SINGLE     Spouse name: Not on file    Number of children: Not on file    Years of education: Not on file    Highest education level: Not on file   Occupational History    Not on file   Social Needs    Financial resource strain: Not on file    Food insecurity:     Worry: Not on file     Inability: Not on file    Transportation needs:     Medical: Not on file     Non-medical: Not on file   Tobacco Use    Smoking status: Former Smoker    Smokeless tobacco: Never Used    Tobacco comment: on chantix,   Substance and Sexual Activity    Alcohol use: No    Drug use: No    Sexual activity: Not on file   Lifestyle    Physical activity:     Days per week: Not on file     Minutes per session: Not on file    Stress: Not on file   Relationships    Social connections:     Talks on phone: Not on file     Gets together: Not on file     Attends Confucianism service: Not on file     Active member of club or organization: Not on file     Attends meetings of clubs or organizations: Not on file     Relationship status: Not on file    Intimate partner violence:     Fear of current or ex partner: Not on file     Emotionally abused: Not on file     Physically abused: Not on file     Forced sexual activity: Not on file   Other Topics Concern    Not on file   Social History Narrative    Not on file         ALLERGIES: Latex, natural rubber; Aspirin;  Other medication; Codeine; Imitrex [sumatriptan]; and Percocet [oxycodone-acetaminophen]    Review of Systems   Constitutional: Negative for chills and fever. HENT: Negative for sore throat and trouble swallowing. Eyes: Negative for photophobia. Respiratory: Negative for cough and shortness of breath. Cardiovascular: Positive for leg swelling. Negative for chest pain. Gastrointestinal: Positive for constipation. Negative for abdominal pain, nausea and vomiting. Genitourinary: Negative for dysuria, frequency and hematuria. Musculoskeletal: Positive for myalgias (bilateral leg pain). Negative for arthralgias. Neurological: Negative for dizziness. Psychiatric/Behavioral: Negative for behavioral problems. The patient is not nervous/anxious. There were no vitals filed for this visit. Physical Exam   Constitutional: She is oriented to person, place, and time. She appears well-developed and well-nourished. No distress. Alert and oriented, morbidly obese, uncomfortable appearing. Afebrile. HENT:   Head: Normocephalic and atraumatic. Right Ear: External ear normal.   Left Ear: External ear normal.   Nose: Nose normal.   Mouth/Throat: Oropharynx is clear and moist. No oropharyngeal exudate. Eyes: Pupils are equal, round, and reactive to light. Conjunctivae and EOM are normal. No scleral icterus. Neck: Normal range of motion. Neck supple. Cardiovascular: Normal rate, regular rhythm, normal heart sounds and intact distal pulses. Pulmonary/Chest: Effort normal and breath sounds normal. She has no wheezes. She exhibits no tenderness. Abdominal: Soft. Normal appearance, normal aorta and bowel sounds are normal. She exhibits no distension, no abdominal bruit, no ascites, no pulsatile midline mass and no mass. There is no hepatosplenomegaly. There is no tenderness. There is no rebound, no guarding, no CVA tenderness, no tenderness at McBurney's point and negative Rose's sign. No hernia. Hernia confirmed negative in the ventral area. Genitourinary: Rectal exam shows external hemorrhoid (no active hemorrhage ). Genitourinary Comments: Anus patient with no hemorrhaging. External hemorrhoid noted, but not thrombosed. No fissure or dehiscence. Musculoskeletal: Normal range of motion. She exhibits no edema or deformity. No peripheral edema    Lymphadenopathy:     She has no cervical adenopathy. Neurological: She is alert and oriented to person, place, and time. No cranial nerve deficit or sensory deficit. She exhibits normal muscle tone. Coordination normal.   Skin: Skin is warm and dry. Capillary refill takes less than 2 seconds. No rash noted. She is not diaphoretic. Psychiatric: She has a normal mood and affect. Her behavior is normal. Judgment and thought content normal.   Nursing note and vitals reviewed. Note written by Sabrina Miguel, as dictated by Lula Mann NP 9:43 PM    MDM  Number of Diagnoses or Management Options  Abdominal pain, generalized: established and worsening  H/O hemorrhoidectomy: established and worsening  Diagnosis management comments:     S/P hemorrhoidectomy, with abdominal pain and BLE swelling. She's concerned of DVT and acute abdominal complication. She has not phoned her surgeon about her concerns, nor does she have a postop visit planned. Using hydrocodone at home for surgical pain, has not taken a dose today. CT is negative for any acute intraabdominal finding, specifically the perianal region. Duplex is negative for DVT. No fever, diaphoresis or evidence of systemic involvement. Recommended following up with surgeon in the morning.           Amount and/or Complexity of Data Reviewed  Clinical lab tests: ordered  Tests in the radiology section of CPT®: ordered  Tests in the medicine section of CPT®: ordered  Review and summarize past medical records: yes  Discuss the patient with other providers: yes  Independent visualization of images, tracings, or specimens: yes Procedures

## 2019-06-12 NOTE — OP NOTES
OPERATIVE NOTE    Date of Procedure: 6/4/2019   Preoperative Diagnosis: Blood loss anemia, bleeding hemorrhoid  Postoperative Diagnosis: Hemorrhoid    Procedure(s): HEMORRHOIDECTOMY 3 Column  Surgeon(s) and Role:     * Victoria Dominguez MD - Primary    Surgical Staff:  Circ-1: Yesy Simon, RN  Scrub Tech-Relief: Marcela Bazan  Scrub RN-1: Gil Chapin RN  Float Staff: Kale Valero  Event Time In Time Out   Incision Start 06/04/2019 1442    Incision Close 06/04/2019 1553      Anesthesia: General   Estimated Blood Loss: 500cc  Specimens:   ID Type Source Tests Collected by Time Destination   1 : Left lateral internal and external hemorrhoids Preservative Rectum  Victoria Dominguez MD 6/4/2019 1514 Pathology   2 : Right Posterior Internal and External Hemorrhoids Preservative Rectum  Victoria Dominguez MD 6/4/2019 1518 Pathology   3 : Right Anterior internal and external hemorrhoids Preservative Rectum  Victoria Dominguez MD 6/4/2019 1540 Pathology      Findings: prolapsed internal, large external hemorrhoids  Complications: none  Implants: * No implants in log *    OPERATIVE INDICATIONS:  Blood loss anemia, source bleeding internal hemorrhoids. DESCRIPTION OF PROCEDURE: The patient was identified in the holding area and brought to the operating room where she was placed in the supine position. After induction of general anesthesia, she was turned to prone jackknife position. A retractor was placed in the anus. Very prominent, large external and internal hemorrhoid was identified at 7-8 o'clock left lateral position. It was grasped with a hemorrhoidal clamp. A 2-0 chromic stitch was placed at the apex. The harmonic scalpel energy device was then used to elliptically excise the large hemorrhoid, staying superficial to the sphincter muscle. Hemorrhoid was then passed off as specimen. Further bleeding was controlled with Bovie electrocautery.  The mucosa was closed with a running 0 chromic stitch, leaving the end-point epidermis open. Two other large internal and external hemorrhoids were identified, right posterior and right anterior. These two hemorrhoids were excised in the exact same fashion as the first hemorrhoid. At the conclusion, there was no evidence of bleeding. There was still some prominent hemorrhoidal tissue remaining. However, I did not feel any further excision would be safe at this time. Xeroform wrapped around 4x4s was then placed in the anus as a dressing and ABD placed over the top. The patient was then awakened and taken to the recovery room in good condition. I went to discuss the case with her family in the waiting area.      Carmen Agosto MD

## 2019-06-30 ENCOUNTER — HOSPITAL ENCOUNTER (EMERGENCY)
Age: 54
Discharge: HOME | End: 2019-07-01
Attending: EMERGENCY MEDICINE
Payer: MEDICARE

## 2019-06-30 ENCOUNTER — APPOINTMENT (OUTPATIENT)
Dept: CT IMAGING | Age: 54
End: 2019-06-30
Attending: NURSE PRACTITIONER
Payer: MEDICARE

## 2019-06-30 DIAGNOSIS — R63.4 ABNORMAL WEIGHT LOSS: ICD-10-CM

## 2019-06-30 DIAGNOSIS — E87.6 HYPOKALEMIA: ICD-10-CM

## 2019-06-30 DIAGNOSIS — F41.9 ANXIETY AND DEPRESSION: ICD-10-CM

## 2019-06-30 DIAGNOSIS — F32.A ANXIETY AND DEPRESSION: ICD-10-CM

## 2019-06-30 DIAGNOSIS — E86.0 DEHYDRATION: Primary | ICD-10-CM

## 2019-06-30 DIAGNOSIS — N17.9 AKI (ACUTE KIDNEY INJURY) (HCC): ICD-10-CM

## 2019-06-30 LAB
ALBUMIN SERPL-MCNC: 4.3 G/DL (ref 3.5–5)
ALBUMIN/GLOB SERPL: 0.9 {RATIO} (ref 1.1–2.2)
ALP SERPL-CCNC: 110 U/L (ref 45–117)
ALT SERPL-CCNC: 28 U/L (ref 12–78)
ANION GAP SERPL CALC-SCNC: 7 MMOL/L (ref 5–15)
AST SERPL-CCNC: 23 U/L (ref 15–37)
BASOPHILS # BLD: 0.1 K/UL (ref 0–0.1)
BASOPHILS NFR BLD: 1 % (ref 0–1)
BILIRUB SERPL-MCNC: 0.4 MG/DL (ref 0.2–1)
BUN SERPL-MCNC: 22 MG/DL (ref 6–20)
BUN/CREAT SERPL: 16 (ref 12–20)
CALCIUM SERPL-MCNC: 9.9 MG/DL (ref 8.5–10.1)
CHLORIDE SERPL-SCNC: 101 MMOL/L (ref 97–108)
CO2 SERPL-SCNC: 29 MMOL/L (ref 21–32)
COMMENT, HOLDF: NORMAL
CREAT SERPL-MCNC: 1.34 MG/DL (ref 0.55–1.02)
DIFFERENTIAL METHOD BLD: ABNORMAL
EOSINOPHIL # BLD: 0.1 K/UL (ref 0–0.4)
EOSINOPHIL NFR BLD: 1 % (ref 0–7)
ERYTHROCYTE [DISTWIDTH] IN BLOOD BY AUTOMATED COUNT: 13.9 % (ref 11.5–14.5)
GLOBULIN SER CALC-MCNC: 4.7 G/DL (ref 2–4)
GLUCOSE SERPL-MCNC: 121 MG/DL (ref 65–100)
HCT VFR BLD AUTO: 36.6 % (ref 35–47)
HGB BLD-MCNC: 11.8 G/DL (ref 11.5–16)
IMM GRANULOCYTES # BLD AUTO: 0 K/UL (ref 0–0.04)
IMM GRANULOCYTES NFR BLD AUTO: 0 % (ref 0–0.5)
LIPASE SERPL-CCNC: 219 U/L (ref 73–393)
LYMPHOCYTES # BLD: 1.9 K/UL (ref 0.8–3.5)
LYMPHOCYTES NFR BLD: 21 % (ref 12–49)
MCH RBC QN AUTO: 28 PG (ref 26–34)
MCHC RBC AUTO-ENTMCNC: 32.2 G/DL (ref 30–36.5)
MCV RBC AUTO: 86.9 FL (ref 80–99)
MONOCYTES # BLD: 0.7 K/UL (ref 0–1)
MONOCYTES NFR BLD: 8 % (ref 5–13)
NEUTS SEG # BLD: 6.2 K/UL (ref 1.8–8)
NEUTS SEG NFR BLD: 70 % (ref 32–75)
NRBC # BLD: 0 K/UL (ref 0–0.01)
NRBC BLD-RTO: 0 PER 100 WBC
PLATELET # BLD AUTO: 414 K/UL (ref 150–400)
PMV BLD AUTO: 10.1 FL (ref 8.9–12.9)
POTASSIUM SERPL-SCNC: 2.7 MMOL/L (ref 3.5–5.1)
PROT SERPL-MCNC: 9 G/DL (ref 6.4–8.2)
RBC # BLD AUTO: 4.21 M/UL (ref 3.8–5.2)
SAMPLES BEING HELD,HOLD: NORMAL
SODIUM SERPL-SCNC: 137 MMOL/L (ref 136–145)
WBC # BLD AUTO: 8.9 K/UL (ref 3.6–11)

## 2019-06-30 PROCEDURE — 74011250636 HC RX REV CODE- 250/636: Performed by: NURSE PRACTITIONER

## 2019-06-30 PROCEDURE — 96366 THER/PROPH/DIAG IV INF ADDON: CPT

## 2019-06-30 PROCEDURE — 80053 COMPREHEN METABOLIC PANEL: CPT

## 2019-06-30 PROCEDURE — 99285 EMERGENCY DEPT VISIT HI MDM: CPT

## 2019-06-30 PROCEDURE — 74011250637 HC RX REV CODE- 250/637: Performed by: NURSE PRACTITIONER

## 2019-06-30 PROCEDURE — 96361 HYDRATE IV INFUSION ADD-ON: CPT

## 2019-06-30 PROCEDURE — 96365 THER/PROPH/DIAG IV INF INIT: CPT

## 2019-06-30 PROCEDURE — 96375 TX/PRO/DX INJ NEW DRUG ADDON: CPT

## 2019-06-30 PROCEDURE — 83690 ASSAY OF LIPASE: CPT

## 2019-06-30 PROCEDURE — 85025 COMPLETE CBC W/AUTO DIFF WBC: CPT

## 2019-06-30 PROCEDURE — 74176 CT ABD & PELVIS W/O CONTRAST: CPT

## 2019-06-30 PROCEDURE — 36415 COLL VENOUS BLD VENIPUNCTURE: CPT

## 2019-06-30 RX ORDER — ONDANSETRON 2 MG/ML
4 INJECTION INTRAMUSCULAR; INTRAVENOUS
Status: COMPLETED | OUTPATIENT
Start: 2019-06-30 | End: 2019-06-30

## 2019-06-30 RX ORDER — POTASSIUM CHLORIDE 750 MG/1
40 TABLET, FILM COATED, EXTENDED RELEASE ORAL
Status: COMPLETED | OUTPATIENT
Start: 2019-06-30 | End: 2019-06-30

## 2019-06-30 RX ORDER — POTASSIUM CHLORIDE 7.45 MG/ML
10 INJECTION INTRAVENOUS
Status: COMPLETED | OUTPATIENT
Start: 2019-06-30 | End: 2019-07-01

## 2019-06-30 RX ADMIN — POTASSIUM CHLORIDE 10 MEQ: 10 INJECTION, SOLUTION INTRAVENOUS at 23:02

## 2019-06-30 RX ADMIN — SODIUM CHLORIDE 1000 ML: 900 INJECTION, SOLUTION INTRAVENOUS at 22:15

## 2019-06-30 RX ADMIN — ONDANSETRON 4 MG: 2 INJECTION INTRAMUSCULAR; INTRAVENOUS at 22:15

## 2019-06-30 RX ADMIN — POTASSIUM CHLORIDE 40 MEQ: 750 TABLET, FILM COATED, EXTENDED RELEASE ORAL at 23:03

## 2019-07-01 VITALS
SYSTOLIC BLOOD PRESSURE: 133 MMHG | WEIGHT: 214 LBS | OXYGEN SATURATION: 99 % | RESPIRATION RATE: 16 BRPM | DIASTOLIC BLOOD PRESSURE: 89 MMHG | HEART RATE: 88 BPM | BODY MASS INDEX: 36.54 KG/M2 | TEMPERATURE: 99 F | HEIGHT: 64 IN

## 2019-07-01 LAB
APPEARANCE UR: CLEAR
BACTERIA URNS QL MICRO: NEGATIVE /HPF
BILIRUB UR QL: NEGATIVE
COLOR UR: NORMAL
EPITH CASTS URNS QL MICRO: NORMAL /LPF
GLUCOSE UR STRIP.AUTO-MCNC: NEGATIVE MG/DL
HGB UR QL STRIP: NEGATIVE
KETONES UR QL STRIP.AUTO: NEGATIVE MG/DL
LEUKOCYTE ESTERASE UR QL STRIP.AUTO: NEGATIVE
NITRITE UR QL STRIP.AUTO: NEGATIVE
PH UR STRIP: 6 [PH] (ref 5–8)
PROT UR STRIP-MCNC: NEGATIVE MG/DL
RBC #/AREA URNS HPF: NORMAL /HPF (ref 0–5)
SP GR UR REFRACTOMETRY: 1.01 (ref 1–1.03)
UR CULT HOLD, URHOLD: NORMAL
UROBILINOGEN UR QL STRIP.AUTO: 1 EU/DL (ref 0.2–1)
WBC URNS QL MICRO: NORMAL /HPF (ref 0–4)

## 2019-07-01 PROCEDURE — 81001 URINALYSIS AUTO W/SCOPE: CPT

## 2019-07-01 PROCEDURE — 96361 HYDRATE IV INFUSION ADD-ON: CPT

## 2019-07-01 PROCEDURE — 96375 TX/PRO/DX INJ NEW DRUG ADDON: CPT

## 2019-07-01 PROCEDURE — 74011250636 HC RX REV CODE- 250/636: Performed by: NURSE PRACTITIONER

## 2019-07-01 PROCEDURE — 74011250637 HC RX REV CODE- 250/637: Performed by: NURSE PRACTITIONER

## 2019-07-01 RX ORDER — KETOROLAC TROMETHAMINE 30 MG/ML
30 INJECTION, SOLUTION INTRAMUSCULAR; INTRAVENOUS
Status: COMPLETED | OUTPATIENT
Start: 2019-07-01 | End: 2019-07-01

## 2019-07-01 RX ORDER — OXYCODONE AND ACETAMINOPHEN 5; 325 MG/1; MG/1
1 TABLET ORAL
Status: COMPLETED | OUTPATIENT
Start: 2019-07-01 | End: 2019-07-01

## 2019-07-01 RX ORDER — OXYCODONE AND ACETAMINOPHEN 5; 325 MG/1; MG/1
TABLET ORAL
Status: DISCONTINUED
Start: 2019-07-01 | End: 2019-07-01 | Stop reason: HOSPADM

## 2019-07-01 RX ORDER — POTASSIUM CHLORIDE 1.5 G/1.77G
20 POWDER, FOR SOLUTION ORAL 2 TIMES DAILY WITH MEALS
Qty: 10 PACKET | Refills: 0 | Status: SHIPPED | OUTPATIENT
Start: 2019-07-01 | End: 2019-07-06

## 2019-07-01 RX ADMIN — SODIUM CHLORIDE, SODIUM LACTATE, POTASSIUM CHLORIDE, AND CALCIUM CHLORIDE 1000 ML: 600; 310; 30; 20 INJECTION, SOLUTION INTRAVENOUS at 00:23

## 2019-07-01 RX ADMIN — OXYCODONE HYDROCHLORIDE AND ACETAMINOPHEN 1 TABLET: 5; 325 TABLET ORAL at 01:13

## 2019-07-01 RX ADMIN — POTASSIUM CHLORIDE 10 MEQ: 10 INJECTION, SOLUTION INTRAVENOUS at 00:23

## 2019-07-01 RX ADMIN — POTASSIUM CHLORIDE 10 MEQ: 10 INJECTION, SOLUTION INTRAVENOUS at 01:19

## 2019-07-01 RX ADMIN — KETOROLAC TROMETHAMINE 30 MG: 30 INJECTION, SOLUTION INTRAMUSCULAR at 00:25

## 2019-07-01 NOTE — ED TRIAGE NOTES
Triage: Had surgery on 6/4 \"hemorrhoidectomy\". Ever Since discharge has been feeling fatigued, vomiting, weakness and reports not being able to eat in the last 3 weeks. Also reports lower abd pain, right leg and back. Took a oxycodone prior to arrival per patient about 2 hours ago.

## 2019-07-01 NOTE — DISCHARGE INSTRUCTIONS
Thank you for allowing us to care for you today. Please follow-up with your Primary Care provider in the next 2-3 days if your symptoms do not improve. Plan for home:     Potassium packets mixed with liquid twice daily for 5 days. Nausea/vomiting: For the next few days watch what you eat. Eat a bland diet. Fluids are the most important. Gatorade, powerade, water are the best fluids to drink. BRAT Diet: Bananas, Rice, Applesauce, Tea/Toast.  Add foods back slowly and as you tolerate. The last type of foods to add back will be salads, acetic foods like citrus, tomatoes and spicy foods. Follow-up with Primary care this week for further evaluation of symptoms and to recheck your potassium levels. Patient Education        Oral Rehydration: Care Instructions  Your Care Instructions    Dehydration occurs when your body loses too much water. This can happen if you do not drink enough fluids or lose a lot of fluid due to diarrhea, vomiting, or sweating. Being dehydrated can cause health problems and can even be life-threatening. To replace lost fluids, you need to drink liquid that contains special chemicals called electrolytes. Electrolytes keep your body working well. Plain water does not have electrolytes. You also need to rest to prevent more fluid loss. Replacing water and electrolytes (oral rehydration) completely takes about 36 hours. But you should feel better within a few hours. Follow-up care is a key part of your treatment and safety. Be sure to make and go to all appointments, and call your doctor if you are having problems. It's also a good idea to know your test results and keep a list of the medicines you take. How can you care for yourself at home? · Take frequent sips of a drink such as Gatorade, Powerade, or other rehydration drinks that your doctor suggests. These replace both fluid and important chemicals (electrolytes) you need for balance in your blood.   · Drink 2 quarts of cool liquid over 2 to 4 hours. You should have at least 10 glasses of liquid a day to replace lost fluid. If you have kidney, heart, or liver disease and have to limit fluids, talk with your doctor before you increase the amount of fluids you drink. · Make your own drink. Measure everything carefully. The drink may not work well or may even be harmful if the amounts are off. ? 1 quart water  ? ½ teaspoon salt  ? 6 teaspoons sugar  · Do not drink liquid with caffeine, such as coffee and micah. · Do not drink any alcohol. It can make you dehydrated. · Drink plenty of fluids, enough so that your urine is light yellow or clear like water. If you have kidney, heart, or liver disease and have to limit fluids, talk with your doctor before you increase the amount of fluids you drink. When should you call for help? Call 911 anytime you think you may need emergency care. For example, call if:    · You have signs of severe dehydration, such as:  ? You are confused or unable to stay awake.  ? You passed out (lost consciousness).    Call your doctor now or seek immediate medical care if:    · You still have signs of dehydration. You have sunken eyes and a dry mouth, and you pass only a little dark urine.     · You are dizzy or lightheaded, or you feel like you may faint.     · You are not able to keep down fluids.    Watch closely for changes in your health, and be sure to contact your doctor if:    · You do not get better as expected. Where can you learn more? Go to http://molina-ofelia.info/. Enter I040 in the search box to learn more about \"Oral Rehydration: Care Instructions. \"  Current as of: September 23, 2018  Content Version: 11.9  © 3608-7031 [employer]. Care instructions adapted under license by [employer] (which disclaims liability or warranty for this information).  If you have questions about a medical condition or this instruction, always ask your healthcare professional. [address] any warranty or liability for your use of this information.

## 2020-03-07 ENCOUNTER — APPOINTMENT (OUTPATIENT)
Dept: GENERAL RADIOLOGY | Age: 55
End: 2020-03-07
Attending: EMERGENCY MEDICINE
Payer: MEDICARE

## 2020-03-07 ENCOUNTER — APPOINTMENT (OUTPATIENT)
Dept: CT IMAGING | Age: 55
End: 2020-03-07
Attending: EMERGENCY MEDICINE
Payer: MEDICARE

## 2020-03-07 ENCOUNTER — HOSPITAL ENCOUNTER (OUTPATIENT)
Age: 55
Setting detail: OBSERVATION
Discharge: HOME | End: 2020-03-11
Attending: EMERGENCY MEDICINE | Admitting: INTERNAL MEDICINE
Payer: MEDICARE

## 2020-03-07 DIAGNOSIS — R07.9 CHEST PAIN, UNSPECIFIED TYPE: Primary | ICD-10-CM

## 2020-03-07 DIAGNOSIS — R06.02 SOB (SHORTNESS OF BREATH): ICD-10-CM

## 2020-03-07 DIAGNOSIS — R06.09 EXERTIONAL DYSPNEA: ICD-10-CM

## 2020-03-07 DIAGNOSIS — G44.89 OTHER HEADACHE SYNDROME: ICD-10-CM

## 2020-03-07 LAB
ALBUMIN SERPL-MCNC: 3.5 G/DL (ref 3.5–5)
ALBUMIN/GLOB SERPL: 0.8 {RATIO} (ref 1.1–2.2)
ALP SERPL-CCNC: 123 U/L (ref 45–117)
ALT SERPL-CCNC: 22 U/L (ref 12–78)
ANION GAP SERPL CALC-SCNC: 7 MMOL/L (ref 5–15)
AST SERPL-CCNC: 13 U/L (ref 15–37)
BASOPHILS # BLD: 0.1 K/UL (ref 0–0.1)
BASOPHILS NFR BLD: 1 % (ref 0–1)
BILIRUB SERPL-MCNC: 0.3 MG/DL (ref 0.2–1)
BNP SERPL-MCNC: 20 PG/ML
BUN SERPL-MCNC: 11 MG/DL (ref 6–20)
BUN/CREAT SERPL: 11 (ref 12–20)
CALCIUM SERPL-MCNC: 9.2 MG/DL (ref 8.5–10.1)
CHLORIDE SERPL-SCNC: 111 MMOL/L (ref 97–108)
CO2 SERPL-SCNC: 24 MMOL/L (ref 21–32)
COMMENT, HOLDF: NORMAL
CREAT SERPL-MCNC: 0.97 MG/DL (ref 0.55–1.02)
D DIMER PPP FEU-MCNC: 0.69 MG/L FEU (ref 0–0.65)
DIFFERENTIAL METHOD BLD: ABNORMAL
EOSINOPHIL # BLD: 0.2 K/UL (ref 0–0.4)
EOSINOPHIL NFR BLD: 2 % (ref 0–7)
ERYTHROCYTE [DISTWIDTH] IN BLOOD BY AUTOMATED COUNT: 14.5 % (ref 11.5–14.5)
GLOBULIN SER CALC-MCNC: 4.6 G/DL (ref 2–4)
GLUCOSE SERPL-MCNC: 104 MG/DL (ref 65–100)
HCT VFR BLD AUTO: 38.1 % (ref 35–47)
HGB BLD-MCNC: 11.8 G/DL (ref 11.5–16)
IMM GRANULOCYTES # BLD AUTO: 0 K/UL (ref 0–0.04)
IMM GRANULOCYTES NFR BLD AUTO: 0 % (ref 0–0.5)
LYMPHOCYTES # BLD: 3 K/UL (ref 0.8–3.5)
LYMPHOCYTES NFR BLD: 26 % (ref 12–49)
MCH RBC QN AUTO: 27.3 PG (ref 26–34)
MCHC RBC AUTO-ENTMCNC: 31 G/DL (ref 30–36.5)
MCV RBC AUTO: 88.2 FL (ref 80–99)
MONOCYTES # BLD: 0.7 K/UL (ref 0–1)
MONOCYTES NFR BLD: 6 % (ref 5–13)
NEUTS SEG # BLD: 7.6 K/UL (ref 1.8–8)
NEUTS SEG NFR BLD: 65 % (ref 32–75)
NRBC # BLD: 0 K/UL (ref 0–0.01)
NRBC BLD-RTO: 0 PER 100 WBC
PLATELET # BLD AUTO: 315 K/UL (ref 150–400)
PMV BLD AUTO: 10.4 FL (ref 8.9–12.9)
POTASSIUM SERPL-SCNC: 3.2 MMOL/L (ref 3.5–5.1)
PROT SERPL-MCNC: 8.1 G/DL (ref 6.4–8.2)
RBC # BLD AUTO: 4.32 M/UL (ref 3.8–5.2)
SAMPLES BEING HELD,HOLD: NORMAL
SODIUM SERPL-SCNC: 142 MMOL/L (ref 136–145)
TROPONIN I SERPL-MCNC: <0.05 NG/ML
WBC # BLD AUTO: 11.6 K/UL (ref 3.6–11)

## 2020-03-07 PROCEDURE — 74011636320 HC RX REV CODE- 636/320: Performed by: RADIOLOGY

## 2020-03-07 PROCEDURE — 85025 COMPLETE CBC W/AUTO DIFF WBC: CPT

## 2020-03-07 PROCEDURE — 36415 COLL VENOUS BLD VENIPUNCTURE: CPT

## 2020-03-07 PROCEDURE — 80053 COMPREHEN METABOLIC PANEL: CPT

## 2020-03-07 PROCEDURE — 96375 TX/PRO/DX INJ NEW DRUG ADDON: CPT

## 2020-03-07 PROCEDURE — 84484 ASSAY OF TROPONIN QUANT: CPT

## 2020-03-07 PROCEDURE — 71045 X-RAY EXAM CHEST 1 VIEW: CPT

## 2020-03-07 PROCEDURE — 71275 CT ANGIOGRAPHY CHEST: CPT

## 2020-03-07 PROCEDURE — 85379 FIBRIN DEGRADATION QUANT: CPT

## 2020-03-07 PROCEDURE — 93005 ELECTROCARDIOGRAM TRACING: CPT

## 2020-03-07 PROCEDURE — 99285 EMERGENCY DEPT VISIT HI MDM: CPT

## 2020-03-07 PROCEDURE — 83880 ASSAY OF NATRIURETIC PEPTIDE: CPT

## 2020-03-07 PROCEDURE — 96374 THER/PROPH/DIAG INJ IV PUSH: CPT

## 2020-03-07 RX ORDER — ACETAMINOPHEN 500 MG
1000 TABLET ORAL
Status: DISPENSED | OUTPATIENT
Start: 2020-03-07 | End: 2020-03-08

## 2020-03-07 RX ADMIN — IOPAMIDOL 100 ML: 755 INJECTION, SOLUTION INTRAVENOUS at 22:56

## 2020-03-08 ENCOUNTER — APPOINTMENT (OUTPATIENT)
Dept: NON INVASIVE DIAGNOSTICS | Age: 55
End: 2020-03-08
Attending: INTERNAL MEDICINE
Payer: MEDICARE

## 2020-03-08 ENCOUNTER — HOSPITAL ENCOUNTER (OUTPATIENT)
Dept: MRI IMAGING | Age: 55
Setting detail: OBSERVATION
Discharge: HOME | End: 2020-03-08
Attending: FAMILY MEDICINE
Payer: MEDICARE

## 2020-03-08 PROBLEM — R07.9 CHEST PAIN: Status: ACTIVE | Noted: 2020-03-08

## 2020-03-08 PROBLEM — E86.0 DEHYDRATION: Status: RESOLVED | Noted: 2019-05-30 | Resolved: 2020-03-08

## 2020-03-08 PROBLEM — R79.89 ELEVATED LACTIC ACID LEVEL: Status: RESOLVED | Noted: 2019-05-30 | Resolved: 2020-03-08

## 2020-03-08 PROBLEM — D64.9 ANEMIA: Status: RESOLVED | Noted: 2019-05-30 | Resolved: 2020-03-08

## 2020-03-08 LAB
AMORPH CRY URNS QL MICRO: ABNORMAL
AMPHET UR QL SCN: NEGATIVE
APPEARANCE UR: CLEAR
AV VELOCITY RATIO: 0.73
BACTERIA URNS QL MICRO: NEGATIVE /HPF
BARBITURATES UR QL SCN: NEGATIVE
BENZODIAZ UR QL: POSITIVE
BILIRUB UR QL: NEGATIVE
CANNABINOIDS UR QL SCN: POSITIVE
COCAINE UR QL SCN: NEGATIVE
COLOR UR: ABNORMAL
DRUG SCRN COMMENT,DRGCM: ABNORMAL
ECHO AO ROOT DIAM: 3.79 CM
ECHO AV AREA PEAK VELOCITY: 2.4 CM2
ECHO AV AREA/BSA PEAK VELOCITY: 1.1 CM2/M2
ECHO AV PEAK GRADIENT: 10.4 MMHG
ECHO AV PEAK VELOCITY: 161.38 CM/S
ECHO EST RA PRESSURE: 3 MMHG
ECHO LA AREA 4C: 13.8 CM2
ECHO LA MAJOR AXIS: 3.38 CM
ECHO LA TO AORTIC ROOT RATIO: 0.89
ECHO LA VOL 2C: 38.72 ML (ref 22–52)
ECHO LA VOL 4C: 30.09 ML (ref 22–52)
ECHO LA VOL BP: 35 ML (ref 22–52)
ECHO LA VOL/BSA BIPLANE: 16.74 ML/M2 (ref 16–28)
ECHO LA VOLUME INDEX A2C: 18.52 ML/M2 (ref 16–28)
ECHO LA VOLUME INDEX A4C: 14.39 ML/M2 (ref 16–28)
ECHO LV E' LATERAL VELOCITY: 8.59 CENTIMETER/SECOND
ECHO LV E' SEPTAL VELOCITY: 6.91 CENTIMETER/SECOND
ECHO LV EDV TEICHHOLZ: 0.8 ML
ECHO LV ESV TEICHHOLZ: 0.2 ML
ECHO LV INTERNAL DIMENSION DIASTOLIC: 5.21 CM (ref 3.9–5.3)
ECHO LV INTERNAL DIMENSION SYSTOLIC: 2.9 CM
ECHO LV IVSD: 1.19 CM (ref 0.6–0.9)
ECHO LV MASS 2D: 256.6 G (ref 67–162)
ECHO LV MASS INDEX 2D: 122.7 G/M2 (ref 43–95)
ECHO LV POSTERIOR WALL DIASTOLIC: 0.97 CM (ref 0.6–0.9)
ECHO LVOT DIAM: 2.03 CM
ECHO LVOT PEAK GRADIENT: 5.5 MMHG
ECHO LVOT PEAK VELOCITY: 117.01 CM/S
ECHO MV A VELOCITY: 101.39 CM/S
ECHO MV AREA PHT: 2.8 CM2
ECHO MV E DECELERATION TIME (DT): 267.2 MS
ECHO MV E VELOCITY: 64.69 CM/S
ECHO MV E/A RATIO: 0.64
ECHO MV PRESSURE HALF TIME (PHT): 77.5 MS
ECHO PV MAX VELOCITY: 94.69 CM/S
ECHO PV PEAK GRADIENT: 3.6 MMHG
ECHO RV INTERNAL DIMENSION: 4.13 CM
ECHO RV TAPSE: 2.27 CM (ref 1.5–2)
EPITH CASTS URNS QL MICRO: ABNORMAL /LPF
ETHANOL SERPL-MCNC: <10 MG/DL
GLUCOSE UR STRIP.AUTO-MCNC: NEGATIVE MG/DL
HGB UR QL STRIP: NEGATIVE
KETONES UR QL STRIP.AUTO: NEGATIVE MG/DL
LEUKOCYTE ESTERASE UR QL STRIP.AUTO: NEGATIVE
LVFS 2D: 44.32 %
LVSV (TEICH): 44.73 ML
METHADONE UR QL: NEGATIVE
MV DEC SLOPE: 2.42
NITRITE UR QL STRIP.AUTO: NEGATIVE
OPIATES UR QL: NEGATIVE
PCP UR QL: NEGATIVE
PH UR STRIP: 7.5 [PH] (ref 5–8)
PROT UR STRIP-MCNC: NEGATIVE MG/DL
RBC #/AREA URNS HPF: ABNORMAL /HPF (ref 0–5)
TROPONIN I SERPL-MCNC: <0.05 NG/ML
TROPONIN I SERPL-MCNC: <0.05 NG/ML
UROBILINOGEN UR QL STRIP.AUTO: 0.2 EU/DL (ref 0.2–1)
WBC URNS QL MICRO: ABNORMAL /HPF (ref 0–4)

## 2020-03-08 PROCEDURE — 74011250637 HC RX REV CODE- 250/637: Performed by: INTERNAL MEDICINE

## 2020-03-08 PROCEDURE — 93306 TTE W/DOPPLER COMPLETE: CPT

## 2020-03-08 PROCEDURE — 81001 URINALYSIS AUTO W/SCOPE: CPT

## 2020-03-08 PROCEDURE — C9113 INJ PANTOPRAZOLE SODIUM, VIA: HCPCS | Performed by: INTERNAL MEDICINE

## 2020-03-08 PROCEDURE — 96372 THER/PROPH/DIAG INJ SC/IM: CPT

## 2020-03-08 PROCEDURE — 87086 URINE CULTURE/COLONY COUNT: CPT

## 2020-03-08 PROCEDURE — 74011250636 HC RX REV CODE- 250/636: Performed by: EMERGENCY MEDICINE

## 2020-03-08 PROCEDURE — 74011250637 HC RX REV CODE- 250/637: Performed by: FAMILY MEDICINE

## 2020-03-08 PROCEDURE — 70551 MRI BRAIN STEM W/O DYE: CPT

## 2020-03-08 PROCEDURE — 84484 ASSAY OF TROPONIN QUANT: CPT

## 2020-03-08 PROCEDURE — 80307 DRUG TEST PRSMV CHEM ANLYZR: CPT

## 2020-03-08 PROCEDURE — 36415 COLL VENOUS BLD VENIPUNCTURE: CPT

## 2020-03-08 PROCEDURE — 74011250636 HC RX REV CODE- 250/636: Performed by: INTERNAL MEDICINE

## 2020-03-08 PROCEDURE — 74011000250 HC RX REV CODE- 250: Performed by: INTERNAL MEDICINE

## 2020-03-08 PROCEDURE — 99218 HC RM OBSERVATION: CPT

## 2020-03-08 PROCEDURE — 96376 TX/PRO/DX INJ SAME DRUG ADON: CPT

## 2020-03-08 RX ORDER — SODIUM CHLORIDE 0.9 % (FLUSH) 0.9 %
5-40 SYRINGE (ML) INJECTION AS NEEDED
Status: DISCONTINUED | OUTPATIENT
Start: 2020-03-08 | End: 2020-03-11 | Stop reason: HOSPADM

## 2020-03-08 RX ORDER — ENOXAPARIN SODIUM 100 MG/ML
40 INJECTION SUBCUTANEOUS EVERY 24 HOURS
Status: DISCONTINUED | OUTPATIENT
Start: 2020-03-08 | End: 2020-03-09

## 2020-03-08 RX ORDER — METOPROLOL TARTRATE 25 MG/1
TABLET, FILM COATED ORAL
Status: DISCONTINUED
Start: 2020-03-08 | End: 2020-03-08 | Stop reason: WASHOUT

## 2020-03-08 RX ORDER — HYDRALAZINE HYDROCHLORIDE 20 MG/ML
INJECTION INTRAMUSCULAR; INTRAVENOUS
Status: DISPENSED
Start: 2020-03-08 | End: 2020-03-08

## 2020-03-08 RX ORDER — HYDRALAZINE HYDROCHLORIDE 20 MG/ML
20 INJECTION INTRAMUSCULAR; INTRAVENOUS
Status: COMPLETED | OUTPATIENT
Start: 2020-03-08 | End: 2020-03-08

## 2020-03-08 RX ORDER — DULOXETIN HYDROCHLORIDE 30 MG/1
120 CAPSULE, DELAYED RELEASE ORAL
Status: DISCONTINUED | OUTPATIENT
Start: 2020-03-08 | End: 2020-03-11 | Stop reason: HOSPADM

## 2020-03-08 RX ORDER — ACETAMINOPHEN 325 MG/1
650 TABLET ORAL
Status: DISCONTINUED | OUTPATIENT
Start: 2020-03-08 | End: 2020-03-11 | Stop reason: HOSPADM

## 2020-03-08 RX ORDER — POTASSIUM CHLORIDE 750 MG/1
40 TABLET, FILM COATED, EXTENDED RELEASE ORAL EVERY 4 HOURS
Status: DISPENSED | OUTPATIENT
Start: 2020-03-08 | End: 2020-03-08

## 2020-03-08 RX ORDER — LISINOPRIL 5 MG/1
10 TABLET ORAL DAILY
Status: DISCONTINUED | OUTPATIENT
Start: 2020-03-08 | End: 2020-03-11 | Stop reason: HOSPADM

## 2020-03-08 RX ORDER — TRAZODONE HYDROCHLORIDE 100 MG/1
100-200 TABLET ORAL
Status: DISCONTINUED | OUTPATIENT
Start: 2020-03-08 | End: 2020-03-11 | Stop reason: HOSPADM

## 2020-03-08 RX ORDER — TOPIRAMATE 25 MG/1
25 TABLET ORAL
Status: DISCONTINUED | OUTPATIENT
Start: 2020-03-08 | End: 2020-03-11 | Stop reason: HOSPADM

## 2020-03-08 RX ORDER — ALPRAZOLAM 0.5 MG/1
TABLET ORAL
Status: DISPENSED
Start: 2020-03-08 | End: 2020-03-08

## 2020-03-08 RX ORDER — BUTALBITAL, ASPIRIN, AND CAFFEINE 325; 50; 40 MG/1; MG/1; MG/1
CAPSULE ORAL
Status: DISPENSED
Start: 2020-03-08 | End: 2020-03-08

## 2020-03-08 RX ORDER — GABAPENTIN 100 MG/1
CAPSULE ORAL
Status: DISPENSED
Start: 2020-03-08 | End: 2020-03-08

## 2020-03-08 RX ORDER — TIZANIDINE 2 MG/1
4 TABLET ORAL 3 TIMES DAILY
Status: DISCONTINUED | OUTPATIENT
Start: 2020-03-08 | End: 2020-03-11 | Stop reason: HOSPADM

## 2020-03-08 RX ORDER — PANTOPRAZOLE SODIUM 40 MG/10ML
INJECTION, POWDER, LYOPHILIZED, FOR SOLUTION INTRAVENOUS
Status: DISPENSED
Start: 2020-03-08 | End: 2020-03-08

## 2020-03-08 RX ORDER — SODIUM CHLORIDE 0.9 % (FLUSH) 0.9 %
5-40 SYRINGE (ML) INJECTION EVERY 8 HOURS
Status: DISCONTINUED | OUTPATIENT
Start: 2020-03-08 | End: 2020-03-11 | Stop reason: HOSPADM

## 2020-03-08 RX ORDER — POTASSIUM CHLORIDE 750 MG/1
TABLET, FILM COATED, EXTENDED RELEASE ORAL
Status: DISPENSED
Start: 2020-03-08 | End: 2020-03-08

## 2020-03-08 RX ORDER — DIPHENHYDRAMINE HCL 25 MG
25 CAPSULE ORAL
Status: DISCONTINUED | OUTPATIENT
Start: 2020-03-08 | End: 2020-03-11 | Stop reason: HOSPADM

## 2020-03-08 RX ORDER — BUTALBITAL, ACETAMINOPHEN AND CAFFEINE 50; 325; 40 MG/1; MG/1; MG/1
1 TABLET ORAL
Status: DISCONTINUED | OUTPATIENT
Start: 2020-03-08 | End: 2020-03-11 | Stop reason: HOSPADM

## 2020-03-08 RX ORDER — LORAZEPAM 2 MG/ML
INJECTION INTRAMUSCULAR
Status: DISCONTINUED
Start: 2020-03-08 | End: 2020-03-08 | Stop reason: WASHOUT

## 2020-03-08 RX ORDER — FAMOTIDINE 20 MG/1
TABLET, FILM COATED ORAL
Status: DISPENSED
Start: 2020-03-08 | End: 2020-03-08

## 2020-03-08 RX ORDER — ONDANSETRON 2 MG/ML
INJECTION INTRAMUSCULAR; INTRAVENOUS
Status: DISPENSED
Start: 2020-03-08 | End: 2020-03-08

## 2020-03-08 RX ORDER — KETOROLAC TROMETHAMINE 30 MG/ML
30 INJECTION, SOLUTION INTRAMUSCULAR; INTRAVENOUS
Status: COMPLETED | OUTPATIENT
Start: 2020-03-08 | End: 2020-03-08

## 2020-03-08 RX ORDER — ENOXAPARIN SODIUM 100 MG/ML
INJECTION SUBCUTANEOUS
Status: DISPENSED
Start: 2020-03-08 | End: 2020-03-08

## 2020-03-08 RX ORDER — FAMOTIDINE 20 MG/1
20 TABLET, FILM COATED ORAL 2 TIMES DAILY
Status: DISCONTINUED | OUTPATIENT
Start: 2020-03-08 | End: 2020-03-11 | Stop reason: HOSPADM

## 2020-03-08 RX ORDER — ALPRAZOLAM 0.5 MG/1
1 TABLET ORAL 3 TIMES DAILY
Status: DISCONTINUED | OUTPATIENT
Start: 2020-03-08 | End: 2020-03-11 | Stop reason: HOSPADM

## 2020-03-08 RX ORDER — TIZANIDINE 2 MG/1
TABLET ORAL
Status: DISPENSED
Start: 2020-03-08 | End: 2020-03-08

## 2020-03-08 RX ORDER — AMLODIPINE BESYLATE 5 MG/1
10 TABLET ORAL DAILY
Status: DISCONTINUED | OUTPATIENT
Start: 2020-03-08 | End: 2020-03-11 | Stop reason: HOSPADM

## 2020-03-08 RX ORDER — METOPROLOL TARTRATE 25 MG/1
12.5 TABLET, FILM COATED ORAL EVERY 12 HOURS
Status: DISCONTINUED | OUTPATIENT
Start: 2020-03-08 | End: 2020-03-11 | Stop reason: HOSPADM

## 2020-03-08 RX ORDER — ONDANSETRON 2 MG/ML
4 INJECTION INTRAMUSCULAR; INTRAVENOUS
Status: DISCONTINUED | OUTPATIENT
Start: 2020-03-08 | End: 2020-03-11 | Stop reason: HOSPADM

## 2020-03-08 RX ADMIN — SODIUM CHLORIDE 40 MG: 9 INJECTION INTRAMUSCULAR; INTRAVENOUS; SUBCUTANEOUS at 09:42

## 2020-03-08 RX ADMIN — ALUMINUM HYDROXIDE AND MAGNESIUM HYDROXIDE 15 ML: 200; 200 SUSPENSION ORAL at 01:42

## 2020-03-08 RX ADMIN — DULOXETINE HYDROCHLORIDE 120 MG: 30 CAPSULE, DELAYED RELEASE ORAL at 22:11

## 2020-03-08 RX ADMIN — AMLODIPINE BESYLATE 10 MG: 5 TABLET ORAL at 01:40

## 2020-03-08 RX ADMIN — FAMOTIDINE 20 MG: 20 TABLET ORAL at 09:40

## 2020-03-08 RX ADMIN — TIZANIDINE 4 MG: 2 TABLET ORAL at 22:10

## 2020-03-08 RX ADMIN — TOPIRAMATE 25 MG: 25 TABLET, FILM COATED ORAL at 22:11

## 2020-03-08 RX ADMIN — TIZANIDINE 4 MG: 2 TABLET ORAL at 11:59

## 2020-03-08 RX ADMIN — KETOROLAC TROMETHAMINE 30 MG: 30 INJECTION, SOLUTION INTRAMUSCULAR at 00:58

## 2020-03-08 RX ADMIN — BUTALBITAL, ACETAMINOPHEN, AND CAFFEINE 1 TABLET: 50; 325; 40 TABLET ORAL at 09:42

## 2020-03-08 RX ADMIN — SODIUM CHLORIDE 40 MG: 9 INJECTION INTRAMUSCULAR; INTRAVENOUS; SUBCUTANEOUS at 22:13

## 2020-03-08 RX ADMIN — ALPRAZOLAM 1 MG: 0.5 TABLET ORAL at 16:28

## 2020-03-08 RX ADMIN — Medication 10 ML: at 22:12

## 2020-03-08 RX ADMIN — GABAPENTIN 400 MG: 100 CAPSULE ORAL at 22:11

## 2020-03-08 RX ADMIN — ONDANSETRON 4 MG: 2 INJECTION INTRAMUSCULAR; INTRAVENOUS at 09:41

## 2020-03-08 RX ADMIN — Medication 10 ML: at 05:17

## 2020-03-08 RX ADMIN — TIZANIDINE 4 MG: 2 TABLET ORAL at 16:28

## 2020-03-08 RX ADMIN — SODIUM CHLORIDE 40 MG: 9 INJECTION INTRAMUSCULAR; INTRAVENOUS; SUBCUTANEOUS at 03:39

## 2020-03-08 RX ADMIN — METOPROLOL TARTRATE 12.5 MG: 25 TABLET, FILM COATED ORAL at 22:12

## 2020-03-08 RX ADMIN — POTASSIUM CHLORIDE 40 MEQ: 750 TABLET, FILM COATED, EXTENDED RELEASE ORAL at 01:36

## 2020-03-08 RX ADMIN — BUSPIRONE HYDROCHLORIDE 15 MG: 10 TABLET ORAL at 22:11

## 2020-03-08 RX ADMIN — LISINOPRIL 10 MG: 5 TABLET ORAL at 01:36

## 2020-03-08 RX ADMIN — METOPROLOL TARTRATE 12.5 MG: 25 TABLET, FILM COATED ORAL at 00:59

## 2020-03-08 RX ADMIN — Medication 10 ML: at 14:00

## 2020-03-08 RX ADMIN — FAMOTIDINE 20 MG: 20 TABLET ORAL at 18:34

## 2020-03-08 RX ADMIN — GABAPENTIN 400 MG: 100 CAPSULE ORAL at 16:28

## 2020-03-08 RX ADMIN — ALPRAZOLAM 1 MG: 0.5 TABLET ORAL at 09:40

## 2020-03-08 RX ADMIN — GABAPENTIN 400 MG: 100 CAPSULE ORAL at 09:40

## 2020-03-08 RX ADMIN — HYDRALAZINE HYDROCHLORIDE 20 MG: 20 INJECTION INTRAMUSCULAR; INTRAVENOUS at 03:45

## 2020-03-08 RX ADMIN — ENOXAPARIN SODIUM 40 MG: 40 INJECTION SUBCUTANEOUS at 09:39

## 2020-03-08 RX ADMIN — BUTALBITAL, ACETAMINOPHEN, AND CAFFEINE 1 TABLET: 50; 325; 40 TABLET ORAL at 16:28

## 2020-03-08 RX ADMIN — ALPRAZOLAM 1 MG: 0.5 TABLET ORAL at 22:11

## 2020-03-08 RX ADMIN — FAMOTIDINE 20 MG: 20 TABLET ORAL at 01:00

## 2020-03-08 NOTE — PROGRESS NOTES
TRANSFER - IN REPORT:    Verbal report received from SandraRN(name) on [address]  being received from ED(unit) for routine progression of care      Report consisted of patients Situation, Background, Assessment and   Recommendations(SBAR). Information from the following report(s) SBAR, Kardex, ED Summary, Procedure Summary, Intake/Output, MAR, Recent Results and Med Rec Status was reviewed with the receiving nurse. Opportunity for questions and clarification was provided. Assessment completed upon patients arrival to unit and care assumed.

## 2020-03-08 NOTE — ROUTINE PROCESS
1805 - Pt called out to [facility] station to inform  that her mother had fallen.  Norbert Parr) alerted charge RN. When entering patient room, patient's mother was lying down on stomach, with right arm extended and glasses broken on floor. Blood was visible on patient R hand but was from where patent had fallen and hit R temple of head that started bleeding. Pt alert and oriented, VS stable, and states she feels fine. Denies dizziness. States she was just trying to close blinds for her daughter when she fell. Pt taken down to [address] [place] [address] fall sheet filled out and placed in fall book. Charge RN attempted to fill out [employer], however application will not open.

## 2020-03-08 NOTE — ED PROVIDER NOTES
60-year-old female presenting to the ER with complaint of chest pain shortness breath lightheadedness and nausea. Patient has significant past medical history of hypertension, smoking, obstructive sleep apnea not on CPAP, incomplete right bundle branch block, anxiety  Patient reports this evening while at rest having chest pain described as pressure was associated with shortness of breath nausea and lightheadedness. Patient received nitro and aspirin from EMS and nitro resolved her chest pain. Patient reports over the last several weeks having worsening episodes of exertional dyspnea. Patient also reporting weight gain. Patient reports difficulty breathing believe that this is likely due to her sleep apnea  No history of DVT or PE. Patient has chronic lower leg swelling is had ultrasound in September 2019 was negative for DVT. Patient has no history of DVT or PE. No history of CAD. Patient has not had a stress test.      EKG: Normal sinus rhythm with a rate of 69 bpm normal intervals. Incomplete right bundle branch block pattern. Normal axis. No ST elevation or depressions.   EKG interpreted by Sunil Means MD             Past Medical History:   Diagnosis Date    Anxiety and depression     Cavernous hemangioma of intracranial structure ([facility] [address] 5/2/2011    Fibromyalgia     fibromyalgia    Hemorrhoids     Hiatal hernia with gastroesophageal reflux 04/2018    Hx of migraines     Hx of seizure disorder     Hypertension     Incomplete right bundle branch block (RBBB) determined by electrocardiography 05/10/2018    CARLOS (obstructive sleep apnea)     no cpap    Thyroid nodule        Past Surgical History:   Procedure Laterality Date    COLONOSCOPY N/A 6/3/2019    COLONOSCOPY performed by Shavonne Ortiz MD at [address] HX CHOLECYSTECTOMY      HX HEENT  2012    tonsillectomy    HX LITHOTRIPSY      HX ORTHOPAEDIC      disc fusion 2010, right knee cyst removed    NEUROLOGICAL PROCEDURE [address] [facility], 2006    Cavernous brain angioma(s) removed    SURGERY,BRAIN/SPINE,W/COMPUTER           Family History:   Problem Relation Age of Onset   [facility] Cancer Mother         breast    Breast Cancer Mother 76    Cancer Father     Migraines Father     Diabetes Father     Breast Cancer Sister 48    Ovarian Cancer Maternal Aunt        Social History     Socioeconomic History    Marital status: SINGLE     Spouse name: Not on file    Number of children: Not on file    Years of education: Not on file    Highest education level: Not on file   Occupational History    Not on file   Social Needs    Financial resource strain: Not on file    Food insecurity:     Worry: Not on file     Inability: Not on file    Transportation needs:     Medical: Not on file     Non-medical: Not on file   Tobacco Use    Smoking status: Former Smoker    Smokeless tobacco: Never Used    Tobacco comment: on chantix,   Substance and Sexual Activity    Alcohol use: No    Drug use: No    Sexual activity: Not on file   Lifestyle    Physical activity:     Days per week: Not on file     Minutes per session: Not on file    Stress: Not on file   Relationships    Social connections:     Talks on phone: Not on file     Gets together: Not on file     Attends [employer] service: Not on file     Active member of club or organization: Not on file     Attends meetings of clubs or organizations: Not on file     Relationship status: Not on file    Intimate partner violence:     Fear of current or ex partner: Not on file     Emotionally abused: Not on file     Physically abused: Not on file     Forced sexual activity: Not on file   Other Topics Concern    Not on file   Social History Narrative    Not on file         ALLERGIES: Latex, natural rubber; Aspirin; Other medication; Codeine; Imitrex [sumatriptan]; and Percocet [oxycodone-acetaminophen]    Review of Systems   Constitutional: Negative for chills and fever.    HENT: Negative for congestion and sore throat. Eyes: Negative for pain. Respiratory: Positive for shortness of breath. Cardiovascular: Positive for chest pain, palpitations and leg swelling. Gastrointestinal: Positive for nausea. Negative for abdominal pain, diarrhea and vomiting. Genitourinary: Negative for dysuria and flank pain. Musculoskeletal: Negative for back pain and neck pain. Skin: Negative for rash. Neurological: Positive for light-headedness. Negative for dizziness and headaches. Vitals:    03/07/20 2107 03/07/20 2108 03/07/20 2110 03/07/20 2115   BP: (!) 181/127 (!) 181/127 (!) 155/120 (!) 148/119   Pulse: 70 88 79 71   Resp: 16 (!) 33 23 24   Temp: 98.6 °F (37 °C)      SpO2: 96% 95% 95% 94%   Weight: 106.1 kg (234 lb)      Height: 5' 4\" (1.626 m)               Physical Exam  Constitutional:       Appearance: She is well-developed. She is obese. HENT:      Head: Normocephalic. Eyes:      Conjunctiva/sclera: Conjunctivae normal.   Neck:      Musculoskeletal: Normal range of motion and neck supple. Cardiovascular:      Rate and Rhythm: Normal rate and regular rhythm. Pulmonary:      Effort: Pulmonary effort is normal. No respiratory distress. Breath sounds: Normal breath sounds. Abdominal:      General: Bowel sounds are normal.      Palpations: Abdomen is soft. Tenderness: There is no abdominal tenderness. Musculoskeletal: Normal range of motion. Right lower leg: Edema (+1) present. Left lower leg: Edema (1+) present. Skin:     General: Skin is warm. Capillary Refill: Capillary refill takes less than 2 seconds. Findings: No rash. Neurological:      Mental Status: She is alert and oriented to person, place, and time.       Comments: No gross motor or sensory deficits          MDM  Number of Diagnoses or Management Options  Chest pain, unspecified type:   Exertional dyspnea:   SOB (shortness of breath):   Diagnosis management comments: Patient presenting with chest pain and shortness of breath. Differential diagnosis includes ACS, PE, pneumonia or lung pathology, etc  Based on story concern for ACS. Patient has high heart score. D-dimer elevated therefore CTA performed which was unremarkable. No pulmonary embolism or aortic dissection. No pneumonia. Patient extremely dyspneic and some chest pain with minimal exertion. Warranting hospitalization for further evaluation. Amount and/or Complexity of Data Reviewed  Clinical lab tests: reviewed  Tests in the radiology section of CPT®: reviewed      ED Course as of Mar 08 0052   Sun Mar 08, 2020   0042 Attempted to ambulate patient now having significant shortness of breath and chest pressure. Patient refusing more nitro. Due to her headache.    [ZD]      ED Course User Index  [ZD] Rex Guillaume MD       Procedures    Hospitalist Teresita Serve for Admission  12:52 AM    ED Room Number: ER02/02  Patient Name and age:  Sonam Kelly 49 y.o.  female  Working Diagnosis:   1. Chest pain, unspecified type    2. SOB (shortness of breath)    3. Exertional dyspnea      Readmission: no  Isolation Requirements:  no  Recommended Level of Care:  telemetry  Code Status:  Full Code  [facility] - [phone]  Other:  Heart Score 6  Shortness of breath with chest pressure after only 10 to 15 feet of walking             Recent Results (from the past 24 hour(s))   SAMPLES BEING HELD    Collection Time: 03/07/20  9:21 PM   Result Value Ref Range    SAMPLES BEING HELD 1SST,1RED,1BLU     COMMENT        Add-on orders for these samples will be processed based on acceptable specimen integrity and analyte stability, which may vary by analyte.    CBC WITH AUTOMATED DIFF    Collection Time: 03/07/20  9:21 PM   Result Value Ref Range    WBC 11.6 (H) 3.6 - 11.0 K/uL    RBC 4.32 3.80 - 5.20 M/uL    HGB 11.8 11.5 - 16.0 g/dL    HCT 38.1 35.0 - 47.0 %    MCV 88.2 80.0 - 99.0 FL    MCH 27.3 26.0 - 34.0 PG    MCHC 31.0 30.0 - 36.5 g/dL    RDW 14.5 11.5 - 14.5 %    PLATELET 917 371 - 727 K/uL    MPV 10.4 8.9 - 12.9 FL    NRBC 0.0 0  WBC    ABSOLUTE NRBC 0.00 0.00 - 0.01 K/uL    NEUTROPHILS 65 32 - 75 %    LYMPHOCYTES 26 12 - 49 %    MONOCYTES 6 5 - 13 %    EOSINOPHILS 2 0 - 7 %    BASOPHILS 1 0 - 1 %    IMMATURE GRANULOCYTES 0 0.0 - 0.5 %    ABS. NEUTROPHILS 7.6 1.8 - 8.0 K/UL    ABS. LYMPHOCYTES 3.0 0.8 - 3.5 K/UL    ABS. MONOCYTES 0.7 0.0 - 1.0 K/UL    ABS. EOSINOPHILS 0.2 0.0 - 0.4 K/UL    ABS. BASOPHILS 0.1 0.0 - 0.1 K/UL    ABS. IMM. GRANS. 0.0 0.00 - 0.04 K/UL    DF AUTOMATED     METABOLIC PANEL, COMPREHENSIVE    Collection Time: 03/07/20  9:21 PM   Result Value Ref Range    Sodium 142 136 - 145 mmol/L    Potassium 3.2 (L) 3.5 - 5.1 mmol/L    Chloride 111 (H) 97 - 108 mmol/L    CO2 24 21 - 32 mmol/L    Anion gap 7 5 - 15 mmol/L    Glucose 104 (H) 65 - 100 mg/dL    BUN 11 6 - 20 MG/DL    Creatinine 0.97 0.55 - 1.02 MG/DL    BUN/Creatinine ratio 11 (L) 12 - 20      GFR est AA >60 >60 ml/min/1.73m2    GFR est non-AA >60 >60 ml/min/1.73m2    Calcium 9.2 8.5 - 10.1 MG/DL    Bilirubin, total 0.3 0.2 - 1.0 MG/DL    ALT (SGPT) 22 12 - 78 U/L    AST (SGOT) 13 (L) 15 - 37 U/L    Alk.  phosphatase 123 (H) 45 - 117 U/L    Protein, total 8.1 6.4 - 8.2 g/dL    Albumin 3.5 3.5 - 5.0 g/dL    Globulin 4.6 (H) 2.0 - 4.0 g/dL    A-G Ratio 0.8 (L) 1.1 - 2.2     TROPONIN I    Collection Time: 03/07/20  9:21 PM   Result Value Ref Range    Troponin-I, Qt. <0.05 <0.05 ng/mL   NT-PRO BNP    Collection Time: 03/07/20  9:21 PM   Result Value Ref Range    NT pro-BNP 20 <125 PG/ML   D DIMER    Collection Time: 03/07/20  9:21 PM   Result Value Ref Range    D-dimer 0.69 (H) 0.00 - 0.65 mg/L FEU   TROPONIN I    Collection Time: 03/07/20 11:46 PM   Result Value Ref Range    Troponin-I, Qt. <0.05 <0.05 ng/mL       Cta Chest W Or W Wo Cont    Result Date: 3/7/2020  CLINICAL HISTORY: Chest pain INDICATION: Chest pain, elevated d-dimer COMPARISON: None TECHNIQUE: CT of the chest with  IV contrast , Isovue-370 is performed. Axial images from the thoracic inlet to the level of the upper abdomen are obtained. Manual post-processing of the images and coronal reformatting is also performed. CT dose reduction was achieved through use of a standardized protocol tailored for this examination and automatic exposure control for dose modulation. Multiplanar reformatted imaging was performed. Sagittal and coronal reformatting. 3-D Postprocessing of imaging was performed. 3-D MIP reconstructed images were obtained in the coronal plane. FINDINGS: There is no pulmonary embolism. There is no aortic aneurysm or dissection. Hepatic steatosis. ACDF. There is no pleural or pericardial effusion. There is no mediastinal, axillary or hilar lymphadenopathy. The aorta is normal in course and caliber. The proximal pulmonary arteries are without evidence of filling defects. No lytic or blastic lesions are identified. The remainder of the upper abdomen visualized is unremarkable. IMPRESSION: There is no pulmonary embolism. There is no aortic aneurysm or dissection. No acute intrathoracic process is identified. Incidental findings are as described above. Xr Chest Port    Result Date: 3/7/2020  PORTABLE CHEST RADIOGRAPH/S: 3/7/2020 9:25 PM Clinical history: CP INDICATION:   CP COMPARISON: 8/11/2013 FINDINGS: AP portable upright view of the chest demonstrates a stable  cardiopericardial silhouette. The lungs are adequately expanded. There is no edema, effusion, consolidation, or pneumothorax. The osseous structures are unremarkable. Patient is on a cardiac monitor. IMPRESSION: No acute process.

## 2020-03-08 NOTE — CONSULTS
Nomi Foster MD [facility] [address]  Office [phone]  Mobile [phone]    Date of  Admission: 3/7/2020  9:01 PM  PCP- MD Delta Mcbride MD  :  1965   MRN:  [id number]     CC: Chest discomfort  Reason for consult:  Same  Admission Diagnosis: Chest pain [R07.9]    Blane Mckeon is a 49 y.o. female admitted for Chest pain [R07.9]. Consult requested by Delta Farfan MD          Subjective:   Chest pain [R07.9]        Impression Plan/Recommendation   1. Chest discomfort  2. Screening cholesterol  3. CARLOS untreated  4. Hypertension  5. Obesity fibromyalgia  6.                · I think at this time she does have several risk factors for heart disease I would proceed with a Lexiscan Cardiolite if she can walk I would be comfortable with doing it with a walking Cardiolite. · She needs work on risk factor modification I spent a good amount of time talking to her about this. Eating healthy and clean will be essential going forward. Knowing her numbers and taking personal responsibility for healthcare           Subjective:  Blane Mckeon is a 49 y.o. female I am seeing for chest discomfort. Somewhat difficult to obtain a good history from her she has somewhat tangential and talks about everything except for her cardiac issues. I am unclear if she gets chest discomfort with activity or not and she does notice at times rapid heartbeats. She states she is never had a stress test.  Has shortness of breath at times and has stopped smoking about 2 years ago. She says she is required multiple transfusions over the last several weeks but never explains to me why she lives at home with her mother who is 80years old she moved here from [place] she is currently on disability for cavernous angiomas.   She did talk frequently that the doctors were not assisting her and frequently but brought up her poor experiences with physicians in the past.              Past Medical History:   Diagnosis Date    Anxiety and depression     Cavernous hemangioma of intracranial structure ([address] 5/2/2011    Fibromyalgia     fibromyalgia    Hemorrhoids     Hiatal hernia with gastroesophageal reflux 04/2018    Hx of migraines     Hx of seizure disorder     Hypertension     Incomplete right bundle branch block (RBBB) determined by electrocardiography 05/10/2018    Obese     CARLOS (obstructive sleep apnea)     no cpap      Past Surgical History:   Procedure Laterality Date    COLONOSCOPY N/A 6/3/2019    COLONOSCOPY performed by Angelia Brush MD at [address] HX HEENT  2012    tonsillectomy    HX LITHOTRIPSY      HX ORTHOPAEDIC      disc fusion 2010, right knee cyst removed   [address] , 2006    Cavernous brain angioma(s) removed   [facility] Medications:  Current Facility-Administered Medications   Medication Dose Route Frequency    metoprolol tartrate (LOPRESSOR) tablet 12.5 mg  12.5 mg Oral Q12H    lisinopriL (PRINIVIL, ZESTRIL) tablet 10 mg  10 mg Oral DAILY    amLODIPine (NORVASC) tablet 10 mg  10 mg Oral DAILY    famotidine (PEPCID) tablet 20 mg  20 mg Oral BID    pantoprazole (PROTONIX) 40 mg in 0.9% sodium chloride 10 mL injection  40 mg IntraVENous Q12H    ALPRAZolam (XANAX) tablet 1 mg  1 mg Oral TID    busPIRone (BUSPAR) tablet 15 mg  15 mg Oral QHS    DULoxetine (CYMBALTA) capsule 120 mg  120 mg Oral QHS    gabapentin (NEURONTIN) capsule 400 mg  400 mg Oral TID    tiZANidine (ZANAFLEX) tablet 4 mg  4 mg Oral TID    topiramate (TOPAMAX) tablet 25 mg  25 mg Oral QHS    traZODone (DESYREL) tablet 100-200 mg  100-200 mg Oral QHS PRN    sodium chloride (NS) flush 5-40 mL  5-40 mL IntraVENous Q8H    sodium chloride (NS) flush 5-40 mL  5-40 mL IntraVENous PRN    acetaminophen (TYLENOL) tablet 650 mg  650 mg Oral Q4H PRN    diphenhydrAMINE (BENADRYL) capsule 25 mg  25 mg Oral Q4H PRN    ondansetron (ZOFRAN) injection 4 mg  4 mg IntraVENous Q4H PRN    enoxaparin (LOVENOX) injection 40 mg  40 mg SubCUTAneous Q24H    potassium chloride SR (KLOR-CON 10) tablet 40 mEq  40 mEq Oral Q4H    butalbital-acetaminophen-caffeine (FIORICET, ESGIC) -40 mg per tablet 1 Tab  1 Tab Oral Q6H PRN    acetaminophen (TYLENOL) tablet 1,000 mg  1,000 mg Oral NOW     No current facility-administered medications on file prior to encounter. Current Outpatient Medications on File Prior to Encounter   Medication Sig Dispense Refill    gabapentin (NEURONTIN) 400 mg capsule Take 1 Cap by mouth three (3) times daily. 90 Cap 0    pantoprazole (PROTONIX) 40 mg tablet Take 40 mg by mouth nightly.  busPIRone (BUSPAR) 7.5 mg tablet Take 15 mg by mouth nightly.  ibuprofen (MOTRIN) 800 mg tablet Take 800 mg by mouth every eight (8) hours as needed for Pain.  losartan-hydroCHLOROthiazide (HYZAAR) 100-25 mg per tablet Take 1 Tab by mouth nightly.  topiramate (TOPAMAX) 25 mg tablet Take 25 mg by mouth nightly.  tiZANidine (ZANAFLEX) 4 mg tablet Take 4 mg by mouth three (3) times daily.  ALPRAZolam (XANAX) 1 mg tablet Take 1 mg by mouth three (3) times daily.  polyethylene glycol (MIRALAX) 17 gram packet Take 1 Packet by mouth daily. 10 Each 0    polyethylene glycol (MIRALAX) 17 gram packet Take 1 Packet by mouth daily. Indications: constipation, If constipation last more than 24-48 hours, despite colace use. 238 g 0    traZODone (DESYREL) 100 mg tablet Take 100-200 mg by mouth nightly as needed for Sleep (Will take 1-2 tabs in addition to first 100 mg tab if unable to sleep).  DULoxetine (CYMBALTA) 60 mg capsule Take 120 mg by mouth nightly.            Allergies   Allergen Reactions    Latex, Natural Rubber Rash    Aspirin Other (comments)     Excessive bleeding      Other Medication Other (comments)     Patient states intolerance to blood thinners due to angiomas    Codeine Palpitations    Hydralazine Other (comments)     Pt reports \"burning/tingling\" sensation in abd, flanks, and into head.  Imitrex [Sumatriptan] Palpitations and Other (comments)     thougt she was having an MI    Percocet [Oxycodone-Acetaminophen] Palpitations             Review of Symptoms:  Constitutional: negative  Respiratory: negative  Gastrointestinal: negative  Musculoskeletal:negative  Neurological: negative  Other systems reviewed and negative except as above. Physical Exam    Visit Vitals  /82 (BP 1 Location: Left arm, BP Patient Position: At rest)   Pulse 80   Temp 98 °F (36.7 °C)   Resp 20   Ht 5' 4\" (1.626 m)   Wt 233 lb 14.5 oz (106.1 kg)   LMP 05/04/2011   SpO2 95%   BMI 40.15 kg/m²     General Appearance:  Well developed, overweight well nourished,alert and oriented x 3, and individual in no acute distress. Ears/Nose/Mouth/Throat:   Hearing grossly normal.Normal oral mucosa,no scleral icterus     Neck: Supple no JVD or bruits,no cervical lymphadenopathy   Chest:   Lungs clear to auscultation bilaterally,no rales rhonchi or wheezing   Cardiovascular:  Regular rate and rhythm, S1, S2 normal,  Murmur. PMI nondisplaced   Abdomen:   Soft, non-tender, bowel sounds are active. No abdominal bruits   Extremities: No edema bilaterally. Pulses detected, no varicosities   Skin: Warm and dry.  No bruising  Neuro  Moves all extermities and neurologically intact                                                       Cardiographics    Telemetry: normal sinus rhythm  ECG: normal EKG, normal sinus rhythm, unchanged from previous tracings    Cardiac testing      Recent Labs     03/08/20  0647   TROIQ <0.05       Recent Labs     03/07/20  2121      K 3.2*   CO2 24   BUN 11   CREA 0.97   *   WBC 11.6*   HGB 11.8   HCT 38.1          I have discussed the diagnosis with the patient and the intended plan as seen in the above orders. Questions were answered concerning future plans. I have discussed medication side effects and warnings with the patient as well. Rosaura Hawkins is in agreement to the plan listed above and wishes to proceed. she  was instructed not to smoke, eat heart healthy diet  and to exercise. Thank you for this consult.     Lizzie Demarco MD

## 2020-03-08 NOTE — PROGRESS NOTES
Bedside and Verbal shift change report given to Shaniqua Chan (oncoming nurse) by David Ferreira (offgoing nurse). Report included the following information SBAR, Kardex, Procedure Summary, Intake/Output, MAR, Recent Results and Med Rec Status.

## 2020-03-08 NOTE — ED NOTES
The Daylight Saving Time occurred at 0200 hrs. Documentation of patient care and medications administered is done with respect to the time change.

## 2020-03-08 NOTE — PROGRESS NOTES
Reason for Admission:   Chest pain. Hx chronic pain, GERD, HTN, hiatial hernia, anxiety/depression, fibromyalgia. RUR Score:   7%/low                  Plan for utilizing home health:   No prior use of home health services. DME:  Walker, cane, crutches. PCP: First and Last name:  Dr. Joanne Oviedo (same practice as Dr. Leticia Talbot)   Name of Practice:  [facility]   Are you a current patient: Yes/No:  yes   Approximate date of last visit:  2/18/2020                    Current Advanced Directive/Advance Care Plan:  Not on file, next of kin is mother Aleshia Castanon [phone]. Transition of Care Plan:       Chart reviewed, demographics verified. CM role and follow up discussed. Patient lives with her mother who she is caregiver for. Patient lives in a one story home with 0 steps to enter home. Patient has prescription drug coverage, uses [facility] on [facility]. Patient is independent, drives, and provides self care. Patient states she has pending Medicaid. PLAN:  1. Monitor patient response to treatment and recommendations. 2. Await echo evaluation, cardiology and GI follow up. 3. Patient is likely home with family assist.  4. CM to monitor for discharge needs. Care Management Interventions  PCP Verified by CM: Yes(Dr. Joanne Oviedo)  Palliative Care Criteria Met (RRAT>21 & CHF Dx)?: No  Transition of Care Consult (CM Consult): Discharge Planning  Discharge Durable Medical Equipment: No  Physical Therapy Consult: No  Occupational Therapy Consult: No  Speech Therapy Consult: No  Current Support Network:  Other(lives with her mother)  Confirm Follow Up Transport: Family  The Plan for Transition of Care is Related to the Following Treatment Goals : discharge  Discharge Location  Discharge Placement: Home with family assistance      Margie Gibson, RN, MSN, Care manager

## 2020-03-08 NOTE — PROGRESS NOTES
Daily Progress Note: 3/8/2020  Angelia Denise MD    Assessment/Plan:   Chest pain / Incomplete right bundle branch block (RBBB) CP POA, likely GERD with pain exacerbated by anxiety, GERD driven by hernia, obesity and medications. -- We have already ruled out NSTEMI by troponin. -- Check ECHO. -- Start PPI, pepcid and maalox. Hold motrin. -- Consult Cards      HTN (hypertension) - Elevated on admit due to pain and anxiety. -- For now start immediate metoprolol, norvacs and lisinopril. --  She stopped her usual losartan/HCTZ months ago, but was started on atenolol 2 weeks ago     Hiatal hernia with gastroesophageal reflux   -- She mentions positive hemoccult 2 weeks ago. She takes NSAIDS daily. -- She has not taken PPI since her hernia surgery. -- GI consult.       Anxiety and depression / Fibromyalgia / Chronic pain / Hx of migraines / Polypharmacy   -- For now continue gabapentin, buspiroen, duloxetine, topiramate, tizanidine, xanax and trazodone. -- This level of polypharmacy is dangerous and should be weaned back by PCP.   -- She declines to see neurology.     Severe obesity / CARLOS (obstructive sleep apnea) non compliant with cpap   -- Advise weight loss and CPAP compliance.     Hx of seizure disorder/Migraine/Loss of balance  -- She is on gabapentin and topiramate, unclear if this Dx is accurate,   -- she does not see a neurologist- will consult     Hypokalemia   -- Replete PO     Dyspnea - Likely due to anxiety and hypoventilation syndrome.     -- Check 6 minute walk  -- Cards consult          Problem List:  Problem List as of 3/8/2020 Date Reviewed: 3/8/2020          Codes Class Noted - Resolved    Chest pain ICD-10-CM: R07.9  ICD-9-CM: 786.50  3/8/2020 - Present        Rectal bleeding ICD-10-CM: K62.5  ICD-9-CM: 569.3  5/30/2019 - Present        CARLOS (obstructive sleep apnea) ICD-10-CM: G47.33  ICD-9-CM: 327.23  Unknown - Present    Overview Signed 5/30/2019  7:55 PM by Smitha Lo MD     no cpap             HTN (hypertension) ICD-10-CM: I10  ICD-9-CM: 401.9  Unknown - Present        Hx of seizure disorder ICD-10-CM: Z86.69  ICD-9-CM: V12.49  Unknown - Present        Fibromyalgia ICD-10-CM: M79.7  ICD-9-CM: 729.1  Unknown - Present    Overview Signed 5/30/2019  7:55 PM by Smitha Lo MD     fibromyalgia             Anxiety and depression ICD-10-CM: F41.9, F32.9  ICD-9-CM: 300.00, 311  Unknown - Present        Hx of migraines ICD-10-CM: Z86.69  ICD-9-CM: V12.49  Unknown - Present        Severe obesity ([address] ICD-10-CM: E66.01  ICD-9-CM: 278.01  4/29/2019 - Present        Hiatal hernia ICD-10-CM: K44.9  ICD-9-CM: 553.3  5/18/2018 - Present        Incomplete right bundle branch block (RBBB) determined by electrocardiography ICD-10-CM: I45.10  ICD-9-CM: 426.4  5/10/2018 - Present        Hiatal hernia with gastroesophageal reflux ICD-10-CM: K21.9, K44.9  ICD-9-CM: 530.81, 553.3  4/1/2018 - Present        RESOLVED: Obese ICD-10-CM: E66.9  ICD-9-CM: 278.00  Unknown - 3/8/2020        RESOLVED: Elevated lactic acid level ICD-10-CM: R79.89  ICD-9-CM: 276.2  5/30/2019 - 3/8/2020        RESOLVED: Dehydration ICD-10-CM: E86.0  ICD-9-CM: 276.51  5/30/2019 - 3/8/2020        RESOLVED: Anemia ICD-10-CM: D64.9  ICD-9-CM: 285.9  5/30/2019 - 3/8/2020        RESOLVED: Hemorrhoids ICD-10-CM: K64.9  ICD-9-CM: 455.6  Unknown - 3/8/2020        RESOLVED: Depressive disorder, not elsewhere classified ICD-10-CM: F32.9  ICD-9-CM: 859  10/1/2013 - 5/30/2019        RESOLVED: Leukocytosis, unspecified ICD-10-CM: N20.838  ICD-9-CM: 288.60  10/1/2013 - 5/30/2019        RESOLVED: Hypokalemia ICD-10-CM: E87.6  ICD-9-CM: 276.8  10/1/2013 - 3/8/2020        RESOLVED: Slurred speech ICD-10-CM: R47.81  ICD-9-CM: 784.59  9/30/2013 - 5/30/2019        RESOLVED: Seizure ([address] (Chronic) ICD-10-CM: R56.9  ICD-9-CM: 780.39  5/2/2011 - 5/30/2019        RESOLVED: Migraine (Chronic) ICD-10-CM: G43.909  ICD-9-CM: 346.90  2011 - 2019        RESOLVED: Cavernous hemangioma of intracranial structure (HCC) (Chronic) ICD-10-CM: R56.55  ICD-9-CM: 228.02  2011 - 3/8/2020              HPI:   Ms. Joana Calzada is a 49 y.o.  female who presented to the Emergency Department complaining of chest pain. Intermittent for weeks. Associated with dyspnea and nausea. She reports a plethora of complaints, including back pain, headache, bloating, etc.  She is frustrated, anxious, depressed, evasive, perseverating on bad experiences with doctors in past. ER workup is negative, expect for elevated BP. We will admit her for observation. (Dr Alexandra Rodriguez)    3/8: She is c/o multiple things this am. BP was up and she was given Hydralazine which gave her abd pain and headache. Hx of migraines and is on topamax. Has not had a headache in some time. Tylenol not helping. Wants morphine. Will order fioricet. She also state that she has had a couple of months where she is losing her balance and having double vision. Will get MRI and neuro consult. Chest pain and SOB for months. Troponin neg. Will get ECHO and ask Cards to see. Review of Systems:   A comprehensive review of systems was negative except for that written in the HPI. Objective:   Physical Exam:     Visit Vitals  /89 (BP 1 Location: Left arm, BP Patient Position: At rest)   Pulse 81   Temp 98.1 °F (36.7 °C)   Resp 22   Ht 5' 4\" (1.626 m)   Wt 234 lb (106.1 kg)   LMP 2011   SpO2 96%   BMI 40.17 kg/m²      O2 Device: Room air    Temp (24hrs), Av.4 °F (36.9 °C), Min:98.1 °F (36.7 °C), Max:98.6 °F (37 °C)    No intake/output data recorded. No intake/output data recorded. General:  Alert, cooperative, no distress, appears stated age. Head:  Normocephalic, without obvious abnormality, atraumatic. Eyes:  Conjunctivae/corneas clear. PERRL, EOMs intact. Nose: Nares normal. Septum midline. Mucosa normal. No drainage or sinus tenderness.    Throat: Lips, mucosa, and tongue normal. Teeth and gums normal.   Neck: Supple, symmetrical, trachea midline, no adenopathy, thyroid: no enlargement/tenderness/nodules, no carotid bruit and no JVD. Back:   Symmetric, no curvature. ROM normal. No CVA tenderness. Lungs:   Clear to auscultation bilaterally. Chest wall:  No tenderness or deformity. Heart:  Regular rate and rhythm, S1, S2 normal, no murmur, click, rub or gallop. Abdomen:   Soft, non-tender. Bowel sounds normal. No masses,  No organomegaly. Extremities: Extremities normal, atraumatic, no cyanosis or edema. No calf tenderness or cords. Pulses: 2+ and symmetric all extremities. Skin: Skin color, texture, turgor normal. No rashes or lesions   Neurologic: CNII-XII intact. Alert and oriented X 3. Fine motor of hands and fingers normal.   equal.  No cogwheeling or rigidity. Gait not tested at this time. Sensation grossly normal to touch. Gross motor of extremities normal.       Data Review:       Recent Days:  Recent Labs     03/07/20 2121   WBC 11.6*   HGB 11.8   HCT 38.1        Recent Labs     03/07/20 2121      K 3.2*   *   CO2 24   *   BUN 11   CREA 0.97   CA 9.2   ALB 3.5   TBILI 0.3   SGOT 13*   ALT 22     No results for input(s): PH, PCO2, PO2, HCO3, FIO2 in the last 72 hours. 24 Hour Results:  Recent Results (from the past 24 hour(s))   SAMPLES BEING HELD    Collection Time: 03/07/20  9:21 PM   Result Value Ref Range    SAMPLES BEING HELD 1SST,1RED,1BLU     COMMENT        Add-on orders for these samples will be processed based on acceptable specimen integrity and analyte stability, which may vary by analyte.    CBC WITH AUTOMATED DIFF    Collection Time: 03/07/20  9:21 PM   Result Value Ref Range    WBC 11.6 (H) 3.6 - 11.0 K/uL    RBC 4.32 3.80 - 5.20 M/uL    HGB 11.8 11.5 - 16.0 g/dL    HCT 38.1 35.0 - 47.0 %    MCV 88.2 80.0 - 99.0 FL    MCH 27.3 26.0 - 34.0 PG    MCHC 31.0 30.0 - 36.5 g/dL    RDW 14.5 11.5 - 14.5 % PLATELET 130 159 - 611 K/uL    MPV 10.4 8.9 - 12.9 FL    NRBC 0.0 0  WBC    ABSOLUTE NRBC 0.00 0.00 - 0.01 K/uL    NEUTROPHILS 65 32 - 75 %    LYMPHOCYTES 26 12 - 49 %    MONOCYTES 6 5 - 13 %    EOSINOPHILS 2 0 - 7 %    BASOPHILS 1 0 - 1 %    IMMATURE GRANULOCYTES 0 0.0 - 0.5 %    ABS. NEUTROPHILS 7.6 1.8 - 8.0 K/UL    ABS. LYMPHOCYTES 3.0 0.8 - 3.5 K/UL    ABS. MONOCYTES 0.7 0.0 - 1.0 K/UL    ABS. EOSINOPHILS 0.2 0.0 - 0.4 K/UL    ABS. BASOPHILS 0.1 0.0 - 0.1 K/UL    ABS. IMM. GRANS. 0.0 0.00 - 0.04 K/UL    DF AUTOMATED     METABOLIC PANEL, COMPREHENSIVE    Collection Time: 03/07/20  9:21 PM   Result Value Ref Range    Sodium 142 136 - 145 mmol/L    Potassium 3.2 (L) 3.5 - 5.1 mmol/L    Chloride 111 (H) 97 - 108 mmol/L    CO2 24 21 - 32 mmol/L    Anion gap 7 5 - 15 mmol/L    Glucose 104 (H) 65 - 100 mg/dL    BUN 11 6 - 20 MG/DL    Creatinine 0.97 0.55 - 1.02 MG/DL    BUN/Creatinine ratio 11 (L) 12 - 20      GFR est AA >60 >60 ml/min/1.73m2    GFR est non-AA >60 >60 ml/min/1.73m2    Calcium 9.2 8.5 - 10.1 MG/DL    Bilirubin, total 0.3 0.2 - 1.0 MG/DL    ALT (SGPT) 22 12 - 78 U/L    AST (SGOT) 13 (L) 15 - 37 U/L    Alk.  phosphatase 123 (H) 45 - 117 U/L    Protein, total 8.1 6.4 - 8.2 g/dL    Albumin 3.5 3.5 - 5.0 g/dL    Globulin 4.6 (H) 2.0 - 4.0 g/dL    A-G Ratio 0.8 (L) 1.1 - 2.2     TROPONIN I    Collection Time: 03/07/20  9:21 PM   Result Value Ref Range    Troponin-I, Qt. <0.05 <0.05 ng/mL   NT-PRO BNP    Collection Time: 03/07/20  9:21 PM   Result Value Ref Range    NT pro-BNP 20 <125 PG/ML   D DIMER    Collection Time: 03/07/20  9:21 PM   Result Value Ref Range    D-dimer 0.69 (H) 0.00 - 0.65 mg/L FEU   ETHYL ALCOHOL    Collection Time: 03/07/20 10:21 PM   Result Value Ref Range    ALCOHOL(ETHYL),SERUM <10 <10 MG/DL   TROPONIN I    Collection Time: 03/07/20 11:46 PM   Result Value Ref Range    Troponin-I, Qt. <0.05 <0.05 ng/mL   URINALYSIS W/MICROSCOPIC    Collection Time: 03/08/20  6:47 AM   Result Value Ref Range    Color YELLOW/STRAW      Appearance CLEAR CLEAR      pH (UA) 7.5 5.0 - 8.0      Protein NEGATIVE  NEG mg/dL    Glucose NEGATIVE  NEG mg/dL    Ketone NEGATIVE  NEG mg/dL    Bilirubin NEGATIVE  NEG      Blood NEGATIVE  NEG      Urobilinogen 0.2 0.2 - 1.0 EU/dL    Nitrites NEGATIVE  NEG      Leukocyte Esterase NEGATIVE  NEG      WBC PENDING /hpf    RBC PENDING /hpf    Epithelial cells PENDING /lpf    Bacteria PENDING /hpf       Medications reviewed  Current Facility-Administered Medications   Medication Dose Route Frequency    metoprolol tartrate (LOPRESSOR) tablet 12.5 mg  12.5 mg Oral Q12H    lisinopriL (PRINIVIL, ZESTRIL) tablet 10 mg  10 mg Oral DAILY    amLODIPine (NORVASC) tablet 10 mg  10 mg Oral DAILY    famotidine (PEPCID) tablet 20 mg  20 mg Oral BID    pantoprazole (PROTONIX) 40 mg in 0.9% sodium chloride 10 mL injection  40 mg IntraVENous Q12H    ALPRAZolam (XANAX) tablet 1 mg  1 mg Oral TID    busPIRone (BUSPAR) tablet 15 mg  15 mg Oral QHS    DULoxetine (CYMBALTA) capsule 120 mg  120 mg Oral QHS    gabapentin (NEURONTIN) capsule 400 mg  400 mg Oral TID    tiZANidine (ZANAFLEX) tablet 4 mg  4 mg Oral TID    topiramate (TOPAMAX) tablet 25 mg  25 mg Oral QHS    traZODone (DESYREL) tablet 100-200 mg  100-200 mg Oral QHS PRN    sodium chloride (NS) flush 5-40 mL  5-40 mL IntraVENous Q8H    sodium chloride (NS) flush 5-40 mL  5-40 mL IntraVENous PRN    acetaminophen (TYLENOL) tablet 650 mg  650 mg Oral Q4H PRN    diphenhydrAMINE (BENADRYL) capsule 25 mg  25 mg Oral Q4H PRN    ondansetron (ZOFRAN) injection 4 mg  4 mg IntraVENous Q4H PRN    enoxaparin (LOVENOX) injection 40 mg  40 mg SubCUTAneous Q24H    potassium chloride SR (KLOR-CON 10) tablet 40 mEq  40 mEq Oral Q4H    butalbital-acetaminophen-caffeine (FIORICET, ESGIC) -40 mg per tablet 1 Tab  1 Tab Oral Q6H PRN    acetaminophen (TYLENOL) tablet 1,000 mg  1,000 mg Oral NOW       Care Plan discussed with: Patient/Family    Total time spent with patient and review of records: 30 minutes.     Goldy Buchanan MD

## 2020-03-08 NOTE — PROGRESS NOTES
Bedside shift change report given to Vani Silvestre RN (oncoming nurse) by Carie Baugh RN (offgoing nurse). Report included the following information SBAR, Kardex, MAR and Accordion.    Lizzy Mckeon

## 2020-03-08 NOTE — ED TRIAGE NOTES
Pt arrives via EMS c/o intermittent CP and SOB on and off x \"weeks. \" Also reports increase in nausea today, denies vomiting. States that she had seen PCP for CP, given a medication that she is unsure of the name of. HTN in route, hx of HTN. Given 1 nitro in route, denies relief with CP, reports increased headache.  Patient states that she cannot take any bloodthinners, including ASA due to neuro dx

## 2020-03-08 NOTE — H&P
[facility]    Hospitalist Admission Note      NAME:  Rosaura Hawkins   :   1965   MRN:  [id number]     PCP:  Brittnee Terrazas MD     Date/Time of service:  3/8/2020 1:05 AM          Subjective:     CHIEF COMPLAINT: CP     HISTORY OF PRESENT ILLNESS:     Ms. Nannette Beebe is a 49 y.o.  female who presented to the Emergency Department complaining of chest pain. Intermittent for weeks. Associated with dyspnea and nausea. She reports a plethora of complaints, including back pain, headache, bloating, etc.  She is frustrated, anxious, depressed, evasive, perseverating on bad experiences with doctors in past. ER workup is negative, expect for elevated BP. We will admit her for observation.       Past Medical History:   Diagnosis Date    Anxiety and depression     Cavernous hemangioma of intracranial structure ([facility] [address] 2011    Fibromyalgia     fibromyalgia    Hemorrhoids     Hiatal hernia with gastroesophageal reflux 2018    Hx of migraines     Hx of seizure disorder     Hypertension     Incomplete right bundle branch block (RBBB) determined by electrocardiography 05/10/2018    Obese     CARLOS (obstructive sleep apnea)     no cpap        Past Surgical History:   Procedure Laterality Date    COLONOSCOPY N/A 6/3/2019    COLONOSCOPY performed by Rina Mercer MD at [facility] HX CHOLECYSTECTOMY      HX HEENT      tonsillectomy    HX LITHOTRIPSY      HX ORTHOPAEDIC      disc fusion , right knee cyst removed   [address] [place] [address], 2006    Cavernous brain angioma(s) removed    SURGERY,BRAIN/SPINE,W/COMPUTER         Social History     Tobacco Use    Smoking status: Former Smoker    Smokeless tobacco: Never Used    Tobacco comment: on chantix,   Substance Use Topics    Alcohol use: No        Family History   Problem Relation Age of Onset    Cancer Mother         breast    Breast Cancer Mother 76    Cancer Father     Migraines Father     Diabetes Father     Breast Cancer Sister 48    Ovarian Cancer Maternal Aunt       Allergies   Allergen Reactions    Latex, Natural Rubber Rash    Aspirin Other (comments)     Excessive bleeding      Other Medication Other (comments)     Patient states intolerance to blood thinners due to angiomas    Codeine Palpitations    Imitrex [Sumatriptan] Palpitations and Other (comments)     thougt she was having an MI    Percocet [Oxycodone-Acetaminophen] Palpitations        Prior to Admission medications    Medication Sig Start Date End Date Taking? Authorizing Provider   gabapentin (NEURONTIN) 400 mg capsule Take 1 Cap by mouth three (3) times daily. 6/7/19  Yes Callie Knight MD   pantoprazole (PROTONIX) 40 mg tablet Take 40 mg by mouth nightly. Yes Provider, Historical   busPIRone (BUSPAR) 7.5 mg tablet Take 15 mg by mouth nightly. Yes Provider, Historical   ibuprofen (MOTRIN) 800 mg tablet Take 800 mg by mouth every eight (8) hours as needed for Pain. Yes Provider, Historical   DULoxetine (CYMBALTA) 60 mg capsule Take 120 mg by mouth nightly. Yes Other, MD Nirmal   losartan-hydroCHLOROthiazide (HYZAAR) 100-25 mg per tablet Take 1 Tab by mouth nightly. Yes Other, MD Nirmal   topiramate (TOPAMAX) 25 mg tablet Take 25 mg by mouth nightly. Yes Other, MD Nirmal   tiZANidine (ZANAFLEX) 4 mg tablet Take 4 mg by mouth three (3) times daily. Yes Other, MD Nirmal   ALPRAZolam (XANAX) 1 mg tablet Take 1 mg by mouth three (3) times daily. Yes Provider, Historical   polyethylene glycol (MIRALAX) 17 gram packet Take 1 Packet by mouth daily. 6/7/19   Callie Knight MD   polyethylene glycol (MIRALAX) 17 gram packet Take 1 Packet by mouth daily. Indications: constipation, If constipation last more than 24-48 hours, despite colace use.  6/7/19   Charles Weiner MD   traZODone (DESYREL) 100 mg tablet Take 100-200 mg by mouth nightly as needed for Sleep (Will take 1-2 tabs in addition to first 100 mg tab if unable to sleep). Provider, Historical       Review of Systems:  (bold if positive, if negative)    Gen:  Eyes:  ENT:  CVS:  chest painPulm:  dyspneaGI:  nauseaGU:  MS:  PainarthritisSkin:  Psych: Insomnia, depression, anxietyEndo:  Hem:  Renal:  Neuro:  weaknessheadache      Objective:      VITALS:    Vital signs reviewed; most recent are:    Visit Vitals  BP (!) 152/102   Pulse 84   Temp 98.6 °F (37 °C)   Resp 30   Ht 5' 4\" (1.626 m)   Wt 106.1 kg (234 lb)   SpO2 93%   BMI 40.17 kg/m²     SpO2 Readings from Last 6 Encounters:   03/08/20 93%   07/01/19 99%   06/10/19 100%   06/07/19 99%   04/29/19 99%   04/29/19 96%        No intake or output data in the 24 hours ending 03/08/20 0105     Exam:     Physical Exam:    Gen:  Morbid obese, in no acute distress  HEENT:  Pink conjunctivae, PERRL, hearing intact to voice, moist mucous membranes  Neck:  Supple, without masses, thyroid non-tender  Resp:  No accessory muscle use, distant breath sounds without wheezes rales or rhonchi  Card:  No murmurs, distant S1, S2 without thrills, bruits or peripheral edema  Abd:  Soft, non-tender, non-distended, distant bowel sounds are present, no mass  Lymph:  No cervical or inguinal adenopathy  Musc:  No cyanosis or clubbing  Skin:  No rashes or ulcers, skin turgor is good  Neuro:  Cranial nerves are grossly intact, no focal motor weakness, follows commands   Psych:  Poor insight, oriented to person, place and time, alert, anxious     Labs:    Recent Labs     03/07/20 2121   WBC 11.6*   HGB 11.8   HCT 38.1        Recent Labs     03/07/20 2121      K 3.2*   *   CO2 24   *   BUN 11   CREA 0.97   CA 9.2   ALB 3.5   TBILI 0.3   SGOT 13*   ALT 22     Lab Results   Component Value Date/Time    Glucose (POC) 93 09/30/2013 12:55 PM     No results for input(s): PH, PCO2, PO2, HCO3, FIO2 in the last 72 hours. No results for input(s): INR, INREXT in the last 72 hours.   All Micro Results     Procedure Component Value Units Date/Time    CULTURE, URINE [[id number]]     Order Status:  Sent Specimen:  Cath Urine           I have reviewed previous records       Assessment and Plan:      Chest pain / Incomplete right bundle branch block (RBBB) determined by electrocardiography - CP POA, likely GERD with pain exacerbated by anxiety, GERD driven by hernia, obesity and medications. We have already ruled out NSTEMI by troponin. Check ECHO. Start PPI, pepcid and maalox. Hold motrin. HTN (hypertension) - Elevated on admit due to pain and anxiety. For now start immediate metoprolol, norvacs and lisinopril. She stopped her usual losartan/HCTZ months ago, ut was started on atenolol 2 weeks ago    Hiatal hernia with gastroesophageal reflux - She mentions positive hemoccult 2 weeks ago. She takes NSAIDS daily. She has not taken PPI since her hernia surgery. GI consult. Outpatient EGD may be needed. Patient is angry at prior GI doctors. Anxiety and depression / Fibromyalgia / Chronic pain / Hx of migraines / Polypharmacy - For now continue gabapentin, buspiroen, duloxetine, topiramate, tizanidine, xanax and trazodone. This level of polypharmacy is dangerous and should be weaned back by PCP. She declines to see neurology. Severe obesity / CARLOS (obstructive sleep apnea) non compliant with cpap - Adivse weight loss and CPAP compliance. Hx of seizure disorder - She is on gabapentin and topiramate, unclear if this Dx is accurate, she does not see a neurologist     Hypokalemia - Replete PO    Dyspnea - Likely due to anxiety and hypoventilation syndrome. Check 6 minute walk    Telemetry reviewed:   normal sinus rhythm    Risk of deterioration: high      Total time spent with patient: 79 Minutes I personally reviewed chart, notes, data and current medications in the medical record. I have personally examined and treated the patient at bedside during this period.                  Care Plan discussed with: Patient, Nursing Staff and >50% of time spent in counseling and coordination of care    Discussed:  Care Plan       ___________________________________________________    Attending Physician: Shira Villanueva MD

## 2020-03-08 NOTE — CONSULTS
BERNARDO Hylton MD  [phone] office     Gastroenterology Consultation Note      Admit Date: 3/7/2020  Consult Date: 3/8/2020   I greatly appreciate your asking me to see Joyce Santacruz, thank you very much for the opportunity to participate in her care. Narrative Assessment and Plan   · 49year old female who presents with chest pain and also epigastric discomfort and melena. Her Hgb is 11.8. She would benefit from an EGD at some point, but will await her cardiac testing first. Would continue PPI therapy. GI will follow. Subjective:     Chief Complaint: Chest pain    History of Present Illness:     Ms. Harsh Reynolds is a 49year old female who presented with chest pain for the past few days. She also reports epigastric discomfort and dark stools. She uses NSAIDs consistently. She has a prior history of a hiatal hernia repair. She denies any rectal bleeding. She reports mild shortness of breath, and Cardiology is planning a stress test. She has had three CT scans since 5/2019 which have been unremarkable.        Juanita An MD    Past Medical History:   Diagnosis Date    Anxiety and depression     Cavernous hemangioma of intracranial structure ([facility] [address] 5/2/2011    Fibromyalgia     fibromyalgia    Hemorrhoids     Hiatal hernia with gastroesophageal reflux 04/2018    Hx of migraines     Hx of seizure disorder     Hypertension     Incomplete right bundle branch block (RBBB) determined by electrocardiography 05/10/2018    Obese     CARLOS (obstructive sleep apnea)     no cpap        Past Surgical History:   Procedure Laterality Date    COLONOSCOPY N/A 6/3/2019    COLONOSCOPY performed by Claudene Reel, MD at [address] [facility] HX CHOLECYSTECTOMY      HX HEENT  2012    tonsillectomy    HX LITHOTRIPSY      HX ORTHOPAEDIC      disc fusion 2010, right knee cyst removed   [facility], 2006    Cavernous brain angioma(s) removed    SURGERY,BRAIN/SPINE,W/COMPUTER         Social History     Tobacco Use    Smoking status: Former Smoker    Smokeless tobacco: Never Used    Tobacco comment: on chantix,   Substance Use Topics    Alcohol use: No        Family History   Problem Relation Age of Onset    Cancer Mother         breast    Breast Cancer Mother 76    Cancer Father     Migraines Father     Diabetes Father     Breast Cancer Sister 48    Ovarian Cancer Maternal Aunt         Allergies   Allergen Reactions    Latex, Natural Rubber Rash    Aspirin Other (comments)     Excessive bleeding      Other Medication Other (comments)     Patient states intolerance to blood thinners due to angiomas    Codeine Palpitations    Hydralazine Other (comments)     Pt reports \"burning/tingling\" sensation in abd, flanks, and into head.  Imitrex [Sumatriptan] Palpitations and Other (comments)     thougt she was having an MI    Percocet [Oxycodone-Acetaminophen] Palpitations            Home Medications:  Prior to Admission Medications   Prescriptions Last Dose Informant Patient Reported? Taking? ALPRAZolam (XANAX) 1 mg tablet 3/7/2020 at Unknown time Other Yes Yes   Sig: Take 1 mg by mouth three (3) times daily. DULoxetine (CYMBALTA) 60 mg capsule   Yes No   Sig: Take 120 mg by mouth nightly. busPIRone (BUSPAR) 7.5 mg tablet 3/6/2020 at Unknown time  Yes Yes   Sig: Take 15 mg by mouth nightly.   gabapentin (NEURONTIN) 400 mg capsule 3/6/2020 at Unknown time  No Yes   Sig: Take 1 Cap by mouth three (3) times daily. ibuprofen (MOTRIN) 800 mg tablet 3/6/2020 at Unknown time  Yes Yes   Sig: Take 800 mg by mouth every eight (8) hours as needed for Pain.   losartan-hydroCHLOROthiazide (HYZAAR) 100-25 mg per tablet 3/6/2020 at Unknown time  Yes Yes   Sig: Take 1 Tab by mouth nightly. pantoprazole (PROTONIX) 40 mg tablet 3/6/2020 at Unknown time  Yes Yes   Sig: Take 40 mg by mouth nightly.    polyethylene glycol (MIRALAX) 17 gram packet Not Taking at Unknown time  No No   Sig: Take 1 Packet by mouth daily. polyethylene glycol (MIRALAX) 17 gram packet Not Taking at Unknown time  No No   Sig: Take 1 Packet by mouth daily. Indications: constipation, If constipation last more than 24-48 hours, despite colace use. tiZANidine (ZANAFLEX) 4 mg tablet 3/6/2020 at Unknown time  Yes Yes   Sig: Take 4 mg by mouth three (3) times daily. topiramate (TOPAMAX) 25 mg tablet 3/6/2020 at Unknown time  Yes Yes   Sig: Take 25 mg by mouth nightly. traZODone (DESYREL) 100 mg tablet Not Taking at Unknown time  Yes No   Sig: Take 100-200 mg by mouth nightly as needed for Sleep (Will take 1-2 tabs in addition to first 100 mg tab if unable to sleep).       Facility-Administered Medications: None       Hospital Medications:  Current Facility-Administered Medications   Medication Dose Route Frequency    metoprolol tartrate (LOPRESSOR) tablet 12.5 mg  12.5 mg Oral Q12H    lisinopriL (PRINIVIL, ZESTRIL) tablet 10 mg  10 mg Oral DAILY    amLODIPine (NORVASC) tablet 10 mg  10 mg Oral DAILY    famotidine (PEPCID) tablet 20 mg  20 mg Oral BID    pantoprazole (PROTONIX) 40 mg in 0.9% sodium chloride 10 mL injection  40 mg IntraVENous Q12H    ALPRAZolam (XANAX) tablet 1 mg  1 mg Oral TID    busPIRone (BUSPAR) tablet 15 mg  15 mg Oral QHS    DULoxetine (CYMBALTA) capsule 120 mg  120 mg Oral QHS    gabapentin (NEURONTIN) capsule 400 mg  400 mg Oral TID    tiZANidine (ZANAFLEX) tablet 4 mg  4 mg Oral TID    topiramate (TOPAMAX) tablet 25 mg  25 mg Oral QHS    traZODone (DESYREL) tablet 100-200 mg  100-200 mg Oral QHS PRN    sodium chloride (NS) flush 5-40 mL  5-40 mL IntraVENous Q8H    sodium chloride (NS) flush 5-40 mL  5-40 mL IntraVENous PRN    acetaminophen (TYLENOL) tablet 650 mg  650 mg Oral Q4H PRN    diphenhydrAMINE (BENADRYL) capsule 25 mg  25 mg Oral Q4H PRN    ondansetron (ZOFRAN) injection 4 mg  4 mg IntraVENous Q4H PRN    enoxaparin (LOVENOX) injection 40 mg 40 mg SubCUTAneous Q24H    butalbital-acetaminophen-caffeine (FIORICET, ESGIC) -40 mg per tablet 1 Tab  1 Tab Oral Q6H PRN       Review of Systems:  Full ROS was done and was negative except as noted in the HPI. Objective:     Physical Exam:  Visit Vitals  BP 93/73 (BP 1 Location: Left arm, BP Patient Position: At rest;Sitting)   Pulse 74   Temp 98 °F (36.7 °C)   Resp 18   Ht 5' 4\" (1.626 m)   Wt 106.1 kg (233 lb 14.5 oz)   SpO2 97%   BMI 40.15 kg/m²     SpO2 Readings from Last 6 Encounters:   03/08/20 97%   07/01/19 99%   06/10/19 100%   06/07/19 99%   04/29/19 99%   04/29/19 96%        No intake or output data in the 24 hours ending 03/08/20 1720   General: no distress, comfortable  Skin:  No rash or jaundice  HEENT: Pupils equal, sclera anicteric  Cardiovascular: Regular, well perfused  Respiratory:  Clear bilaterally. Normal respiratory effort  GI:  Abdomen soft, mildly tender  Musculoskeletal:  No skeletal deformity nor acute arthritis noted. Neurological:  Motor and sensory function intact in upper extremities  Psychiatric:  Normal affect, memory intact, appears to have insight into current illness      Laboratory:    Recent Results (from the past 24 hour(s))   SAMPLES BEING HELD    Collection Time: 03/07/20  9:21 PM   Result Value Ref Range    SAMPLES BEING HELD 1SST,1RED,1BLU     COMMENT        Add-on orders for these samples will be processed based on acceptable specimen integrity and analyte stability, which may vary by analyte.    CBC WITH AUTOMATED DIFF    Collection Time: 03/07/20  9:21 PM   Result Value Ref Range    WBC 11.6 (H) 3.6 - 11.0 K/uL    RBC 4.32 3.80 - 5.20 M/uL    HGB 11.8 11.5 - 16.0 g/dL    HCT 38.1 35.0 - 47.0 %    MCV 88.2 80.0 - 99.0 FL    MCH 27.3 26.0 - 34.0 PG    MCHC 31.0 30.0 - 36.5 g/dL    RDW 14.5 11.5 - 14.5 %    PLATELET 663 445 - 877 K/uL    MPV 10.4 8.9 - 12.9 FL    NRBC 0.0 0  WBC    ABSOLUTE NRBC 0.00 0.00 - 0.01 K/uL    NEUTROPHILS 65 32 - 75 % LYMPHOCYTES 26 12 - 49 %    MONOCYTES 6 5 - 13 %    EOSINOPHILS 2 0 - 7 %    BASOPHILS 1 0 - 1 %    IMMATURE GRANULOCYTES 0 0.0 - 0.5 %    ABS. NEUTROPHILS 7.6 1.8 - 8.0 K/UL    ABS. LYMPHOCYTES 3.0 0.8 - 3.5 K/UL    ABS. MONOCYTES 0.7 0.0 - 1.0 K/UL    ABS. EOSINOPHILS 0.2 0.0 - 0.4 K/UL    ABS. BASOPHILS 0.1 0.0 - 0.1 K/UL    ABS. IMM. GRANS. 0.0 0.00 - 0.04 K/UL    DF AUTOMATED     METABOLIC PANEL, COMPREHENSIVE    Collection Time: 03/07/20  9:21 PM   Result Value Ref Range    Sodium 142 136 - 145 mmol/L    Potassium 3.2 (L) 3.5 - 5.1 mmol/L    Chloride 111 (H) 97 - 108 mmol/L    CO2 24 21 - 32 mmol/L    Anion gap 7 5 - 15 mmol/L    Glucose 104 (H) 65 - 100 mg/dL    BUN 11 6 - 20 MG/DL    Creatinine 0.97 0.55 - 1.02 MG/DL    BUN/Creatinine ratio 11 (L) 12 - 20      GFR est AA >60 >60 ml/min/1.73m2    GFR est non-AA >60 >60 ml/min/1.73m2    Calcium 9.2 8.5 - 10.1 MG/DL    Bilirubin, total 0.3 0.2 - 1.0 MG/DL    ALT (SGPT) 22 12 - 78 U/L    AST (SGOT) 13 (L) 15 - 37 U/L    Alk.  phosphatase 123 (H) 45 - 117 U/L    Protein, total 8.1 6.4 - 8.2 g/dL    Albumin 3.5 3.5 - 5.0 g/dL    Globulin 4.6 (H) 2.0 - 4.0 g/dL    A-G Ratio 0.8 (L) 1.1 - 2.2     TROPONIN I    Collection Time: 03/07/20  9:21 PM   Result Value Ref Range    Troponin-I, Qt. <0.05 <0.05 ng/mL   NT-PRO BNP    Collection Time: 03/07/20  9:21 PM   Result Value Ref Range    NT pro-BNP 20 <125 PG/ML   D DIMER    Collection Time: 03/07/20  9:21 PM   Result Value Ref Range    D-dimer 0.69 (H) 0.00 - 0.65 mg/L FEU   ETHYL ALCOHOL    Collection Time: 03/07/20 10:21 PM   Result Value Ref Range    ALCOHOL(ETHYL),SERUM <10 <10 MG/DL   TROPONIN I    Collection Time: 03/07/20 11:46 PM   Result Value Ref Range    Troponin-I, Qt. <0.05 <0.05 ng/mL   URINALYSIS W/MICROSCOPIC    Collection Time: 03/08/20  6:47 AM   Result Value Ref Range    Color YELLOW/STRAW      Appearance CLEAR CLEAR      pH (UA) 7.5 5.0 - 8.0      Protein NEGATIVE  NEG mg/dL    Glucose NEGATIVE  NEG mg/dL    Ketone NEGATIVE  NEG mg/dL    Bilirubin NEGATIVE  NEG      Blood NEGATIVE  NEG      Urobilinogen 0.2 0.2 - 1.0 EU/dL    Nitrites NEGATIVE  NEG      Leukocyte Esterase NEGATIVE  NEG      WBC 0-4 0 - 4 /hpf    RBC 0-5 0 - 5 /hpf    Epithelial cells FEW FEW /lpf    Bacteria NEGATIVE  NEG /hpf    Amorphous Crystals FEW (A) NEG     DRUG SCREEN, URINE    Collection Time: 03/08/20  6:47 AM   Result Value Ref Range    AMPHETAMINES NEGATIVE  NEG      BARBITURATES NEGATIVE  NEG      BENZODIAZEPINES POSITIVE (A) NEG      COCAINE NEGATIVE  NEG      METHADONE NEGATIVE  NEG      OPIATES NEGATIVE  NEG      PCP(PHENCYCLIDINE) NEGATIVE  NEG      THC (TH-CANNABINOL) POSITIVE (A) NEG      Drug screen comment (NOTE)    TROPONIN I    Collection Time: 03/08/20  6:47 AM   Result Value Ref Range    Troponin-I, Qt. <0.05 <0.05 ng/mL   ECHO ADULT COMPLETE    Collection Time: 03/08/20  8:13 AM   Result Value Ref Range    LA Volume 35.0 22 - 52 mL    LV E' Lateral Velocity 8.59 centimeter/second    LV E' Septal Velocity 6.91 centimeter/second    Tapse 2.27 (A) 1.5 - 2.0 cm    Ao Root D 3.79 cm    Aortic Valve Systolic Peak Velocity 073.65 cm/s    Aortic Valve Area by Continuity of Peak Velocity 2.4 cm2    AoV PG 10.4 mmHg    LVIDd 5.21 3.9 - 5.3 cm    LVPWd 0.97 (A) 0.6 - 0.9 cm    LVIDs 2.90 cm    IVSd 1.19 (A) 0.6 - 0.9 cm    LVOT d 2.03 cm    LVOT Peak Velocity 117.01 cm/s    LVOT Peak Gradient 5.5 mmHg    MVA (PHT) 2.8 cm2    MV A Rosalio 101.39 cm/s    MV E Rosalio 64.69 cm/s    MV E/A 0.64     Left Atrium to Aortic Root Ratio 0.89     RVIDd 4.13 cm    LA Vol 4C 30.09 22 - 52 mL    LA Vol 2C 38.72 22 - 52 mL    LA Area 4C 13.8 cm2    LV Mass .6 (A) 67 - 162 g    LV Mass AL Index 122.7 (A) 43 - 95 g/m2    Est. RA Pressure 3.0 mmHg    Mitral Valve E Wave Deceleration Time 267.2 ms    Mitral Valve Pressure Half-time 77.5 ms    Left Atrium Major Axis 3.38 cm    Pulmonic Valve Max Velocity 94.69 cm/s    LA Vol Index 16.74 16 - 28 ml/m2 LA Vol Index 18.52 16 - 28 ml/m2    LA Vol Index 14.39 16 - 28 ml/m2    WILMA/BSA Pk Rosalio 1.1 cm2/m2    Left Ventricular Fractional Shortening by 2D 61.384898753 %    Mitral Valve Deceleration [place] 3.2400224791043     AV Velocity Ratio 0.73     Left Ventricular End Diastolic Volume by Teichholz Method 5.27092554424 mL    Left Ventricular End Systolic Volume by Teichholz Method 3.50194193151 mL    Left Ventricular Stroke Volume by Teichholz Method 06.377426932 mL    PV peak gradient 3.6 mmHg         Assessment/Plan:     Active Problems:    Hiatal hernia (5/18/2018)      Severe obesity ([address] (4/29/2019)      Rectal bleeding (5/30/2019)      CARLOS (obstructive sleep apnea) ()      Overview: no cpap      Incomplete right bundle branch block (RBBB) determined by electrocardiography (5/10/2018)      HTN (hypertension) ()      Hx of seizure disorder ()      Fibromyalgia ()      Overview: fibromyalgia      Anxiety and depression ()      Hx of migraines ()      Hiatal hernia with gastroesophageal reflux (4/1/2018)      Chest pain (3/8/2020)         See above narrative for full detail.

## 2020-03-09 ENCOUNTER — APPOINTMENT (OUTPATIENT)
Dept: NUCLEAR MEDICINE | Age: 55
End: 2020-03-09
Attending: NURSE PRACTITIONER
Payer: MEDICARE

## 2020-03-09 ENCOUNTER — APPOINTMENT (OUTPATIENT)
Dept: NON INVASIVE DIAGNOSTICS | Age: 55
End: 2020-03-09
Attending: NURSE PRACTITIONER
Payer: MEDICARE

## 2020-03-09 LAB
ATRIAL RATE: 69 BPM
BACTERIA SPEC CULT: NORMAL
CALCULATED P AXIS, ECG09: 31 DEGREES
CALCULATED R AXIS, ECG10: -17 DEGREES
CALCULATED T AXIS, ECG11: 32 DEGREES
CHOLEST SERPL-MCNC: 224 MG/DL
DIAGNOSIS, 93000: NORMAL
HDLC SERPL-MCNC: 41 MG/DL
HDLC SERPL: 5.5 {RATIO} (ref 0–5)
LDLC SERPL CALC-MCNC: 142.2 MG/DL (ref 0–100)
LIPID PROFILE,FLP: ABNORMAL
P-R INTERVAL, ECG05: 180 MS
Q-T INTERVAL, ECG07: 426 MS
QRS DURATION, ECG06: 94 MS
QTC CALCULATION (BEZET), ECG08: 456 MS
SERVICE CMNT-IMP: NORMAL
TRIGL SERPL-MCNC: 204 MG/DL (ref ?–150)
VENTRICULAR RATE, ECG03: 69 BPM
VLDLC SERPL CALC-MCNC: 40.8 MG/DL

## 2020-03-09 PROCEDURE — 74011250637 HC RX REV CODE- 250/637: Performed by: NURSE PRACTITIONER

## 2020-03-09 PROCEDURE — 74011250636 HC RX REV CODE- 250/636: Performed by: SPECIALIST

## 2020-03-09 PROCEDURE — 74011000250 HC RX REV CODE- 250: Performed by: INTERNAL MEDICINE

## 2020-03-09 PROCEDURE — 80061 LIPID PANEL: CPT

## 2020-03-09 PROCEDURE — 74011250636 HC RX REV CODE- 250/636: Performed by: FAMILY MEDICINE

## 2020-03-09 PROCEDURE — 96376 TX/PRO/DX INJ SAME DRUG ADON: CPT

## 2020-03-09 PROCEDURE — A9500 TC99M SESTAMIBI: HCPCS

## 2020-03-09 PROCEDURE — 74011250637 HC RX REV CODE- 250/637: Performed by: INTERNAL MEDICINE

## 2020-03-09 PROCEDURE — 36415 COLL VENOUS BLD VENIPUNCTURE: CPT

## 2020-03-09 PROCEDURE — 96372 THER/PROPH/DIAG INJ SC/IM: CPT

## 2020-03-09 PROCEDURE — 74011250636 HC RX REV CODE- 250/636: Performed by: INTERNAL MEDICINE

## 2020-03-09 PROCEDURE — 74011250637 HC RX REV CODE- 250/637: Performed by: FAMILY MEDICINE

## 2020-03-09 PROCEDURE — 99218 HC RM OBSERVATION: CPT

## 2020-03-09 PROCEDURE — C9113 INJ PANTOPRAZOLE SODIUM, VIA: HCPCS | Performed by: INTERNAL MEDICINE

## 2020-03-09 RX ORDER — ENOXAPARIN SODIUM 100 MG/ML
40 INJECTION SUBCUTANEOUS EVERY 12 HOURS
Status: DISCONTINUED | OUTPATIENT
Start: 2020-03-09 | End: 2020-03-11 | Stop reason: HOSPADM

## 2020-03-09 RX ORDER — ATORVASTATIN CALCIUM 20 MG/1
20 TABLET, FILM COATED ORAL
Status: DISCONTINUED | OUTPATIENT
Start: 2020-03-09 | End: 2020-03-11 | Stop reason: HOSPADM

## 2020-03-09 RX ADMIN — SODIUM CHLORIDE 40 MG: 9 INJECTION INTRAMUSCULAR; INTRAVENOUS; SUBCUTANEOUS at 10:10

## 2020-03-09 RX ADMIN — ATORVASTATIN CALCIUM 20 MG: 20 TABLET, FILM COATED ORAL at 21:58

## 2020-03-09 RX ADMIN — TOPIRAMATE 25 MG: 25 TABLET, FILM COATED ORAL at 22:01

## 2020-03-09 RX ADMIN — METOPROLOL TARTRATE 12.5 MG: 25 TABLET, FILM COATED ORAL at 10:10

## 2020-03-09 RX ADMIN — TIZANIDINE 4 MG: 2 TABLET ORAL at 10:12

## 2020-03-09 RX ADMIN — REGADENOSON 0.4 MG: 0.08 INJECTION, SOLUTION INTRAVENOUS at 12:00

## 2020-03-09 RX ADMIN — Medication 10 ML: at 22:00

## 2020-03-09 RX ADMIN — SODIUM CHLORIDE 40 MG: 9 INJECTION INTRAMUSCULAR; INTRAVENOUS; SUBCUTANEOUS at 21:58

## 2020-03-09 RX ADMIN — FAMOTIDINE 20 MG: 20 TABLET ORAL at 17:34

## 2020-03-09 RX ADMIN — GABAPENTIN 400 MG: 100 CAPSULE ORAL at 21:58

## 2020-03-09 RX ADMIN — GABAPENTIN 400 MG: 100 CAPSULE ORAL at 10:11

## 2020-03-09 RX ADMIN — Medication 10 ML: at 06:00

## 2020-03-09 RX ADMIN — TIZANIDINE 4 MG: 2 TABLET ORAL at 21:58

## 2020-03-09 RX ADMIN — ALPRAZOLAM 1 MG: 0.5 TABLET ORAL at 21:57

## 2020-03-09 RX ADMIN — Medication 10 ML: at 14:02

## 2020-03-09 RX ADMIN — POLYETHYLENE GLYCOL 3350, SODIUM SULFATE ANHYDROUS, SODIUM BICARBONATE, SODIUM CHLORIDE, POTASSIUM CHLORIDE 4000 ML: 236; 22.74; 6.74; 5.86; 2.97 POWDER, FOR SOLUTION ORAL at 16:08

## 2020-03-09 RX ADMIN — ENOXAPARIN SODIUM 40 MG: 40 INJECTION SUBCUTANEOUS at 10:10

## 2020-03-09 RX ADMIN — AMLODIPINE BESYLATE 10 MG: 5 TABLET ORAL at 10:11

## 2020-03-09 RX ADMIN — ENOXAPARIN SODIUM 40 MG: 40 INJECTION SUBCUTANEOUS at 21:59

## 2020-03-09 RX ADMIN — GABAPENTIN 400 MG: 100 CAPSULE ORAL at 16:08

## 2020-03-09 RX ADMIN — TIZANIDINE 4 MG: 2 TABLET ORAL at 16:08

## 2020-03-09 RX ADMIN — FAMOTIDINE 20 MG: 20 TABLET ORAL at 10:12

## 2020-03-09 RX ADMIN — BUTALBITAL, ACETAMINOPHEN, AND CAFFEINE 1 TABLET: 50; 325; 40 TABLET ORAL at 14:02

## 2020-03-09 RX ADMIN — LISINOPRIL 10 MG: 5 TABLET ORAL at 10:11

## 2020-03-09 RX ADMIN — ALPRAZOLAM 1 MG: 0.5 TABLET ORAL at 16:08

## 2020-03-09 RX ADMIN — METOPROLOL TARTRATE 12.5 MG: 25 TABLET, FILM COATED ORAL at 21:57

## 2020-03-09 RX ADMIN — BUSPIRONE HYDROCHLORIDE 15 MG: 10 TABLET ORAL at 21:58

## 2020-03-09 RX ADMIN — ALPRAZOLAM 1 MG: 0.5 TABLET ORAL at 10:12

## 2020-03-09 RX ADMIN — DULOXETINE HYDROCHLORIDE 120 MG: 30 CAPSULE, DELAYED RELEASE ORAL at 21:58

## 2020-03-09 NOTE — PROGRESS NOTES
Daily Progress Note: 3/9/2020  Angel Clark MD    Assessment/Plan:   Chest pain / Incomplete right bundle branch block (RBBB) CP POA, likely GERD with pain exacerbated by anxiety, GERD driven by hernia, obesity and medications. -- We have already ruled out NSTEMI by troponin. -- Check ECHO. -- Start PPI, pepcid and maalox. Hold motrin. -- Consult Cards  -- For stress test      HTN (hypertension) - Elevated on admit due to pain and anxiety. -- For now start immediate metoprolol, norvacs and lisinopril. --  She stopped her usual losartan/HCTZ months ago, but was started on atenolol 2 weeks ago     Hiatal hernia with gastroesophageal reflux   -- She mentions positive hemoccult 2 weeks ago. She takes NSAIDS daily. -- She has not taken PPI since her hernia surgery. -- GI consult. Considering EGD     Anxiety and depression / Fibromyalgia / Chronic pain / Hx of migraines / Polypharmacy   -- For now continue gabapentin, buspiroen, duloxetine, topiramate, tizanidine, xanax and trazodone. Severe obesity / CARLOS (obstructive sleep apnea) non compliant with cpap   -- Advise weight loss and CPAP compliance.     Hx of seizure disorder/Migraine/Loss of balance  -- She is on gabapentin and topiramate, unclear if this Dx is accurate,   -- she does not see a neurologist- will consult     Hypokalemia   -- Replete PO     Dyspnea - Likely due to anxiety and hypoventilation syndrome.     -- Check 6 minute walk  -- Cards consult          Problem List:  Problem List as of 3/9/2020 Date Reviewed: 3/8/2020          Codes Class Noted - Resolved    Chest pain ICD-10-CM: R07.9  ICD-9-CM: 786.50  3/8/2020 - Present        Rectal bleeding ICD-10-CM: K62.5  ICD-9-CM: 569.3  5/30/2019 - Present        CARLOS (obstructive sleep apnea) ICD-10-CM: G47.33  ICD-9-CM: 327.23  Unknown - Present    Overview Signed 5/30/2019  7:55 PM by Nikki Aleman MD     no cpap             HTN (hypertension) ICD-10-CM: I10  ICD-9-CM: 401.9  Unknown - Present        Hx of seizure disorder ICD-10-CM: Z86.69  ICD-9-CM: V12.49  Unknown - Present        Fibromyalgia ICD-10-CM: M79.7  ICD-9-CM: 729.1  Unknown - Present    Overview Signed 5/30/2019  7:55 PM by Dustin Leroy MD     fibromyalgia             Anxiety and depression ICD-10-CM: F41.9, F32.9  ICD-9-CM: 300.00, 311  Unknown - Present        Hx of migraines ICD-10-CM: Z86.69  ICD-9-CM: V12.49  Unknown - Present        Severe obesity ([address] ICD-10-CM: E66.01  ICD-9-CM: 278.01  4/29/2019 - Present        Hiatal hernia ICD-10-CM: K44.9  ICD-9-CM: 553.3  5/18/2018 - Present        Incomplete right bundle branch block (RBBB) determined by electrocardiography ICD-10-CM: I45.10  ICD-9-CM: 426.4  5/10/2018 - Present        Hiatal hernia with gastroesophageal reflux ICD-10-CM: K21.9, K44.9  ICD-9-CM: 530.81, 553.3  4/1/2018 - Present        RESOLVED: Obese ICD-10-CM: E66.9  ICD-9-CM: 278.00  Unknown - 3/8/2020        RESOLVED: Elevated lactic acid level ICD-10-CM: R79.89  ICD-9-CM: 276.2  5/30/2019 - 3/8/2020        RESOLVED: Dehydration ICD-10-CM: E86.0  ICD-9-CM: 276.51  5/30/2019 - 3/8/2020        RESOLVED: Anemia ICD-10-CM: D64.9  ICD-9-CM: 285.9  5/30/2019 - 3/8/2020        RESOLVED: Hemorrhoids ICD-10-CM: K64.9  ICD-9-CM: 455.6  Unknown - 3/8/2020        RESOLVED: Depressive disorder, not elsewhere classified ICD-10-CM: F32.9  ICD-9-CM: 527  10/1/2013 - 5/30/2019        RESOLVED: Leukocytosis, unspecified ICD-10-CM: K98.164  ICD-9-CM: 288.60  10/1/2013 - 5/30/2019        RESOLVED: Hypokalemia ICD-10-CM: E87.6  ICD-9-CM: 276.8  10/1/2013 - 3/8/2020        RESOLVED: Slurred speech ICD-10-CM: R47.81  ICD-9-CM: 784.59  9/30/2013 - 5/30/2019        RESOLVED: Seizure ([address] (Chronic) ICD-10-CM: R56.9  ICD-9-CM: 780.39  5/2/2011 - 5/30/2019        RESOLVED: Migraine (Chronic) ICD-10-CM: D00.558  ICD-9-CM: 346.90  5/2/2011 - 5/30/2019        RESOLVED: Cavernous hemangioma of intracranial structure [facility]) (Chronic) ICD-10-CM: N45.19  ICD-9-CM: 228.02  2011 - 3/8/2020              HPI:   Ms. Ole Walker is a 49 y.o.  female who presented to the Emergency Department complaining of chest pain. Intermittent for weeks. Associated with dyspnea and nausea. She reports a plethora of complaints, including back pain, headache, bloating, etc.  She is frustrated, anxious, depressed, evasive, perseverating on bad experiences with doctors in past. ER workup is negative, expect for elevated BP. We will admit her for observation. (Dr Danisha Carter)    3/8: She is c/o multiple things this am. BP was up and she was given Hydralazine which gave her abd pain and headache. Hx of migraines and is on topamax. Has not had a headache in some time. Tylenol not helping. Wants morphine. Will order fioricet. She also state that she has had a couple of months where she is losing her balance and having double vision. Will get MRI and neuro consult. Chest pain and SOB for months. Troponin neg. Will get ECHO and ask Cards to see. 3/9: Headache has improved. No CP this am. Cards planning stress test. Neuro consult pending. GI has seen and is considering EGD but needs stress test first. MRI unchanged from previous. Review of Systems:   A comprehensive review of systems was negative except for that written in the HPI. Objective:   Physical Exam:     Visit Vitals  /83 (BP 1 Location: Left arm, BP Patient Position: At rest)   Pulse 88   Temp 98.2 °F (36.8 °C)   Resp 18   Ht 5' 4\" (1.626 m)   Wt 233 lb 14.5 oz (106.1 kg)   LMP 2011   SpO2 93%   BMI 40.15 kg/m²      O2 Device: Room air    Temp (24hrs), Av °F (36.7 °C), Min:97.5 °F (36.4 °C), Max:98.2 °F (36.8 °C)    No intake/output data recorded. No intake/output data recorded. General:  Alert, cooperative, no distress, appears stated age. Obese   Head:  Normocephalic, without obvious abnormality, atraumatic. Eyes:  Conjunctivae/corneas clear.  PERRL, EOMs intact. Nose: Nares normal. Septum midline. Mucosa normal. No drainage or sinus tenderness. Throat: Lips, mucosa, and tongue normal. Teeth and gums normal.   Neck: Supple, symmetrical, trachea midline, no adenopathy, thyroid: no enlargement/tenderness/nodules, no carotid bruit and no JVD. Back:   Symmetric, no curvature. ROM normal. No CVA tenderness. Lungs:   Clear to auscultation bilaterally. Chest wall:  No tenderness or deformity. Heart:  Regular rate and rhythm, S1, S2 normal, no murmur, click, rub or gallop. Abdomen:   Soft, non-tender. Bowel sounds normal. No masses,  No organomegaly. Extremities: Extremities normal, atraumatic, no cyanosis or edema. No calf tenderness or cords. Pulses: 2+ and symmetric all extremities. Skin: Skin color, texture, turgor normal. No rashes or lesions   Neurologic: CNII-XII intact. Alert and oriented X 3. Fine motor of hands and fingers normal.   equal.  No cogwheeling or rigidity. Gait not tested at this time. Sensation grossly normal to touch. Gross motor of extremities normal.       Data Review:     Interpretation Summary ECHO 3/8/20    · Normal cavity size and wall thickness. Estimated left ventricular ejection fraction is 65 - 70%. IMPRESSION:MRI Brain 3/8/20  1. Patient with history of paternal intracranial \"hemangiomas\" is well as prior  surgery at outside institutions in the 1990s in 2006 for hemangioma or other  vascular malformation. 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 2. Postoperative findings from prior left parietal craniotomy and  encephalomalacia. Some listed history suggests this may have actually been a  site of prior parenchymal hemorrhage treated surgically. 3. No acute intracranial abnormality demonstrated.   Recent Days:  Recent Labs 03/07/20 2121   WBC 11.6*   HGB 11.8   HCT 38.1        Recent Labs     03/07/20 2121      K 3.2*   *   CO2 24   *   BUN 11   CREA 0.97   CA 9.2   ALB 3.5   TBILI 0.3   SGOT 13*   ALT 22     No results for input(s): PH, PCO2, PO2, HCO3, FIO2 in the last 72 hours.     24 Hour Results:  Recent Results (from the past 24 hour(s))   ECHO ADULT COMPLETE    Collection Time: 03/08/20  8:13 AM   Result Value Ref Range    LA Volume 35.0 22 - 52 mL    LV E' Lateral Velocity 8.59 centimeter/second    LV E' Septal Velocity 6.91 centimeter/second    Tapse 2.27 (A) 1.5 - 2.0 cm    Ao Root D 3.79 cm    Aortic Valve Systolic Peak Velocity 946.10 cm/s    Aortic Valve Area by Continuity of Peak Velocity 2.4 cm2    AoV PG 10.4 mmHg    LVIDd 5.21 3.9 - 5.3 cm    LVPWd 0.97 (A) 0.6 - 0.9 cm    LVIDs 2.90 cm    IVSd 1.19 (A) 0.6 - 0.9 cm    LVOT d 2.03 cm    LVOT Peak Velocity 117.01 cm/s    LVOT Peak Gradient 5.5 mmHg    MVA (PHT) 2.8 cm2    MV A Rosalio 101.39 cm/s    MV E Rosalio 64.69 cm/s    MV E/A 0.64     Left Atrium to Aortic Root Ratio 0.89     RVIDd 4.13 cm    LA Vol 4C 30.09 22 - 52 mL    LA Vol 2C 38.72 22 - 52 mL    LA Area 4C 13.8 cm2    LV Mass .6 (A) 67 - 162 g    LV Mass AL Index 122.7 (A) 43 - 95 g/m2    Est. RA Pressure 3.0 mmHg    Mitral Valve E Wave Deceleration Time 267.2 ms    Mitral Valve Pressure Half-time 77.5 ms    Left Atrium Major Axis 3.38 cm    Pulmonic Valve Max Velocity 94.69 cm/s    LA Vol Index 16.74 16 - 28 ml/m2    LA Vol Index 18.52 16 - 28 ml/m2    LA Vol Index 14.39 16 - 28 ml/m2    WILMA/BSA Pk Rosalio 1.1 cm2/m2    Left Ventricular Fractional Shortening by 2D 18.630902302 %    Mitral Valve Deceleration [place] 0.5773174901909     AV Velocity Ratio 0.73     Left Ventricular End Diastolic Volume by Teichholz Method 1.38515185677 mL    Left Ventricular End Systolic Volume by Teichholz Method 1.98666002492 mL    Left Ventricular Stroke Volume by Teichholz Method 33.317511730 mL    PV peak gradient 3.6 mmHg       Medications reviewed  Current Facility-Administered Medications   Medication Dose Route Frequency    metoprolol tartrate (LOPRESSOR) tablet 12.5 mg  12.5 mg Oral Q12H    lisinopriL (PRINIVIL, ZESTRIL) tablet 10 mg  10 mg Oral DAILY    amLODIPine (NORVASC) tablet 10 mg  10 mg Oral DAILY    famotidine (PEPCID) tablet 20 mg  20 mg Oral BID    pantoprazole (PROTONIX) 40 mg in 0.9% sodium chloride 10 mL injection  40 mg IntraVENous Q12H    ALPRAZolam (XANAX) tablet 1 mg  1 mg Oral TID    busPIRone (BUSPAR) tablet 15 mg  15 mg Oral QHS    DULoxetine (CYMBALTA) capsule 120 mg  120 mg Oral QHS    gabapentin (NEURONTIN) capsule 400 mg  400 mg Oral TID    tiZANidine (ZANAFLEX) tablet 4 mg  4 mg Oral TID    topiramate (TOPAMAX) tablet 25 mg  25 mg Oral QHS    traZODone (DESYREL) tablet 100-200 mg  100-200 mg Oral QHS PRN    sodium chloride (NS) flush 5-40 mL  5-40 mL IntraVENous Q8H    sodium chloride (NS) flush 5-40 mL  5-40 mL IntraVENous PRN    acetaminophen (TYLENOL) tablet 650 mg  650 mg Oral Q4H PRN    diphenhydrAMINE (BENADRYL) capsule 25 mg  25 mg Oral Q4H PRN    ondansetron (ZOFRAN) injection 4 mg  4 mg IntraVENous Q4H PRN    enoxaparin (LOVENOX) injection 40 mg  40 mg SubCUTAneous Q24H    butalbital-acetaminophen-caffeine (FIORICET, ESGIC) -40 mg per tablet 1 Tab  1 Tab Oral Q6H PRN       Care Plan discussed with: Patient/Family    Total time spent with patient and review of records: 30 minutes.     Brannon Morillo MD

## 2020-03-09 NOTE — PROGRESS NOTES
[facility] Pharmacy Dosing Services:      Pharmacy automatically dose adjusted Lovenox for BMI >40 per [facility] dosing protocol  Current BMI: 40.15 kg/m2  Previous dose: Lovenox 40mg SQ daily  New dose: Lovenox 40mg SQ BID    Wan Marie Pharm. D.   [address]

## 2020-03-09 NOTE — PROGRESS NOTES
Spiritual Care Partner Volunteer visited patient on Med Surgical unit on 3/9/20. Documented by:  Bette Carranza.      Paging Service: [phone])

## 2020-03-09 NOTE — PROGRESS NOTES
Transitional Plan of Care:      Reason for Admission:   Chest pain. Hx chronic pain, GERD, HTN, hiatial hernia, anxiety/depression, fibromyalgia. RUR Score:   7%/low        1. Patient has a neuro consult and stress test.  2. Continue to monitor patients response to medical treatment. 3. Case management will continue to follow.   Stephanie Alves BS

## 2020-03-09 NOTE — PROGRESS NOTES
Evaluation for home O2 need has not been completed. Pt out for testing. Will follow and evaluate closer to discharge.

## 2020-03-09 NOTE — CONSULTS
[facility]: [address] [facility] Neurology [address] [facility] [phone] Name:   Justen Banuelos Medical record #: [id number] Admission Date: 3/7/2020 Who Consulted: Dr. Adams Flor Reason for Consult:  Migraines, loss of balance HISTORY OF PRESENT ILLNESS:  
 
This is a 49 y.o. female who is admitted for  female who presented to the Emergency Department with chest pain intermittent for weeks which has been associated with dyspnea and nausea. She reports other pain including back pain, headache, bloating. Her ER workup was negative, expect for elevated BP, she was admitted for observation. The Neurology Service is asked to evaluate for migraines and loss of balance. Headache history: · Age of migraine onset: · Usual location of pain and area of radiation: · Number of headache days per month: 
· Length of average migraine: · Usual severity/type of pain: · Triggers: · Previous treatments: 
· Home Neurologist:   
 
Neuro-imaging: MRI:  
IMPRESSION: 
1. Patient with history of paternal intracranial \"hemangiomas\" is well as prior 
surgery at outside institutions in the 1990s in 2006 for hemangioma or other 
vascular malformation. Numerous foci of hemosiderin deposition in the brain may 
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 
ventricle. 2. Postoperative findings from prior left parietal craniotomy and 
encephalomalacia. Some listed history suggests this may have actually been a 
site of prior parenchymal hemorrhage treated surgically. 3. No acute intracranial abnormality demonstrated. EKG: normal sinus rhythm,incomplete  RBBB, shortened QT Care Plan discussed with: 
Patient x Family RN Care Manager Consultant/Specialist:    
 
 
Thank you for allowing the Neurology Service the pleasure of participating in the care of your patient. This patient will be discussed with my collaborating care team physician *** and he may have further recommendations regarding this patient's care Rubin Daly [employer]LAKE student 
 
 
 
==================== Attending Attestation:  
 
 
 
 
 
 
 
 
 
  
 
 
Impression/ Plan: 1. Migraine Headache · Migraine cocktail (Decadron, Compazine, Magnesium, Benadryl) now · If unsuccessful, will try Depakote 500 mg IVPB x 1 
· Eliminate/minimize triggers Review of Systems: 10 point ROS was performed. Pertinent positives listed in HPI. Negative ROS is as follows. Pt denies: angina, palpitations, paresthesias, weakness, vision loss, slurred speech, aphasia, confusion, fever, chills, falls, headache, diplopia, back pain, neck pain, prior episodes of vertigo, hallucinations, new medications or dosage changes. Allergies: Allergies Allergen Reactions  Latex, Natural Rubber Rash  Aspirin Other (comments) Excessive bleeding  Other Medication Other (comments) Patient states intolerance to blood thinners due to angiomas  Codeine Palpitations  Hydralazine Other (comments) Pt reports \"burning/tingling\" sensation in abd, flanks, and into head.  Imitrex [Sumatriptan] Palpitations and Other (comments)  
  thougt she was having an MI  
 Percocet [Oxycodone-Acetaminophen] Palpitations Outpatient Meds No current facility-administered medications on file prior to encounter. Current Outpatient Medications on File Prior to Encounter Medication Sig Dispense Refill  gabapentin (NEURONTIN) 400 mg capsule Take 1 Cap by mouth three (3) times daily. 90 Cap 0  
 pantoprazole (PROTONIX) 40 mg tablet Take 40 mg by mouth nightly.  busPIRone (BUSPAR) 7.5 mg tablet Take 15 mg by mouth nightly.  ibuprofen (MOTRIN) 800 mg tablet Take 800 mg by mouth every eight (8) hours as needed for Pain.  losartan-hydroCHLOROthiazide (HYZAAR) 100-25 mg per tablet Take 1 Tab by mouth nightly.  topiramate (TOPAMAX) 25 mg tablet Take 25 mg by mouth nightly.  tiZANidine (ZANAFLEX) 4 mg tablet Take 4 mg by mouth three (3) times daily.  ALPRAZolam (XANAX) 1 mg tablet Take 1 mg by mouth three (3) times daily.  polyethylene glycol (MIRALAX) 17 gram packet Take 1 Packet by mouth daily. 10 Each 0  
 polyethylene glycol (MIRALAX) 17 gram packet Take 1 Packet by mouth daily. Indications: constipation, If constipation last more than 24-48 hours, despite colace use. 238 g 0  
 traZODone (DESYREL) 100 mg tablet Take 100-200 mg by mouth nightly as needed for Sleep (Will take 1-2 tabs in addition to first 100 mg tab if unable to sleep).  DULoxetine (CYMBALTA) 60 mg capsule Take 120 mg by mouth nightly. Inpatient Meds Current Facility-Administered Medications Medication Dose Route Frequency Provider Last Rate Last Dose  metoprolol tartrate (LOPRESSOR) tablet 12.5 mg  12.5 mg Oral Q12H Chidi Hansen MD   12.5 mg at 03/08/20 2212  lisinopriL (PRINIVIL, ZESTRIL) tablet 10 mg  10 mg Oral DAILY Chidi Hansen MD   10 mg at 03/08/20 0136  
 amLODIPine (NORVASC) tablet 10 mg  10 mg Oral DAILY Chidi Hansen MD   10 mg at 03/08/20 0140  famotidine (PEPCID) tablet 20 mg  20 mg Oral BID Chidi Hansen MD   20 mg at 03/08/20 4885  pantoprazole (PROTONIX) 40 mg in 0.9% sodium chloride 10 mL injection  40 mg IntraVENous Q12H Chidi Hansen MD   40 mg at 03/08/20 2213  ALPRAZolam Marrion Jeans) tablet 1 mg  1 mg Oral TID Chidi Hansen MD   1 mg at 03/08/20 2211  busPIRone (BUSPAR) tablet 15 mg  15 mg Oral QHS Chidi Hansen MD   15 mg at 03/08/20 2211  DULoxetine (CYMBALTA) capsule 120 mg  120 mg Oral QHS Chidi Hansen MD   120 mg at 03/08/20 2211  
 gabapentin (NEURONTIN) capsule 400 mg  400 mg Oral TID Chidi Hansen MD   400 mg at 03/08/20 2211  tiZANidine (ZANAFLEX) tablet 4 mg  4 mg Oral TID Ankita Luna MD   4 mg at 03/08/20 2210  topiramate (TOPAMAX) tablet 25 mg  25 mg Oral QHS Ankita Luna MD   25 mg at 03/08/20 2211  traZODone (DESYREL) tablet 100-200 mg  100-200 mg Oral QHS PRN Ankita Luna MD      
 sodium chloride (NS) flush 5-40 mL  5-40 mL IntraVENous Q8H Ankita Luna MD   10 mL at 03/09/20 0600  
 sodium chloride (NS) flush 5-40 mL  5-40 mL IntraVENous PRN Ankita Luna MD      
 acetaminophen (TYLENOL) tablet 650 mg  650 mg Oral Q4H PRN Ankita Luna MD      
 diphenhydrAMINE (BENADRYL) capsule 25 mg  25 mg Oral Q4H PRN Ankita Luna MD      
 [facility]ron [facility] 4 mg  4 mg IntraVENous Q4H PRN Ankita Luna MD   4 mg at 03/08/20 9836  enoxaparin (LOVENOX) injection 40 mg  40 mg SubCUTAneous Q24H Ankita Luna MD   40 mg at 03/08/20 3589  
 butalbital-acetaminophen-caffeine (FIORICET, ESGIC) -40 mg per tablet 1 Tab  1 Tab Oral Q6H PRN Kavitha Yi MD   1 Tab at 03/08/20 1316 Past Medical History:  
Diagnosis Date  Anxiety and depression  Cavernous hemangioma of intracranial structure ([facility] [address] 5/2/2011  Fibromyalgia   
 fibromyalgia  Hemorrhoids  Hiatal hernia with gastroesophageal reflux 04/2018  Hx of migraines  Hx of seizure disorder  Hypertension  Incomplete right bundle branch block (RBBB) determined by electrocardiography 05/10/2018  Obese  CARLOS (obstructive sleep apnea)   
 no cpap Past Surgical History:  
Procedure Laterality Date  COLONOSCOPY N/A 6/3/2019 COLONOSCOPY performed by Anyi Crouch MD at [address]  HX HEENT  2012  
 tonsillectomy  HX LITHOTRIPSY  HX ORTHOPAEDIC    
 disc fusion 2010, right knee cyst removed [address], 2006 Cavernous brain angioma(s) removed  SURGERY,BRAIN/SPINE,W/COMPUTER    
 
 
 family history includes Breast Cancer (age of onset: 48) in her sister; Breast Cancer (age of onset: 76) in her mother; Cancer in her father and mother; Diabetes in her father; Migraines in her father; Ovarian Cancer in her maternal aunt. reports that she has quit smoking. She has never used smokeless tobacco. She reports that she does not drink alcohol or use drugs. Lab Results (last 24 hrs) Recent Results (from the past 24 hour(s)) LIPID PANEL Collection Time: 03/09/20  5:11 AM  
Result Value Ref Range LIPID PROFILE Cholesterol, total 224 (H) <200 MG/DL Triglyceride 204 (H) <150 MG/DL  
 HDL Cholesterol 41 MG/DL  
 LDL, calculated 142.2 (H) 0 - 100 MG/DL VLDL, calculated 40.8 MG/DL  
 CHOL/HDL Ratio 5.5 (H) 0.0 - 5.0

## 2020-03-09 NOTE — CONSULTS
[address]     Name:  Tennie Phoenix  MR#:  [id number]  :  1965  ACCOUNT #:  [de-identified]  DATE OF SERVICE:  2020    NEUROLOGY CONSULTATION    REQUESTING PHYSICIAN:  Gabbi Knight MD    HISTORY OF PRESENT ILLNESS:  Thank you for asking us to see the patient in Neurologic consultation regarding headache. This is a 50-year-old lady who came in with complaints of shortness of breath, lightheadedness, nausea, etc.  She is being evaluated from a cardiac perspective with stress test pending. She has also been seen by GI, who indicates at some point, she will need an EGD but we will await the cardiac workup. In any regard, in the emergency room she was given several doses of nitroglycerin. She had headache. She is on topiramate for modulation of headache and noticed that she has not had headache in a long time. In any regard, she says that the headache is markedly better at this point. No focal complaints at this point except for some baseline difficulty with the right foot that keeps her from driving secondary to her previous craniotomy. In any regard, she has had AVM status post hemorrhage in [place] and surgery in the  and then in . She had some transient seizures around that time or thought to have some and was on anticonvulsant and then discontinued. She is again at present on topiramate. PAST MEDICAL HISTORY:  Significant for,  1. Craniotomy as noted. 2.  Migraine. 3.  Hypertension. 4.  Sleep apnea. 5.  Fibromyalgia. 6.  Anxiety. 7.  Depression. 8.  Migraine headache. 9.  Right-bundle branch block. SURGERIES:  She has also had cholecystectomy and lithotripsy. CURRENT MEDICATIONS:  Include,  1. Xanax. 2.  Norvasc. 3.  BuSpar. 4.  Cymbalta. 5.  Lovenox. 6.  Pepcid. 7.  Neurontin. 8.  Prinivil. 9.  Lopressor. 10.  Protonix. 11.  Zanaflex. 12.  Topamax 25 mg at h.s.     ALLERGIES:  ASPIRIN, CODEINE, HYDRALAZINE, Rosalie Small. SOCIAL HISTORY:  She does not smoke. Denies alcohol use. FAMILY HISTORY:  Mother with breast cancer, also diabetes and migraine run in the family. REVIEW OF SYSTEMS:  Diffusely positive. PHYSICAL EXAMINATION:  VITAL SIGNS:  Temperature 98.2, pulse 88, blood pressure 108/83, oxygen saturation in the mid to high 90s on room air. HEENT:  Anicteric sclerae. Oropharynx clear and moist.  She has poor dentition. EXTREMITIES:  No edema. Symmetric pulses. NEUROLOGIC:  She is tangential.  She has full versions. No nystagmus. Symmetric face and facial sensation. Tone is a bit increased in the right lower extremity. Strength is full throughout. Reflex is a bit brisk right upper and lower verses left. No pathologic reflexes. No ataxia. Sensory inconsistent. LABORATORY DATA:  Studies show a toxicology screen with THC as well as benzodiazepine. She had an MRI of the brain that demonstrates evidence of her prior left craniotomy with evidence of previous hemorrhage with hemosiderin. Nothing acute. IMPRESSION:  A 81-year-old woman with clearly some underlying psychopathology with underlying headache and I think some of that headache she had was likely due to the nitroglycerin in a patient predisposed to migraine. At this point, she is currently asymptomatic. Her examination is unchanged from myself back in 2019 when I saw her and then also consistent with Dr. Soto Cunningham' exam in 2013. At this point, I would not pursue any further testing. She does have complaints of memory difficulty and certainly if that persist, then she can see Dr. Rosario Morgan as an outpatient for evaluation. Thank you for your request for consultation.       Chani Haque MD      [id number]  D:  03/09/2020 8:28  T:  03/09/2020 15:22  JOB #:  [id number]

## 2020-03-09 NOTE — PROGRESS NOTES
Bedside shift change report given to Malena Moise RN (oncoming nurse) by Philip Vásquez RN (offgoing nurse). Report included the following information LAN, Samina and LILY Cole

## 2020-03-09 NOTE — PROGRESS NOTES
Cardiology Progress Note    5th      NAME:  Rosana Streeter   :   1965   MRN:   [id number]     Assessment/Plan:   1. Atypical chest  Pain: for lexiscan today. NPO.   2. HLD: start lipitor. .   3. HTN: cont PTA meds   4. abd pain/ previous heme + stools. GI to proceed with work up after stress test.        Subjective:   Rosana Streeter is a 49 y.o. female we are following chest discomfort. This am all she is concerned about is breast pain and does no mention previous chest pain. She then discusses her interaction with neurology team earlier this am and does not agree with their assessment.            Cardiac ROS: Patient denies any exertional chest pain, dyspnea, palpitations, syncope, orthopnea, edema or paroxysmal nocturnal dyspnea. Previous Cardiac Eval  20   ECHO ADULT COMPLETE 2020 3/8/2020    Narrative · Normal cavity size and wall thickness. Estimated left ventricular   ejection fraction is 65 - 70%. Signed by: Debbie Segal MD         Review of Systems: + bilateral breast pain . No nausea, indigestion, vomiting, cough, sputum. No bleeding. NPO for stress test.           Objective:     Visit Vitals  /89 (BP 1 Location: Left arm, BP Patient Position: At rest)   Pulse 80   Temp 98.2 °F (36.8 °C)   Resp 18   Ht 5' 4\" (1.626 m)   Wt 233 lb 14.5 oz (106.1 kg)   LMP 2011   SpO2 95%   BMI 40.15 kg/m²      O2 Device: Room air    Temp (24hrs), Av °F (36.7 °C), Min:97.5 °F (36.4 °C), Max:98.2 °F (36.8 °C)      No intake/output data recorded. No intake/output data recorded. TELE: SR    General: AAOx3 cooperative, no acute distress. HEENT: Atraumatic. Pink and moist.  Anicteric sclerae. Neck : Supple, no thyromegaly. Lungs: CTA bilaterally. No wheezing/rhonchi/rales. Heart: Regular rhythm, no murmur, no rubs, no gallops. No JVD. Abdomen: obese, Soft, non-distended, non-tender. + Bowel sounds.    Extremities: No edema, no clubbing, no cyanosis. No calf tenderness  Neurologic: Grossly intact. Alert and oriented X 3. No acute neurological distress. Psych: Good insight. Not anxious or agitated. Care Plan discussed with:    Comments   Patient x    Family  x    RN x    Care Manager                    Consultant:          Data Review:     No lab exists for component: ITNL   Recent Labs     03/08/20  0647 03/07/20  2346 03/07/20  2121   TROIQ <0.05 <0.05 <0.05     Recent Labs     03/07/20  2121      K 3.2*   *   CO2 24   BUN 11   CREA 0.97   *   ALB 3.5   WBC 11.6*   HGB 11.8   HCT 38.1        No results for input(s): INR, PTP, APTT, INREXT in the last 72 hours.     Medications reviewed  Current Facility-Administered Medications   Medication Dose Route Frequency    atorvastatin (LIPITOR) tablet 20 mg  20 mg Oral QHS    peg 3350-electrolytes (COLYTE) 4000 mL  4,000 mL Oral ONCE    metoprolol tartrate (LOPRESSOR) tablet 12.5 mg  12.5 mg Oral Q12H    lisinopriL (PRINIVIL, ZESTRIL) tablet 10 mg  10 mg Oral DAILY    amLODIPine (NORVASC) tablet 10 mg  10 mg Oral DAILY    famotidine (PEPCID) tablet 20 mg  20 mg Oral BID    pantoprazole (PROTONIX) 40 mg in 0.9% sodium chloride 10 mL injection  40 mg IntraVENous Q12H    ALPRAZolam (XANAX) tablet 1 mg  1 mg Oral TID    busPIRone (BUSPAR) tablet 15 mg  15 mg Oral QHS    DULoxetine (CYMBALTA) capsule 120 mg  120 mg Oral QHS    gabapentin (NEURONTIN) capsule 400 mg  400 mg Oral TID    tiZANidine (ZANAFLEX) tablet 4 mg  4 mg Oral TID    topiramate (TOPAMAX) tablet 25 mg  25 mg Oral QHS    traZODone (DESYREL) tablet 100-200 mg  100-200 mg Oral QHS PRN    sodium chloride (NS) flush 5-40 mL  5-40 mL IntraVENous Q8H    sodium chloride (NS) flush 5-40 mL  5-40 mL IntraVENous PRN    acetaminophen (TYLENOL) tablet 650 mg  650 mg Oral Q4H PRN    diphenhydrAMINE (BENADRYL) capsule 25 mg  25 mg Oral Q4H PRN    ondansetron (ZOFRAN) injection 4 mg  4 mg IntraVENous Q4H PRN  enoxaparin (LOVENOX) injection 40 mg  40 mg SubCUTAneous Q24H    butalbital-acetaminophen-caffeine (FIORICET, ESGIC) -40 mg per tablet 1 Tab  1 Tab Oral Q6H PRN         Elmer Plummer, NP

## 2020-03-09 NOTE — PROGRESS NOTES
Bedside and Verbal shift change report given to Hunter Erwin (oncoming nurse) by Marquis Johnston (offgoing nurse). Report included the following information SBAR, Kardex, Procedure Summary, Intake/Output, MAR, Recent Results and Med Rec Status.

## 2020-03-10 ENCOUNTER — HOSPITAL ENCOUNTER (OUTPATIENT)
Dept: MRI IMAGING | Age: 55
Discharge: HOME | End: 2020-03-10
Attending: FAMILY MEDICINE | Admitting: INTERNAL MEDICINE
Payer: MEDICARE

## 2020-03-10 LAB
ALBUMIN SERPL-MCNC: 3.5 G/DL (ref 3.5–5)
ALBUMIN/GLOB SERPL: 0.9 {RATIO} (ref 1.1–2.2)
ALP SERPL-CCNC: 111 U/L (ref 45–117)
ALT SERPL-CCNC: 20 U/L (ref 12–78)
ANION GAP SERPL CALC-SCNC: 6 MMOL/L (ref 5–15)
AST SERPL-CCNC: 14 U/L (ref 15–37)
BASOPHILS # BLD: 0.1 K/UL (ref 0–0.1)
BASOPHILS NFR BLD: 1 % (ref 0–1)
BILIRUB SERPL-MCNC: 0.5 MG/DL (ref 0.2–1)
BUN SERPL-MCNC: 19 MG/DL (ref 6–20)
BUN/CREAT SERPL: 21 (ref 12–20)
CALCIUM SERPL-MCNC: 8.9 MG/DL (ref 8.5–10.1)
CHLORIDE SERPL-SCNC: 105 MMOL/L (ref 97–108)
CO2 SERPL-SCNC: 28 MMOL/L (ref 21–32)
CREAT SERPL-MCNC: 0.9 MG/DL (ref 0.55–1.02)
DIFFERENTIAL METHOD BLD: ABNORMAL
EOSINOPHIL # BLD: 0.3 K/UL (ref 0–0.4)
EOSINOPHIL NFR BLD: 5 % (ref 0–7)
ERYTHROCYTE [DISTWIDTH] IN BLOOD BY AUTOMATED COUNT: 15.1 % (ref 11.5–14.5)
GLOBULIN SER CALC-MCNC: 4.1 G/DL (ref 2–4)
GLUCOSE SERPL-MCNC: 97 MG/DL (ref 65–100)
HCT VFR BLD AUTO: 36.6 % (ref 35–47)
HGB BLD-MCNC: 11.2 G/DL (ref 11.5–16)
IMM GRANULOCYTES # BLD AUTO: 0 K/UL (ref 0–0.04)
IMM GRANULOCYTES NFR BLD AUTO: 0 % (ref 0–0.5)
LYMPHOCYTES # BLD: 2.6 K/UL (ref 0.8–3.5)
LYMPHOCYTES NFR BLD: 35 % (ref 12–49)
MCH RBC QN AUTO: 27.5 PG (ref 26–34)
MCHC RBC AUTO-ENTMCNC: 30.6 G/DL (ref 30–36.5)
MCV RBC AUTO: 89.7 FL (ref 80–99)
MONOCYTES # BLD: 0.6 K/UL (ref 0–1)
MONOCYTES NFR BLD: 8 % (ref 5–13)
NEUTS SEG # BLD: 3.7 K/UL (ref 1.8–8)
NEUTS SEG NFR BLD: 51 % (ref 32–75)
NRBC # BLD: 0 K/UL (ref 0–0.01)
NRBC BLD-RTO: 0 PER 100 WBC
PLATELET # BLD AUTO: 303 K/UL (ref 150–400)
PMV BLD AUTO: 11 FL (ref 8.9–12.9)
POTASSIUM SERPL-SCNC: 3.9 MMOL/L (ref 3.5–5.1)
PROT SERPL-MCNC: 7.6 G/DL (ref 6.4–8.2)
RBC # BLD AUTO: 4.08 M/UL (ref 3.8–5.2)
SODIUM SERPL-SCNC: 139 MMOL/L (ref 136–145)
STRESS BASELINE DIAS BP: 70 MMHG
STRESS BASELINE HR: 77 BPM
STRESS BASELINE SYS BP: 96 MMHG
STRESS ESTIMATED WORKLOAD: 1 METS
STRESS EXERCISE DUR MIN: NORMAL
STRESS PEAK DIAS BP: 74 MMHG
STRESS PEAK SYS BP: 104 MMHG
STRESS PERCENT HR ACHIEVED: 54 %
STRESS POST PEAK HR: 91 BPM
STRESS RATE PRESSURE PRODUCT: 9464 BPM*MMHG
STRESS ST DEPRESSION: 0 MM
STRESS ST ELEVATION: 0 MM
STRESS TARGET HR: 167 BPM
WBC # BLD AUTO: 7.4 K/UL (ref 3.6–11)

## 2020-03-10 PROCEDURE — 74011250636 HC RX REV CODE- 250/636: Performed by: INTERNAL MEDICINE

## 2020-03-10 PROCEDURE — 96372 THER/PROPH/DIAG INJ SC/IM: CPT

## 2020-03-10 PROCEDURE — 96376 TX/PRO/DX INJ SAME DRUG ADON: CPT

## 2020-03-10 PROCEDURE — 74011000250 HC RX REV CODE- 250: Performed by: INTERNAL MEDICINE

## 2020-03-10 PROCEDURE — 74011250637 HC RX REV CODE- 250/637: Performed by: NURSE PRACTITIONER

## 2020-03-10 PROCEDURE — C9113 INJ PANTOPRAZOLE SODIUM, VIA: HCPCS | Performed by: INTERNAL MEDICINE

## 2020-03-10 PROCEDURE — 72148 MRI LUMBAR SPINE W/O DYE: CPT

## 2020-03-10 PROCEDURE — 80053 COMPREHEN METABOLIC PANEL: CPT

## 2020-03-10 PROCEDURE — 36415 COLL VENOUS BLD VENIPUNCTURE: CPT

## 2020-03-10 PROCEDURE — 74011250636 HC RX REV CODE- 250/636: Performed by: FAMILY MEDICINE

## 2020-03-10 PROCEDURE — 99218 HC RM OBSERVATION: CPT

## 2020-03-10 PROCEDURE — 85025 COMPLETE CBC W/AUTO DIFF WBC: CPT

## 2020-03-10 PROCEDURE — 74011250637 HC RX REV CODE- 250/637: Performed by: INTERNAL MEDICINE

## 2020-03-10 RX ORDER — KETOROLAC TROMETHAMINE 30 MG/ML
30 INJECTION, SOLUTION INTRAMUSCULAR; INTRAVENOUS
Status: DISCONTINUED | OUTPATIENT
Start: 2020-03-10 | End: 2020-03-11 | Stop reason: HOSPADM

## 2020-03-10 RX ADMIN — METOPROLOL TARTRATE 12.5 MG: 25 TABLET, FILM COATED ORAL at 22:11

## 2020-03-10 RX ADMIN — GABAPENTIN 400 MG: 100 CAPSULE ORAL at 09:19

## 2020-03-10 RX ADMIN — TIZANIDINE 4 MG: 2 TABLET ORAL at 09:20

## 2020-03-10 RX ADMIN — Medication 10 ML: at 16:51

## 2020-03-10 RX ADMIN — ATORVASTATIN CALCIUM 20 MG: 20 TABLET, FILM COATED ORAL at 22:11

## 2020-03-10 RX ADMIN — METOPROLOL TARTRATE 12.5 MG: 25 TABLET, FILM COATED ORAL at 09:20

## 2020-03-10 RX ADMIN — ENOXAPARIN SODIUM 40 MG: 40 INJECTION SUBCUTANEOUS at 09:19

## 2020-03-10 RX ADMIN — KETOROLAC TROMETHAMINE 30 MG: 30 INJECTION, SOLUTION INTRAMUSCULAR at 09:20

## 2020-03-10 RX ADMIN — FAMOTIDINE 20 MG: 20 TABLET ORAL at 17:00

## 2020-03-10 RX ADMIN — AMLODIPINE BESYLATE 10 MG: 5 TABLET ORAL at 09:19

## 2020-03-10 RX ADMIN — ALPRAZOLAM 1 MG: 0.5 TABLET ORAL at 16:51

## 2020-03-10 RX ADMIN — DULOXETINE HYDROCHLORIDE 120 MG: 30 CAPSULE, DELAYED RELEASE ORAL at 22:10

## 2020-03-10 RX ADMIN — ALPRAZOLAM 1 MG: 0.5 TABLET ORAL at 22:11

## 2020-03-10 RX ADMIN — SODIUM CHLORIDE 40 MG: 9 INJECTION INTRAMUSCULAR; INTRAVENOUS; SUBCUTANEOUS at 09:19

## 2020-03-10 RX ADMIN — ALPRAZOLAM 1 MG: 0.5 TABLET ORAL at 09:19

## 2020-03-10 RX ADMIN — GABAPENTIN 400 MG: 100 CAPSULE ORAL at 16:51

## 2020-03-10 RX ADMIN — Medication 10 ML: at 22:12

## 2020-03-10 RX ADMIN — FAMOTIDINE 20 MG: 20 TABLET ORAL at 09:20

## 2020-03-10 RX ADMIN — Medication 10 ML: at 06:00

## 2020-03-10 RX ADMIN — TIZANIDINE 4 MG: 2 TABLET ORAL at 16:51

## 2020-03-10 RX ADMIN — TIZANIDINE 4 MG: 2 TABLET ORAL at 22:10

## 2020-03-10 RX ADMIN — TOPIRAMATE 25 MG: 25 TABLET, FILM COATED ORAL at 22:11

## 2020-03-10 RX ADMIN — GABAPENTIN 400 MG: 100 CAPSULE ORAL at 22:11

## 2020-03-10 RX ADMIN — BUSPIRONE HYDROCHLORIDE 15 MG: 10 TABLET ORAL at 22:10

## 2020-03-10 RX ADMIN — LISINOPRIL 10 MG: 5 TABLET ORAL at 09:20

## 2020-03-10 RX ADMIN — ENOXAPARIN SODIUM 40 MG: 40 INJECTION SUBCUTANEOUS at 22:11

## 2020-03-10 RX ADMIN — SODIUM CHLORIDE 40 MG: 9 INJECTION INTRAMUSCULAR; INTRAVENOUS; SUBCUTANEOUS at 22:12

## 2020-03-10 NOTE — PROGRESS NOTES
Bedside and Verbal shift change report given to [address] [place] [address] (oncoming nurse) by Riri Moran RN  (offgoing nurse). Report included the following information SBAR, Kardex and MAR.

## 2020-03-10 NOTE — ADVANCED PRACTICE NURSE
Stress test cpmpleted. I have asked Dr. Porfirio Flanagan to read the exam.   ECHO reviewed nml ED. No WMA. [id number] Mena Rich for d/c if stress test is normal.  Will need PCP OP follow for for HTN/HLD. Stress test with no ischemia.

## 2020-03-10 NOTE — PROGRESS NOTES
Patient refusing to keep telemetry box on Cristiane REGALADO from cardiology and Dr. Norma Benjamin notified. Patient stated she would refuse MRI stating she was in too much pain. She states she takes Gabapentin at home for pain. She was told that she is also on tizanidine as well as the Gabapentin for her pain here in the hospital.     She also stated she wanted to eat.      Dr. Norma Benjamin notified and placed diet order in chart

## 2020-03-10 NOTE — PROGRESS NOTES
Gastrointestinal Progress Note    3/10/2020    Admit Date: 3/7/2020    Subjective:     New Complaints Today:  No: but continues complain of low back pains radiating anteriorly. Drank some golytely with bowel movements accomplished. Requesting colonoscopy for removal hemorrhoids as source bleeding, constipation and pain. Advised daily Miralax or similar is usual recommendation for constipation; colonoscopy with hemorrhoidectomy not effective constipation.   Advised colonoscopy, EGD 2019 did not alleviate similar complaints    Current Facility-Administered Medications   Medication Dose Route Frequency    atorvastatin (LIPITOR) tablet 20 mg  20 mg Oral QHS    enoxaparin (LOVENOX) injection 40 mg  40 mg SubCUTAneous Q12H    metoprolol tartrate (LOPRESSOR) tablet 12.5 mg  12.5 mg Oral Q12H    lisinopriL (PRINIVIL, ZESTRIL) tablet 10 mg  10 mg Oral DAILY    amLODIPine (NORVASC) tablet 10 mg  10 mg Oral DAILY    famotidine (PEPCID) tablet 20 mg  20 mg Oral BID    pantoprazole (PROTONIX) 40 mg in 0.9% sodium chloride 10 mL injection  40 mg IntraVENous Q12H    ALPRAZolam (XANAX) tablet 1 mg  1 mg Oral TID    busPIRone (BUSPAR) tablet 15 mg  15 mg Oral QHS    DULoxetine (CYMBALTA) capsule 120 mg  120 mg Oral QHS    gabapentin (NEURONTIN) capsule 400 mg  400 mg Oral TID    tiZANidine (ZANAFLEX) tablet 4 mg  4 mg Oral TID    topiramate (TOPAMAX) tablet 25 mg  25 mg Oral QHS    traZODone (DESYREL) tablet 100-200 mg  100-200 mg Oral QHS PRN    sodium chloride (NS) flush 5-40 mL  5-40 mL IntraVENous Q8H    sodium chloride (NS) flush 5-40 mL  5-40 mL IntraVENous PRN    acetaminophen (TYLENOL) tablet 650 mg  650 mg Oral Q4H PRN    diphenhydrAMINE (BENADRYL) capsule 25 mg  25 mg Oral Q4H PRN    ondansetron (ZOFRAN) injection 4 mg  4 mg IntraVENous Q4H PRN    butalbital-acetaminophen-caffeine (FIORICET, ESGIC) -40 mg per tablet 1 Tab  1 Tab Oral Q6H PRN        Objective:     Blood pressure 117/79, pulse 78, temperature 98.3 °F (36.8 °C), resp. rate 17, height 5' 4\" (1.626 m), weight 106.1 kg (233 lb 14.5 oz), last menstrual period 05/04/2011, SpO2 97 %. No intake/output data recorded. No intake/output data recorded. EXAM:  GENERAL: alert, no distress, HEART: regular rate and rhythm, LUNGS: chest clear, no wheezing, rales, normal symmetric air entry, ABDOMEN:  Bowel sounds are normal, liver is not enlarged, spleen is not enlarged and EXTREMITY: no edema      Data Review    Recent Results (from the past 24 hour(s))   NUCLEAR CARDIAC STRESS TEST    Collection Time: 03/09/20  1:23 PM   Result Value Ref Range    Target  bpm    Exercise duration time 00:03:00     Stress Base Systolic  mmHg    Stress Base Diastolic BP 74 mmHg    Post peak HR 91 BPM    Baseline HR 77 BPM    Estimated workload 1.0 METS    Baseline BP 96 mmHg    Percent HR 54 %    ST Elevation (mm) 0 mm    ST Depression (mm) 0 mm    Stress Base Diastolic BP 70 mmHg    Stress Rate Pressure Product 9,464 BPM*mmHg   CBC WITH AUTOMATED DIFF    Collection Time: 03/10/20  4:24 AM   Result Value Ref Range    WBC 7.4 3.6 - 11.0 K/uL    RBC 4.08 3.80 - 5.20 M/uL    HGB 11.2 (L) 11.5 - 16.0 g/dL    HCT 36.6 35.0 - 47.0 %    MCV 89.7 80.0 - 99.0 FL    MCH 27.5 26.0 - 34.0 PG    MCHC 30.6 30.0 - 36.5 g/dL    RDW 15.1 (H) 11.5 - 14.5 %    PLATELET 165 677 - 314 K/uL    MPV 11.0 8.9 - 12.9 FL    NRBC 0.0 0  WBC    ABSOLUTE NRBC 0.00 0.00 - 0.01 K/uL    NEUTROPHILS 51 32 - 75 %    LYMPHOCYTES 35 12 - 49 %    MONOCYTES 8 5 - 13 %    EOSINOPHILS 5 0 - 7 %    BASOPHILS 1 0 - 1 %    IMMATURE GRANULOCYTES 0 0.0 - 0.5 %    ABS. NEUTROPHILS 3.7 1.8 - 8.0 K/UL    ABS. LYMPHOCYTES 2.6 0.8 - 3.5 K/UL    ABS. MONOCYTES 0.6 0.0 - 1.0 K/UL    ABS. EOSINOPHILS 0.3 0.0 - 0.4 K/UL    ABS. BASOPHILS 0.1 0.0 - 0.1 K/UL    ABS. IMM.  GRANS. 0.0 0.00 - 0.04 K/UL    DF AUTOMATED     METABOLIC PANEL, COMPREHENSIVE    Collection Time: 03/10/20  4:24 AM   Result Value Ref Range    Sodium 139 136 - 145 mmol/L    Potassium 3.9 3.5 - 5.1 mmol/L    Chloride 105 97 - 108 mmol/L    CO2 28 21 - 32 mmol/L    Anion gap 6 5 - 15 mmol/L    Glucose 97 65 - 100 mg/dL    BUN 19 6 - 20 MG/DL    Creatinine 0.90 0.55 - 1.02 MG/DL    BUN/Creatinine ratio 21 (H) 12 - 20      GFR est AA >60 >60 ml/min/1.73m2    GFR est non-AA >60 >60 ml/min/1.73m2    Calcium 8.9 8.5 - 10.1 MG/DL    Bilirubin, total 0.5 0.2 - 1.0 MG/DL    ALT (SGPT) 20 12 - 78 U/L    AST (SGOT) 14 (L) 15 - 37 U/L    Alk. phosphatase 111 45 - 117 U/L    Protein, total 7.6 6.4 - 8.2 g/dL    Albumin 3.5 3.5 - 5.0 g/dL    Globulin 4.1 (H) 2.0 - 4.0 g/dL    A-G Ratio 0.9 (L) 1.1 - 2.2         Assessment:     Active Problems:    Hiatal hernia (5/18/2018)      Severe obesity ([address] (4/29/2019)      Rectal bleeding (5/30/2019)      CARLOS (obstructive sleep apnea) ()      Overview: no cpap      Incomplete right bundle branch block (RBBB) determined by electrocardiography (5/10/2018)      HTN (hypertension) ()      Hx of seizure disorder ()      Fibromyalgia ()      Overview: fibromyalgia      Anxiety and depression ()      Hx of migraines ()      Hiatal hernia with gastroesophageal reflux (4/1/2018)      Chest pain (3/8/2020)        Plan:     1. No new recommendations  2.   No plans endoscopic exams

## 2020-03-10 NOTE — PROGRESS NOTES
Daily Progress Note: 3/10/2020  Masha Auguste MD    Assessment/Plan:   Chest pain / Incomplete right bundle branch block (RBBB) CP POA, likely GERD with pain exacerbated by anxiety, GERD driven by hernia, obesity and medications. -- We have already ruled out NSTEMI by troponin. -- ECHO normal   -- Start PPI, pepcid and maalox. Hold motrin. -- Consult Cards  -- For stress test      HTN (hypertension) - Elevated on admit due to pain and anxiety. -- For now start immediate metoprolol, norvacs and lisinopril. --  She stopped her usual losartan/HCTZ months ago, but was started on atenolol 2 weeks ago     Hiatal hernia with gastroesophageal reflux   -- She mentions positive hemoccult 2 weeks ago. She takes NSAIDS daily. -- She has not taken PPI since her hernia surgery. -- GI consult. Considering EGD     Anxiety and depression / Fibromyalgia / Chronic pain / Hx of migraines / Polypharmacy   -- For now continue gabapentin, buspiroen, duloxetine, topiramate, tizanidine, xanax and trazodone. Severe obesity / CARLOS (obstructive sleep apnea) non compliant with cpap   -- Advise weight loss and CPAP compliance.     Hx of seizure disorder/Migraine/Loss of balance  -- She is on gabapentin and topiramate  -- she does not see a neurologist- will consult     Hypokalemia   -- Replete PO     Dyspnea - Likely due to anxiety and hypoventilation syndrome.     -- Check 6 minute walk  -- Cards consult     Back pain with radiating symptoms down left leg  -- MRI LS spine  -- Toradol      Problem List:  Problem List as of 3/10/2020 Date Reviewed: 3/8/2020          Codes Class Noted - Resolved    Chest pain ICD-10-CM: R07.9  ICD-9-CM: 786.50  3/8/2020 - Present        Rectal bleeding ICD-10-CM: K62.5  ICD-9-CM: 569.3  5/30/2019 - Present        CARLOS (obstructive sleep apnea) ICD-10-CM: Z50.66  ICD-9-CM: 327.23  Unknown - Present    Overview Signed 5/30/2019  7:55 PM by Donna Layne MD     no cpap             HTN (hypertension) ICD-10-CM: I10  ICD-9-CM: 401.9  Unknown - Present        Hx of seizure disorder ICD-10-CM: Z86.69  ICD-9-CM: V12.49  Unknown - Present        Fibromyalgia ICD-10-CM: M79.7  ICD-9-CM: 729.1  Unknown - Present    Overview Signed 5/30/2019  7:55 PM by Jodi Costello MD     fibromyalgia             Anxiety and depression ICD-10-CM: F41.9, F32.9  ICD-9-CM: 300.00, 311  Unknown - Present        Hx of migraines ICD-10-CM: Z86.69  ICD-9-CM: V12.49  Unknown - Present        Severe obesity ([address] ICD-10-CM: E66.01  ICD-9-CM: 278.01  4/29/2019 - Present        Hiatal hernia ICD-10-CM: K44.9  ICD-9-CM: 553.3  5/18/2018 - Present        Incomplete right bundle branch block (RBBB) determined by electrocardiography ICD-10-CM: I45.10  ICD-9-CM: 426.4  5/10/2018 - Present        Hiatal hernia with gastroesophageal reflux ICD-10-CM: K21.9, K44.9  ICD-9-CM: 530.81, 553.3  4/1/2018 - Present        RESOLVED: Obese ICD-10-CM: E66.9  ICD-9-CM: 278.00  Unknown - 3/8/2020        RESOLVED: Elevated lactic acid level ICD-10-CM: R79.89  ICD-9-CM: 276.2  5/30/2019 - 3/8/2020        RESOLVED: Dehydration ICD-10-CM: E86.0  ICD-9-CM: 276.51  5/30/2019 - 3/8/2020        RESOLVED: Anemia ICD-10-CM: D64.9  ICD-9-CM: 285.9  5/30/2019 - 3/8/2020        RESOLVED: Hemorrhoids ICD-10-CM: K64.9  ICD-9-CM: 455.6  Unknown - 3/8/2020        RESOLVED: Depressive disorder, not elsewhere classified ICD-10-CM: F32.9  ICD-9-CM: 521  10/1/2013 - 5/30/2019        RESOLVED: Leukocytosis, unspecified ICD-10-CM: L44.312  ICD-9-CM: 288.60  10/1/2013 - 5/30/2019        RESOLVED: Hypokalemia ICD-10-CM: E87.6  ICD-9-CM: 276.8  10/1/2013 - 3/8/2020        RESOLVED: Slurred speech ICD-10-CM: R47.81  ICD-9-CM: 784.59  9/30/2013 - 5/30/2019        RESOLVED: Seizure ([facility] [address] (Chronic) ICD-10-CM: R56.9  ICD-9-CM: 780.39  5/2/2011 - 5/30/2019        RESOLVED: Migraine (Chronic) ICD-10-CM: Z63.748  ICD-9-CM: 346.90  5/2/2011 - 5/30/2019 RESOLVED: 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 Ina Murphy is a 49 y.o.  female who presented to the Emergency Department complaining of chest pain. Intermittent for weeks. Associated with dyspnea and nausea. She reports a plethora of complaints, including back pain, headache, bloating, etc.  She is frustrated, anxious, depressed, evasive, perseverating on bad experiences with doctors in past. ER workup is negative, expect for elevated BP. We will admit her for observation. (Dr Yesy Newman)    3/8: She is c/o multiple things this am. BP was up and she was given Hydralazine which gave her abd pain and headache. Hx of migraines and is on topamax. Has not had a headache in some time. Tylenol not helping. Wants morphine. Will order fioricet. She also state that she has had a couple of months where she is losing her balance and having double vision. Will get MRI and neuro consult. Chest pain and SOB for months. Troponin neg. Will get ECHO and ask Cards to see. 3/9: Headache has improved. No CP this am. Cards planning stress test. Neuro consult pending. GI has seen and is considering EGD but needs stress test first. MRI unchanged from previous. 3/10: Headache has improved and now having LBP radiating down left leg. States this has been present for over a month. Hx of cervical fusion. Can't stand for very long without pain. Wants pain meds. Advised this is chronic and need to know cause before giving pain meds. ECHO ok. Stress pending. Review of Systems:   A comprehensive review of systems was negative except for that written in the HPI.     Objective:   Physical Exam:     Visit Vitals  /79 (BP 1 Location: Right arm, BP Patient Position: At rest)   Pulse 78   Temp 98.3 °F (36.8 °C)   Resp 17   Ht 5' 4\" (1.626 m)   Wt 233 lb 14.5 oz (106.1 kg)   LMP 05/04/2011   SpO2 97%   BMI 40.15 kg/m²      O2 Device: Room air    Temp (24hrs), Av.8 °F (36.6 °C), Min:97.3 °F (36.3 °C), Max:98.3 °F (36.8 °C)    No intake/output data recorded. No intake/output data recorded. General:  Alert, cooperative, no distress, appears stated age. Obese   Head:  Normocephalic, without obvious abnormality, atraumatic. Eyes:  Conjunctivae/corneas clear. PERRL, EOMs intact. Nose: Nares normal. Septum midline. Mucosa normal. No drainage or sinus tenderness. Throat: Lips, mucosa, and tongue normal. Teeth and gums normal.   Neck: Supple, symmetrical, trachea midline, no adenopathy, thyroid: no enlargement/tenderness/nodules, no carotid bruit and no JVD. Back:   Symmetric, no curvature. ROM normal. No CVA tenderness. Lungs:   Clear to auscultation bilaterally. Chest wall:  No tenderness or deformity. Heart:  Regular rate and rhythm, S1, S2 normal, no murmur, click, rub or gallop. Abdomen:   Soft, non-tender. Bowel sounds normal. No masses,  No organomegaly. Extremities: Extremities normal, atraumatic, no cyanosis or edema. No calf tenderness or cords. Pulses: 2+ and symmetric all extremities. Skin: Skin color, texture, turgor normal. No rashes or lesions   Neurologic: CNII-XII intact. Alert and oriented X 3. Fine motor of hands and fingers normal.   equal.  No cogwheeling or rigidity. Gait not tested at this time. Sensation grossly normal to touch. Gross motor of extremities normal.       Data Review:     Interpretation Summary ECHO 3/8/20    · Normal cavity size and wall thickness. Estimated left ventricular ejection fraction is 65 - 70%. IMPRESSION:MRI Brain 3/8/20  1. Patient with history of paternal intracranial \"hemangiomas\" is well as prior  surgery at outside institutions in the 1990s in  for hemangioma or other  vascular malformation.  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 2. Postoperative findings from prior left parietal craniotomy and  encephalomalacia. Some listed history suggests this may have actually been a  site of prior parenchymal hemorrhage treated surgically. 3. No acute intracranial abnormality demonstrated. Recent Days:  Recent Labs     03/10/20  0424 03/07/20  2121   WBC 7.4 11.6*   HGB 11.2* 11.8   HCT 36.6 38.1    315     Recent Labs     03/10/20  0424 03/07/20  2121    142   K 3.9 3.2*    111*   CO2 28 24   GLU 97 104*   BUN 19 11   CREA 0.90 0.97   CA 8.9 9.2   ALB 3.5 3.5   TBILI 0.5 0.3   SGOT 14* 13*   ALT 20 22     No results for input(s): PH, PCO2, PO2, HCO3, FIO2 in the last 72 hours. 24 Hour Results:  Recent Results (from the past 24 hour(s))   NUCLEAR CARDIAC STRESS TEST    Collection Time: 03/09/20  1:23 PM   Result Value Ref Range    Target  bpm    Exercise duration time 00:03:00     Stress Base Systolic  mmHg    Stress Base Diastolic BP 74 mmHg    Post peak HR 91 BPM    Baseline HR 77 BPM    Estimated workload 1.0 METS    Baseline BP 96 mmHg    Percent HR 54 %    ST Elevation (mm) 0 mm    ST Depression (mm) 0 mm    Stress Base Diastolic BP 70 mmHg    Stress Rate Pressure Product 9,464 BPM*mmHg   CBC WITH AUTOMATED DIFF    Collection Time: 03/10/20  4:24 AM   Result Value Ref Range    WBC 7.4 3.6 - 11.0 K/uL    RBC 4.08 3.80 - 5.20 M/uL    HGB 11.2 (L) 11.5 - 16.0 g/dL    HCT 36.6 35.0 - 47.0 %    MCV 89.7 80.0 - 99.0 FL    MCH 27.5 26.0 - 34.0 PG    MCHC 30.6 30.0 - 36.5 g/dL    RDW 15.1 (H) 11.5 - 14.5 %    PLATELET 015 043 - 808 K/uL    MPV 11.0 8.9 - 12.9 FL    NRBC 0.0 0  WBC    ABSOLUTE NRBC 0.00 0.00 - 0.01 K/uL    NEUTROPHILS 51 32 - 75 %    LYMPHOCYTES 35 12 - 49 %    MONOCYTES 8 5 - 13 %    EOSINOPHILS 5 0 - 7 %    BASOPHILS 1 0 - 1 %    IMMATURE GRANULOCYTES 0 0.0 - 0.5 %    ABS. NEUTROPHILS 3.7 1.8 - 8.0 K/UL    ABS.  LYMPHOCYTES 2.6 0.8 - 3.5 K/UL    ABS. MONOCYTES 0.6 0.0 - 1.0 K/UL    ABS. EOSINOPHILS 0.3 0.0 - 0.4 K/UL    ABS. BASOPHILS 0.1 0.0 - 0.1 K/UL    ABS. IMM. GRANS. 0.0 0.00 - 0.04 K/UL    DF AUTOMATED     METABOLIC PANEL, COMPREHENSIVE    Collection Time: 03/10/20  4:24 AM   Result Value Ref Range    Sodium 139 136 - 145 mmol/L    Potassium 3.9 3.5 - 5.1 mmol/L    Chloride 105 97 - 108 mmol/L    CO2 28 21 - 32 mmol/L    Anion gap 6 5 - 15 mmol/L    Glucose 97 65 - 100 mg/dL    BUN 19 6 - 20 MG/DL    Creatinine 0.90 0.55 - 1.02 MG/DL    BUN/Creatinine ratio 21 (H) 12 - 20      GFR est AA >60 >60 ml/min/1.73m2    GFR est non-AA >60 >60 ml/min/1.73m2    Calcium 8.9 8.5 - 10.1 MG/DL    Bilirubin, total 0.5 0.2 - 1.0 MG/DL    ALT (SGPT) 20 12 - 78 U/L    AST (SGOT) 14 (L) 15 - 37 U/L    Alk.  phosphatase 111 45 - 117 U/L    Protein, total 7.6 6.4 - 8.2 g/dL    Albumin 3.5 3.5 - 5.0 g/dL    Globulin 4.1 (H) 2.0 - 4.0 g/dL    A-G Ratio 0.9 (L) 1.1 - 2.2         Medications reviewed  Current Facility-Administered Medications   Medication Dose Route Frequency    atorvastatin (LIPITOR) tablet 20 mg  20 mg Oral QHS    enoxaparin (LOVENOX) injection 40 mg  40 mg SubCUTAneous Q12H    metoprolol tartrate (LOPRESSOR) tablet 12.5 mg  12.5 mg Oral Q12H    lisinopriL (PRINIVIL, ZESTRIL) tablet 10 mg  10 mg Oral DAILY    amLODIPine (NORVASC) tablet 10 mg  10 mg Oral DAILY    famotidine (PEPCID) tablet 20 mg  20 mg Oral BID    pantoprazole (PROTONIX) 40 mg in 0.9% sodium chloride 10 mL injection  40 mg IntraVENous Q12H    ALPRAZolam (XANAX) tablet 1 mg  1 mg Oral TID    busPIRone (BUSPAR) tablet 15 mg  15 mg Oral QHS    DULoxetine (CYMBALTA) capsule 120 mg  120 mg Oral QHS    gabapentin (NEURONTIN) capsule 400 mg  400 mg Oral TID    tiZANidine (ZANAFLEX) tablet 4 mg  4 mg Oral TID    topiramate (TOPAMAX) tablet 25 mg  25 mg Oral QHS    traZODone (DESYREL) tablet 100-200 mg  100-200 mg Oral QHS PRN    sodium chloride (NS) flush 5-40 mL  5-40 mL IntraVENous Q8H    sodium chloride (NS) flush 5-40 mL  5-40 mL IntraVENous PRN    acetaminophen (TYLENOL) tablet 650 mg  650 mg Oral Q4H PRN    diphenhydrAMINE (BENADRYL) capsule 25 mg  25 mg Oral Q4H PRN    ondansetron (ZOFRAN) injection 4 mg  4 mg IntraVENous Q4H PRN    butalbital-acetaminophen-caffeine (FIORICET, ESGIC) -40 mg per tablet 1 Tab  1 Tab Oral Q6H PRN       Care Plan discussed with: Patient/Family    Total time spent with patient and review of records: 30 minutes.     Clarence Chavarria MD

## 2020-03-10 NOTE — PROGRESS NOTES
Bedside and Verbal shift change report given to   Nettie Ruiz Rn (oncoming nurse) by   Freida Yancey (offgoing nurse). Report included the following information SBAR, Kardex, Intake/Output, MAR, Accordion, Recent Results and Med Rec Status.

## 2020-03-10 NOTE — PHYSICIAN ADVISORY
Letter of Status Determination: Current Status   OBSERVATION is Appropriate         Pt Name:  Anum Sanchez   MR#  [id number]   [facility]#   [id number]   Room and Hospital  533/01  @ [address] [place] [address]   Hospitalization date  3/7/2020  9:01 PM   Current Attending Physician  Lino Nick MD   Principal diagnosis  CP   Clinicals  Ms. Mcdonald Cousin is a 49 y.o.  female who presented to the Emergency Department complaining of chest pain. Intermittent for weeks. Associated with dyspnea and nausea. She reports a plethora of complaints, including back pain, headache, bloating, etc.  She is frustrated, anxious, depressed, evasive, perseverating on bad experiences with doctors in past. ER workup is negative, expect for elevated BP. Stress test:Normal myocardial perfusion imaging.  Myocardial perfusion imaging supports a low risk stress test.    Likely GI related      [place] MCG criteria   Does  NOT apply    STATUS DETERMINATION  On the basis of clinical data, available documentaion, we believe that the current status of this patient as OBSERVATION is Appropriate     The final decision of the patient's hospitalization status depends on the attending physician's judgment    Additional comments     Insurance  Payor: Daryl Knox / Plan: [id number] PPO/PFFS / Product Type: Managed Care Medicare /    Insurance Information                [employer] [id number] [facility] PPO/PFFS Phone:     SubscriberGuilherme Elizabeth Subscriber#: [id number]    Group#: [id number] Precert#:                    Jr Echeverria MD  Cell: [phone]  Physician Advisor

## 2020-03-10 NOTE — PROGRESS NOTES
Pt was asking morphine for low back pain, Rn call oncall MD Dajuan Santiago and MD told me to give heating pad with tylenol for pain. Rn explain pt and pt do not want to take tylenol at this time. Will continue to monitor.

## 2020-03-10 NOTE — PROGRESS NOTES
Transitional Plan of Care:        Reason for Admission:   Chest pain. Hx chronic pain, GERD, HTN, hiatial hernia, anxiety/depression, fibromyalgia.     RUR Score:   7%/low         1. Patient has a pending Stress test pending results. 2. Continue to monitor patients response to medical treatment. 3. Case management will continue to follow.   Lenka Galvan BS

## 2020-03-11 VITALS
OXYGEN SATURATION: 97 % | HEIGHT: 64 IN | SYSTOLIC BLOOD PRESSURE: 130 MMHG | RESPIRATION RATE: 18 BRPM | WEIGHT: 233.91 LBS | TEMPERATURE: 98 F | HEART RATE: 79 BPM | DIASTOLIC BLOOD PRESSURE: 65 MMHG | BODY MASS INDEX: 39.93 KG/M2

## 2020-03-11 PROCEDURE — C9113 INJ PANTOPRAZOLE SODIUM, VIA: HCPCS | Performed by: INTERNAL MEDICINE

## 2020-03-11 PROCEDURE — 74011250636 HC RX REV CODE- 250/636: Performed by: INTERNAL MEDICINE

## 2020-03-11 PROCEDURE — 99218 HC RM OBSERVATION: CPT

## 2020-03-11 PROCEDURE — 96376 TX/PRO/DX INJ SAME DRUG ADON: CPT

## 2020-03-11 PROCEDURE — 74011250637 HC RX REV CODE- 250/637: Performed by: INTERNAL MEDICINE

## 2020-03-11 RX ORDER — LISINOPRIL 10 MG/1
10 TABLET ORAL DAILY
Qty: 30 TAB | Refills: 0 | Status: ON HOLD | OUTPATIENT
Start: 2020-03-11 | End: 2021-10-28

## 2020-03-11 RX ORDER — METOPROLOL TARTRATE 25 MG/1
12.5 TABLET, FILM COATED ORAL EVERY 12 HOURS
Qty: 60 TAB | Refills: 0 | Status: SHIPPED | OUTPATIENT
Start: 2020-03-11 | End: 2020-06-12

## 2020-03-11 RX ORDER — ATORVASTATIN CALCIUM 20 MG/1
20 TABLET, FILM COATED ORAL
Qty: 30 TAB | Refills: 0 | Status: SHIPPED | OUTPATIENT
Start: 2020-03-11 | End: 2020-06-12

## 2020-03-11 RX ORDER — AMLODIPINE BESYLATE 10 MG/1
10 TABLET ORAL DAILY
Qty: 30 TAB | Refills: 0 | Status: SHIPPED | OUTPATIENT
Start: 2020-03-11 | End: 2020-06-12

## 2020-03-11 RX ORDER — FAMOTIDINE 20 MG/1
20 TABLET, FILM COATED ORAL 2 TIMES DAILY
Qty: 60 TAB | Refills: 0 | Status: SHIPPED | OUTPATIENT
Start: 2020-03-11 | End: 2022-07-19

## 2020-03-11 RX ADMIN — AMLODIPINE BESYLATE 10 MG: 5 TABLET ORAL at 08:33

## 2020-03-11 RX ADMIN — GABAPENTIN 400 MG: 100 CAPSULE ORAL at 08:33

## 2020-03-11 RX ADMIN — ALPRAZOLAM 1 MG: 0.5 TABLET ORAL at 08:32

## 2020-03-11 RX ADMIN — TIZANIDINE 4 MG: 2 TABLET ORAL at 08:33

## 2020-03-11 RX ADMIN — Medication 10 ML: at 05:15

## 2020-03-11 RX ADMIN — FAMOTIDINE 20 MG: 20 TABLET ORAL at 08:34

## 2020-03-11 RX ADMIN — SODIUM CHLORIDE 40 MG: 9 INJECTION INTRAMUSCULAR; INTRAVENOUS; SUBCUTANEOUS at 08:33

## 2020-03-11 RX ADMIN — METOPROLOL TARTRATE 12.5 MG: 25 TABLET, FILM COATED ORAL at 08:32

## 2020-03-11 RX ADMIN — LISINOPRIL 10 MG: 5 TABLET ORAL at 08:32

## 2020-03-11 NOTE — PROGRESS NOTES
10:28 AM  Patient was accepted to [facility] for PT and RN. I was able to tell the patient before she was discharged that [facility] will be in contact with her. YOLI Umanzor      Transitional Plan of Care:        Reason for Admission:   Chest pain. Hx chronic pain, GERD, HTN, hiatial hernia, anxiety/depression, fibromyalgia.     RUR Score:   7%/low        1. Patient wanted to do PT Home Health, she signed the Choice Letter and I sent the referral in [employer] to [facility]. Waiting to hear a response back. I told patient I would call her when she was accepted since she wanted to leave and not wait. 2. Patient is ready for discharge from case management standpoint. Care Management Interventions  PCP Verified by CM: Yes(Dr. Cyrus Lopez)  Palliative Care Criteria Met (RRAT>21 & CHF Dx)?: No  Transition of Care Consult (CM Consult): Home Health([facility])  [address]: No  Reason Outside [place]: ([facility] willing to accept)  Discharge Durable Medical Equipment: No  Physical Therapy Consult: No  Occupational Therapy Consult: No  Speech Therapy Consult: No  Current Support Network:  Other(lives with her mother)  Confirm Follow Up Transport: Family  The Plan for Transition of Care is Related to the Following Treatment Goals : discharge  The Patient and/or Patient Representative was Provided with a Choice of Provider and Agrees with the Discharge Plan?: (S) Yes  Name of the Patient Representative Who was Provided with a Choice of Provider and Agrees with the Discharge Plan: [facility]  Discharge Location  Discharge Placement: Home with home health      [facility]Holy [facility]

## 2020-03-11 NOTE — PROGRESS NOTES
Daily Progress Note: 3/11/2020  Migdalia Atwood MD    Assessment/Plan:   Chest pain / Incomplete right bundle branch block (RBBB) CP POA, likely GERD with pain exacerbated by anxiety, GERD driven by hernia, obesity and medications. -- We have already ruled out NSTEMI by troponin. -- ECHO and stress normal   -- Start PPI, pepcid and maalox. -- Consult Cards  -- For stress test      HTN (hypertension) - Elevated on admit due to pain and anxiety. -- For now start immediate metoprolol, norvacs and lisinopril. --  She stopped her usual losartan/HCTZ months ago, but was started on atenolol 2 weeks ago     Hiatal hernia with gastroesophageal reflux   -- She mentions positive hemoccult 2 weeks ago. She takes NSAIDS daily. -- She has not taken PPI since her hernia surgery. -- GI consult.      Anxiety and depression / Fibromyalgia / Chronic pain / Hx of migraines / Polypharmacy   -- For now continue gabapentin, buspiroen, duloxetine, topiramate, tizanidine, xanax and trazodone. Severe obesity / CARLOS (obstructive sleep apnea) non compliant with cpap   -- Advise weight loss and CPAP compliance.     Hx of seizure disorder/Migraine/Loss of balance  -- She is on gabapentin and topiramate  -- she does not see a neurologist- will consult     Hypokalemia   -- Replete PO     Dyspnea - Likely due to anxiety and hypoventilation syndrome.     -- Cards consult     Back pain with radiating symptoms down left leg  -- MRI LS spine- mild DDD  -- Toradol as needed     Problem List:  Problem List as of 3/11/2020 Date Reviewed: 3/8/2020          Codes Class Noted - Resolved    Chest pain ICD-10-CM: R07.9  ICD-9-CM: 786.50  3/8/2020 - Present        Rectal bleeding ICD-10-CM: K62.5  ICD-9-CM: 569.3  5/30/2019 - Present        CARLOS (obstructive sleep apnea) ICD-10-CM: H83.75  ICD-9-CM: 327.23  Unknown - Present    Overview Signed 5/30/2019  7:55 PM by Catherine Frankel MD     no cpap             HTN (hypertension) ICD-10-CM: I10  ICD-9-CM: 401.9  Unknown - Present        Hx of seizure disorder ICD-10-CM: Z86.69  ICD-9-CM: V12.49  Unknown - Present        Fibromyalgia ICD-10-CM: M79.7  ICD-9-CM: 729.1  Unknown - Present    Overview Signed 5/30/2019  7:55 PM by Edilberto Lott MD     fibromyalgia             Anxiety and depression ICD-10-CM: F41.9, F32.9  ICD-9-CM: 300.00, 311  Unknown - Present        Hx of migraines ICD-10-CM: Z86.69  ICD-9-CM: V12.49  Unknown - Present        Severe obesity ([address] ICD-10-CM: E66.01  ICD-9-CM: 278.01  4/29/2019 - Present        Hiatal hernia ICD-10-CM: K44.9  ICD-9-CM: 553.3  5/18/2018 - Present        Incomplete right bundle branch block (RBBB) determined by electrocardiography ICD-10-CM: I45.10  ICD-9-CM: 426.4  5/10/2018 - Present        Hiatal hernia with gastroesophageal reflux ICD-10-CM: K21.9, K44.9  ICD-9-CM: 530.81, 553.3  4/1/2018 - Present        RESOLVED: Obese ICD-10-CM: E66.9  ICD-9-CM: 278.00  Unknown - 3/8/2020        RESOLVED: Elevated lactic acid level ICD-10-CM: R79.89  ICD-9-CM: 276.2  5/30/2019 - 3/8/2020        RESOLVED: Dehydration ICD-10-CM: E86.0  ICD-9-CM: 276.51  5/30/2019 - 3/8/2020        RESOLVED: Anemia ICD-10-CM: D64.9  ICD-9-CM: 285.9  5/30/2019 - 3/8/2020        RESOLVED: Hemorrhoids ICD-10-CM: K64.9  ICD-9-CM: 455.6  Unknown - 3/8/2020        RESOLVED: Depressive disorder, not elsewhere classified ICD-10-CM: F32.9  ICD-9-CM: 817  10/1/2013 - 5/30/2019        RESOLVED: Leukocytosis, unspecified ICD-10-CM: Z76.916  ICD-9-CM: 288.60  10/1/2013 - 5/30/2019        RESOLVED: Hypokalemia ICD-10-CM: E87.6  ICD-9-CM: 276.8  10/1/2013 - 3/8/2020        RESOLVED: Slurred speech ICD-10-CM: R47.81  ICD-9-CM: 784.59  9/30/2013 - 5/30/2019        RESOLVED: Seizure ([facility] [address] (Chronic) ICD-10-CM: R56.9  ICD-9-CM: 780.39  5/2/2011 - 5/30/2019        RESOLVED: Migraine (Chronic) ICD-10-CM: Q71.318  ICD-9-CM: 346.90  5/2/2011 - 5/30/2019        RESOLVED: Cavernous hemangioma of intracranial structure ([facility] [address] (Chronic) ICD-10-CM: V89.25  ICD-9-CM: 228.02  5/2/2011 - 3/8/2020              HPI:   Ms. Deanna Solis is a 49 y.o.  female who presented to the Emergency Department complaining of chest pain. Intermittent for weeks. Associated with dyspnea and nausea. She reports a plethora of complaints, including back pain, headache, bloating, etc.  She is frustrated, anxious, depressed, evasive, perseverating on bad experiences with doctors in past. ER workup is negative, expect for elevated BP. We will admit her for observation. (Dr Stephanie Velasquez)    3/8: She is c/o multiple things this am. BP was up and she was given Hydralazine which gave her abd pain and headache. Hx of migraines and is on topamax. Has not had a headache in some time. Tylenol not helping. Wants morphine. Will order fioricet. She also state that she has had a couple of months where she is losing her balance and having double vision. Will get MRI and neuro consult. Chest pain and SOB for months. Troponin neg. Will get ECHO and ask Cards to see. 3/9: Headache has improved. No CP this am. Cards planning stress test. Neuro consult pending. GI has seen and is considering EGD but needs stress test first. MRI unchanged from previous. 3/10: Headache has improved and now having LBP radiating down left leg. States this has been present for over a month. Hx of cervical fusion. Can't stand for very long without pain. Wants pain meds. Advised this is chronic and need to know cause before giving pain meds. ECHO ok. Stress pending. 3/11: Still c/o constipation. Advised that she needs to maintain a bowel regimen and keeping her in the hospital will not help with her constipation and in fact may worsen this as she is more inactive here. MRI showed mild DDD in the spine. Stress test neg. No c/o headache. Advised her medical issues are best treated with her PCP as an outpatient.      Review of Systems:   A comprehensive review of systems was negative except for that written in the HPI. Objective:   Physical Exam:     Visit Vitals  /81 (BP 1 Location: Left arm, BP Patient Position: At rest)   Pulse 95   Temp 98.2 °F (36.8 °C)   Resp 18   Ht 5' 4\" (1.626 m)   Wt 233 lb 14.5 oz (106.1 kg)   LMP 2011   SpO2 95%   BMI 40.15 kg/m²      O2 Device: Room air    Temp (24hrs), Av.2 °F (36.8 °C), Min:98 °F (36.7 °C), Max:98.6 °F (37 °C)    03/10 1901 -  0700  In: 100 [P.O.:100]  Out: -    No intake/output data recorded. General:  Alert, cooperative, no distress, appears stated age. Obese   Head:  Normocephalic, without obvious abnormality, atraumatic. Eyes:  Conjunctivae/corneas clear. PERRL, EOMs intact. Nose: Nares normal. Septum midline. Mucosa normal. No drainage or sinus tenderness. Throat: Lips, mucosa, and tongue normal. Teeth and gums normal.   Neck: Supple, symmetrical, trachea midline, no adenopathy, thyroid: no enlargement/tenderness/nodules, no carotid bruit and no JVD. Back:   Symmetric, no curvature. ROM normal. No CVA tenderness. Lungs:   Clear to auscultation bilaterally. Chest wall:  No tenderness or deformity. Heart:  Regular rate and rhythm, S1, S2 normal, no murmur, click, rub or gallop. Abdomen:   Soft, non-tender. Bowel sounds normal. No masses,  No organomegaly. Extremities: Extremities normal, atraumatic, no cyanosis or edema. No calf tenderness or cords. Pulses: 2+ and symmetric all extremities. Skin: Skin color, texture, turgor normal. No rashes or lesions   Neurologic: CNII-XII intact. Alert and oriented X 3. Fine motor of hands and fingers normal.   equal.  No cogwheeling or rigidity. Gait not tested at this time. Sensation grossly normal to touch. Gross motor of extremities normal.       Data Review:     Interpretation Summary ECHO 3/8/20    · Normal cavity size and wall thickness. Estimated left ventricular ejection fraction is 65 - 70%.      IMPRESSION:MRI Brain 3/8/20  1. Patient with history of paternal intracranial \"hemangiomas\" is well as prior  surgery at outside institutions in the 1990s in 2006 for hemangioma or other  vascular malformation. 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 2. Postoperative findings from prior left parietal craniotomy and  encephalomalacia. Some listed history suggests this may have actually been a  site of prior parenchymal hemorrhage treated surgically. 3. No acute intracranial abnormality demonstrated. FINDINGS:MRI LS spine     Small incidental area of increased signal intensity in the conus is nonspecific,  should be followed possibly with contrast.  The alignment is satisfactory. There is no evidence for bone marrow replacement  paraspinal masses or fluid collections.     T12-L1 shows no focal findings. L1-L2 shows no focal findings. L2-L3 shows some minimal facet disease and disc degeneration. No significant  canal or foraminal compromise. L3-L4 shows some mild facet disease, chronic disc degeneration but no  significant canal or foraminal compromise. L4-5 show some moderate facet hypertrophy some ligamentum thickening chronic  disc degeneration with central bulge but no significant canal or foraminal  compromise. L5-S1 shows central disc bulging and minimal facet disease. No significant canal  or foraminal compromise.     IMPRESSION  IMPRESSION: Mild degenerative disc disease and spondylosis without significant  canal or foraminal compromise.   Incidental small area of signal abnormality in the conus should be follow-up  with contrast.  Recent Days:  Recent Labs     03/10/20  0424   WBC 7.4   HGB 11.2*   HCT 36.6        Recent Labs     03/10/20  0424      K 3.9      CO2 28   GLU 97   BUN 19   CREA 0.90   CA 8.9   ALB 3.5 TBILI 0.5   SGOT 14*   ALT 20     No results for input(s): PH, PCO2, PO2, HCO3, FIO2 in the last 72 hours. 24 Hour Results:  No results found for this or any previous visit (from the past 24 hour(s)). Medications reviewed  Current Facility-Administered Medications   Medication Dose Route Frequency    ketorolac (TORADOL) injection 30 mg  30 mg IntraVENous Q6H PRN    atorvastatin (LIPITOR) tablet 20 mg  20 mg Oral QHS    enoxaparin (LOVENOX) injection 40 mg  40 mg SubCUTAneous Q12H    metoprolol tartrate (LOPRESSOR) tablet 12.5 mg  12.5 mg Oral Q12H    lisinopriL (PRINIVIL, ZESTRIL) tablet 10 mg  10 mg Oral DAILY    amLODIPine (NORVASC) tablet 10 mg  10 mg Oral DAILY    famotidine (PEPCID) tablet 20 mg  20 mg Oral BID    pantoprazole (PROTONIX) 40 mg in 0.9% sodium chloride 10 mL injection  40 mg IntraVENous Q12H    ALPRAZolam (XANAX) tablet 1 mg  1 mg Oral TID    busPIRone (BUSPAR) tablet 15 mg  15 mg Oral QHS    DULoxetine (CYMBALTA) capsule 120 mg  120 mg Oral QHS    gabapentin (NEURONTIN) capsule 400 mg  400 mg Oral TID    tiZANidine (ZANAFLEX) tablet 4 mg  4 mg Oral TID    topiramate (TOPAMAX) tablet 25 mg  25 mg Oral QHS    traZODone (DESYREL) tablet 100-200 mg  100-200 mg Oral QHS PRN    sodium chloride (NS) flush 5-40 mL  5-40 mL IntraVENous Q8H    sodium chloride (NS) flush 5-40 mL  5-40 mL IntraVENous PRN    acetaminophen (TYLENOL) tablet 650 mg  650 mg Oral Q4H PRN    diphenhydrAMINE (BENADRYL) capsule 25 mg  25 mg Oral Q4H PRN    ondansetron (ZOFRAN) injection 4 mg  4 mg IntraVENous Q4H PRN    butalbital-acetaminophen-caffeine (FIORICET, ESGIC) -40 mg per tablet 1 Tab  1 Tab Oral Q6H PRN       Care Plan discussed with: Patient/Family    Total time spent with patient and review of records: 30 minutes.     Michi Shields MD

## 2020-03-11 NOTE — DISCHARGE INSTRUCTIONS
Patient Discharge Instructions    Bryon Roberson / [id number] : 1965    Admitted 3/7/2020 Discharged: 3/11/2020 7:06 AM     ACUTE DIAGNOSES:  Chest pain [R07.9]    CHRONIC MEDICAL DIAGNOSES:  Problem List as of 3/11/2020 Date Reviewed: 3/8/2020          Codes Class Noted - Resolved    Chest pain ICD-10-CM: R07.9  ICD-9-CM: 786.50  3/8/2020 - Present        Rectal bleeding ICD-10-CM: K62.5  ICD-9-CM: 569.3  2019 - Present        CARLOS (obstructive sleep apnea) ICD-10-CM: G47.33  ICD-9-CM: 327.23  Unknown - Present    Overview Signed 2019  7:55 PM by Andrew Saini MD     no cpap             HTN (hypertension) ICD-10-CM: I10  ICD-9-CM: 401.9  Unknown - Present        Hx of seizure disorder ICD-10-CM: Z86.69  ICD-9-CM: V12.49  Unknown - Present        Fibromyalgia ICD-10-CM: M79.7  ICD-9-CM: 729.1  Unknown - Present    Overview Signed 2019  7:55 PM by Andrew Saini MD     fibromyalgia             Anxiety and depression ICD-10-CM: F41.9, F32.9  ICD-9-CM: 300.00, 311  Unknown - Present        Hx of migraines ICD-10-CM: Z86.69  ICD-9-CM: V12.49  Unknown - Present        Severe obesity ([address] ICD-10-CM: E66.01  ICD-9-CM: 278.01  2019 - Present        Hiatal hernia ICD-10-CM: K44.9  ICD-9-CM: 553.3  2018 - Present        Incomplete right bundle branch block (RBBB) determined by electrocardiography ICD-10-CM: I45.10  ICD-9-CM: 426.4  5/10/2018 - Present        Hiatal hernia with gastroesophageal reflux ICD-10-CM: K21.9, K44.9  ICD-9-CM: 530.81, 553.3  2018 - Present        RESOLVED: Obese ICD-10-CM: E66.9  ICD-9-CM: 278.00  Unknown - 3/8/2020        RESOLVED: Elevated lactic acid level ICD-10-CM: R79.89  ICD-9-CM: 276.2  2019 - 3/8/2020        RESOLVED: Dehydration ICD-10-CM: E86.0  ICD-9-CM: 276.51  2019 - 3/8/2020        RESOLVED: Anemia ICD-10-CM: D64.9  ICD-9-CM: 285.9  2019 - 3/8/2020        RESOLVED: Hemorrhoids ICD-10-CM: K64.9  ICD-9-CM: 455.6  Unknown - 3/8/2020 RESOLVED: Depressive disorder, not elsewhere classified ICD-10-CM: F32.9  ICD-9-CM: 290  10/1/2013 - 5/30/2019        RESOLVED: Leukocytosis, unspecified ICD-10-CM: W54.944  ICD-9-CM: 288.60  10/1/2013 - 5/30/2019        RESOLVED: Hypokalemia ICD-10-CM: E87.6  ICD-9-CM: 276.8  10/1/2013 - 3/8/2020        RESOLVED: Slurred speech ICD-10-CM: R47.81  ICD-9-CM: 784.59  9/30/2013 - 5/30/2019        RESOLVED: Seizure ([facility] [address] (Chronic) ICD-10-CM: R56.9  ICD-9-CM: 780.39  5/2/2011 - 5/30/2019        RESOLVED: Migraine (Chronic) ICD-10-CM: X95.162  ICD-9-CM: 346.90  5/2/2011 - 5/30/2019        RESOLVED: Cavernous hemangioma of intracranial structure ([facility] [address] (Chronic) ICD-10-CM: D18.02  ICD-9-CM: 228.02  5/2/2011 - 3/8/2020              DISCHARGE MEDICATIONS:         · It is important that you take the medication exactly as they are prescribed. · Keep your medication in the bottles provided by the pharmacist and keep a list of the medication names, dosages, and times to be taken in your wallet. · Do not take other medications without consulting your doctor. DIET:  Small meals, Mason diet    ACTIVITY: Activity as tolerated    ADDITIONAL INFORMATION: If you experience any of the following symptoms then please call your primary care physician or return to the emergency room if you cannot get hold of your doctor: Fever, chills, nausea, vomiting, diarrhea, change in mentation, falling, bleeding, shortness of breath. FOLLOW UP CARE:  Dr. Maryanne Rosario MD  you are to call and set up an appointment to see them with in 1 week. Follow-up with specialists at directed by them      Information obtained by :  I understand that if any problems occur once I am at home I am to contact my physician. I understand and acknowledge receipt of the instructions indicated above. Physician's or R.N.'s Signature                                                                  Date/Time                                                                                                                                              Patient or Representative Signature                                                          Date/Time

## 2020-03-11 NOTE — DISCHARGE SUMMARY
Physician Discharge Summary     Patient ID:    Rogelio Granados  [id number]  25 y.o.  1965  Iris Fletcher MD    Admit date: 3/7/2020    Discharge date and time: 3/11/2020    Admission Diagnoses: Chest pain [R07.9]    Chronic Diagnoses:    Problem List as of 3/11/2020 Date Reviewed: 3/8/2020          Codes Class Noted - Resolved    Chest pain ICD-10-CM: R07.9  ICD-9-CM: 786.50  3/8/2020 - Present        Rectal bleeding ICD-10-CM: K62.5  ICD-9-CM: 569.3  5/30/2019 - Present        CARLOS (obstructive sleep apnea) ICD-10-CM: G47.33  ICD-9-CM: 327.23  Unknown - Present    Overview Signed 5/30/2019  7:55 PM by Abhijit Hess MD     no cpap             HTN (hypertension) ICD-10-CM: I10  ICD-9-CM: 401.9  Unknown - Present        Hx of seizure disorder ICD-10-CM: Z86.69  ICD-9-CM: V12.49  Unknown - Present        Fibromyalgia ICD-10-CM: M79.7  ICD-9-CM: 729.1  Unknown - Present    Overview Signed 5/30/2019  7:55 PM by Abhijit Hess MD     fibromyalgia             Anxiety and depression ICD-10-CM: F41.9, F32.9  ICD-9-CM: 300.00, 311  Unknown - Present        Hx of migraines ICD-10-CM: Z86.69  ICD-9-CM: V12.49  Unknown - Present        Severe obesity ([address] ICD-10-CM: E66.01  ICD-9-CM: 278.01  4/29/2019 - Present        Hiatal hernia ICD-10-CM: K44.9  ICD-9-CM: 553.3  5/18/2018 - Present        Incomplete right bundle branch block (RBBB) determined by electrocardiography ICD-10-CM: I45.10  ICD-9-CM: 426.4  5/10/2018 - Present        Hiatal hernia with gastroesophageal reflux ICD-10-CM: K21.9, K44.9  ICD-9-CM: 530.81, 553.3  4/1/2018 - Present        RESOLVED: Obese ICD-10-CM: E66.9  ICD-9-CM: 278.00  Unknown - 3/8/2020        RESOLVED: Elevated lactic acid level ICD-10-CM: R79.89  ICD-9-CM: 276.2  5/30/2019 - 3/8/2020        RESOLVED: Dehydration ICD-10-CM: E86.0  ICD-9-CM: 276.51  5/30/2019 - 3/8/2020        RESOLVED: Anemia ICD-10-CM: D64.9  ICD-9-CM: 285.9  5/30/2019 - 3/8/2020        RESOLVED: Hemorrhoids ICD-10-CM: K64.9  ICD-9-CM: 455.6  Unknown - 3/8/2020        RESOLVED: Depressive disorder, not elsewhere classified ICD-10-CM: F32.9  ICD-9-CM: 550  10/1/2013 - 5/30/2019        RESOLVED: Leukocytosis, unspecified ICD-10-CM: W24.300  ICD-9-CM: 288.60  10/1/2013 - 5/30/2019        RESOLVED: Hypokalemia ICD-10-CM: E87.6  ICD-9-CM: 276.8  10/1/2013 - 3/8/2020        RESOLVED: Slurred speech ICD-10-CM: R47.81  ICD-9-CM: 784.59  9/30/2013 - 5/30/2019        RESOLVED: Seizure ([facility] [address] (Chronic) ICD-10-CM: R56.9  ICD-9-CM: 780.39  5/2/2011 - 5/30/2019        RESOLVED: Migraine (Chronic) ICD-10-CM: Y81.287  ICD-9-CM: 346.90  5/2/2011 - 5/30/2019        RESOLVED: Cavernous hemangioma of intracranial structure ([facility] [address] (Chronic) ICD-10-CM: D18.02  ICD-9-CM: 228.02  5/2/2011 - 3/8/2020              Discharge Medications:   Current Discharge Medication List      START taking these medications    Details   amLODIPine (NORVASC) 10 mg tablet Take 1 Tab by mouth daily. Qty: 30 Tab, Refills: 0      atorvastatin (LIPITOR) 20 mg tablet Take 1 Tab by mouth nightly. Qty: 30 Tab, Refills: 0      famotidine (PEPCID) 20 mg tablet Take 1 Tab by mouth two (2) times a day. Qty: 60 Tab, Refills: 0      lisinopriL (PRINIVIL, ZESTRIL) 10 mg tablet Take 1 Tab by mouth daily. Qty: 30 Tab, Refills: 0      metoprolol tartrate (LOPRESSOR) 25 mg tablet Take 0.5 Tabs by mouth every twelve (12) hours. Qty: 60 Tab, Refills: 0         CONTINUE these medications which have NOT CHANGED    Details   gabapentin (NEURONTIN) 400 mg capsule Take 1 Cap by mouth three (3) times daily. Qty: 90 Cap, Refills: 0      pantoprazole (PROTONIX) 40 mg tablet Take 40 mg by mouth nightly. busPIRone (BUSPAR) 7.5 mg tablet Take 15 mg by mouth nightly. topiramate (TOPAMAX) 25 mg tablet Take 25 mg by mouth nightly. tiZANidine (ZANAFLEX) 4 mg tablet Take 4 mg by mouth three (3) times daily.       ALPRAZolam (XANAX) 1 mg tablet Take 1 mg by mouth three (3) times daily.      polyethylene glycol (MIRALAX) 17 gram packet Take 1 Packet by mouth daily. Qty: 10 Each, Refills: 0      traZODone (DESYREL) 100 mg tablet Take 100-200 mg by mouth nightly as needed for Sleep (Will take 1-2 tabs in addition to first 100 mg tab if unable to sleep). DULoxetine (CYMBALTA) 60 mg capsule Take 120 mg by mouth nightly. STOP taking these medications       ibuprofen (MOTRIN) 800 mg tablet Comments:   Reason for Stopping:         losartan-hydroCHLOROthiazide (HYZAAR) 100-25 mg per tablet Comments:   Reason for Stopping: Follow up Care:    1. Mohit Ibarra MD with in 1 weeks  2. GI, Card, Neuro specialists as directed. Diet:  Small meals, [place]    Disposition:  Home. Advanced Directive:    Discharge Exam:  [See today's progress note.]    CONSULTATIONS: Cardiology, GI and Neurology    Significant Diagnostic Studies:   Recent Labs     03/10/20  0424   WBC 7.4   HGB 11.2*   HCT 36.6        Recent Labs     03/10/20  0424      K 3.9      CO2 28   BUN 19   CREA 0.90   GLU 97   CA 8.9     Recent Labs     03/10/20  0424   SGOT 14*   ALT 20      TBILI 0.5   TP 7.6   ALB 3.5   GLOB 4.1*       Lab Results   Component Value Date/Time    Glucose (POC) 93 09/30/2013 12:55 PM     Lab Results   Component Value Date/Time    TSH 1.38 05/30/2019 08:20 PM       HOSPITAL COURSE & TREATMENT RENDERED:   Chest pain / Incomplete right bundle branch block (RBBB) CP POA, likely GERD with pain exacerbated by anxiety, GERD driven by hernia, obesity and medications.    -- We have already ruled out NSTEMI by troponin.    -- ECHO and stress normal   -- Start PPI, pepcid and maalox.     -- Consult Cards  -- For stress test     HTN (hypertension) - Elevated on admit due to pain and anxiety.    -- For now start immediate metoprolol, norvacs and lisinopril.    --  She stopped her usual losartan/HCTZ months ago, but was started on atenolol 2 weeks ago     Hiatal hernia with gastroesophageal reflux   -- She mentions positive hemoccult 2 weeks ago. Anette Sandoval takes NSAIDS daily.    -- She has not taken PPI since her hernia surgery.    -- GI consult.      Anxiety and depression / Fibromyalgia / Chronic pain / Hx of migraines / Polypharmacy   -- For now continue gabapentin, buspiroen, duloxetine, topiramate, tizanidine, xanax and trazodone.       Severe obesity / CARLOS (obstructive sleep apnea) non compliant with cpap   -- Advise weight loss and CPAP compliance.     Hx of seizure disorder/Migraine/Loss of balance  -- She is on gabapentin and topiramate  -- she does not see a neurologist- will consult     Hypokalemia   -- Replete PO     Dyspnea - Likely due to anxiety and hypoventilation syndrome.    -- Cards consult     Back pain with radiating symptoms down left leg  -- MRI LS spine- mild DDD  -- Toradol as needed      HPI:   Ms. Brunson is a 49 y. o.   female who presented to the Emergency Department complaining of chest pain. Intermittent for weeks. Associated with dyspnea and nausea.  She reports a plethora of complaints, including back pain, headache, bloating, etc.  She is frustrated, anxious, depressed, evasive, perseverating on bad experiences with doctors in past. ER workup is negative, expect for elevated BP.  We will admit her for observation. (Dr Yoder Drafts)     3/8: She is c/o multiple things this am. BP was up and she was given Hydralazine which gave her abd pain and headache. Hx of migraines and is on topamax. Has not had a headache in some time. Tylenol not helping. Wants morphine. Will order fioricet. She also state that she has had a couple of months where she is losing her balance and having double vision. Will get MRI and neuro consult. Chest pain and SOB for months. Troponin neg. Will get ECHO and ask Cards to see.      3/9: Headache has improved. No CP this am. Cards planning stress test. Neuro consult pending.  GI has seen and is considering EGD but needs stress test first. MRI unchanged from previous.      3/10: Headache has improved and now having LBP radiating down left leg. States this has been present for over a month. Hx of cervical fusion. Can't stand for very long without pain. Wants pain meds. Advised this is chronic and need to know cause before giving pain meds. ECHO ok. Stress pending.      3/11: Still c/o constipation. Advised that she needs to maintain a bowel regimen and keeping her in the hospital will not help with her constipation and in fact may worsen this as she is more inactive here. MRI showed mild DDD in the spine. Stress test neg. No c/o headache. Advised her medical issues are best treated with her PCP as an outpatient.      Review of Systems:   A comprehensive review of systems was negative except for that written in the HPI.     Objective:   Physical Exam:      Visit Vitals  /81 (BP 1 Location: Left arm, BP Patient Position: At rest)   Pulse 95   Temp 98.2 °F (36.8 °C)   Resp 18   Ht 5' 4\" (1.626 m)   Wt 233 lb 14.5 oz (106.1 kg)   LMP 2011   SpO2 95%   BMI 40.15 kg/m²      O2 Device: Room air     Temp (24hrs), Av.2 °F (36.8 °C), Min:98 °F (36.7 °C), Max:98.6 °F (37 °C)    03/10 1901 -  0700  In: 100 [P.O.:100]  Out: -    No intake/output data recorded.     General:  Alert, cooperative, no distress, appears stated age. Obese   Head:  Normocephalic, without obvious abnormality, atraumatic. Eyes:  Conjunctivae/corneas clear. PERRL, EOMs intact. Nose: Nares normal. Septum midline. Mucosa normal. No drainage or sinus tenderness. Throat: Lips, mucosa, and tongue normal. Teeth and gums normal.   Neck: Supple, symmetrical, trachea midline, no adenopathy, thyroid: no enlargement/tenderness/nodules, no carotid bruit and no JVD. Back:   Symmetric, no curvature. ROM normal. No CVA tenderness. Lungs:   Clear to auscultation bilaterally. Chest wall:  No tenderness or deformity.    Heart:  Regular rate and rhythm, S1, S2 normal, no murmur, click, rub or gallop. Abdomen:   Soft, non-tender. Bowel sounds normal. No masses,  No organomegaly. Extremities: Extremities normal, atraumatic, no cyanosis or edema. No calf tenderness or cords. Pulses: 2+ and symmetric all extremities. Skin: Skin color, texture, turgor normal. No rashes or lesions   Neurologic: CNII-XII intact. Alert and oriented X 3. Fine motor of hands and fingers normal.   equal.  No cogwheeling or rigidity. Gait not tested at this time. Sensation grossly normal to touch. Gross motor of extremities normal.        Data Review:     Interpretation Summary ECHO 3/8/20     · Normal cavity size and wall thickness. Estimated left ventricular ejection fraction is 65 - 70%.    IMPRESSION:MRI Brain 3/8/20  1. Patient with history of paternal intracranial \"hemangiomas\" is well as prior  surgery at outside institutions in the 1990s in 2006 for hemangioma or other  vascular malformation. 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 2. Postoperative findings from prior left parietal craniotomy and  encephalomalacia. Some listed history suggests this may have actually been a  site of prior parenchymal hemorrhage treated surgically. 3. No acute intracranial abnormality demonstrated.     FINDINGS:MRI LS spine     Small incidental area of increased signal intensity in the conus is nonspecific,  should be followed possibly with contrast.  The alignment is satisfactory. There is no evidence for bone marrow replacement  paraspinal masses or fluid collections.     T12-L1 shows no focal findings. L1-L2 shows no focal findings. L2-L3 shows some minimal facet disease and disc degeneration. No significant  canal or foraminal compromise.   L3-L4 shows some mild facet disease, chronic disc degeneration but no  significant canal or foraminal compromise. L4-5 show some moderate facet hypertrophy some ligamentum thickening chronic  disc degeneration with central bulge but no significant canal or foraminal  compromise. L5-S1 shows central disc bulging and minimal facet disease. No significant canal  or foraminal compromise.     IMPRESSION  IMPRESSION: Mild degenerative disc disease and spondylosis without significant  canal or foraminal compromise.   Incidental small area of signal abnormality in the conus should be follow-up  with contrast.        Signed:  Demario Hart MD  3/11/2020  7:07 AM

## 2020-03-11 NOTE — PROGRESS NOTES
Attempted to make follow up appointment with PCP but office said only appointments requested from Patient will be made by practice.  Please advise patient to call and schedule follow up appointment

## 2020-03-11 NOTE — PROGRESS NOTES
Discharge medications reviewed with patient and appropriate educational materials and side effects teaching were provided. I have reviewed discharge instructions with the patient. The patient verbalized understanding. The patient is not in agreement with the proposed medical treatment plan for the following reasons: prefers current lifestyle and does not desire to change.  IV removed

## 2020-03-17 NOTE — ED NOTES
PT CALLING BACK AGAIN ABOUT IRON LEVELS. SEE PREVIOUS NOTES. ADVISED PT THAT HER ISSUE HAD BEEN SENT TO DR. SLADE TO SEE IF HE WANTED PT TO GET IV IRON OR NOT. TOLD PT WE JUST NEED TO WAIT AND HEAR BACK FROM MD AT THIS POINT. PT V/U.    Patient discharged by MD.  Questions answered and discharge papers given with instructions.

## 2020-05-13 ENCOUNTER — HOSPITAL ENCOUNTER (OUTPATIENT)
Dept: MRI IMAGING | Age: 55
Discharge: HOME | End: 2020-05-13
Payer: MEDICARE

## 2020-05-13 VITALS — BODY MASS INDEX: 41.27 KG/M2 | WEIGHT: 233 LBS

## 2020-05-13 DIAGNOSIS — M54.17 RADICULITIS, LUMBOSACRAL: ICD-10-CM

## 2020-05-13 PROCEDURE — 74011250636 HC RX REV CODE- 250/636: Performed by: RADIOLOGY

## 2020-05-13 PROCEDURE — A9575 INJ GADOTERATE MEGLUMI 0.1ML: HCPCS | Performed by: RADIOLOGY

## 2020-05-13 PROCEDURE — 72158 MRI LUMBAR SPINE W/O & W/DYE: CPT

## 2020-05-13 RX ORDER — GADOTERATE MEGLUMINE 376.9 MG/ML
20 INJECTION INTRAVENOUS
Status: COMPLETED | OUTPATIENT
Start: 2020-05-13 | End: 2020-05-13

## 2020-05-13 RX ADMIN — GADOTERATE MEGLUMINE 20 ML: 376.9 INJECTION INTRAVENOUS at 14:25

## 2020-06-12 ENCOUNTER — APPOINTMENT (OUTPATIENT)
Dept: CT IMAGING | Age: 55
End: 2020-06-12
Attending: STUDENT IN AN ORGANIZED HEALTH CARE EDUCATION/TRAINING PROGRAM
Payer: MEDICARE

## 2020-06-12 ENCOUNTER — HOSPITAL ENCOUNTER (OUTPATIENT)
Age: 55
Setting detail: OBSERVATION
Discharge: HOME | End: 2020-06-15
Attending: STUDENT IN AN ORGANIZED HEALTH CARE EDUCATION/TRAINING PROGRAM | Admitting: INTERNAL MEDICINE
Payer: MEDICARE

## 2020-06-12 DIAGNOSIS — Z86.69 HX OF MIGRAINES: ICD-10-CM

## 2020-06-12 DIAGNOSIS — R47.01 APHASIA: ICD-10-CM

## 2020-06-12 DIAGNOSIS — Z87.898 HISTORY OF SEIZURE: ICD-10-CM

## 2020-06-12 DIAGNOSIS — G93.40 ENCEPHALOPATHY: Primary | ICD-10-CM

## 2020-06-12 PROBLEM — I63.9 CVA (CEREBRAL VASCULAR ACCIDENT) (HCC): Status: ACTIVE | Noted: 2020-06-12

## 2020-06-12 LAB
ALBUMIN SERPL-MCNC: 4 G/DL (ref 3.5–5)
ALBUMIN/GLOB SERPL: 0.7 {RATIO} (ref 1.1–2.2)
ALP SERPL-CCNC: 160 U/L (ref 45–117)
ALT SERPL-CCNC: 34 U/L (ref 12–78)
ANION GAP SERPL CALC-SCNC: 6 MMOL/L (ref 5–15)
APPEARANCE UR: CLEAR
AST SERPL-CCNC: 25 U/L (ref 15–37)
BASOPHILS # BLD: 0.1 K/UL (ref 0–0.1)
BASOPHILS NFR BLD: 1 % (ref 0–1)
BILIRUB SERPL-MCNC: 0.6 MG/DL (ref 0.2–1)
BILIRUB UR QL: NEGATIVE
BUN SERPL-MCNC: 10 MG/DL (ref 6–20)
BUN/CREAT SERPL: 10 (ref 12–20)
CALCIUM SERPL-MCNC: 9.6 MG/DL (ref 8.5–10.1)
CHLORIDE SERPL-SCNC: 107 MMOL/L (ref 97–108)
CO2 SERPL-SCNC: 25 MMOL/L (ref 21–32)
COLOR UR: NORMAL
COMMENT, HOLDF: NORMAL
COMMENT, HOLDF: NORMAL
CREAT SERPL-MCNC: 1.01 MG/DL (ref 0.55–1.02)
DIFFERENTIAL METHOD BLD: ABNORMAL
EOSINOPHIL # BLD: 0.2 K/UL (ref 0–0.4)
EOSINOPHIL NFR BLD: 1 % (ref 0–7)
ERYTHROCYTE [DISTWIDTH] IN BLOOD BY AUTOMATED COUNT: 14 % (ref 11.5–14.5)
GLOBULIN SER CALC-MCNC: 5.6 G/DL (ref 2–4)
GLUCOSE BLD STRIP.AUTO-MCNC: 122 MG/DL (ref 65–100)
GLUCOSE SERPL-MCNC: 117 MG/DL (ref 65–100)
GLUCOSE UR STRIP.AUTO-MCNC: NEGATIVE MG/DL
HCT VFR BLD AUTO: 45.6 % (ref 35–47)
HGB BLD-MCNC: 14.5 G/DL (ref 11.5–16)
HGB UR QL STRIP: NEGATIVE
IMM GRANULOCYTES # BLD AUTO: 0.1 K/UL (ref 0–0.04)
IMM GRANULOCYTES NFR BLD AUTO: 1 % (ref 0–0.5)
KETONES UR QL STRIP.AUTO: NEGATIVE MG/DL
LEUKOCYTE ESTERASE UR QL STRIP.AUTO: NEGATIVE
LYMPHOCYTES # BLD: 2.3 K/UL (ref 0.8–3.5)
LYMPHOCYTES NFR BLD: 17 % (ref 12–49)
MAGNESIUM SERPL-MCNC: 2.4 MG/DL (ref 1.6–2.4)
MCH RBC QN AUTO: 27.9 PG (ref 26–34)
MCHC RBC AUTO-ENTMCNC: 31.8 G/DL (ref 30–36.5)
MCV RBC AUTO: 87.7 FL (ref 80–99)
MONOCYTES # BLD: 0.6 K/UL (ref 0–1)
MONOCYTES NFR BLD: 5 % (ref 5–13)
NEUTS SEG # BLD: 10.7 K/UL (ref 1.8–8)
NEUTS SEG NFR BLD: 75 % (ref 32–75)
NITRITE UR QL STRIP.AUTO: NEGATIVE
NRBC # BLD: 0 K/UL (ref 0–0.01)
NRBC BLD-RTO: 0 PER 100 WBC
PH UR STRIP: 7 [PH] (ref 5–8)
PLATELET # BLD AUTO: 395 K/UL (ref 150–400)
PMV BLD AUTO: 10.5 FL (ref 8.9–12.9)
POTASSIUM SERPL-SCNC: 3.5 MMOL/L (ref 3.5–5.1)
PROT SERPL-MCNC: 9.6 G/DL (ref 6.4–8.2)
PROT UR STRIP-MCNC: NEGATIVE MG/DL
RBC # BLD AUTO: 5.2 M/UL (ref 3.8–5.2)
SAMPLES BEING HELD,HOLD: NORMAL
SAMPLES BEING HELD,HOLD: NORMAL
SERVICE CMNT-IMP: ABNORMAL
SODIUM SERPL-SCNC: 138 MMOL/L (ref 136–145)
SP GR UR REFRACTOMETRY: 1 (ref 1–1.03)
UROBILINOGEN UR QL STRIP.AUTO: 0.2 EU/DL (ref 0.2–1)
WBC # BLD AUTO: 13.9 K/UL (ref 3.6–11)

## 2020-06-12 PROCEDURE — 96365 THER/PROPH/DIAG IV INF INIT: CPT

## 2020-06-12 PROCEDURE — 36415 COLL VENOUS BLD VENIPUNCTURE: CPT

## 2020-06-12 PROCEDURE — 65660000000 HC RM CCU STEPDOWN

## 2020-06-12 PROCEDURE — 70450 CT HEAD/BRAIN W/O DYE: CPT

## 2020-06-12 PROCEDURE — 74011636320 HC RX REV CODE- 636/320: Performed by: RADIOLOGY

## 2020-06-12 PROCEDURE — 82962 GLUCOSE BLOOD TEST: CPT

## 2020-06-12 PROCEDURE — 96374 THER/PROPH/DIAG INJ IV PUSH: CPT

## 2020-06-12 PROCEDURE — 81003 URINALYSIS AUTO W/O SCOPE: CPT

## 2020-06-12 PROCEDURE — 80053 COMPREHEN METABOLIC PANEL: CPT

## 2020-06-12 PROCEDURE — 65390000012 HC CONDITION CODE 44 OBSERVATION

## 2020-06-12 PROCEDURE — 93005 ELECTROCARDIOGRAM TRACING: CPT

## 2020-06-12 PROCEDURE — 94761 N-INVAS EAR/PLS OXIMETRY MLT: CPT

## 2020-06-12 PROCEDURE — 0042T CT PERF W CBF: CPT

## 2020-06-12 PROCEDURE — 99285 EMERGENCY DEPT VISIT HI MDM: CPT

## 2020-06-12 PROCEDURE — 80201 ASSAY OF TOPIRAMATE: CPT

## 2020-06-12 PROCEDURE — 83735 ASSAY OF MAGNESIUM: CPT

## 2020-06-12 PROCEDURE — 70496 CT ANGIOGRAPHY HEAD: CPT

## 2020-06-12 PROCEDURE — 74011250636 HC RX REV CODE- 250/636: Performed by: STUDENT IN AN ORGANIZED HEALTH CARE EDUCATION/TRAINING PROGRAM

## 2020-06-12 PROCEDURE — 85025 COMPLETE CBC W/AUTO DIFF WBC: CPT

## 2020-06-12 RX ORDER — LABETALOL HCL 20 MG/4 ML
10 SYRINGE (ML) INTRAVENOUS
Status: COMPLETED | OUTPATIENT
Start: 2020-06-12 | End: 2020-06-12

## 2020-06-12 RX ORDER — METHOCARBAMOL 750 MG/1
750 TABLET, FILM COATED ORAL
Status: ON HOLD | COMMUNITY
End: 2021-10-28

## 2020-06-12 RX ORDER — IBUPROFEN 800 MG/1
800 TABLET ORAL
COMMUNITY
End: 2022-04-21

## 2020-06-12 RX ORDER — ATENOLOL 50 MG/1
50 TABLET ORAL DAILY
Status: ON HOLD | COMMUNITY
End: 2021-10-28

## 2020-06-12 RX ADMIN — LABETALOL HYDROCHLORIDE 10 MG: 5 INJECTION, SOLUTION INTRAVENOUS at 21:49

## 2020-06-12 RX ADMIN — SODIUM CHLORIDE 2250 MG: 900 INJECTION, SOLUTION INTRAVENOUS at 22:42

## 2020-06-12 RX ADMIN — IOPAMIDOL 100 ML: 755 INJECTION, SOLUTION INTRAVENOUS at 20:57

## 2020-06-12 RX ADMIN — IOPAMIDOL 50 ML: 755 INJECTION, SOLUTION INTRAVENOUS at 20:57

## 2020-06-12 NOTE — ED TRIAGE NOTES
Triage: Per EMS pt had a seizure before their arrival. Pt has history of seizures. Pt appears to be confused. Pt got 5mg versed IN.

## 2020-06-13 ENCOUNTER — APPOINTMENT (OUTPATIENT)
Dept: MRI IMAGING | Age: 55
End: 2020-06-13
Attending: INTERNAL MEDICINE
Payer: MEDICARE

## 2020-06-13 ENCOUNTER — APPOINTMENT (OUTPATIENT)
Dept: VASCULAR SURGERY | Age: 55
End: 2020-06-13
Attending: INTERNAL MEDICINE
Payer: MEDICARE

## 2020-06-13 LAB
CHOLEST SERPL-MCNC: 290 MG/DL
CRP SERPL HS-MCNC: >9.5 MG/L
ERYTHROCYTE [DISTWIDTH] IN BLOOD BY AUTOMATED COUNT: 14.1 % (ref 11.5–14.5)
ERYTHROCYTE [SEDIMENTATION RATE] IN BLOOD: 23 MM/HR (ref 0–30)
EST. AVERAGE GLUCOSE BLD GHB EST-MCNC: 140 MG/DL
HBA1C MFR BLD: 6.5 % (ref 4–5.6)
HCT VFR BLD AUTO: 43.1 % (ref 35–47)
HCYS SERPL-SCNC: 7.2 UMOL/L (ref 3.7–13.9)
HDLC SERPL-MCNC: 54 MG/DL
HDLC SERPL: 5.4 {RATIO} (ref 0–5)
HGB BLD-MCNC: 13.7 G/DL (ref 11.5–16)
LDLC SERPL CALC-MCNC: 196.8 MG/DL (ref 0–100)
LIPID PROFILE,FLP: ABNORMAL
MAGNESIUM SERPL-MCNC: 2.5 MG/DL (ref 1.6–2.4)
MCH RBC QN AUTO: 28.1 PG (ref 26–34)
MCHC RBC AUTO-ENTMCNC: 31.8 G/DL (ref 30–36.5)
MCV RBC AUTO: 88.5 FL (ref 80–99)
NRBC # BLD: 0 K/UL (ref 0–0.01)
NRBC BLD-RTO: 0 PER 100 WBC
PHOSPHATE SERPL-MCNC: 3.9 MG/DL (ref 2.6–4.7)
PLATELET # BLD AUTO: 412 K/UL (ref 150–400)
PMV BLD AUTO: 10.6 FL (ref 8.9–12.9)
RBC # BLD AUTO: 4.87 M/UL (ref 3.8–5.2)
TRIGL SERPL-MCNC: 196 MG/DL (ref ?–150)
TSH SERPL DL<=0.05 MIU/L-ACNC: 1.51 UIU/ML (ref 0.36–3.74)
VLDLC SERPL CALC-MCNC: 39.2 MG/DL
WBC # BLD AUTO: 16 K/UL (ref 3.6–11)

## 2020-06-13 PROCEDURE — 85652 RBC SED RATE AUTOMATED: CPT

## 2020-06-13 PROCEDURE — 85027 COMPLETE CBC AUTOMATED: CPT

## 2020-06-13 PROCEDURE — 83036 HEMOGLOBIN GLYCOSYLATED A1C: CPT

## 2020-06-13 PROCEDURE — 96375 TX/PRO/DX INJ NEW DRUG ADDON: CPT

## 2020-06-13 PROCEDURE — 96376 TX/PRO/DX INJ SAME DRUG ADON: CPT

## 2020-06-13 PROCEDURE — 36415 COLL VENOUS BLD VENIPUNCTURE: CPT

## 2020-06-13 PROCEDURE — A9575 INJ GADOTERATE MEGLUMI 0.1ML: HCPCS | Performed by: STUDENT IN AN ORGANIZED HEALTH CARE EDUCATION/TRAINING PROGRAM

## 2020-06-13 PROCEDURE — 74011250636 HC RX REV CODE- 250/636: Performed by: INTERNAL MEDICINE

## 2020-06-13 PROCEDURE — 74011250637 HC RX REV CODE- 250/637: Performed by: FAMILY MEDICINE

## 2020-06-13 PROCEDURE — 97161 PT EVAL LOW COMPLEX 20 MIN: CPT

## 2020-06-13 PROCEDURE — 74011250636 HC RX REV CODE- 250/636: Performed by: FAMILY MEDICINE

## 2020-06-13 PROCEDURE — 84100 ASSAY OF PHOSPHORUS: CPT

## 2020-06-13 PROCEDURE — 65390000012 HC CONDITION CODE 44 OBSERVATION

## 2020-06-13 PROCEDURE — 74011250636 HC RX REV CODE- 250/636: Performed by: STUDENT IN AN ORGANIZED HEALTH CARE EDUCATION/TRAINING PROGRAM

## 2020-06-13 PROCEDURE — 65660000000 HC RM CCU STEPDOWN

## 2020-06-13 PROCEDURE — 86141 C-REACTIVE PROTEIN HS: CPT

## 2020-06-13 PROCEDURE — 83090 ASSAY OF HOMOCYSTEINE: CPT

## 2020-06-13 PROCEDURE — 74011000258 HC RX REV CODE- 258: Performed by: INTERNAL MEDICINE

## 2020-06-13 PROCEDURE — 93880 EXTRACRANIAL BILAT STUDY: CPT

## 2020-06-13 PROCEDURE — 70553 MRI BRAIN STEM W/O & W/DYE: CPT

## 2020-06-13 PROCEDURE — 83735 ASSAY OF MAGNESIUM: CPT

## 2020-06-13 PROCEDURE — 97165 OT EVAL LOW COMPLEX 30 MIN: CPT

## 2020-06-13 PROCEDURE — 80061 LIPID PANEL: CPT

## 2020-06-13 PROCEDURE — 97112 NEUROMUSCULAR REEDUCATION: CPT

## 2020-06-13 PROCEDURE — 84443 ASSAY THYROID STIM HORMONE: CPT

## 2020-06-13 PROCEDURE — 94760 N-INVAS EAR/PLS OXIMETRY 1: CPT

## 2020-06-13 PROCEDURE — 97116 GAIT TRAINING THERAPY: CPT

## 2020-06-13 RX ORDER — ACETAMINOPHEN 650 MG/1
650 SUPPOSITORY RECTAL
Status: DISCONTINUED | OUTPATIENT
Start: 2020-06-13 | End: 2020-06-15 | Stop reason: HOSPADM

## 2020-06-13 RX ORDER — GADOTERATE MEGLUMINE 376.9 MG/ML
10 INJECTION INTRAVENOUS
Status: COMPLETED | OUTPATIENT
Start: 2020-06-13 | End: 2020-06-13

## 2020-06-13 RX ORDER — HYDROXYZINE 25 MG/1
25 TABLET, FILM COATED ORAL EVERY 8 HOURS
Status: DISCONTINUED | OUTPATIENT
Start: 2020-06-13 | End: 2020-06-15 | Stop reason: HOSPADM

## 2020-06-13 RX ORDER — LORAZEPAM 2 MG/ML
2 INJECTION INTRAMUSCULAR
Status: DISCONTINUED | OUTPATIENT
Start: 2020-06-13 | End: 2020-06-15 | Stop reason: HOSPADM

## 2020-06-13 RX ORDER — ONDANSETRON 2 MG/ML
4 INJECTION INTRAMUSCULAR; INTRAVENOUS
Status: DISCONTINUED | OUTPATIENT
Start: 2020-06-13 | End: 2020-06-15 | Stop reason: HOSPADM

## 2020-06-13 RX ORDER — LORAZEPAM 2 MG/ML
1 INJECTION INTRAMUSCULAR ONCE
Status: COMPLETED | OUTPATIENT
Start: 2020-06-13 | End: 2020-06-13

## 2020-06-13 RX ORDER — MORPHINE SULFATE 2 MG/ML
1 INJECTION, SOLUTION INTRAMUSCULAR; INTRAVENOUS
Status: DISCONTINUED | OUTPATIENT
Start: 2020-06-13 | End: 2020-06-14

## 2020-06-13 RX ORDER — ACETAMINOPHEN 325 MG/1
650 TABLET ORAL
Status: DISCONTINUED | OUTPATIENT
Start: 2020-06-13 | End: 2020-06-15 | Stop reason: HOSPADM

## 2020-06-13 RX ORDER — ACETAMINOPHEN 160 MG/5ML
650 SOLUTION ORAL
Status: DISCONTINUED | OUTPATIENT
Start: 2020-06-13 | End: 2020-06-15 | Stop reason: HOSPADM

## 2020-06-13 RX ORDER — ENOXAPARIN SODIUM 100 MG/ML
40 INJECTION SUBCUTANEOUS EVERY 12 HOURS
Status: DISCONTINUED | OUTPATIENT
Start: 2020-06-13 | End: 2020-06-15 | Stop reason: HOSPADM

## 2020-06-13 RX ORDER — TOPIRAMATE 25 MG/1
25 TABLET ORAL
Status: DISCONTINUED | OUTPATIENT
Start: 2020-06-13 | End: 2020-06-14

## 2020-06-13 RX ADMIN — ONDANSETRON 4 MG: 2 INJECTION INTRAMUSCULAR; INTRAVENOUS at 15:38

## 2020-06-13 RX ADMIN — LEVETIRACETAM 1000 MG: 100 INJECTION, SOLUTION INTRAVENOUS at 22:54

## 2020-06-13 RX ADMIN — MORPHINE SULFATE 1 MG: 2 INJECTION, SOLUTION INTRAMUSCULAR; INTRAVENOUS at 15:38

## 2020-06-13 RX ADMIN — ONDANSETRON 4 MG: 2 INJECTION INTRAMUSCULAR; INTRAVENOUS at 04:00

## 2020-06-13 RX ADMIN — ONDANSETRON 4 MG: 2 INJECTION INTRAMUSCULAR; INTRAVENOUS at 07:58

## 2020-06-13 RX ADMIN — TOPIRAMATE 25 MG: 25 TABLET, FILM COATED ORAL at 22:51

## 2020-06-13 RX ADMIN — LORAZEPAM 1 MG: 2 INJECTION INTRAMUSCULAR; INTRAVENOUS at 08:51

## 2020-06-13 RX ADMIN — GADOTERATE MEGLUMINE 10 ML: 376.9 INJECTION INTRAVENOUS at 09:23

## 2020-06-13 RX ADMIN — MORPHINE SULFATE 1 MG: 2 INJECTION, SOLUTION INTRAMUSCULAR; INTRAVENOUS at 02:27

## 2020-06-13 RX ADMIN — LEVETIRACETAM 1000 MG: 100 INJECTION, SOLUTION INTRAVENOUS at 10:51

## 2020-06-13 NOTE — PROGRESS NOTES
Daily Progress Note: 6/13/2020  Lucie Wild MD    Assessment/Plan:   Possable acute cerebrovascular accident with residual dysphasia versus breakthrough seizure. Seizure disorder. History of migraine headaches. CT head and CT angiogram head and neck code neuro negative for any acute findings.  - MRI brain and carotid Dopplers.    - Statin, IV Keppra, as-needed IV Ativan for breakthrough seizures. Patient unable to tolerate aspirin due to increased bleeding. Close neuro checks and Neurology consulted. History of obstructive sleep apnea. Leukocytosis, probably due to a leukemoid reaction from possible seizure. No stigmata of sepsis. Will monitor off anti-infective therapy. History of gastroesophageal reflux disease. Endocrine:  Obesity with a body mass index greater than 35. Would benefit from therapeutic lifestyle changes counseling. Anxiety disorder and depression. Prophylaxis for deep venous thrombosis. Rehabilitation:  Speech, Physical and Occupational Therapy evaluations.    Directives:  Full code with a very guarded prognosis.        Problem List:  Problem List as of 6/13/2020 Date Reviewed: 3/8/2020          Codes Class Noted - Resolved    CVA (cerebral vascular accident) [facility]) ICD-10-CM: I63.9  ICD-9-CM: 434.91  6/12/2020 - Present        Chest pain ICD-10-CM: R07.9  ICD-9-CM: 786.50  3/8/2020 - Present        Rectal bleeding ICD-10-CM: K62.5  ICD-9-CM: 569.3  5/30/2019 - Present        CARLOS (obstructive sleep apnea) ICD-10-CM: G47.33  ICD-9-CM: 327.23  Unknown - Present    Overview Signed 5/30/2019  7:55 PM by Nancy Fletcher MD     no cpap             HTN (hypertension) ICD-10-CM: I10  ICD-9-CM: 401.9  Unknown - Present        Hx of seizure disorder ICD-10-CM: Z86.69  ICD-9-CM: V12.49  Unknown - Present        Fibromyalgia ICD-10-CM: M79.7  ICD-9-CM: 729.1  Unknown - Present    Overview Signed 5/30/2019  7:55 PM by Nancy Fletcher MD     fibromyalgia Anxiety and depression ICD-10-CM: F41.9, F32.9  ICD-9-CM: 300.00, 311  Unknown - Present        Hx of migraines ICD-10-CM: Z86.69  ICD-9-CM: V12.49  Unknown - Present        Severe obesity ([address] ICD-10-CM: E66.01  ICD-9-CM: 278.01  4/29/2019 - Present        Hiatal hernia ICD-10-CM: K44.9  ICD-9-CM: 553.3  5/18/2018 - Present        Incomplete right bundle branch block (RBBB) determined by electrocardiography ICD-10-CM: I45.10  ICD-9-CM: 426.4  5/10/2018 - Present        Hiatal hernia with gastroesophageal reflux ICD-10-CM: K21.9, K44.9  ICD-9-CM: 530.81, 553.3  4/1/2018 - Present        RESOLVED: Obese ICD-10-CM: E66.9  ICD-9-CM: 278.00  Unknown - 3/8/2020        RESOLVED: Elevated lactic acid level ICD-10-CM: R79.89  ICD-9-CM: 276.2  5/30/2019 - 3/8/2020        RESOLVED: Dehydration ICD-10-CM: E86.0  ICD-9-CM: 276.51  5/30/2019 - 3/8/2020        RESOLVED: Anemia ICD-10-CM: D64.9  ICD-9-CM: 285.9  5/30/2019 - 3/8/2020        RESOLVED: Hemorrhoids ICD-10-CM: K64.9  ICD-9-CM: 455.6  Unknown - 3/8/2020        RESOLVED: Depressive disorder, not elsewhere classified ICD-10-CM: F32.9  ICD-9-CM: 152  10/1/2013 - 5/30/2019        RESOLVED: Leukocytosis, unspecified ICD-10-CM: A80.636  ICD-9-CM: 288.60  10/1/2013 - 5/30/2019        RESOLVED: Hypokalemia ICD-10-CM: E87.6  ICD-9-CM: 276.8  10/1/2013 - 3/8/2020        RESOLVED: Slurred speech ICD-10-CM: R47.81  ICD-9-CM: 784.59  9/30/2013 - 5/30/2019        RESOLVED: Seizure ([facility] (Chronic) ICD-10-CM: R56.9  ICD-9-CM: 780.39  5/2/2011 - 5/30/2019        RESOLVED: Migraine (Chronic) ICD-10-CM: G43.909  ICD-9-CM: 346.90  5/2/2011 - 5/30/2019        RESOLVED: Cavernous hemangioma of intracranial structure ([facility] (Chronic) ICD-10-CM: D18.02  ICD-9-CM: 228.02  5/2/2011 - 3/8/2020              Subjective:     Please note most of the history was obtained from the patient's mother via phone who witnessed onset of presenting symptoms as  patient with word-finding difficulty and unable to give all pertinent history. 52-year-old female with a past medical history significant for obesity, obstructive sleep apnea, gastroesophageal reflux disease, primary hypertension, seizure disorder, migraine headaches, anxiety disorder, and depression, was brought to the emergency room from home via EMS for word-finding deficit and difficulty in speaking. Per  patient's mother, patient was at her baseline mental status up until about 03:00 p.m. on 2020, whereafter the patient was noted with repetitive monosyllabic phrases. Per patient's mother, patient had full comprehension, expression and was able to speak in full sentences prior to the onset of symptoms. Patient had no prior complaints of headaches, dizziness, visual deficits, chest pains, palpitations, sore throat, cough, shortness of breath, nausea, vomiting, fevers, chills, abdominal pain, bladder or bowel irregularities. :  Afebrile. WBC still elevated - monitoring. BP mildly up - medicate as needed. Review of Systems:   Review of systems not obtained due to patient factors. Objective:   Physical Exam:     Visit Vitals  BP (!) 140/103   Pulse 86   Temp 97.5 °F (36.4 °C)   Resp 17   Wt 112.3 kg (247 lb 9.2 oz)   LMP 2011   SpO2 97%   BMI 43.86 kg/m²      O2 Device: Room air    Temp (24hrs), Av.5 °F (36.4 °C), Min:97.5 °F (36.4 °C), Max:97.5 °F (36.4 °C)    No intake/output data recorded. No intake/output data recorded. General:  Alert, obese, cooperative, no distress, appears stated age. Head:  Normocephalic, without obvious abnormality, atraumatic. Eyes:  Conjunctivae/corneas clear. PERRL, EOMs intact. Throat: Lips, mucosa, and tongue moist..   Neck: Supple, symmetrical, trachea midline, no adenopathy, thyroid: no enlargement/tenderness/nodules, no carotid bruit and no JVD. Lungs:   Clear to auscultation bilaterally. Chest wall:  No tenderness or deformity.    Heart:  Regular rate and rhythm, no murmur, click, rub or gallop. Abdomen:   Soft, non-tender. Bowel sounds normal. No masses,  No organomegaly. Extremities: no cyanosis or edema. No calf tenderness or cords. Pulses: 2+ and symmetric all extremities. Skin: Skin color, texture, turgor normal. No rashes    Neurologic:  Alert and oriented X 3 as best as could be determined. Aphasic off and on.  equal.  No cogwheeling or rigidity. Gait not tested at this time. Sensation grossly normal to touch. Gross motor of extremities normal.       Data Review:       Recent Days:  Recent Labs     06/13/20  0749 06/12/20 2019   WBC 16.0* 13.9*   HGB 13.7 14.5   HCT 43.1 45.6   * 395     Recent Labs     06/12/20 2019      K 3.5      CO2 25   *   BUN 10   CREA 1.01   CA 9.6   MG 2.4   ALB 4.0   TBILI 0.6   ALT 34     No results for input(s): PH, PCO2, PO2, HCO3, FIO2 in the last 72 hours. 24 Hour Results:  Recent Results (from the past 24 hour(s))   SAMPLES BEING HELD    Collection Time: 06/12/20  8:19 PM   Result Value Ref Range    SAMPLES BEING HELD MERLY.GRN. SST     COMMENT        Add-on orders for these samples will be processed based on acceptable specimen integrity and analyte stability, which may vary by analyte. METABOLIC PANEL, COMPREHENSIVE    Collection Time: 06/12/20  8:19 PM   Result Value Ref Range    Sodium 138 136 - 145 mmol/L    Potassium 3.5 3.5 - 5.1 mmol/L    Chloride 107 97 - 108 mmol/L    CO2 25 21 - 32 mmol/L    Anion gap 6 5 - 15 mmol/L    Glucose 117 (H) 65 - 100 mg/dL    BUN 10 6 - 20 MG/DL    Creatinine 1.01 0.55 - 1.02 MG/DL    BUN/Creatinine ratio 10 (L) 12 - 20      GFR est AA >60 >60 ml/min/1.73m2    GFR est non-AA 57 (L) >60 ml/min/1.73m2    Calcium 9.6 8.5 - 10.1 MG/DL    Bilirubin, total 0.6 0.2 - 1.0 MG/DL    ALT (SGPT) 34 12 - 78 U/L    AST (SGOT) 25 15 - 37 U/L    Alk.  phosphatase 160 (H) 45 - 117 U/L    Protein, total 9.6 (H) 6.4 - 8.2 g/dL    Albumin 4.0 3.5 - 5.0 g/dL    Globulin 5.6 (H) 2.0 - 4.0 g/dL    A-G Ratio 0.7 (L) 1.1 - 2.2     CBC WITH AUTOMATED DIFF    Collection Time: 06/12/20  8:19 PM   Result Value Ref Range    WBC 13.9 (H) 3.6 - 11.0 K/uL    RBC 5.20 3.80 - 5.20 M/uL    HGB 14.5 11.5 - 16.0 g/dL    HCT 45.6 35.0 - 47.0 %    MCV 87.7 80.0 - 99.0 FL    MCH 27.9 26.0 - 34.0 PG    MCHC 31.8 30.0 - 36.5 g/dL    RDW 14.0 11.5 - 14.5 %    PLATELET 445 679 - 729 K/uL    MPV 10.5 8.9 - 12.9 FL    NRBC 0.0 0  WBC    ABSOLUTE NRBC 0.00 0.00 - 0.01 K/uL    NEUTROPHILS 75 32 - 75 %    LYMPHOCYTES 17 12 - 49 %    MONOCYTES 5 5 - 13 %    EOSINOPHILS 1 0 - 7 %    BASOPHILS 1 0 - 1 %    IMMATURE GRANULOCYTES 1 (H) 0.0 - 0.5 %    ABS. NEUTROPHILS 10.7 (H) 1.8 - 8.0 K/UL    ABS. LYMPHOCYTES 2.3 0.8 - 3.5 K/UL    ABS. MONOCYTES 0.6 0.0 - 1.0 K/UL    ABS. EOSINOPHILS 0.2 0.0 - 0.4 K/UL    ABS. BASOPHILS 0.1 0.0 - 0.1 K/UL    ABS. IMM. GRANS. 0.1 (H) 0.00 - 0.04 K/UL    DF AUTOMATED     MAGNESIUM    Collection Time: 06/12/20  8:19 PM   Result Value Ref Range    Magnesium 2.4 1.6 - 2.4 mg/dL   GLUCOSE, POC    Collection Time: 06/12/20  8:23 PM   Result Value Ref Range    Glucose (POC) 122 (H) 65 - 100 mg/dL    Performed by [address] [place] [address],[address] W/ RFLX MICROSCOPIC    Collection Time: 06/12/20 10:48 PM   Result Value Ref Range    Color YELLOW/STRAW      Appearance CLEAR CLEAR      Specific gravity 1.005 1.003 - 1.030      pH (UA) 7.0 5.0 - 8.0      Protein Negative NEG mg/dL    Glucose Negative NEG mg/dL    Ketone Negative NEG mg/dL    Bilirubin Negative NEG      Blood Negative NEG      Urobilinogen 0.2 0.2 - 1.0 EU/dL    Nitrites Negative NEG      Leukocyte Esterase Negative NEG     SAMPLES BEING HELD    Collection Time: 06/12/20 10:48 PM   Result Value Ref Range    SAMPLES BEING HELD GRY TUBE URINE     COMMENT        Add-on orders for these samples will be processed based on acceptable specimen integrity and analyte stability, which may vary by analyte.    CBC W/O DIFF    Collection Time: 06/13/20  7:49 AM   Result Value Ref Range    WBC 16.0 (H) 3.6 - 11.0 K/uL    RBC 4.87 3.80 - 5.20 M/uL    HGB 13.7 11.5 - 16.0 g/dL    HCT 43.1 35.0 - 47.0 %    MCV 88.5 80.0 - 99.0 FL    MCH 28.1 26.0 - 34.0 PG    MCHC 31.8 30.0 - 36.5 g/dL    RDW 14.1 11.5 - 14.5 %    PLATELET 332 (H) 752 - 400 K/uL    MPV 10.6 8.9 - 12.9 FL    NRBC 0.0 0  WBC    ABSOLUTE NRBC 0.00 0.00 - 0.01 K/uL       Medications reviewed  Current Facility-Administered Medications   Medication Dose Route Frequency    acetaminophen (TYLENOL) tablet 650 mg  650 mg Oral Q4H PRN    Or    acetaminophen (TYLENOL) solution 650 mg  650 mg Per NG tube Q4H PRN    Or    acetaminophen (TYLENOL) suppository 650 mg  650 mg Rectal Q4H PRN    enoxaparin (LOVENOX) injection 40 mg  40 mg SubCUTAneous Q12H    levETIRAcetam (KEPPRA) 1,000 mg in 0.9% sodium chloride 100 mL IVPB  1,000 mg IntraVENous Q12H    LORazepam (ATIVAN) injection 2 mg  2 mg IntraVENous Q4H PRN    morphine injection 1 mg  1 mg IntraVENous Q4H PRN    ondansetron (ZOFRAN) injection 4 mg  4 mg IntraVENous Q6H PRN     Current Outpatient Medications   Medication Sig    atenoloL (TENORMIN) 50 mg tablet Take 50 mg by mouth daily.  ibuprofen (MOTRIN) 800 mg tablet Take 800 mg by mouth every eight (8) hours as needed for Pain.  methocarbamoL (ROBAXIN) 750 mg tablet Take 750 mg by mouth three (3) times daily as needed for Muscle Spasm(s).  famotidine (PEPCID) 20 mg tablet Take 1 Tab by mouth two (2) times a day.  lisinopriL (PRINIVIL, ZESTRIL) 10 mg tablet Take 1 Tab by mouth daily.  pantoprazole (PROTONIX) 40 mg tablet Take 40 mg by mouth two (2) times a day.  busPIRone (BUSPAR) 15 mg tablet Take 15 mg by mouth two (2) times a day.  DULoxetine (CYMBALTA) 60 mg capsule Take 120 mg by mouth nightly.  topiramate (TOPAMAX) 25 mg tablet Take 25 mg by mouth two (2) times daily (with meals).     ALPRAZolam (XANAX) 1 mg tablet Take 1 mg by mouth three (3) times daily. Care Plan discussed with: Patient/Nurse  Total time spent with patient: 30 minutes.   Mirela Jimenez MD

## 2020-06-13 NOTE — ED PROVIDER NOTES
The patient is a 58-year-old female history of anxiety, cavernous angioma, fibromyalgia, seizure disorder and hypertension presenting to the emergency department today with altered mental status and possible seizures. Patient is acutely altered and unable to provide any history. EMS provides majority of history. They state that they were called for a seizure. By the time they got there she was walking around but seemed confused. They witnessed her to have 2 more seizures and gave her intranasal Versed. They describe seizures that looked like she was looking to the right but not grand mal seizures. Blood sugar okay in route. Full history, physical exam, and ROS unable to be obtained due to:  confusion.              Past Medical History:   Diagnosis Date    Anxiety and depression     Cavernous hemangioma of intracranial structure ([facility] 5/2/2011    Fibromyalgia     fibromyalgia    Hemorrhoids     Hiatal hernia with gastroesophageal reflux 04/2018    Hx of migraines     Hx of seizure disorder     Hypertension     Incomplete right bundle branch block (RBBB) determined by electrocardiography 05/10/2018    Obese     CARLOS (obstructive sleep apnea)     no cpap       Past Surgical History:   Procedure Laterality Date    COLONOSCOPY N/A 6/3/2019    COLONOSCOPY performed by Haylee Snell MD at [address] [facility] [address] HX HEENT  2012    tonsillectomy    HX LITHOTRIPSY      HX ORTHOPAEDIC      disc fusion 2010, right knee cyst removed   [facility], 2006    Cavernous brain angioma(s) removed    SURGERY,BRAIN/SPINE,W/COMPUTER           Family History:   Problem Relation Age of Onset    Cancer Mother         breast    Breast Cancer Mother 76    Cancer Father     Migraines Father     Diabetes Father     Breast Cancer Sister 48    Ovarian Cancer Maternal Aunt        Social History     Socioeconomic History    Marital status: SINGLE     Spouse name: Not on file    Number of children: Not on file    Years of education: Not on file    Highest education level: Not on file   Occupational History    Not on file   Social Needs    Financial resource strain: Not on file    Food insecurity     Worry: Not on file     Inability: Not on file    Transportation needs     Medical: Not on file     Non-medical: Not on file   Tobacco Use    Smoking status: Former Smoker    Smokeless tobacco: Never Used    Tobacco comment: on chantix,   Substance and Sexual Activity    Alcohol use: No    Drug use: No    Sexual activity: Not on file   Lifestyle    Physical activity     Days per week: Not on file     Minutes per session: Not on file    Stress: Not on file   Relationships    Social connections     Talks on phone: Not on file     Gets together: Not on file     Attends Restorationism service: Not on file     Active member of club or organization: Not on file     Attends meetings of clubs or organizations: Not on file     Relationship status: Not on file    Intimate partner violence     Fear of current or ex partner: Not on file     Emotionally abused: Not on file     Physically abused: Not on file     Forced sexual activity: Not on file   Other Topics Concern    Not on file   Social History Narrative    Not on file         ALLERGIES: Latex, natural rubber; Aspirin; Other medication; Codeine; Hydralazine; Imitrex [sumatriptan]; and Percocet [oxycodone-acetaminophen]    Review of Systems   Unable to perform ROS: Mental status change       Vitals:    06/12/20 2018 06/12/20 2030 06/12/20 2101 06/12/20 2102   BP: (!) 198/153 (!) 202/151  170/83   Pulse: 96 90  87   Resp: 25 18  10   Temp:       SpO2: 93% 93%  96%   Weight:   112.3 kg (247 lb 9.2 oz)             Physical Exam  Vitals signs and nursing note reviewed. Constitutional:       General: She is not in acute distress. Appearance: She is well-developed. She is not diaphoretic. HENT:      Head: Normocephalic. Mouth/Throat:      Pharynx: No oropharyngeal exudate. Eyes:      General:         Right eye: No discharge. Left eye: No discharge. Pupils: Pupils are equal, round, and reactive to light. Neck:      Musculoskeletal: Normal range of motion and neck supple. No neck rigidity. Cardiovascular:      Rate and Rhythm: Normal rate and regular rhythm. Heart sounds: Normal heart sounds. No murmur. No friction rub. No gallop. Pulmonary:      Effort: Pulmonary effort is normal. No respiratory distress. Breath sounds: Normal breath sounds. No stridor. No wheezing or rales. Abdominal:      General: Bowel sounds are normal. There is no distension. Palpations: Abdomen is soft. Tenderness: There is no abdominal tenderness. There is no guarding or rebound. Musculoskeletal: Normal range of motion. General: No deformity. Skin:     General: Skin is warm and dry. Capillary Refill: Capillary refill takes less than 2 seconds. Findings: No rash. Neurological:      Mental Status: She is alert. Comments: Awake and alert  Patient is confused, has trouble following commands, and has expressive aphasia  Equal strength in all 4 extremities  Normal tongue protrusion  No facial asymmetry   Psychiatric:         Behavior: Behavior normal.              Labs Reviewed:   Mild leukocytosis  Normal renal function and electrolytes  Glucose okay  No anemia        Imaging Reviewed:   CT head negative for acute process  CTA head neck negative for acute process      Course: On arrival patient markedly confused telling me that she has headache with possible seizure activity. Code stroke was called given her aphasia and atypical presentation for postictal state. I discussed with her mother on the phone. She actually tells me the patient started around 11 AM this morning with difficulty finding her words. This is atypical for her. She has been doing well otherwise.   She was also complaining of a right-sided headache. I discussed with telemetry neurology. They felt that this was more likely to be seizure and a stroke. They did see a possible pituitary apoplexy and recommended discussing with radiology. I did discuss with the radiologist who said the patient had calcification in her pituitary which is chronic and unchanged. Patient was loaded with Keppra per request of neurology. Patient was given a dose of labetalol for markedly elevated blood pressures as this could also be secondary to hypertension. Blood pressure did improve    Hospitalist Perfect Serve for Admission  9:43 PM    ED Room Number: ER10/10  Patient Name and age:  Tony Raines 49 y.o.  female  Working Diagnosis:   1. Encephalopathy    2. Aphasia    3. History of seizure        COVID-19 Suspicion:  no    Code Status:  Full Code  Readmission: no  Isolation Requirements:  no  Recommended Level of Care:  step down  Department:[facility] ED - [phone]  Other:  49 y.o. female presented with AMS and possible seizures (has hx of seizure). Aphasia started at 11am today. Seen by neuro felt this is likely seizures, loading with keppra. Getting labetalol for elevated blood pressures. MDM:  60-year-old female presenting to the emergency department today with what appears to be encephalopathy. Differential included stroke versus seizure versus ICH versus metabolic process. At this time I found no metabolic process to be causing her symptoms. CT including CTA head and neck were negative for acute process. Neurology felt that this was most likely seizure without convulsions. They recommended Keppra which she was loaded with. This could also be secondary to her blood pressure therefore she was given IV labetalol. I considered meningitis however she has no meningismus and is afebrile. Patient to be admitted for further management. Clinical Impression:     ICD-10-CM ICD-9-CM    1.  Encephalopathy G93.40 348.30    2. Aphasia R47.01 784.3    3. History of seizure Z87.898 V12.49            Disposition: Admit    Total critical care time spent exclusive of procedures:  40  Due to a high probability of clinically significant, life threatening deterioration, the patient required my highest level of preparedness to intervene emergently and I personally spent this critical care time directly and personally managing the patient. This critical care time included obtaining a history; examining the patient; pulse oximetry; ordering and review of studies; arranging urgent treatment with development of a management plan; evaluation of patient's response to treatment; frequent reassessment; and, discussions with other providers. This critical care time was performed to assess and manage the high probability of imminent, life-threatening deterioration that could result in multi-organ failure. It was exclusive of separately billable procedures and treating other patients and teaching time.     Theodore Can, DO

## 2020-06-13 NOTE — ED NOTES
Bedside and Verbal shift change report [facility] (oncoming nurse) by Jimmy Knox RN (offgoing nurse). Report included the following information SBAR, Kardex, ED Summary, MAR, Accordion and Recent Results.

## 2020-06-13 NOTE — PROGRESS NOTES
Admission Medication Reconciliation:     Please review list with caution. The patient was very altered and unable to complete the interview. Family was unable to assist with the interview. The list is based on pill bottles. Information obtained from:    Pill bottles in the patient's purse   reviewed  RxQuery data available¹:  YES    Comments/Recommendations: The patient was noted to have AMS. Pharmacist called the patient's emergency contact, her mother, to review the list of home medications. The patient's mother declined interview as she is not knowledgeable about what medications the patient is taking at home. The patient's mother reports she placed all of the patient's medications in the patient's purse. The pharmacist reviewed the pill bottles in the patient's purse. The pharmacist notified the nurse of the pill bottles in the patient's purse so security could be contacted to lock up the medications. Chantix was in the bag of bottles but the bottle was full and it was last filled in January of 2020 for a 90 day supply. Chantix 1 mg twice daily has not been added to the PTA list as it does not appear that the patient is taking this medication. Pharmacist will attempt to follow up with the patient tomorrow. ¹RxQuery pharmacy benefit data reflects medications filled and processed through the patient's insurance, however   this data does NOT capture whether the medication was picked up or is currently being taken by the patient. Prior to Admission Medications   Prescriptions Last Dose Informant Taking? ALPRAZolam (XANAX) 1 mg tablet  Other Yes   Sig: Take 1 mg by mouth three (3) times daily. DULoxetine (CYMBALTA) 60 mg capsule   Yes   Sig: Take 120 mg by mouth nightly. atenoloL (TENORMIN) 50 mg tablet   Yes   Sig: Take 50 mg by mouth daily. busPIRone (BUSPAR) 15 mg tablet   Yes   Sig: Take 15 mg by mouth two (2) times a day.    famotidine (PEPCID) 20 mg tablet   Yes   Sig: Take 1 Tab by mouth two (2) times a day. ibuprofen (MOTRIN) 800 mg tablet   Yes   Sig: Take 800 mg by mouth every eight (8) hours as needed for Pain. lisinopriL (PRINIVIL, ZESTRIL) 10 mg tablet   Yes   Sig: Take 1 Tab by mouth daily. methocarbamoL (ROBAXIN) 750 mg tablet   Yes   Sig: Take 750 mg by mouth three (3) times daily as needed for Muscle Spasm(s). pantoprazole (PROTONIX) 40 mg tablet   Yes   Sig: Take 40 mg by mouth two (2) times a day. topiramate (TOPAMAX) 25 mg tablet   Yes   Sig: Take 25 mg by mouth two (2) times daily (with meals). Facility-Administered Medications: None         Please contact the main inpatient pharmacy with any questions or concerns at [phone] and we will direct you to the clinical pharmacist covering this patient's care while in-house.    Abraham Sandoval, SeraD, BCPS

## 2020-06-13 NOTE — ED NOTES
Pt refused Tylenol. Pt states \" tylenol won't help me with headache. I need different or stronger medicine. \" Dr. Solitario Kramer made aware. No new order received at this time.

## 2020-06-13 NOTE — ROUTINE PROCESS
Bedside shift change report given to Shannan Ng RN (oncoming nurse) by Lluvia Day RN (offgoing nurse). Report included the following information SBAR and Kardex.

## 2020-06-13 NOTE — ED NOTES
Bedside and Verbal shift change report given to Keo (oncoming nurse) by Chela Conley (offgoing nurse). Report included the following information SBAR, ED Summary, MAR and Recent Results.

## 2020-06-13 NOTE — ROUTINE PROCESS
TRANSFER - IN REPORT: 
 
Verbal report received from SNOW Valentino(name) on [address]  being received from ED(unit) for routine progression of care Report consisted of patients Situation, Background, Assessment and  
Recommendations(SBAR). Information from the following report(s) SBAR and Kardex was reviewed with the receiving nurse. Opportunity for questions and clarification was provided. Assessment completed upon patients arrival to unit and care assumed.

## 2020-06-13 NOTE — ROUTINE PROCESS
Primary Nurse Corinne Rahman, SNOW and [place], RN performed a dual skin assessment on this patient No impairment noted Tyree score is 23

## 2020-06-13 NOTE — ROUTINE PROCESS
Stroke Education provided to caregiver / friend and the following topics were discussed 1. Patients personal risk factors for stroke are hypertension, family history and prior stroke 2. Warning signs of Stroke: * Sudden numbness or weakness of the face, arm or leg, especially on one side of The body * Sudden confusion, trouble speaking or understanding * Sudden trouble seeing in one or both eyes * Sudden trouble walking, dizziness, loss of balance or coordination * Sudden severe headache with no known cause 3. Importance of activation Emergency [facility] Services ( 9-1-1 ) immediately if experience any warning signs of stroke. 4. Be sure and schedule a follow-up appointment with your primary care doctor or any specialists as instructed. 5. You must take medicine every day to treat your risk factors for stroke. Be sure to take your medicines exactly as your doctor tells you: no more, no less. Know what your medicines are for , what they do. Anti-thrombotics /anticoagulants can help prevent strokes. You are taking the following medicine(s)  Lovenox 6. Smoking and second-hand smoke greatly increase your risk of stroke, cardiovascular disease and death. Smoking history never 7. Information provided was [facility] Stroke Education [employer] 8. Documentation of teaching completed in Patient Education Activity and on Care Plan with teaching response noted? yes

## 2020-06-13 NOTE — PROGRESS NOTES
Pharmacy Dosing Note    Ordered Regimen: Enoxaparin 40 mg subcutaneously every 24 hours  New Regimen: Change to enoxaparin 40 mg subcutaneously every 12 hours for BMI greater than 40.     BMI = 43.86    Thank you,  Tadeo Mantilla, PHARMD

## 2020-06-13 NOTE — ED NOTES
Pt refused to keep BP cuff. Pt states \" too tight. I don't want it. \" Pt took it off and throw it on the floor.

## 2020-06-13 NOTE — H&P
Florenc[facility] [place] [address]  HISTORY AND PHYSICAL    Name:  Rosina ELIZABETH  MR#:  [id number]  :  1965  ACCOUNT #:  [de-identified]    DATE OF SERVICE:  2020    CHIEF COMPLAINT:  Difficulty in speaking. HISTORY OF PRESENT ILLNESS:  Please note most of the history was obtained from the patient's mother via phone who witnessed onset of presenting symptoms as  patient with word-finding difficulty and unable to give all pertinent history. 25-year-old female with a past medical history significant for obesity, obstructive sleep apnea, gastroesophageal reflux disease, primary hypertension, seizure disorder, migraine headaches, anxiety disorder, and depression, was brought to the emergency room from home via EMS for word-finding deficit and difficulty in speaking. Per  patient's mother, patient was at her baseline mental status up until about 03:00 p.m. on 2020, whereafter the patient was noted with repetitive monosyllabic phrases. Per patient's mother, patient had full comprehension, expression and was able to speak in full sentences prior to the onset of symptoms. Patient had no prior complaints of headaches, dizziness, visual deficits, chest pains, palpitations, sore throat, cough, shortness of breath, nausea, vomiting, fevers, chills, abdominal pain, bladder or bowel irregularities. PAST MEDICAL HISTORY:  1.  Primary hypertension. 2.  Gastroesophageal reflux disease. 3.  Hiatal hernia. 4.  Fibromyalgia. 5.  Migraine headaches. 6.  Seizure disorder. 7.  Obstructive sleep apnea. 8.  Obesity. 9.  Anxiety disorder. 10. Depression. PAST SURGICAL HISTORY:  1. Lithotripsy. 2.  Cholecystectomy. 3.  Colonoscopy. 4.  Orthopedic surgery. HOME MEDICATIONS:  1. Xanax 1 mg p.o. three times a day. 2.  BuSpar 15 mg p.o. nightly. 3.  Cymbalta 120 mg p.o. nightly. 4.  Pepcid 20 mg p.o. twice a day. 5.  Lisinopril 10 mg one tablet p.o. daily. 6.  Protonix 40 mg p.o. nightly.   7. MiraLax 17 g packet p.o. daily. 8.  Zanaflex 4 mg p.o. three times a day. 9.  Topamax 25 mg p.o. nightly. 10. Trazodone 100-200 mg p.o. nightly as needed for sleep. ALLERGIES:  1.  ASPIRIN. 2.  CODEINE. 3.  HYDRALAZINE. 4.  IMITREX. 5.  PERCOCET. SOCIAL HISTORY:  Significant for living at home. Is independent of activities and instrumental activities of daily living. Smokes two cigarettes daily. No alcohol use disorder. No IV or illicit drug use. No recent travels or sick contacts. FAMILY HISTORY:  Reviewed and positive for breast cancer on the maternal side of the family. Migraine headaches, diabetes on the paternal side of the family. REVIEW OF SYSTEMS:  Attempted to conduct a complete system review, however, unsuccessful due to the patient's dysphasia and dysarthria. PHYSICAL EXAMINATION:  GENERAL:  No acute distress. VITAL SIGNS:  Blood pressure of 170/83, heart rate 87, respiratory rate 10, afebrile, saturating 96% on room air. HEENT:  Head exam, normocephalic. Pupils equal and reactive to light without any nystagmus. ENT exam, ear, nose, throat clear. NECK:  No jugular venous distention or carotid bruits. CARDIOVASCULAR:  S1, S2 present without murmurs. RESPIRATORY:  Lungs with decreased air entry at the bases without any crepitations or rhonchi. GI:  Abdomen obese. Bowel sounds present. The abdomen is soft, nontender. No rebound, no guarding. GENITOURINARY:  No suprapubic or flank tenderness. MUSCULOSKELETAL:  No asymmetry of the extremities. Power appeared to be 5/5 in bilateral upper and lower extremities. Gait was not assessed. CNS:  Sensory system intact. The patient was awake, alert, however, unable to ascertain orientation due to the dysphasia. PSYCHIATRY:  Unable to assess mood. LABORATORY/RADIOGRAPHIC FINDINGS:     CT head code neuro without any acute intracranial findings.     CT angiogram head and neck code neuro without any central arterial occlusion or substantial stenosis. No common intracranial artery or extracranial artery or vertebral occlusion or stenosis. CT perfusion without any perfusion abnormalities beyond an area of postsurgical myelomalacia. A 2D echocardiogram done on 03/08/2020 with an ejection fraction of 65% to 31%, normal systolic function. Metabolic panel with a sodium of 138, potassium 3.5, chloride 107, bicarb 25, BUN 10, creatinine 1.01, glucose of 117, calcium 9.6 with a magnesium of 2.4. CBC with a WBC of 13.9, 75% polys, hemoglobin 14.5, hematocrit 45.6, platelet count 530, MCV 87.7. Urinalysis pending. ASSESSMENT/PLAN:  1. Central Nervous System: Probable acute cerebrovascular accident with residual dysphasia versus breakthrough seizure. Seizure disorder. History of migraine headaches. CT head and CT angiogram head and neck code neuro negative for any acute findings. Further evaluation with MRI brain and carotid Dopplers. Statin, IV Keppra, as-needed IV Ativan for breakthrough seizures. Patient unable to tolerate aspirin due to increased bleeding. Close neuro checks and Neurology consult. 2.  Respiratory:  History of obstructive sleep apnea. 3.  Infectious Disease:  Leukocytosis, probably due to a leukemoid reaction from possible seizure. No stigmata of sepsis. Will monitor off anti-infective therapy. 4.  Gastrointestinal:  History of gastroesophageal reflux disease. 5.  Endocrine:  Obesity with a body mass index greater than 35. Would benefit from therapeutic lifestyle changes counseling. 6.  Psychiatry:  Anxiety disorder and depression. 7.  Prophylaxis for deep venous thrombosis. 8.  Rehabilitation:  Speech, Physical and Occupational Therapy evaluations. 9.  Directives:  Full code with a very guarded prognosis.     In summary, a 73-year-old female with a past medical history significant for primary hypertension, seizure disorder, migraine headaches, obstructive sleep apnea, hiatal hernia, gastroesophageal reflux disease, anxiety disorder, and depression, is being admitted to the inpatient level for acute encephalopathy probably due to cerebrovascular accident with residual dysphasia versus a breakthrough seizure, necessitating ongoing close monitoring and management including with neuro checks, complete cerebrovascular accident workup, electroencephalogram, IV antiepileptics, and Neurology consult for further recommendations. The entire admission plans discussed in detail with  patient's mother, Bev Long, via cell [phone]. All questions and concerns were addressed. Patient's functional status prior to admission significant for patient being able to ambulate unassisted. Total time for admission, 45 minutes, of which at least 50% of the time was spent in plan of care and counseling of patient.       Xin Barksdale MD      [id number]  D:  06/12/2020 22:55  T:  06/13/2020 0:43  JOB #:  [id number]

## 2020-06-13 NOTE — ED NOTES
Pt refusing to keep EKG leads and pulse ox on after replacing stickers and hook her back up to the monitor. Pt unhooked herself numerous times. Educated pt important of checking V/S continuously. Pt refused to be on cardiac monitor, pt got out of the bed by herself several times. Dr. Gracy Balderas made aware.

## 2020-06-13 NOTE — ED NOTES
TRANSFER - OUT REPORT:    Verbal report given to Maylin(name) sandro Ferrer  being transferred to 5th floor(unit) for routine progression of care       Report consisted of patients Situation, Background, Assessment and   Recommendations(SBAR). Information from the following report(s) SBAR, Kardex, ED Summary, [facility] and Recent Results was reviewed with the receiving nurse. Lines:   Peripheral IV 06/12/20 Right Antecubital (Active)        Opportunity for questions and clarification was provided.       Patient transported with:   [place]

## 2020-06-13 NOTE — ED NOTES
0400 labs were not drawn prior to assuming care of pt. Labs drawn at this time. Pt dry heaving during RN assessment; 4 mg Zofran given at this time. Pt refused Lovenox injection because she states that she is at a high risk for brain bleed. Pt is ambulatory and makes frequent position changes independently. Plan to speak to attending MD once labs result to enquire about possible bed placement downgrade. Charge RN aware. 0982: Verbal orders for 1 mg ativan received via telephone from Dr. Rizwana Brunson for MRI.  MRI checklist at bedside, MRI notified that pt is ready for exam.

## 2020-06-13 NOTE — ED NOTES
Assumed care of pt at this time. Pt had multiple home meds with her on admission. Meds have been sealed in bag and placed with security. Claim ticket put in folder at 's desk, and has also been scanned into chart.

## 2020-06-13 NOTE — PROGRESS NOTES
Admission Medication Reconciliation:  Talked to SNOW Bond  Patient is alert but cannot get right words out  Please review current med list with caution and order meds as clinically required. ALPRAZolam (XANAX) 1 mg tablet   Other Yes   Sig: Take 1 mg by mouth three (3) times daily. DULoxetine (CYMBALTA) 60 mg capsule     Yes   Sig: Take 120 mg by mouth nightly. atenoloL (TENORMIN) 50 mg tablet     Yes   Sig: Take 50 mg by mouth daily. busPIRone (BUSPAR) 15 mg tablet     Yes   Sig: Take 15 mg by mouth two (2) times a day. famotidine (PEPCID) 20 mg tablet     Yes   Sig: Take 1 Tab by mouth two (2) times a day. ibuprofen (MOTRIN) 800 mg tablet     Yes   Sig: Take 800 mg by mouth every eight (8) hours as needed for Pain. lisinopriL (PRINIVIL, ZESTRIL) 10 mg tablet     Yes   Sig: Take 1 Tab by mouth daily. methocarbamoL (ROBAXIN) 750 mg tablet     Yes   Sig: Take 750 mg by mouth three (3) times daily as needed for Muscle Spasm(s). pantoprazole (PROTONIX) 40 mg tablet     Yes   Sig: Take 40 mg by mouth two (2) times a day. topiramate (TOPAMAX) 25 mg tablet     Yes   Sig: Take 25 mg by mouth two (2) times daily (with meals).              Thank you,  Cory Vitale, PharmD

## 2020-06-13 NOTE — PROGRESS NOTES
Problem: Self Care Deficits Care Plan (Adult)  Goal: *Acute Goals and Plan of Care (Insert Text)  Description:   FUNCTIONAL STATUS PRIOR TO ADMISSION: Per pt's mother, patient was independent and active without use of DME and was independent for basic and instrumental ADLs. Pt reports she was able to cook and do some IADLs. HOME SUPPORT: The patient lived with mother but did not require assist.    Occupational Therapy Goals  Initiated 6/13/2020  1. Patient will perform grooming, standing at sink, with modified independence within 7 day(s). 2.  Patient will perform bathing, standing, with modified independence within 7 day(s). 3.  Patient will perform simple home management with modified independence within 7 day(s). 4.  Patient will participate in upper extremity therapeutic exercise/activities with modified independence for 10 minutes within 7 day(s). Outcome: Progressing Towards Goal     OCCUPATIONAL THERAPY EVALUATION  Patient: Cristian Martinez (37 y.o. female)  Date: 6/13/2020  Primary Diagnosis: CVA (cerebral vascular accident) [facility]) [I63.9]        Precautions:  Seizure    ASSESSMENT  Based on the objective data described below, the patient presents with confusion, aphasia, decreased safety awareness, impaired coordination, and decreased ADL performance following admission for word finding difficulties and speech deficits. MRI pending. Pt with h/o craniotomies and seizures. This session, pt seen in ED, noted to have intact balance for mobility. She is tangential with speech and has difficulty answering questions but will come to correct answer with heavy cues and increased time. Pt with waxing/waning orientation and difficulty with following commands to complete higher level coordination tasks. Pt requires cues for safety but has excellent ROM and and strength complete tasks.  She is questionable historian therefore call placed to her mother who reports pt is independent at baseline and usually has no deficits with cognition. Pt may benefit from [facility] during admission. Will continue to follow in acute setting to address above mentioned deficits impacting safety and independence with ADLs and mobility. Pending progress, pt may benefit from IP rehab for intensive OT and SLP. Current Level of Function Impacting Discharge (ADLs/self-care): overall SBA to min A for ADLs for safety    Functional Outcome Measure: The patient scored Total A-D  Total A-D (Motor Function): 64/66 on the Fugl-Mosley Assessment which is indicative of mild impairment in upper extremity functional status. Other factors to consider for discharge: cognition; aphasia; decreased safety awareness     Patient will benefit from skilled therapy intervention to address the above noted impairments. PLAN :  Recommendations and Planned Interventions: self care training, functional mobility training, therapeutic exercise, balance training, therapeutic activities, cognitive retraining, endurance activities, neuromuscular re-education, patient education, home safety training and family training/education    Frequency/Duration: Patient will be followed by occupational therapy 3 times a week to address goals. Recommendation for discharge: (in order for the patient to meet his/her long term goals)  To be determined: IPR vs. home with 24/7 assistance and OP OT     This discharge recommendation:  Has not yet been discussed the attending provider and/or case management    IF patient discharges home will need the following DME: anticipate none       SUBJECTIVE:   Patient stated it's Cea. See ya later. The month is cea and we are July.     OBJECTIVE DATA SUMMARY:   HISTORY:   Past Medical History:   Diagnosis Date    Anxiety and depression     Cavernous hemangioma of intracranial structure ([facility] [address] 5/2/2011    Fibromyalgia     fibromyalgia    Hemorrhoids     Hiatal hernia with gastroesophageal reflux 04/2018    Hx of migraines     Hx of seizure disorder     Hypertension     Incomplete right bundle branch block (RBBB) determined by electrocardiography 05/10/2018    Obese     CARLOS (obstructive sleep apnea)     no cpap     Past Surgical History:   Procedure Laterality Date    COLONOSCOPY N/A 6/3/2019    COLONOSCOPY performed by Vicky Medina MD at [address] [facility] [address] [facility] [address] HX HEENT  2012    tonsillectomy    HX LITHOTRIPSY      HX ORTHOPAEDIC      disc fusion 2010, right knee cyst removed   [facility], 2006    Cavernous brain angioma(s) removed    SURGERY,BRAIN/SPINE,W/COMPUTER         Expanded or extensive additional review of patient history:     Home Situation  Home Environment: Apartment  # Steps to Enter: 0  One/Two Story Residence: One story  Living Alone: No  Support Systems: Parent  Patient Expects to be Discharged to[de-identified] Private residence  Current DME Used/Available at Home: Walker, rolling  Tub or Shower Type: Tub/Shower combination    Hand dominance: Right    EXAMINATION OF PERFORMANCE DEFICITS:  Cognitive/Behavioral Status:  Neurologic State: Alert  Orientation Level: (answers incorrectly every questions on first attempt)  Cognition: Follows commands; Appropriate for age attention/concentration  Perception: Appears intact  Safety/Judgement: Fall prevention; Insight into deficits    Hearing: Auditory  Auditory Impairment: None    Vision/Perceptual:    Tracking: Requires cues, head turns, or add eye shifts to track(difficult to assess)    Convergence: Unable to test secondary due to decreased visual attention    Visual Fields: (Difficult to assess due to pt's command following/understand)  Diplopia: No(per pt report)    Acuity: (unable to read clock on the wall)      Range of Motion:  AROM: Within functional limits  PROM: Within functional limits    Strength:  Strength:  Within functional limits    Coordination:  Coordination: Generally decreased, functional(pt with difficulty following directions)  Fine Motor Skills-Upper: Left Intact; Right Intact(slowed; mild dysmetria)    Gross Motor Skills-Upper: Left Intact; Right Intact    Tone:  Tone: Normal    Balance:  Sitting: Intact  Standing: Intact    Functional Mobility and Transfers for ADLs:  Bed Mobility:  Rolling: Independent  Supine to Sit: Independent  Sit to Supine: Independent  Scooting: Independent    Transfers:  Sit to Stand: Independent  Stand to Sit: Independent  Bathroom Mobility: Supervision/set up(infer based on observations)    ADL Assessment:  Feeding: Setup    Oral Facial Hygiene/Grooming: Setup;Minimum assistance(infer based on observations)    Bathing: Stand-by assistance;Minimum assistance(infer based on observations)    Upper Body Dressing: Setup    Lower Body Dressing: Stand-by assistance;Minimum assistance(due to command following)    Toileting: Stand by assistance;Minimum assistance(infer based on observations)    ADL Intervention and task modifications:  Feeding  Drink to Mouth: Modified independent    Upper Body Dressing Assistance  Front Opened Shirt: Stand-by assistance(simulated hospital gown as robe)  Cues: Tactile cues provided    Lower Body Dressing Assistance  Socks: Supervision(excellent tailor sitting)  Leg Crossed Method Used: Yes  Position Performed: Seated edge of bed    Cognitive Retraining  Safety/Judgement: Fall prevention; Insight into deficits     Functional Measure:  Fugl-Mosley Assessment of Motor Recovery after Stroke:   Reflex Activity  Flexors/Biceps/Fingers: Can be elicited  Extensors/Triceps: Can be elicited  Reflex Subtotal: 4    Volitional Movement Within Synergies  Shoulder Retraction: Full  Shoulder Elevation: Full  Shoulder Abduction (90 degrees): Full  Shoulder External Rotation: Full  Elbow Flexion: Full  Forearm Supination: Full  Shoulder Adduction/Internal Rotation: Full  Elbow Extension: Full  Forearm Pronation: Full  Subtotal: 18    Volitional Movement Mixing Synergies  Hand to Lumbar Spine: Full  Shoulder Flexion (0-90 degrees): Full  Pronation-Supination: Full  Subtotal: 6    Volitional Movement With Little or No Synergy  Shoulder Abduction (0-90 degrees): Full  Shoulder Flexion ( degrees): Full  Pronation/Supination: Full  Subtotal : 6    Normal Reflex Activity  Biceps, Triceps, Finger Flexors: Full  Subtotal : 2    Upper Extremity Total   Upper Extremity Total: 36    Wrist  Stability at 15 Degree Dorsiflexion: Full  Repeated Dorsiflexion/ Volar Flexion: Full  Stability at 15 Degree Dorsiflexion: Full  Repeated Dorsiflexion/ Volar Flexion: Full  Circumduction: Full  Wrist Total: 10    Hand  Mass Flexion: Full  Mass Extension: Full  Grasp A: Full  Grasp B: Full  Grasp C: Full  Grasp D: Full  Grasp E: Full  Hand Total: 14    Coordination/Speed  Tremor: None  Dysmetria: Slight  Time: 2-5s  Coordination/Speed Total : 4    Total A-D  Total A-D (Motor Function): 64/66     This is a reliable/valid measure of arm function after a neurological event. It has established value to characterize functional status and for measuring spontaneous and therapy-induced recovery; tests proximal and distal motor functions. Fugl-Mosley Assessment  UE scores recorded between five and 30 days post neurologic event can be used to predict UE recovery at six months post neurologic event. Severe = 0-21 points   Moderately Severe = 22-33 points   Moderate = 34-47 points   Mild = 48-66 points  CHRISTY Do, SIXTO López, & GERALDINE Allen (1992). Measurement of motor recovery after stroke: Outcome assessment and sample size requirements.  Stroke, 23, pp. 5832-4370.   ------------------------------------------------------------------------------------------------------------------------------------------------------------------  MCID:  Stroke:   Benjamin Petersen et al, 2001; n = 171; mean age 79 (5) years; assessed within 16 (12) days of stroke, Acute Stroke)  FMA Motor Scores from Admission to Discharge    10 point increase in FMA Upper Extremity = 1.5 change in discharge FIM    10 point increase in FMA Lower Extremity = 1.9 change in discharge FIM  MDC:   Stroke:   Tim Paniagua et al, 2008, n = 14, mean age = 59.9 (14.6) years, assessed on average 14 (6.5) months post stroke, Chronic Stroke)    FMA = 5.2 points for the Upper Extremity portion of the assessment     Occupational Therapy Evaluation Charge Determination   History Examination Decision-Making   MEDIUM Complexity : Expanded review of history including physical, cognitive and psychosocial  history  HIGH Complexity : 5 or more performance deficits relating to physical, cognitive , or psychosocial skils that result in activity limitations and / or participation restrictions MEDIUM Complexity : Patient may present with comorbidities that affect occupational performnce. Miniml to moderate modification of tasks or assistance (eg, physical or verbal ) with assesment(s) is necessary to enable patient to complete evaluation       Based on the above components, the patient evaluation is determined to be of the following complexity level: MEDIUM  Pain Rating:  Pt reporting HA    Activity Tolerance:   Fair  Please refer to the flowsheet for vital signs taken during this treatment. After treatment patient left in no apparent distress:    Call bell within reach and pt sitting in bed    COMMUNICATION/EDUCATION:   The patients plan of care was discussed with: Physical therapist and Registered nurse. Patient and/or family was verbally educated on the BE FAST acronym for signs/symptoms of CVA and TIA. Informed patient to refer to the Stroke Binder for further BE FAST information. All questions answered with patient indicating fair understanding. Patient/family have participated as able in goal setting and plan of care. and Patient/family agree to work toward stated goals and plan of care. This patients plan of care is appropriate for delegation to [facility].     Thank you for this referral.  Jose Villa, OT  Time Calculation: 21 mins

## 2020-06-13 NOTE — ED NOTES
Pt reports headache, requested pain medication for headache. Dr. Gunner Mix made aware. Give 650 mg PO Tylenol for headache per Dr. Gunner Mix.

## 2020-06-13 NOTE — PROGRESS NOTES
PHYSICAL THERAPY EVALUATION/DISCHARGE  Patient: Arcenio Tristan (17 y.o. female)  Date: 6/13/2020  Primary Diagnosis: CVA (cerebral vascular accident) [facility]) [I63.9]       Precautions:   Seizure      ASSESSMENT  Based on the objective data described below, the patient presents with confusion, aphasia, decreased safety awareness, and functional mobility at an independent level. Preliminary MRI negative for acute changes though with chronic changes and previous brain surgery. Evaluation challenging d/t waxing and waning orientation level. Answered all questions incorrectly on first try but eventually comes to correct answer but once moving onto next question repeating previous answer for several attempts. However once in conversation pt speaks normally without difficulty. Coordination testing impaired d/t difficulty following verbal and visual directions(continues to bring nose to knee instead of moving finger back and forth). Balance intact, strength intact and full ROM. No difficulty with ambulation. No LOB or weakness observed. Per past CM noted pt was driving during admission in March of this year. Pt reports she does not drive and given presentation today is not safe to make correct judgements to be behind the wheel. Pt has no acute PT needs though will require continued safety cues while admitted and should not be up without supervision. May benefit from HHPT if mentation does not clear up for home safety assessment. Functional Outcome Measure: The patient scored 27/28 on the Tinetti outcome measure which is indicative of low fall risk. Other factors to consider for discharge: confusion     Further skilled acute physical therapy is not indicated at this time.      PLAN :  Recommendation for discharge: (in order for the patient to meet his/her long term goals)  Physical therapy at least 2 days/week in the home     This discharge recommendation:  Has been made in collaboration with the attending provider and/or case management    IF patient discharges home will need the following DME: none       SUBJECTIVE:   Patient stated Naoma Huddle.     OBJECTIVE DATA SUMMARY:   HISTORY:    Past Medical History:   Diagnosis Date    Anxiety and depression     Cavernous hemangioma of intracranial structure ([facility] [address] 5/2/2011    Fibromyalgia     fibromyalgia    Hemorrhoids     Hiatal hernia with gastroesophageal reflux 04/2018    Hx of migraines     Hx of seizure disorder     Hypertension     Incomplete right bundle branch block (RBBB) determined by electrocardiography 05/10/2018    Obese     CARLOS (obstructive sleep apnea)     no cpap     Past Surgical History:   Procedure Laterality Date    COLONOSCOPY N/A 6/3/2019    COLON[facility]COPY performed by [facility] MD at [facility] HX CHOLECYSTECTOMY      HX HEENT  2012    tonsillectomy    HX LITHOTRIPSY      HX ORTHOPAEDIC      disc fusion 2010, right knee cyst removed   [facility], 2006    Cavernous brain angioma(s) removed    SURGERY,BRAIN/SPINE,W/COMPUTER         Prior level of function: Independent? Personal factors and/or comorbidities impacting plan of care: fibromyalgia, HTN, seizure disorder, brain surgery    Home Situation  Home Environment: Apartment  # Steps to Enter: 0  One/Two Story Residence: One story  Living Alone: No  Support Systems: Parent  Patient Expects to be Discharged to[de-identified] Private residence  Current DME Used/Available at Home: Romina Matter, rolling  Tub or Shower Type: Tub/Shower combination    EXAMINATION/PRESENTATION/DECISION MAKING:   Critical Behavior:  Neurologic State: Alert  Orientation Level: (answers incorrectly every questions on first attempt)  Cognition: Follows commands, Appropriate safety awareness, Appropriate decision making     Hearing: Auditory  Auditory Impairment: None  Skin:    Edema:   Range Of Motion:  AROM: Within functional limits           PROM: Within functional limits           Strength:    Strength:  Within functional limits                    Tone & Sensation:   Tone: Normal                              Coordination:  Coordination: Generally decreased, functional(pt with difficulty following directions)  Vision:      Functional Mobility:  Bed Mobility:  Rolling: Independent  Supine to Sit: Independent  Sit to Supine: Independent  Scooting: Independent  Transfers:  Sit to Stand: Independent  Stand to Sit: Independent                       Balance:   Sitting: Intact  Standing: Intact  Ambulation/Gait Training:  Distance (ft): 260 Feet (ft)  Assistive Device: Gait belt  Ambulation - Level of Assistance: Supervision                 Base of Support: Widened     Speed/Rossana: Accelerated                       Functional Measure:  Tinetti test:    Sitting Balance: 1  Arises: 1  Attempts to Rise: 2  Immediate Standing Balance: 2  Standing Balance: 2  Nudged: 2  Eyes Closed: 1  Turn 360 Degrees - Continuous/Discontinuous: 1  Turn 360 Degrees - Steady/Unsteady: 1  Sitting Down: 2  Balance Score: 15 Balance total score  Indication of Gait: 1  R Step Length/Height: 1  L Step Length/Height: 1  R Foot Clearance: 1  L Foot Clearance: 1  Step Symmetry: 1  Step Continuity: 1  Path: 2  Trunk: 2  Walking Time: 1  Gait Score: 12 Gait total score  Total Score: 27/28 Overall total score         Tinetti Tool Score Risk of Falls  <19 = High Fall Risk  19-24 = Moderate Fall Risk  25-28 = Low Fall Risk  Tinetti ME. Performance-Oriented Assessment of Mobility Problems in Elderly Patients. [address]; [id number].  (Scoring Description: PT Bulletin Feb. 10, 1993)    Older adults: Fredy Damian et al, 2009; n = 1000 [facility] elderly evaluated with ABC, MAUREEN, ADL, and IADL)  · Mean MAUREEN score for males aged 69-68 years = 26.21(3.40)  · Mean MAUREEN score for females age 69-68 years = 25.16(4.30)  · Mean MAUREEN score for males over 80 years = 23.29(6.02)  · Mean MAUREEN score for females over 80 years = 17.20(8.32)            Physical Therapy Evaluation Charge Determination   History Examination Presentation Decision-Making   HIGH Complexity :3+ comorbidities / personal factors will impact the outcome/ POC  MEDIUM Complexity : 3 Standardized tests and measures addressing body structure, function, activity limitation and / or participation in recreation  LOW Complexity : Stable, uncomplicated  Other outcome measures Tinetti  LOW       Based on the above components, the patient evaluation is determined to be of the following complexity level: LOW       Activity Tolerance:   Good  Please refer to the flowsheet for vital signs taken during this treatment. After treatment patient left in no apparent distress:   Supine in bed, Call bell within reach and Bed / chair alarm activated    COMMUNICATION/EDUCATION:   The patients plan of care was discussed with: Registered nurse. Fall prevention education was provided and the patient/caregiver indicated understanding., Patient/family have participated as able in goal setting and plan of care. and Patient/family agree to work toward stated goals and plan of care.     Thank you for this referral.  Gucci Elizabeth, PT   Time Calculation: 22 mins

## 2020-06-13 NOTE — CONSULTS
NEUROLOGY IN-PATIENT NEW CONSULTATION      6/13/2020    RE: Augustin Roth         1965      REFERRED BY:  Dany Dyer MD      CHIEF COMPLAINT:  This is Augustin Roth is a 49 y.o. female  who had concerns including Seizure. HPI:     Patient comes in for word finding difficulty that lasted all day yesterday. Patient feels better today. In 4/29/19, patient was seen by Dr Kamille Clements who noted tangential thought process and pressured speech at times. Review of chart: Had craniotomy for bleeding AVM, and this was in [place] [id number], and another in 2006. This was for cavernous malformation that was bleeding. She says that she has had multiple seizures  in the past, has been put on seizure medicine, but did not like the way it made her feel, so she did not take it. MRI of the brain performed March 2018 and compared to her September 2013 scan. She is status post left parietal craniotomy with encephalomalacia in the left parietal lobe. She has hemosiderin along that area of encephalomalacia. 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     ROS   (-) fever  (-) rash  All other systems reviewed and are negative    Past Medical Hx  Past Medical History:   Diagnosis Date    Anxiety and depression     Cavernous hemangioma of intracranial structure ([facility] [address] 5/2/2011    Fibromyalgia     fibromyalgia    Hemorrhoids     Hiatal hernia with gastroesophageal reflux 04/2018    Hx of migraines     Hx of seizure disorder     Hypertension     Incomplete right bundle branch block (RBBB) determined by electrocardiography 05/10/2018    Obese     CARLOS (obstructive sleep apnea)     no cpap       Social Hx  Social History     Socioeconomic History    Marital status: SINGLE     Spouse name: Not on file    Number of children: Not on file    Years of education: Not on file    Highest education level: Not on file   Tobacco Use    Smoking status: Former Smoker    Smokeless tobacco: Never Used    Tobacco comment: on chantix,   Substance and Sexual Activity    Alcohol use: No    Drug use: No       Family Hx  Family History   Problem Relation Age of Onset    Cancer Mother         breast    Breast Cancer Mother 76    Cancer Father     Migraines Father     Diabetes Father     Breast Cancer Sister 48    Ovarian Cancer Maternal Aunt        ALLERGIES  Allergies   Allergen Reactions    Latex, Natural Rubber Rash    Aspirin Other (comments)     Excessive bleeding      Other Medication Other (comments)     Patient states intolerance to blood thinners due to angiomas    Codeine Palpitations    Hydralazine Other (comments)     Pt reports \"burning/tingling\" sensation in abd, flanks, and into head.     Imitrex [Sumatriptan] Palpitations and Other (comments)     thougt she was having an MI    Percocet [Oxycodone-Acetaminophen] Palpitations       CURRENT MEDS  Current Facility-Administered Medications   Medication Dose Route Frequency Provider Last Rate Last Dose    acetaminophen (TYLENOL) tablet 650 mg  650 mg Oral Q4H PRN Rochelle Ram MD        Or    acetaminophen (TYLENOL) solution 650 mg  650 mg Per NG tube Q4H PRN Rochelle Ram MD        Or    [facility] acetaminophen (TYLENOL) suppository 650 mg  650 mg Rectal Q4H PRN Rochelle Ram MD        enoxaparin (LOVENOX) injection 40 mg  40 mg SubCUTAneous Q12H Rochelle SANCHEZ MD        levETIRAcetam (KEPPRA) 1,000 mg in 0.9% sodium chloride 100 mL IVPB  1,000 mg IntraVENous Q12H Rochelle SANCHEZ  mL/hr at 06/13/20 1051 1,000 mg at 06/13/20 1051    LORazepam (ATIVAN) injection 2 mg  2 mg IntraVENous Q4H PRN Rochelle Ram MD        morphine injection 1 mg  1 mg IntraVENous Q4H PRN Rochelle SANCHEZ MD   1 mg at 06/13/20 0227    ondansetron (ZOFRAN) injection 4 mg  4 mg IntraVENous Q6H PRN Rochelle Ram MD   4 mg at 06/13/20 7228           PREVIOUS WORKUP: (reviewed)  IMAGING:    CT Results (recent):  Results from [place] encounter on 06/12/20   CT PERF W CBF    Narrative INDICATION: code neuro    COMPARISON: Earlier unenhanced head CT. EXAM: CT brain perfusion was performed with generation of hemodynamic maps of  multiple parameters, including cerebral blood flow, cerebral blood volume, and  MTT (mean transit time). CT dose reduction was achieved through use of a standardized protocol tailored  for this examination and automatic exposure control for dose modulation. FINDINGS: Diminished blood volume is shown in the superior left frontoparietal  region corresponding with postsurgical myelomalacia. There is associated delayed  flow, transit time and time to peak. No other perfusion abnormality is  demonstrated. Impression IMPRESSION: No perfusion abnormality beyond area of postsurgical myelomalacia. MRI Results (recent):  Results from [place] encounter on 06/12/20   MRI BRAIN W WO CONT    Narrative *PRELIMINARY REPORT*    INDICATION: Aphasia, altered mental status, seizure, cerebrovascular accident. 10 mL IV Dotarem was administered. 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 Small mass in the  anteroinferior third ventricle. Scattered foci of magnetic susceptibility  artifact consistent with numerous old punctate hemorrhages. 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 No acute intracranial process. Preliminary report was provided by Dr. Sho Zapata, the on-call  radiologist, at 1103 hours on 6/13/2020. Final report to follow. *END PRELIMINARY REPORT*                         IR Results (recent):  No results found for this or any previous visit. VAS/US Results (recent):  Results from Hospital Encounter encounter on 08/11/13   DUPLEX LOWER EXT VENOUS RIGHT    Narrative **Final Report**       ICD Codes / Adm. Diagnosis: 828511  782.2 / Chest Pain    Examination:  US LOW EXT VENOUS DOPPLER UNI RT  - [id number] - Aug 11 2013    2:06PM  Accession No:  [id number]  Reason:  pain, swelling, recent surgery, eval for DVT      REPORT:  INDICATION:  pain, swelling, recent surgery, eval for DVT    COMPARISON: 6.22.2012    FINDINGS: Duplex Doppler sonography of the right lower extremity was   performed from the groin to the calf. The right common femoral, femoral and   popliteal veins are compressible with normal color-flow and wave forms and   response to physiologic maneuvers including Valsalva and augmentation. There is a complex fluid collection along the posterior medial right thigh. IMPRESSION:  No deep venous thrombosis identified. Hematoma in the right   medial posterior thigh. Signing/Reading Doctor: Calixto Ann ([id number])    Approved: Calixto Ann ([id number])  Aug 11 2013  2:15PM                                          LABS (reviewed)  Results for orders placed or performed during the hospital encounter of 06/12/20   SAMPLES BEING HELD   Result Value Ref Range    SAMPLES BEING HELD [address] SST     COMMENT        Add-on orders for these samples will be processed based on acceptable specimen integrity and analyte stability, which may vary by analyte. METABOLIC PANEL, COMPREHENSIVE   Result Value Ref Range    Sodium 138 136 - 145 mmol/L    Potassium 3.5 3.5 - 5.1 mmol/L    Chloride 107 97 - 108 mmol/L    CO2 25 21 - 32 mmol/L    Anion gap 6 5 - 15 mmol/L    Glucose 117 (H) 65 - 100 mg/dL    BUN 10 6 - 20 MG/DL    Creatinine 1.01 0.55 - 1.02 MG/DL    BUN/Creatinine ratio 10 (L) 12 - 20      GFR est AA >60 >60 ml/min/1.73m2    GFR est non-AA 57 (L) >60 ml/min/1.73m2    Calcium 9.6 8.5 - 10.1 MG/DL    Bilirubin, total 0.6 0.2 - 1.0 MG/DL    ALT (SGPT) 34 12 - 78 U/L    AST (SGOT) 25 15 - 37 U/L    Alk.  phosphatase 160 (H) 45 - 117 U/L    Protein, total 9.6 (H) 6.4 - 8.2 g/dL    Albumin 4.0 3.5 - 5.0 g/dL    Globulin 5.6 (H) 2.0 - 4.0 g/dL    A-G Ratio 0.7 (L) 1.1 - 2.2     CBC WITH AUTOMATED DIFF Result Value Ref Range    WBC 13.9 (H) 3.6 - 11.0 K/uL    RBC 5.20 3.80 - 5.20 M/uL    HGB 14.5 11.5 - 16.0 g/dL    HCT 45.6 35.0 - 47.0 %    MCV 87.7 80.0 - 99.0 FL    MCH 27.9 26.0 - 34.0 PG    MCHC 31.8 30.0 - 36.5 g/dL    RDW 14.0 11.5 - 14.5 %    PLATELET 602 892 - 751 K/uL    MPV 10.5 8.9 - 12.9 FL    NRBC 0.0 0  WBC    ABSOLUTE NRBC 0.00 0.00 - 0.01 K/uL    NEUTROPHILS 75 32 - 75 %    LYMPHOCYTES 17 12 - 49 %    MONOCYTES 5 5 - 13 %    EOSINOPHILS 1 0 - 7 %    BASOPHILS 1 0 - 1 %    IMMATURE GRANULOCYTES 1 (H) 0.0 - 0.5 %    ABS. NEUTROPHILS 10.7 (H) 1.8 - 8.0 K/UL    ABS. LYMPHOCYTES 2.3 0.8 - 3.5 K/UL    ABS. MONOCYTES 0.6 0.0 - 1.0 K/UL    ABS. EOSINOPHILS 0.2 0.0 - 0.4 K/UL    ABS. BASOPHILS 0.1 0.0 - 0.1 K/UL    ABS. IMM. GRANS. 0.1 (H) 0.00 - 0.04 K/UL    DF AUTOMATED     URINALYSIS W/ RFLX MICROSCOPIC   Result Value Ref Range    Color YELLOW/STRAW      Appearance CLEAR CLEAR      Specific gravity 1.005 1.003 - 1.030      pH (UA) 7.0 5.0 - 8.0      Protein Negative NEG mg/dL    Glucose Negative NEG mg/dL    Ketone Negative NEG mg/dL    Bilirubin Negative NEG      Blood Negative NEG      Urobilinogen 0.2 0.2 - 1.0 EU/dL    Nitrites Negative NEG      Leukocyte Esterase Negative NEG     MAGNESIUM   Result Value Ref Range    Magnesium 2.4 1.6 - 2.4 mg/dL   SAMPLES BEING HELD   Result Value Ref Range    SAMPLES BEING HELD GRY TUBE URINE     COMMENT        Add-on orders for these samples will be processed based on acceptable specimen integrity and analyte stability, which may vary by analyte.    LIPID PANEL   Result Value Ref Range    LIPID PROFILE          Cholesterol, total 290 (H) <200 MG/DL    Triglyceride 196 (H) <150 MG/DL    HDL Cholesterol 54 MG/DL    LDL, calculated 196.8 (H) 0 - 100 MG/DL    VLDL, calculated 39.2 MG/DL    CHOL/HDL Ratio 5.4 (H) 0.0 - 5.0     HEMOGLOBIN A1C WITH EAG   Result Value Ref Range    Hemoglobin A1c 6.5 (H) 4.0 - 5.6 %    Est. average glucose 140 mg/dL   CBC W/O DIFF Result Value Ref Range    WBC 16.0 (H) 3.6 - 11.0 K/uL    RBC 4.87 3.80 - 5.20 M/uL    HGB 13.7 11.5 - 16.0 g/dL    HCT 43.1 35.0 - 47.0 %    MCV 88.5 80.0 - 99.0 FL    MCH 28.1 26.0 - 34.0 PG    MCHC 31.8 30.0 - 36.5 g/dL    RDW 14.1 11.5 - 14.5 %    PLATELET 885 (H) 399 - 400 K/uL    MPV 10.6 8.9 - 12.9 FL    NRBC 0.0 0  WBC    ABSOLUTE NRBC 0.00 0.00 - 0.01 K/uL   SED RATE (ESR)   Result Value Ref Range    Sed rate, automated 23 0 - 30 mm/hr   CRP, HIGH SENSITIVITY   Result Value Ref Range    CRP, High sensitivity >9.5 mg/L   TSH 3RD GENERATION   Result Value Ref Range    TSH 1.51 0.36 - 3.74 uIU/mL   PHOSPHORUS   Result Value Ref Range    Phosphorus 3.9 2.6 - 4.7 MG/DL   MAGNESIUM   Result Value Ref Range    Magnesium 2.5 (H) 1.6 - 2.4 mg/dL   HOMOCYSTEINE, PLASMA   Result Value Ref Range    Homocysteine, plasma 7.2 3.7 - 13.9 umol/L   GLUCOSE, POC   Result Value Ref Range    Glucose (POC) 122 (H) 65 - 100 mg/dL    Performed by Xiomara Nguyen        Physical Exam:     Visit Vitals  BP (!) 177/110   Pulse 86   Temp 98.6 °F (37 °C)   Resp 16   Wt 112.3 kg (247 lb 9.2 oz)   LMP 05/04/2011   SpO2 94%   BMI 43.86 kg/m²     General:  Alert, cooperative, no distress. Head:  Normocephalic, without obvious abnormality, atraumatic. Eyes:  Conjunctivae/corneas clear. Lungs:  Heart:   Non labored breathing  Regular rate and rhythm, no carotid bruits   Abdomen:   Soft, non-distended   Extremities: Extremities normal, atraumatic, no cyanosis or edema. Pulses: 2+ and symmetric all extremities. Skin: Skin color, texture, turgor normal. No rashes or lesions. Neurologic Exam     Gen: Attention normal             Language: oriented x 3.  Knows the president, able to name and repeat  Tangential responses with pressured speech            Cranial Nerves:  I: smell Not tested   II: visual fields Full to confrontation   II: pupils Equal, round, reactive to light   II: optic disc No papilledema   III,VII: ptosis none III,IV,VI: extraocular muscles  Full ROM   V: mastication normal   V: facial light touch sensation  normal   VII: facial muscle function   symmetric   VIII: hearing symmetric   IX: soft palate elevation  normal   XI: trapezius strength  5/5   XI: sternocleidomastoid strength 5/5   XI: neck flexion strength  5/5   XII: tongue  midline     Motor: normal bulk and tone, no tremor              Strength: 5/5 all four extremities  Sensory: intact to LT, PP, vibration, and temperature  Reflexes: 2+ throughout; Down going toes  Coordination: Good FTN and HTS  Gait: deferred           Impression:     Isabelle Morales is a 49 y.o. female who  has a past medical history of Anxiety and depression, Cavernous hemangioma of intracranial structure ([facility] [address] (5/2/2011), Fibromyalgia, Hemorrhoids, Hiatal hernia with gastroesophageal reflux (04/2018), migraines, seizure disorder, Hypertension, Incomplete right bundle branch block (RBBB) determined by electrocardiography (05/10/2018), Obese, and CARLOS (obstructive sleep apnea). who comes in for word finding difficulty that lasted all day yesterday. Patient feels better today. In 4/29/19, patient was seen by Dr Sherwin Galan who noted tangential thought process and pressured speech at times. Review of chart: Had craniotomy for bleeding AVM, and this was in [place] [id number], and another in 2006. This was for cavernous malformation that was bleeding. She says that she has had multiple seizures in the past, has been put on seizure medicine, but did not like the way it made her feel, so she did not take it. MRI of the brain performed March 2018 and compared to her September 2013 scan. She is status post left parietal craniotomy with encephalomalacia in the left parietal lobe. She has hemosiderin along that area of encephalomalacia. 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 RECOMMENDATIONS  1. I had a long discussion with patient.  Discussed diagnosis, prognosis, pathophysiology and available treatment. Reviewed test results. All questions were answered. 2. Follow up final MRI brain report to look for interval change from last MRI brain  3. EEG to look for subclinical seizure event  4. Seizure precautions  5.  Review[facility] medical records in [facility]    Please call for questions        Thank you for the consultation      Lesly Mccartney MD  Diplomate, American Board of Psychiatry and Neurology  Diplomate, Neuromuscular Medicine  Diplomate, American Board of Electrodiagnostic Medicine    Greater than 50% of time spent counseling patient        CC: Garo Chambers MD  Fax: [phone]

## 2020-06-14 ENCOUNTER — APPOINTMENT (OUTPATIENT)
Dept: CT IMAGING | Age: 55
End: 2020-06-14
Attending: PSYCHIATRY & NEUROLOGY
Payer: MEDICARE

## 2020-06-14 LAB
ALBUMIN SERPL-MCNC: 3.6 G/DL (ref 3.5–5)
ALBUMIN/GLOB SERPL: 0.8 {RATIO} (ref 1.1–2.2)
ALP SERPL-CCNC: 137 U/L (ref 45–117)
ALT SERPL-CCNC: 28 U/L (ref 12–78)
ANION GAP SERPL CALC-SCNC: 7 MMOL/L (ref 5–15)
AST SERPL-CCNC: 27 U/L (ref 15–37)
BASOPHILS # BLD: 0.1 K/UL (ref 0–0.1)
BASOPHILS NFR BLD: 1 % (ref 0–1)
BILIRUB SERPL-MCNC: 0.6 MG/DL (ref 0.2–1)
BUN SERPL-MCNC: 14 MG/DL (ref 6–20)
BUN/CREAT SERPL: 13 (ref 12–20)
CALCIUM SERPL-MCNC: 9.1 MG/DL (ref 8.5–10.1)
CHLORIDE SERPL-SCNC: 109 MMOL/L (ref 97–108)
CO2 SERPL-SCNC: 27 MMOL/L (ref 21–32)
CREAT SERPL-MCNC: 1.04 MG/DL (ref 0.55–1.02)
DIFFERENTIAL METHOD BLD: NORMAL
EOSINOPHIL # BLD: 0.1 K/UL (ref 0–0.4)
EOSINOPHIL NFR BLD: 1 % (ref 0–7)
ERYTHROCYTE [DISTWIDTH] IN BLOOD BY AUTOMATED COUNT: 14.3 % (ref 11.5–14.5)
GLOBULIN SER CALC-MCNC: 4.6 G/DL (ref 2–4)
GLUCOSE BLD STRIP.AUTO-MCNC: 110 MG/DL (ref 65–100)
GLUCOSE SERPL-MCNC: 113 MG/DL (ref 65–100)
HCT VFR BLD AUTO: 40.8 % (ref 35–47)
HGB BLD-MCNC: 12.8 G/DL (ref 11.5–16)
IMM GRANULOCYTES # BLD AUTO: 0 K/UL (ref 0–0.04)
IMM GRANULOCYTES NFR BLD AUTO: 0 % (ref 0–0.5)
LYMPHOCYTES # BLD: 3 K/UL (ref 0.8–3.5)
LYMPHOCYTES NFR BLD: 28 % (ref 12–49)
MCH RBC QN AUTO: 27.9 PG (ref 26–34)
MCHC RBC AUTO-ENTMCNC: 31.4 G/DL (ref 30–36.5)
MCV RBC AUTO: 89.1 FL (ref 80–99)
MONOCYTES # BLD: 0.8 K/UL (ref 0–1)
MONOCYTES NFR BLD: 8 % (ref 5–13)
NEUTS SEG # BLD: 6.6 K/UL (ref 1.8–8)
NEUTS SEG NFR BLD: 62 % (ref 32–75)
NRBC # BLD: 0 K/UL (ref 0–0.01)
NRBC BLD-RTO: 0 PER 100 WBC
PLATELET # BLD AUTO: 336 K/UL (ref 150–400)
PMV BLD AUTO: 10.5 FL (ref 8.9–12.9)
POTASSIUM SERPL-SCNC: 3.2 MMOL/L (ref 3.5–5.1)
PROT SERPL-MCNC: 8.2 G/DL (ref 6.4–8.2)
RBC # BLD AUTO: 4.58 M/UL (ref 3.8–5.2)
SERVICE CMNT-IMP: ABNORMAL
SODIUM SERPL-SCNC: 143 MMOL/L (ref 136–145)
WBC # BLD AUTO: 10.5 K/UL (ref 3.6–11)

## 2020-06-14 PROCEDURE — 96376 TX/PRO/DX INJ SAME DRUG ADON: CPT

## 2020-06-14 PROCEDURE — 85025 COMPLETE CBC W/AUTO DIFF WBC: CPT

## 2020-06-14 PROCEDURE — 94760 N-INVAS EAR/PLS OXIMETRY 1: CPT

## 2020-06-14 PROCEDURE — 70450 CT HEAD/BRAIN W/O DYE: CPT

## 2020-06-14 PROCEDURE — 80053 COMPREHEN METABOLIC PANEL: CPT

## 2020-06-14 PROCEDURE — 82962 GLUCOSE BLOOD TEST: CPT

## 2020-06-14 PROCEDURE — 36415 COLL VENOUS BLD VENIPUNCTURE: CPT

## 2020-06-14 PROCEDURE — 74011250637 HC RX REV CODE- 250/637: Performed by: FAMILY MEDICINE

## 2020-06-14 PROCEDURE — 65390000012 HC CONDITION CODE 44 OBSERVATION

## 2020-06-14 PROCEDURE — 74011250636 HC RX REV CODE- 250/636: Performed by: INTERNAL MEDICINE

## 2020-06-14 PROCEDURE — 74011000258 HC RX REV CODE- 258: Performed by: INTERNAL MEDICINE

## 2020-06-14 PROCEDURE — 99218 HC RM OBSERVATION: CPT

## 2020-06-14 RX ORDER — PANTOPRAZOLE SODIUM 40 MG/1
40 TABLET, DELAYED RELEASE ORAL
Status: DISCONTINUED | OUTPATIENT
Start: 2020-06-14 | End: 2020-06-15 | Stop reason: HOSPADM

## 2020-06-14 RX ORDER — LISINOPRIL 5 MG/1
10 TABLET ORAL DAILY
Status: DISCONTINUED | OUTPATIENT
Start: 2020-06-14 | End: 2020-06-15 | Stop reason: HOSPADM

## 2020-06-14 RX ORDER — ATENOLOL 50 MG/1
50 TABLET ORAL DAILY
Status: DISCONTINUED | OUTPATIENT
Start: 2020-06-14 | End: 2020-06-15 | Stop reason: HOSPADM

## 2020-06-14 RX ORDER — ALPRAZOLAM 0.5 MG/1
0.5 TABLET ORAL
Status: DISCONTINUED | OUTPATIENT
Start: 2020-06-14 | End: 2020-06-15 | Stop reason: HOSPADM

## 2020-06-14 RX ORDER — TOPIRAMATE 25 MG/1
25 TABLET ORAL 2 TIMES DAILY WITH MEALS
Status: DISCONTINUED | OUTPATIENT
Start: 2020-06-14 | End: 2020-06-15 | Stop reason: HOSPADM

## 2020-06-14 RX ORDER — TOPIRAMATE 25 MG/1
25 TABLET ORAL 2 TIMES DAILY WITH MEALS
Status: DISCONTINUED | OUTPATIENT
Start: 2020-06-14 | End: 2020-06-14

## 2020-06-14 RX ORDER — METHOCARBAMOL 750 MG/1
750 TABLET, FILM COATED ORAL
Status: DISCONTINUED | OUTPATIENT
Start: 2020-06-14 | End: 2020-06-15 | Stop reason: HOSPADM

## 2020-06-14 RX ORDER — DULOXETIN HYDROCHLORIDE 30 MG/1
120 CAPSULE, DELAYED RELEASE ORAL
Status: DISCONTINUED | OUTPATIENT
Start: 2020-06-14 | End: 2020-06-15 | Stop reason: HOSPADM

## 2020-06-14 RX ORDER — IBUPROFEN 400 MG/1
800 TABLET ORAL
Status: DISCONTINUED | OUTPATIENT
Start: 2020-06-14 | End: 2020-06-15 | Stop reason: HOSPADM

## 2020-06-14 RX ORDER — POTASSIUM CHLORIDE 750 MG/1
20 TABLET, FILM COATED, EXTENDED RELEASE ORAL 2 TIMES DAILY
Status: DISCONTINUED | OUTPATIENT
Start: 2020-06-14 | End: 2020-06-15 | Stop reason: HOSPADM

## 2020-06-14 RX ADMIN — ONDANSETRON 4 MG: 2 INJECTION INTRAMUSCULAR; INTRAVENOUS at 06:03

## 2020-06-14 RX ADMIN — MORPHINE SULFATE 1 MG: 2 INJECTION, SOLUTION INTRAMUSCULAR; INTRAVENOUS at 06:03

## 2020-06-14 RX ADMIN — ALPRAZOLAM 0.5 MG: 0.5 TABLET ORAL at 17:27

## 2020-06-14 RX ADMIN — BUSPIRONE HYDROCHLORIDE 15 MG: 10 TABLET ORAL at 09:22

## 2020-06-14 RX ADMIN — HYDROXYZINE HYDROCHLORIDE 25 MG: 25 TABLET, FILM COATED ORAL at 14:00

## 2020-06-14 RX ADMIN — LEVETIRACETAM 1000 MG: 100 INJECTION, SOLUTION INTRAVENOUS at 09:30

## 2020-06-14 RX ADMIN — ALPRAZOLAM 0.5 MG: 0.5 TABLET ORAL at 09:30

## 2020-06-14 RX ADMIN — BUSPIRONE HYDROCHLORIDE 15 MG: 10 TABLET ORAL at 17:28

## 2020-06-14 RX ADMIN — ONDANSETRON 4 MG: 2 INJECTION INTRAMUSCULAR; INTRAVENOUS at 14:23

## 2020-06-14 RX ADMIN — LEVETIRACETAM 1000 MG: 100 INJECTION, SOLUTION INTRAVENOUS at 22:15

## 2020-06-14 NOTE — ROUTINE PROCESS
Patient continues to refuse medications, including lovenox because she claims to have a brain bleed. She also refused atenolol and lisinopril and potassium.

## 2020-06-14 NOTE — ROUTINE PROCESS
1435 - patient calls RN to room to report things flying at her from her right side. Neuro assessment done, no deficits noted. Notified Dr. Bharat White of symptoms who suggested Code Stroke be called. 1440 - Code Stroke called. /142 and then 165/113. Blood glucose 110. RRT RN responds to stroke for VAN scale which was negative. Order received for CT w/o contrast. NIH done with score of 0. Patient transported to CT with RRT RN. Patient returned to room for teleneuro consult. Teleneurologist performed exam on patient. CT results consistent with previous day's results. No deficits noted, NIH 0. No new orders received. Code Stroke ended at 1530. Dr Elkin Mirza notified of above.

## 2020-06-14 NOTE — ROUTINE PROCESS
C/o of headaches offered Tylenol but refused. 0808 C/o nausea and severe pain on her left head where her surgery was per pt. Morphine 1 mg iv and Zofran 4 mg iv given.

## 2020-06-14 NOTE — PROGRESS NOTES
1314:  Pt received obs letters and code 44 letter but was unable to sign. SNOW Nunes CM Note:  Virtual reviewer communicated change to CM, which reflect outpatient observation order written prior to Written discharge order. Code 44 delivered to patient. CM met with the patient and provided to the patient the printed information about her outpatient observation status. The patient was given the flyer entitled, \"Medicare Outpatient Observation: Information & notification. \" All questions were answered, no additional discharge needs identified at this time and patient expects to return to their (home, assisted living facility, relatives home, etc.) after discharge today.    SNOW Nunes

## 2020-06-14 NOTE — ROUTINE PROCESS
Bedside and Verbal shift change report given to Jaxon Oliver (oncoming nurse) by Marie Marx (offgoing nurse). Report included the following information SBAR and Kardex.

## 2020-06-14 NOTE — PROGRESS NOTES
Problem: Falls - Risk of  Goal: *Absence of Falls  Outcome: Progressing Towards Goal  Note: Fall Risk Interventions:  Mobility Interventions: Bed/chair exit alarm, Communicate number of staff needed for ambulation/transfer, Patient to call before getting OOB    Mentation Interventions: Bed/chair exit alarm, Door open when patient unattended, More frequent rounding, Reorient patient, Update white board    Medication Interventions: Assess postural VS orthostatic hypotension, Bed/chair exit alarm, Patient to call before getting OOB, Teach patient to arise slowly

## 2020-06-14 NOTE — ROUTINE PROCESS
Bedside shift change report given to Alicia Sagastume RN (oncoming nurse) by Ro Casey RN (offgoing nurse). Report included the following information SBAR and Kardex.

## 2020-06-14 NOTE — PROGRESS NOTES
Daily Progress Note: 6/14/2020  Gilbert Vargas MD    Assessment/Plan:   Possable acute cerebrovascular accident with residual dysphasia versus breakthrough seizure vrs due to med noncompliance. Seizure disorder. History of migraine headaches. CT head and CT angiogram head and neck code neuro negative for any acute findings.  - MRI brain and carotid Dopplers. - refuses statin, unable to tolerate aspirin due to increased bleeding.    -Neurology consulted. Chronic Non-compliance  - reports she is going to stop medicines she thinks she does not need - counseled    History of obstructive sleep apnea  - declines to get further w/u    Leukocytosis, probably due to a leukemoid reaction from possible seizure. No stigmata of sepsis. Will monitor off anti-infective therapy. History of gastroesophageal reflux disease. Endocrine:  Obesity with a body mass index greater than 35. Would benefit from therapeutic lifestyle changes counseling. Anxiety disorder and depression. Prophylaxis for deep venous thrombosis. Rehabilitation:  Speech, Physical and Occupational Therapy evaluations.    Directives:  Full code with a very guarded prognosis.        Problem List:  Problem List as of 6/14/2020 Date Reviewed: 3/8/2020          Codes Class Noted - Resolved    CVA (cerebral vascular accident) [facility]) ICD-10-CM: I63.9  ICD-9-CM: 434.91  6/12/2020 - Present        Chest pain ICD-10-CM: R07.9  ICD-9-CM: 786.50  3/8/2020 - Present        Rectal bleeding ICD-10-CM: K62.5  ICD-9-CM: 569.3  5/30/2019 - Present        CARLOS (obstructive sleep apnea) ICD-10-CM: G47.33  ICD-9-CM: 327.23  Unknown - Present    Overview Signed 5/30/2019  7:55 PM by Khoi Smith MD     no cpap             HTN (hypertension) ICD-10-CM: I10  ICD-9-CM: 401.9  Unknown - Present        Hx of seizure disorder ICD-10-CM: Z86.69  ICD-9-CM: V12.49  Unknown - Present        Fibromyalgia ICD-10-CM: M79.7  ICD-9-CM: 729.1  Unknown - Present    Overview Signed 5/30/2019  7:55 PM by Gaby Taylor MD     fibromyalgia             Anxiety and depression ICD-10-CM: F41.9, F32.9  ICD-9-CM: 300.00, 311  Unknown - Present        Hx of migraines ICD-10-CM: Z86.69  ICD-9-CM: V12.49  Unknown - Present        Severe obesity ([address] ICD-10-CM: E66.01  ICD-9-CM: 278.01  4/29/2019 - Present        Hiatal hernia ICD-10-CM: K44.9  ICD-9-CM: 553.3  5/18/2018 - Present        Incomplete right bundle branch block (RBBB) determined by electrocardiography ICD-10-CM: I45.10  ICD-9-CM: 426.4  5/10/2018 - Present        Hiatal hernia with gastroesophageal reflux ICD-10-CM: K21.9, K44.9  ICD-9-CM: 530.81, 553.3  4/1/2018 - Present        RESOLVED: Obese ICD-10-CM: E66.9  ICD-9-CM: 278.00  Unknown - 3/8/2020        RESOLVED: Elevated lactic acid level ICD-10-CM: R79.89  ICD-9-CM: 276.2  5/30/2019 - 3/8/2020        RESOLVED: Dehydration ICD-10-CM: E86.0  ICD-9-CM: 276.51  5/30/2019 - 3/8/2020        RESOLVED: Anemia ICD-10-CM: D64.9  ICD-9-CM: 285.9  5/30/2019 - 3/8/2020        RESOLVED: Hemorrhoids ICD-10-CM: K64.9  ICD-9-CM: 455.6  Unknown - 3/8/2020        RESOLVED: Depressive disorder, not elsewhere classified ICD-10-CM: F32.9  ICD-9-CM: 965  10/1/2013 - 5/30/2019        RESOLVED: Leukocytosis, unspecified ICD-10-CM: Y40.462  ICD-9-CM: 288.60  10/1/2013 - 5/30/2019        RESOLVED: Hypokalemia ICD-10-CM: E87.6  ICD-9-CM: 276.8  10/1/2013 - 3/8/2020        RESOLVED: Slurred speech ICD-10-CM: R47.81  ICD-9-CM: 784.59  9/30/2013 - 5/30/2019        RESOLVED: Seizure ([facility] (Chronic) ICD-10-CM: R56.9  ICD-9-CM: 780.39  5/2/2011 - 5/30/2019        RESOLVED: Migraine (Chronic) ICD-10-CM: Z19.965  ICD-9-CM: 346.90  5/2/2011 - 5/30/2019        RESOLVED: Cavernous hemangioma of intracranial structure ([facility] (Chronic) ICD-10-CM: D18.02  ICD-9-CM: 228.02  5/2/2011 - 3/8/2020              Subjective:     Please note most of the history was obtained from the patient's mother via phone who witnessed onset of presenting symptoms as  patient with word-finding difficulty and unable to give all pertinent history. 61-year-old female with a past medical history significant for obesity, obstructive sleep apnea, gastroesophageal reflux disease, primary hypertension, seizure disorder, migraine headaches, anxiety disorder, and depression, was brought to the emergency room from home via EMS for word-finding deficit and difficulty in speaking. Per  patient's mother, patient was at her baseline mental status up until about 03:00 p.m. on 06/12/2020, whereafter the patient was noted with repetitive monosyllabic phrases. Per patient's mother, patient had full comprehension, expression and was able to speak in full sentences prior to the onset of symptoms. Patient had no prior complaints of headaches, dizziness, visual deficits, chest pains, palpitations, sore throat, cough, shortness of breath, nausea, vomiting, fevers, chills, abdominal pain, bladder or bowel irregularities. (Dr Randi Johnson)    6/13:  Afebrile. WBC still elevated - monitoring. BP mildly up - medicate as needed. 6/14: Word finding diff has resolved. No complaints at this moment. Pleasant at this moment. She insists there is \"something new in scans and something needs to be done. \"  Reviewed results so far with pt. Pt refused Keppra last night and was demanding Benzos. Refusing Lovenox. She was throwing things in room last night. She demands a reg diet. Neuro has seen. MRI yesterday with no new acute process per preliminary report. K+ sl low - replacing. She reports before symptoms started that she \"cut most of the medicines in half cause just on too many of them and don't need them\" - did not cut her benzos. Discussed her lipid results and the risks of her high LDL - she refuses to consider meds for her hyperlipidemia. She declines to consider psy referral - I suspect there is a primary psy issue. BP up and down.   Will restart her home meds with a reduced dose of Xanax. With this degree of noncompliance and intransigence, there is likely little we can do to improve her situation. Review of Systems:   Review of systems not obtained due to patient factors. Objective:   Physical Exam:     Visit Vitals  BP (!) 167/112 (BP 1 Location: Left arm, BP Patient Position: At rest)   Pulse 93   Temp 98 °F (36.7 °C)   Resp 18   Wt 112.3 kg (247 lb 9.2 oz)   LMP 2011   SpO2 93%   BMI 43.86 kg/m²      O2 Device: Room air    Temp (24hrs), Av.3 °F (36.8 °C), Min:98 °F (36.7 °C), Max:98.7 °F (37.1 °C)    No intake/output data recorded.  1901 -  0700  In: 100 [I.V.:100]  Out: -   General:  Alert, obese, cooperative, no distress, appears stated age. Head:  Normocephalic, without obvious abnormality, atraumatic. Eyes:  Conjunctivae/corneas clear. PERRL, EOMs intact. Throat: Lips, mucosa, and tongue moist..   Neck: Supple, symmetrical, trachea midline, no adenopathy, thyroid: no enlargement/tenderness/nodules, no carotid bruit and no JVD. Lungs:   Clear to auscultation bilaterally. Chest wall:  No tenderness or deformity. Heart:  Regular rate and rhythm, no murmur, click, rub or gallop. Abdomen:   Soft, non-tender. Bowel sounds normal. No masses,  No organomegaly. Extremities: no cyanosis or edema. No calf tenderness or cords. Pulses: 2+ and symmetric all extremities. Skin: Skin color, texture, turgor normal. No rashes    Neurologic:  Alert and oriented X 3. Aphasia/word search has completely resolved.  equal.  No cogwheeling or rigidity. Gait not tested at this time. Sensation grossly normal to touch.   Gross motor of extremities normal.       Data Review:       Lab Results   Component Value Date/Time    Cholesterol, total 290 (H) 2020 07:49 AM    HDL Cholesterol 54 2020 07:49 AM    LDL, calculated 196.8 (H) 2020 07:49 AM    VLDL, calculated 39.2 2020 07:49 AM Triglyceride 196 (H) 06/13/2020 07:49 AM    CHOL/HDL Ratio 5.4 (H) 06/13/2020 07:49 AM     Recent Days:  Recent Labs     06/14/20 0519 06/13/20  0749 06/12/20 2019   WBC 10.5 16.0* 13.9*   HGB 12.8 13.7 14.5   HCT 40.8 43.1 45.6    412* 395     Recent Labs     06/14/20 0519 06/13/20  0749 06/12/20 2019     --  138   K 3.2*  --  3.5   *  --  107   CO2 27  --  25   *  --  117*   BUN 14  --  10   CREA 1.04*  --  1.01   CA 9.1  --  9.6   MG  --  2.5* 2.4   PHOS  --  3.9  --    ALB 3.6  --  4.0   TBILI 0.6  --  0.6   ALT 28  --  34     No results for input(s): PH, PCO2, PO2, HCO3, FIO2 in the last 72 hours.     24 Hour Results:  Recent Results (from the past 24 hour(s))   DUPLEX CAROTID BILATERAL    Collection Time: 06/13/20  7:00 PM   Result Value Ref Range    Right subclavian sys 92.5 cm/s    RIGHT SUBCLAVIAN ARTERY D 3.75 cm/s    Right cca dist sys 47.0 cm/s    Right CCA dist douglass 18.2 cm/s    Right CCA prox sys 58.4 cm/s    Right CCA prox douglass 18.2 cm/s    Right eca sys 65.9 cm/s    RIGHT EXTERNAL CAROTID ARTERY D 19.28 cm/s    Right ICA dist sys 79.6 cm/s    Right ICA dist douglass 39.7 cm/s    Right ICA mid sys 29.8 cm/s    Right ICA mid douglass 18.4 cm/s    Right ICA prox sys 46.9 cm/s    Right ICA prox douglass 17.7 cm/s    Right vertebral sys 42.3 cm/s    RIGHT VERTEBRAL ARTERY D 19.71 cm/s    Right ICA/CCA sys 1.7     Left subclavian sys 109.6 cm/s    LEFT SUBCLAVIAN ARTERY D 12.08 cm/s    Left CCA dist sys 59.6 cm/s    Left CCA dist douglass 23.3 cm/s    Left CCA prox sys 73.3 cm/s    Left CCA prox douglass 20.1 cm/s    Left ECA sys 76.8 cm/s    LEFT EXTERNAL CAROTID ARTERY D 27.35 cm/s    Left ICA dist sys 104.3 cm/s    Left ICA dist douglass 55.0 cm/s    Left ICA mid sys 39.4 cm/s    Left ICA mid douglass 21.9 cm/s    Left ICA prox sys 49.3 cm/s    Left ICA prox douglass 17.5 cm/s    Left vertebral sys 46.7 cm/s    LEFT VERTEBRAL ARTERY D 22.46 cm/s    Left ICA/CCA sys 0.65    METABOLIC PANEL, COMPREHENSIVE Collection Time: 06/14/20  5:19 AM   Result Value Ref Range    Sodium 143 136 - 145 mmol/L    Potassium 3.2 (L) 3.5 - 5.1 mmol/L    Chloride 109 (H) 97 - 108 mmol/L    CO2 27 21 - 32 mmol/L    Anion gap 7 5 - 15 mmol/L    Glucose 113 (H) 65 - 100 mg/dL    BUN 14 6 - 20 MG/DL    Creatinine 1.04 (H) 0.55 - 1.02 MG/DL    BUN/Creatinine ratio 13 12 - 20      GFR est AA >60 >60 ml/min/1.73m2    GFR est non-AA 55 (L) >60 ml/min/1.73m2    Calcium 9.1 8.5 - 10.1 MG/DL    Bilirubin, total 0.6 0.2 - 1.0 MG/DL    ALT (SGPT) 28 12 - 78 U/L    AST (SGOT) 27 15 - 37 U/L    Alk. phosphatase 137 (H) 45 - 117 U/L    Protein, total 8.2 6.4 - 8.2 g/dL    Albumin 3.6 3.5 - 5.0 g/dL    Globulin 4.6 (H) 2.0 - 4.0 g/dL    A-G Ratio 0.8 (L) 1.1 - 2.2     CBC WITH AUTOMATED DIFF    Collection Time: 06/14/20  5:19 AM   Result Value Ref Range    WBC 10.5 3.6 - 11.0 K/uL    RBC 4.58 3.80 - 5.20 M/uL    HGB 12.8 11.5 - 16.0 g/dL    HCT 40.8 35.0 - 47.0 %    MCV 89.1 80.0 - 99.0 FL    MCH 27.9 26.0 - 34.0 PG    MCHC 31.4 30.0 - 36.5 g/dL    RDW 14.3 11.5 - 14.5 %    PLATELET 260 585 - 012 K/uL    MPV 10.5 8.9 - 12.9 FL    NRBC 0.0 0  WBC    ABSOLUTE NRBC 0.00 0.00 - 0.01 K/uL    NEUTROPHILS 62 32 - 75 %    LYMPHOCYTES 28 12 - 49 %    MONOCYTES 8 5 - 13 %    EOSINOPHILS 1 0 - 7 %    BASOPHILS 1 0 - 1 %    IMMATURE GRANULOCYTES 0 0.0 - 0.5 %    ABS. NEUTROPHILS 6.6 1.8 - 8.0 K/UL    ABS. LYMPHOCYTES 3.0 0.8 - 3.5 K/UL    ABS. MONOCYTES 0.8 0.0 - 1.0 K/UL    ABS. EOSINOPHILS 0.1 0.0 - 0.4 K/UL    ABS. BASOPHILS 0.1 0.0 - 0.1 K/UL    ABS. IMM.  GRANS. 0.0 0.00 - 0.04 K/UL    DF AUTOMATED         Medications reviewed  Current Facility-Administered Medications   Medication Dose Route Frequency    atenoloL (TENORMIN) tablet 50 mg  50 mg Oral DAILY    busPIRone (BUSPAR) tablet 15 mg  15 mg Oral BID    DULoxetine (CYMBALTA) capsule 120 mg  120 mg Oral QHS    lisinopriL (PRINIVIL, ZESTRIL) tablet 10 mg  10 mg Oral DAILY    methocarbamoL (ROBAXIN) tablet 750 mg  750 mg Oral TID PRN    pantoprazole (PROTONIX) tablet 40 mg  40 mg Oral ACB/HS    topiramate (TOPAMAX) tablet 25 mg  25 mg Oral BID WITH MEALS    ALPRAZolam (XANAX) tablet 0.5 mg  0.5 mg Oral TID PRN    ibuprofen (MOTRIN) tablet 800 mg  800 mg Oral Q8H PRN    potassium chloride SR (KLOR-CON 10) tablet 20 mEq  20 mEq Oral BID    acetaminophen (TYLENOL) tablet 650 mg  650 mg Oral Q4H PRN    Or    acetaminophen (TYLENOL) solution 650 mg  650 mg Per NG tube Q4H PRN    Or    acetaminophen (TYLENOL) suppository 650 mg  650 mg Rectal Q4H PRN    enoxaparin (LOVENOX) injection 40 mg  40 mg SubCUTAneous Q12H    levETIRAcetam (KEPPRA) 1,000 mg in 0.9% sodium chloride 100 mL IVPB  1,000 mg IntraVENous Q12H    LORazepam (ATIVAN) injection 2 mg  2 mg IntraVENous Q4H PRN    ondansetron (ZOFRAN) injection 4 mg  4 mg IntraVENous Q6H PRN    hydrOXYzine HCL (ATARAX) tablet 25 mg  25 mg Oral Q8H       Care Plan discussed with: Patient/Nurse  Total time spent with patient: 30 minutes.   Matthias Combs MD

## 2020-06-14 NOTE — PROGRESS NOTES
CMS Note  6/14/2020    Patient received copies of the Observation, [place] and Code 44 letters. The patient was not able to obtain the letters. The patient seemed to show signs of confusion, while CMS discussed the letters.    Chelsy Tobias CMS

## 2020-06-14 NOTE — PROGRESS NOTES
@7222 Code S called for visual disturbances of seeing birds flying at her from the right side. VAN negative with glucose of 110. Taken down for stat Head CT and brought back where Dr. Donna Villareal from teleneuro evaluated the patient and had no additional recommendations and then requested to speak to MD on site. Notified Dr. Alley Hassan whom decline and recommended that Dr. Meme Khoury be called as he is the attending. No further escalation of care and Code S was completed. Also NIH upon return from CT was 0.

## 2020-06-14 NOTE — PROGRESS NOTES
Spiritual Care Assessment/Progress Note  [facility]      NAME: Gloria Montalvo      MRN: [id number]  AGE: 49 y.o. SEX: female  Druze Affiliation: Evangelical   Language: English     6/14/2020     Total Time (in minutes): 9     Spiritual Assessment begun in [facility]  MED SURG 2 through conversation with:         []Patient        [] Family    [] Friend(s)        Reason for Consult: Other (comment)(Code Stroke)     Spiritual beliefs: (Please include comment if needed)     [] Identifies with a joey tradition:    Indicated belief Evangelical listed, did not verify     [] Supported by a joey community:            [] Claims no spiritual orientation:           [] Seeking spiritual identity:                [] Adheres to an individual form of spirituality:           [] Not able to assess:                           Identified resources for coping:      [] Prayer                               [] Music                  [] Guided Imagery     [] Family/friends                 [] Pet visits     [] Devotional reading                         [x] Unknown     [] Other:                                          Interventions offered during this visit: (See comments for more details)    Patient Interventions: Affirmation of emotions/emotional suffering, Affirmation of joey           Plan of Care:     [] Support spiritual and/or cultural needs    [] Support AMD and/or advance care planning process      [] Support grieving process   [] Coordinate Rites and/or Rituals    [] Coordination with community clergy   [] No spiritual needs identified at this time   [] Detailed Plan of Care below (See Comments)  [] Make referral to Music Therapy  [] Make referral to Pet Therapy     [] Make referral to Addiction services  [] Make referral to [facility]  [] Make referral to Spiritual Care Partner  [] No future visits requested        [x] Follow up visits as needed     Comments: Responded to Code Stroke on 5 Med Surg.   Staff was working with Miss Stevan Bray. Miss Stevan Bray smiled at me as she spotted me outside her door. She indicated she remembered me from previous visits. Provided brief spiritual presence and breathing meditation. ... I with the [address]. ... Out with the rest... Staff took Miss Stevan Bray out for tests.  available as able and/or needed.   Visited by: Mike Downey., MS., [address] [facility] Yossi Farr ([id number])

## 2020-06-14 NOTE — ROUTINE PROCESS
Refusing Lovenox sq and Atarax and now she refused her Keppra iv until she will get her Topamax po for headaches/migraine notified MD and stated ok to give. Home med Topamax 25 mg po 2x day with meals. Pt stated if she will take her med 2x a day it will bother her so she is taking daily at bedtime.

## 2020-06-14 NOTE — PROGRESS NOTES
Neurology Hospital Progress Note    Patient ID:  Isabelle Morales  [id number]  3 y.o.  1965      Subjective:   History:  Isabelle Morales is a 49 y.o. female who  has a past medical history of Anxiety and depression, Cavernous hemangioma of intracranial structure ([facility] [address] (5/2/2011), Fibromyalgia, Hemorrhoids, Hiatal hernia with gastroesophageal reflux (04/2018), migraines, seizure disorder, Hypertension, Incomplete right bundle branch block (RBBB) determined by electrocardiography (05/10/2018), Obese, and CARLOS (obstructive sleep apnea). who comes in for word finding difficulty that lasted all day yesterday. Patient feels better today. In 4/29/19, patient was seen by Dr Sherwin Galan who noted tangential thought process and pressured speech at times. Review of chart: Had craniotomy for bleeding AVM, and this was in [place] [id number], and another in 2006. This was for cavernous malformation that was bleeding. She says that she has had multiple seizures in the past, has been put on seizure medicine, but did not like the way it made her feel, so she did not take it.  MRI of the brain performed March 2018 and compared to her September 2013 scan. Darlyn Escobar is status post left parietal craniotomy with encephalomalacia in the left parietal lobe.  She has hemosiderin along that area of encephalomalacia. 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     Patient doing well today with no new event.  Still has tangential conversation with hesitant speech.       ROS:  Per HPI-  Otherwise the remainder of the review of system was negative      Social Hx:  Social History     Socioeconomic History    Marital status: SINGLE     Spouse name: Not on file    Number of children: Not on file    Years of education: Not on file    Highest education level: Not on file   Tobacco Use    Smoking status: Former Smoker    Smokeless tobacco: Never Used    Tobacco comment: on chantix,   Substance and Sexual Activity    Alcohol use: No    Drug use: No       Meds:  No current facility-administered medications on file prior to encounter. Current Outpatient Medications on File Prior to Encounter   Medication Sig Dispense Refill    atenoloL (TENORMIN) 50 mg tablet Take 50 mg by mouth daily.  ibuprofen (MOTRIN) 800 mg tablet Take 800 mg by mouth every eight (8) hours as needed for Pain.  methocarbamoL (ROBAXIN) 750 mg tablet Take 750 mg by mouth three (3) times daily as needed for Muscle Spasm(s).  famotidine (PEPCID) 20 mg tablet Take 1 Tab by mouth two (2) times a day. 60 Tab 0    lisinopriL (PRINIVIL, ZESTRIL) 10 mg tablet Take 1 Tab by mouth daily. 30 Tab 0    pantoprazole (PROTONIX) 40 mg tablet Take 40 mg by mouth two (2) times a day.  busPIRone (BUSPAR) 15 mg tablet Take 15 mg by mouth two (2) times a day.  DULoxetine (CYMBALTA) 60 mg capsule Take 120 mg by mouth nightly.  topiramate (TOPAMAX) 25 mg tablet Take 25 mg by mouth two (2) times daily (with meals).  ALPRAZolam (XANAX) 1 mg tablet Take 1 mg by mouth three (3) times daily. Imaging:    CT Results (recent):  Results from Hospital Encounter encounter on 06/12/20   CT PERF W CBF    Narrative INDICATION: code neuro    COMPARISON: Earlier unenhanced head CT. EXAM: CT brain perfusion was performed with generation of hemodynamic maps of  multiple parameters, including cerebral blood flow, cerebral blood volume, and  MTT (mean transit time). CT dose reduction was achieved through use of a standardized protocol tailored  for this examination and automatic exposure control for dose modulation. FINDINGS: Diminished blood volume is shown in the superior left frontoparietal  region corresponding with postsurgical myelomalacia. There is associated delayed  flow, transit time and time to peak. No other perfusion abnormality is  demonstrated. Impression IMPRESSION: No perfusion abnormality beyond area of postsurgical myelomalacia.        MRI Results (recent):  Results from [place] encounter on 06/12/20   MRI BRAIN W WO CONT    Narrative *PRELIMINARY REPORT*    INDICATION: Aphasia, altered mental status, seizure, cerebrovascular accident. 10 mL IV Dotarem was administered. 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 Small mass in the  anteroinferior third ventricle. Scattered foci of magnetic susceptibility  artifact consistent with numerous old punctate hemorrhages. 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 No acute intracranial process. Preliminary report was provided by Dr. Scotty Escobedo, the on-call  radiologist, at 1103 hours on 6/13/2020. Final report to follow. *END PRELIMINARY REPORT*                         IR Results (recent):  No results found for this or any previous visit. VAS/US Results (recent):  Results from Hospital Encounter encounter on 08/11/13   DUPLEX LOWER EXT VENOUS RIGHT    Narrative **Final Report**       ICD Codes / Adm. Diagnosis: 652617  782.2 / Chest Pain    Examination:  US LOW EXT VENOUS DOPPLER UNI RT  - [id number] - Aug 11 2013    2:06PM  Accession No:  [id number]  Reason:  pain, swelling, recent surgery, eval for DVT      REPORT:  INDICATION:  pain, swelling, recent surgery, eval for DVT    COMPARISON: 6.22.2012    FINDINGS: Duplex Doppler sonography of the right lower extremity was   performed from the groin to the calf. The right common femoral, femoral and   popliteal veins are compressible with normal color-flow and wave forms and   response to physiologic maneuvers including Valsalva and augmentation. There is a complex fluid collection along the posterior medial right thigh. IMPRESSION:  No deep venous thrombosis identified. Hematoma in the right   medial posterior thigh.                 Signing/Reading Doctor: Gabriella Sierra ([id number])    Approved: Gabriella Sierra ([id number])  Aug 11 2013  2:15PM                                      Reviewed records in [facility] and media tab today    Lab Review     Admission on 06/12/2020   Component Date Value Ref Range Status    SAMPLES BEING HELD 06/12/2020 MERLY.GRN. SST   Corrected    CORRECTED ON 06/12 AT 2048: PREVIOUSLY REPORTED AS MERLY    COMMENT 06/12/2020 Add-on orders for these samples will be processed based on acceptable specimen integrity and analyte stability, which may vary by analyte. Final    Sodium 06/12/2020 138  136 - 145 mmol/L Final    Potassium 06/12/2020 3.5  3.5 - 5.1 mmol/L Final    Chloride 06/12/2020 107  97 - 108 mmol/L Final    CO2 06/12/2020 25  21 - 32 mmol/L Final    Anion gap 06/12/2020 6  5 - 15 mmol/L Final    Glucose 06/12/2020 117* 65 - 100 mg/dL Final    BUN 06/12/2020 10  6 - 20 MG/DL Final    Creatinine 06/12/2020 1.01  0.55 - 1.02 MG/DL Final    BUN/Creatinine ratio 06/12/2020 10* 12 - 20   Final    GFR est AA 06/12/2020 >60  >60 ml/min/1.73m2 Final    GFR est non-AA 06/12/2020 57* >60 ml/min/1.73m2 Final    Comment: Estimated GFR is calculated using the IDMS-traceable Modification of Diet in Renal Disease (MDRD) Study equation, reported for both  Americans (GFRAA) and non- Americans (GFRNA), and normalized to 1.73m2 body surface area. The physician must decide which value applies to the patient. The MDRD study equation should only be used in individuals age 25 or older. It has not been validated for the following: pregnant women, patients with serious comorbid conditions, or on certain medications, or persons with extremes of body size, muscle mass, or nutritional status.  Calcium 06/12/2020 9.6  8.5 - 10.1 MG/DL Final    Bilirubin, total 06/12/2020 0.6  0.2 - 1.0 MG/DL Final    ALT (SGPT) 06/12/2020 34  12 - 78 U/L Final    AST (SGOT) 06/12/2020 25  15 - 37 U/L Final    Alk.  phosphatase 06/12/2020 160* 45 - 117 U/L Final    Protein, total 06/12/2020 9.6* 6.4 - 8.2 g/dL Final    Albumin 06/12/2020 4.0  3.5 - 5.0 g/dL Final    Globulin 06/12/2020 5.6* 2.0 - 4.0 g/dL Final    A-G Ratio 06/12/2020 0.7* 1.1 - 2.2   Final    WBC 06/12/2020 13.9* 3.6 - 11.0 K/uL Final    RBC 06/12/2020 5.20  3.80 - 5.20 M/uL Final    HGB 06/12/2020 14.5  11.5 - 16.0 g/dL Final    HCT 06/12/2020 45.6  35.0 - 47.0 % Final    MCV 06/12/2020 87.7  80.0 - 99.0 FL Final    MCH 06/12/2020 27.9  26.0 - 34.0 PG Final    MCHC 06/12/2020 31.8  30.0 - 36.5 g/dL Final    RDW 06/12/2020 14.0  11.5 - 14.5 % Final    PLATELET 46/02/3005 428  150 - 400 K/uL Final    MPV 06/12/2020 10.5  8.9 - 12.9 FL Final    NRBC 06/12/2020 0.0  0  WBC Final    ABSOLUTE NRBC 06/12/2020 0.00  0.00 - 0.01 K/uL Final    NEUTROPHILS 06/12/2020 75  32 - 75 % Final    LYMPHOCYTES 06/12/2020 17  12 - 49 % Final    MONOCYTES 06/12/2020 5  5 - 13 % Final    EOSINOPHILS 06/12/2020 1  0 - 7 % Final    BASOPHILS 06/12/2020 1  0 - 1 % Final    IMMATURE GRANULOCYTES 06/12/2020 1* 0.0 - 0.5 % Final    ABS. NEUTROPHILS 06/12/2020 10.7* 1.8 - 8.0 K/UL Final    ABS. LYMPHOCYTES 06/12/2020 2.3  0.8 - 3.5 K/UL Final    ABS. MONOCYTES 06/12/2020 0.6  0.0 - 1.0 K/UL Final    ABS. EOSINOPHILS 06/12/2020 0.2  0.0 - 0.4 K/UL Final    ABS. BASOPHILS 06/12/2020 0.1  0.0 - 0.1 K/UL Final    ABS. IMM.  GRANS. 06/12/2020 0.1* 0.00 - 0.04 K/UL Final    DF 06/12/2020 AUTOMATED    Final    Color 06/12/2020 YELLOW/STRAW    Final    Color Reference Range: Straw, Yellow or Dark Yellow    Appearance 06/12/2020 CLEAR  CLEAR   Final    Specific gravity 06/12/2020 1.005  1.003 - 1.030   Final    pH (UA) 06/12/2020 7.0  5.0 - 8.0   Final    Protein 06/12/2020 Negative  NEG mg/dL Final    Glucose 06/12/2020 Negative  NEG mg/dL Final    Ketone 06/12/2020 Negative  NEG mg/dL Final    Bilirubin 06/12/2020 Negative  NEG   Final    Blood 06/12/2020 Negative  NEG   Final    Urobilinogen 06/12/2020 0.2  0.2 - 1.0 EU/dL Final    Nitrites 06/12/2020 Negative  NEG   Final    Leukocyte Esterase 06/12/2020 Negative  NEG   Final    Glucose (POC) 06/12/2020 122* 65 - 100 mg/dL Final    Comment: (NOTE)  The Accu-Chek Inform II glucometer is not FDA cleared for critically   ill patient use. A study was performed validating the equivalence of   glucometer and clinical laboratory results on this patient   population. Despite the study, use of glucometers with capillary   specimens from critically ill patients, regardless of their location,   makes the test high complexity and requires the performing individual   to comply with CLIA requirements more stringent than those for waived   testing in the hospital setting. Critical thinking skills are   necessary to determine a potentially critically ill patients status   prior to using a glucometer.  Performed by 06/12/2020 [place]    Final    Magnesium 06/12/2020 2.4  1.6 - 2.4 mg/dL Final    SAMPLES BEING HELD 06/12/2020 GRY TUBE URINE   Final    COMMENT 06/12/2020 Add-on orders for these samples will be processed based on acceptable specimen integrity and analyte stability, which may vary by analyte. Final    LIPID PROFILE 06/13/2020        Final    Cholesterol, total 06/13/2020 290* <200 MG/DL Final    Triglyceride 06/13/2020 196* <150 MG/DL Final    Based on NCEP-ATP III:  Triglycerides <150 mg/dL  is considered normal, 150-199 mg/dL  borderline high,  200-499 mg/dL high and  greater than or equal to 500 mg/dL very high.  HDL Cholesterol 06/13/2020 54  MG/DL Final    Based on NCEP ATP III, HDL Cholesterol <40 mg/dL is considered low and >60 mg/dL is elevated.     LDL, calculated 06/13/2020 196.8* 0 - 100 MG/DL Final    Comment: Based on the NCEP-ATP: LDL-C concentrations are considered  optimal <100 mg/dL,  near optimal/above Normal 100-129 mg/dL  Borderline High: 130-159, High: 160-189 mg/dL  Very High: Greater than or equal to 190 mg/dL      VLDL, calculated 06/13/2020 39.2  MG/DL Final    CHOL/HDL Ratio 06/13/2020 5.4* 0.0 - 5.0   Final    Hemoglobin A1c 06/13/2020 6.5* 4.0 - 5.6 % Final    Comment: NEW METHOD  PLEASE NOTE NEW REFERENCE RANGE  (NOTE)  HbA1C Interpretive Ranges  <5.7              Normal  5.7 - 6.4         Consider Prediabetes  >6.5              Consider Diabetes      Est. average glucose 06/13/2020 140  mg/dL Final    WBC 06/13/2020 16.0* 3.6 - 11.0 K/uL Final    RBC 06/13/2020 4.87  3.80 - 5.20 M/uL Final    HGB 06/13/2020 13.7  11.5 - 16.0 g/dL Final    HCT 06/13/2020 43.1  35.0 - 47.0 % Final    MCV 06/13/2020 88.5  80.0 - 99.0 FL Final    MCH 06/13/2020 28.1  26.0 - 34.0 PG Final    MCHC 06/13/2020 31.8  30.0 - 36.5 g/dL Final    RDW 06/13/2020 14.1  11.5 - 14.5 % Final    PLATELET 31/40/1521 904* 150 - 400 K/uL Final    MPV 06/13/2020 10.6  8.9 - 12.9 FL Final    NRBC 06/13/2020 0.0  0  WBC Final    ABSOLUTE NRBC 06/13/2020 0.00  0.00 - 0.01 K/uL Final    Sed rate, automated 06/13/2020 23  0 - 30 mm/hr Final    CRP, High sensitivity 06/13/2020 >9.5  mg/L Final    Comment: (NOTE)   Value                          Risk    <1.0 mg/L                        Low  1.0-3.0 mg/L                     Average  >3.0 mg/L                        High    CRP is a nonspecific marker of inflammation and conditions other than   atherosclerosis may cause elevated levels. If first result >3.0 mg/L,   consider repeating at least 2 weeks later with patient in a   metabolically stable state, free of infection or acute illness.  TSH 06/13/2020 1.51  0.36 - 3.74 uIU/mL Final    Comment:      Due to TSH heterogeneity, both structurally and degree of glycosylation, monoclonal antibodies used in the TSH assay may not accurately quantitate TSH. Therefore, this result should be correlated with clinical findings as well as with other assessments of thyroid function, e.g., free T4, free T3.       Phosphorus 06/13/2020 3.9  2.6 - 4.7 MG/DL Final    Magnesium 06/13/2020 2.5* 1.6 - 2.4 mg/dL Final    Homocysteine, plasma 06/13/2020 7.2 3.7 - 13.9 umol/L Final    Right subclavian sys 06/13/2020 92.5  cm/s In process    RIGHT SUBCLAVIAN ARTERY D 06/13/2020 3.75  cm/s In process    Right cca dist sys 06/13/2020 47.0  cm/s In process    Right CCA dist douglass 06/13/2020 18.2  cm/s In process    Right CCA prox sys 06/13/2020 58.4  cm/s In process    Right CCA prox douglass 06/13/2020 18.2  cm/s In process    Right eca sys 06/13/2020 65.9  cm/s In process    RIGHT EXTERNAL CAROTID ARTERY D 06/13/2020 19.28  cm/s In process    Right ICA dist sys 06/13/2020 79.6  cm/s In process    Right ICA dist douglass 06/13/2020 39.7  cm/s In process    Right ICA mid sys 06/13/2020 29.8  cm/s In process    Right ICA mid douglass 06/13/2020 18.4  cm/s In process    Right ICA prox sys 06/13/2020 46.9  cm/s In process    Right ICA prox douglass 06/13/2020 17.7  cm/s In process    Right vertebral sys 06/13/2020 42.3  cm/s In process    RIGHT VERTEBRAL ARTERY D 06/13/2020 19.71  cm/s In process    Right ICA/CCA sys 06/13/2020 1.7   In process    Left subclavian sys 06/13/2020 109.6  cm/s In process    LEFT SUBCLAVIAN ARTERY D 06/13/2020 12.08  cm/s In process    Left CCA dist sys 06/13/2020 59.6  cm/s In process    Left CCA dist douglass 06/13/2020 23.3  cm/s In process    Left CCA prox sys 06/13/2020 73.3  cm/s In process    Left CCA prox douglass 06/13/2020 20.1  cm/s In process    Left ECA sys 06/13/2020 76.8  cm/s In process    LEFT EXTERNAL CAROTID ARTERY D 06/13/2020 27.35  cm/s In process    Left ICA dist sys 06/13/2020 104.3  cm/s In process    Left ICA dist douglass 06/13/2020 55.0  cm/s In process    Left ICA mid sys 06/13/2020 39.4  cm/s In process    Left ICA mid douglass 06/13/2020 21.9  cm/s In process    Left ICA prox sys 06/13/2020 49.3  cm/s In process    Left ICA prox douglass 06/13/2020 17.5  cm/s In process    Left vertebral sys 06/13/2020 46.7  cm/s In process    LEFT VERTEBRAL ARTERY D 06/13/2020 22.46  cm/s In process    Left ICA/CCA sys 06/13/2020 1.75 In process    Sodium 06/14/2020 143  136 - 145 mmol/L Final    Potassium 06/14/2020 3.2* 3.5 - 5.1 mmol/L Final    Chloride 06/14/2020 109* 97 - 108 mmol/L Final    CO2 06/14/2020 27  21 - 32 mmol/L Final    Anion gap 06/14/2020 7  5 - 15 mmol/L Final    Glucose 06/14/2020 113* 65 - 100 mg/dL Final    BUN 06/14/2020 14  6 - 20 MG/DL Final    Creatinine 06/14/2020 1.04* 0.55 - 1.02 MG/DL Final    BUN/Creatinine ratio 06/14/2020 13  12 - 20   Final    GFR est AA 06/14/2020 >60  >60 ml/min/1.73m2 Final    GFR est non-AA 06/14/2020 55* >60 ml/min/1.73m2 Final    Calcium 06/14/2020 9.1  8.5 - 10.1 MG/DL Final    Bilirubin, total 06/14/2020 0.6  0.2 - 1.0 MG/DL Final    ALT (SGPT) 06/14/2020 28  12 - 78 U/L Final    AST (SGOT) 06/14/2020 27  15 - 37 U/L Final    Alk. phosphatase 06/14/2020 137* 45 - 117 U/L Final    Protein, total 06/14/2020 8.2  6.4 - 8.2 g/dL Final    Albumin 06/14/2020 3.6  3.5 - 5.0 g/dL Final    Globulin 06/14/2020 4.6* 2.0 - 4.0 g/dL Final    A-G Ratio 06/14/2020 0.8* 1.1 - 2.2   Final    WBC 06/14/2020 10.5  3.6 - 11.0 K/uL Final    RBC 06/14/2020 4.58  3.80 - 5.20 M/uL Final    HGB 06/14/2020 12.8  11.5 - 16.0 g/dL Final    HCT 06/14/2020 40.8  35.0 - 47.0 % Final    MCV 06/14/2020 89.1  80.0 - 99.0 FL Final    MCH 06/14/2020 27.9  26.0 - 34.0 PG Final    MCHC 06/14/2020 31.4  30.0 - 36.5 g/dL Final    RDW 06/14/2020 14.3  11.5 - 14.5 % Final    PLATELET 72/15/2486 493  150 - 400 K/uL Final    MPV 06/14/2020 10.5  8.9 - 12.9 FL Final    NRBC 06/14/2020 0.0  0  WBC Final    ABSOLUTE NRBC 06/14/2020 0.00  0.00 - 0.01 K/uL Final    NEUTROPHILS 06/14/2020 62  32 - 75 % Final    LYMPHOCYTES 06/14/2020 28  12 - 49 % Final    MONOCYTES 06/14/2020 8  5 - 13 % Final    EOSINOPHILS 06/14/2020 1  0 - 7 % Final    BASOPHILS 06/14/2020 1  0 - 1 % Final    IMMATURE GRANULOCYTES 06/14/2020 0  0.0 - 0.5 % Final    ABS.  NEUTROPHILS 06/14/2020 6.6  1.8 - 8.0 K/UL Final  ABS. LYMPHOCYTES 06/14/2020 3.0  0.8 - 3.5 K/UL Final    ABS. MONOCYTES 06/14/2020 0.8  0.0 - 1.0 K/UL Final    ABS. EOSINOPHILS 06/14/2020 0.1  0.0 - 0.4 K/UL Final    ABS. BASOPHILS 06/14/2020 0.1  0.0 - 0.1 K/UL Final    ABS. IMM. GRANS. 06/14/2020 0.0  0.00 - 0.04 K/UL Final    DF 06/14/2020 AUTOMATED    Final         Objective:       Exam:  Visit Vitals  BP (!) 167/112 (BP 1 Location: Left arm, BP Patient Position: At rest)   Pulse 93   Temp 98 °F (36.7 °C)   Resp 18   Wt 112.3 kg (247 lb 9.2 oz)   SpO2 93%   BMI 43.86 kg/m²     Gen: Awake, alert, follows commands  Appropriate appearance, normal speech. Oriented to all spheres. No visual field defect on confrontation exam.  Full eyes movement, with no nystagmus, no diplopia, no ptosis. Normal gag and swallow. All remaining cranial nerves were normal  Motor function: 5/5 in all extremities  Sensory: intact to LT, PP and JPS  DTRs ++ in all extremities, (-) Babinski  Good FTN and HTS   Gait: Deferred    Assessment:     1. Encephalopathy    2. Aphasia    3. History of seizure      Old left parietal encephalomalacia and craniotomy. Small mass in the  anteroinferior third ventricle. Scattered foci of magnetic susceptibility  artifact consistent with numerous old punctate hemorrhages. 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 No acute process      Plan:     RECOMMENDATIONS  1. I had a long discussion with patient. Discussed diagnosis, prognosis, pathophysiology and available treatment. Reviewed test results. All questions were answered. 2. Awiating EEG to look for subclinical seizure event. If EEG is unremarkable, can go home  3. Seizure precautions  4. Of note, BP during admission tends to be in the higher side (/105 - 167/112).  This needs to be monitored and BP meds be optimized if hypertensive cause is a possibility (although patient has been refusing meds)    Please call for questions    35 mins of time spent, 50% of which was spent on counseling and coordination of care.       Ann Stern MD  Diplomate, American Board of Psychiatry and Neurology  Diplomate, Neuromuscular Medicine  Diplomate, American Board of Electrodiagnostic Medicine

## 2020-06-15 VITALS
OXYGEN SATURATION: 93 % | TEMPERATURE: 98.2 F | DIASTOLIC BLOOD PRESSURE: 113 MMHG | RESPIRATION RATE: 18 BRPM | WEIGHT: 247.58 LBS | HEART RATE: 79 BPM | BODY MASS INDEX: 43.86 KG/M2 | SYSTOLIC BLOOD PRESSURE: 163 MMHG

## 2020-06-15 LAB
ALBUMIN SERPL-MCNC: 3.5 G/DL (ref 3.5–5)
ALBUMIN/GLOB SERPL: 0.8 {RATIO} (ref 1.1–2.2)
ALP SERPL-CCNC: 131 U/L (ref 45–117)
ALT SERPL-CCNC: 24 U/L (ref 12–78)
ANION GAP SERPL CALC-SCNC: 5 MMOL/L (ref 5–15)
AST SERPL-CCNC: 34 U/L (ref 15–37)
ATRIAL RATE: 92 BPM
BASOPHILS # BLD: 0.1 K/UL (ref 0–0.1)
BASOPHILS NFR BLD: 1 % (ref 0–1)
BILIRUB SERPL-MCNC: 0.5 MG/DL (ref 0.2–1)
BUN SERPL-MCNC: 14 MG/DL (ref 6–20)
BUN/CREAT SERPL: 15 (ref 12–20)
CALCIUM SERPL-MCNC: 8.7 MG/DL (ref 8.5–10.1)
CALCULATED P AXIS, ECG09: 36 DEGREES
CALCULATED R AXIS, ECG10: -17 DEGREES
CALCULATED T AXIS, ECG11: 20 DEGREES
CHLORIDE SERPL-SCNC: 110 MMOL/L (ref 97–108)
CO2 SERPL-SCNC: 25 MMOL/L (ref 21–32)
CREAT SERPL-MCNC: 0.96 MG/DL (ref 0.55–1.02)
DIAGNOSIS, 93000: NORMAL
DIFFERENTIAL METHOD BLD: ABNORMAL
EOSINOPHIL # BLD: 0.1 K/UL (ref 0–0.4)
EOSINOPHIL NFR BLD: 1 % (ref 0–7)
ERYTHROCYTE [DISTWIDTH] IN BLOOD BY AUTOMATED COUNT: 14.1 % (ref 11.5–14.5)
GLOBULIN SER CALC-MCNC: 4.4 G/DL (ref 2–4)
GLUCOSE SERPL-MCNC: 99 MG/DL (ref 65–100)
HCT VFR BLD AUTO: 42.3 % (ref 35–47)
HGB BLD-MCNC: 12.8 G/DL (ref 11.5–16)
IMM GRANULOCYTES # BLD AUTO: 0 K/UL (ref 0–0.04)
IMM GRANULOCYTES NFR BLD AUTO: 0 % (ref 0–0.5)
LEFT CCA DIST DIAS: 23.3 CM/S
LEFT CCA DIST SYS: 59.6 CM/S
LEFT CCA PROX DIAS: 20.1 CM/S
LEFT CCA PROX SYS: 73.3 CM/S
LEFT ECA DIAS: 27.35 CM/S
LEFT ECA SYS: 76.8 CM/S
LEFT ICA DIST DIAS: 55 CM/S
LEFT ICA DIST SYS: 104.3 CM/S
LEFT ICA MID DIAS: 21.9 CM/S
LEFT ICA MID SYS: 39.4 CM/S
LEFT ICA PROX DIAS: 17.5 CM/S
LEFT ICA PROX SYS: 49.3 CM/S
LEFT ICA/CCA SYS: 1.75
LEFT SUBCLAVIAN DIAS: 12.08 CM/S
LEFT SUBCLAVIAN SYS: 109.6 CM/S
LEFT VERTEBRAL DIAS: 22.46 CM/S
LEFT VERTEBRAL SYS: 46.7 CM/S
LYMPHOCYTES # BLD: 3.6 K/UL (ref 0.8–3.5)
LYMPHOCYTES NFR BLD: 38 % (ref 12–49)
MCH RBC QN AUTO: 27.8 PG (ref 26–34)
MCHC RBC AUTO-ENTMCNC: 30.3 G/DL (ref 30–36.5)
MCV RBC AUTO: 92 FL (ref 80–99)
MONOCYTES # BLD: 0.7 K/UL (ref 0–1)
MONOCYTES NFR BLD: 7 % (ref 5–13)
NEUTS SEG # BLD: 5.1 K/UL (ref 1.8–8)
NEUTS SEG NFR BLD: 53 % (ref 32–75)
NRBC # BLD: 0 K/UL (ref 0–0.01)
NRBC BLD-RTO: 0 PER 100 WBC
P-R INTERVAL, ECG05: 160 MS
PLATELET # BLD AUTO: 321 K/UL (ref 150–400)
PMV BLD AUTO: 10.5 FL (ref 8.9–12.9)
POTASSIUM SERPL-SCNC: 3.6 MMOL/L (ref 3.5–5.1)
PROT SERPL-MCNC: 7.9 G/DL (ref 6.4–8.2)
Q-T INTERVAL, ECG07: 382 MS
QRS DURATION, ECG06: 86 MS
QTC CALCULATION (BEZET), ECG08: 472 MS
RBC # BLD AUTO: 4.6 M/UL (ref 3.8–5.2)
RIGHT CCA DIST DIAS: 18.2 CM/S
RIGHT CCA DIST SYS: 47 CM/S
RIGHT CCA PROX DIAS: 18.2 CM/S
RIGHT CCA PROX SYS: 58.4 CM/S
RIGHT ECA DIAS: 19.28 CM/S
RIGHT ECA SYS: 65.9 CM/S
RIGHT ICA DIST DIAS: 39.7 CM/S
RIGHT ICA DIST SYS: 79.6 CM/S
RIGHT ICA MID DIAS: 18.4 CM/S
RIGHT ICA MID SYS: 29.8 CM/S
RIGHT ICA PROX DIAS: 17.7 CM/S
RIGHT ICA PROX SYS: 46.9 CM/S
RIGHT ICA/CCA SYS: 1.7
RIGHT SUBCLAVIAN DIAS: 3.75 CM/S
RIGHT SUBCLAVIAN SYS: 92.5 CM/S
RIGHT VERTEBRAL DIAS: 19.71 CM/S
RIGHT VERTEBRAL SYS: 42.3 CM/S
SODIUM SERPL-SCNC: 140 MMOL/L (ref 136–145)
TOPIRAMATE SERPL-MCNC: 1.4 UG/ML (ref 2–25)
VENTRICULAR RATE, ECG03: 92 BPM
WBC # BLD AUTO: 9.7 K/UL (ref 3.6–11)

## 2020-06-15 PROCEDURE — 80053 COMPREHEN METABOLIC PANEL: CPT

## 2020-06-15 PROCEDURE — 74011250637 HC RX REV CODE- 250/637: Performed by: FAMILY MEDICINE

## 2020-06-15 PROCEDURE — 36415 COLL VENOUS BLD VENIPUNCTURE: CPT

## 2020-06-15 PROCEDURE — 99218 HC RM OBSERVATION: CPT

## 2020-06-15 PROCEDURE — 85025 COMPLETE CBC W/AUTO DIFF WBC: CPT

## 2020-06-15 PROCEDURE — 92610 EVALUATE SWALLOWING FUNCTION: CPT

## 2020-06-15 PROCEDURE — 94760 N-INVAS EAR/PLS OXIMETRY 1: CPT

## 2020-06-15 PROCEDURE — 74011250637 HC RX REV CODE- 250/637: Performed by: PSYCHIATRY & NEUROLOGY

## 2020-06-15 RX ORDER — LEVETIRACETAM 500 MG/1
1000 TABLET ORAL 2 TIMES DAILY
Status: DISCONTINUED | OUTPATIENT
Start: 2020-06-15 | End: 2020-06-15 | Stop reason: HOSPADM

## 2020-06-15 RX ORDER — LEVETIRACETAM 1000 MG/1
1000 TABLET ORAL 2 TIMES DAILY
Qty: 60 TAB | Refills: 0 | Status: ON HOLD | OUTPATIENT
Start: 2020-06-15 | End: 2021-10-28

## 2020-06-15 RX ADMIN — TOPIRAMATE 25 MG: 25 TABLET, FILM COATED ORAL at 07:49

## 2020-06-15 RX ADMIN — HYDROXYZINE HYDROCHLORIDE 25 MG: 25 TABLET, FILM COATED ORAL at 06:59

## 2020-06-15 RX ADMIN — ALPRAZOLAM 0.5 MG: 0.5 TABLET ORAL at 08:00

## 2020-06-15 RX ADMIN — BUSPIRONE HYDROCHLORIDE 15 MG: 10 TABLET ORAL at 08:00

## 2020-06-15 RX ADMIN — PANTOPRAZOLE SODIUM 40 MG: 40 TABLET, DELAYED RELEASE ORAL at 06:59

## 2020-06-15 RX ADMIN — LEVETIRACETAM 1000 MG: 500 TABLET, FILM COATED ORAL at 10:47

## 2020-06-15 NOTE — PROGRESS NOTES
Daily Progress Note and Discharge Note: 6/15/2020  Beverly Caraballo MD    Assessment/Plan:   Possable acute cerebrovascular accident with residual dysphasia versus breakthrough seizure vrs due to med noncompliance. Seizure disorder. History of migraine headaches. CT head and CT angiogram head and neck code neuro negative for any acute findings.  - MRI brain and carotid Dopplers. - refuses statin, unable to tolerate aspirin due to increased bleeding.    -Neurology consulted. Chronic Non-compliance  - reports she is going to stop medicines she thinks she does not need - counseled    History of obstructive sleep apnea  - declines to get further w/u    Leukocytosis, probably due to a leukemoid reaction from possible seizure. No stigmata of sepsis. Will monitor off anti-infective therapy. History of gastroesophageal reflux disease. Endocrine:  Obesity with a body mass index greater than 35. Would benefit from therapeutic lifestyle changes counseling. Anxiety disorder and depression. Prophylaxis for deep venous thrombosis. Rehabilitation:  Speech, Physical and Occupational Therapy evaluations.    Directives:  Full code with a very guarded prognosis.        Problem List:  Problem List as of 6/15/2020 Date Reviewed: 3/8/2020          Codes Class Noted - Resolved    CVA (cerebral vascular accident) [facility]) ICD-10-CM: I63.9  ICD-9-CM: 434.91  6/12/2020 - Present        Chest pain ICD-10-CM: R07.9  ICD-9-CM: 786.50  3/8/2020 - Present        Rectal bleeding ICD-10-CM: K62.5  ICD-9-CM: 569.3  5/30/2019 - Present        CARLOS (obstructive sleep apnea) ICD-10-CM: G47.33  ICD-9-CM: 327.23  Unknown - Present    Overview Signed 5/30/2019  7:55 PM by Sarah Sampson MD     no cpap             HTN (hypertension) ICD-10-CM: I10  ICD-9-CM: 401.9  Unknown - Present        Hx of seizure disorder ICD-10-CM: Z86.69  ICD-9-CM: V12.49  Unknown - Present        Fibromyalgia ICD-10-CM: M79.7  ICD-9-CM: 729.1  Unknown - Present    Overview Signed 5/30/2019  7:55 PM by Austin Mitchell MD     fibromyalgia             Anxiety and depression ICD-10-CM: F41.9, F32.9  ICD-9-CM: 300.00, 311  Unknown - Present        Hx of migraines ICD-10-CM: Z86.69  ICD-9-CM: V12.49  Unknown - Present        Severe obesity ([address] ICD-10-CM: E66.01  ICD-9-CM: 278.01  4/29/2019 - Present        Hiatal hernia ICD-10-CM: K44.9  ICD-9-CM: 553.3  5/18/2018 - Present        Incomplete right bundle branch block (RBBB) determined by electrocardiography ICD-10-CM: I45.10  ICD-9-CM: 426.4  5/10/2018 - Present        Hiatal hernia with gastroesophageal reflux ICD-10-CM: K21.9, K44.9  ICD-9-CM: 530.81, 553.3  4/1/2018 - Present        RESOLVED: Obese ICD-10-CM: E66.9  ICD-9-CM: 278.00  Unknown - 3/8/2020        RESOLVED: Elevated lactic acid level ICD-10-CM: R79.89  ICD-9-CM: 276.2  5/30/2019 - 3/8/2020        RESOLVED: Dehydration ICD-10-CM: E86.0  ICD-9-CM: 276.51  5/30/2019 - 3/8/2020        RESOLVED: Anemia ICD-10-CM: D64.9  ICD-9-CM: 285.9  5/30/2019 - 3/8/2020        RESOLVED: Hemorrhoids ICD-10-CM: K64.9  ICD-9-CM: 455.6  Unknown - 3/8/2020        RESOLVED: Depressive disorder, not elsewhere classified ICD-10-CM: F32.9  ICD-9-CM: 189  10/1/2013 - 5/30/2019        RESOLVED: Leukocytosis, unspecified ICD-10-CM: G46.150  ICD-9-CM: 288.60  10/1/2013 - 5/30/2019        RESOLVED: Hypokalemia ICD-10-CM: E87.6  ICD-9-CM: 276.8  10/1/2013 - 3/8/2020        RESOLVED: Slurred speech ICD-10-CM: R47.81  ICD-9-CM: 784.59  9/30/2013 - 5/30/2019        RESOLVED: Seizure ([facility] (Chronic) ICD-10-CM: R56.9  ICD-9-CM: 780.39  5/2/2011 - 5/30/2019        RESOLVED: Migraine (Chronic) ICD-10-CM: P36.317  ICD-9-CM: 346.90  5/2/2011 - 5/30/2019        RESOLVED: Cavernous hemangioma of intracranial structure ([facility] (Chronic) ICD-10-CM: L18.45  ICD-9-CM: 228.02  5/2/2011 - 3/8/2020            Subjective:     Please note most of the history was obtained from the patient's mother via phone who witnessed onset of presenting symptoms as  patient with word-finding difficulty and unable to give all pertinent history. 81-year-old female with a past medical history significant for obesity, obstructive sleep apnea, gastroesophageal reflux disease, primary hypertension, seizure disorder, migraine headaches, anxiety disorder, and depression, was brought to the emergency room from home via EMS for word-finding deficit and difficulty in speaking. Per  patient's mother, patient was at her baseline mental status up until about 03:00 p.m. on 06/12/2020, whereafter the patient was noted with repetitive monosyllabic phrases. Per patient's mother, patient had full comprehension, expression and was able to speak in full sentences prior to the onset of symptoms. Patient had no prior complaints of headaches, dizziness, visual deficits, chest pains, palpitations, sore throat, cough, shortness of breath, nausea, vomiting, fevers, chills, abdominal pain, bladder or bowel irregularities. (Dr Deidre Yoon)    6/13:  Afebrile. WBC still elevated - monitoring. BP mildly up - medicate as needed. 6/14: Word finding diff has resolved. No complaints at this moment. Pleasant at this moment. She insists there is \"something new in scans and something needs to be done. \"  Reviewed results so far with pt. Pt refused Keppra last night and was demanding Benzos. Refusing Lovenox. She was throwing things in room last night. She demands a reg diet. Neuro has seen. MRI yesterday with no new acute process per preliminary report. K+ sl low - replacing. She reports before symptoms started that she \"cut most of the medicines in half cause just on too many of them and don't need them\" - did not cut her benzos. Discussed her lipid results and the risks of her high LDL - she refuses to consider meds for her hyperlipidemia. She declines to consider psy referral - I suspect there is a primary psy issue. BP up and down. Will restart her home meds with a reduced dose of Xanax. With this degree of noncompliance and intransigence, there is likely little we can do to improve her situation. 6/15:  Luis BERNAL called yesterday for \"seeing birds flying at me on the right side. \"  No other sx. Code S work up was neg as expected. No new visual hallucinations. Refused all meds last night except did get the IV Keppra. She is insisting she is leaving today no matter what. Discussed compliance with meds again but she reports she is going to take them as she sees fit. Discussed risks of not taking her BP meds - again, she reports she will take meds only if she feels she needs them. She refuses to follow up with Neuro or Neurosurg as \"that's $50 and haven't found any of them I like. \"  Strongly advised she follow up with at least Neurology and with PCP. Review of Systems:   Review of systems not obtained due to patient factors. Objective:   Physical Exam:   Visit Vitals  BP (!) 142/95 (BP 1 Location: Left arm, BP Patient Position: At rest)   Pulse 87   Temp 98.2 °F (36.8 °C)   Resp 18   Wt 112.3 kg (247 lb 9.2 oz)   LMP 2011   SpO2 94%   BMI 43.86 kg/m²      O2 Device: Room air  Temp (24hrs), Av.1 °F (36.7 °C), Min:97.8 °F (36.6 °C), Max:98.7 °F (37.1 °C)    No intake/output data recorded.  0701 -  1900  In: 100 [I.V.:100]  Out: -   General:  Alert, obese, not cooperative, no distress, appears stated age. Head:  Normocephalic, without obvious abnormality, atraumatic. Eyes:  Conjunctivae/corneas clear. PERRL, EOMs intact. Throat: Lips, mucosa, and tongue moist..   Neck: Supple, symmetrical, trachea midline, no adenopathy, thyroid: no enlargement/tenderness/nodules, no carotid bruit and no JVD. Lungs:   Clear to auscultation bilaterally. Chest wall:  No tenderness or deformity. Heart:  Regular rate and rhythm, no murmur, click, rub or gallop. Abdomen:   Soft, non-tender.  Bowel sounds normal. No masses,  No organomegaly. Extremities: no cyanosis or edema. No calf tenderness or cords. Pulses: 2+ and symmetric all extremities. Skin: Skin color, texture, turgor normal. No rashes    Neurologic:  Alert and oriented X 3. Aphasia/word search has completely resolved at this time.  equal.  No cogwheeling or rigidity. Gait not tested at this time. Sensation grossly normal to touch. Gross motor of extremities normal.       Data Review:   Carotid Dopplers 6/13/20    Interpretation Summary   Findings are consistent with 0-49% stenosis of the right internal carotid and 0-49% stenosis of the left internal carotid. Vertebrals are patent with antegrade flow. Cerebrovascular Findings   Right Carotid   There is intimal thickening present in the right CCA. The right ICA is normal. The right middle ICA is tortuous. The right ECA is patent. The right vertebral is antegrade. There is <50% stenosis in the right vertebral artery. The right subclavian has <50% stenosis. Left Carotid   Carotid bulb has intimal thickening plaque. There is intimal thickening present in the left CCA. The left ICA is normal. The left ECA is patent. There is intimal thickening present in the left ECA. The left vertebral is antegrade. There is <50% stenosis in the left vertebral artery. The left subclavian has <50% stenosis. MRI Brain 6/13/20  INDICATION: Aphasia, altered mental status, seizure, cerebrovascular accident. 10 mL IV Dotarem was administered. 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 Small mass in the  anteroinferior third ventricle. Scattered foci of magnetic susceptibility  artifact consistent with numerous old punctate hemorrhages. 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 No acute intracranial process.     Lab Results   Component Value Date/Time    Cholesterol, total 290 (H) 06/13/2020 07:49 AM    HDL Cholesterol 54 06/13/2020 07:49 AM    LDL, calculated 196.8 (H) 06/13/2020 07:49 AM    VLDL, calculated 39.2 06/13/2020 07:49 AM    Triglyceride 196 (H) 06/13/2020 07:49 AM    CHOL/HDL Ratio 5.4 (H) 06/13/2020 07:49 AM     Recent Days:  Recent Labs     06/15/20  0202 06/14/20  0519 06/13/20  0749   WBC 9.7 10.5 16.0*   HGB 12.8 12.8 13.7   HCT 42.3 40.8 43.1    336 412*     Recent Labs     06/15/20  0202 06/14/20 0519 06/13/20  0749 06/12/20 2019    143  --  138   K 3.6 3.2*  --  3.5   * 109*  --  107   CO2 25 27  --  25   GLU 99 113*  --  117*   BUN 14 14  --  10   CREA 0.96 1.04*  --  1.01   CA 8.7 9.1  --  9.6   MG  --   --  2.5* 2.4   PHOS  --   --  3.9  --    ALB 3.5 3.6  --  4.0   TBILI 0.5 0.6  --  0.6   ALT 24 28  --  34     No results for input(s): PH, PCO2, PO2, HCO3, FIO2 in the last 72 hours. 24 Hour Results:  Recent Results (from the past 24 hour(s))   GLUCOSE, POC    Collection Time: 06/14/20  2:49 PM   Result Value Ref Range    Glucose (POC) 110 (H) 65 - 100 mg/dL    Performed by [place], COMPREHENSIVE    Collection Time: 06/15/20  2:02 AM   Result Value Ref Range    Sodium 140 136 - 145 mmol/L    Potassium 3.6 3.5 - 5.1 mmol/L    Chloride 110 (H) 97 - 108 mmol/L    CO2 25 21 - 32 mmol/L    Anion gap 5 5 - 15 mmol/L    Glucose 99 65 - 100 mg/dL    BUN 14 6 - 20 MG/DL    Creatinine 0.96 0.55 - 1.02 MG/DL    BUN/Creatinine ratio 15 12 - 20      GFR est AA >60 >60 ml/min/1.73m2    GFR est non-AA >60 >60 ml/min/1.73m2    Calcium 8.7 8.5 - 10.1 MG/DL    Bilirubin, total 0.5 0.2 - 1.0 MG/DL    ALT (SGPT) 24 12 - 78 U/L    AST (SGOT) 34 15 - 37 U/L    Alk.  phosphatase 131 (H) 45 - 117 U/L    Protein, total 7.9 6.4 - 8.2 g/dL    Albumin 3.5 3.5 - 5.0 g/dL    Globulin 4.4 (H) 2.0 - 4.0 g/dL    A-G Ratio 0.8 (L) 1.1 - 2.2     CBC WITH AUTOMATED DIFF    Collection Time: 06/15/20  2:02 AM   Result Value Ref Range    WBC 9.7 3.6 - 11.0 K/uL    RBC 4.60 3.80 - 5.20 M/uL    HGB 12.8 11.5 - 16.0 g/dL    HCT 42.3 35.0 - 47.0 %    MCV 92.0 80.0 - 99.0 FL    MCH 27.8 26.0 - 34.0 PG    MCHC 30.3 30.0 - 36.5 g/dL    RDW 14.1 11.5 - 14.5 %    PLATELET 503 635 - 155 K/uL    MPV 10.5 8.9 - 12.9 FL    NRBC 0.0 0  WBC    ABSOLUTE NRBC 0.00 0.00 - 0.01 K/uL    NEUTROPHILS 53 32 - 75 %    LYMPHOCYTES 38 12 - 49 %    MONOCYTES 7 5 - 13 %    EOSINOPHILS 1 0 - 7 %    BASOPHILS 1 0 - 1 %    IMMATURE GRANULOCYTES 0 0.0 - 0.5 %    ABS. NEUTROPHILS 5.1 1.8 - 8.0 K/UL    ABS. LYMPHOCYTES 3.6 (H) 0.8 - 3.5 K/UL    ABS. MONOCYTES 0.7 0.0 - 1.0 K/UL    ABS. EOSINOPHILS 0.1 0.0 - 0.4 K/UL    ABS. BASOPHILS 0.1 0.0 - 0.1 K/UL    ABS. IMM.  GRANS. 0.0 0.00 - 0.04 K/UL    DF AUTOMATED         Medications reviewed  Current Facility-Administered Medications   Medication Dose Route Frequency    atenoloL (TENORMIN) tablet 50 mg  50 mg Oral DAILY    busPIRone (BUSPAR) tablet 15 mg  15 mg Oral BID    DULoxetine (CYMBALTA) capsule 120 mg  120 mg Oral QHS    lisinopriL (PRINIVIL, ZESTRIL) tablet 10 mg  10 mg Oral DAILY    methocarbamoL (ROBAXIN) tablet 750 mg  750 mg Oral TID PRN    pantoprazole (PROTONIX) tablet 40 mg  40 mg Oral ACB/HS    ALPRAZolam (XANAX) tablet 0.5 mg  0.5 mg Oral TID PRN    ibuprofen (MOTRIN) tablet 800 mg  800 mg Oral Q8H PRN    potassium chloride SR (KLOR-CON 10) tablet 20 mEq  20 mEq Oral BID    topiramate (TOPAMAX) tablet 25 mg  25 mg Oral BID WITH MEALS    acetaminophen (TYLENOL) tablet 650 mg  650 mg Oral Q4H PRN    Or    acetaminophen (TYLENOL) solution 650 mg  650 mg Per NG tube Q4H PRN    Or    acetaminophen (TYLENOL) suppository 650 mg  650 mg Rectal Q4H PRN    enoxaparin (LOVENOX) injection 40 mg  40 mg SubCUTAneous Q12H    levETIRAcetam (KEPPRA) 1,000 mg in 0.9% sodium chloride 100 mL IVPB  1,000 mg IntraVENous Q12H    LORazepam (ATIVAN) injection 2 mg  2 mg IntraVENous Q4H PRN    ondansetron (ZOFRAN) injection 4 mg  4 mg IntraVENous Q6H PRN    hydrOXYzine HCL (ATARAX) tablet 25 mg  25 mg Oral Q8H Care Plan discussed with: Patient/Nurse  Total time spent with patient: 30 minutes.   James Crow MD

## 2020-06-15 NOTE — DISCHARGE SUMMARY
Physician Discharge Summary     Patient ID:    James Gomez  [id number]  79 y.o.  1965  Radha Sow MD    Admit date: 6/12/2020  Discharge date and time: 6/15/2020  Admission Diagnoses: Change in Mental status    Discharge Medications:   Current Discharge Medication List      START taking these medications    Details   levETIRAcetam 1,000 mg tablet Take 1 Tab by mouth two (2) times a day. Qty: 60 Tab, Refills: 0         CONTINUE these medications which have NOT CHANGED    Details   atenoloL (TENORMIN) 50 mg tablet Take 50 mg by mouth daily. ibuprofen (MOTRIN) 800 mg tablet Take 800 mg by mouth every eight (8) hours as needed for Pain. methocarbamoL (ROBAXIN) 750 mg tablet Take 750 mg by mouth three (3) times daily as needed for Muscle Spasm(s). famotidine (PEPCID) 20 mg tablet Take 1 Tab by mouth two (2) times a day. Qty: 60 Tab, Refills: 0      lisinopriL (PRINIVIL, ZESTRIL) 10 mg tablet Take 1 Tab by mouth daily. Qty: 30 Tab, Refills: 0      pantoprazole (PROTONIX) 40 mg tablet Take 40 mg by mouth two (2) times a day. busPIRone (BUSPAR) 15 mg tablet Take 15 mg by mouth two (2) times a day. DULoxetine (CYMBALTA) 60 mg capsule Take 120 mg by mouth nightly. topiramate (TOPAMAX) 25 mg tablet Take 25 mg by mouth two (2) times daily (with meals). ALPRAZolam (XANAX) 1 mg tablet Take 1 mg by mouth three (3) times daily. Follow up Care:    1. Radha Sow MD with in 1 weeks  2.  specialists as directed. Diet:  Low fat, Low cholesterol  Disposition:  Home.   Advanced Directive:  Discharge Exam:  [See today's progress note.]  CONSULTATIONS: Neurology    Significant Diagnostic Studies:   Recent Labs     06/15/20  0202 06/14/20  0519   WBC 9.7 10.5   HGB 12.8 12.8   HCT 42.3 40.8    336     Recent Labs     06/15/20  0202 06/14/20  0519 06/13/20  0749 06/12/20 2019    143  --  138   K 3.6 3.2*  --  3.5   * 109*  --  107   CO2 25 27  --  25   BUN 14 14  -- 10   CREA 0.96 1.04*  --  1.01   GLU 99 113*  --  117*   CA 8.7 9.1  --  9.6   MG  --   --  2.5* 2.4   PHOS  --   --  3.9  --      Recent Labs     06/15/20  0202 06/14/20  0519 06/12/20 2019   ALT 24 28 34   * 137* 160*   TBILI 0.5 0.6 0.6   TP 7.9 8.2 9.6*   ALB 3.5 3.6 4.0   GLOB 4.4* 4.6* 5.6*     Lab Results   Component Value Date/Time    Glucose (POC) 110 (H) 06/14/2020 02:49 PM    Glucose (POC) 122 (H) 06/12/2020 08:23 PM    Glucose (POC) 93 09/30/2013 12:55 PM     Lab Results   Component Value Date/Time    TSH 1.51 06/13/2020 07:49 AM     HOSPITAL COURSE & TREATMENT RENDERED:   1. See today's progress note:  Daily Progress Note and Discharge Note: 6/15/2020  Ceci Perkins MD         Assessment/Plan:   Possable acute cerebrovascular accident with residual dysphasia versus breakthrough seizure vrs due to med noncompliance.      Seizure disorder.  History of migraine headaches.  CT head and CT angiogram head and neck code neuro negative for any acute findings.  - MRI brain and carotid Dopplers.    - refuses statin, unable to tolerate aspirin due to increased bleeding.    -Neurology consulted.     Chronic Non-compliance  - reports she is going to stop medicines she thinks she does not need - counseled     History of obstructive sleep apnea  - declines to get further w/u     Leukocytosis, probably due to a leukemoid reaction from possible seizure.  No stigmata of sepsis.  Will monitor off anti-infective therapy.     History of gastroesophageal reflux disease.     Endocrine:  Obesity with a body mass index greater than 35.  Would benefit from therapeutic lifestyle changes counseling.     Anxiety disorder and depression.     Prophylaxis for deep venous thrombosis. Rehabilitation:  Speech, Physical and Occupational Therapy evaluations.   Directives:  Full code with a very guarded prognosis.          Problem List:             Problem List as of 6/15/2020 Date Reviewed: 3/8/2020           Codes Class Noted - Resolved     CVA (cerebral vascular accident) [facility]) ICD-10-CM: I63.9  ICD-9-CM: 434.91   6/12/2020 - Present           Chest pain ICD-10-CM: R07.9  ICD-9-CM: 786.50   3/8/2020 - Present           Rectal bleeding ICD-10-CM: K62.5  ICD-9-CM: 364. 3   5/30/2019 - Present           CARLOS (obstructive sleep apnea) ICD-10-CM: L25.83  ICD-9-CM: 327.23   Unknown - Present     Overview Signed 5/30/2019  7:55 PM by Austin Mitchell MD       no cpap                 HTN (hypertension) ICD-10-CM: I10  ICD-9-CM: 401.9   Unknown - Present           Hx of seizure disorder ICD-10-CM: Z86.69  ICD-9-CM: V12.49   Unknown - Present           Fibromyalgia ICD-10-CM: M79.7  ICD-9-CM: 729.1   Unknown - Present     Overview Signed 5/30/2019  7:55 PM by Austin Mitchell MD       fibromyalgia                 Anxiety and depression ICD-10-CM: F41.9, F32.9  ICD-9-CM: 300.00, 311   Unknown - Present           Hx of migraines ICD-10-CM: Z86.69  ICD-9-CM: V12.49   Unknown - Present           Severe obesity ([address] ICD-10-CM: E66.01  ICD-9-CM: 278.01   4/29/2019 - Present           Hiatal hernia ICD-10-CM: K44.9  ICD-9-CM: 872. 3   5/18/2018 - Present           Incomplete right bundle branch block (RBBB) determined by electrocardiography ICD-10-CM: I45.10  ICD-9-CM: 426. 4   5/10/2018 - Present           Hiatal hernia with gastroesophageal reflux ICD-10-CM: K21.9, K44.9  ICD-9-CM: 530.81, 553.3   4/1/2018 - Present           RESOLVED: Obese ICD-10-CM: E66.9  ICD-9-CM: 278.00   Unknown - 3/8/2020           RESOLVED: Elevated lactic acid level ICD-10-CM: R79.89  ICD-9-CM: 276.2   5/30/2019 - 3/8/2020           RESOLVED: Dehydration ICD-10-CM: E86.0  ICD-9-CM: 276.51   5/30/2019 - 3/8/2020           RESOLVED: Anemia ICD-10-CM: D64.9  ICD-9-CM: 358. 9   5/30/2019 - 3/8/2020           RESOLVED: Hemorrhoids ICD-10-CM: K64.9  ICD-9-CM: 297. 6   Unknown - 3/8/2020           RESOLVED: Depressive disorder, not elsewhere classified ICD-10-CM: F32.9  ICD-9-CM: 200   10/1/2013 - 5/30/2019           RESOLVED: Leukocytosis, unspecified ICD-10-CM: D72.829  ICD-9-CM: 288.60   10/1/2013 - 5/30/2019           RESOLVED: Hypokalemia ICD-10-CM: E87.6  ICD-9-CM: 276.8   10/1/2013 - 3/8/2020           RESOLVED: Slurred speech ICD-10-CM: R47.81  ICD-9-CM: 784.59   9/30/2013 - 5/30/2019           RESOLVED: Seizure (HCC) (Chronic) ICD-10-CM: R56.9  ICD-9-CM: 780.39   5/2/2011 - 5/30/2019           RESOLVED: Migraine (Chronic) ICD-10-CM: Z09.653  ICD-9-CM: 346.90   5/2/2011 - 5/30/2019           RESOLVED: Cavernous hemangioma of intracranial structure (HCC) (Chronic) ICD-10-CM: D18.02  ICD-9-CM: 228.02   5/2/2011 - 3/8/2020                Subjective:     Please note most of the history was obtained from the patient's mother via phone who witnessed onset of presenting symptoms as  patient with word-finding difficulty and unable to give all pertinent history. 21-year-old female with a past medical history significant for obesity, obstructive sleep apnea, gastroesophageal reflux disease, primary hypertension, seizure disorder, migraine headaches, anxiety disorder, and depression, was brought to the emergency room from home via EMS for word-finding deficit and difficulty in speaking.  Per  patient's mother, patient was at her baseline mental status up until about 03:00 p.m. on 06/12/2020, whereafter the patient was noted with repetitive monosyllabic phrases. Per patient's mother, patient had full comprehension, expression and was able to speak in full sentences prior to the onset of symptoms. Patient had no prior complaints of headaches, dizziness, visual deficits, chest pains, palpitations, sore throat, cough, shortness of breath, nausea, vomiting, fevers, chills, abdominal pain, bladder or bowel irregularities. (Dr Rip Whitney)     6/13:  Afebrile. WBC still elevated - monitoring. BP mildly up - medicate as needed.       6/14: Word finding diff has resolved.   No complaints at this moment. Pleasant at this moment. She insists there is \"something new in scans and something needs to be done. \"  Reviewed results so far with pt. Pt refused Keppra last night and was demanding Benzos. Refusing Lovenox. She was throwing things in room last night. She demands a reg diet. Neuro has seen. MRI yesterday with no new acute process per preliminary report. K+ sl low - replacing. She reports before symptoms started that she \"cut most of the medicines in half cause just on too many of them and don't need them\" - did not cut her benzos. Discussed her lipid results and the risks of her high LDL - she refuses to consider meds for her hyperlipidemia. She declines to consider psy referral - I suspect there is a primary psy issue. BP up and down. Will restart her home meds with a reduced dose of Xanax. With this degree of noncompliance and intransigence, there is likely little we can do to improve her situation.       6/15:  Code S called yesterday for \"seeing birds flying at me on the right side. \"  No other sx. Code S work up was neg as expected. No new visual hallucinations. Refused all meds last night except did get the IV Keppra. She is insisting she is leaving today no matter what. Discussed compliance with meds again but she reports she is going to take them as she sees fit. Discussed risks of not taking her BP meds - again, she reports she will take meds only if she feels she needs them. She refuses to follow up with Neuro or Neurosurg as \"that's $50 and haven't found any of them I like. \"  Strongly advised she follow up with at least Neurology and with PCP.       Review of Systems:   Review of systems not obtained due to patient factors.     Objective:   Physical Exam:   Visit Vitals  BP (!) 142/95 (BP 1 Location: Left arm, BP Patient Position: At rest)   Pulse 87   Temp 98.2 °F (36.8 °C)   Resp 18   Wt 112.3 kg (247 lb 9.2 oz)   LMP 05/04/2011   SpO2 94%   BMI 43.86 kg/m²      O2 Device: Room air  Temp (24hrs), Av.1 °F (36.7 °C), Min:97.8 °F (36.6 °C), Max:98.7 °F (37.1 °C)    No intake/output data recorded. 701 -  1900  In: 100 [I.V.:100]  Out: -   General:  Alert, obese, not cooperative, no distress, appears stated age. Head:  Normocephalic, without obvious abnormality, atraumatic. Eyes:  Conjunctivae/corneas clear. PERRL, EOMs intact. Throat: Lips, mucosa, and tongue moist..   Neck: Supple, symmetrical, trachea midline, no adenopathy, thyroid: no enlargement/tenderness/nodules, no carotid bruit and no JVD. Lungs:   Clear to auscultation bilaterally. Chest wall:  No tenderness or deformity. Heart:  Regular rate and rhythm, no murmur, click, rub or gallop. Abdomen:   Soft, non-tender. Bowel sounds normal. No masses,  No organomegaly. Extremities: no cyanosis or edema. No calf tenderness or cords. Pulses: 2+ and symmetric all extremities. Skin: Skin color, texture, turgor normal. No rashes    Neurologic:  Alert and oriented X 3. Aphasia/word search has completely resolved at this time.  equal.  No cogwheeling or rigidity. Gait not tested at this time. Sensation grossly normal to touch. Gross motor of extremities normal.        Data Review:   Carotid Dopplers 20    Interpretation Summary   Findings are consistent with 0-49% stenosis of the right internal carotid and 0-49% stenosis of the left internal carotid.  Vertebrals are patent with antegrade flow. Cerebrovascular Findings   Right Carotid   There is intimal thickening present in the right CCA. The right ICA is normal. The right middle ICA is tortuous. The right ECA is patent. The right vertebral is antegrade. There is <50% stenosis in the right vertebral artery. The right subclavian has <50% stenosis. Left Carotid   Carotid bulb has intimal thickening plaque.  There is intimal thickening present in the left CCA. The left ICA is normal. The left ECA is patent.  There is intimal thickening present in the left ECA. The left vertebral is antegrade. There is <50% stenosis in the left vertebral artery. The left subclavian has <50% stenosis.      MRI Brain 6/13/20  INDICATION: Aphasia, altered mental status, seizure, cerebrovascular accident. 10 mL IV Dotarem was administered. 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 Small mass in the  anteroinferior third ventricle. Scattered foci of magnetic susceptibility  artifact consistent with numerous old punctate hemorrhages. 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   No acute intracranial process.           Lab Results   Component Value Date/Time     Cholesterol, total 290 (H) 06/13/2020 07:49 AM     HDL Cholesterol 54 06/13/2020 07:49 AM     LDL, calculated 196.8 (H) 06/13/2020 07:49 AM     VLDL, calculated 39.2 06/13/2020 07:49 AM     Triglyceride 196 (H) 06/13/2020 07:49 AM     CHOL/HDL Ratio 5.4 (H) 06/13/2020 07:49 AM      Signed:  Meryle Spaniel, MD  6/15/2020  11:14 AM

## 2020-06-15 NOTE — PROGRESS NOTES
Transition Plan of Care  RUR 9%      Patient is ready for discharge with  No needs. Patient's family will assist with transportation.   Discharge Location  Discharge Placement: Home  [facility]

## 2020-06-15 NOTE — DISCHARGE INSTRUCTIONS
Patient Discharge Instructions    Binu Batres / [id number] : 1965    Admitted 2020 Discharged: 6/15/2020 11:13 AM     ACUTE DIAGNOSES:  Change in mental status    CHRONIC MEDICAL DIAGNOSES:  Problem List as of 6/15/2020 Date Reviewed: 3/8/2020          Codes Class Noted - Resolved    CVA (cerebral vascular accident) [facility]) ICD-10-CM: I63.9  ICD-9-CM: 434.91  2020 - Present        Chest pain ICD-10-CM: R07.9  ICD-9-CM: 786.50  3/8/2020 - Present        Rectal bleeding ICD-10-CM: K62.5  ICD-9-CM: 569.3  2019 - Present        CARLOS (obstructive sleep apnea) ICD-10-CM: G47.33  ICD-9-CM: 327.23  Unknown - Present    Overview Signed 2019  7:55 PM by Jason Avelar MD     no cpap             HTN (hypertension) ICD-10-CM: I10  ICD-9-CM: 401.9  Unknown - Present        Hx of seizure disorder ICD-10-CM: Z86.69  ICD-9-CM: V12.49  Unknown - Present        Fibromyalgia ICD-10-CM: M79.7  ICD-9-CM: 729.1  Unknown - Present    Overview Signed 2019  7:55 PM by Jason Avelar MD     fibromyalgia             Anxiety and depression ICD-10-CM: F41.9, F32.9  ICD-9-CM: 300.00, 311  Unknown - Present        Hx of migraines ICD-10-CM: Z86.69  ICD-9-CM: V12.49  Unknown - Present        Severe obesity ([address] ICD-10-CM: E66.01  ICD-9-CM: 278.01  2019 - Present        Hiatal hernia ICD-10-CM: K44.9  ICD-9-CM: 553.3  2018 - Present        Incomplete right bundle branch block (RBBB) determined by electrocardiography ICD-10-CM: I45.10  ICD-9-CM: 426.4  5/10/2018 - Present        Hiatal hernia with gastroesophageal reflux ICD-10-CM: K21.9, K44.9  ICD-9-CM: 530.81, 553.3  2018 - Present        RESOLVED: Obese ICD-10-CM: E66.9  ICD-9-CM: 278.00  Unknown - 3/8/2020        RESOLVED: Elevated lactic acid level ICD-10-CM: R79.89  ICD-9-CM: 276.2  2019 - 3/8/2020        RESOLVED: Dehydration ICD-10-CM: E86.0  ICD-9-CM: 276.51  2019 - 3/8/2020        RESOLVED: Anemia ICD-10-CM: D64.9  ICD-9-CM: 285.9  5/30/2019 - 3/8/2020        RESOLVED: Hemorrhoids ICD-10-CM: K64.9  ICD-9-CM: 455.6  Unknown - 3/8/2020        RESOLVED: Depressive disorder, not elsewhere classified ICD-10-CM: F32.9  ICD-9-CM: 247  10/1/2013 - 5/30/2019        RESOLVED: Leukocytosis, unspecified ICD-10-CM: A25.340  ICD-9-CM: 288.60  10/1/2013 - 5/30/2019        RESOLVED: Hypokalemia ICD-10-CM: E87.6  ICD-9-CM: 276.8  10/1/2013 - 3/8/2020        RESOLVED: Slurred speech ICD-10-CM: R47.81  ICD-9-CM: 784.59  9/30/2013 - 5/30/2019        RESOLVED: Seizure ([facility] (Chronic) ICD-10-CM: R56.9  ICD-9-CM: 780.39  5/2/2011 - 5/30/2019        RESOLVED: Migraine (Chronic) ICD-10-CM: C10.601  ICD-9-CM: 346.90  5/2/2011 - 5/30/2019        RESOLVED: Cavernous hemangioma of intracranial structure ([facility] (Chronic) ICD-10-CM: D18.02  ICD-9-CM: 228.02  5/2/2011 - 3/8/2020              DISCHARGE MEDICATIONS:         · It is important that you take the medication exactly as they are prescribed. · Keep your medication in the bottles provided by the pharmacist and keep a list of the medication names, dosages, and times to be taken in your wallet. · Do not take other medications without consulting your doctor. DIET:  Low fat, Low cholesterol    ACTIVITY: Activity as tolerated    ADDITIONAL INFORMATION: If you experience any of the following symptoms then please call your primary care physician or return to the emergency room if you cannot get hold of your doctor: Fever, chills, nausea, vomiting, diarrhea, change in mentation, falling, bleeding, shortness of breath. FOLLOW UP CARE:  Dr. Jemima Boo MD  you are to call and set up an appointment to see them with in 1 week. Follow-up with specialists at directed by them      Information obtained by :  I understand that if any problems occur once I am at home I am to contact my physician. I understand and acknowledge receipt of the instructions indicated above. Physician's or R.N.'s Signature                                                                  Date/Time                                                                                                                                              Patient or Representative Signature                                                          Date/Time

## 2020-06-15 NOTE — PROGRESS NOTES
SPEECH PATHOLOGY BEDSIDE SWALLOW EVALUATION/DISCHARGE  Patient: Kvng Gilbert (1 y.o. female)  Date: 6/15/2020  Primary Diagnosis: CVA (cerebral vascular accident) ([facility] [I63.9]  CVA (cerebral vascular accident) ([facility] [I63.9]       Precautions:   Seizure    ASSESSMENT :  Based on the objective data described below, the patient presents with functional swallow and speech. She has mild word-retrieval deficits at times. It is uncertain if she is at baseline level or not for word-retrieval. If not, she may benefit from [place] [facility],since  She can not drive. Admitted 6-12-20 with sz and expresssive aphasia. PMH:  L parietal craniotomy for bleeding AVM 1996. HTN, GERD with HH, fibromyalgia,sz, anxeity. .  Skilled acute therapy provided by a speech-language pathologist is not indicated at this time. PLAN :  Recommendations:  Ok for regular diet, thin liquids. [facility] [place] SLP if she requests. Discharge Recommendations: Home Health     SUBJECTIVE:   Patient stated The health care in [place] is terrible.  .    OBJECTIVE:     Past Medical History:   Diagnosis Date    Anxiety and depression     Cavernous hemangioma of intracranial structure ([facility] 5/2/2011    Fibromyalgia     fibromyalgia    Hemorrhoids     Hiatal hernia with gastroesophageal reflux 04/2018    Hx of migraines     Hx of seizure disorder     Hypertension     Incomplete right bundle branch block (RBBB) determined by electrocardiography 05/10/2018    Obese     CARLOS (obstructive sleep apnea)     no cpap     Past Surgical History:   Procedure Laterality Date    COLONOSCOPY N/A 6/3/2019    COLONOSCOPY performed by Paul Pinto MD at [address] HX CHOLECYSTECTOMY      HX HEENT  2012    tonsillectomy    HX LITHOTRIPSY      HX ORTHOPAEDIC      disc fusion 2010, right knee cyst removed   [facility], 2006    Cavernous brain angioma(s) removed    SURGERY,BRAIN/SPINE,W/COMPUTER       Prior Level of Function/Home Situation: Home Situation  Home Environment: Apartment  # Steps to Enter: 0  One/Two Story Residence: One story  Living Alone: No  Support Systems: Parent  Patient Expects to be Discharged to[de-identified] Private residence  Current DME Used/Available at Home: Walker, rolling  Tub or Shower Type: Tub/Shower combination  Diet prior to admission: regular, thins  Current Diet:  Regular, thins. Cognitive and Communication Status:  Neurologic State: Alert, Irritable  Orientation Level: Oriented X4  Cognition: Follows commands  Perception: Appears intact  Perseveration: No perseveration noted  Safety/Judgement: Insight into deficits  Oral Assessment:  Oral Assessment  Labial: No impairment  Dentition: Natural  P.O. Trials:  Patient Position: upright in bed  Vocal quality prior to P.O.: No impairment  Consistency Presented: Thin liquid  How Presented: Self-fed/presented; Successive swallows   ORAL PHASE:   Bolus Acceptance: No impairment  Bolus Formation/Control: No impairment     Propulsion: No impairment   PHARYNGEAL PHASE:   Initiation of Swallow: No impairment  Laryngeal Elevation: Functional  Aspiration Signs/Symptoms: None  Pharyngeal Phase Characteristics: No impairment, issues, or problems       patient reports h/o dysphagia from UPPP  And ACDF. Currently issues are minimal. She c/o hospital food. SHe also has h/o GERD in chart. SPEECH/LANGUAGE:   No dysarthria. She had intermittent brief word-retrieval delays. She admits she had a h/o word-retrieval issues from L crani \"and that is why she is here\". Unable to clarify differences between now and 2 weeks ago. She could communicate wants and needs functionally and make phone calls. NOMS:   The NOMS functional outcome measure was used to quantify this patient's level of swallowing impairment.   Based on the NOMS, the patient was determined to be at level 7 for swallow function     NOMS Swallowing Levels:  Level 1 (CN): NPO  Level 2 (CM): NPO but takes consistency in therapy  Level 3 (CL): Takes less than 50% of nutrition p.o. and continues with nonoral feedings; and/or safe with mod cues; and/or max diet restriction  Level 4 (CK): Safe swallow but needs mod cues; and/or mod diet restriction; and/or still requires some nonoral feeding/supplements  Level 5 (CJ): Safe swallow with min diet restriction; and/or needs min cues  Level 6 (CI): Independent with p.o.; rare cues; usually self cues; may need to avoid some foods or needs extra time  Level 7 ([address] [place] [address]): Independent for all p.o.  ALEXANDER. (2003). National Outcomes Measurement System (NOMS): Adult Speech-Language Pathology User's Guide. Pain:  Pain Scale 1: Numeric (0 - 10)  Pain Intensity 1: 9  Pain Location 1: Head  After treatment:   Patient left in no apparent distress in bed and Nursing notified    COMMUNICATION/EDUCATION:   Patient was educated regarding her deficit(s) of mild aphasia  as this relates to her diagnosis of sz and h/o L crani. She demonstrated Fair understanding as evidenced by discussion. .    The patient's plan of care including recommendations, planned interventions, and recommended diet changes were discussed with: Registered nurse.      Thank you for this referral.  Randa Frias, TANNER  Time Calculation: 10 mins

## 2020-06-15 NOTE — PROGRESS NOTES
Patient given discharge instructions. Patient verbalizes understanding of home medications and follow up. Patient removed her IV this morning. Patient to call family for ride home. Patient dressed and prepared for ride home. Patient states that all of her medication was brought with her and \"locked up\" when she was in the ER. No safe ticket found in chart. Patient's mother called, to see if medication was taken home. Patient's mother states that patient's medication, \"all of her pill bottles\" were taken from patient and \"locked up. \" Security called, found record. Medication brought up to patient.

## 2020-06-15 NOTE — PROGRESS NOTES
Bedside and Verbal shift change report given to Lu Hairston RN (oncoming nurse) by Troy Colindres (offgoing nurse). Report included the following information SBAR, Kardex, MAR and Accordion.

## 2020-06-15 NOTE — PROGRESS NOTES
Inpatient Neurology Progress Note      Patient ID:   Laura Barraza  [id number]  83 y.o.  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 Small mass in the  anteroinferior third ventricle. Scattered foci of magnetic susceptibility  artifact consistent with numerous old punctate hemorrhages. 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 Pt declined EEG yesterday    Reviewed Progress note from Family Practice: 6/15:  Code S called yesterday for \"seeing birds flying at me on the right side. \"  No other sx. Code S work up was neg as expected. No new visual hallucinations. Refused all meds last night except did get the IV Keppra. She is insisting she is leaving today no matter what. Discussed compliance with meds again but she reports she is going to take them as she sees fit. Discussed risks of not taking her BP meds - again, she reports she will take meds only if she feels she needs them. She refuses to follow up with Neuro or Neurosurg as \"that's $50 and haven't found any of them I like. \"  Strongly advised she follow up with at least Neurology and with PCP.       She says she has migraine, didn't get her Topamax last night  Got it this morning and headache is getting better  She doesn't like taking Topamax during daytime as it makes her sleepy    Completely awake, alert, conversant. Is very frustrated with many things (shes expresses general dissatisfaction with her medical care in [place] compared to when she lived in [place] [address]). Moves all extremities without command. EOMI, face symmetric.        Vitals:   Patient Vitals for the past 8 hrs:   Temp Pulse Resp BP SpO2   06/15/20 0739    (!) 147/117    06/15/20 0734 99.3 °F (37.4 °C) 99 17 (!) 179/109 98 %   06/15/20 0658  97      06/15/20 0347 98.2 °F (36.8 °C) 87 18 (!) 142/95 94 % Objective: Interval progress notes in [facility] care were reviewed    Brief ROS: Per Interval Hx above    Allergies: Allergies   Allergen Reactions    Latex, Natural Rubber Rash    Aspirin Other (comments)     Excessive bleeding      Other Medication Other (comments)     Patient states intolerance to blood thinners due to angiomas    Codeine Palpitations    Hydralazine Other (comments)     Pt reports \"burning/tingling\" sensation in abd, flanks, and into head.  Imitrex [Sumatriptan] Palpitations and Other (comments)     thougt she was having an MI    Percocet [Oxycodone-Acetaminophen] Palpitations       Medications:  Current Facility-Administered Medications   Medication Dose Route Frequency    levETIRAcetam (KEPPRA) tablet 1,000 mg  1,000 mg Oral BID    atenoloL (TENORMIN) tablet 50 mg  50 mg Oral DAILY    busPIRone (BUSPAR) tablet 15 mg  15 mg Oral BID    DULoxetine (CYMBALTA) capsule 120 mg  120 mg Oral QHS    lisinopriL (PRINIVIL, ZESTRIL) tablet 10 mg  10 mg Oral DAILY    methocarbamoL (ROBAXIN) tablet 750 mg  750 mg Oral TID PRN    pantoprazole (PROTONIX) tablet 40 mg  40 mg Oral ACB/HS    ALPRAZolam (XANAX) tablet 0.5 mg  0.5 mg Oral TID PRN    ibuprofen (MOTRIN) tablet 800 mg  800 mg Oral Q8H PRN    potassium chloride SR (KLOR-CON 10) tablet 20 mEq  20 mEq Oral BID    topiramate (TOPAMAX) tablet 25 mg  25 mg Oral BID WITH MEALS    acetaminophen (TYLENOL) tablet 650 mg  650 mg Oral Q4H PRN    Or    acetaminophen (TYLENOL) solution 650 mg  650 mg Per NG tube Q4H PRN    Or    acetaminophen (TYLENOL) suppository 650 mg  650 mg Rectal Q4H PRN    enoxaparin (LOVENOX) injection 40 mg  40 mg SubCUTAneous Q12H    LORazepam (ATIVAN) injection 2 mg  2 mg IntraVENous Q4H PRN    ondansetron (ZOFRAN) injection 4 mg  4 mg IntraVENous Q6H PRN    hydrOXYzine HCL (ATARAX) tablet 25 mg  25 mg Oral Q8H       Impression/ Plan:       ICD-10-CM ICD-9-CM    1. Encephalopathy G93.40 348.30    2. Aphasia R47.01 784.3    3. History of seizure Z87.898 V12.49        D/w patient that she would probably tolerate Topiramate ER one tablet at bedtime instead of the immediate release/ twice a day topiramate. TPM ER not on hospital formulary. Continue the TPM 25 mg twice daily for now and if she follows up in Neurology Clinic as outpatient, will change her to TPM ER 50 mg QHS with goal of going to higher daily dose (200 mg/ day) gradually to serve both as HA preventive and anti-seizure med. Changed,Keppra 1000 mg BID to PO.      Can be discharged from Neurology standpoint  Call back with any questions        Signed By: Shoshana Elder MD     Tatum 15, 2020

## 2020-06-16 ENCOUNTER — PATIENT OUTREACH (OUTPATIENT)
Dept: CASE MANAGEMENT | Age: 55
End: 2020-06-16

## 2020-06-16 NOTE — PROGRESS NOTES
6/16/20 Covid care transitions -attempt to reach pt - LM on only listed # - 6/16 6/20 - 2nd attempt to reach pt post discharge -   Sending my chart message, as well -    Resolving Covid attempts. Patient resolved from Transition of Care episode on 6/20/20  Unable to reach pt with 2 attempts - sending my chart contact. Patient/family has been provided the following resources and education related to COVID-19:                         Signs, symptoms and red flags related to COVID-19            CDC exposure and quarantine guidelines            [employer] exposure contact - [phone]            Contact for their local Department of Health [phone]             No further outreach scheduled with this CTN/ACM/LPN/HC/ MA. Episode of Care resolved. Patient has this CTN/ACM/LPN/HC/MA contact information if future needs arise. Keyur Crockett RN, [facility], CCM  Care transitions nurse [phone]  [facility] Coordination Team  ________________________________________________  Neurology note -  Plan:      RECOMMENDATIONS  1. I had a long discussion with patient. Discussed diagnosis, prognosis, pathophysiology and available treatment. Reviewed test results. All questions were answered. 2. Awiating EEG to look for subclinical seizure event. If EEG is unremarkable, can go home  3. Seizure precautions  4. Of note, BP during admission tends to be in the higher side (/105 - 167/112). This needs to be monitored and BP meds be optimized if hypertensive cause is a possibility (although patient has been refusing meds)     Please call for questions     35 mins of time spent, 50% of which was spent on counseling and coordination of care. Cavernous hemangioma, also called cavernous angioma, cavernoma, or cerebral cavernoma (CCM) (when referring to presence in the brain)[1][2] is a type of benign vascular tumor or hemangioma, where a collection of dilated blood vessels form a lesion.  Because of this, blood flow through the cavities, or caverns, is slow. Additionally, the cells that form the vessels do not form the necessary junctions with surrounding cells. Also, the structural support from the smooth muscle is hindered, causing leakage into the surrounding tissue. It is the leakage of blood, known as a hemorrhage, from these vessels that causes a variety of symptoms known to be associated with this disease.

## 2021-03-21 NOTE — ED NOTES
Bedside and Verbal shift change report given to Bryson Guzman RN (oncoming nurse) by Bj Wang RN (offgoing nurse). Report included the following information SBAR, ED Summary, Procedure Summary, MAR, Recent Results and Med Rec Status.
Nancy Duque reviewed discharge instructions with the patient. The patient verbalized understanding.
English

## 2021-03-22 ENCOUNTER — TRANSCRIBE ORDER (OUTPATIENT)
Dept: SCHEDULING | Age: 56
End: 2021-03-22

## 2021-03-22 DIAGNOSIS — Z12.31 VISIT FOR SCREENING MAMMOGRAM: Primary | ICD-10-CM

## 2021-04-01 ENCOUNTER — HOSPITAL ENCOUNTER (OUTPATIENT)
Dept: MAMMOGRAPHY | Age: 56
Discharge: HOME | End: 2021-04-01
Attending: FAMILY MEDICINE
Payer: MEDICARE

## 2021-04-01 DIAGNOSIS — Z12.31 VISIT FOR SCREENING MAMMOGRAM: ICD-10-CM

## 2021-04-01 PROCEDURE — 77067 SCR MAMMO BI INCL CAD: CPT

## 2021-10-27 ENCOUNTER — HOSPITAL ENCOUNTER (OUTPATIENT)
Age: 56
Setting detail: OBSERVATION
Discharge: HOME | End: 2021-10-30
Attending: EMERGENCY MEDICINE | Admitting: INTERNAL MEDICINE
Payer: MEDICARE

## 2021-10-27 ENCOUNTER — APPOINTMENT (OUTPATIENT)
Dept: CT IMAGING | Age: 56
End: 2021-10-27
Attending: INTERNAL MEDICINE
Payer: MEDICARE

## 2021-10-27 ENCOUNTER — APPOINTMENT (OUTPATIENT)
Dept: CT IMAGING | Age: 56
End: 2021-10-27
Attending: EMERGENCY MEDICINE
Payer: MEDICARE

## 2021-10-27 ENCOUNTER — APPOINTMENT (OUTPATIENT)
Dept: GENERAL RADIOLOGY | Age: 56
End: 2021-10-27
Attending: EMERGENCY MEDICINE
Payer: MEDICARE

## 2021-10-27 DIAGNOSIS — R06.09 EXERTIONAL DYSPNEA: ICD-10-CM

## 2021-10-27 DIAGNOSIS — R07.9 EXERTIONAL CHEST PAIN: Primary | ICD-10-CM

## 2021-10-27 DIAGNOSIS — R51.9 ACUTE NONINTRACTABLE HEADACHE, UNSPECIFIED HEADACHE TYPE: ICD-10-CM

## 2021-10-27 DIAGNOSIS — I16.0 HYPERTENSIVE URGENCY: ICD-10-CM

## 2021-10-27 LAB
ALBUMIN SERPL-MCNC: 3.9 G/DL (ref 3.5–5)
ALBUMIN/GLOB SERPL: 0.8 {RATIO} (ref 1.1–2.2)
ALP SERPL-CCNC: 140 U/L (ref 45–117)
ALT SERPL-CCNC: 15 U/L (ref 12–78)
ANION GAP SERPL CALC-SCNC: 6 MMOL/L (ref 5–15)
AST SERPL-CCNC: 11 U/L (ref 15–37)
BASOPHILS # BLD: 0.1 K/UL (ref 0–0.1)
BASOPHILS NFR BLD: 1 % (ref 0–1)
BILIRUB SERPL-MCNC: 0.4 MG/DL (ref 0.2–1)
BUN SERPL-MCNC: 11 MG/DL (ref 6–20)
BUN/CREAT SERPL: 10 (ref 12–20)
CALCIUM SERPL-MCNC: 9.8 MG/DL (ref 8.5–10.1)
CHLORIDE SERPL-SCNC: 108 MMOL/L (ref 97–108)
CO2 SERPL-SCNC: 25 MMOL/L (ref 21–32)
COMMENT, HOLDF: NORMAL
CREAT SERPL-MCNC: 1.08 MG/DL (ref 0.55–1.02)
D DIMER PPP FEU-MCNC: 1 MG/L FEU (ref 0–0.65)
DIFFERENTIAL METHOD BLD: ABNORMAL
EOSINOPHIL # BLD: 0.1 K/UL (ref 0–0.4)
EOSINOPHIL NFR BLD: 2 % (ref 0–7)
ERYTHROCYTE [DISTWIDTH] IN BLOOD BY AUTOMATED COUNT: 13 % (ref 11.5–14.5)
GLOBULIN SER CALC-MCNC: 5.1 G/DL (ref 2–4)
GLUCOSE SERPL-MCNC: 87 MG/DL (ref 65–100)
HCT VFR BLD AUTO: 48.3 % (ref 35–47)
HGB BLD-MCNC: 15.7 G/DL (ref 11.5–16)
IMM GRANULOCYTES # BLD AUTO: 0 K/UL (ref 0–0.04)
IMM GRANULOCYTES NFR BLD AUTO: 0 % (ref 0–0.5)
LYMPHOCYTES # BLD: 2.8 K/UL (ref 0.8–3.5)
LYMPHOCYTES NFR BLD: 30 % (ref 12–49)
MCH RBC QN AUTO: 29.3 PG (ref 26–34)
MCHC RBC AUTO-ENTMCNC: 32.5 G/DL (ref 30–36.5)
MCV RBC AUTO: 90.1 FL (ref 80–99)
MONOCYTES # BLD: 0.6 K/UL (ref 0–1)
MONOCYTES NFR BLD: 7 % (ref 5–13)
NEUTS SEG # BLD: 5.5 K/UL (ref 1.8–8)
NEUTS SEG NFR BLD: 60 % (ref 32–75)
NRBC # BLD: 0 K/UL (ref 0–0.01)
NRBC BLD-RTO: 0 PER 100 WBC
PLATELET # BLD AUTO: 365 K/UL (ref 150–400)
PMV BLD AUTO: 10.6 FL (ref 8.9–12.9)
POTASSIUM SERPL-SCNC: 3.9 MMOL/L (ref 3.5–5.1)
PROT SERPL-MCNC: 9 G/DL (ref 6.4–8.2)
RBC # BLD AUTO: 5.36 M/UL (ref 3.8–5.2)
SAMPLES BEING HELD,HOLD: NORMAL
SODIUM SERPL-SCNC: 139 MMOL/L (ref 136–145)
TROPONIN-HIGH SENSITIVITY: 9 NG/L (ref 0–51)
WBC # BLD AUTO: 9.2 K/UL (ref 3.6–11)

## 2021-10-27 PROCEDURE — 74011250636 HC RX REV CODE- 250/636: Performed by: INTERNAL MEDICINE

## 2021-10-27 PROCEDURE — 85025 COMPLETE CBC W/AUTO DIFF WBC: CPT

## 2021-10-27 PROCEDURE — 74011250636 HC RX REV CODE- 250/636: Performed by: EMERGENCY MEDICINE

## 2021-10-27 PROCEDURE — C9113 INJ PANTOPRAZOLE SODIUM, VIA: HCPCS | Performed by: INTERNAL MEDICINE

## 2021-10-27 PROCEDURE — 80053 COMPREHEN METABOLIC PANEL: CPT

## 2021-10-27 PROCEDURE — 74011250637 HC RX REV CODE- 250/637: Performed by: INTERNAL MEDICINE

## 2021-10-27 PROCEDURE — 71045 X-RAY EXAM CHEST 1 VIEW: CPT

## 2021-10-27 PROCEDURE — 96375 TX/PRO/DX INJ NEW DRUG ADDON: CPT

## 2021-10-27 PROCEDURE — 65390000012 HC CONDITION CODE 44 OBSERVATION

## 2021-10-27 PROCEDURE — 99285 EMERGENCY DEPT VISIT HI MDM: CPT

## 2021-10-27 PROCEDURE — 65660000000 HC RM CCU STEPDOWN

## 2021-10-27 PROCEDURE — 70450 CT HEAD/BRAIN W/O DYE: CPT

## 2021-10-27 PROCEDURE — 93005 ELECTROCARDIOGRAM TRACING: CPT

## 2021-10-27 PROCEDURE — 85379 FIBRIN DEGRADATION QUANT: CPT

## 2021-10-27 PROCEDURE — 84484 ASSAY OF TROPONIN QUANT: CPT

## 2021-10-27 PROCEDURE — 74178 CT ABD&PLV WO CNTR FLWD CNTR: CPT

## 2021-10-27 PROCEDURE — 74011000636 HC RX REV CODE- 636: Performed by: RADIOLOGY

## 2021-10-27 PROCEDURE — 96374 THER/PROPH/DIAG INJ IV PUSH: CPT

## 2021-10-27 RX ORDER — HYDROMORPHONE HYDROCHLORIDE 1 MG/ML
0.5 INJECTION, SOLUTION INTRAMUSCULAR; INTRAVENOUS; SUBCUTANEOUS
Status: DISCONTINUED | OUTPATIENT
Start: 2021-10-27 | End: 2021-10-27

## 2021-10-27 RX ORDER — NITROGLYCERIN 0.4 MG/1
0.4 TABLET SUBLINGUAL
Status: DISCONTINUED | OUTPATIENT
Start: 2021-10-27 | End: 2021-10-30 | Stop reason: HOSPADM

## 2021-10-27 RX ORDER — SODIUM CHLORIDE 9 MG/ML
25 INJECTION, SOLUTION INTRAVENOUS AS NEEDED
Status: DISCONTINUED | OUTPATIENT
Start: 2021-10-27 | End: 2021-10-30 | Stop reason: HOSPADM

## 2021-10-27 RX ORDER — AMLODIPINE BESYLATE 5 MG/1
10 TABLET ORAL DAILY
Status: DISCONTINUED | OUTPATIENT
Start: 2021-10-27 | End: 2021-10-30 | Stop reason: HOSPADM

## 2021-10-27 RX ORDER — ACETAMINOPHEN 650 MG/1
650 SUPPOSITORY RECTAL
Status: DISCONTINUED | OUTPATIENT
Start: 2021-10-27 | End: 2021-10-30 | Stop reason: HOSPADM

## 2021-10-27 RX ORDER — LABETALOL HCL 20 MG/4 ML
20 SYRINGE (ML) INTRAVENOUS
Status: DISCONTINUED | OUTPATIENT
Start: 2021-10-27 | End: 2021-10-29

## 2021-10-27 RX ORDER — ONDANSETRON 2 MG/ML
4 INJECTION INTRAMUSCULAR; INTRAVENOUS
Status: DISCONTINUED | OUTPATIENT
Start: 2021-10-27 | End: 2021-10-30 | Stop reason: HOSPADM

## 2021-10-27 RX ORDER — SODIUM CHLORIDE 0.9 % (FLUSH) 0.9 %
5-40 SYRINGE (ML) INJECTION EVERY 8 HOURS
Status: DISCONTINUED | OUTPATIENT
Start: 2021-10-27 | End: 2021-10-30 | Stop reason: HOSPADM

## 2021-10-27 RX ORDER — FACIAL-BODY WIPES
10 EACH TOPICAL DAILY PRN
Status: DISCONTINUED | OUTPATIENT
Start: 2021-10-27 | End: 2021-10-30 | Stop reason: HOSPADM

## 2021-10-27 RX ORDER — METOCLOPRAMIDE HYDROCHLORIDE 5 MG/ML
10 INJECTION INTRAMUSCULAR; INTRAVENOUS
Status: COMPLETED | OUTPATIENT
Start: 2021-10-27 | End: 2021-10-27

## 2021-10-27 RX ORDER — LABETALOL 200 MG/1
200 TABLET, FILM COATED ORAL EVERY 12 HOURS
Status: DISCONTINUED | OUTPATIENT
Start: 2021-10-27 | End: 2021-10-30 | Stop reason: HOSPADM

## 2021-10-27 RX ORDER — PROCHLORPERAZINE EDISYLATE 5 MG/ML
10 INJECTION INTRAMUSCULAR; INTRAVENOUS
Status: DISCONTINUED | OUTPATIENT
Start: 2021-10-27 | End: 2021-10-30 | Stop reason: HOSPADM

## 2021-10-27 RX ORDER — MORPHINE SULFATE 2 MG/ML
2 INJECTION, SOLUTION INTRAMUSCULAR; INTRAVENOUS
Status: DISCONTINUED | OUTPATIENT
Start: 2021-10-27 | End: 2021-10-30 | Stop reason: HOSPADM

## 2021-10-27 RX ORDER — ACETAMINOPHEN 325 MG/1
650 TABLET ORAL
Status: DISCONTINUED | OUTPATIENT
Start: 2021-10-27 | End: 2021-10-30 | Stop reason: HOSPADM

## 2021-10-27 RX ORDER — SODIUM CHLORIDE 9 MG/ML
50 INJECTION, SOLUTION INTRAVENOUS CONTINUOUS
Status: DISCONTINUED | OUTPATIENT
Start: 2021-10-27 | End: 2021-10-30 | Stop reason: HOSPADM

## 2021-10-27 RX ORDER — SODIUM CHLORIDE 0.9 % (FLUSH) 0.9 %
5-40 SYRINGE (ML) INJECTION AS NEEDED
Status: DISCONTINUED | OUTPATIENT
Start: 2021-10-27 | End: 2021-10-30 | Stop reason: HOSPADM

## 2021-10-27 RX ADMIN — SODIUM CHLORIDE 250 ML: 9 INJECTION, SOLUTION INTRAVENOUS at 19:59

## 2021-10-27 RX ADMIN — SODIUM CHLORIDE 50 ML/HR: 9 INJECTION, SOLUTION INTRAVENOUS at 22:25

## 2021-10-27 RX ADMIN — MORPHINE SULFATE 2 MG: 2 INJECTION, SOLUTION INTRAMUSCULAR; INTRAVENOUS at 20:04

## 2021-10-27 RX ADMIN — Medication 10 ML: at 22:23

## 2021-10-27 RX ADMIN — METOCLOPRAMIDE 10 MG: 5 INJECTION, SOLUTION INTRAMUSCULAR; INTRAVENOUS at 20:02

## 2021-10-27 RX ADMIN — PANTOPRAZOLE SODIUM 40 MG: 40 INJECTION, POWDER, FOR SOLUTION INTRAVENOUS at 22:21

## 2021-10-27 RX ADMIN — IOPAMIDOL 100 ML: 755 INJECTION, SOLUTION INTRAVENOUS at 20:44

## 2021-10-27 RX ADMIN — LABETALOL HYDROCHLORIDE 200 MG: 200 TABLET, FILM COATED ORAL at 20:00

## 2021-10-27 NOTE — H&P
[address]  [address], [place], [address] [facility]  [phone]    Admission History and Physical      NAME:  Soo Gaytan   :   1965   MRN:  [id number]     PCP:  Steve Zuniga MD     Date/Time of service:  10/27/2021  7:48 PM        Subjective:     CHIEF COMPLAINT: chest pain, headache, dark stools     HISTORY OF PRESENT ILLNESS:     The patient is a 54 yo hx of HTN, seizure d/o, migraines, cavernous hemangiomas, presented w/ chest pain, abd pain, melena, migraines, malignant HTN urgency. The patient c/o intermittent LUQ and chest pain for several weeks. She described the pain as \"some one punching me in the chest.\"  The pain is intermittent, associated with dyspnea and some L arm numbness. She also c/o \"black stool\" during this time. Denied cough, fevers, chills, nausea, vomiting, diarrhea. The patient does not take BP meds. In the ED, SBP ~200s. Initial EKG and Trop non-ischemic. Head CT and CXR neg. Allergies   Allergen Reactions    Latex, Natural Rubber Rash    Aspirin Other (comments)     Excessive bleeding      Other Medication Other (comments)     Patient states intolerance to blood thinners due to angiomas    Codeine Palpitations    Hydralazine Other (comments)     Pt reports \"burning/tingling\" sensation in abd, flanks, and into head.  Imitrex [Sumatriptan] Palpitations and Other (comments)     thougt she was having an MI    Percocet [Oxycodone-Acetaminophen] Palpitations       Prior to Admission medications    Medication Sig Start Date End Date Taking? Authorizing Provider   levETIRAcetam 1,000 mg tablet Take 1 Tab by mouth two (2) times a day. 6/15/20   Farhana Hancock MD   atenoloL (TENORMIN) 50 mg tablet Take 50 mg by mouth daily. Provider, Historical   ibuprofen (MOTRIN) 800 mg tablet Take 800 mg by mouth every eight (8) hours as needed for Pain.     Provider, Historical   methocarbamoL (ROBAXIN) 750 mg tablet Take 750 mg by mouth three (3) times daily as needed for Muscle Spasm(s). Provider, Historical   famotidine (PEPCID) 20 mg tablet Take 1 Tab by mouth two (2) times a day. 3/11/20   Ronny Knight MD   lisinopriL (PRINIVIL, ZESTRIL) 10 mg tablet Take 1 Tab by mouth daily. 3/11/20   Ronny Knight MD   pantoprazole (PROTONIX) 40 mg tablet Take 40 mg by mouth two (2) times a day. Provider, Historical   busPIRone (BUSPAR) 15 mg tablet Take 15 mg by mouth two (2) times a day. Provider, Historical   DULoxetine (CYMBALTA) 60 mg capsule Take 120 mg by mouth nightly. Other, MD Nirmal   topiramate (TOPAMAX) 25 mg tablet Take 25 mg by mouth two (2) times daily (with meals). Other, MD Nirmal   ALPRAZolam Mirna [place]) 1 mg tablet Take 1 mg by mouth three (3) times daily.     Provider, Historical       Past Medical History:   Diagnosis Date    Anxiety and depression     Cavernous hemangioma of intracranial structure ([facility] [address] 5/2/2011    Fibromyalgia     fibromyalgia    Hemorrhoids     Hiatal hernia with gastroesophageal reflux 04/2018    Hx of migraines     Hx of seizure disorder     Hypertension     Incomplete right bundle branch block (RBBB) determined by electrocardiography 05/10/2018    Obese     CARLOS (obstructive sleep apnea)     no cpap        Past Surgical History:   Procedure Laterality Date    COLONOSCOPY N/A 6/3/2019    COLONOSCOPY performed by Dianna House MD at [address] [facility] HX CHOLECYSTECTOMY      HX HEENT  2012    tonsillectomy    HX LITHOTRIPSY      HX ORTHOPAEDIC      disc fusion 2010, right knee cyst removed   [facility], 2006    Cavernous brain angioma(s) removed    SURGERY,BRAIN/SPINE,W/COMPUTER         Social History     Tobacco Use    Smoking status: Former Smoker    Smokeless tobacco: Never Used    Tobacco comment: on chantix,   Substance Use Topics    Alcohol use: No        Family History   Problem Relation Age of Onset    Cancer Mother         breast    Breast Cancer Mother 76    Cancer Father    Issac Loser Migraines Father     Diabetes Father     Breast Cancer Sister 48    Ovarian Cancer Maternal Aunt         Review of Systems:  (bold if positive, if negative)    Gen:  Eyes:  ENT:  CVS:  , chest pain,Pulm:  GI:  :  MS:  Skin:  Psych:  Endo:  Hem:  Renal:  Neuro:  , headache        Objective:      VITALS:    Vital signs reviewed; most recent are:    Visit Vitals  BP (!) 183/119   Pulse 97   Temp 98.9 °F (37.2 °C)   Resp 23   Ht 5' 3\" (1.6 m)   Wt 112.3 kg (247 lb 9.2 oz)   SpO2 95%   BMI 43.86 kg/m²     SpO2 Readings from Last 6 Encounters:   10/27/21 95%   06/15/20 93%   03/11/20 97%   07/01/19 99%   06/10/19 100%   06/07/19 99%        No intake or output data in the 24 hours ending 10/27/21 1948     Exam:     Physical Exam:    Gen:  Well-developed, well-nourished, morbidly obese, mild distress  HEENT:  Pink conjunctivae, PERRL, hearing intact to voice, moist mucous membranes  Neck:  Supple, without masses, thyroid non-tender  Resp:  No accessory muscle use, clear breath sounds without wheezes rales or rhonchi  Card:  No murmurs, normal S1, S2 without thrills, bruits or peripheral edema  Abd:  Soft, non-tender, non-distended, normoactive bowel sounds are present  Lymph:  No cervical adenopathy  Musc:  No cyanosis or clubbing  Skin:  No rashes  Neuro:  Cranial nerves 3-12 are grossly intact,  strength is 5/5 bilaterally, dorsi / plantarflexion strength is 5/5 bilaterally, follows commands appropriately  Psych:  Alert with fair insight.   Oriented to person, place, and time    Labs:    Recent Labs     10/27/21  1824   WBC 9.2   HGB 15.7   HCT 48.3*        Recent Labs     10/27/21  1824      K 3.9      CO2 25   GLU 87   BUN 11   CREA 1.08*   CA 9.8   ALB 3.9   TBILI 0.4   ALT 15     Lab Results   Component Value Date/Time    Glucose (POC) 110 (H) 06/14/2020 02:49 PM    Glucose (POC) 122 (H) 06/12/2020 08:23 PM     No results for input(s): PH, PCO2, PO2, HCO3, FIO2 in the last 72 hours. No results for input(s): INR, INREXT in the last 72 hours. Radiology and EKG reviewed:   CXR neg    **Old Records reviewed in [facility]**       Assessment/Plan:       Principal Problem:    54 yo hx of HTN, seizure d/o, migraines, cavernous hemangiomas, presented w/ chest pain, abd pain, melena, migraines, malignant HTN urgency    1) Malignant hypertensive urgency: has hx of HTN, but does not take meds. Initial SBP ~200s. Will start oral labetalol, norvasc. Use IV labetalol prn. Low threshold for nicardipine gt    2) Chest/abd pain: atypical for ACS. Not hypoxic or tachy, so PE less likely. Concern for abdominal pathology given melena. Will monitor on Tele. Check Cardiac enzymes, echo, d-dimer. Use IV morphine prn, nitro prn. Patient refused ASA since she has a hx of intracranial bleed    3) Melena: reported \"black stool. \"  Hgb stable. Will check Abd CT. Will start IV PPI. Consult GI for EGD    4) Acute headache/migraines: restart Topamax after med rec. Use IV antiemetics, IV morphine prn.   Cannot have NSAIDS    5) Seizure d/o: restart Keppra if appropriate after med rec    Risk of deterioration: high      Total time spent with patient: 79 Minutes **I personally saw and examined the patient during this time period**                 Care Plan discussed with: Patient, nursing     Discussed:  Care Plan    Prophylaxis:  SCD's    Probable Disposition:  Home w/Family           ___________________________________________________    Attending Physician: Norah Tijerina MD

## 2021-10-27 NOTE — ED TRIAGE NOTES
EMS reports pt has had chest discomfort radiating down her left arm today accompanied by some SOB. Denies any cardiac hx. States she has hx of brain surgery.

## 2021-10-27 NOTE — ED PROVIDER NOTES
81-year-old female with history of hypertension, fibromyalgia, migraines, seizures, obesity presents to the emergency department with worsening chest pain shortness of breath. She tells me the symptoms have been going on for months but have significantly worsened over the past day with exertional dyspnea, exertional shortness of breath. She does not take anything for hypertension. She tells me she was told to see a cardiologist but was not able to afford to. The history is provided by the patient and medical records. Chest Pain (Angina)   This is a chronic problem. The current episode started more than 1 week ago. The problem has been rapidly worsening. The problem occurs constantly. The pain is associated with exertion. The pain is present in the substernal region. The pain is severe. The quality of the pain is described as pressure-like. The pain does not radiate. Associated symptoms include exertional chest pressure, headaches, nausea and shortness of breath. Pertinent negatives include no abdominal pain, no cough, no diaphoresis, no fever, no vomiting and no weakness. Her past medical history is significant for HTN.        Past Medical History:   Diagnosis Date    Anxiety and depression     Cavernous hemangioma of intracranial structure ([facility] [address] 5/2/2011    Fibromyalgia     fibromyalgia    Hemorrhoids     Hiatal hernia with gastroesophageal reflux 04/2018    Hx of migraines     Hx of seizure disorder     Hypertension     Incomplete right bundle branch block (RBBB) determined by electrocardiography 05/10/2018    Obese     CARLOS (obstructive sleep apnea)     no cpap       Past Surgical History:   Procedure Laterality Date    COLONOSCOPY N/A 6/3/2019    COLONOSCOPY performed by Johanna Noonan MD at [address] [facility] HX CHOLECYSTECTOMY      HX HEENT  2012    tonsillectomy    HX LITHOTRIPSY      HX ORTHOPAEDIC      disc fusion 2010, right knee cyst removed   [facility], 2006    Cavernous brain angioma(s) removed    SURGERY,BRAIN/SPINE,W/COMPUTER           Family History:   Problem Relation Age of Onset   David Salyahir Cancer Mother         breast    Breast Cancer Mother 76    Cancer Father     Migraines Father     Diabetes Father     Breast Cancer Sister 48    Ovarian Cancer Maternal Aunt        Social History     Socioeconomic History    Marital status: SINGLE     Spouse name: Not on file    Number of children: Not on file    Years of education: Not on file    Highest education level: Not on file   Occupational History    Not on file   Tobacco Use    Smoking status: Former Smoker    Smokeless tobacco: Never Used    Tobacco comment: on chantix,   Substance and Sexual Activity    Alcohol use: No    Drug use: No    Sexual activity: Not on file   Other Topics Concern    Not on file   Social History Narrative    Not on file     Social Determinants of Health     Financial Resource Strain:     Difficulty of Paying Living Expenses:    Food Insecurity:     Worried About [address] in the Last Year:     [address] in the Last Year:    Transportation Needs:     Lack of Transportation (Medical):  Lack of Transportation (Non-Medical):    Physical Activity:     Days of Exercise per Week:     Minutes of Exercise per Session:    Stress:     Feeling of Stress :    Social Connections:     Frequency of Communication with Friends and Family:     Frequency of Social Gatherings with Friends and Family:     Attends Bahai Services:     Active Member of Clubs or Organizations:     Attends Club or Organization Meetings:     Marital Status:    Intimate Partner Violence:     Fear of Current or Ex-Partner:     Emotionally Abused:     Physically Abused:     Sexually Abused: ALLERGIES: Latex, natural rubber; Aspirin; Other medication; Codeine;  Hydralazine; Imitrex [sumatriptan]; and Percocet [oxycodone-acetaminophen]    Review of Systems   Constitutional: Negative for diaphoresis, fatigue and fever. HENT: Negative for sneezing and sore throat. Respiratory: Positive for shortness of breath. Negative for cough. Cardiovascular: Positive for chest pain. Negative for leg swelling. Gastrointestinal: Positive for nausea. Negative for abdominal pain, diarrhea and vomiting. Genitourinary: Negative for difficulty urinating and dysuria. Musculoskeletal: Negative for arthralgias and myalgias. Skin: Negative for color change and rash. Neurological: Positive for headaches. Negative for weakness. Psychiatric/Behavioral: Negative for agitation and behavioral problems. Vitals:    10/27/21 1806   BP: (!) 183/138   Pulse: 98   Resp: 16   Temp: 98.9 °F (37.2 °C)   SpO2: 99%   Weight: 112.3 kg (247 lb 9.2 oz)   Height: 5' 3\" (1.6 m)            Physical Exam  Vitals and nursing note reviewed. Constitutional:       General: She is not in acute distress. Appearance: Normal appearance. She is well-developed. She is obese. She is not ill-appearing, toxic-appearing or diaphoretic. HENT:      Head: Normocephalic and atraumatic. Nose: Nose normal.      Mouth/Throat:      Mouth: Mucous membranes are moist.      Pharynx: Oropharynx is clear. Eyes:      Extraocular Movements: Extraocular movements intact. Conjunctiva/sclera: Conjunctivae normal.      Pupils: Pupils are equal, round, and reactive to light. Cardiovascular:      Rate and Rhythm: Normal rate and regular rhythm. Pulses: Normal pulses. Heart sounds: Normal heart sounds. Pulmonary:      Effort: Pulmonary effort is normal. No respiratory distress. Breath sounds: Normal breath sounds. No wheezing. Chest:      Chest wall: No tenderness. Abdominal:      General: Abdomen is flat. There is no distension. Palpations: Abdomen is soft. Tenderness: There is no abdominal tenderness. There is no guarding or rebound.    Musculoskeletal:         General: No swelling, tenderness, deformity or signs of injury. Normal range of motion. Cervical back: Normal range of motion and neck supple. No rigidity. No muscular tenderness. Right lower leg: No edema. Left lower leg: No edema. Skin:     General: Skin is warm and dry. Capillary Refill: Capillary refill takes less than 2 seconds. Neurological:      General: No focal deficit present. Mental Status: She is alert and oriented to person, place, and time. Psychiatric:         Mood and Affect: Mood normal.         Behavior: Behavior normal.          MDM  Number of Diagnoses or Management Options  Diagnosis management comments: 41-year-old female presents as above with exertional chest pain shortness of breath concerning for unstable angina. She has a reassuring initial troponin in the emergency department. Her blood pressures have been significantly elevated and she may have an element of hypertensive emergency as well. Plan to treat with nitroglycerin. She also has a headache which will treat symptomatically. Plan to admit for further management. Amount and/or Complexity of Data Reviewed  Clinical lab tests: reviewed  Tests in the radiology section of CPT®: reviewed      ED Course as of Oct 27 1919   Wed Oct 27, 2021   1834 ED EKG interpretation:Rhythm: Normal sinus rhythm at rate approximately 88 with left axis deviation. ST segment: Nonspecific ST segment. This EKG was interpreted by Jaden Levi MD,ED Provider. [JM]      ED Course User Index  [JM] Zari Matamoros MD       Procedures          Perfect Serve Consult for Admission  7:20 PM    ED Room Number: ER15/15  Patient Name and age:  Jeremiah Sarah 55 y.o.  female  Working Diagnosis:   1. Exertional chest pain    2. Exertional dyspnea    3. Hypertensive urgency    4.  Acute nonintractable headache, unspecified headache type        COVID-19 Suspicion:  no  Sepsis present:  no  Reassessment needed: N/A  Code Status:  Full Code  Readmission: no  Isolation Requirements:  no  Recommended Level of Care:  telemetry  Department: [facility] ED - [phone]  Other: Patient has not been taking blood pressure medicines and has not seen cardiology despite recommendations by her primary care doctor

## 2021-10-28 ENCOUNTER — APPOINTMENT (OUTPATIENT)
Dept: NON INVASIVE DIAGNOSTICS | Age: 56
End: 2021-10-28
Attending: INTERNAL MEDICINE
Payer: MEDICARE

## 2021-10-28 ENCOUNTER — APPOINTMENT (OUTPATIENT)
Dept: NUCLEAR MEDICINE | Age: 56
End: 2021-10-28
Attending: SPECIALIST
Payer: MEDICARE

## 2021-10-28 ENCOUNTER — APPOINTMENT (OUTPATIENT)
Dept: NON INVASIVE DIAGNOSTICS | Age: 56
End: 2021-10-28
Attending: SPECIALIST
Payer: MEDICARE

## 2021-10-28 LAB
ALBUMIN SERPL-MCNC: 2.4 G/DL (ref 3.5–5)
ALBUMIN/GLOB SERPL: 0.7 {RATIO} (ref 1.1–2.2)
ALP SERPL-CCNC: 81 U/L (ref 45–117)
ALT SERPL-CCNC: 11 U/L (ref 12–78)
ANION GAP SERPL CALC-SCNC: 5 MMOL/L (ref 5–15)
AST SERPL-CCNC: 7 U/L (ref 15–37)
BILIRUB DIRECT SERPL-MCNC: 0.1 MG/DL (ref 0–0.2)
BILIRUB SERPL-MCNC: 0.3 MG/DL (ref 0.2–1)
BNP SERPL-MCNC: 27 PG/ML
BUN SERPL-MCNC: 10 MG/DL (ref 6–20)
BUN/CREAT SERPL: 12 (ref 12–20)
CALCIUM SERPL-MCNC: 6.9 MG/DL (ref 8.5–10.1)
CHLORIDE SERPL-SCNC: 119 MMOL/L (ref 97–108)
CHOLEST SERPL-MCNC: 200 MG/DL
CO2 SERPL-SCNC: 23 MMOL/L (ref 21–32)
CREAT SERPL-MCNC: 0.82 MG/DL (ref 0.55–1.02)
ECHO AO ASC DIAM: 3.79 CM
ECHO AV ANNULUS DIAM: 3.92 CM
ECHO AV AREA PEAK VELOCITY: 2.55 CM2
ECHO AV AREA/BSA PEAK VELOCITY: 1.2 CM2/M2
ECHO AV PEAK GRADIENT: 7.81 MMHG
ECHO AV PEAK VELOCITY: 139.71 CM/S
ECHO LA AREA 4C: 8.34 CM2
ECHO LA MAJOR AXIS: 2.88 CM
ECHO LA MINOR AXIS: 1.36 CM
ECHO LA VOL 2C: 18.53 ML (ref 22–52)
ECHO LA VOL 4C: 15.51 ML (ref 22–52)
ECHO LA VOL BP: 18.64 ML (ref 22–52)
ECHO LA VOL/BSA BIPLANE: 8.79 ML/M2 (ref 16–28)
ECHO LA VOLUME INDEX A2C: 8.74 ML/M2 (ref 16–28)
ECHO LA VOLUME INDEX A4C: 7.32 ML/M2 (ref 16–28)
ECHO LV E' LATERAL VELOCITY: 5.41 CM/S
ECHO LV E' SEPTAL VELOCITY: 5.19 CM/S
ECHO LV INTERNAL DIMENSION DIASTOLIC: 3.97 CM (ref 3.9–5.3)
ECHO LV INTERNAL DIMENSION SYSTOLIC: 2.93 CM
ECHO LV IVSD: 1.15 CM (ref 0.6–0.9)
ECHO LV MASS 2D: 151.7 G (ref 67–162)
ECHO LV MASS INDEX 2D: 71.5 G/M2 (ref 43–95)
ECHO LV POSTERIOR WALL DIASTOLIC: 1.13 CM (ref 0.6–0.9)
ECHO LVOT DIAM: 2.17 CM
ECHO LVOT PEAK GRADIENT: 3.71 MMHG
ECHO LVOT PEAK VELOCITY: 96.27 CM/S
ECHO MV A VELOCITY: 73.36 CM/S
ECHO MV E DECELERATION TIME (DT): 325.39 MS
ECHO MV E VELOCITY: 46.86 CM/S
ECHO MV E/A RATIO: 0.64
ECHO MV E/E' LATERAL: 8.66
ECHO MV E/E' RATIO (AVERAGED): 8.85
ECHO MV E/E' SEPTAL: 9.03
ECHO PV MAX VELOCITY: 94.69 CM/S
ECHO PV PEAK INSTANTANEOUS GRADIENT SYSTOLIC: 3.59 MMHG
ECHO PV REGURGITANT MAX VELOCITY: 117.36 CM/S
ECHO RV INTERNAL DIMENSION: 2.86 CM
ECHO RV TAPSE: 2.02 CM (ref 1.5–2)
ECHO TV REGURGITANT MAX VELOCITY: 237.34 CM/S
ECHO TV REGURGITANT PEAK GRADIENT: 22.66 MMHG
ERYTHROCYTE [DISTWIDTH] IN BLOOD BY AUTOMATED COUNT: 13.1 % (ref 11.5–14.5)
EST. AVERAGE GLUCOSE BLD GHB EST-MCNC: 117 MG/DL
GLOBULIN SER CALC-MCNC: 3.3 G/DL (ref 2–4)
GLUCOSE SERPL-MCNC: 77 MG/DL (ref 65–100)
HBA1C MFR BLD: 5.7 % (ref 4–5.6)
HCT VFR BLD AUTO: 42.9 % (ref 35–47)
HDLC SERPL-MCNC: 42 MG/DL
HDLC SERPL: 4.8 {RATIO} (ref 0–5)
HGB BLD-MCNC: 13.9 G/DL (ref 11.5–16)
LA VOL DISK BP: 16.96 ML (ref 22–52)
LDLC SERPL CALC-MCNC: 132.6 MG/DL (ref 0–100)
MAGNESIUM SERPL-MCNC: 1.7 MG/DL (ref 1.6–2.4)
MCH RBC QN AUTO: 29.6 PG (ref 26–34)
MCHC RBC AUTO-ENTMCNC: 32.4 G/DL (ref 30–36.5)
MCV RBC AUTO: 91.3 FL (ref 80–99)
NRBC # BLD: 0 K/UL (ref 0–0.01)
NRBC BLD-RTO: 0 PER 100 WBC
PHOSPHATE SERPL-MCNC: 3.2 MG/DL (ref 2.6–4.7)
PLATELET # BLD AUTO: 326 K/UL (ref 150–400)
PMV BLD AUTO: 11 FL (ref 8.9–12.9)
POTASSIUM SERPL-SCNC: 3.5 MMOL/L (ref 3.5–5.1)
PROT SERPL-MCNC: 5.7 G/DL (ref 6.4–8.2)
RBC # BLD AUTO: 4.7 M/UL (ref 3.8–5.2)
SODIUM SERPL-SCNC: 147 MMOL/L (ref 136–145)
TRIGL SERPL-MCNC: 127 MG/DL (ref ?–150)
TROPONIN-HIGH SENSITIVITY: 9 NG/L (ref 0–51)
VLDLC SERPL CALC-MCNC: 25.4 MG/DL
WBC # BLD AUTO: 11.2 K/UL (ref 3.6–11)

## 2021-10-28 PROCEDURE — 83036 HEMOGLOBIN GLYCOSYLATED A1C: CPT

## 2021-10-28 PROCEDURE — 65270000029 HC RM PRIVATE

## 2021-10-28 PROCEDURE — 78451 HT MUSCLE IMAGE SPECT SING: CPT

## 2021-10-28 PROCEDURE — 74011250637 HC RX REV CODE- 250/637: Performed by: INTERNAL MEDICINE

## 2021-10-28 PROCEDURE — 84484 ASSAY OF TROPONIN QUANT: CPT

## 2021-10-28 PROCEDURE — 84100 ASSAY OF PHOSPHORUS: CPT

## 2021-10-28 PROCEDURE — 83880 ASSAY OF NATRIURETIC PEPTIDE: CPT

## 2021-10-28 PROCEDURE — 83735 ASSAY OF MAGNESIUM: CPT

## 2021-10-28 PROCEDURE — 36415 COLL VENOUS BLD VENIPUNCTURE: CPT

## 2021-10-28 PROCEDURE — 96375 TX/PRO/DX INJ NEW DRUG ADDON: CPT

## 2021-10-28 PROCEDURE — 74011250636 HC RX REV CODE- 250/636: Performed by: INTERNAL MEDICINE

## 2021-10-28 PROCEDURE — 80076 HEPATIC FUNCTION PANEL: CPT

## 2021-10-28 PROCEDURE — 93306 TTE W/DOPPLER COMPLETE: CPT | Performed by: SPECIALIST

## 2021-10-28 PROCEDURE — 80061 LIPID PANEL: CPT

## 2021-10-28 PROCEDURE — 99204 OFFICE O/P NEW MOD 45 MIN: CPT | Performed by: SPECIALIST

## 2021-10-28 PROCEDURE — 80048 BASIC METABOLIC PNL TOTAL CA: CPT

## 2021-10-28 PROCEDURE — A9500 TC99M SESTAMIBI: HCPCS

## 2021-10-28 PROCEDURE — 99218 HC RM OBSERVATION: CPT

## 2021-10-28 PROCEDURE — 65390000012 HC CONDITION CODE 44 OBSERVATION

## 2021-10-28 PROCEDURE — 74011000250 HC RX REV CODE- 250: Performed by: NURSE PRACTITIONER

## 2021-10-28 PROCEDURE — 93306 TTE W/DOPPLER COMPLETE: CPT

## 2021-10-28 PROCEDURE — 96376 TX/PRO/DX INJ SAME DRUG ADON: CPT

## 2021-10-28 PROCEDURE — 74011250636 HC RX REV CODE- 250/636: Performed by: SPECIALIST

## 2021-10-28 PROCEDURE — 85027 COMPLETE CBC AUTOMATED: CPT

## 2021-10-28 PROCEDURE — C9113 INJ PANTOPRAZOLE SODIUM, VIA: HCPCS | Performed by: INTERNAL MEDICINE

## 2021-10-28 PROCEDURE — 78451 HT MUSCLE IMAGE SPECT SING: CPT | Performed by: SPECIALIST

## 2021-10-28 RX ORDER — TETRAKIS(2-METHOXYISOBUTYLISOCYANIDE)COPPER(I) TETRAFLUOROBORATE 1 MG/ML
27 INJECTION, POWDER, LYOPHILIZED, FOR SOLUTION INTRAVENOUS
Status: COMPLETED | OUTPATIENT
Start: 2021-10-28 | End: 2021-10-28

## 2021-10-28 RX ORDER — POLYETHYLENE GLYCOL 3350, SODIUM SULFATE, SODIUM CHLORIDE, POTASSIUM CHLORIDE, SODIUM ASCORBATE, AND ASCORBIC ACID 7.5-2.691G
2 KIT ORAL ONCE
Status: COMPLETED | OUTPATIENT
Start: 2021-10-28 | End: 2021-10-28

## 2021-10-28 RX ORDER — TRIAMCINOLONE ACETONIDE 1 MG/G
CREAM TOPICAL 2 TIMES DAILY
COMMUNITY
End: 2022-04-21

## 2021-10-28 RX ORDER — ALPRAZOLAM 0.5 MG/1
1 TABLET ORAL 3 TIMES DAILY
Status: DISCONTINUED | OUTPATIENT
Start: 2021-10-28 | End: 2021-10-30 | Stop reason: HOSPADM

## 2021-10-28 RX ADMIN — AMLODIPINE BESYLATE 10 MG: 5 TABLET ORAL at 00:50

## 2021-10-28 RX ADMIN — MORPHINE SULFATE 2 MG: 2 INJECTION, SOLUTION INTRAMUSCULAR; INTRAVENOUS at 05:39

## 2021-10-28 RX ADMIN — TETRAKIS(2-METHOXYISOBUTYLISOCYANIDE)COPPER(I) TETRAFLUOROBORATE 27 MILLICURIE: 1 INJECTION, POWDER, LYOPHILIZED, FOR SOLUTION INTRAVENOUS at 12:15

## 2021-10-28 RX ADMIN — PANTOPRAZOLE SODIUM 40 MG: 40 INJECTION, POWDER, FOR SOLUTION INTRAVENOUS at 20:58

## 2021-10-28 RX ADMIN — PEG-3350, SODIUM SULFATE, SODIUM CHLORIDE, POTASSIUM CHLORIDE, SODIUM ASCORBATE AND ASCORBIC ACID 2 L: KIT at 17:37

## 2021-10-28 RX ADMIN — ALPRAZOLAM 1 MG: 0.5 TABLET ORAL at 14:29

## 2021-10-28 RX ADMIN — MORPHINE SULFATE 2 MG: 2 INJECTION, SOLUTION INTRAMUSCULAR; INTRAVENOUS at 14:50

## 2021-10-28 RX ADMIN — PROCHLORPERAZINE EDISYLATE 10 MG: 5 INJECTION INTRAMUSCULAR; INTRAVENOUS at 00:54

## 2021-10-28 RX ADMIN — PANTOPRAZOLE SODIUM 40 MG: 40 INJECTION, POWDER, FOR SOLUTION INTRAVENOUS at 08:37

## 2021-10-28 RX ADMIN — MORPHINE SULFATE 2 MG: 2 INJECTION, SOLUTION INTRAMUSCULAR; INTRAVENOUS at 10:41

## 2021-10-28 RX ADMIN — LABETALOL HYDROCHLORIDE 200 MG: 200 TABLET, FILM COATED ORAL at 08:37

## 2021-10-28 RX ADMIN — REGADENOSON 0.4 MG: 0.08 INJECTION, SOLUTION INTRAVENOUS at 12:12

## 2021-10-28 RX ADMIN — ALPRAZOLAM 1 MG: 0.5 TABLET ORAL at 21:00

## 2021-10-28 RX ADMIN — ONDANSETRON 4 MG: 2 INJECTION INTRAMUSCULAR; INTRAVENOUS at 19:36

## 2021-10-28 RX ADMIN — Medication 10 ML: at 05:49

## 2021-10-28 RX ADMIN — MORPHINE SULFATE 2 MG: 2 INJECTION, SOLUTION INTRAMUSCULAR; INTRAVENOUS at 00:54

## 2021-10-28 RX ADMIN — SODIUM CHLORIDE 50 ML/HR: 9 INJECTION, SOLUTION INTRAVENOUS at 19:55

## 2021-10-28 RX ADMIN — Medication 10 ML: at 21:00

## 2021-10-28 RX ADMIN — MORPHINE SULFATE 2 MG: 2 INJECTION, SOLUTION INTRAMUSCULAR; INTRAVENOUS at 19:36

## 2021-10-28 RX ADMIN — LABETALOL HYDROCHLORIDE 200 MG: 200 TABLET, FILM COATED ORAL at 20:58

## 2021-10-28 NOTE — PROGRESS NOTES
Rounded on [facility] patients and provided Anointing of the Sick at request of patient.     Mackenzie Fowler

## 2021-10-28 NOTE — H&P (VIEW-ONLY)
Suzanne Hobson, FABRICIO-C  [phone] cell     Gastroenterology Consultation Note      Admit Date: 10/27/2021  Consult Date: 10/28/2021   I greatly appreciate your asking me to see Gucci Becker, thank you very much for the opportunity to participate in her care. Narrative Assessment and Plan   GI consultation in this 55year old female admitted with 1 month history of chest pain and LUQ pain that is accompanied by dark stools. Her pain was associated with dyspnea and left arm numbness but cardiac work up negative. She is a known patient of our practice and presented similarly in 3/2020 and we were consulted. The history is a bit difficult as she just says \"it never stopped\" when I ask when her symptoms started. She complains of BRBPR starting recently and says her bowel movements remain very difficult. She strains daily for BM and she has rectal pain. Stools are not hard or large. Her bowels move every other day to every 2 days. Her last EGD and colonoscopy were 6/2019 by Dr. Tawny Vitale. Minor antral erythema on EGD and large hemorrhoids on colonoscopy. Had hemorrhoidectomy afterward. Has also had paraesophageal hernia repair which she says \"did nothing\". Rarely takes NSAIDs. Takes famotidine at least daily although ordered BID. Takes pantoprazole daily as well. Labs ok. CT abd/pelvis with contrast without source of GI bleeding but shows 12 mm exophytic lesion on liver which is not mentioned in CT in 2019. Radiologist recommended MRI with contrast for further evaluation. LFTs and platelets normal.    · Plan EGD and colonoscopy tomorrow, discussed with patient. May need surgical referral for hemorrhoids. Consider anal manometry as OP if symptoms persist, may need PT for pelvic floor. Ok to have clear liquids today with prep this evening then NPO past midnight for procedures tomorrow. · Continue PPI BID  · Add daily Miralax  · Check MRI with liver protocol before discharge  Adan Quintanilla, MD  [phone] office  [phone] voicemail   I have personally reviewed the history, interviewed the patient, and independently examined the patient. I have reviewed the chart and agree with the documentation recorded by the Advanced Practice Provider, including the assessment, treatment plan, and disposition; with the addition of: For melena, hematochezia, LUQ pain I have reviewed the indications, risks, benefits and alternatives to bidirectional endoscopy and she has asked we proceed. Lizz Mcintosh MD      Subjective:     Chief Complaint: Noncardiac chest pain, LUQ pain, dark stools, rectal bleeding, constipation    History of Present Illness: GI consultation in this 55year old female admitted with 1 month history of chest pain and LUQ pain that is accompanied by dark stools. Her pain was associated with dyspnea and left arm numbness but cardiac work up negative. She is a known patient of our practice and presented similarly in 3/2020 and we were consulted. The history is a bit difficult as she just says \"it never stopped\" when I ask when her symptoms started. She complains of BRBPR starting recently and says her bowel movements remain very difficult. She strains daily for BM and she has rectal pain. Stools are not hard or large. Her bowels move every other day to every 2 days. She just feels like she can't push out the stool. Complains of dysphagia affecting upper esophagus since uvula removed 8 years ago for sleep apnea. Her last EGD and colonoscopy were 6/2019 by Dr. Uche Perkins. Minor antral erythema on EGD and large hemorrhoids on colonoscopy. Had hemorrhoidectomy afterward by Dr. Rell Yoder. Has also had paraesophageal hernia repair which she says \"did nothing\". Rarely takes NSAIDs. Takes famotidine at least daily although ordered BID. Takes pantoprazole daily as well. Family history unknown. Labs ok.  CT abd/pelvis with contrast without source of GI bleeding but shows 12 mm exophytic lesion on liver which is not mentioned in CT in 2019. Radiologist recommended MRI with contrast for further evaluation. Pertinent labs: WBC 11.2, hgb 13.9 (15.7 at admission), hct 42.9, MCV 91.3, platelets 181, sodium 147, potassium 3.5, BUN 10, creatinine 0.82. PCP:  Timmy Nieves MD    Past Medical History:   Diagnosis Date    Anxiety and depression     Cavernous hemangioma of intracranial structure ([address] 5/2/2011    Fibromyalgia     fibromyalgia    Hemorrhoids     Hiatal hernia with gastroesophageal reflux 04/2018    Hx of migraines     Hx of seizure disorder     Hypertension     Incomplete right bundle branch block (RBBB) determined by electrocardiography 05/10/2018    Obese     CARLOS (obstructive sleep apnea)     no cpap        Past Surgical History:   Procedure Laterality Date    COLONOSCOPY N/A 6/3/2019    COLONOSCOPY performed by Liam Cummings MD at [facility] HX CHOLECYSTECTOMY      HX HEENT  2012    tonsillectomy    HX LITHOTRIPSY      HX ORTHOPAEDIC      disc fusion 2010, right knee cyst removed   [facility], 2006    Cavernous brain angioma(s) removed    SURGERY,BRAIN/SPINE,W/COMPUTER         Social History     Tobacco Use    Smoking status: Former Smoker    Smokeless tobacco: Never Used    Tobacco comment: on chantix,   Substance Use Topics    Alcohol use: No        Family History   Problem Relation Age of Onset    Cancer Mother         breast    Breast Cancer Mother 76    Cancer Father     Migraines Father     Diabetes Father     Breast Cancer Sister 48    Ovarian Cancer Maternal Aunt         Allergies   Allergen Reactions    Latex, Natural Rubber Rash    Aspirin Other (comments)     Excessive bleeding      Other Medication Other (comments)     Patient states intolerance to blood thinners due to angiomas    Codeine Palpitations    Hydralazine Other (comments)     Pt reports \"burning/tingling\" sensation in abd, flanks, and into head.     Imitrex [Sumatriptan] Palpitations and Other (comments)     thougt she was having an MI    Percocet [Oxycodone-Acetaminophen] Palpitations            Home Medications:  Prior to Admission Medications   Prescriptions Last Dose Informant Patient Reported? Taking? ALPRAZolam (XANAX) 1 mg tablet 10/26/2021 Self Yes Yes   Sig: Take 1 mg by mouth three (3) times daily. DULoxetine (CYMBALTA) 60 mg capsule 10/26/2021 Self Yes Yes   Sig: Take 120 mg by mouth nightly. famotidine (PEPCID) 20 mg tablet 10/26/2021 Self No Yes   Sig: Take 1 Tab by mouth two (2) times a day. ibuprofen (MOTRIN) 800 mg tablet  Self Yes Yes   Sig: Take 800 mg by mouth every eight (8) hours as needed for Pain.   pantoprazole (PROTONIX) 40 mg tablet 10/26/2021 Self Yes Yes   Sig: Take 40 mg by mouth nightly. topiramate (TOPAMAX) 25 mg tablet 10/26/2021 Self Yes Yes   Sig: Take 25 mg by mouth nightly. triamcinolone acetonide (KENALOG) 0.1 % topical cream 10/26/2021 Self Yes Yes   Sig: Apply  to affected area two (2) times a day.  use thin layer  Apply to legs      Facility-Administered Medications: None       Hospital Medications:  Current Facility-Administered Medications   Medication Dose Route Frequency    nitroglycerin (NITROSTAT) tablet 0.4 mg  0.4 mg SubLINGual Q5MIN PRN    labetaloL (NORMODYNE;TRANDATE) 20 mg/4 mL (5 mg/mL) injection 20 mg  20 mg IntraVENous Q4H PRN    prochlorperazine (COMPAZINE) injection 10 mg  10 mg IntraVENous Q6H PRN    morphine injection 2 mg  2 mg IntraVENous Q4H PRN    labetaloL (NORMODYNE) tablet 200 mg  200 mg Oral Q12H    pantoprazole (PROTONIX) 40 mg in 0.9% sodium chloride 10 mL injection  40 mg IntraVENous Q12H    ondansetron (ZOFRAN) injection 4 mg  4 mg IntraVENous Q6H PRN    amLODIPine (NORVASC) tablet 10 mg  10 mg Oral DAILY    0.9% sodium chloride infusion  50 mL/hr IntraVENous CONTINUOUS    sodium chloride (NS) flush 5-40 mL  5-40 mL IntraVENous Q8H    sodium chloride (NS) flush 5-40 mL  5-40 mL IntraVENous PRN    0.9% sodium chloride infusion 25 mL  25 mL IntraVENous PRN    acetaminophen (TYLENOL) tablet 650 mg  650 mg Oral Q6H PRN    Or    acetaminophen (TYLENOL) suppository 650 mg  650 mg Rectal Q6H PRN    bisacodyL (DULCOLAX) suppository 10 mg  10 mg Rectal DAILY PRN       Review of Systems: Per HPI, otherwise negative. Objective:     Physical Exam:  Visit Vitals  BP (!) 141/95   Pulse 72   Temp 98.6 °F (37 °C)   Resp 15   Ht 5' 3\" (1.6 m)   Wt 112.3 kg (247 lb 9.2 oz)   SpO2 96%   BMI 43.86 kg/m²     SpO2 Readings from Last 6 Encounters:   10/28/21 96%   06/15/20 93%   03/11/20 97%   07/01/19 99%   06/10/19 100%   06/07/19 99%            Intake/Output Summary (Last 24 hours) at 10/28/2021 1414  Last data filed at 10/28/2021 1222  Gross per 24 hour   Intake 833.33 ml   Output 0 ml   Net 833.33 ml      General: no distress, comfortable  Skin:  No rash or palpable dermatologic mass lesions  HEENT: Pupils equal, sclera anicteric, oropharynx with no gross lesions  Cardiovascular: No abnormal audible heart sounds, well perfused, no edema  Respiratory:  No abnormal audible breath sounds, normal respiratory effort, no throacic deformity  GI:   Abdomen nondistended, nontender, no mass, no free fluid, no rebound or guarding. Musculoskeletal:  No skeletal deformity nor acute arthritis noted. Neurological:  Motor and sensory function intact in upper extremeties  Psychiatric:  Irritable, memory intact, appears to have insight into current illness    Laboratory:    Recent Results (from the past 24 hour(s))   SAMPLES BEING HELD    Collection Time: 10/27/21  6:24 PM   Result Value Ref Range    SAMPLES BEING HELD [email or web address]     COMMENT        Add-on orders for these samples will be processed based on acceptable specimen integrity and analyte stability, which may vary by analyte.    CBC WITH AUTOMATED DIFF    Collection Time: 10/27/21  6:24 PM   Result Value Ref Range    WBC 9.2 3.6 - 11.0 K/uL RBC 5.36 (H) 3.80 - 5.20 M/uL    HGB 15.7 11.5 - 16.0 g/dL    HCT 48.3 (H) 35.0 - 47.0 %    MCV 90.1 80.0 - 99.0 FL    MCH 29.3 26.0 - 34.0 PG    MCHC 32.5 30.0 - 36.5 g/dL    RDW 13.0 11.5 - 14.5 %    PLATELET 173 241 - 223 K/uL    MPV 10.6 8.9 - 12.9 FL    NRBC 0.0 0  WBC    ABSOLUTE NRBC 0.00 0.00 - 0.01 K/uL    NEUTROPHILS 60 32 - 75 %    LYMPHOCYTES 30 12 - 49 %    MONOCYTES 7 5 - 13 %    EOSINOPHILS 2 0 - 7 %    BASOPHILS 1 0 - 1 %    IMMATURE GRANULOCYTES 0 0.0 - 0.5 %    ABS. NEUTROPHILS 5.5 1.8 - 8.0 K/UL    ABS. LYMPHOCYTES 2.8 0.8 - 3.5 K/UL    ABS. MONOCYTES 0.6 0.0 - 1.0 K/UL    ABS. EOSINOPHILS 0.1 0.0 - 0.4 K/UL    ABS. BASOPHILS 0.1 0.0 - 0.1 K/UL    ABS. IMM. GRANS. 0.0 0.00 - 0.04 K/UL    DF AUTOMATED     METABOLIC PANEL, COMPREHENSIVE    Collection Time: 10/27/21  6:24 PM   Result Value Ref Range    Sodium 139 136 - 145 mmol/L    Potassium 3.9 3.5 - 5.1 mmol/L    Chloride 108 97 - 108 mmol/L    CO2 25 21 - 32 mmol/L    Anion gap 6 5 - 15 mmol/L    Glucose 87 65 - 100 mg/dL    BUN 11 6 - 20 MG/DL    Creatinine 1.08 (H) 0.55 - 1.02 MG/DL    BUN/Creatinine ratio 10 (L) 12 - 20      GFR est AA >60 >60 ml/min/1.73m2    GFR est non-AA 53 (L) >60 ml/min/1.73m2    Calcium 9.8 8.5 - 10.1 MG/DL    Bilirubin, total 0.4 0.2 - 1.0 MG/DL    ALT (SGPT) 15 12 - 78 U/L    AST (SGOT) 11 (L) 15 - 37 U/L    Alk.  phosphatase 140 (H) 45 - 117 U/L    Protein, total 9.0 (H) 6.4 - 8.2 g/dL    Albumin 3.9 3.5 - 5.0 g/dL    Globulin 5.1 (H) 2.0 - 4.0 g/dL    A-G Ratio 0.8 (L) 1.1 - 2.2     TROPONIN-HIGH SENSITIVITY    Collection Time: 10/27/21  6:24 PM   Result Value Ref Range    Troponin-High Sensitivity 9 0 - 51 ng/L   D DIMER    Collection Time: 10/27/21  6:24 PM   Result Value Ref Range    D-dimer 1.00 (H) 0.00 - 0.65 mg/L FEU   TROPONIN-HIGH SENSITIVITY    Collection Time: 10/28/21  1:07 AM   Result Value Ref Range    Troponin-High Sensitivity 9 0 - 51 ng/L   NT-PRO BNP    Collection Time: 10/28/21  1:07 AM Result Value Ref Range    NT pro-BNP 27 <814 PG/ML   METABOLIC PANEL, BASIC    Collection Time: 10/28/21  1:07 AM   Result Value Ref Range    Sodium 147 (H) 136 - 145 mmol/L    Potassium 3.5 3.5 - 5.1 mmol/L    Chloride 119 (H) 97 - 108 mmol/L    CO2 23 21 - 32 mmol/L    Anion gap 5 5 - 15 mmol/L    Glucose 77 65 - 100 mg/dL    BUN 10 6 - 20 MG/DL    Creatinine 0.82 0.55 - 1.02 MG/DL    BUN/Creatinine ratio 12 12 - 20      GFR est AA >60 >60 ml/min/1.73m2    GFR est non-AA >60 >60 ml/min/1.73m2    Calcium 6.9 (L) 8.5 - 10.1 MG/DL   HEPATIC FUNCTION PANEL    Collection Time: 10/28/21  1:07 AM   Result Value Ref Range    Protein, total 5.7 (L) 6.4 - 8.2 g/dL    Albumin 2.4 (L) 3.5 - 5.0 g/dL    Globulin 3.3 2.0 - 4.0 g/dL    A-G Ratio 0.7 (L) 1.1 - 2.2      Bilirubin, total 0.3 0.2 - 1.0 MG/DL    Bilirubin, direct 0.1 0.0 - 0.2 MG/DL    Alk.  phosphatase 81 45 - 117 U/L    AST (SGOT) 7 (L) 15 - 37 U/L    ALT (SGPT) 11 (L) 12 - 78 U/L   MAGNESIUM    Collection Time: 10/28/21  1:07 AM   Result Value Ref Range    Magnesium 1.7 1.6 - 2.4 mg/dL   CBC W/O DIFF    Collection Time: 10/28/21  1:07 AM   Result Value Ref Range    WBC 11.2 (H) 3.6 - 11.0 K/uL    RBC 4.70 3.80 - 5.20 M/uL    HGB 13.9 11.5 - 16.0 g/dL    HCT 42.9 35.0 - 47.0 %    MCV 91.3 80.0 - 99.0 FL    MCH 29.6 26.0 - 34.0 PG    MCHC 32.4 30.0 - 36.5 g/dL    RDW 13.1 11.5 - 14.5 %    PLATELET 748 469 - 793 K/uL    MPV 11.0 8.9 - 12.9 FL    NRBC 0.0 0  WBC    ABSOLUTE NRBC 0.00 0.00 - 0.01 K/uL   HEMOGLOBIN A1C WITH EAG    Collection Time: 10/28/21  1:07 AM   Result Value Ref Range    Hemoglobin A1c 5.7 (H) 4.0 - 5.6 %    Est. average glucose 117 mg/dL   PHOSPHORUS    Collection Time: 10/28/21  1:07 AM   Result Value Ref Range    Phosphorus 3.2 2.6 - 4.7 MG/DL   LIPID PANEL    Collection Time: 10/28/21  1:07 AM   Result Value Ref Range    Cholesterol, total 200 (H) <200 MG/DL    Triglyceride 127 <150 MG/DL    HDL Cholesterol 42 MG/DL    LDL, calculated 132.6 (H) 0 - 100 MG/DL    VLDL, calculated 25.4 MG/DL    CHOL/HDL Ratio 4.8 0.0 - 5.0     ECHO ADULT COMPLETE    Collection Time: 10/28/21  8:15 AM   Result Value Ref Range    IVSd 1.15 (A) 0.60 - 0.90 cm    LVIDd 3.97 3.90 - 5.30 cm    LVIDs 2.93 cm    LVOT d 2.17 cm    LVPWd 1.13 (A) 0.60 - 0.90 cm    LVOT Peak Gradient 3.71 mmHg    LVOT Peak Velocity 96.27 cm/s    RVIDd 2.86 cm    Left Atrium Major Axis 2.88 cm    LA Volume 18.64 22.0 - 52.0 mL    LA Area 4C 8.34 cm2    LA Vol 2C 18.53 (A) 22.00 - 52.00 mL    LA Vol 4C 15.51 (A) 22.00 - 52.00 mL    LA Volume DISK BP 16.96 22.0 - 52.0 mL    AV Annulus 3.92 cm    Aortic Valve Area by Continuity of Peak Velocity 2.55 cm2    AoV PG 7.81 mmHg    Aortic Valve Systolic Peak Velocity 796.12 cm/s    MV A Rosalio 73.36 cm/s    Mitral Valve E Wave Deceleration Time 325.39 ms    MV E Rosalio 46.86 cm/s    E/E' ratio (averaged) 8.85     E/E' lateral 8.66     E/E' septal 9.03     LV E' Lateral Velocity 5.41 cm/s    LV E' Septal Velocity 5.19 cm/s    Pulmonic Regurgitant End Max Velocity 117.36 cm/s    Pulmonic Valve Systolic Peak Instantaneous Gradient 3.59 mmHg    Pulmonic Valve Max Velocity 94.69 cm/s    Tapse 2.02 (A) 1.50 - 2.00 cm    Triscuspid Valve Regurgitation Peak Gradient 22.66 mmHg    TR Max Velocity 237.34 cm/s    AO ASC D 3.79 cm    MV E/A 0.64     LV Mass .7 67.0 - 162.0 g    LV Mass AL Index 71.5 43.0 - 95.0 g/m2    Left Atrium Minor Axis 1.36 cm    LA Vol Index 8.79 16.00 - 28.00 ml/m2    LA Vol Index 8.74 16.00 - 28.00 ml/m2    LA Vol Index 7.32 16.00 - 28.00 ml/m2    WILMA/BSA Pk Rosalio 1.2 cm2/m2   NUCLEAR CARDIAC STRESS TEST    Collection Time: 10/28/21 11:57 AM   Result Value Ref Range    Target  bpm    Exercise duration time 00:03:00     Stress Base Systolic  mmHg    Stress Base Diastolic  mmHg    Post peak  BPM    Baseline HR 75 BPM    Estimated workload 1.0 METS    Baseline  mmHg    Percent HR 64 %    ST Elevation (mm) 0 mm ST Depression (mm) 0 mm    Stress Base Diastolic BP 95 mmHg    Stress Rate Pressure Product 17,745 BPM*mmHg         Assessment/Plan:     Principal Problem:    Malignant hypertensive urgency (10/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     Suaznne Hobson NP

## 2021-10-28 NOTE — PROGRESS NOTES
Spiritual Care Assessment/Progress Note  [address]      NAME: Maria Guadalupe Cam      MRN: [id number]  AGE: 55 y.o.  SEX: female  Catholic Affiliation: Denominational   Language: English     10/28/2021     Total Time (in minutes): 35     Spiritual Assessment begun in [facility]  MED SURG 2 through conversation with:         [x]Patient        [] Family    [] Friend(s)        Reason for Consult: Advance medical directive consult     Spiritual beliefs: (Please include comment if needed)     [x] Identifies with a joey tradition: Denominational        [] Supported by a joey community:            [] Claims no spiritual orientation:           [] Seeking spiritual identity:                [] Adheres to an individual form of spirituality:           [] Not able to assess:                           Identified resources for coping:      [] Prayer                               [] Music                  [] Guided Imagery     [] Family/friends                 [] Pet visits     [] Devotional reading                         [] Unknown     [x] Other: Denominational services                                              Interventions offered during this visit: (See comments for more details)    Patient Interventions: Advance medical directive consult, Catharsis/review of pertinent events in supportive environment           Plan of Care:     [] Support spiritual and/or cultural needs    [x] Support AMD and/or advance care planning process      [] Support grieving process   [] Coordinate Rites and/or Rituals    [] Coordination with community clergy   [] No spiritual needs identified at this time   [] Detailed Plan of Care below (See Comments)  [] Make referral to Music Therapy  [] Make referral to Pet Therapy     [] Make referral to Addiction services  [] Make referral to [facility]  [] Make referral to Spiritual Care Partner  [] No future visits requested        [] Follow up upon further referrals     Comments:  responded to a request from Case Management to assist Mrs. Brunson with an Advance Medical Directive on the Med. Surgical Unit. Mrs. Trey Ocampo was awake, alert, and lying in bed when the  came into the room. She made good eye contact and greeted the  warmly. She shared that Fr. Pan had just visited and provided communion which she greatly appreciated.  inquired about the Advance Medical Directive request and Mrs. Brunson shared she would like to have more information, but she had just been given medicine and inquired if  could stop by later. With her permission, Farida Alford left the AMD packet for her to review at a more opportune time. Mrs. Trey Ocampo will contact 's if she has questions or needs additional support. 's are available for further support upon referral  Payam Woodard. Millie Vinson.      Paging Service: [phone])

## 2021-10-28 NOTE — PROGRESS NOTES
CARDIOLOGY     Ben Simmons MD, [address], [place], [address] [facility]  Phone [phone]; Fax [phone]  Mobile [phone]   Voice Mail [phone]      Her Lexiscan Cardiolite images were normal with normal perfusion and I called her to inform her of this. We will be more than glad to see her as needed.     Kalyani Chambers MD

## 2021-10-28 NOTE — PROGRESS NOTES
Reason for Admission:  Malignant hypertensive urgency                   RUR Score:  8%                  Plan for utilizing home health:   Not anticipated     PCP: First and Last name:  Rui Jimenez MD   Name of Practice: [facility]   Are you a current patient: Yes/No: yes   Approximate date of last visit:    Can you participate in a virtual visit with your PCP:                     Current Advanced Directive/Advance Care Plan: Full Code; patient does not have an AMD.  She is interested in obtaining more information on advance care planning. Spiritual Care Services have been notified. Healthcare Decision Maker:   Maris Philippe, mother - [phone]                      Transition of Care Plan:                    CM met with patient to begin discharge planning. She lives with her mother in a ground floor apartment with no steps to enter. Patient is independent with self care, owns a blood pressure cuff and pulse oximeter. She does not use any assistive devices when ambulating . Patient has had home health in the past but is unable to recall the provider name. Patient states she and her mother do not drive and transportation is a challenge. She lives within walking distance to her pharmacy, PCP and grocery store. Patient relies on friends/neighbors or her sister who lives in [place] for transportation. Patient states she is on a tight budget, but has been able to afford her medications through [address] drug plan. CM provided patient information on [facility]. 1. CM to follow  2. Await further evaluation - GI, Cardiology consulted  3. Discharge to home when stable  4. Patient's friend/neighbor to transport at discharge    Care Management Interventions  PCP Verified by CM:  Yes  Transition of Care Consult (CM Consult): Discharge Planning  Support Systems: Parent(s), Other Family Member(s), Friend/Neighbor  Confirm Follow Up Transport: Friends  The Plan for Transition of Care is Related to the Following Treatment Goals :  Malignant hypertensive urgency  Discharge Location  Discharge Placement: Home with outpatient services     Sandra Nance LCSW

## 2021-10-28 NOTE — PROGRESS NOTES
[facility]: MED RECONCILIATION    Comments/Recommendations:   Patient alert and oriented for medication reconciliation and knowledgeable regarding medications. Patient did not take any medications yesterday prior to hospital arrival.  Confirmed preferred pharmacy as [facility] on [address] Medications added:     Triamcinolone cream     Medications removed: Atenolol  Buspirone  Keppra  Lisinopril  methocarbamol    Medications adjusted:    Protonix 40mg QHS adjusted from BID  Topiramate 25mg QHS adjusted from BID- prescribed BID but states she can only tolerate it QHS due to medication making her feel \"sick\"    Information obtained from: patient, Rx query    Allergies: Latex, natural rubber; Aspirin; Other medication; Codeine; Hydralazine; Imitrex [sumatriptan]; and Percocet [oxycodone-acetaminophen]    Prior to Admission Medications:     Medication Documentation Review Audit       Reviewed by Edie Hansen (Pharmacist) on 10/28/21 at 0856      Medication Sig Documenting Provider Last Dose Status Taking? ALPRAZolam (XANAX) 1 mg tablet Take 1 mg by mouth three (3) times daily. Provider, Historical 10/26/2021 Active Yes   DULoxetine (CYMBALTA) 60 mg capsule Take 120 mg by mouth nightly. Nirmal Villarreal MD 10/26/2021 Active Yes   famotidine (PEPCID) 20 mg tablet Take 1 Tab by mouth two (2) times a day. Gustavo Garcia MD 10/26/2021 Active Yes   ibuprofen (MOTRIN) 800 mg tablet Take 800 mg by mouth every eight (8) hours as needed for Pain. Provider, Historical  Active Yes   pantoprazole (PROTONIX) 40 mg tablet Take 40 mg by mouth nightly. Provider, Historical 10/26/2021 Active Yes   topiramate (TOPAMAX) 25 mg tablet Take 25 mg by mouth nightly. Nirmal Villarreal MD 10/26/2021 Active Yes   triamcinolone acetonide (KENALOG) 0.1 % topical cream Apply  to affected area two (2) times a day.  use thin layer  Apply to legs Provider, Historical 10/26/2021 Active Yes                    Thank you,   Edie Light, Kemi, BCPS   Contact: [phone]

## 2021-10-28 NOTE — PROGRESS NOTES
Bedside and Verbal shift change report given to SNOW Spears (oncoming nurse) by Brian Minaya RN (offgoing nurse). Report included the following information SBAR, Kardex, Intake/Output, MAR and Recent Results.

## 2021-10-28 NOTE — PROGRESS NOTES
Bedside shift change report given to Carissa Victoria. (oncoming nurse) by Yfn Cary (offgoing nurse). Report included the following information SBAR, Kardex, Intake/Output, MAR and Recent Results.

## 2021-10-28 NOTE — ACP (ADVANCE CARE PLANNING)
Advance Care Planning   Advance Care Planning Inpatient Note  [address] [facility] AdRoll Department    Today's Date: 10/28/2021  Unit: [facility]  MED SURG 2    Received request from Case Management. Upon review of chart and communication with care team, Spiritual Care will defer Advance Care planning with patient at this time. Patient was/were present in the room during visit. Goals of ACP Conversation:  Discuss Advance Care planning documents    Health Care Decision Makers:    No healthcare decision makers have been documented. Click here to complete [address] including selection of the Healthcare Decision Maker Relationship (ie \"Primary\")    Summary:  No Decision Maker named by patient at this time    Advance Care Planning Documents (Patient Wishes) on file:  None     Assessment:     responded to a request from Case Management to assist Mrs. Brunson with an Advance Medical Directive on the Med. Surgical Unit. Mrs. Chrissie Webber was awake, alert, and lying in bed when the  came into the room. She made good eye contact and greeted the  warmly. She shared that Fr. Pan had just visited and provided communion which she greatly appreciated.  inquired about the Advance Medical Directive request and Mrs. Brunson shared she would like to have more information, but she had just been given medicine and inquired if  could stop by later. With her permission, Melissa Bettencourt left the AMD packet for her to review at a more opportune time. Mrs. Chrissie Webber will contact 's if she has questions or needs additional support.  's are available for further support upon referral       Interventions:  Deferred conversation as patient not interested in completing an advance directive at this time    Care Preferences Communicated:  No    Outcomes/Plan:   left AMD packet on Mrs. Tess Almonte bedside table for further review    Chaplain Marion on 10/28/2021 at 11:48 AM

## 2021-10-28 NOTE — ED NOTES
Bedside and Verbal shift change report given to [address] [place] [address] (oncoming nurse) by SNOW Motta (offgoing nurse). Report included the following information SBAR, ED Summary, Intake/Output, MAR and Recent Results. Bedside and Verbal shift change report given to SNOW Reddy (oncoming nurse) by [address] [place] [address] (offgoing nurse). Report included the following information SBAR, ED Summary, Intake/Output, MAR and Recent Results. Resting at this time.

## 2021-10-28 NOTE — ED NOTES
TRANSFER - OUT REPORT:    Verbal report given to RN (name) on Arcenio Tristan  being transferred to Telemetry (unit) for routine progression of care       Report consisted of patients Situation, Background, Assessment and   Recommendations(SBAR). Information from the following report(s) SBAR, Kardex, ED Summary, Intake/Output, MAR, Recent Results and Cardiac Rhythm NSR was reviewed with the receiving nurse. Lines:   Peripheral IV 10/27/21 Left Antecubital (Active)   Site Assessment Clean, dry, & intact 10/27/21 1832   Phlebitis Assessment 0 10/27/21 1832   Infiltration Assessment 0 10/27/21 1832   Dressing Status Clean, dry, & intact 10/27/21 1832   Dressing Type Tape;Transparent 10/27/21 1832   Hub Color/Line Status Pink;Flushed;Patent 10/27/21 1832   Action Taken Blood drawn 10/27/21 1832        Opportunity for questions and clarification was provided.       Patient transported with:   Monitor  Patient-specific medications from Pharmacy  Registered Nurse

## 2021-10-28 NOTE — PROGRESS NOTES
10/28/2021   CM ADDENDUM:  Virtual reviewer communicated change to CM, which reflect outpatient observation order written prior to Written discharge order. Code 44 delivered and given to patient. CM met with the patient and provided to the patient the printed information about her outpatient observation status. The patient was given the flyer entitled, \"Medicare Outpatient Observation: Information & notification. \" All questions were answered, no additional discharge needs identified at this time and patient expects to return to their (home, assisted living facility, relatives home, etc.) after discharge today.   Copy provided to patient or sent secure email, secure fax , or certified mail to patient's loved one per their request.  Kassandra Petersen

## 2021-10-28 NOTE — PROGRESS NOTES
Daily Progress Note: 10/28/2021  Gm Merrill MD    Assessment/Plan:   54 yo hx of HTN, seizure d/o, migraines, cavernous hemangiomas, presented w/ chest pain, abd pain, melena, migraines, malignant HTN urgency     1) Malignant hypertensive urgency: has hx of HTN, but does not take meds. Initial SBP ~200s. Will start oral labetalol, norvasc. Use IV labetalol prn. Low threshold for nicardipine gt  - she was not on BP meds as an outpatient because she has repeatedly refused to take them, states they make her BP too low  - BP improved significantly     2) Chest/abd pain: atypical for ACS. Not hypoxic or tachy, so PE less likely. Concern for abdominal pathology given melena. Will monitor on Tele. Check Cardiac enzymes, echo, d-dimer. Use IV morphine prn, nitro prn. Patient refused ASA since she has a hx of intracranial bleed  - troponin not elevated, echo pending  - GI and Cards consulted  - abd CT unremarkable other than possible liver lesion, may need MRI     3) Melena: reported \"black stool. \"  Hgb stable. Will check Abd CT. Will start IV PPI. Consult GI for EGD  - Hgb down 2 grams but remains in normal range  - GI to see     4) Acute headache/migraines: restart Topamax after med rec. Use IV antiemetics, IV morphine prn.   Cannot have NSAIDS     5) Seizure d/o: restart Keppra if appropriate after med rec    6) Morbid obesity with BMI >40  -would benefit from weight loss     Problem List:  Problem List as of 10/28/2021 Date Reviewed: 10/27/2021        Codes Class Noted - Resolved    * (Principal) Malignant hypertensive urgency ICD-10-CM: I16.0  ICD-9-CM: 401.0  10/27/2021 - Present        CVA (cerebral vascular accident) [facility]) ICD-10-CM: I63.9  ICD-9-CM: 434.91  6/12/2020 - Present        Chest pain at rest ICD-10-CM: R07.9  ICD-9-CM: 786.50  3/8/2020 - Present        Rectal bleeding ICD-10-CM: K62.5  ICD-9-CM: 569.3  5/30/2019 - Present        CARLOS (obstructive sleep apnea) ICD-10-CM: I52.42  ICD-9-CM: 327.23  Unknown - Present    Overview Signed 5/30/2019  7:55 PM by Bayron Gray MD     no cpap             HTN (hypertension) ICD-10-CM: I10  ICD-9-CM: 401.9  Unknown - Present        Hx of seizure disorder ICD-10-CM: Z86.69  ICD-9-CM: V12.49  Unknown - Present        Fibromyalgia ICD-10-CM: M79.7  ICD-9-CM: 729.1  Unknown - Present    Overview Signed 5/30/2019  7:55 PM by Bayron Gray MD     fibromyalgia             Anxiety and depression ICD-10-CM: F41.9, F32. A  ICD-9-CM: 300.00, 311  Unknown - Present        Hx of migraines ICD-10-CM: Z86.69  ICD-9-CM: V12.49  Unknown - Present        Severe obesity ([address] ICD-10-CM: E66.01  ICD-9-CM: 278.01  4/29/2019 - Present        Hiatal hernia ICD-10-CM: K44.9  ICD-9-CM: 553.3  5/18/2018 - Present        Incomplete right bundle branch block (RBBB) determined by electrocardiography ICD-10-CM: I45.10  ICD-9-CM: 426.4  5/10/2018 - Present        Hiatal hernia with gastroesophageal reflux ICD-10-CM: K21.9, K44.9  ICD-9-CM: 530.81, 553.3  4/1/2018 - Present        RESOLVED: Obese ICD-10-CM: E66.9  ICD-9-CM: 278.00  Unknown - 3/8/2020        RESOLVED: Elevated lactic acid level ICD-10-CM: R79.89  ICD-9-CM: 276.2  5/30/2019 - 3/8/2020        RESOLVED: Dehydration ICD-10-CM: E86.0  ICD-9-CM: 276.51  5/30/2019 - 3/8/2020        RESOLVED: Anemia ICD-10-CM: D64.9  ICD-9-CM: 285.9  5/30/2019 - 3/8/2020        RESOLVED: Hemorrhoids ICD-10-CM: K64.9  ICD-9-CM: 455.6  Unknown - 3/8/2020        RESOLVED: Depressive disorder, not elsewhere classified ICD-10-CM: F32.9  ICD-9-CM: 915  10/1/2013 - 5/30/2019        RESOLVED: Leukocytosis, unspecified ICD-10-CM: L14.418  ICD-9-CM: 288.60  10/1/2013 - 5/30/2019        RESOLVED: Hypokalemia ICD-10-CM: E87.6  ICD-9-CM: 276.8  10/1/2013 - 3/8/2020        RESOLVED: Slurred speech ICD-10-CM: R47.81  ICD-9-CM: 784.59  9/30/2013 - 5/30/2019        RESOLVED: Seizure ([address] (Chronic) ICD-10-CM: R56.9  ICD-9-CM: 780.39  5/2/2011 - 2019        RESOLVED: Migraine (Chronic) ICD-10-CM: G43.909  ICD-9-CM: 346.90  2011 - 2019        RESOLVED: Cavernous hemangioma of intracranial structure ([facility] [address] (Chronic) ICD-10-CM: M59.79  ICD-9-CM: 228.02  2011 - 3/8/2020              Subjective: The patient is a 56 yo hx of HTN, seizure d/o, migraines, cavernous hemangiomas, presented w/ chest pain, abd pain, melena, migraines, malignant HTN urgency. The patient c/o intermittent LUQ and chest pain for several weeks. She described the pain as \"some one punching me in the chest.\"  The pain is intermittent, associated with dyspnea and some L arm numbness. She also c/o \"black stool\" during this time. Denied cough, fevers, chills, nausea, vomiting, diarrhea. The patient does not take BP meds. In the ED, SBP ~200s. Initial EKG and Trop non-ischemic. Head CT and CXR neg. (Dr. Parris Mcdaniel)    10/28: Continues to have left sided pain, under her rib cage, radiating up into her chest and into her left arm, associated with palpitations, N/V. Has not had any further dark stool. Review of Systems:   A comprehensive review of systems was negative except for that written in the HPI. Objective:   Physical Exam:     Visit Vitals  /85 (BP 1 Location: Right upper arm, BP Patient Position: At rest)   Pulse 78   Temp 98.2 °F (36.8 °C)   Resp 15   Ht 5' 3\" (1.6 m)   Wt 112.3 kg (247 lb 9.2 oz)   LMP 2011   SpO2 97%   BMI 43.86 kg/m²      O2 Device: None (Room air)    Temp (24hrs), Av.4 °F (36.9 °C), Min:98.2 °F (36.8 °C), Max:98.9 °F (37.2 °C)    10/27 1901 - 10/28 0700  In: 250 [I.V.:250]  Out: -    No intake/output data recorded. General:  Alert, cooperative, no distress, appears stated age. Head:  Normocephalic, without obvious abnormality, atraumatic. Eyes:  Conjunctivae/corneas clear. PERRL, EOMs intact. Nose: Nares normal. Septum midline. Mucosa normal. No drainage or sinus tenderness.    Throat: Lips, mucosa, and tongue moist..   Neck: Supple, symmetrical, trachea midline, no adenopathy, thyroid: no enlargement/tenderness/nodules, no carotid bruit and no JVD. Back:   Symmetric, no curvature. ROM normal. No CVA tenderness. Lungs:   Clear to auscultation bilaterally. Chest wall:  No tenderness or deformity. Heart:  Regular rate and rhythm, S1, S2 normal, no murmur, click, rub or gallop. Abdomen:   Soft, non-tender. Bowel sounds normal. No masses,  No organomegaly. Extremities: no cyanosis or edema. No calf tenderness or cords. Pulses: 2+ and symmetric all extremities. Skin: Skin color, texture, turgor normal. No rashes or lesions   Neurologic: CNII-XII intact. Alert and oriented X 3. Fine motor of hands and fingers normal.   equal.  No cogwheeling or rigidity. Gait not tested at this time. Sensation grossly normal to touch. Gross motor of extremities normal.       Data Review:       Recent Days:  Recent Labs     10/28/21  0107 10/27/21  1824   WBC 11.2* 9.2   HGB 13.9 15.7   HCT 42.9 48.3*    365     Recent Labs     10/28/21  0107 10/27/21  1824   * 139   K 3.5 3.9   * 108   CO2 23 25   GLU 77 87   BUN 10 11   CREA 0.82 1.08*   CA 6.9* 9.8   MG 1.7  --    PHOS 3.2  --    ALB 2.4* 3.9   TBILI 0.3 0.4   ALT 11* 15     No results for input(s): PH, PCO2, PO2, HCO3, FIO2 in the last 72 hours. 24 Hour Results:  Recent Results (from the past 24 hour(s))   SAMPLES BEING HELD    Collection Time: 10/27/21  6:24 PM   Result Value Ref Range    SAMPLES BEING HELD [email or web address]     COMMENT        Add-on orders for these samples will be processed based on acceptable specimen integrity and analyte stability, which may vary by analyte.    CBC WITH AUTOMATED DIFF    Collection Time: 10/27/21  6:24 PM   Result Value Ref Range    WBC 9.2 3.6 - 11.0 K/uL    RBC 5.36 (H) 3.80 - 5.20 M/uL    HGB 15.7 11.5 - 16.0 g/dL    HCT 48.3 (H) 35.0 - 47.0 %    MCV 90.1 80.0 - 99.0 FL    MCH 29.3 26.0 - 34.0 PG MCHC 32.5 30.0 - 36.5 g/dL    RDW 13.0 11.5 - 14.5 %    PLATELET 633 647 - 509 K/uL    MPV 10.6 8.9 - 12.9 FL    NRBC 0.0 0  WBC    ABSOLUTE NRBC 0.00 0.00 - 0.01 K/uL    NEUTROPHILS 60 32 - 75 %    LYMPHOCYTES 30 12 - 49 %    MONOCYTES 7 5 - 13 %    EOSINOPHILS 2 0 - 7 %    BASOPHILS 1 0 - 1 %    IMMATURE GRANULOCYTES 0 0.0 - 0.5 %    ABS. NEUTROPHILS 5.5 1.8 - 8.0 K/UL    ABS. LYMPHOCYTES 2.8 0.8 - 3.5 K/UL    ABS. MONOCYTES 0.6 0.0 - 1.0 K/UL    ABS. EOSINOPHILS 0.1 0.0 - 0.4 K/UL    ABS. BASOPHILS 0.1 0.0 - 0.1 K/UL    ABS. IMM. GRANS. 0.0 0.00 - 0.04 K/UL    DF AUTOMATED     METABOLIC PANEL, COMPREHENSIVE    Collection Time: 10/27/21  6:24 PM   Result Value Ref Range    Sodium 139 136 - 145 mmol/L    Potassium 3.9 3.5 - 5.1 mmol/L    Chloride 108 97 - 108 mmol/L    CO2 25 21 - 32 mmol/L    Anion gap 6 5 - 15 mmol/L    Glucose 87 65 - 100 mg/dL    BUN 11 6 - 20 MG/DL    Creatinine 1.08 (H) 0.55 - 1.02 MG/DL    BUN/Creatinine ratio 10 (L) 12 - 20      GFR est AA >60 >60 ml/min/1.73m2    GFR est non-AA 53 (L) >60 ml/min/1.73m2    Calcium 9.8 8.5 - 10.1 MG/DL    Bilirubin, total 0.4 0.2 - 1.0 MG/DL    ALT (SGPT) 15 12 - 78 U/L    AST (SGOT) 11 (L) 15 - 37 U/L    Alk.  phosphatase 140 (H) 45 - 117 U/L    Protein, total 9.0 (H) 6.4 - 8.2 g/dL    Albumin 3.9 3.5 - 5.0 g/dL    Globulin 5.1 (H) 2.0 - 4.0 g/dL    A-G Ratio 0.8 (L) 1.1 - 2.2     TROPONIN-HIGH SENSITIVITY    Collection Time: 10/27/21  6:24 PM   Result Value Ref Range    Troponin-High Sensitivity 9 0 - 51 ng/L   D DIMER    Collection Time: 10/27/21  6:24 PM   Result Value Ref Range    D-dimer 1.00 (H) 0.00 - 0.65 mg/L FEU   TROPONIN-HIGH SENSITIVITY    Collection Time: 10/28/21  1:07 AM   Result Value Ref Range    Troponin-High Sensitivity 9 0 - 51 ng/L   NT-PRO BNP    Collection Time: 10/28/21  1:07 AM   Result Value Ref Range    NT pro-BNP 27 <701 PG/ML   METABOLIC PANEL, BASIC    Collection Time: 10/28/21  1:07 AM   Result Value Ref Range Sodium 147 (H) 136 - 145 mmol/L    Potassium 3.5 3.5 - 5.1 mmol/L    Chloride 119 (H) 97 - 108 mmol/L    CO2 23 21 - 32 mmol/L    Anion gap 5 5 - 15 mmol/L    Glucose 77 65 - 100 mg/dL    BUN 10 6 - 20 MG/DL    Creatinine 0.82 0.55 - 1.02 MG/DL    BUN/Creatinine ratio 12 12 - 20      GFR est AA >60 >60 ml/min/1.73m2    GFR est non-AA >60 >60 ml/min/1.73m2    Calcium 6.9 (L) 8.5 - 10.1 MG/DL   HEPATIC FUNCTION PANEL    Collection Time: 10/28/21  1:07 AM   Result Value Ref Range    Protein, total 5.7 (L) 6.4 - 8.2 g/dL    Albumin 2.4 (L) 3.5 - 5.0 g/dL    Globulin 3.3 2.0 - 4.0 g/dL    A-G Ratio 0.7 (L) 1.1 - 2.2      Bilirubin, total 0.3 0.2 - 1.0 MG/DL    Bilirubin, direct 0.1 0.0 - 0.2 MG/DL    Alk.  phosphatase 81 45 - 117 U/L    AST (SGOT) 7 (L) 15 - 37 U/L    ALT (SGPT) 11 (L) 12 - 78 U/L   MAGNESIUM    Collection Time: 10/28/21  1:07 AM   Result Value Ref Range    Magnesium 1.7 1.6 - 2.4 mg/dL   CBC W/O DIFF    Collection Time: 10/28/21  1:07 AM   Result Value Ref Range    WBC 11.2 (H) 3.6 - 11.0 K/uL    RBC 4.70 3.80 - 5.20 M/uL    HGB 13.9 11.5 - 16.0 g/dL    HCT 42.9 35.0 - 47.0 %    MCV 91.3 80.0 - 99.0 FL    MCH 29.6 26.0 - 34.0 PG    MCHC 32.4 30.0 - 36.5 g/dL    RDW 13.1 11.5 - 14.5 %    PLATELET 598 788 - 965 K/uL    MPV 11.0 8.9 - 12.9 FL    NRBC 0.0 0  WBC    ABSOLUTE NRBC 0.00 0.00 - 0.01 K/uL   PHOSPHORUS    Collection Time: 10/28/21  1:07 AM   Result Value Ref Range    Phosphorus 3.2 2.6 - 4.7 MG/DL       Medications reviewed  Current Facility-Administered Medications   Medication Dose Route Frequency    nitroglycerin (NITROSTAT) tablet 0.4 mg  0.4 mg SubLINGual Q5MIN PRN    labetaloL (NORMODYNE;TRANDATE) 20 mg/4 mL (5 mg/mL) injection 20 mg  20 mg IntraVENous Q4H PRN    prochlorperazine (COMPAZINE) injection 10 mg  10 mg IntraVENous Q6H PRN    morphine injection 2 mg  2 mg IntraVENous Q4H PRN    labetaloL (NORMODYNE) tablet 200 mg  200 mg Oral Q12H    pantoprazole (PROTONIX) 40 mg in 0.9% sodium chloride 10 mL injection  40 mg IntraVENous Q12H    ondansetron (ZOFRAN) injection 4 mg  4 mg IntraVENous Q6H PRN    amLODIPine (NORVASC) tablet 10 mg  10 mg Oral DAILY    0.9% sodium chloride infusion  50 mL/hr IntraVENous CONTINUOUS    sodium chloride (NS) flush 5-40 mL  5-40 mL IntraVENous Q8H    sodium chloride (NS) flush 5-40 mL  5-40 mL IntraVENous PRN    0.9% sodium chloride infusion 25 mL  25 mL IntraVENous PRN    acetaminophen (TYLENOL) tablet 650 mg  650 mg Oral Q6H PRN    Or    acetaminophen (TYLENOL) suppository 650 mg  650 mg Rectal Q6H PRN    bisacodyL (DULCOLAX) suppository 10 mg  10 mg Rectal DAILY PRN       Care Plan discussed with: Patient/Family, Nurse and     Total time spent with patient: 30 minutes.     Tyesha Cisneros MD

## 2021-10-28 NOTE — PROGRESS NOTES
TRANSFER - IN REPORT:    Verbal report received from ED nurse (name) on Augustin Roth  being received from ED(unit) for routine progression of care      Report consisted of patients Situation, Background, Assessment and   Recommendations(SBAR). Information from the following report(s) SBAR, Kardex, Intake/Output, MAR and Recent Results was reviewed with the receiving nurse. Opportunity for questions and clarification was provided. Assessment completed upon patients arrival to unit and care assumed.

## 2021-10-28 NOTE — CONSULTS
Cinthia Hodgkins, NP-C  [phone] cell     Gastroenterology Consultation Note      Admit Date: 10/27/2021  Consult Date: 10/28/2021   I greatly appreciate your asking me to see Bessy Brown, thank you very much for the opportunity to participate in her care. Narrative Assessment and Plan   GI consultation in this 55year old female admitted with 1 month history of chest pain and LUQ pain that is accompanied by dark stools. Her pain was associated with dyspnea and left arm numbness but cardiac work up negative. She is a known patient of our practice and presented similarly in 3/2020 and we were consulted. The history is a bit difficult as she just says \"it never stopped\" when I ask when her symptoms started. She complains of BRBPR starting recently and says her bowel movements remain very difficult. She strains daily for BM and she has rectal pain. Stools are not hard or large. Her bowels move every other day to every 2 days. Her last EGD and colonoscopy were 6/2019 by Dr. Juany Cheng. Minor antral erythema on EGD and large hemorrhoids on colonoscopy. Had hemorrhoidectomy afterward. Has also had paraesophageal hernia repair which she says \"did nothing\". Rarely takes NSAIDs. Takes famotidine at least daily although ordered BID. Takes pantoprazole daily as well. Labs ok. CT abd/pelvis with contrast without source of GI bleeding but shows 12 mm exophytic lesion on liver which is not mentioned in CT in 2019. Radiologist recommended MRI with contrast for further evaluation. LFTs and platelets normal.    · Plan EGD and colonoscopy tomorrow, discussed with patient. May need surgical referral for hemorrhoids. Consider anal manometry as OP if symptoms persist, may need PT for pelvic floor. Ok to have clear liquids today with prep this evening then NPO past midnight for procedures tomorrow. · Continue PPI BID  · Add daily Miralax  · Check MRI with liver protocol before discharge  Christopher D. Corlis Hatchet, MD  [phone] office  [phone] voicemail   I have personally reviewed the history, interviewed the patient, and independently examined the patient. I have reviewed the chart and agree with the documentation recorded by the Advanced Practice Provider, including the assessment, treatment plan, and disposition; with the addition of: For melena, hematochezia, LUQ pain I have reviewed the indications, risks, benefits and alternatives to bidirectional endoscopy and she has asked we proceed. Verena Chicas MD      Subjective:     Chief Complaint: Noncardiac chest pain, LUQ pain, dark stools, rectal bleeding, constipation    History of Present Illness: GI consultation in this 55year old female admitted with 1 month history of chest pain and LUQ pain that is accompanied by dark stools. Her pain was associated with dyspnea and left arm numbness but cardiac work up negative. She is a known patient of our practice and presented similarly in 3/2020 and we were consulted. The history is a bit difficult as she just says \"it never stopped\" when I ask when her symptoms started. She complains of BRBPR starting recently and says her bowel movements remain very difficult. She strains daily for BM and she has rectal pain. Stools are not hard or large. Her bowels move every other day to every 2 days. She just feels like she can't push out the stool. Complains of dysphagia affecting upper esophagus since uvula removed 8 years ago for sleep apnea. Her last EGD and colonoscopy were 6/2019 by Dr. Dylan Pacheco. Minor antral erythema on EGD and large hemorrhoids on colonoscopy. Had hemorrhoidectomy afterward by Dr. Ole Otoole. Has also had paraesophageal hernia repair which she says \"did nothing\". Rarely takes NSAIDs. Takes famotidine at least daily although ordered BID. Takes pantoprazole daily as well. Family history unknown. Labs ok.  CT abd/pelvis with contrast without source of GI bleeding but shows 12 mm exophytic lesion on liver which is not mentioned in CT in 2019. Radiologist recommended MRI with contrast for further evaluation. Pertinent labs: WBC 11.2, hgb 13.9 (15.7 at admission), hct 42.9, MCV 91.3, platelets 729, sodium 147, potassium 3.5, BUN 10, creatinine 0.82. PCP:  Chemo Vela MD    Past Medical History:   Diagnosis Date    Anxiety and depression     Cavernous hemangioma of intracranial structure ([address] 5/2/2011    Fibromyalgia     fibromyalgia    Hemorrhoids     Hiatal hernia with gastroesophageal reflux 04/2018    Hx of migraines     Hx of seizure disorder     Hypertension     Incomplete right bundle branch block (RBBB) determined by electrocardiography 05/10/2018    Obese     CARLOS (obstructive sleep apnea)     no cpap        Past Surgical History:   Procedure Laterality Date    COLONOSCOPY N/A 6/3/2019    COLONOSCOPY performed by Vinicius Powell MD at [address] HX CHOLECYSTECTOMY      HX HEENT  2012    tonsillectomy    HX LITHOTRIPSY      HX ORTHOPAEDIC      disc fusion 2010, right knee cyst removed   [address] [place] [address], [facility]    Cavernous brain angioma(s) removed    SURGERY,BRAIN/SPINE,W/COMPUTER         Social History     Tobacco Use    Smoking status: Former Smoker    Smokeless tobacco: Never Used    Tobacco comment: on chantix,   Substance Use Topics    Alcohol use: No        Family History   Problem Relation Age of Onset    Cancer Mother         breast    Breast Cancer Mother 76    Cancer Father     Migraines Father     Diabetes Father     Breast Cancer Sister 48    Ovarian Cancer Maternal Aunt         Allergies   Allergen Reactions    Latex, Natural Rubber Rash    Aspirin Other (comments)     Excessive bleeding      Other Medication Other (comments)     Patient states intolerance to blood thinners due to angiomas    Codeine Palpitations    Hydralazine Other (comments)     Pt reports \"burning/tingling\" sensation in abd, flanks, and into head.     Imitrex [Sumatriptan] Palpitations and Other (comments)     thougt she was having an MI    Percocet [Oxycodone-Acetaminophen] Palpitations            Home Medications:  Prior to Admission Medications   Prescriptions Last Dose Informant Patient Reported? Taking? ALPRAZolam (XANAX) 1 mg tablet 10/26/2021 Self Yes Yes   Sig: Take 1 mg by mouth three (3) times daily. DULoxetine (CYMBALTA) 60 mg capsule 10/26/2021 Self Yes Yes   Sig: Take 120 mg by mouth nightly. famotidine (PEPCID) 20 mg tablet 10/26/2021 Self No Yes   Sig: Take 1 Tab by mouth two (2) times a day. ibuprofen (MOTRIN) 800 mg tablet  Self Yes Yes   Sig: Take 800 mg by mouth every eight (8) hours as needed for Pain.   pantoprazole (PROTONIX) 40 mg tablet 10/26/2021 Self Yes Yes   Sig: Take 40 mg by mouth nightly. topiramate (TOPAMAX) 25 mg tablet 10/26/2021 Self Yes Yes   Sig: Take 25 mg by mouth nightly. triamcinolone acetonide (KENALOG) 0.1 % topical cream 10/26/2021 Self Yes Yes   Sig: Apply  to affected area two (2) times a day.  use thin layer  Apply to legs      Facility-Administered Medications: None       Hospital Medications:  Current Facility-Administered Medications   Medication Dose Route Frequency    nitroglycerin (NITROSTAT) tablet 0.4 mg  0.4 mg SubLINGual Q5MIN PRN    labetaloL (NORMODYNE;TRANDATE) 20 mg/4 mL (5 mg/mL) injection 20 mg  20 mg IntraVENous Q4H PRN    prochlorperazine (COMPAZINE) injection 10 mg  10 mg IntraVENous Q6H PRN    morphine injection 2 mg  2 mg IntraVENous Q4H PRN    labetaloL (NORMODYNE) tablet 200 mg  200 mg Oral Q12H    pantoprazole (PROTONIX) 40 mg in 0.9% sodium chloride 10 mL injection  40 mg IntraVENous Q12H    ondansetron (ZOFRAN) injection 4 mg  4 mg IntraVENous Q6H PRN    amLODIPine (NORVASC) tablet 10 mg  10 mg Oral DAILY    0.9% sodium chloride infusion  50 mL/hr IntraVENous CONTINUOUS    sodium chloride (NS) flush 5-40 mL  5-40 mL IntraVENous Q8H    sodium chloride (NS) flush 5-40 mL  5-40 mL IntraVENous PRN    0.9% sodium chloride infusion 25 mL  25 mL IntraVENous PRN    acetaminophen (TYLENOL) tablet 650 mg  650 mg Oral Q6H PRN    Or    acetaminophen (TYLENOL) suppository 650 mg  650 mg Rectal Q6H PRN    bisacodyL (DULCOLAX) suppository 10 mg  10 mg Rectal DAILY PRN       Review of Systems: Per HPI, otherwise negative. Objective:     Physical Exam:  Visit Vitals  BP (!) 141/95   Pulse 72   Temp 98.6 °F (37 °C)   Resp 15   Ht 5' 3\" (1.6 m)   Wt 112.3 kg (247 lb 9.2 oz)   SpO2 96%   BMI 43.86 kg/m²     SpO2 Readings from Last 6 Encounters:   10/28/21 96%   06/15/20 93%   03/11/20 97%   07/01/19 99%   06/10/19 100%   06/07/19 99%            Intake/Output Summary (Last 24 hours) at 10/28/2021 1414  Last data filed at 10/28/2021 1222  Gross per 24 hour   Intake 833.33 ml   Output 0 ml   Net 833.33 ml      General: no distress, comfortable  Skin:  No rash or palpable dermatologic mass lesions  HEENT: Pupils equal, sclera anicteric, oropharynx with no gross lesions  Cardiovascular: No abnormal audible heart sounds, well perfused, no edema  Respiratory:  No abnormal audible breath sounds, normal respiratory effort, no throacic deformity  GI:   Abdomen nondistended, nontender, no mass, no free fluid, no rebound or guarding. Musculoskeletal:  No skeletal deformity nor acute arthritis noted. Neurological:  Motor and sensory function intact in upper extremeties  Psychiatric:  Irritable, memory intact, appears to have insight into current illness    Laboratory:    Recent Results (from the past 24 hour(s))   SAMPLES BEING HELD    Collection Time: 10/27/21  6:24 PM   Result Value Ref Range    SAMPLES BEING HELD [email or web address]     COMMENT        Add-on orders for these samples will be processed based on acceptable specimen integrity and analyte stability, which may vary by analyte.    CBC WITH AUTOMATED DIFF    Collection Time: 10/27/21  6:24 PM   Result Value Ref Range    WBC 9.2 3.6 - 11.0 K/uL RBC 5.36 (H) 3.80 - 5.20 M/uL    HGB 15.7 11.5 - 16.0 g/dL    HCT 48.3 (H) 35.0 - 47.0 %    MCV 90.1 80.0 - 99.0 FL    MCH 29.3 26.0 - 34.0 PG    MCHC 32.5 30.0 - 36.5 g/dL    RDW 13.0 11.5 - 14.5 %    PLATELET 981 816 - 158 K/uL    MPV 10.6 8.9 - 12.9 FL    NRBC 0.0 0  WBC    ABSOLUTE NRBC 0.00 0.00 - 0.01 K/uL    NEUTROPHILS 60 32 - 75 %    LYMPHOCYTES 30 12 - 49 %    MONOCYTES 7 5 - 13 %    EOSINOPHILS 2 0 - 7 %    BASOPHILS 1 0 - 1 %    IMMATURE GRANULOCYTES 0 0.0 - 0.5 %    ABS. NEUTROPHILS 5.5 1.8 - 8.0 K/UL    ABS. LYMPHOCYTES 2.8 0.8 - 3.5 K/UL    ABS. MONOCYTES 0.6 0.0 - 1.0 K/UL    ABS. EOSINOPHILS 0.1 0.0 - 0.4 K/UL    ABS. BASOPHILS 0.1 0.0 - 0.1 K/UL    ABS. IMM. GRANS. 0.0 0.00 - 0.04 K/UL    DF AUTOMATED     METABOLIC PANEL, COMPREHENSIVE    Collection Time: 10/27/21  6:24 PM   Result Value Ref Range    Sodium 139 136 - 145 mmol/L    Potassium 3.9 3.5 - 5.1 mmol/L    Chloride 108 97 - 108 mmol/L    CO2 25 21 - 32 mmol/L    Anion gap 6 5 - 15 mmol/L    Glucose 87 65 - 100 mg/dL    BUN 11 6 - 20 MG/DL    Creatinine 1.08 (H) 0.55 - 1.02 MG/DL    BUN/Creatinine ratio 10 (L) 12 - 20      GFR est AA >60 >60 ml/min/1.73m2    GFR est non-AA 53 (L) >60 ml/min/1.73m2    Calcium 9.8 8.5 - 10.1 MG/DL    Bilirubin, total 0.4 0.2 - 1.0 MG/DL    ALT (SGPT) 15 12 - 78 U/L    AST (SGOT) 11 (L) 15 - 37 U/L    Alk.  phosphatase 140 (H) 45 - 117 U/L    Protein, total 9.0 (H) 6.4 - 8.2 g/dL    Albumin 3.9 3.5 - 5.0 g/dL    Globulin 5.1 (H) 2.0 - 4.0 g/dL    A-G Ratio 0.8 (L) 1.1 - 2.2     TROPONIN-HIGH SENSITIVITY    Collection Time: 10/27/21  6:24 PM   Result Value Ref Range    Troponin-High Sensitivity 9 0 - 51 ng/L   D DIMER    Collection Time: 10/27/21  6:24 PM   Result Value Ref Range    D-dimer 1.00 (H) 0.00 - 0.65 mg/L FEU   TROPONIN-HIGH SENSITIVITY    Collection Time: 10/28/21  1:07 AM   Result Value Ref Range    Troponin-High Sensitivity 9 0 - 51 ng/L   NT-PRO BNP    Collection Time: 10/28/21  1:07 AM Result Value Ref Range    NT pro-BNP 27 <451 PG/ML   METABOLIC PANEL, BASIC    Collection Time: 10/28/21  1:07 AM   Result Value Ref Range    Sodium 147 (H) 136 - 145 mmol/L    Potassium 3.5 3.5 - 5.1 mmol/L    Chloride 119 (H) 97 - 108 mmol/L    CO2 23 21 - 32 mmol/L    Anion gap 5 5 - 15 mmol/L    Glucose 77 65 - 100 mg/dL    BUN 10 6 - 20 MG/DL    Creatinine 0.82 0.55 - 1.02 MG/DL    BUN/Creatinine ratio 12 12 - 20      GFR est AA >60 >60 ml/min/1.73m2    GFR est non-AA >60 >60 ml/min/1.73m2    Calcium 6.9 (L) 8.5 - 10.1 MG/DL   HEPATIC FUNCTION PANEL    Collection Time: 10/28/21  1:07 AM   Result Value Ref Range    Protein, total 5.7 (L) 6.4 - 8.2 g/dL    Albumin 2.4 (L) 3.5 - 5.0 g/dL    Globulin 3.3 2.0 - 4.0 g/dL    A-G Ratio 0.7 (L) 1.1 - 2.2      Bilirubin, total 0.3 0.2 - 1.0 MG/DL    Bilirubin, direct 0.1 0.0 - 0.2 MG/DL    Alk.  phosphatase 81 45 - 117 U/L    AST (SGOT) 7 (L) 15 - 37 U/L    ALT (SGPT) 11 (L) 12 - 78 U/L   MAGNESIUM    Collection Time: 10/28/21  1:07 AM   Result Value Ref Range    Magnesium 1.7 1.6 - 2.4 mg/dL   CBC W/O DIFF    Collection Time: 10/28/21  1:07 AM   Result Value Ref Range    WBC 11.2 (H) 3.6 - 11.0 K/uL    RBC 4.70 3.80 - 5.20 M/uL    HGB 13.9 11.5 - 16.0 g/dL    HCT 42.9 35.0 - 47.0 %    MCV 91.3 80.0 - 99.0 FL    MCH 29.6 26.0 - 34.0 PG    MCHC 32.4 30.0 - 36.5 g/dL    RDW 13.1 11.5 - 14.5 %    PLATELET 406 061 - 152 K/uL    MPV 11.0 8.9 - 12.9 FL    NRBC 0.0 0  WBC    ABSOLUTE NRBC 0.00 0.00 - 0.01 K/uL   HEMOGLOBIN A1C WITH EAG    Collection Time: 10/28/21  1:07 AM   Result Value Ref Range    Hemoglobin A1c 5.7 (H) 4.0 - 5.6 %    Est. average glucose 117 mg/dL   PHOSPHORUS    Collection Time: 10/28/21  1:07 AM   Result Value Ref Range    Phosphorus 3.2 2.6 - 4.7 MG/DL   LIPID PANEL    Collection Time: 10/28/21  1:07 AM   Result Value Ref Range    Cholesterol, total 200 (H) <200 MG/DL    Triglyceride 127 <150 MG/DL    HDL Cholesterol 42 MG/DL    LDL, calculated 132.6 (H) 0 - 100 MG/DL    VLDL, calculated 25.4 MG/DL    CHOL/HDL Ratio 4.8 0.0 - 5.0     ECHO ADULT COMPLETE    Collection Time: 10/28/21  8:15 AM   Result Value Ref Range    IVSd 1.15 (A) 0.60 - 0.90 cm    LVIDd 3.97 3.90 - 5.30 cm    LVIDs 2.93 cm    LVOT d 2.17 cm    LVPWd 1.13 (A) 0.60 - 0.90 cm    LVOT Peak Gradient 3.71 mmHg    LVOT Peak Velocity 96.27 cm/s    RVIDd 2.86 cm    Left Atrium Major Axis 2.88 cm    LA Volume 18.64 22.0 - 52.0 mL    LA Area 4C 8.34 cm2    LA Vol 2C 18.53 (A) 22.00 - 52.00 mL    LA Vol 4C 15.51 (A) 22.00 - 52.00 mL    LA Volume DISK BP 16.96 22.0 - 52.0 mL    AV Annulus 3.92 cm    Aortic Valve Area by Continuity of Peak Velocity 2.55 cm2    AoV PG 7.81 mmHg    Aortic Valve Systolic Peak Velocity 487.30 cm/s    MV A Rosalio 73.36 cm/s    Mitral Valve E Wave Deceleration Time 325.39 ms    MV E Rosalio 46.86 cm/s    E/E' ratio (averaged) 8.85     E/E' lateral 8.66     E/E' septal 9.03     LV E' Lateral Velocity 5.41 cm/s    LV E' Septal Velocity 5.19 cm/s    Pulmonic Regurgitant End Max Velocity 117.36 cm/s    Pulmonic Valve Systolic Peak Instantaneous Gradient 3.59 mmHg    Pulmonic Valve Max Velocity 94.69 cm/s    Tapse 2.02 (A) 1.50 - 2.00 cm    Triscuspid Valve Regurgitation Peak Gradient 22.66 mmHg    TR Max Velocity 237.34 cm/s    AO ASC D 3.79 cm    MV E/A 0.64     LV Mass .7 67.0 - 162.0 g    LV Mass AL Index 71.5 43.0 - 95.0 g/m2    Left Atrium Minor Axis 1.36 cm    LA Vol Index 8.79 16.00 - 28.00 ml/m2    LA Vol Index 8.74 16.00 - 28.00 ml/m2    LA Vol Index 7.32 16.00 - 28.00 ml/m2    WILMA/BSA Pk Rosalio 1.2 cm2/m2   NUCLEAR CARDIAC STRESS TEST    Collection Time: 10/28/21 11:57 AM   Result Value Ref Range    Target  bpm    Exercise duration time 00:03:00     Stress Base Systolic  mmHg    Stress Base Diastolic  mmHg    Post peak  BPM    Baseline HR 75 BPM    Estimated workload 1.0 METS    Baseline  mmHg    Percent HR 64 %    ST Elevation (mm) 0 mm ST Depression (mm) 0 mm    Stress Base Diastolic BP 95 mmHg    Stress Rate Pressure Product 17,745 BPM*mmHg         Assessment/Plan:     Principal Problem:    Malignant hypertensive urgency (10/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     Josie Whitmore, NP

## 2021-10-28 NOTE — CONSULTS
CARDIOLOGY CONSULTATION NOTE    Ben Shaw MD,  [address], [place], [address] [facility]  Phone [phone]; Fax [phone]  Mobile [phone]   Voice Mail [phone]                               10/27/2021  5:59 PM  Primary Care Annell Closs, MD  :  1965   MRN:  [id number]     CC: Chest discomfort with radiation of discomfort down her left arm as well as black stools    Reason for consult:  Chest discomfort      Admission Diagnosis: Malignant hypertensive urgency [I16.0]         ATTENTION:   This medical record was transcribed using an electronic medical records/speech recognition system. Although proofread, it may and can contain electronic, spelling and other errors. Corrections may be executed at a later time. Please feel free to contact us for any clarifications as needed. Impression Plan/Recommendation   1. Chest discomfort negative troponins  2. Malignant hypertension but untreated blood pressure patient refuses take medicines  3. CARLOS  4. Obesity  5. Cavernous hemangiomas  6. Melena  7. Seizure disorder on Keppra  8. Dyslipidemia TC is 200, HDL 42, LDL is 132.6,       Refuses aspirin secondary to history of intracranial bleeding      H&H is 13.9/42.9, potassium 3.5, BUN 10 creatinine 1.82, proBNP is 27, hemoglobin A1c 5.7, CG normal sinus rhythm left axis deviation 1.  chest discomfort the degree that she is experiencing I would have expected an elevation in her troponin. 2. I think we should go forward with a Tanya Still as this was to be done in the past but she was unable to afford it. 3. Her LDL is not at goal and I went over this with her. Should be under 100 and is currently 132. I think I would start her on intermediate dose statin. 4. Consider calcium scoring  5.  At this time she is refusing aspirin secondary to history of intracranial bleed from her cavernous hemangiomas         Kenyetta Mast is a 55 y.o. female I am seeing for chest discomfort. She has a history of hypertension fibromyalgia and states for the last 2 months she has been experiencing chest discomfort with exertional dyspnea. She says she was told to see a cardiologist but was unable to afford it. Describes the pain as pressure-like sensation and associated shortness of breath particularly with exertion. She says she has not seen a cardiologist in the past.  He also describes black stools. Has not been taking her blood pressure medicines. In the emergency room her systolic was in the 634 range. Her troponins were negative as was her EKG. Allergies   Allergen Reactions    Latex, Natural Rubber Rash    Aspirin Other (comments)     Excessive bleeding      Other Medication Other (comments)     Patient states intolerance to blood thinners due to angiomas    Codeine Palpitations    Hydralazine Other (comments)     Pt reports \"burning/tingling\" sensation in abd, flanks, and into head.     Imitrex [Sumatriptan] Palpitations and Other (comments)     thougt she was having an MI    Percocet [Oxycodone-Acetaminophen] Palpitations         Past Medical History:   Diagnosis Date    Anxiety and depression     Cavernous hemangioma of intracranial structure ([address] 5/2/2011    Fibromyalgia     fibromyalgia    Hemorrhoids     Hiatal hernia with gastroesophageal reflux 04/2018    Hx of migraines     Hx of seizure disorder     Hypertension     Incomplete right bundle branch block (RBBB) determined by electrocardiography 05/10/2018    Obese     CARLOS (obstructive sleep apnea)     no cpap        Past Surgical History:   Procedure Laterality Date    COLONOSCOPY N/A 6/3/2019    COLONOSCOPY performed by Almas Butler MD at [address] [facility] HX CHOLECYSTECTOMY      HX HEENT  2012    tonsillectomy    HX LITHOTRIPSY      HX ORTHOPAEDIC      disc fusion 2010, right knee cyst removed   [address] [place] [address], 2006    Cavernous brain angioma(s) removed    SURGERY,BRAIN/SPINE,W/COMPUTER          . Home Medications:  Prior to Admission Medications   Prescriptions Last Dose Informant Patient Reported? Taking? ALPRAZolam (XANAX) 1 mg tablet 10/26/2021 Self Yes Yes   Sig: Take 1 mg by mouth three (3) times daily. DULoxetine (CYMBALTA) 60 mg capsule 10/26/2021 Self Yes Yes   Sig: Take 120 mg by mouth nightly. famotidine (PEPCID) 20 mg tablet 10/26/2021 Self No Yes   Sig: Take 1 Tab by mouth two (2) times a day. ibuprofen (MOTRIN) 800 mg tablet  Self Yes Yes   Sig: Take 800 mg by mouth every eight (8) hours as needed for Pain.   pantoprazole (PROTONIX) 40 mg tablet 10/26/2021 Self Yes Yes   Sig: Take 40 mg by mouth nightly. topiramate (TOPAMAX) 25 mg tablet 10/26/2021 Self Yes Yes   Sig: Take 25 mg by mouth nightly. triamcinolone acetonide (KENALOG) 0.1 % topical cream 10/26/2021 Self Yes Yes   Sig: Apply  to affected area two (2) times a day.  use thin layer  Apply to legs      Facility-Administered Medications: None       Hospital Medications:  Current Facility-Administered Medications   Medication Dose Route Frequency    nitroglycerin (NITROSTAT) tablet 0.4 mg  0.4 mg SubLINGual Q5MIN PRN    labetaloL (NORMODYNE;TRANDATE) 20 mg/4 mL (5 mg/mL) injection 20 mg  20 mg IntraVENous Q4H PRN    prochlorperazine (COMPAZINE) injection 10 mg  10 mg IntraVENous Q6H PRN    morphine injection 2 mg  2 mg IntraVENous Q4H PRN    labetaloL (NORMODYNE) tablet 200 mg  200 mg Oral Q12H    pantoprazole (PROTONIX) 40 mg in 0.9% sodium chloride 10 mL injection  40 mg IntraVENous Q12H    ondansetron (ZOFRAN) injection 4 mg  4 mg IntraVENous Q6H PRN    amLODIPine (NORVASC) tablet 10 mg  10 mg Oral DAILY    0.9% sodium chloride infusion  50 mL/hr IntraVENous CONTINUOUS    sodium chloride (NS) flush 5-40 mL  5-40 mL IntraVENous Q8H    sodium chloride (NS) flush 5-40 mL  5-40 mL IntraVENous PRN    0.9% sodium chloride infusion 25 mL  25 mL IntraVENous PRN    acetaminophen (TYLENOL) tablet 650 mg  650 mg Oral Q6H PRN    Or    acetaminophen (TYLENOL) suppository 650 mg  650 mg Rectal Q6H PRN    bisacodyL (DULCOLAX) suppository 10 mg  10 mg Rectal DAILY PRN          OBJECTIVE       Laboratory and Imaging have been reviewed and are notable for        Diagnostic Tests:     No results for input(s): CPK, CKMB, TROIQ in the last 72 hours. No lab exists for component: CKQMB, CPKMB  Recent Labs     10/28/21  0107 10/27/21  1824   * 139   K 3.5 3.9   CO2 23 25   BUN 10 11   CREA 0.82 1.08*   GLU 77 87   PHOS 3.2  --    MG 1.7  --    WBC 11.2* 9.2   HGB 13.9 15.7   HCT 42.9 48.3*    365         Cardiac work up to date:  No specialty comments available. Social History:  Social History     Tobacco Use    Smoking status: Former Smoker    Smokeless tobacco: Never Used    Tobacco comment: on chantix,   Substance Use Topics    Alcohol use: No       Family History:  Family History   Problem Relation Age of Onset    Cancer Mother         breast    Breast Cancer Mother 76    Cancer Father     Migraines Father     Diabetes Father     Breast Cancer Sister 48    Ovarian Cancer Maternal Aunt        Review of Symptoms:  A comprehensive review of systems was negative except for that written in the HPI. Physical Exam:      Visit Vitals  /85   Pulse 74   Temp 98.2 °F (36.8 °C)   Resp 15   Ht 5' 3\" (1.6 m)   Wt 247 lb 9.2 oz (112.3 kg)   LMP 05/04/2011   SpO2 97%   BMI 43.86 kg/m²     General Appearance:  Well developed, well nourished,alert and oriented x 3, and individual in no acute distress.    Ears/Nose/Mouth/Throat:   Hearing grossly normal.Normal oral mucosa,no scleral icterus     Neck: Supple no JVD or bruits,no cervical lymphadenopathy   Chest:   Lungs clear to auscultation bilaterally,no rales rhonchi or wheezing   Cardiovascular:  Regular rate and rhythm 2/6 systolic murmur left sternal border   Abdomen:   Soft, non-tender, bowel sounds are active. No abdominal bruits   Extremities: No edema bilaterally. Pulses detected, no varicosities   Skin: Warm and dry. No bruising  Neuro  Moves all extermities and neurologically intact                                                       I have discussed the diagnosis with the patient and the intended plan as seen in the above orders. Questions were answered concerning future plans. I have discussed medication side effects and warnings with the patient as well. Simon Gutierrez is in agreement to the plan listed above and wishes to proceed. she  was instructed not to smoke, eat heart healthy diet  and to exercise. Thank you for the consult and the opportunity to be involved in the care of Simon Gutierrez.         Alan Olivares MD

## 2021-10-29 ENCOUNTER — ANESTHESIA EVENT (OUTPATIENT)
Dept: ENDOSCOPY | Age: 56
End: 2021-10-29
Payer: MEDICARE

## 2021-10-29 ENCOUNTER — APPOINTMENT (OUTPATIENT)
Dept: MRI IMAGING | Age: 56
End: 2021-10-29
Attending: SPECIALIST
Payer: MEDICARE

## 2021-10-29 ENCOUNTER — ANESTHESIA (OUTPATIENT)
Dept: ENDOSCOPY | Age: 56
End: 2021-10-29
Payer: MEDICARE

## 2021-10-29 LAB
ANION GAP SERPL CALC-SCNC: 7 MMOL/L (ref 5–15)
ATRIAL RATE: 88 BPM
BUN SERPL-MCNC: 11 MG/DL (ref 6–20)
BUN/CREAT SERPL: 12 (ref 12–20)
CALCIUM SERPL-MCNC: 8.9 MG/DL (ref 8.5–10.1)
CALCULATED P AXIS, ECG09: 57 DEGREES
CALCULATED R AXIS, ECG10: -33 DEGREES
CALCULATED T AXIS, ECG11: 47 DEGREES
CHLORIDE SERPL-SCNC: 112 MMOL/L (ref 97–108)
CO2 SERPL-SCNC: 24 MMOL/L (ref 21–32)
COVID-19 RAPID TEST, COVR: NOT DETECTED
CREAT SERPL-MCNC: 0.9 MG/DL (ref 0.55–1.02)
DIAGNOSIS, 93000: NORMAL
ERYTHROCYTE [DISTWIDTH] IN BLOOD BY AUTOMATED COUNT: 13.1 % (ref 11.5–14.5)
GLUCOSE SERPL-MCNC: 103 MG/DL (ref 65–100)
HCT VFR BLD AUTO: 42.7 % (ref 35–47)
HGB BLD-MCNC: 13.5 G/DL (ref 11.5–16)
MCH RBC QN AUTO: 29.5 PG (ref 26–34)
MCHC RBC AUTO-ENTMCNC: 31.6 G/DL (ref 30–36.5)
MCV RBC AUTO: 93.2 FL (ref 80–99)
NRBC # BLD: 0 K/UL (ref 0–0.01)
NRBC BLD-RTO: 0 PER 100 WBC
P-R INTERVAL, ECG05: 170 MS
PLATELET # BLD AUTO: 313 K/UL (ref 150–400)
PMV BLD AUTO: 11 FL (ref 8.9–12.9)
POTASSIUM SERPL-SCNC: 3.9 MMOL/L (ref 3.5–5.1)
Q-T INTERVAL, ECG07: 382 MS
QRS DURATION, ECG06: 82 MS
QTC CALCULATION (BEZET), ECG08: 462 MS
RBC # BLD AUTO: 4.58 M/UL (ref 3.8–5.2)
SODIUM SERPL-SCNC: 143 MMOL/L (ref 136–145)
SOURCE, COVRS: NORMAL
STRESS BASELINE DIAS BP: 95 MMHG
STRESS BASELINE HR: 75 BPM
STRESS BASELINE SYS BP: 141 MMHG
STRESS ESTIMATED WORKLOAD: 1 METS
STRESS EXERCISE DUR MIN: NORMAL
STRESS PEAK DIAS BP: 123 MMHG
STRESS PEAK SYS BP: 169 MMHG
STRESS PERCENT HR ACHIEVED: 64 %
STRESS POST PEAK HR: 105 BPM
STRESS RATE PRESSURE PRODUCT: NORMAL BPM*MMHG
STRESS ST DEPRESSION: 0 MM
STRESS ST ELEVATION: 0 MM
STRESS TARGET HR: 165 BPM
VENTRICULAR RATE, ECG03: 88 BPM
WBC # BLD AUTO: 7 K/UL (ref 3.6–11)

## 2021-10-29 PROCEDURE — 87635 SARS-COV-2 COVID-19 AMP PRB: CPT

## 2021-10-29 PROCEDURE — C9113 INJ PANTOPRAZOLE SODIUM, VIA: HCPCS | Performed by: INTERNAL MEDICINE

## 2021-10-29 PROCEDURE — 99218 HC RM OBSERVATION: CPT

## 2021-10-29 PROCEDURE — 77030021593 HC FCPS BIOP ENDOSC BSC -A: Performed by: SPECIALIST

## 2021-10-29 PROCEDURE — 74011250637 HC RX REV CODE- 250/637: Performed by: INTERNAL MEDICINE

## 2021-10-29 PROCEDURE — 80048 BASIC METABOLIC PNL TOTAL CA: CPT

## 2021-10-29 PROCEDURE — 96376 TX/PRO/DX INJ SAME DRUG ADON: CPT

## 2021-10-29 PROCEDURE — 2709999900 HC NON-CHARGEABLE SUPPLY: Performed by: SPECIALIST

## 2021-10-29 PROCEDURE — 74011000250 HC RX REV CODE- 250: Performed by: NURSE ANESTHETIST, CERTIFIED REGISTERED

## 2021-10-29 PROCEDURE — 76040000019: Performed by: SPECIALIST

## 2021-10-29 PROCEDURE — 88305 TISSUE EXAM BY PATHOLOGIST: CPT

## 2021-10-29 PROCEDURE — 74011250636 HC RX REV CODE- 250/636: Performed by: RADIOLOGY

## 2021-10-29 PROCEDURE — A9575 INJ GADOTERATE MEGLUMI 0.1ML: HCPCS | Performed by: RADIOLOGY

## 2021-10-29 PROCEDURE — 74011250636 HC RX REV CODE- 250/636: Performed by: SPECIALIST

## 2021-10-29 PROCEDURE — 74011250636 HC RX REV CODE- 250/636: Performed by: INTERNAL MEDICINE

## 2021-10-29 PROCEDURE — 74011250636 HC RX REV CODE- 250/636: Performed by: NURSE ANESTHETIST, CERTIFIED REGISTERED

## 2021-10-29 PROCEDURE — 96375 TX/PRO/DX INJ NEW DRUG ADDON: CPT

## 2021-10-29 PROCEDURE — 74183 MRI ABD W/O CNTR FLWD CNTR: CPT

## 2021-10-29 PROCEDURE — 76060000031 HC ANESTHESIA FIRST 0.5 HR: Performed by: SPECIALIST

## 2021-10-29 PROCEDURE — 85027 COMPLETE CBC AUTOMATED: CPT

## 2021-10-29 PROCEDURE — 36415 COLL VENOUS BLD VENIPUNCTURE: CPT

## 2021-10-29 RX ORDER — LABETALOL HCL 20 MG/4 ML
20 SYRINGE (ML) INTRAVENOUS
Status: DISCONTINUED | OUTPATIENT
Start: 2021-10-29 | End: 2021-10-30 | Stop reason: HOSPADM

## 2021-10-29 RX ORDER — PHENYLEPHRINE HCL IN 0.9% NACL 0.4MG/10ML
SYRINGE (ML) INTRAVENOUS AS NEEDED
Status: DISCONTINUED | OUTPATIENT
Start: 2021-10-29 | End: 2021-10-29 | Stop reason: HOSPADM

## 2021-10-29 RX ORDER — GADOTERATE MEGLUMINE 376.9 MG/ML
20 INJECTION INTRAVENOUS
Status: COMPLETED | OUTPATIENT
Start: 2021-10-29 | End: 2021-10-29

## 2021-10-29 RX ORDER — MIDAZOLAM HYDROCHLORIDE 1 MG/ML
.25-5 INJECTION, SOLUTION INTRAMUSCULAR; INTRAVENOUS AS NEEDED
Status: DISCONTINUED | OUTPATIENT
Start: 2021-10-29 | End: 2021-10-29 | Stop reason: HOSPADM

## 2021-10-29 RX ORDER — TOPIRAMATE 25 MG/1
25 TABLET ORAL
Status: DISCONTINUED | OUTPATIENT
Start: 2021-10-29 | End: 2021-10-30 | Stop reason: HOSPADM

## 2021-10-29 RX ORDER — FLUMAZENIL 0.1 MG/ML
0.2 INJECTION INTRAVENOUS
Status: DISCONTINUED | OUTPATIENT
Start: 2021-10-29 | End: 2021-10-29 | Stop reason: HOSPADM

## 2021-10-29 RX ORDER — FENTANYL CITRATE 50 UG/ML
25 INJECTION, SOLUTION INTRAMUSCULAR; INTRAVENOUS AS NEEDED
Status: DISCONTINUED | OUTPATIENT
Start: 2021-10-29 | End: 2021-10-29 | Stop reason: HOSPADM

## 2021-10-29 RX ORDER — PROPOFOL 10 MG/ML
INJECTION, EMULSION INTRAVENOUS
Status: DISCONTINUED | OUTPATIENT
Start: 2021-10-29 | End: 2021-10-29 | Stop reason: HOSPADM

## 2021-10-29 RX ORDER — SODIUM CHLORIDE 9 MG/ML
INJECTION, SOLUTION INTRAVENOUS
Status: DISCONTINUED | OUTPATIENT
Start: 2021-10-29 | End: 2021-10-29 | Stop reason: HOSPADM

## 2021-10-29 RX ORDER — PROPOFOL 10 MG/ML
INJECTION, EMULSION INTRAVENOUS AS NEEDED
Status: DISCONTINUED | OUTPATIENT
Start: 2021-10-29 | End: 2021-10-29 | Stop reason: HOSPADM

## 2021-10-29 RX ORDER — LIDOCAINE HYDROCHLORIDE 20 MG/ML
INJECTION, SOLUTION EPIDURAL; INFILTRATION; INTRACAUDAL; PERINEURAL AS NEEDED
Status: DISCONTINUED | OUTPATIENT
Start: 2021-10-29 | End: 2021-10-29 | Stop reason: HOSPADM

## 2021-10-29 RX ORDER — DULOXETIN HYDROCHLORIDE 30 MG/1
120 CAPSULE, DELAYED RELEASE ORAL
Status: DISCONTINUED | OUTPATIENT
Start: 2021-10-29 | End: 2021-10-30 | Stop reason: HOSPADM

## 2021-10-29 RX ORDER — NALOXONE HYDROCHLORIDE 0.4 MG/ML
0.4 INJECTION, SOLUTION INTRAMUSCULAR; INTRAVENOUS; SUBCUTANEOUS
Status: DISCONTINUED | OUTPATIENT
Start: 2021-10-29 | End: 2021-10-29 | Stop reason: HOSPADM

## 2021-10-29 RX ORDER — DEXTROMETHORPHAN/PSEUDOEPHED 2.5-7.5/.8
1.2 DROPS ORAL
Status: DISCONTINUED | OUTPATIENT
Start: 2021-10-29 | End: 2021-10-29 | Stop reason: HOSPADM

## 2021-10-29 RX ORDER — GADOTERATE MEGLUMINE 376.9 MG/ML
20 INJECTION INTRAVENOUS
Status: DISCONTINUED | OUTPATIENT
Start: 2021-10-29 | End: 2021-10-29

## 2021-10-29 RX ORDER — SODIUM CHLORIDE 9 MG/ML
50 INJECTION, SOLUTION INTRAVENOUS CONTINUOUS
Status: DISPENSED | OUTPATIENT
Start: 2021-10-29 | End: 2021-10-29

## 2021-10-29 RX ADMIN — ALPRAZOLAM 1 MG: 0.5 TABLET ORAL at 16:26

## 2021-10-29 RX ADMIN — SODIUM CHLORIDE 50 ML/HR: 9 INJECTION, SOLUTION INTRAVENOUS at 16:10

## 2021-10-29 RX ADMIN — AMLODIPINE BESYLATE 10 MG: 5 TABLET ORAL at 08:47

## 2021-10-29 RX ADMIN — SODIUM CHLORIDE: 9 INJECTION, SOLUTION INTRAVENOUS at 14:30

## 2021-10-29 RX ADMIN — DULOXETINE HYDROCHLORIDE 120 MG: 30 CAPSULE, DELAYED RELEASE ORAL at 22:53

## 2021-10-29 RX ADMIN — Medication 100 MCG: at 15:12

## 2021-10-29 RX ADMIN — PANTOPRAZOLE SODIUM 40 MG: 40 INJECTION, POWDER, FOR SOLUTION INTRAVENOUS at 22:02

## 2021-10-29 RX ADMIN — ONDANSETRON 4 MG: 2 INJECTION INTRAMUSCULAR; INTRAVENOUS at 16:26

## 2021-10-29 RX ADMIN — ALPRAZOLAM 1 MG: 0.5 TABLET ORAL at 22:01

## 2021-10-29 RX ADMIN — LABETALOL HYDROCHLORIDE 200 MG: 200 TABLET, FILM COATED ORAL at 22:01

## 2021-10-29 RX ADMIN — LABETALOL HYDROCHLORIDE 20 MG: 5 INJECTION, SOLUTION INTRAVENOUS at 04:10

## 2021-10-29 RX ADMIN — Medication 160 MCG: at 15:24

## 2021-10-29 RX ADMIN — ONDANSETRON 4 MG: 2 INJECTION INTRAMUSCULAR; INTRAVENOUS at 08:46

## 2021-10-29 RX ADMIN — MORPHINE SULFATE 2 MG: 2 INJECTION, SOLUTION INTRAMUSCULAR; INTRAVENOUS at 22:53

## 2021-10-29 RX ADMIN — ONDANSETRON 4 MG: 2 INJECTION INTRAMUSCULAR; INTRAVENOUS at 22:53

## 2021-10-29 RX ADMIN — MORPHINE SULFATE 2 MG: 2 INJECTION, SOLUTION INTRAMUSCULAR; INTRAVENOUS at 08:46

## 2021-10-29 RX ADMIN — Medication 10 ML: at 22:02

## 2021-10-29 RX ADMIN — TOPIRAMATE 25 MG: 25 TABLET, FILM COATED ORAL at 22:01

## 2021-10-29 RX ADMIN — MORPHINE SULFATE 2 MG: 2 INJECTION, SOLUTION INTRAMUSCULAR; INTRAVENOUS at 04:19

## 2021-10-29 RX ADMIN — GADOTERATE MEGLUMINE 20 ML: 376.9 INJECTION, SOLUTION INTRAVENOUS at 18:29

## 2021-10-29 RX ADMIN — PANTOPRAZOLE SODIUM 40 MG: 40 INJECTION, POWDER, FOR SOLUTION INTRAVENOUS at 08:46

## 2021-10-29 RX ADMIN — MORPHINE SULFATE 2 MG: 2 INJECTION, SOLUTION INTRAMUSCULAR; INTRAVENOUS at 00:13

## 2021-10-29 RX ADMIN — Medication 100 MCG: at 15:19

## 2021-10-29 RX ADMIN — PROPOFOL 80 MG: 10 INJECTION, EMULSION INTRAVENOUS at 15:00

## 2021-10-29 RX ADMIN — LIDOCAINE HYDROCHLORIDE 40 MG: 20 INJECTION, SOLUTION INTRAVENOUS at 15:00

## 2021-10-29 RX ADMIN — LABETALOL HYDROCHLORIDE 20 MG: 5 INJECTION, SOLUTION INTRAVENOUS at 00:02

## 2021-10-29 RX ADMIN — PROCHLORPERAZINE EDISYLATE 10 MG: 5 INJECTION INTRAMUSCULAR; INTRAVENOUS at 12:08

## 2021-10-29 RX ADMIN — ALPRAZOLAM 1 MG: 0.5 TABLET ORAL at 08:47

## 2021-10-29 RX ADMIN — LABETALOL HYDROCHLORIDE 200 MG: 200 TABLET, FILM COATED ORAL at 08:47

## 2021-10-29 RX ADMIN — PROPOFOL INJECTABLE EMULSION 140 MCG/KG/MIN: 10 INJECTION, EMULSION INTRAVENOUS at 15:00

## 2021-10-29 RX ADMIN — MORPHINE SULFATE 2 MG: 2 INJECTION, SOLUTION INTRAMUSCULAR; INTRAVENOUS at 16:26

## 2021-10-29 RX ADMIN — Medication 10 ML: at 02:44

## 2021-10-29 NOTE — PROGRESS NOTES
TRANSFER - OUT REPORT:    Verbal report given to [address] [facility] (name) on Tony Raines  being transferred to [facility](unit) for routine post - op       Report consisted of patients Situation, Background, Assessment and   Recommendations(SBAR). Information from the following report(s) SBAR was reviewed with the receiving nurse. Lines:   Peripheral IV 10/27/21 Left Antecubital (Active)   Site Assessment Clean, dry, & intact 10/28/21 1342   Phlebitis Assessment 0 10/28/21 1342   Infiltration Assessment 0 10/28/21 1342   Dressing Status Clean, dry, & intact 10/28/21 1342   Dressing Type Tape;Transparent 10/28/21 1342   Hub Color/Line Status Pink; Infusing 10/28/21 1342   Action Taken Open ports on tubing capped 10/28/21 1342   Alcohol Cap Used Yes 10/28/21 1342       Peripheral IV 10/29/21 Left Hand (Active)   Site Assessment Clean, dry, & intact 10/29/21 1411   Phlebitis Assessment 0 10/29/21 1411   Dressing Status Clean, dry, & intact 10/29/21 1411   Dressing Type Transparent;Tape 10/29/21 1411   Hub Color/Line Status Pink; Infusing 10/29/21 1411   Action Taken Open ports on tubing capped 10/29/21 1411   Alcohol Cap Used Yes 10/29/21 1411        Opportunity for questions and clarification was provided.       Patient with stable VS.

## 2021-10-29 NOTE — PROGRESS NOTES
Bedside, Verbal and Written shift change report given to [address] [place] [address] (oncoming nurse) by Jr Rowe (offgoing nurse). Report included the following information SBAR, Kardex, ED Summary, Procedure Summary, Intake/Output, MAR, Recent Results and Med Rec Status.

## 2021-10-29 NOTE — ACP (ADVANCE CARE PLANNING)
Advance Care Planning   Advance Care Planning Inpatient Note  [facility] Department    Today's Date: 10/29/2021  Unit: [facility]  MED SURG 2    Received request from In basket. Upon review of chart and communication with care team, patient's decision making abilities are not in question. Patient was/were present in the room during visit. Goals of ACP Conversation:  Discuss Advance Care planning documents    Health Care Decision Makers:    No healthcare decision makers have been documented. Click here to complete [address] including selection of the Healthcare Decision Maker Relationship (ie \"Primary\")    Summary:  No Decision Maker named by patient at this time    Advance Care Planning Documents (Patient Wishes) on file:  None     Assessment:     responded to an in-basket request to assist Mrs. Brunson with an Advanced Medical Directive request. [address] [place] [address] had visited with Mrs. Kojo Alonso yesterday (10/28) and left the forms for her to review. Mrs. Kojo Alonso was lying in bed and had appeared to be resting when the  came into the room. Mrs. Kojo Alonso made good eye contact with the  and greeted the  warmly. She shared that she was not ready to complete the form yet as she did not feel up to reviewing or filling out the forms yesterday. Mrs. Kojo Alonso shared that her sister should be arriving to the hospital later today and they would discuss the forms then. She had not questions at this time. Mrs. Kojo Alonso is aware of the 's availability.  's are available for further support upon referral.       Interventions:  Deferred conversation as patient not interested in completing an advance directive at this time    Care Preferences Communicated:  No    Outcomes/Plan:  Mrs. Kojo Alonso is aware of the 's availability    Heather Epstein, [address] [place] [address] on 10/29/2021 at 10:25 AM

## 2021-10-29 NOTE — INTERVAL H&P NOTE
Pre-Endoscopy H&P Update  Chief complaint/HPI/ROS:  The indication for the procedure, the patient's history and the patient's current medications are reviewed prior to the procedure and that data is reported on the H&P to which this document is attached. Any significant complaints with regard to organ systems will be noted, and if not mentioned then a review of systems is not contributory.   Past Medical History:   Diagnosis Date    Anxiety and depression     Cavernous hemangioma of intracranial structure ([address] 5/2/2011    Fibromyalgia     fibromyalgia    Hemorrhoids     Hiatal hernia with gastroesophageal reflux 04/2018    Hx of migraines     Hx of seizure disorder     Hypertension     Incomplete right bundle branch block (RBBB) determined by electrocardiography 05/10/2018    Obese     CARLOS (obstructive sleep apnea)     no cpap      Past Surgical History:   Procedure Laterality Date    COLONOSCOPY N/A 6/3/2019    COLONOSCOPY performed by Liam Cummings MD at [address]      HX HEENT  2012    tonsillectomy    HX LITHOTRIPSY      HX ORTHOPAEDIC      disc fusion 2010, right knee cyst removed    NEUROLOGICAL PROCEDURE UNLISTED  1996, 2006    Cavernous brain angioma(s) removed    SURGERY,BRAIN/SPINE,W/COMPUTER       Social   Social History     Tobacco Use    Smoking status: Former Smoker    Smokeless tobacco: Never Used    Tobacco comment: on chantix,   Substance Use Topics    Alcohol use: No      Family History   Problem Relation Age of Onset    Cancer Mother         breast    Breast Cancer Mother 76    Cancer Father     Migraines Father     Diabetes Father     Breast Cancer Sister 48    Ovarian Cancer Maternal Aunt       Allergies   Allergen Reactions    Latex, Natural Rubber Rash    Aspirin Other (comments)     Excessive bleeding      Other Medication Other (comments)     Patient states intolerance to blood thinners due to angiomas    Codeine Palpitations    Hydralazine Other (comments)     Pt reports \"burning/tingling\" sensation in abd, flanks, and into head. Imitrex [Sumatriptan] Palpitations and Other (comments)     thougt she was having an MI    Percocet [Oxycodone-Acetaminophen] Palpitations      Prior to Admission Medications   Prescriptions Last Dose Informant Patient Reported? Taking? ALPRAZolam (XANAX) 1 mg tablet 10/26/2021 Self Yes Yes   Sig: Take 1 mg by mouth three (3) times daily. DULoxetine (CYMBALTA) 60 mg capsule 10/26/2021 Self Yes Yes   Sig: Take 120 mg by mouth nightly. famotidine (PEPCID) 20 mg tablet 10/26/2021 Self No Yes   Sig: Take 1 Tab by mouth two (2) times a day. ibuprofen (MOTRIN) 800 mg tablet  Self Yes Yes   Sig: Take 800 mg by mouth every eight (8) hours as needed for Pain.   pantoprazole (PROTONIX) 40 mg tablet 10/26/2021 Self Yes Yes   Sig: Take 40 mg by mouth nightly. topiramate (TOPAMAX) 25 mg tablet 10/26/2021 Self Yes Yes   Sig: Take 25 mg by mouth nightly. triamcinolone acetonide (KENALOG) 0.1 % topical cream 10/26/2021 Self Yes Yes   Sig: Apply  to affected area two (2) times a day. use thin layer  Apply to legs      Facility-Administered Medications: None       PHYSICAL EXAM:  The patient is examined immediately prior to the procedure. Visit Vitals  BP (!) 127/104   Pulse 83   Temp 98 °F (36.7 °C)   Resp 14   Ht 5' 3\" (1.6 m)   Wt 112.3 kg (247 lb 9.2 oz)   SpO2 94%   Breastfeeding No   BMI 43.86 kg/m²     Gen: Appears comfortable, no distress. Pulm: comfortable respirations with no abnormal audible breath sounds  HEART: well perfused, no abnormal audible heart sounds  GI: abdomen flat. PLAN:  Informed consent discussion held, patient afforded an opportunity to ask questions and all questions answered. After being advised of the risks, benefits, and alternatives, the patient requested that we proceed and indicated so on a written consent form. Will proceed with procedure as planned.   Jing Butler MD

## 2021-10-29 NOTE — PROGRESS NOTES
Problem: Falls - Risk of  Goal: *Absence of Falls  Description: Document Werner Mariscal Fall Risk and appropriate interventions in the flowsheet.   Outcome: Progressing Towards Goal  Note: Fall Risk Interventions:            Medication Interventions: Bed/chair exit alarm                   Problem: Patient Education: Go to Patient Education Activity  Goal: Patient/Family Education  Outcome: Progressing Towards Goal

## 2021-10-29 NOTE — PERIOP NOTES
Bandar Thacker  1965  [id number]    Situation:    Scheduled Procedure: Procedure(s):  ESOPHAGOGASTRODUODENOSCOPY (EGD)  COLONOSCOPY  Verbal report received from: Iris Quarles RN  Preoperative diagnosis: hematochezia/abdominal pain    Background:    Procedure: Procedure(s):  ESOPHAGOGASTRODUODENOSCOPY (EGD)  COLONOSCOPY  Physician performing procedure; Dr. Makenzie Turcios RN    NPO Status/Last PO Intake: midnight    Pregnancy Test:Not applicable If yes, result: none    Is the patient taking Blood Thinners: NO If yes, list:  and last taken   Is the patient diabetic:no           Does the patient have a Pacemaker/Defibrillator in place?: no   Does the patient need antibiotics before/during/after procedure: no   If the patient is having a colon, How much prep was drank? all   What were the Colon prep results? Liquid tan stools   Does the patient have SCD in place:no   Is patient on CONTACT precautions:no          Assessment:  Are the vital signs stable prior to patient coming to ENDO?  yes  Is the patient alert/oriented and able to sign consent for the procedures:yes    Does the patient have a patient IV in place?  yes     Recommendation:  Family or Friend present no     Permission to share finding with Family or Friend n/a

## 2021-10-29 NOTE — PROGRESS NOTES
Bedside and Verbal shift change report given to Lemus,RN (oncoming nurse) by Abner Parnell (offgoing nurse). Report included the following information SBAR.

## 2021-10-29 NOTE — PROGRESS NOTES

## 2021-10-29 NOTE — PROGRESS NOTES
10/29/2021   CARE MANAGEMENT NOTE:  CM reviewed EMR and handoff received from previous  (Akila Armstrong). Pt was admitted with malignant hypertensive urgency. Reportedly, pt resides with her mother Simona Nielsen ([phone]). RUR 8%    Transition Plan of Care:  1. Cardiology, and GI following for medical management - stress normal on 10/28; EGD/colonoscopy planned 10/29  2. Plan is for pt to return home with her mother  3. Outpt f/u  4. Mother will transport pt home    CM will continue to follow pt until discharged.   Albert

## 2021-10-29 NOTE — PROCEDURES
[facility] CHRISTY Norris MD  [phone]      2021    Esophagogastroduodenoscopy & Colonoscopy Procedure Note  Ana Luisa Velez  : 1965  [address] [place] [address] Medical Record Number: [id number]      Indications:    Chest pain Hematochezia/melena   Referring Physician:  Garo Chambers MD  Anesthesia/Sedation: Conscious Sedation/Moderate Sedation/MAC  Endoscopist:  Dr. Irina Horton  Complications:  None  Estimated Blood Loss:  None    Permit:  The indications, risks, benefits and alternatives were reviewed with the patient or their decision maker who was provided an opportunity to ask questions and all questions were answered. The specific risks of esophagogastroduodenoscopy with conscious sedation were reviewed, including but not limited to anesthetic complication, bleeding, adverse drug reaction, missed lesion, infection, IV site reactions, and intestinal perforation which would lead to the need for surgical repair. Alternatives to EGD and colonoscopy including radiographic imaging, observation without testing, or laboratory testing were reviewed as well as the limitations of those alternatives discussed. After considering the options and having all their questions answered, the patient or their decision maker provided both verbal and written consent to proceed. -----------EGD------------   Procedure in Detail:  After obtaining informed consent, positioning of the patient in the left lateral decubitus position, and conduction of a pre-procedure pause or \"time out\" the endoscope was introduced into the mouth and advanced to the duodenum. A careful inspection was made, and findings or interventions are described below. Findings:   Esophagus:normal aside from apparent previous esophageal surgery, seems C/W Nissen, and is intact without mucosal abnormality, inflammatory changes.   Cold forceps biopsies from mid esophagus taken for histology. Stomach: normal   Duodenum/jejunum: normal        ----------Colonoscopy-----------    Procedure in Detail:  After obtaining informed consent, positioning of the patient in the left lateral decubitus position, and conduction of a pre-procedure pause or \"time out\" the endoscope was introduced into the anus and advanced to the cecum, which was identified by the ileocecal valve and appendiceal orifice. The quality of the colonic preparation was good. A careful inspection was made as the colonoscope was withdrawn, findings and interventions are described below. Findings:   A few diverticula and small hemorrhoids but otherwise completely normal.  No blood in the colon. ------------------------------  Specimens:    See above    Complications:   None; patient tolerated the procedure well. Impressions:  EGD:  Previous endoscopic surgery and no findings that would suggest a cause for chest pain. Colonoscopy: Otherwise normal colonoscopy to the cecum      Recommendations:     - Await pathology.  -Will order MRI  -Regular diet  -If MRI unremarkable then would suggest discharge with advice she follow up with Dr. Desmond Greene as needed. Thank you for entrusting me with this patient's care. Please do not hesitate to contact me with any questions or if I can be of assistance with any of your other patients' GI needs. Signed By: Mary Gil MD                        October 29, 2021    Surgical assistant none. Implants none unless specified.

## 2021-10-29 NOTE — ANESTHESIA PREPROCEDURE EVALUATION
Relevant Problems   No relevant active problems   Anesthetic History          Comments: Awareness during colonoscopy     Review of Systems / Medical History  Patient summary reviewed and pertinent labs reviewed    Pulmonary        Sleep apnea: No treatment  Smoker (recently quit)         Neuro/Psych     seizures (last seizure 4 years ago): well controlled    Headaches and psychiatric history (anxiety/depression)     Cardiovascular    Hypertension: well controlled              Exercise tolerance: <4 METS     GI/Hepatic/Renal     GERD (no symptoms recently)    Renal disease: stones       Endo/Other          Pertinent negatives: Diabetes: borderline.    Other Findings              Physical Exam    Airway  Mallampati: II  TM Distance: 4 - 6 cm  Neck ROM: normal range of motion   Mouth opening: Normal     Cardiovascular    Rhythm: regular  Rate: normal         Dental         Pulmonary  Breath sounds clear to auscultation               Abdominal         Other Findings            Anesthetic Plan    ASA: 2  Anesthesia type: general          Induction: Intravenous  Anesthetic plan and risks discussed with: Patient              Anesthetic History   No history of anesthetic complications            Review of Systems / Medical History  Patient summary reviewed, nursing notes reviewed and pertinent labs reviewed    Pulmonary        Sleep apnea: No treatment        Comments: Quit smoking 3 years ago   Neuro/Psych     seizures (last seizure 5 years ago, questionable seizure 3 weeks ago)    Headaches and psychiatric history    Comments: Anxiety/depression Cardiovascular    Hypertension: well controlled          Hyperlipidemia    Exercise tolerance: <4 METS     GI/Hepatic/Renal     GERD      Hiatal hernia     Endo/Other      Hypothyroidism: well controlled  Morbid obesity    Comments: Fibromyalgia Other Findings   Comments: Cavernous hemangioma s/p resection 2011           Physical Exam    Airway  Mallampati: II  TM Distance: 4 - 6 cm  Neck ROM: normal range of motion   Mouth opening: Normal     Cardiovascular    Rhythm: regular  Rate: normal         Dental    Dentition: Poor dentition     Pulmonary  Breath sounds clear to auscultation               Abdominal         Other Findings            Anesthetic Plan    ASA: 3  Anesthesia type: MAC          Induction: Intravenous  Anesthetic plan and risks discussed with: Patient

## 2021-10-29 NOTE — ANESTHESIA POSTPROCEDURE EVALUATION
Procedure(s):  ESOPHAGOGASTRODUODENOSCOPY (EGD)  COLONOSCOPY  ESOPHAGOGASTRODUODENAL (EGD) BIOPSY. MAC    Anesthesia Post Evaluation      Multimodal analgesia: multimodal analgesia not used between 6 hours prior to anesthesia start to PACU discharge  Patient location during evaluation: PACU  Patient participation: complete - patient participated  Level of consciousness: awake and alert and sleepy but conscious  Pain score: 0  Pain management: adequate  Airway patency: patent  Anesthetic complications: no  Cardiovascular status: hemodynamically stable and acceptable  Respiratory status: acceptable  Hydration status: acceptable  Comments: Patient seen and evaluated; no concerns.   Post anesthesia nausea and vomiting:  none      INITIAL Post-op Vital signs:   Vitals Value Taken Time   /67 10/29/21 1540   Temp 36.8 °C (98.2 °F) 10/29/21 1535   Pulse 74 10/29/21 1540   Resp 15 10/29/21 1540   SpO2 95 % 10/29/21 1540

## 2021-10-29 NOTE — PROGRESS NOTES
responded to an in-basket request to assist Mrs. Brnuson with an [employer] Directive request. Lidia Rosario had visited with Mrs. Mahsa Elizabeth yesterday (10/28) and left the forms for her to review. Mrs. Mahsa Elizabeth was lying in bed and had appeared to be resting when the  came into the room. Mrs. Mahsa Elizabeth made good eye contact with the  and greeted the  warmly. She shared that she was not ready to complete the form yet as she did not feel up to reviewing or filling out the forms yesterday. Mrs. Mahsa Elizabeth shared that her sister should be arriving to the hospital later today and they would discuss the forms then. She had no questions at this time. Mrs. Mahsa Elizabeth is aware of the 's availability. 's are available for further support upon referral.  Payam Woodard. Michele Reynolds.      Paging Service: [phone])

## 2021-10-29 NOTE — PROGRESS NOTES
Daily Progress Note: 10/29/2021  Jovita Cotton MD    Assessment/Plan:   54 yo hx of HTN, seizure d/o, migraines, cavernous hemangiomas, presented w/ chest pain, abd pain, melena, migraines, malignant HTN urgency     1) Malignant hypertensive urgency: has hx of HTN, but does not take meds. Initial SBP ~200s. Will start oral labetalol, norvasc. Use IV labetalol prn. Low threshold for nicardipine gt  - she was not on BP meds as an outpatient because she has repeatedly refused to take them, states they make her BP too low  - BP better overall but remains elevated  - does not seem to be symptomatic with it, HA is better     2) Chest/abd pain: atypical for ACS. Not hypoxic or tachy, so PE less likely. Concern for abdominal pathology given melena. Will monitor on Tele. Check Cardiac enzymes, echo, d-dimer. Use IV morphine prn, nitro prn. Patient refused ASA since she has a hx of intracranial bleed  - troponin not elevated, echo pending  - Cards saw and did stress test 10/28, normal, signed off  - GI plans EGD/colon today  - abd CT unremarkable other than possible liver lesion, MRI w/liver protocol prior to discharge per Dr. Kiersten Acharya     3) Melena: reported \"black stool. \"  Hgb stable. Will check Abd CT. Will start IV PPI. Consult GI for EGD  - Hgb down 2 grams initially, still in normal range, stable now  - GI to see     4) Acute headache/migraines: restart Topamax after med rec. Use IV antiemetics, IV morphine prn.   Cannot have NSAIDS  - better overall, likely d/t improved BP  - requested Demerol in place of morphine, explained we do not use Demerol here     5) Seizure d/o: restart Keppra if appropriate after med rec    6) Morbid obesity with BMI >40  -would benefit from weight loss     Problem List:  Problem List as of 10/29/2021 Date Reviewed: 10/27/2021        Codes Class Noted - Resolved    * (Principal) Malignant hypertensive urgency ICD-10-CM: I16.0  ICD-9-CM: 401.0  10/27/2021 - Present        CVA (cerebral vascular accident) [facility]) ICD-10-CM: I63.9  ICD-9-CM: 434.91  6/12/2020 - Present        Chest pain at rest ICD-10-CM: R07.9  ICD-9-CM: 786.50  3/8/2020 - Present        Rectal bleeding ICD-10-CM: K62.5  ICD-9-CM: 569.3  5/30/2019 - Present        CARLOS (obstructive sleep apnea) ICD-10-CM: G47.33  ICD-9-CM: 327.23  Unknown - Present    Overview Signed 5/30/2019  7:55 PM by Gaby Taylor MD     no cpap             HTN (hypertension) ICD-10-CM: I10  ICD-9-CM: 401.9  Unknown - Present        Hx of seizure disorder ICD-10-CM: Z86.69  ICD-9-CM: V12.49  Unknown - Present        Fibromyalgia ICD-10-CM: M79.7  ICD-9-CM: 729.1  Unknown - Present    Overview Signed 5/30/2019  7:55 PM by Gaby Taylor MD     fibromyalgia             Anxiety and depression ICD-10-CM: F41.9, F32. A  ICD-9-CM: 300.00, 311  Unknown - Present        Hx of migraines ICD-10-CM: Z86.69  ICD-9-CM: V12.49  Unknown - Present        Severe obesity ([address] ICD-10-CM: E66.01  ICD-9-CM: 278.01  4/29/2019 - Present        Hiatal hernia ICD-10-CM: K44.9  ICD-9-CM: 553.3  5/18/2018 - Present        Incomplete right bundle branch block (RBBB) determined by electrocardiography ICD-10-CM: I45.10  ICD-9-CM: 426.4  5/10/2018 - Present        Hiatal hernia with gastroesophageal reflux ICD-10-CM: K21.9, K44.9  ICD-9-CM: 530.81, 553.3  4/1/2018 - Present        RESOLVED: Obese ICD-10-CM: E66.9  ICD-9-CM: 278.00  Unknown - 3/8/2020        RESOLVED: Elevated lactic acid level ICD-10-CM: R79.89  ICD-9-CM: 276.2  5/30/2019 - 3/8/2020        RESOLVED: Dehydration ICD-10-CM: E86.0  ICD-9-CM: 276.51  5/30/2019 - 3/8/2020        RESOLVED: Anemia ICD-10-CM: D64.9  ICD-9-CM: 285.9  5/30/2019 - 3/8/2020        RESOLVED: Hemorrhoids ICD-10-CM: K64.9  ICD-9-CM: 455.6  Unknown - 3/8/2020        RESOLVED: Depressive disorder, not elsewhere classified ICD-10-CM: F32.9  ICD-9-CM: 161  10/1/2013 - 5/30/2019        RESOLVED: Leukocytosis, unspecified ICD-10-CM: V19.087  ICD-9-CM: 288.60  10/1/2013 - 5/30/2019        RESOLVED: Hypokalemia ICD-10-CM: E87.6  ICD-9-CM: 276.8  10/1/2013 - 3/8/2020        RESOLVED: Slurred speech ICD-10-CM: R47.81  ICD-9-CM: 784.59  9/30/2013 - 5/30/2019        RESOLVED: Seizure ([facility] (Chronic) ICD-10-CM: R56.9  ICD-9-CM: 780.39  5/2/2011 - 5/30/2019        RESOLVED: Migraine (Chronic) ICD-10-CM: D77.252  ICD-9-CM: 346.90  5/2/2011 - 5/30/2019        RESOLVED: Cavernous hemangioma of intracranial structure ([facility] (Chronic) ICD-10-CM: Z85.87  ICD-9-CM: 228.02  5/2/2011 - 3/8/2020              Subjective: The patient is a 54 yo hx of HTN, seizure d/o, migraines, cavernous hemangiomas, presented w/ chest pain, abd pain, melena, migraines, malignant HTN urgency. The patient c/o intermittent LUQ and chest pain for several weeks. She described the pain as \"some one punching me in the chest.\"  The pain is intermittent, associated with dyspnea and some L arm numbness. She also c/o \"black stool\" during this time. Denied cough, fevers, chills, nausea, vomiting, diarrhea. The patient does not take BP meds. In the ED, SBP ~200s. Initial EKG and Trop non-ischemic. Head CT and CXR neg. (Dr. Tomlin Or)    10/28: Continues to have left sided pain, under her rib cage, radiating up into her chest and into her left arm, associated with palpitations, N/V. Has not had any further dark stool. 10/29: Negative stress test, for EGD/colon today. Feels better overall, still with some HA but overall improved. Continue to have upper abdominal and chest pain. Has not had any further dark stool or visible blood loss. Review of Systems:   A comprehensive review of systems was negative except for that written in the HPI.     Objective:   Physical Exam:     Visit Vitals  BP (!) 150/100 (BP 1 Location: Right upper arm, BP Patient Position: At rest)   Pulse 67   Temp 98.7 °F (37.1 °C)   Resp 18   Ht 5' 3\" (1.6 m)   Wt 112.3 kg (247 lb 9.2 oz)   LMP 2011   SpO2 93%   BMI 43.86 kg/m²      O2 Device: None (Room air)    Temp (24hrs), Av.2 °F (36.8 °C), Min:97.9 °F (36.6 °C), Max:98.7 °F (37.1 °C)    No intake/output data recorded. 10/27 0701 - 10/28 1900  In: 1193.3 [P.O.:360; I.V.:833.3]  Out: 0     General:  Alert, cooperative, no distress, appears stated age. Head:  Normocephalic, without obvious abnormality, atraumatic. Eyes:  Conjunctivae/corneas clear. PERRL, EOMs intact. Nose: Nares normal. Septum midline. Mucosa normal. No drainage or sinus tenderness. Throat: Lips, mucosa, and tongue moist..   Neck: Supple, symmetrical, trachea midline, no adenopathy, thyroid: no enlargement/tenderness/nodules, no carotid bruit and no JVD. Back:   Symmetric, no curvature. ROM normal. No CVA tenderness. Lungs:   Clear to auscultation bilaterally. Chest wall:  No tenderness or deformity. Heart:  Regular rate and rhythm, S1, S2 normal, no murmur, click, rub or gallop. Abdomen:   Soft, non-tender. Bowel sounds normal. No masses,  No organomegaly. Extremities: no cyanosis or edema. No calf tenderness or cords. Pulses: 2+ and symmetric all extremities. Skin: Skin color, texture, turgor normal. No rashes or lesions   Neurologic: CNII-XII intact. Alert and oriented X 3. Fine motor of hands and fingers normal.   equal.  No cogwheeling or rigidity. Gait not tested at this time. Sensation grossly normal to touch.   Gross motor of extremities normal.       Data Review:       Recent Days:  Recent Labs     10/29/21  0244 10/28/21  0107 10/27/21  1824   WBC 7.0 11.2* 9.2   HGB 13.5 13.9 15.7   HCT 42.7 42.9 48.3*    326 365     Recent Labs     10/29/21  0244 10/28/21  0107 10/27/21  1824    147* 139   K 3.9 3.5 3.9   * 119* 108   CO2 24 23 25   * 77 87   BUN 11 10 11   CREA 0.90 0.82 1.08*   CA 8.9 6.9* 9.8   MG  --  1.7  --    PHOS  --  3.2  --    ALB  --  2.4* 3.9   TBILI  --  0.3 0.4   ALT  --  11* 15     No results for input(s): PH, PCO2, PO2, HCO3, FIO2 in the last 72 hours.     24 Hour Results:  Recent Results (from the past 24 hour(s))   ECHO ADULT COMPLETE    Collection Time: 10/28/21  8:15 AM   Result Value Ref Range    IVSd 1.15 (A) 0.60 - 0.90 cm    LVIDd 3.97 3.90 - 5.30 cm    LVIDs 2.93 cm    LVOT d 2.17 cm    LVPWd 1.13 (A) 0.60 - 0.90 cm    LVOT Peak Gradient 3.71 mmHg    LVOT Peak Velocity 96.27 cm/s    RVIDd 2.86 cm    Left Atrium Major Axis 2.88 cm    LA Volume 18.64 22.0 - 52.0 mL    LA Area 4C 8.34 cm2    LA Vol 2C 18.53 (A) 22.00 - 52.00 mL    LA Vol 4C 15.51 (A) 22.00 - 52.00 mL    LA Volume DISK BP 16.96 22.0 - 52.0 mL    AV Annulus 3.92 cm    Aortic Valve Area by Continuity of Peak Velocity 2.55 cm2    AoV PG 7.81 mmHg    Aortic Valve Systolic Peak Velocity 422.64 cm/s    MV A Rosalio 73.36 cm/s    Mitral Valve E Wave Deceleration Time 325.39 ms    MV E Rosalio 46.86 cm/s    E/E' ratio (averaged) 8.85     E/E' lateral 8.66     E/E' septal 9.03     LV E' Lateral Velocity 5.41 cm/s    LV E' Septal Velocity 5.19 cm/s    Pulmonic Regurgitant End Max Velocity 117.36 cm/s    Pulmonic Valve Systolic Peak Instantaneous Gradient 3.59 mmHg    Pulmonic Valve Max Velocity 94.69 cm/s    Tapse 2.02 (A) 1.50 - 2.00 cm    Triscuspid Valve Regurgitation Peak Gradient 22.66 mmHg    TR Max Velocity 237.34 cm/s    AO ASC D 3.79 cm    MV E/A 0.64     LV Mass .7 67.0 - 162.0 g    LV Mass AL Index 71.5 43.0 - 95.0 g/m2    Left Atrium Minor Axis 1.36 cm    LA Vol Index 8.79 16.00 - 28.00 ml/m2    LA Vol Index 8.74 16.00 - 28.00 ml/m2    LA Vol Index 7.32 16.00 - 28.00 ml/m2    WILMA/BSA Pk Rosalio 1.2 cm2/m2   NUCLEAR CARDIAC STRESS TEST    Collection Time: 10/28/21 12:54 PM   Result Value Ref Range    Target  bpm    Exercise duration time 00:03:00     Stress Base Systolic  mmHg    Stress Base Diastolic  mmHg    Post peak  BPM    Baseline HR 75 BPM    Estimated workload 1.0 METS    Baseline  mmHg Percent HR 64 %    ST Elevation (mm) 0 mm    ST Depression (mm) 0 mm    Stress Base Diastolic BP 95 mmHg    Stress Rate Pressure Product 17,745 BPM*mmHg   CBC W/O DIFF    Collection Time: 10/29/21  2:44 AM   Result Value Ref Range    WBC 7.0 3.6 - 11.0 K/uL    RBC 4.58 3.80 - 5.20 M/uL    HGB 13.5 11.5 - 16.0 g/dL    HCT 42.7 35.0 - 47.0 %    MCV 93.2 80.0 - 99.0 FL    MCH 29.5 26.0 - 34.0 PG    MCHC 31.6 30.0 - 36.5 g/dL    RDW 13.1 11.5 - 14.5 %    PLATELET 895 247 - 610 K/uL    MPV 11.0 8.9 - 12.9 FL    NRBC 0.0 0  WBC    ABSOLUTE NRBC 0.00 0.00 - 5.07 K/uL   METABOLIC PANEL, BASIC    Collection Time: 10/29/21  2:44 AM   Result Value Ref Range    Sodium 143 136 - 145 mmol/L    Potassium 3.9 3.5 - 5.1 mmol/L    Chloride 112 (H) 97 - 108 mmol/L    CO2 24 21 - 32 mmol/L    Anion gap 7 5 - 15 mmol/L    Glucose 103 (H) 65 - 100 mg/dL    BUN 11 6 - 20 MG/DL    Creatinine 0.90 0.55 - 1.02 MG/DL    BUN/Creatinine ratio 12 12 - 20      GFR est AA >60 >60 ml/min/1.73m2    GFR est non-AA >60 >60 ml/min/1.73m2    Calcium 8.9 8.5 - 10.1 MG/DL       Medications reviewed  Current Facility-Administered Medications   Medication Dose Route Frequency    labetaloL (NORMODYNE;TRANDATE) 20 mg/4 mL (5 mg/mL) injection 20 mg  20 mg IntraVENous Q4H PRN    ALPRAZolam (XANAX) tablet 1 mg  1 mg Oral TID    nitroglycerin (NITROSTAT) tablet 0.4 mg  0.4 mg SubLINGual Q5MIN PRN    prochlorperazine (COMPAZINE) injection 10 mg  10 mg IntraVENous Q6H PRN    morphine injection 2 mg  2 mg IntraVENous Q4H PRN    labetaloL (NORMODYNE) tablet 200 mg  200 mg Oral Q12H    pantoprazole (PROTONIX) 40 mg in 0.9% sodium chloride 10 mL injection  40 mg IntraVENous Q12H    ondansetron (ZOFRAN) injection 4 mg  4 mg IntraVENous Q6H PRN    amLODIPine (NORVASC) tablet 10 mg  10 mg Oral DAILY    0.9% sodium chloride infusion  50 mL/hr IntraVENous CONTINUOUS    sodium chloride (NS) flush 5-40 mL  5-40 mL IntraVENous Q8H    sodium chloride (NS) flush 5-40 mL  5-40 mL IntraVENous PRN    0.9% sodium chloride infusion 25 mL  25 mL IntraVENous PRN    acetaminophen (TYLENOL) tablet 650 mg  650 mg Oral Q6H PRN    Or    acetaminophen (TYLENOL) suppository 650 mg  650 mg Rectal Q6H PRN    bisacodyL (DULCOLAX) suppository 10 mg  10 mg Rectal DAILY PRN       Care Plan discussed with: Patient/Family, Nurse and     Total time spent with patient: 30 minutes.     Gisela Jon MD

## 2021-10-29 NOTE — PROGRESS NOTES
Sakshi Carissa  1965  [id number]    Situation:  Verbal report received from: Deepa Barba RN   Procedure: Procedure(s):  ESOPHAGOGASTRODUODENOSCOPY (EGD)  COLONOSCOPY  ESOPHAGOGASTRODUODENAL (EGD) BIOPSY    Background:    Preoperative diagnosis: hematochezia/abdominal pain  Postoperative diagnosis: 1.- Normal EGD  2.- Diverticulosis  3.- Hemorrhoids    :  Dr. Britt Sow  Assistant(s): Endoscopy Technician-1: Erin Ghotra  Endoscopy RN-1: Cristiano Marquez RN    Specimens:   ID Type Source Tests Collected by Time Destination   1 : Mid-Esophagus Biopsies Preservative   Jose Nguyen MD 10/29/2021 1505 Pathology     H. Pylori  no    Assessment:    Anesthesia gave intra-procedure sedation and medications, see anesthesia flow sheet no    Intravenous fluids: NS@ KVO     Vital signs stable     Abdominal assessment: round and soft     Recommendation:  Discharge patient per MD order.   Return to floor  Family or Friend   Permission to share finding with family or friend yes

## 2021-10-30 VITALS
TEMPERATURE: 98.8 F | RESPIRATION RATE: 18 BRPM | SYSTOLIC BLOOD PRESSURE: 154 MMHG | DIASTOLIC BLOOD PRESSURE: 104 MMHG | BODY MASS INDEX: 43.77 KG/M2 | OXYGEN SATURATION: 95 % | WEIGHT: 247 LBS | HEART RATE: 94 BPM | HEIGHT: 63 IN

## 2021-10-30 PROCEDURE — 96376 TX/PRO/DX INJ SAME DRUG ADON: CPT

## 2021-10-30 PROCEDURE — 74011250636 HC RX REV CODE- 250/636: Performed by: INTERNAL MEDICINE

## 2021-10-30 PROCEDURE — 99218 HC RM OBSERVATION: CPT

## 2021-10-30 PROCEDURE — 74011250637 HC RX REV CODE- 250/637: Performed by: INTERNAL MEDICINE

## 2021-10-30 PROCEDURE — C9113 INJ PANTOPRAZOLE SODIUM, VIA: HCPCS | Performed by: INTERNAL MEDICINE

## 2021-10-30 RX ORDER — LABETALOL 200 MG/1
200 TABLET, FILM COATED ORAL EVERY 12 HOURS
Qty: 60 TABLET | Refills: 0 | Status: SHIPPED | OUTPATIENT
Start: 2021-10-30 | End: 2022-04-19

## 2021-10-30 RX ORDER — AMLODIPINE BESYLATE 10 MG/1
10 TABLET ORAL DAILY
Qty: 30 TABLET | Refills: 0 | Status: ON HOLD | OUTPATIENT
Start: 2021-10-30 | End: 2022-04-21

## 2021-10-30 RX ADMIN — MORPHINE SULFATE 2 MG: 2 INJECTION, SOLUTION INTRAMUSCULAR; INTRAVENOUS at 03:23

## 2021-10-30 RX ADMIN — MORPHINE SULFATE 2 MG: 2 INJECTION, SOLUTION INTRAMUSCULAR; INTRAVENOUS at 11:00

## 2021-10-30 RX ADMIN — Medication 10 ML: at 07:03

## 2021-10-30 RX ADMIN — ALPRAZOLAM 1 MG: 0.5 TABLET ORAL at 09:19

## 2021-10-30 RX ADMIN — LABETALOL HYDROCHLORIDE 200 MG: 200 TABLET, FILM COATED ORAL at 09:20

## 2021-10-30 RX ADMIN — AMLODIPINE BESYLATE 10 MG: 5 TABLET ORAL at 09:20

## 2021-10-30 RX ADMIN — ONDANSETRON 4 MG: 2 INJECTION INTRAMUSCULAR; INTRAVENOUS at 09:23

## 2021-10-30 RX ADMIN — PANTOPRAZOLE SODIUM 40 MG: 40 INJECTION, POWDER, FOR SOLUTION INTRAVENOUS at 09:19

## 2021-10-30 RX ADMIN — MORPHINE SULFATE 2 MG: 2 INJECTION, SOLUTION INTRAMUSCULAR; INTRAVENOUS at 06:59

## 2021-10-30 NOTE — PROGRESS NOTES
10/30/2021  9:18 AM    Observation notice provided in writing to patient and/or caregiver as well as verbal explanation of the policy. Patients who are in outpatient status also receive the Observation notice. Patient has received notice and or patient representative has received via secure email, fax, or certified mail based on patient representative's preference. RUR:  7%  Risk Level: [x]Low []Moderate []High  Value-based purchasing: [] Yes [x] No  Bundle patient: [] Yes [x] No   Specify:     Transition of care plan:  1. Medically stable with discharge order  2. Home with family assistance  3. Outpatient follow-up. 4. Pt's family/friend to transport  5. No further CM needs identified    Care Management Interventions  PCP Verified by CM: Yes  Transition of Care Consult (CM Consult): Discharge Planning  Support Systems: Parent(s)  Confirm Follow Up Transport: Friends  The Plan for Transition of Care is Related to the Following Treatment Goals :  Malignant hypertensive urgency  Discharge Location  Discharge Placement: Home with family assistance    Quinn Smith RN

## 2021-10-30 NOTE — PROGRESS NOTES
Daily Progress Note: 10/30/2021  Connie Saha MD    Assessment/Plan:   54 yo hx of HTN, seizure d/o, migraines, cavernous hemangiomas, presented w/ chest pain, abd pain, melena, migraines, malignant HTN urgency     1) Malignant hypertensive urgency: has hx of HTN, but does not take meds. Initial SBP ~200s. Will start oral labetalol, norvasc. Use IV labetalol prn. Low threshold for nicardipine gt  - she was not on BP meds as an outpatient because she has repeatedly refused to take them, states they make her BP too low  - BP much better overall on current regiemn  - HA is better     2) Chest/abd pain: atypical for ACS. Not hypoxic or tachy, so PE less likely. Concern for abdominal pathology given melena. Will monitor on Tele. Check Cardiac enzymes, echo, d-dimer. Use IV morphine prn, nitro prn. Patient refused ASA since she has a hx of intracranial bleed  - troponin not elevated, echo normal  - Cards saw and did stress test 10/28, normal, signed off  - EGD/colon unremarkable, nothing evident to cause her sxs  - abd CT unremarkable other than possible liver lesion, MRI w/liver protocol just showed the stable exophytic liver lesion which was seen back in 2019     3) Melena: reported \"black stool. \"  Hgb stable. Will check Abd CT. Will start IV PPI. Consult GI for EGD  - Hgb down 2 grams initially, still in normal range, stable now  - EGD/colon without source for blood loss, no evidence of blood loss on labs     4) Acute headache/migraines: restart Topamax after med rec. Use IV antiemetics, IV morphine prn.   Cannot have NSAIDS  - better overall, likely d/t improved BP  - requested Demerol in place of morphine, explained we do not use Demerol here     5) Seizure d/o: restart Keppra if appropriate after med rec    6) Morbid obesity with BMI >40  -would benefit from weight loss     Problem List:  Problem List as of 10/30/2021 Date Reviewed: 10/27/2021        Codes Class Noted - Resolved * (Principal) Malignant hypertensive urgency ICD-10-CM: I16.0  ICD-9-CM: 401.0  10/27/2021 - Present        CVA (cerebral vascular accident) [facility]) ICD-10-CM: I63.9  ICD-9-CM: 434.91  6/12/2020 - Present        Chest pain at rest ICD-10-CM: R07.9  ICD-9-CM: 786.50  3/8/2020 - Present        Rectal bleeding ICD-10-CM: K62.5  ICD-9-CM: 569.3  5/30/2019 - Present        CARLOS (obstructive sleep apnea) ICD-10-CM: G47.33  ICD-9-CM: 327.23  Unknown - Present    Overview Signed 5/30/2019  7:55 PM by Yahir Knowles MD     no cpap             HTN (hypertension) ICD-10-CM: I10  ICD-9-CM: 401.9  Unknown - Present        Hx of seizure disorder ICD-10-CM: Z86.69  ICD-9-CM: V12.49  Unknown - Present        Fibromyalgia ICD-10-CM: M79.7  ICD-9-CM: 729.1  Unknown - Present    Overview Signed 5/30/2019  7:55 PM by Yahir Knowles MD     fibromyalgia             Anxiety and depression ICD-10-CM: F41.9, F32. A  ICD-9-CM: 300.00, 311  Unknown - Present        Hx of migraines ICD-10-CM: Z86.69  ICD-9-CM: V12.49  Unknown - Present        Severe obesity ([address] ICD-10-CM: E66.01  ICD-9-CM: 278.01  4/29/2019 - Present        Hiatal hernia ICD-10-CM: K44.9  ICD-9-CM: 553.3  5/18/2018 - Present        Incomplete right bundle branch block (RBBB) determined by electrocardiography ICD-10-CM: I45.10  ICD-9-CM: 426.4  5/10/2018 - Present        Hiatal hernia with gastroesophageal reflux ICD-10-CM: K21.9, K44.9  ICD-9-CM: 530.81, 553.3  4/1/2018 - Present        RESOLVED: Obese ICD-10-CM: E66.9  ICD-9-CM: 278.00  Unknown - 3/8/2020        RESOLVED: Elevated lactic acid level ICD-10-CM: R79.89  ICD-9-CM: 276.2  5/30/2019 - 3/8/2020        RESOLVED: Dehydration ICD-10-CM: E86.0  ICD-9-CM: 276.51  5/30/2019 - 3/8/2020        RESOLVED: Anemia ICD-10-CM: D64.9  ICD-9-CM: 285.9  5/30/2019 - 3/8/2020        RESOLVED: Hemorrhoids ICD-10-CM: K64.9  ICD-9-CM: 455.6  Unknown - 3/8/2020        RESOLVED: Depressive disorder, not elsewhere classified ICD-10-CM: F32.9  ICD-9-CM: 942  10/1/2013 - 5/30/2019        RESOLVED: Leukocytosis, unspecified ICD-10-CM: K29.340  ICD-9-CM: 288.60  10/1/2013 - 5/30/2019        RESOLVED: Hypokalemia ICD-10-CM: E87.6  ICD-9-CM: 276.8  10/1/2013 - 3/8/2020        RESOLVED: Slurred speech ICD-10-CM: R47.81  ICD-9-CM: 784.59  9/30/2013 - 5/30/2019        RESOLVED: Seizure ([facility] [address] (Chronic) ICD-10-CM: R56.9  ICD-9-CM: 780.39  5/2/2011 - 5/30/2019        RESOLVED: Migraine (Chronic) ICD-10-CM: G94.428  ICD-9-CM: 346.90  5/2/2011 - 5/30/2019        RESOLVED: Cavernous hemangioma of intracranial structure ([facility] [address] (Chronic) ICD-10-CM: M37.93  ICD-9-CM: 228.02  5/2/2011 - 3/8/2020              Subjective: The patient is a 56 yo hx of HTN, seizure d/o, migraines, cavernous hemangiomas, presented w/ chest pain, abd pain, melena, migraines, malignant HTN urgency. The patient c/o intermittent LUQ and chest pain for several weeks. She described the pain as \"some one punching me in the chest.\"  The pain is intermittent, associated with dyspnea and some L arm numbness. She also c/o \"black stool\" during this time. Denied cough, fevers, chills, nausea, vomiting, diarrhea. The patient does not take BP meds. In the ED, SBP ~200s. Initial EKG and Trop non-ischemic. Head CT and CXR neg. (Dr. Cathie Cotton)    10/28: Continues to have left sided pain, under her rib cage, radiating up into her chest and into her left arm, associated with palpitations, N/V. Has not had any further dark stool. 10/29: Negative stress test, for EGD/colon today. Feels better overall, still with some HA but overall improved. Continue to have upper abdominal and chest pain. Has not had any further dark stool or visible blood loss. 10/30: EGD/Colononscopy without source for chest pain or evidence of blood loss; liver MRI stable c/w 2019.   She states her HA is better but she requested to go home with opiates, I explained that I will not be discharging her on pain medication. She is ready for discharge      Review of Systems:   A comprehensive review of systems was negative except for that written in the HPI. Objective:   Physical Exam:     Visit Vitals  BP (!) 140/87 (BP 1 Location: Right upper arm, BP Patient Position: At rest)   Pulse 87   Temp 98.2 °F (36.8 °C)   Resp 18   Ht 5' 3\" (1.6 m)   Wt 112 kg (247 lb)   LMP 2011   SpO2 91%   Breastfeeding No   BMI 43.75 kg/m²      O2 Device: None (Room air)    Temp (24hrs), Av.9 °F (36.6 °C), Min:97.4 °F (36.3 °C), Max:98.3 °F (36.8 °C)    No intake/output data recorded. 10/28 190 - 10/30 0700  In: 1010 [P.O.:360; I.V.:650]  Out: -     General:  Alert, cooperative, no distress, appears stated age. Head:  Normocephalic, without obvious abnormality, atraumatic. Eyes:  Conjunctivae/corneas clear. PERRL, EOMs intact. Nose: Nares normal. Septum midline. Mucosa normal. No drainage or sinus tenderness. Throat: Lips, mucosa, and tongue moist..   Neck: Supple, symmetrical, trachea midline, no adenopathy, thyroid: no enlargement/tenderness/nodules, no carotid bruit and no JVD. Back:   Symmetric, no curvature. ROM normal. No CVA tenderness. Lungs:   Clear to auscultation bilaterally. Chest wall:  No tenderness or deformity. Heart:  Regular rate and rhythm, S1, S2 normal, no murmur, click, rub or gallop. Abdomen:   Soft, non-tender. Bowel sounds normal. No masses,  No organomegaly. Extremities: no cyanosis or edema. No calf tenderness or cords. Pulses: 2+ and symmetric all extremities. Skin: Skin color, texture, turgor normal. No rashes or lesions   Neurologic: CNII-XII intact. Alert and oriented X 3. Fine motor of hands and fingers normal.   equal.  No cogwheeling or rigidity. Gait not tested at this time. Sensation grossly normal to touch.   Gross motor of extremities normal.       Data Review:       Recent Days:  Recent Labs     10/29/21  0244 10/28/21  0107 10/27/21  1824   WBC 7.0 11.2* 9. 2   HGB 13.5 13.9 15.7   HCT 42.7 42.9 48.3*    326 365     Recent Labs     10/29/21  0244 10/28/21  0107 10/27/21  1824    147* 139   K 3.9 3.5 3.9   * 119* 108   CO2 24 23 25   * 77 87   BUN 11 10 11   CREA 0.90 0.82 1.08*   CA 8.9 6.9* 9.8   MG  --  1.7  --    PHOS  --  3.2  --    ALB  --  2.4* 3.9   TBILI  --  0.3 0.4   ALT  --  11* 15     No results for input(s): PH, PCO2, PO2, HCO3, FIO2 in the last 72 hours.     24 Hour Results:  Recent Results (from the past 24 hour(s))   COVID-19 RAPID TEST    Collection Time: 10/29/21 12:01 PM   Result Value Ref Range    Specimen source Nasopharyngeal      COVID-19 rapid test Not detected NOTD         Medications reviewed  Current Facility-Administered Medications   Medication Dose Route Frequency    labetaloL (NORMODYNE;TRANDATE) 20 mg/4 mL (5 mg/mL) injection 20 mg  20 mg IntraVENous Q4H PRN    DULoxetine (CYMBALTA) capsule 120 mg  120 mg Oral QHS    topiramate (TOPAMAX) tablet 25 mg  25 mg Oral QHS    ALPRAZolam (XANAX) tablet 1 mg  1 mg Oral TID    nitroglycerin (NITROSTAT) tablet 0.4 mg  0.4 mg SubLINGual Q5MIN PRN    prochlorperazine (COMPAZINE) injection 10 mg  10 mg IntraVENous Q6H PRN    morphine injection 2 mg  2 mg IntraVENous Q4H PRN    labetaloL (NORMODYNE) tablet 200 mg  200 mg Oral Q12H    pantoprazole (PROTONIX) 40 mg in 0.9% sodium chloride 10 mL injection  40 mg IntraVENous Q12H    ondansetron (ZOFRAN) injection 4 mg  4 mg IntraVENous Q6H PRN    amLODIPine (NORVASC) tablet 10 mg  10 mg Oral DAILY    0.9% sodium chloride infusion  50 mL/hr IntraVENous CONTINUOUS    sodium chloride (NS) flush 5-40 mL  5-40 mL IntraVENous Q8H    sodium chloride (NS) flush 5-40 mL  5-40 mL IntraVENous PRN    0.9% sodium chloride infusion 25 mL  25 mL IntraVENous PRN    acetaminophen (TYLENOL) tablet 650 mg  650 mg Oral Q6H PRN    Or    acetaminophen (TYLENOL) suppository 650 mg  650 mg Rectal Q6H PRN    bisacodyL (DULCOLAX) suppository 10 mg  10 mg Rectal DAILY PRN       Care Plan discussed with: Patient/Family, Nurse and     Total time spent with patient: 30 minutes.     Nadia Richard MD

## 2021-10-30 NOTE — DISCHARGE SUMMARY
Physician Discharge Summary     Patient ID:    Binu Batres  [id number]  6 y.o.  1965  Marry Santana MD    Admit date: 10/27/2021  Discharge date and time: 10/30/2021  Admission Diagnoses: Malignant hypertensive urgency [I16.0]  Discharge Medications:   Current Discharge Medication List      START taking these medications    Details   amLODIPine (NORVASC) 10 mg tablet Take 1 Tablet by mouth daily. Qty: 30 Tablet, Refills: 0      labetaloL (NORMODYNE) 200 mg tablet Take 1 Tablet by mouth every twelve (12) hours. Qty: 60 Tablet, Refills: 0         CONTINUE these medications which have NOT CHANGED    Details   triamcinolone acetonide (KENALOG) 0.1 % topical cream Apply  to affected area two (2) times a day. use thin layer  Apply to legs      ibuprofen (MOTRIN) 800 mg tablet Take 800 mg by mouth every eight (8) hours as needed for Pain.      famotidine (PEPCID) 20 mg tablet Take 1 Tab by mouth two (2) times a day. Qty: 60 Tab, Refills: 0      pantoprazole (PROTONIX) 40 mg tablet Take 40 mg by mouth nightly. DULoxetine (CYMBALTA) 60 mg capsule Take 120 mg by mouth nightly. topiramate (TOPAMAX) 25 mg tablet Take 25 mg by mouth nightly. ALPRAZolam (XANAX) 1 mg tablet Take 1 mg by mouth three (3) times daily. Follow up Care:    1. Marry Santana MD with in 1 weeks  2. specialists as directed. Diet:  Cardiac Diet  Disposition:  Home.   Advanced Directive:  Discharge Exam:  [See today's progress note.]  CONSULTATIONS: Cardiology and GI    Significant Diagnostic Studies:   Recent Labs     10/29/21  0244 10/28/21  0107   WBC 7.0 11.2*   HGB 13.5 13.9   HCT 42.7 42.9    326     Recent Labs     10/29/21  0244 10/28/21  0107 10/27/21  1824    147* 139   K 3.9 3.5 3.9   * 119* 108   CO2 24 23 25   BUN 11 10 11   CREA 0.90 0.82 1.08*   * 77 87   CA 8.9 6.9* 9.8   MG  --  1.7  --    PHOS  --  3.2  --      Recent Labs     10/28/21  0107 10/27/21  1824   ALT 11* 15   AP 81 140*   TBILI 0.3 0.4   TP 5.7* 9.0*   ALB 2.4* 3.9   GLOB 3.3 5.1*     No results for input(s): INR, PTP, APTT, INREXT in the last 72 hours. No results for input(s): FE, TIBC, PSAT, FERR in the last 72 hours. No results for input(s): PH, PCO2, PO2 in the last 72 hours. No results for input(s): CPK, CKMB in the last 72 hours. No lab exists for component: TROPONINI  Lab Results   Component Value Date/Time    Glucose (POC) 110 (H) 06/14/2020 02:49 PM    Glucose (POC) 122 (H) 06/12/2020 08:23 PM    Glucose (POC) 93 09/30/2013 12:55 PM     Lab Results   Component Value Date/Time    TSH 1.51 06/13/2020 07:49 AM         HOSPITAL COURSE & TREATMENT RENDERED:   Assessment/Plan:   56 yo hx of HTN, seizure d/o, migraines, cavernous hemangiomas, presented w/ chest pain, abd pain, melena, migraines, malignant HTN urgency     1) Malignant hypertensive urgency: has hx of HTN, but does not take meds.  Initial SBP ~200s.  Will start oral labetalol, norvasc.  Use IV labetalol prn.  Low threshold for nicardipine gt  - she was not on BP meds as an outpatient because she has repeatedly refused to take them, states they make her BP too low  - BP much better overall on current regiemn  - HA is better     2) Chest/abd pain: atypical for ACS.  Not hypoxic or tachy, so PE less likely.  Concern for abdominal pathology given melena.  Will monitor on Tele.  Check Cardiac enzymes, echo, d-dimer.  Use IV morphine prn, nitro prn.  Patient refused ASA since she has a hx of intracranial bleed  - troponin not elevated, echo normal  - Cards saw and did stress test 10/28, normal, signed off  - EGD/colon unremarkable, nothing evident to cause her sxs  - abd CT unremarkable other than possible liver lesion, MRI w/liver protocol just showed the stable exophytic liver lesion which was seen back in 2019     3) Melena: reported \"black stool. \"  Hgb stable.  Will check Abd CT.  Will start IV PPI.   Consult GI for EGD  - Hgb down 2 grams initially, still in normal range, stable now  - EGD/colon without source for blood loss, no evidence of blood loss on labs     4) Acute headache/migraines: restart Topamax after med rec.  Use IV antiemetics, IV morphine prn.  Cannot have NSAIDS  - better overall, likely d/t improved BP  - requested Demerol in place of morphine, explained we do not use Demerol here     5) Seizure d/o: restart Keppra if appropriate after med rec     6) Morbid obesity with BMI >40  -would benefit from weight loss      Problem List:            Problem List as of 10/30/2021 Date Reviewed: 10/27/2021         Codes Class Noted - Resolved     * (Principal) Malignant hypertensive urgency ICD-10-CM: I16.0  ICD-9-CM: 401.0   10/27/2021 - Present           CVA (cerebral vascular accident) [facility]) ICD-10-CM: I63.9  ICD-9-CM: 434.91   6/12/2020 - Present           Chest pain at rest ICD-10-CM: R07.9  ICD-9-CM: 786.50   3/8/2020 - Present           Rectal bleeding ICD-10-CM: K62.5  ICD-9-CM: 273. 3   5/30/2019 - Present           CARLOS (obstructive sleep apnea) ICD-10-CM: C76.63  ICD-9-CM: 327.23   Unknown - Present     Overview Signed 5/30/2019  7:55 PM by Mary Dupree MD       no cpap                 HTN (hypertension) ICD-10-CM: I10  ICD-9-CM: 401.9   Unknown - Present           Hx of seizure disorder ICD-10-CM: Z86.69  ICD-9-CM: V12.49   Unknown - Present           Fibromyalgia ICD-10-CM: M79.7  ICD-9-CM: 729.1   Unknown - Present     Overview Signed 5/30/2019  7:55 PM by Mary Dupree MD       fibromyalgia                 Anxiety and depression ICD-10-CM: F41.9, F32. A  ICD-9-CM: 300.00, 311   Unknown - Present           Hx of migraines ICD-10-CM: Z86.69  ICD-9-CM: V12.49   Unknown - Present           Severe obesity (HCC) ICD-10-CM: E66.01  ICD-9-CM: 278.01   4/29/2019 - Present           Hiatal hernia ICD-10-CM: K44.9  ICD-9-CM: 672. 3   5/18/2018 - Present           Incomplete right bundle branch block (RBBB) determined by electrocardiography ICD-10-CM: I45.10  ICD-9-CM: 426. 4   5/10/2018 - Present           Hiatal hernia with gastroesophageal reflux ICD-10-CM: K21.9, K44.9  ICD-9-CM: 530.81, 553.3   4/1/2018 - Present           RESOLVED: Obese ICD-10-CM: E66.9  ICD-9-CM: 278.00   Unknown - 3/8/2020           RESOLVED: Elevated lactic acid level ICD-10-CM: R79.89  ICD-9-CM: 276.2   5/30/2019 - 3/8/2020           RESOLVED: Dehydration ICD-10-CM: E86.0  ICD-9-CM: 276.51   5/30/2019 - 3/8/2020           RESOLVED: Anemia ICD-10-CM: D64.9  ICD-9-CM: 588. 9   5/30/2019 - 3/8/2020           RESOLVED: Hemorrhoids ICD-10-CM: K64.9  ICD-9-CM: 387. 6   Unknown - 3/8/2020           RESOLVED: Depressive disorder, not elsewhere classified ICD-10-CM: F32.9  ICD-9-CM: 769   10/1/2013 - 5/30/2019           RESOLVED: Leukocytosis, unspecified ICD-10-CM: J57.866  ICD-9-CM: 288.60   10/1/2013 - 5/30/2019           RESOLVED: Hypokalemia ICD-10-CM: E87.6  ICD-9-CM: 276.8   10/1/2013 - 3/8/2020           RESOLVED: Slurred speech ICD-10-CM: R47.81  ICD-9-CM: 784.59   9/30/2013 - 5/30/2019           RESOLVED: Seizure (HCC) (Chronic) ICD-10-CM: R56.9  ICD-9-CM: 780.39   5/2/2011 - 5/30/2019           RESOLVED: Migraine (Chronic) ICD-10-CM: X62.848  ICD-9-CM: 346.90   5/2/2011 - 5/30/2019           RESOLVED: Cavernous hemangioma of intracranial structure (HCC) (Chronic) ICD-10-CM: T30.06  ICD-9-CM: 228.02   5/2/2011 - 3/8/2020                   Subjective: The patient is a 54 yo hx of HTN, seizure d/o, migraines, cavernous hemangiomas, presented w/ chest pain, abd pain, melena, migraines, malignant HTN urgency.  The patient c/o intermittent LUQ and chest pain for several weeks.  She described the pain as \"some one punching me in the chest.\"  The pain is intermittent, associated with dyspnea and some L arm numbness.  She also c/o \"black stool\" during this time.  Denied cough, fevers, chills, nausea, vomiting, diarrhea.  The patient does not take BP meds.  In the ED, SBP ~200s.  Initial EKG and Trop non-ischemic.  Head CT and CXR neg.  (Dr. Rekha Fuller)     10/28: Continues to have left sided pain, under her rib cage, radiating up into her chest and into her left arm, associated with palpitations, N/V. Has not had any further dark stool.     10/29: Negative stress test, for EGD/colon today. Feels better overall, still with some HA but overall improved. Continue to have upper abdominal and chest pain. Has not had any further dark stool or visible blood loss.     10/30: EGD/Colononscopy without source for chest pain or evidence of blood loss; liver MRI stable c/w 2019. She states her HA is better but she requested to go home with opiates, I explained that I will not be discharging her on pain medication.   She is ready for discharge        Signed:  Tayo Ren MD  10/30/2021  7:43 AM

## 2021-10-30 NOTE — PROGRESS NOTES
Bedside shift change report given to [address] [place] [address] (oncoming nurse) by Luzma Gilbert (offgoing nurse). Report included the following information SBAR, Kardex, Intake/Output, MAR and Recent Results.

## 2021-10-30 NOTE — ROUTINE PROCESS
Discharge instructions reviewed with Patient verbalized understanding. Discharge medications reviewed with Patient and prescriptions given to patient. Patient made aware of follow up MD visits. An After Visit Summary was printed and given to the patient, hardcopy signed and placed in chart. Peripheral IVs were removed. Patient transported home.

## 2021-10-30 NOTE — DISCHARGE INSTRUCTIONS
Patient Discharge Instructions    Bessy Brown / [id number] : 1965    Admitted 10/27/2021 Discharged: 10/30/2021 7:39 AM     ACUTE DIAGNOSES:  Malignant hypertensive urgency [I16.0]    CHRONIC MEDICAL DIAGNOSES:  Problem List as of 10/30/2021 Date Reviewed: 10/27/2021        Codes Class Noted - Resolved    * (Principal) Malignant hypertensive urgency ICD-10-CM: I16.0  ICD-9-CM: 401.0  10/27/2021 - Present        CVA (cerebral vascular accident) [facility]) ICD-10-CM: I63.9  ICD-9-CM: 434.91  2020 - Present        Chest pain at rest ICD-10-CM: R07.9  ICD-9-CM: 786.50  3/8/2020 - Present        Rectal bleeding ICD-10-CM: K62.5  ICD-9-CM: 569.3  2019 - Present        CARLOS (obstructive sleep apnea) ICD-10-CM: G47.33  ICD-9-CM: 327.23  Unknown - Present    Overview Signed 2019  7:55 PM by Amada Ibanez MD     no cpap             HTN (hypertension) ICD-10-CM: I10  ICD-9-CM: 401.9  Unknown - Present        Hx of seizure disorder ICD-10-CM: Z86.69  ICD-9-CM: V12.49  Unknown - Present        Fibromyalgia ICD-10-CM: M79.7  ICD-9-CM: 729.1  Unknown - Present    Overview Signed 2019  7:55 PM by Amada Ibanez MD     fibromyalgia             Anxiety and depression ICD-10-CM: F41.9, F32. A  ICD-9-CM: 300.00, 311  Unknown - Present        Hx of migraines ICD-10-CM: Z86.69  ICD-9-CM: V12.49  Unknown - Present        Severe obesity ([address] ICD-10-CM: E66.01  ICD-9-CM: 278.01  2019 - Present        Hiatal hernia ICD-10-CM: K44.9  ICD-9-CM: 553.3  2018 - Present        Incomplete right bundle branch block (RBBB) determined by electrocardiography ICD-10-CM: I45.10  ICD-9-CM: 426.4  5/10/2018 - Present        Hiatal hernia with gastroesophageal reflux ICD-10-CM: K21.9, K44.9  ICD-9-CM: 530.81, 553.3  2018 - Present        RESOLVED: Obese ICD-10-CM: E66.9  ICD-9-CM: 278.00  Unknown - 3/8/2020        RESOLVED: Elevated lactic acid level ICD-10-CM: R79.89  ICD-9-CM: 276.2  2019 - 3/8/2020 RESOLVED: Dehydration ICD-10-CM: E86.0  ICD-9-CM: 276.51  5/30/2019 - 3/8/2020        RESOLVED: Anemia ICD-10-CM: D64.9  ICD-9-CM: 285.9  5/30/2019 - 3/8/2020        RESOLVED: Hemorrhoids ICD-10-CM: K64.9  ICD-9-CM: 455.6  Unknown - 3/8/2020        RESOLVED: Depressive disorder, not elsewhere classified ICD-10-CM: F32.9  ICD-9-CM: 571  10/1/2013 - 5/30/2019        RESOLVED: Leukocytosis, unspecified ICD-10-CM: Q42.558  ICD-9-CM: 288.60  10/1/2013 - 5/30/2019        RESOLVED: Hypokalemia ICD-10-CM: E87.6  ICD-9-CM: 276.8  10/1/2013 - 3/8/2020        RESOLVED: Slurred speech ICD-10-CM: R47.81  ICD-9-CM: 784.59  9/30/2013 - 5/30/2019        RESOLVED: Seizure ([facility] [address] (Chronic) ICD-10-CM: R56.9  ICD-9-CM: 780.39  5/2/2011 - 5/30/2019        RESOLVED: Migraine (Chronic) ICD-10-CM: S23.428  ICD-9-CM: 346.90  5/2/2011 - 5/30/2019        RESOLVED: Cavernous hemangioma of intracranial structure ([facility] [address] (Chronic) ICD-10-CM: D18.02  ICD-9-CM: 228.02  5/2/2011 - 3/8/2020              DISCHARGE MEDICATIONS:       · It is important that you take the medication exactly as they are prescribed. · Keep your medication in the bottles provided by the pharmacist and keep a list of the medication names, dosages, and times to be taken in your wallet. · Do not take other medications without consulting your doctor. DIET:  Cardiac Diet  ACTIVITY: Activity as tolerated    ADDITIONAL INFORMATION: If you experience any of the following symptoms then please call your primary care physician or return to the emergency room if you cannot get hold of your doctor: Fever, chills, nausea, vomiting, diarrhea, change in mentation, falling, bleeding, shortness of breath. FOLLOW UP CARE:  Dr. Frantz Ribeiro MD  you are to call and set up an appointment to see them with in 1 week.     Follow-up with specialists at directed by them      Information obtained by :  I understand that if any problems occur once I am at home I am to contact my physician. I understand and acknowledge receipt of the instructions indicated above.                                                                                                                                            Physician's or R.N.'s Signature                                                                  Date/Time                                                                                                                                              Patient or Representative Signature                                                          Date/Time

## 2022-02-22 ENCOUNTER — TRANSCRIBE ORDER (OUTPATIENT)
Dept: SCHEDULING | Age: 57
End: 2022-02-22

## 2022-02-22 DIAGNOSIS — Z12.31 VISIT FOR SCREENING MAMMOGRAM: Primary | ICD-10-CM

## 2022-02-28 ENCOUNTER — TRANSCRIBE ORDER (OUTPATIENT)
Dept: SCHEDULING | Age: 57
End: 2022-02-28

## 2022-02-28 DIAGNOSIS — G89.29 CHRONIC LOW BACK PAIN: Primary | ICD-10-CM

## 2022-02-28 DIAGNOSIS — M54.50 CHRONIC LOW BACK PAIN: Primary | ICD-10-CM

## 2022-03-01 ENCOUNTER — TRANSCRIBE ORDER (OUTPATIENT)
Dept: SCHEDULING | Age: 57
End: 2022-03-01

## 2022-03-01 DIAGNOSIS — M54.50 LOW BACK PAIN: Primary | ICD-10-CM

## 2022-03-08 ENCOUNTER — HOSPITAL ENCOUNTER (OUTPATIENT)
Dept: CT IMAGING | Age: 57
Discharge: HOME | End: 2022-03-08
Attending: FAMILY MEDICINE

## 2022-03-08 DIAGNOSIS — M54.50 LOW BACK PAIN: ICD-10-CM

## 2022-03-10 ENCOUNTER — HOSPITAL ENCOUNTER (OUTPATIENT)
Dept: CT IMAGING | Age: 57
Discharge: HOME | End: 2022-03-10
Attending: FAMILY MEDICINE
Payer: MEDICARE

## 2022-03-10 PROCEDURE — 72131 CT LUMBAR SPINE W/O DYE: CPT

## 2022-03-18 PROBLEM — K21.9 HIATAL HERNIA WITH GASTROESOPHAGEAL REFLUX: Status: ACTIVE | Noted: 2018-04-01

## 2022-03-18 PROBLEM — I16.0 MALIGNANT HYPERTENSIVE URGENCY: Status: ACTIVE | Noted: 2021-10-27

## 2022-03-18 PROBLEM — E66.01 SEVERE OBESITY (HCC): Status: ACTIVE | Noted: 2019-04-29

## 2022-03-18 PROBLEM — K44.9 HIATAL HERNIA WITH GASTROESOPHAGEAL REFLUX: Status: ACTIVE | Noted: 2018-04-01

## 2022-03-19 PROBLEM — R07.9 CHEST PAIN AT REST: Status: ACTIVE | Noted: 2020-03-08

## 2022-03-19 PROBLEM — K62.5 RECTAL BLEEDING: Status: ACTIVE | Noted: 2019-05-30

## 2022-03-20 PROBLEM — I45.10 INCOMPLETE RIGHT BUNDLE BRANCH BLOCK (RBBB) DETERMINED BY ELECTROCARDIOGRAPHY: Status: ACTIVE | Noted: 2018-05-10

## 2022-03-20 PROBLEM — K44.9 HIATAL HERNIA: Status: ACTIVE | Noted: 2018-05-18

## 2022-03-20 PROBLEM — I63.9 CVA (CEREBRAL VASCULAR ACCIDENT) (HCC): Status: ACTIVE | Noted: 2020-06-12

## 2022-04-19 ENCOUNTER — HOSPITAL ENCOUNTER (OUTPATIENT)
Dept: PREADMISSION TESTING | Age: 57
Discharge: HOME | End: 2022-04-19
Payer: MEDICARE

## 2022-04-19 ENCOUNTER — APPOINTMENT (OUTPATIENT)
Dept: GENERAL RADIOLOGY | Age: 57
DRG: 661 | End: 2022-04-19
Attending: UROLOGY
Payer: MEDICARE

## 2022-04-19 ENCOUNTER — APPOINTMENT (OUTPATIENT)
Dept: CT IMAGING | Age: 57
DRG: 661 | End: 2022-04-19
Attending: STUDENT IN AN ORGANIZED HEALTH CARE EDUCATION/TRAINING PROGRAM
Payer: MEDICARE

## 2022-04-19 ENCOUNTER — HOSPITAL ENCOUNTER (INPATIENT)
Age: 57
LOS: 2 days | Discharge: HOME | DRG: 661 | End: 2022-04-21
Attending: STUDENT IN AN ORGANIZED HEALTH CARE EDUCATION/TRAINING PROGRAM | Admitting: FAMILY MEDICINE
Payer: MEDICARE

## 2022-04-19 ENCOUNTER — ANESTHESIA EVENT (OUTPATIENT)
Dept: SURGERY | Age: 57
DRG: 661 | End: 2022-04-19
Payer: MEDICARE

## 2022-04-19 ENCOUNTER — ANESTHESIA (OUTPATIENT)
Dept: SURGERY | Age: 57
DRG: 661 | End: 2022-04-19
Payer: MEDICARE

## 2022-04-19 VITALS
HEIGHT: 64 IN | SYSTOLIC BLOOD PRESSURE: 178 MMHG | BODY MASS INDEX: 31.01 KG/M2 | TEMPERATURE: 98.6 F | DIASTOLIC BLOOD PRESSURE: 125 MMHG | WEIGHT: 181.66 LBS | HEART RATE: 82 BPM

## 2022-04-19 DIAGNOSIS — N20.0 KIDNEY STONE ON RIGHT SIDE: Primary | ICD-10-CM

## 2022-04-19 DIAGNOSIS — N39.0 URINARY TRACT INFECTION WITH HEMATURIA, SITE UNSPECIFIED: ICD-10-CM

## 2022-04-19 DIAGNOSIS — R31.9 URINARY TRACT INFECTION WITH HEMATURIA, SITE UNSPECIFIED: ICD-10-CM

## 2022-04-19 DIAGNOSIS — N13.2 HYDRONEPHROSIS WITH URINARY OBSTRUCTION DUE TO URETERAL CALCULUS: ICD-10-CM

## 2022-04-19 PROBLEM — N13.9 OBSTRUCTIVE UROPATHY: Status: ACTIVE | Noted: 2022-04-19

## 2022-04-19 LAB
ALBUMIN SERPL-MCNC: 3.8 G/DL (ref 3.5–5)
ALBUMIN SERPL-MCNC: 4 G/DL (ref 3.5–5)
ALBUMIN/GLOB SERPL: 0.8 {RATIO} (ref 1.1–2.2)
ALBUMIN/GLOB SERPL: 1 {RATIO} (ref 1.1–2.2)
ALP SERPL-CCNC: 128 U/L (ref 45–117)
ALP SERPL-CCNC: 132 U/L (ref 45–117)
ALT SERPL-CCNC: 22 U/L (ref 12–78)
ALT SERPL-CCNC: 24 U/L (ref 12–78)
ANION GAP SERPL CALC-SCNC: 6 MMOL/L (ref 5–15)
ANION GAP SERPL CALC-SCNC: 6 MMOL/L (ref 5–15)
APPEARANCE UR: ABNORMAL
APPEARANCE UR: ABNORMAL
AST SERPL-CCNC: 10 U/L (ref 15–37)
AST SERPL-CCNC: 12 U/L (ref 15–37)
BACTERIA URNS QL MICRO: ABNORMAL /HPF
BACTERIA URNS QL MICRO: NEGATIVE /HPF
BASOPHILS # BLD: 0.1 K/UL (ref 0–0.1)
BASOPHILS # BLD: 0.1 K/UL (ref 0–0.1)
BASOPHILS NFR BLD: 1 % (ref 0–1)
BASOPHILS NFR BLD: 1 % (ref 0–1)
BILIRUB SERPL-MCNC: 0.4 MG/DL (ref 0.2–1)
BILIRUB SERPL-MCNC: 0.4 MG/DL (ref 0.2–1)
BILIRUB UR QL CFM: NEGATIVE
BILIRUB UR QL CFM: NEGATIVE
BILIRUB UR QL: NEGATIVE
BUN SERPL-MCNC: 12 MG/DL (ref 6–20)
BUN SERPL-MCNC: 12 MG/DL (ref 6–20)
BUN/CREAT SERPL: 12 (ref 12–20)
BUN/CREAT SERPL: 12 (ref 12–20)
CALCIUM SERPL-MCNC: 9.4 MG/DL (ref 8.5–10.1)
CALCIUM SERPL-MCNC: 9.7 MG/DL (ref 8.5–10.1)
CHLORIDE SERPL-SCNC: 108 MMOL/L (ref 97–108)
CHLORIDE SERPL-SCNC: 110 MMOL/L (ref 97–108)
CO2 SERPL-SCNC: 23 MMOL/L (ref 21–32)
CO2 SERPL-SCNC: 26 MMOL/L (ref 21–32)
COLOR UR: ABNORMAL
COLOR UR: ABNORMAL
COMMENT, HOLDF: NORMAL
CREAT SERPL-MCNC: 0.98 MG/DL (ref 0.55–1.02)
CREAT SERPL-MCNC: 1.01 MG/DL (ref 0.55–1.02)
DIFFERENTIAL METHOD BLD: NORMAL
DIFFERENTIAL METHOD BLD: NORMAL
EOSINOPHIL # BLD: 0.1 K/UL (ref 0–0.4)
EOSINOPHIL # BLD: 0.1 K/UL (ref 0–0.4)
EOSINOPHIL NFR BLD: 1 % (ref 0–7)
EOSINOPHIL NFR BLD: 1 % (ref 0–7)
EPITH CASTS URNS QL MICRO: ABNORMAL /LPF
EPITH CASTS URNS QL MICRO: ABNORMAL /LPF
ERYTHROCYTE [DISTWIDTH] IN BLOOD BY AUTOMATED COUNT: 14.1 % (ref 11.5–14.5)
ERYTHROCYTE [DISTWIDTH] IN BLOOD BY AUTOMATED COUNT: 14.1 % (ref 11.5–14.5)
GLOBULIN SER CALC-MCNC: 4.1 G/DL (ref 2–4)
GLOBULIN SER CALC-MCNC: 4.8 G/DL (ref 2–4)
GLUCOSE SERPL-MCNC: 90 MG/DL (ref 65–100)
GLUCOSE SERPL-MCNC: 98 MG/DL (ref 65–100)
GLUCOSE UR STRIP.AUTO-MCNC: NEGATIVE MG/DL
GLUCOSE UR STRIP.AUTO-MCNC: NEGATIVE MG/DL
HCT VFR BLD AUTO: 42.7 % (ref 35–47)
HCT VFR BLD AUTO: 42.9 % (ref 35–47)
HGB BLD-MCNC: 13.8 G/DL (ref 11.5–16)
HGB BLD-MCNC: 14.1 G/DL (ref 11.5–16)
HGB UR QL STRIP: ABNORMAL
HGB UR QL STRIP: ABNORMAL
HYALINE CASTS URNS QL MICRO: ABNORMAL /LPF (ref 0–5)
IMM GRANULOCYTES # BLD AUTO: 0 K/UL (ref 0–0.04)
IMM GRANULOCYTES # BLD AUTO: 0 K/UL (ref 0–0.04)
IMM GRANULOCYTES NFR BLD AUTO: 0 % (ref 0–0.5)
IMM GRANULOCYTES NFR BLD AUTO: 0 % (ref 0–0.5)
KETONES UR QL STRIP.AUTO: ABNORMAL MG/DL
KETONES UR QL STRIP.AUTO: NEGATIVE MG/DL
LEUKOCYTE ESTERASE UR QL STRIP.AUTO: ABNORMAL
LEUKOCYTE ESTERASE UR QL STRIP.AUTO: ABNORMAL
LYMPHOCYTES # BLD: 2.2 K/UL (ref 0.8–3.5)
LYMPHOCYTES # BLD: 2.7 K/UL (ref 0.8–3.5)
LYMPHOCYTES NFR BLD: 25 % (ref 12–49)
LYMPHOCYTES NFR BLD: 28 % (ref 12–49)
MCH RBC QN AUTO: 29.1 PG (ref 26–34)
MCH RBC QN AUTO: 29.3 PG (ref 26–34)
MCHC RBC AUTO-ENTMCNC: 32.2 G/DL (ref 30–36.5)
MCHC RBC AUTO-ENTMCNC: 33 G/DL (ref 30–36.5)
MCV RBC AUTO: 88.8 FL (ref 80–99)
MCV RBC AUTO: 90.3 FL (ref 80–99)
MONOCYTES # BLD: 0.5 K/UL (ref 0–1)
MONOCYTES # BLD: 0.5 K/UL (ref 0–1)
MONOCYTES NFR BLD: 6 % (ref 5–13)
MONOCYTES NFR BLD: 6 % (ref 5–13)
NEUTS SEG # BLD: 5.7 K/UL (ref 1.8–8)
NEUTS SEG # BLD: 6 K/UL (ref 1.8–8)
NEUTS SEG NFR BLD: 64 % (ref 32–75)
NEUTS SEG NFR BLD: 67 % (ref 32–75)
NITRITE UR QL STRIP.AUTO: NEGATIVE
NITRITE UR QL STRIP.AUTO: POSITIVE
NRBC # BLD: 0 K/UL (ref 0–0.01)
NRBC # BLD: 0 K/UL (ref 0–0.01)
NRBC BLD-RTO: 0 PER 100 WBC
NRBC BLD-RTO: 0 PER 100 WBC
PH UR STRIP: 6.5 [PH] (ref 5–8)
PH UR STRIP: 7.5 [PH] (ref 5–8)
PLATELET # BLD AUTO: 286 K/UL (ref 150–400)
PLATELET # BLD AUTO: 319 K/UL (ref 150–400)
PMV BLD AUTO: 10.2 FL (ref 8.9–12.9)
PMV BLD AUTO: 10.8 FL (ref 8.9–12.9)
POTASSIUM SERPL-SCNC: 3.4 MMOL/L (ref 3.5–5.1)
POTASSIUM SERPL-SCNC: 3.6 MMOL/L (ref 3.5–5.1)
PROT SERPL-MCNC: 8.1 G/DL (ref 6.4–8.2)
PROT SERPL-MCNC: 8.6 G/DL (ref 6.4–8.2)
PROT UR STRIP-MCNC: 100 MG/DL
PROT UR STRIP-MCNC: 100 MG/DL
RBC # BLD AUTO: 4.75 M/UL (ref 3.8–5.2)
RBC # BLD AUTO: 4.81 M/UL (ref 3.8–5.2)
RBC #/AREA URNS HPF: >100 /HPF (ref 0–5)
RBC #/AREA URNS HPF: >100 /HPF (ref 0–5)
SAMPLES BEING HELD,HOLD: NORMAL
SODIUM SERPL-SCNC: 139 MMOL/L (ref 136–145)
SODIUM SERPL-SCNC: 140 MMOL/L (ref 136–145)
SP GR UR REFRACTOMETRY: 1.01 (ref 1–1.03)
SP GR UR REFRACTOMETRY: 1.02 (ref 1–1.03)
UA: UC IF INDICATED,UAUC: ABNORMAL
UR CULT HOLD, URHOLD: NORMAL
UROBILINOGEN UR QL STRIP.AUTO: 0.2 EU/DL (ref 0.2–1)
UROBILINOGEN UR QL STRIP.AUTO: 1 EU/DL (ref 0.2–1)
WBC # BLD AUTO: 8.5 K/UL (ref 3.6–11)
WBC # BLD AUTO: 9.4 K/UL (ref 3.6–11)
WBC URNS QL MICRO: ABNORMAL /HPF (ref 0–4)
WBC URNS QL MICRO: ABNORMAL /HPF (ref 0–4)

## 2022-04-19 PROCEDURE — 2709999900 HC NON-CHARGEABLE SUPPLY: Performed by: UROLOGY

## 2022-04-19 PROCEDURE — 74011250636 HC RX REV CODE- 250/636

## 2022-04-19 PROCEDURE — 74011000636 HC RX REV CODE- 636: Performed by: UROLOGY

## 2022-04-19 PROCEDURE — C1769 GUIDE WIRE: HCPCS | Performed by: UROLOGY

## 2022-04-19 PROCEDURE — 80053 COMPREHEN METABOLIC PANEL: CPT

## 2022-04-19 PROCEDURE — 74011250636 HC RX REV CODE- 250/636: Performed by: NURSE ANESTHETIST, CERTIFIED REGISTERED

## 2022-04-19 PROCEDURE — 76060000032 HC ANESTHESIA 0.5 TO 1 HR: Performed by: UROLOGY

## 2022-04-19 PROCEDURE — 94760 N-INVAS EAR/PLS OXIMETRY 1: CPT

## 2022-04-19 PROCEDURE — 76010000138 HC OR TIME 0.5 TO 1 HR: Performed by: UROLOGY

## 2022-04-19 PROCEDURE — 96374 THER/PROPH/DIAG INJ IV PUSH: CPT

## 2022-04-19 PROCEDURE — 77030010509 HC AIRWY LMA MSK TELE -A: Performed by: ANESTHESIOLOGY

## 2022-04-19 PROCEDURE — 99285 EMERGENCY DEPT VISIT HI MDM: CPT

## 2022-04-19 PROCEDURE — 81001 URINALYSIS AUTO W/SCOPE: CPT

## 2022-04-19 PROCEDURE — 36415 COLL VENOUS BLD VENIPUNCTURE: CPT

## 2022-04-19 PROCEDURE — 74011000250 HC RX REV CODE- 250: Performed by: NURSE ANESTHETIST, CERTIFIED REGISTERED

## 2022-04-19 PROCEDURE — 77030019927 HC TBNG IRR CYSTO BAXT -A: Performed by: UROLOGY

## 2022-04-19 PROCEDURE — 74011250636 HC RX REV CODE- 250/636: Performed by: ANESTHESIOLOGY

## 2022-04-19 PROCEDURE — 65270000032 HC RM SEMIPRIVATE

## 2022-04-19 PROCEDURE — 74011250636 HC RX REV CODE- 250/636: Performed by: STUDENT IN AN ORGANIZED HEALTH CARE EDUCATION/TRAINING PROGRAM

## 2022-04-19 PROCEDURE — 74011000250 HC RX REV CODE- 250: Performed by: ANESTHESIOLOGY

## 2022-04-19 PROCEDURE — 85025 COMPLETE CBC W/AUTO DIFF WBC: CPT

## 2022-04-19 PROCEDURE — 74011250636 HC RX REV CODE- 250/636: Performed by: FAMILY MEDICINE

## 2022-04-19 PROCEDURE — C1758 CATHETER, URETERAL: HCPCS | Performed by: UROLOGY

## 2022-04-19 PROCEDURE — 74176 CT ABD & PELVIS W/O CONTRAST: CPT

## 2022-04-19 PROCEDURE — 87086 URINE CULTURE/COLONY COUNT: CPT

## 2022-04-19 PROCEDURE — 74011000250 HC RX REV CODE- 250: Performed by: STUDENT IN AN ORGANIZED HEALTH CARE EDUCATION/TRAINING PROGRAM

## 2022-04-19 PROCEDURE — 96375 TX/PRO/DX INJ NEW DRUG ADDON: CPT

## 2022-04-19 PROCEDURE — 74420 UROGRAPHY RTRGR +-KUB: CPT

## 2022-04-19 PROCEDURE — 76210000017 HC OR PH I REC 1.5 TO 2 HR: Performed by: UROLOGY

## 2022-04-19 RX ORDER — ONDANSETRON 2 MG/ML
4 INJECTION INTRAMUSCULAR; INTRAVENOUS
Status: COMPLETED | OUTPATIENT
Start: 2022-04-19 | End: 2022-04-19

## 2022-04-19 RX ORDER — SODIUM CHLORIDE 0.9 % (FLUSH) 0.9 %
5-40 SYRINGE (ML) INJECTION AS NEEDED
Status: DISCONTINUED | OUTPATIENT
Start: 2022-04-19 | End: 2022-04-21 | Stop reason: HOSPADM

## 2022-04-19 RX ORDER — DIPHENHYDRAMINE HYDROCHLORIDE 50 MG/ML
12.5 INJECTION, SOLUTION INTRAMUSCULAR; INTRAVENOUS AS NEEDED
Status: ACTIVE | OUTPATIENT
Start: 2022-04-19 | End: 2022-04-19

## 2022-04-19 RX ORDER — SODIUM CHLORIDE 0.9 % (FLUSH) 0.9 %
5-40 SYRINGE (ML) INJECTION EVERY 8 HOURS
Status: DISCONTINUED | OUTPATIENT
Start: 2022-04-19 | End: 2022-04-21 | Stop reason: HOSPADM

## 2022-04-19 RX ORDER — SODIUM CHLORIDE 9 MG/ML
25 INJECTION, SOLUTION INTRAVENOUS CONTINUOUS
Status: DISCONTINUED | OUTPATIENT
Start: 2022-04-19 | End: 2022-04-20

## 2022-04-19 RX ORDER — FENTANYL CITRATE 50 UG/ML
25 INJECTION, SOLUTION INTRAMUSCULAR; INTRAVENOUS
Status: COMPLETED | OUTPATIENT
Start: 2022-04-19 | End: 2022-04-19

## 2022-04-19 RX ORDER — SODIUM CHLORIDE 0.9 % (FLUSH) 0.9 %
5-40 SYRINGE (ML) INJECTION EVERY 8 HOURS
Status: DISCONTINUED | OUTPATIENT
Start: 2022-04-19 | End: 2022-04-20 | Stop reason: HOSPADM

## 2022-04-19 RX ORDER — SODIUM CHLORIDE, SODIUM LACTATE, POTASSIUM CHLORIDE, CALCIUM CHLORIDE 600; 310; 30; 20 MG/100ML; MG/100ML; MG/100ML; MG/100ML
100 INJECTION, SOLUTION INTRAVENOUS CONTINUOUS
Status: DISCONTINUED | OUTPATIENT
Start: 2022-04-19 | End: 2022-04-20 | Stop reason: HOSPADM

## 2022-04-19 RX ORDER — FENTANYL CITRATE 50 UG/ML
50 INJECTION, SOLUTION INTRAMUSCULAR; INTRAVENOUS AS NEEDED
Status: DISCONTINUED | OUTPATIENT
Start: 2022-04-19 | End: 2022-04-20

## 2022-04-19 RX ORDER — ACETAMINOPHEN 650 MG/1
650 SUPPOSITORY RECTAL
Status: DISCONTINUED | OUTPATIENT
Start: 2022-04-19 | End: 2022-04-21 | Stop reason: HOSPADM

## 2022-04-19 RX ORDER — VANCOMYCIN 2 GRAM/500 ML IN 0.9 % SODIUM CHLORIDE INTRAVENOUS
2000 ONCE
Status: COMPLETED | OUTPATIENT
Start: 2022-04-19 | End: 2022-04-20

## 2022-04-19 RX ORDER — EPHEDRINE SULFATE/0.9% NACL/PF 50 MG/5 ML
SYRINGE (ML) INTRAVENOUS AS NEEDED
Status: DISCONTINUED | OUTPATIENT
Start: 2022-04-19 | End: 2022-04-19 | Stop reason: HOSPADM

## 2022-04-19 RX ORDER — MIDAZOLAM HYDROCHLORIDE 1 MG/ML
0.5 INJECTION, SOLUTION INTRAMUSCULAR; INTRAVENOUS
Status: DISCONTINUED | OUTPATIENT
Start: 2022-04-19 | End: 2022-04-20 | Stop reason: HOSPADM

## 2022-04-19 RX ORDER — LABETALOL HYDROCHLORIDE 5 MG/ML
10 INJECTION, SOLUTION INTRAVENOUS ONCE
Status: COMPLETED | OUTPATIENT
Start: 2022-04-19 | End: 2022-04-19

## 2022-04-19 RX ORDER — HYDROMORPHONE HYDROCHLORIDE 1 MG/ML
1 INJECTION, SOLUTION INTRAMUSCULAR; INTRAVENOUS; SUBCUTANEOUS ONCE
Status: COMPLETED | OUTPATIENT
Start: 2022-04-19 | End: 2022-04-19

## 2022-04-19 RX ORDER — PROPOFOL 10 MG/ML
INJECTION, EMULSION INTRAVENOUS AS NEEDED
Status: DISCONTINUED | OUTPATIENT
Start: 2022-04-19 | End: 2022-04-19 | Stop reason: HOSPADM

## 2022-04-19 RX ORDER — MORPHINE SULFATE 2 MG/ML
2 INJECTION, SOLUTION INTRAMUSCULAR; INTRAVENOUS
Status: DISCONTINUED | OUTPATIENT
Start: 2022-04-19 | End: 2022-04-20 | Stop reason: HOSPADM

## 2022-04-19 RX ORDER — SODIUM CHLORIDE, SODIUM LACTATE, POTASSIUM CHLORIDE, CALCIUM CHLORIDE 600; 310; 30; 20 MG/100ML; MG/100ML; MG/100ML; MG/100ML
25 INJECTION, SOLUTION INTRAVENOUS CONTINUOUS
Status: DISCONTINUED | OUTPATIENT
Start: 2022-04-19 | End: 2022-04-20

## 2022-04-19 RX ORDER — ACETAMINOPHEN 325 MG/1
650 TABLET ORAL
Status: DISCONTINUED | OUTPATIENT
Start: 2022-04-19 | End: 2022-04-21 | Stop reason: HOSPADM

## 2022-04-19 RX ORDER — LIDOCAINE HYDROCHLORIDE 20 MG/ML
INJECTION, SOLUTION EPIDURAL; INFILTRATION; INTRACAUDAL; PERINEURAL AS NEEDED
Status: DISCONTINUED | OUTPATIENT
Start: 2022-04-19 | End: 2022-04-19 | Stop reason: HOSPADM

## 2022-04-19 RX ORDER — ONDANSETRON 2 MG/ML
INJECTION INTRAMUSCULAR; INTRAVENOUS AS NEEDED
Status: DISCONTINUED | OUTPATIENT
Start: 2022-04-19 | End: 2022-04-19 | Stop reason: HOSPADM

## 2022-04-19 RX ORDER — HYDROCODONE BITARTRATE AND ACETAMINOPHEN 5; 325 MG/1; MG/1
2 TABLET ORAL
COMMUNITY
End: 2022-04-21

## 2022-04-19 RX ORDER — LIDOCAINE HYDROCHLORIDE 10 MG/ML
0.1 INJECTION, SOLUTION EPIDURAL; INFILTRATION; INTRACAUDAL; PERINEURAL AS NEEDED
Status: DISCONTINUED | OUTPATIENT
Start: 2022-04-19 | End: 2022-04-21 | Stop reason: HOSPADM

## 2022-04-19 RX ORDER — DEXAMETHASONE SODIUM PHOSPHATE 4 MG/ML
INJECTION, SOLUTION INTRA-ARTICULAR; INTRALESIONAL; INTRAMUSCULAR; INTRAVENOUS; SOFT TISSUE AS NEEDED
Status: DISCONTINUED | OUTPATIENT
Start: 2022-04-19 | End: 2022-04-19 | Stop reason: HOSPADM

## 2022-04-19 RX ORDER — ONDANSETRON 4 MG/1
4 TABLET, ORALLY DISINTEGRATING ORAL
Status: DISCONTINUED | OUTPATIENT
Start: 2022-04-19 | End: 2022-04-21 | Stop reason: HOSPADM

## 2022-04-19 RX ORDER — MIDAZOLAM HYDROCHLORIDE 1 MG/ML
1 INJECTION, SOLUTION INTRAMUSCULAR; INTRAVENOUS AS NEEDED
Status: DISCONTINUED | OUTPATIENT
Start: 2022-04-19 | End: 2022-04-20

## 2022-04-19 RX ORDER — VANCOMYCIN/0.9 % SOD CHLORIDE 1.5G/250ML
1500 PLASTIC BAG, INJECTION (ML) INTRAVENOUS EVERY 24 HOURS
Status: DISCONTINUED | OUTPATIENT
Start: 2022-04-20 | End: 2022-04-21

## 2022-04-19 RX ORDER — MORPHINE SULFATE 4 MG/ML
8 INJECTION INTRAVENOUS
Status: COMPLETED | OUTPATIENT
Start: 2022-04-19 | End: 2022-04-19

## 2022-04-19 RX ORDER — ROPIVACAINE HYDROCHLORIDE 5 MG/ML
30 INJECTION, SOLUTION EPIDURAL; INFILTRATION; PERINEURAL AS NEEDED
Status: DISCONTINUED | OUTPATIENT
Start: 2022-04-19 | End: 2022-04-21 | Stop reason: HOSPADM

## 2022-04-19 RX ORDER — FENTANYL CITRATE 50 UG/ML
INJECTION, SOLUTION INTRAMUSCULAR; INTRAVENOUS
Status: COMPLETED
Start: 2022-04-19 | End: 2022-04-19

## 2022-04-19 RX ORDER — FENTANYL CITRATE 50 UG/ML
INJECTION, SOLUTION INTRAMUSCULAR; INTRAVENOUS AS NEEDED
Status: DISCONTINUED | OUTPATIENT
Start: 2022-04-19 | End: 2022-04-19 | Stop reason: HOSPADM

## 2022-04-19 RX ORDER — MIDAZOLAM HYDROCHLORIDE 1 MG/ML
INJECTION, SOLUTION INTRAMUSCULAR; INTRAVENOUS AS NEEDED
Status: DISCONTINUED | OUTPATIENT
Start: 2022-04-19 | End: 2022-04-19 | Stop reason: HOSPADM

## 2022-04-19 RX ORDER — ONDANSETRON 2 MG/ML
4 INJECTION INTRAMUSCULAR; INTRAVENOUS
Status: DISCONTINUED | OUTPATIENT
Start: 2022-04-19 | End: 2022-04-21 | Stop reason: HOSPADM

## 2022-04-19 RX ORDER — SODIUM CHLORIDE 0.9 % (FLUSH) 0.9 %
5-40 SYRINGE (ML) INJECTION AS NEEDED
Status: DISCONTINUED | OUTPATIENT
Start: 2022-04-19 | End: 2022-04-20 | Stop reason: HOSPADM

## 2022-04-19 RX ORDER — ONDANSETRON 2 MG/ML
4 INJECTION INTRAMUSCULAR; INTRAVENOUS AS NEEDED
Status: DISCONTINUED | OUTPATIENT
Start: 2022-04-19 | End: 2022-04-20 | Stop reason: HOSPADM

## 2022-04-19 RX ORDER — HYDROMORPHONE HYDROCHLORIDE 1 MG/ML
0.2 INJECTION, SOLUTION INTRAMUSCULAR; INTRAVENOUS; SUBCUTANEOUS
Status: DISCONTINUED | OUTPATIENT
Start: 2022-04-19 | End: 2022-04-20 | Stop reason: HOSPADM

## 2022-04-19 RX ORDER — AMMONIUM LACTATE 12 G/100G
LOTION TOPICAL AS NEEDED
COMMUNITY
End: 2022-04-21

## 2022-04-19 RX ADMIN — HYDROMORPHONE HYDROCHLORIDE 1 MG: 1 INJECTION, SOLUTION INTRAMUSCULAR; INTRAVENOUS; SUBCUTANEOUS at 20:30

## 2022-04-19 RX ADMIN — FENTANYL CITRATE 25 MCG: 50 INJECTION, SOLUTION INTRAMUSCULAR; INTRAVENOUS at 22:50

## 2022-04-19 RX ADMIN — FENTANYL CITRATE 25 MCG: 50 INJECTION, SOLUTION INTRAMUSCULAR; INTRAVENOUS at 22:30

## 2022-04-19 RX ADMIN — LIDOCAINE HYDROCHLORIDE 100 MG: 20 INJECTION, SOLUTION EPIDURAL; INFILTRATION; INTRACAUDAL; PERINEURAL at 22:17

## 2022-04-19 RX ADMIN — ONDANSETRON HYDROCHLORIDE 4 MG: 2 SOLUTION INTRAMUSCULAR; INTRAVENOUS at 18:26

## 2022-04-19 RX ADMIN — LABETALOL HYDROCHLORIDE 10 MG: 5 INJECTION INTRAVENOUS at 21:38

## 2022-04-19 RX ADMIN — MORPHINE SULFATE 8 MG: 4 INJECTION, SOLUTION INTRAMUSCULAR; INTRAVENOUS at 18:26

## 2022-04-19 RX ADMIN — DEXAMETHASONE SODIUM PHOSPHATE 4 MG: 4 INJECTION, SOLUTION INTRAMUSCULAR; INTRAVENOUS at 22:24

## 2022-04-19 RX ADMIN — PROPOFOL 150 MG: 10 INJECTION, EMULSION INTRAVENOUS at 22:17

## 2022-04-19 RX ADMIN — FENTANYL CITRATE 25 MCG: 50 INJECTION, SOLUTION INTRAMUSCULAR; INTRAVENOUS at 22:55

## 2022-04-19 RX ADMIN — SODIUM CHLORIDE, POTASSIUM CHLORIDE, SODIUM LACTATE AND CALCIUM CHLORIDE 25 ML/HR: 600; 310; 30; 20 INJECTION, SOLUTION INTRAVENOUS at 22:14

## 2022-04-19 RX ADMIN — CEFTRIAXONE SODIUM 1 G: 1 INJECTION, POWDER, FOR SOLUTION INTRAMUSCULAR; INTRAVENOUS at 20:30

## 2022-04-19 RX ADMIN — FENTANYL CITRATE 25 MCG: 50 INJECTION, SOLUTION INTRAMUSCULAR; INTRAVENOUS at 23:15

## 2022-04-19 RX ADMIN — Medication 15 MG: at 22:32

## 2022-04-19 RX ADMIN — FENTANYL CITRATE 25 MCG: 50 INJECTION, SOLUTION INTRAMUSCULAR; INTRAVENOUS at 22:23

## 2022-04-19 RX ADMIN — VANCOMYCIN HYDROCHLORIDE 2000 MG: 10 INJECTION, POWDER, LYOPHILIZED, FOR SOLUTION INTRAVENOUS at 23:28

## 2022-04-19 RX ADMIN — ONDANSETRON HYDROCHLORIDE 4 MG: 2 INJECTION, SOLUTION INTRAMUSCULAR; INTRAVENOUS at 22:24

## 2022-04-19 RX ADMIN — FENTANYL CITRATE 25 MCG: 50 INJECTION, SOLUTION INTRAMUSCULAR; INTRAVENOUS at 23:20

## 2022-04-19 RX ADMIN — MIDAZOLAM 2 MG: 1 INJECTION INTRAMUSCULAR; INTRAVENOUS at 22:11

## 2022-04-19 NOTE — ED TRIAGE NOTES
TRIAGE: Pt arrives via EMS from [facility] for HTN /89 in route. Pt reports 9/10 left and right flank pain since Thanksgiving. +nausea. Surgery scheduled on the 22nd to remove kidney stones. VSS in route.

## 2022-04-19 NOTE — ED PROVIDER NOTES
Patient presenting to the ED with hematuria and severe right flank pain. Pain for 2 days. Yesterday took the last of the oxy from Dr. Pj Brandt. Takes no NSAIDs due to concern for bleeding. The history is provided by the patient and medical records. Flank Pain   This is a recurrent problem. The current episode started 2 days ago. The problem has been rapidly worsening. The problem occurs constantly. Patient reports not work related injury. The pain is associated with no known injury. The pain is present in the right side. The quality of the pain is described as stabbing, sharp and similar to previous episodes. The pain does not radiate. The pain is at a severity of 9/10. The pain is severe. The pain is the same all the time. Stiffness is present all day. Associated symptoms include abdominal pain and dysuria. She has tried analgesics for the symptoms. The treatment provided no relief. Risk factors include history of kidney stones.  The patient's surgical history non-contributory        Past Medical History:   Diagnosis Date    Adverse effect of anesthesia 2010    It doesn't put me to sleep easy    Anxiety and depression     Cavernous hemangioma of intracranial structure ([address] 5/2/2011    Fibromyalgia     fibromyalgia    Hemorrhoids     Hiatal hernia with gastroesophageal reflux 04/2018    Hx of migraines     Hx of seizure disorder     Hypertension     Ill-defined condition 1998    Brain lesions    Incomplete right bundle branch block (RBBB) determined by electrocardiography 05/10/2018    Obese     CARLOS (obstructive sleep apnea)     no cpap    Seizures ([address] 6 months old    Since I was a baby       Past Surgical History:   Procedure Laterality Date    COLONOSCOPY N/A 6/3/2019    COLONOSCOPY performed by Adore Carmen MD at [facility] COLONOSCOPY N/A 10/29/2021    COLONOSCOPY performed by Preeti Link MD at [facility] HX HEENT  2012    tonsillectomy    HX LITHOTRIPSY      HX ORTHOPAEDIC      disc fusion , right knee cyst removed    HX OTHER SURGICAL      NEUROLOGICAL PROCEDURE UNLISTED  ,     Cavernous brain angioma(s) removed    SURGERY,BRAIN/SPINE,W/COMPUTER           Family History:   Problem Relation Age of Onset   [place] Cancer Mother         breast    Breast Cancer Mother 76    Headache Mother     Migraines Mother     Cancer Father     Migraines Father     Diabetes Father         Most of Father's side    Alcohol abuse Father         Passed away  12    Breast Cancer Sister 48    Ovarian Cancer Maternal Aunt     Stroke Maternal Aunt        Social History     Socioeconomic History    Marital status: SINGLE     Spouse name: Not on file    Number of children: Not on file    Years of education: Not on file    Highest education level: Not on file   Occupational History    Not on file   Tobacco Use    Smoking status: Former Smoker     Packs/day: 0.00     Years: 0.00     Pack years: 0.00     Quit date: 2016     Years since quittin.3    Smokeless tobacco: Never Used    Tobacco comment: on chantix,   Vaping Use    Vaping Use: Never used   Substance and Sexual Activity    Alcohol use: No    Drug use: Never     Frequency: 4.0 times per week     Types: Marijuana     Comment: 4-5 times a week , LAST 22    Sexual activity: Not Currently     Partners: Male     Birth control/protection: None   Other Topics Concern    Not on file   Social History Narrative    Not on file     Social Determinants of Health     Financial Resource Strain:     Difficulty of Paying Living Expenses: Not on file   Food Insecurity:     Worried About Running Out of Food in the Last Year: Not on file    Los of Food in the Last Year: Not on file   Transportation Needs:     Lack of Transportation (Medical): Not on file    Lack of Transportation (Non-Medical):  Not on file   Physical Activity:     Days of Exercise per Week: Not on file    Minutes of Exercise per Session: Not on file   Stress:     Feeling of Stress : Not on file   Social Connections:     Frequency of Communication with Friends and Family: Not on file    Frequency of Social Gatherings with Friends and Family: Not on file    Attends Judaism Services: Not on file    Active Member of Clubs or Organizations: Not on file    Attends Club or Organization Meetings: Not on file    Marital Status: Not on file   Intimate Partner Violence:     Fear of Current or Ex-Partner: Not on file    Emotionally Abused: Not on file    Physically Abused: Not on file    Sexually Abused: Not on file   Housing Stability:     Unable to Pay for Housing in the Last Year: Not on file    Number of [place] in the Last Year: Not on file    Unstable Housing in the Last Year: Not on file         ALLERGIES: Latex, natural rubber; Aspirin; Other medication; Codeine; Hydralazine; Imitrex [sumatriptan]; and Percocet [oxycodone-acetaminophen]    Review of Systems   Constitutional: Negative. HENT: Negative. Eyes: Negative. Respiratory: Negative. Cardiovascular: Negative. Gastrointestinal: Positive for abdominal pain. Endocrine: Negative. Genitourinary: Positive for dysuria and flank pain. Skin: Negative. Allergic/Immunologic: Negative. Neurological: Negative. Hematological: Negative. Psychiatric/Behavioral: Negative. Vitals:    04/19/22 1618   BP: (!) 168/129   Pulse: 96   Resp: 18   Temp: 97.5 °F (36.4 °C)   SpO2: 96%   Weight: 82.4 kg (181 lb 10.5 oz)   Height: 5' 4\" (1.626 m)            Physical Exam  Vitals and nursing note reviewed. Constitutional:       General: She is in acute distress. Appearance: Normal appearance. She is ill-appearing. HENT:      Head: Normocephalic and atraumatic. Right Ear: External ear normal.      Left Ear: External ear normal.      Nose: Nose normal.   Eyes:      Extraocular Movements: Extraocular movements intact. Conjunctiva/sclera: Conjunctivae normal.   Cardiovascular:      Rate and Rhythm: Normal rate. Pulses: Normal pulses. Radial pulses are 2+ on the right side and 2+ on the left side. Heart sounds: Normal heart sounds. Pulmonary:      Effort: Pulmonary effort is normal.      Breath sounds: Normal breath sounds. Chest:      Chest wall: No deformity or tenderness. Abdominal:      General: Abdomen is flat. There is no distension. Tenderness: There is no abdominal tenderness. There is right CVA tenderness. Musculoskeletal:         General: No deformity or signs of injury. Normal range of motion. Cervical back: Normal range of motion and neck supple. No tenderness. Skin:     General: Skin is warm and dry. Capillary Refill: Capillary refill takes less than 2 seconds. Neurological:      General: No focal deficit present. Mental Status: She is alert and oriented to person, place, and time. Psychiatric:         Attention and Perception: Attention normal.         Mood and Affect: Mood normal.         Behavior: Behavior normal.          [facility]  ED Course as of 04/19/22 2054 Tue Apr 19, 2022   1750 WBC: 8.5 [AL]   1750 HGB: 14.1 [AL]   1846 Nitrites(!): Positive [AL]   1846 Leukocyte Esterase(!): TRACE [AL]   1846 Bacteria(!): 1+ [AL]   1846 Blood(!): LARGE [AL]   1846 WBC: 8.5 [AL]   1846 HGB: 14.1 [AL]   2030 Roseline [email or web address] in preperation for lithotripsy    Dr. Vahe Mercedes from urology was paged, he will come evaluate the patient    Planning for stent. [AL]      ED Course User Index  [AL] Conchita Marin MD     LABORATORY RESULTS:  Labs Reviewed   METABOLIC PANEL, COMPREHENSIVE - Abnormal; Notable for the following components:       Result Value    Potassium 3.4 (*)     Chloride 110 (*)     GFR est non-AA 59 (*)     AST (SGOT) 10 (*)     Alk.  phosphatase 128 (*)     Protein, total 8.6 (*)     Globulin 4.8 (*)     A-G Ratio 0.8 (*)     All other components within normal limits URINALYSIS W/MICROSCOPIC - Abnormal; Notable for the following components:    Appearance CLOUDY (*)     Protein 100 (*)     Ketone TRACE (*)     Blood LARGE (*)     Nitrites Positive (*)     Leukocyte Esterase TRACE (*)     RBC >100 (*)     Bacteria 1+ (*)     All other components within normal limits   URINE CULTURE HOLD SAMPLE   CULTURE, URINE   CBC WITH AUTOMATED DIFF   SAMPLES BEING HELD   BILIRUBIN, CONFIRM       IMAGING RESULTS:  CT ABD PELV WO CONT   Final Result   13 x 8 mm calculus in the distal ureter on the right with moderate   hydroureteronephrosis      Incidental and/or nonemergent findings are as described in detail above. MEDICATIONS GIVEN:  Medications   ondansetron (ZOFRAN) injection 4 mg (4 mg IntraVENous Given 4/19/22 1826)   morphine injection 8 mg (8 mg IntraVENous Given 4/19/22 1826)   cefTRIAXone (ROCEPHIN) 1 g in sterile water (preservative free) 10 mL IV syringe (1 g IntraVENous Given 4/19/22 2030)   HYDROmorphone (DILAUDID) injection 1 mg (1 mg IntraVENous Given 4/19/22 2030)       Differential diagnosis: Obstructive kidney stone, urinary tract infection    ED physician interpretation of imaging: CT shows moving kidney stone in the right ureter with signs of obstruction  ED physician interpretation of laboratory results: You evidence of infection, conservatism    MDM: Patient is a 55-year-old female with a complex medical history presented ED with acute worsening of her right flank pain found to have kidney stone on the right ureter that will likely not pass based on size as well as signs of infection requiring urology consult in the ED, emergent stent placement and hospital admission for further treatment evaluation. Hospital service contacted and patient admitted after stent placement by urology. Antibiotics given. DISPOSITION: Admitted    [address] [place] [address] for Admission  8:54 PM    ED Room Number: ER07/07  Patient Name and age:  Patito Panchal 55 y. o. female  Working Diagnosis:   1. Kidney stone on right side    2. Urinary tract infection with hematuria, site unspecified    3. Hydronephrosis with urinary obstruction due to ureteral calculus        COVID-19 Suspicion:  no  Sepsis present:  no  Reassessment needed: yes  Code Status:  Full Code  Readmission: yes  Isolation Requirements:  no  Recommended Level of Care:  med/surg  Department:  [facility] ED - 21     Other: Patient presented to ED with severe right flank pain found to have obstructing stone. Urology consulted will take to the OR for stent. Antibiotics given. Tomy Child.  Mayra Lewis MD        Procedures

## 2022-04-19 NOTE — PERIOP NOTES
PATIENT GIVEN SURGICAL SITE INFECTION FAQ HANDOUT AND HAND WASHING TIP SHEET. PREOP INSTRUCTIONS REVIEWED AND PATIENT VERBALIZES UNDERSTANDING OF INSTRUCTIONS. PATIENT HAS BEEN GIVEN THE OPPORTUNITY TO ASK ADDITIONAL QUESTIONS. PATIENT GIVEN PREOP CHG SOAP AND PREOP INSTRUCTIONS. PATIENT HAS /125 RT ARM, 9/10 ON PAIN SCALE, PATIENT HERE FOR PREOP APPT. BUT HAS REACHED HER MAX ON HYDROCODONE AND CANNOT GET ANYMORE FROM PHARMACY FOR A FEW DAYS, SURGERY SCHEDULED FOR 4/22/22. 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 PATIENT LIVES IN [place], [place] , STATES SHE HAS NO MONEY FOR CELL PHONE OR FOR RESCUE SQUAD, PATIENT CONSULTED WITH NP GABE HERBERT , RECOMMENDING ER VISIT FOR PAIN MEDICATION UNLESS SHE CAN WAIT TILL SURGERY DAY. PATIENT STATES SHE CAN'T WAIT, TRIED TO CALL UBER FROM OUR OFFICE TODAY, BUT NO TIMELY RESPONSE, PATIENT CALLED 911 FROM OUR OFFICE FOR RIDE FROM RESCUE SQUAD TO [facility] ER DUE TO ELEVATED BP AND INTRACTABLE PAIN, RESCUE SQUAD PICKED PATIENT UP AT 1600 FROM P.A.T.        UNABLE TO COMPLETE PAT INTERVIEW IN PERSON, PATIENT CALLED RESCUE SQUAD , STOPPED AT PATIENT INFORMATION IN CC

## 2022-04-19 NOTE — PERIOP NOTES
[address] INSTRUCTIONS    Surgery Date:   4/22/22    [facility] staff will call you between 4 PM- 8 PM the day before surgery with your arrival time. If your surgery is on a Monday, we will call you the preceding Friday. Please call [phone] after 8 PM if you did not receive your arrival time. 1. Please report to [facility] Patient Access/Admitting on the 1st floor. Bring your insurance card, photo identification, and any copayment ( if applicable). 2. If you are going home the same day of your surgery, you must have a responsible adult to drive you home. You need to have a responsible adult to stay with you the first 24 hours after surgery and you should not drive a car for 24 hours following your surgery. 3. Nothing to eat or drink after midnight the night before surgery. This includes no water, gum, mints, coffee, juice, etc.  Please note special instructions, if applicable, below for medications. 4. Do NOT drink alcohol or smoke 24 hours before surgery. STOP smoking for 14 days prior as it helps with breathing and healing after surgery. 5. If you are being admitted to the hospital, please leave personal belongings/luggage in your car until you have an assigned hospital room number. 6. Please wear comfortable clothes. Wear your glasses instead of contacts. We ask that all money, jewelry and valuables be left at home. Wear no make up, particularly mascara, the day of surgery. 7.  All body piercings, rings, and jewelry need to be removed and left at home. Please remove any nail polish or artificial nails from your fingernails. Please wear your hair loose or down. Please no pony-tails, buns, or any metal hair accessories. If you shower the morning of surgery, please do not apply any lotions or powders afterwards. You may wear deodorant, unless having breast surgery. Do not shave any body area within 24 hours of your surgery.   8. Please follow all instructions to avoid any potential surgical cancellation. 9. Should your physical condition change, (i.e. fever, cold, flu, etc.) please notify your surgeon as soon as possible. 10. It is important to be on time. If a situation occurs where you may be delayed, please call:  [phone] / [phone] on the day of surgery. 11. The Preadmission Testing staff can be reached at [phone]. 12. Special instructions: ANY INSTRUCTIONS PER DR. Diego Vivas OFFICE      Current Outpatient Medications   Medication Sig    ammonium lactate (LAC-HYDRIN) 12 % lotion Apply  to affected area as needed. rub in to affected area well    amLODIPine (NORVASC) 10 mg tablet Take 1 Tablet by mouth daily.  ibuprofen (MOTRIN) 800 mg tablet Take 800 mg by mouth every eight (8) hours as needed for Pain.  famotidine (PEPCID) 20 mg tablet Take 1 Tab by mouth two (2) times a day. (Patient taking differently: Take 20 mg by mouth nightly.)    pantoprazole (PROTONIX) 40 mg tablet Take 40 mg by mouth nightly.  DULoxetine (CYMBALTA) 60 mg capsule Take 120 mg by mouth nightly.  topiramate (TOPAMAX) 25 mg tablet Take 25 mg by mouth nightly.  ALPRAZolam (XANAX) 1 mg tablet Take 1 mg by mouth three (3) times daily.  triamcinolone acetonide (KENALOG) 0.1 % topical cream Apply  to affected area two (2) times a day. use thin layer  Apply to legs (Patient not taking: Reported on 4/19/2022)     No current facility-administered medications for this encounter. 1. YOU MUST ONLY TAKE THESE MEDICATIONS THE MORNING OF SURGERY WITH A SIP OF WATER: XANAX (IF NEEDED ) , AMLODIPINE  2. MEDICATIONS TO TAKE THE MORNING OF SURGERY ONLY IF NEEDED: XANAX, HYDROCODONE (LAST DOSE 4 HOURS PRIOR TO ARRIVAL TIME ON DAY OF SURGERY. 3. HOLD these prescription medications BEFORE Surgery: CREAMS AND LOTIONS , 24 HOURS PRIOR TO SURGERY. MOTRIN, STOP USING MOTRIN TODAY.    4. Ask your surgeon/prescribing physician about when/if to STOP taking these medications: PER DR. Negra Wild OFFICE   5. Stop all vitamins, herbal medicines and Aspirin containing products 7 days prior to surgery. Stop any non-steroidal anti-inflammatory drugs (i.e. Ibuprofen, Naproxen, Advil, Aleve) 3 days before surgery. You may take Tylenol. 6. If you are currently taking Plavix, Coumadin, or any other blood-thinning/anticoagulant medication contact your prescribing physician for instructions. Preventing Infections Before and After - Your Surgery    IMPORTANT INSTRUCTIONS      You play an important role in your health and preparation for surgery. To reduce the germs on your skin you will need to shower with CHG soap (Chorhexidine gluconate 4%) two times before surgery. CHG soap (Hibiclens, Hex-A-Clens or store brand)   CHG soap will be provided at your Preadmission Testing (PAT) appointment.  If you do not have a PAT appointment before surgery, you may arrange to  CHG soap from our office or purchase CHG soap at a pharmacy, grocery or department store.  You need to purchase TWO 4 ounce bottles to use for your 2 showers. Steps to follow:  1. Wash your hair with your normal shampoo and your body with regular soap and rinse well to remove shampoo and soap from your skin. 2. Wet a clean washcloth and turn off the shower. 3. Put CHG soap on washcloth and apply to your entire body from the neck down. Do not use on your head, face or private parts(genitals). Do not use CHG soap on open sores, wounds or areas of skin irritation. 4. Wash you body gently for 5 minutes. Do not wash your skin too hard. This soap does not create lather. Pay special attention to your underarms and from your belly button to your feet. 5. Turn the shower back on and rinse well to get CHG soap off your body. 6. Pat your skin dry with a clean, dry towel. Do not apply lotions or moisturizer. 7. Put on clean clothes and sleep on fresh bed sheets and do not allow pets to sleep with you.     Shower with CHG soap 2 times before your surgery   The evening before your surgery   The morning of your surgery      Tips to help prevent infections after your surgery:  1. Protect your surgical wound from germs:  ? Hand washing is the most important thing you and your caregivers can do to prevent infections. ? Keep your bandage clean and dry! ? Do not touch your surgical wound. 2. Use clean, freshly washed towels and washcloths every time you shower; do not share bath linens with others. 3. Until your surgical wound is healed, wear clothing and sleep on bed linens each day that are clean and freshly washed. 4. Do not allow pets to sleep in your bed with you or touch your surgical wound. 5. Do not smoke - smoking delays wound healing. This may be a good time to stop smoking. 6. If you have diabetes, it is important for you to manage your blood sugar levels properly before your surgery as well as after your surgery. Poorly managed blood sugar levels slow down wound healing and prevent you from healing completely. Patient Information Regarding COVID Restrictions    Day of Procedure     Please park in the parking deck or any designated visitor parking lot.  Enter the facility through the [facility] of the hospitals.   On the day of surgery, please provide the cell phone number of the person who will be waiting for you to the Patient Access representative at the time of registration.  Please wear a mask on the day of your procedure.  We are now allowing two designated visitors per stay. Pediatric patients may have 2 designated visitors. These two people may come in with you on the day of your procedure.  No visitors under the age of 13.  The designated visitor must also wear a mask.    Once your procedure and the immediate recovery period is completed, a nurse in the recovery area will contact your designated visitor to inform them of your room number or to otherwise review other pertinent information regarding your care.  Social distancing practices are to be adhered to in waiting areas and the cafeteria. The patient was contacted  in person. She verbalized understanding of all instructions does not  need reinforcement.

## 2022-04-20 LAB
ANION GAP SERPL CALC-SCNC: 7 MMOL/L (ref 5–15)
BACTERIA SPEC CULT: NORMAL
BASOPHILS # BLD: 0.1 K/UL (ref 0–0.1)
BASOPHILS NFR BLD: 1 % (ref 0–1)
BUN SERPL-MCNC: 12 MG/DL (ref 6–20)
BUN/CREAT SERPL: 12 (ref 12–20)
CALCIUM SERPL-MCNC: 9.1 MG/DL (ref 8.5–10.1)
CHLORIDE SERPL-SCNC: 112 MMOL/L (ref 97–108)
CO2 SERPL-SCNC: 22 MMOL/L (ref 21–32)
CREAT SERPL-MCNC: 1.01 MG/DL (ref 0.55–1.02)
DIFFERENTIAL METHOD BLD: ABNORMAL
EOSINOPHIL # BLD: 0 K/UL (ref 0–0.4)
EOSINOPHIL NFR BLD: 0 % (ref 0–7)
ERYTHROCYTE [DISTWIDTH] IN BLOOD BY AUTOMATED COUNT: 14 % (ref 11.5–14.5)
GLUCOSE SERPL-MCNC: 109 MG/DL (ref 65–100)
HCT VFR BLD AUTO: 41 % (ref 35–47)
HGB BLD-MCNC: 13.4 G/DL (ref 11.5–16)
IMM GRANULOCYTES # BLD AUTO: 0 K/UL (ref 0–0.04)
IMM GRANULOCYTES NFR BLD AUTO: 0 % (ref 0–0.5)
LYMPHOCYTES # BLD: 1 K/UL (ref 0.8–3.5)
LYMPHOCYTES NFR BLD: 11 % (ref 12–49)
MCH RBC QN AUTO: 29.6 PG (ref 26–34)
MCHC RBC AUTO-ENTMCNC: 32.7 G/DL (ref 30–36.5)
MCV RBC AUTO: 90.7 FL (ref 80–99)
MONOCYTES # BLD: 0.2 K/UL (ref 0–1)
MONOCYTES NFR BLD: 2 % (ref 5–13)
NEUTS SEG # BLD: 8.1 K/UL (ref 1.8–8)
NEUTS SEG NFR BLD: 86 % (ref 32–75)
NRBC # BLD: 0 K/UL (ref 0–0.01)
NRBC BLD-RTO: 0 PER 100 WBC
PLATELET # BLD AUTO: 284 K/UL (ref 150–400)
PMV BLD AUTO: 11 FL (ref 8.9–12.9)
POTASSIUM SERPL-SCNC: 3.6 MMOL/L (ref 3.5–5.1)
RBC # BLD AUTO: 4.52 M/UL (ref 3.8–5.2)
SERVICE CMNT-IMP: NORMAL
SODIUM SERPL-SCNC: 141 MMOL/L (ref 136–145)
WBC # BLD AUTO: 9.4 K/UL (ref 3.6–11)

## 2022-04-20 PROCEDURE — 0T768DZ DILATION OF RIGHT URETER WITH INTRALUMINAL DEVICE, VIA NATURAL OR ARTIFICIAL OPENING ENDOSCOPIC: ICD-10-PCS | Performed by: UROLOGY

## 2022-04-20 PROCEDURE — 74011250637 HC RX REV CODE- 250/637: Performed by: HOSPITALIST

## 2022-04-20 PROCEDURE — 80048 BASIC METABOLIC PNL TOTAL CA: CPT

## 2022-04-20 PROCEDURE — 74011250637 HC RX REV CODE- 250/637: Performed by: FAMILY MEDICINE

## 2022-04-20 PROCEDURE — 74011000250 HC RX REV CODE- 250: Performed by: FAMILY MEDICINE

## 2022-04-20 PROCEDURE — 74011250636 HC RX REV CODE- 250/636: Performed by: NURSE PRACTITIONER

## 2022-04-20 PROCEDURE — 74011250636 HC RX REV CODE- 250/636: Performed by: UROLOGY

## 2022-04-20 PROCEDURE — 85025 COMPLETE CBC W/AUTO DIFF WBC: CPT

## 2022-04-20 PROCEDURE — 74011250636 HC RX REV CODE- 250/636: Performed by: FAMILY MEDICINE

## 2022-04-20 PROCEDURE — 65270000032 HC RM SEMIPRIVATE

## 2022-04-20 PROCEDURE — 74011250637 HC RX REV CODE- 250/637: Performed by: NURSE PRACTITIONER

## 2022-04-20 PROCEDURE — 36415 COLL VENOUS BLD VENIPUNCTURE: CPT

## 2022-04-20 PROCEDURE — 74011000258 HC RX REV CODE- 258: Performed by: FAMILY MEDICINE

## 2022-04-20 RX ORDER — TOPIRAMATE 25 MG/1
25 TABLET ORAL
Status: DISCONTINUED | OUTPATIENT
Start: 2022-04-20 | End: 2022-04-21 | Stop reason: HOSPADM

## 2022-04-20 RX ORDER — PANTOPRAZOLE SODIUM 40 MG/1
40 TABLET, DELAYED RELEASE ORAL
Status: DISCONTINUED | OUTPATIENT
Start: 2022-04-20 | End: 2022-04-21 | Stop reason: HOSPADM

## 2022-04-20 RX ORDER — LANOLIN ALCOHOL/MO/W.PET/CERES
3 CREAM (GRAM) TOPICAL
Status: DISCONTINUED | OUTPATIENT
Start: 2022-04-20 | End: 2022-04-21 | Stop reason: HOSPADM

## 2022-04-20 RX ORDER — DULOXETIN HYDROCHLORIDE 60 MG/1
120 CAPSULE, DELAYED RELEASE ORAL
Status: DISCONTINUED | OUTPATIENT
Start: 2022-04-20 | End: 2022-04-21 | Stop reason: HOSPADM

## 2022-04-20 RX ORDER — CLONIDINE HYDROCHLORIDE 0.1 MG/1
0.1 TABLET ORAL
Status: DISCONTINUED | OUTPATIENT
Start: 2022-04-20 | End: 2022-04-21 | Stop reason: HOSPADM

## 2022-04-20 RX ORDER — AMLODIPINE BESYLATE 5 MG/1
10 TABLET ORAL DAILY
Status: DISCONTINUED | OUTPATIENT
Start: 2022-04-21 | End: 2022-04-21 | Stop reason: HOSPADM

## 2022-04-20 RX ORDER — ALPRAZOLAM 1 MG/1
1 TABLET ORAL 3 TIMES DAILY
Status: DISCONTINUED | OUTPATIENT
Start: 2022-04-20 | End: 2022-04-20 | Stop reason: SDUPTHER

## 2022-04-20 RX ORDER — FENTANYL CITRATE 50 UG/ML
50 INJECTION, SOLUTION INTRAMUSCULAR; INTRAVENOUS
Status: DISCONTINUED | OUTPATIENT
Start: 2022-04-20 | End: 2022-04-21 | Stop reason: HOSPADM

## 2022-04-20 RX ORDER — HYDROCHLOROTHIAZIDE 25 MG/1
12.5 TABLET ORAL DAILY
Status: DISCONTINUED | OUTPATIENT
Start: 2022-04-20 | End: 2022-04-21

## 2022-04-20 RX ORDER — LIDOCAINE 4 G/100G
1 PATCH TOPICAL EVERY 24 HOURS
Status: DISCONTINUED | OUTPATIENT
Start: 2022-04-20 | End: 2022-04-21 | Stop reason: HOSPADM

## 2022-04-20 RX ORDER — TRAMADOL HYDROCHLORIDE 50 MG/1
50 TABLET ORAL
Status: DISCONTINUED | OUTPATIENT
Start: 2022-04-20 | End: 2022-04-21 | Stop reason: HOSPADM

## 2022-04-20 RX ORDER — ALPRAZOLAM 1 MG/1
1 TABLET ORAL 3 TIMES DAILY
Status: DISCONTINUED | OUTPATIENT
Start: 2022-04-20 | End: 2022-04-21 | Stop reason: HOSPADM

## 2022-04-20 RX ORDER — FAMOTIDINE 20 MG/1
20 TABLET, FILM COATED ORAL
Status: DISCONTINUED | OUTPATIENT
Start: 2022-04-20 | End: 2022-04-21 | Stop reason: HOSPADM

## 2022-04-20 RX ADMIN — FENTANYL CITRATE 50 MCG: 50 INJECTION INTRAMUSCULAR; INTRAVENOUS at 18:30

## 2022-04-20 RX ADMIN — HYDROCHLOROTHIAZIDE 12.5 MG: 25 TABLET ORAL at 04:33

## 2022-04-20 RX ADMIN — FENTANYL CITRATE 50 MCG: 50 INJECTION INTRAMUSCULAR; INTRAVENOUS at 01:23

## 2022-04-20 RX ADMIN — MEROPENEM 1 G: 1 INJECTION, POWDER, FOR SOLUTION INTRAVENOUS at 03:25

## 2022-04-20 RX ADMIN — MEROPENEM 1 G: 1 INJECTION, POWDER, FOR SOLUTION INTRAVENOUS at 18:30

## 2022-04-20 RX ADMIN — DULOXETINE HYDROCHLORIDE 120 MG: 60 CAPSULE, DELAYED RELEASE ORAL at 22:54

## 2022-04-20 RX ADMIN — TOPIRAMATE 25 MG: 25 TABLET, FILM COATED ORAL at 22:54

## 2022-04-20 RX ADMIN — ALPRAZOLAM 1 MG: 1 TABLET ORAL at 12:21

## 2022-04-20 RX ADMIN — ALPRAZOLAM 1 MG: 1 TABLET ORAL at 21:35

## 2022-04-20 RX ADMIN — CLONIDINE HYDROCHLORIDE 0.1 MG: 0.1 TABLET ORAL at 22:54

## 2022-04-20 RX ADMIN — SODIUM CHLORIDE 25 ML/HR: 9 INJECTION, SOLUTION INTRAVENOUS at 01:39

## 2022-04-20 RX ADMIN — TRAMADOL HYDROCHLORIDE 50 MG: 50 TABLET, COATED ORAL at 04:33

## 2022-04-20 RX ADMIN — FENTANYL CITRATE 50 MCG: 50 INJECTION INTRAMUSCULAR; INTRAVENOUS at 08:57

## 2022-04-20 RX ADMIN — ALPRAZOLAM 1 MG: 1 TABLET ORAL at 15:46

## 2022-04-20 RX ADMIN — PANTOPRAZOLE SODIUM 40 MG: 40 TABLET, DELAYED RELEASE ORAL at 22:54

## 2022-04-20 RX ADMIN — TRAMADOL HYDROCHLORIDE 50 MG: 50 TABLET, COATED ORAL at 10:25

## 2022-04-20 RX ADMIN — FAMOTIDINE 20 MG: 20 TABLET, FILM COATED ORAL at 22:54

## 2022-04-20 RX ADMIN — MEROPENEM 1 G: 1 INJECTION, POWDER, FOR SOLUTION INTRAVENOUS at 12:21

## 2022-04-20 NOTE — CONSULTS
Urology Consult    Subjective:     Date of Consultation:  April 19, 2022    Referring Physician: Nadeen Jameson    Reason for Consultation:  R obstructing ureteral stone with UTI    History of Present Illness:   Patient is a 55 y.o.  female who is being seen for obstructing R ureteral stone. She had a known large R UPJ stone and was at PAT getting ready for ESWL when pain noted much worse and reported gross hematuria and dysuria and frequency. Sent to ER with worsening Htn as well. CT done showing stone now in the R distal ureteral and UA with nitrite positive with 1+ bacteria. Creatinine normal and WBC 8.5. Patient requiring larger amounts of narcotics to control pain.     Past Medical History:   Diagnosis Date    Adverse effect of anesthesia 2010    It doesn't put me to sleep easy    Anxiety and depression     Cavernous hemangioma of intracranial structure ([address] 5/2/2011    Fibromyalgia     fibromyalgia    Hemorrhoids     Hiatal hernia with gastroesophageal reflux 04/2018    Hx of migraines     Hx of seizure disorder     Hypertension     Ill-defined condition 1998    Brain lesions    Incomplete right bundle branch block (RBBB) determined by electrocardiography 05/10/2018    Obese     CARLOS (obstructive sleep apnea)     no cpap    Seizures ([address] 6 months old    Since I was a baby      Past Surgical History:   Procedure Laterality Date    COLONOSCOPY N/A 6/3/2019    COLONOSCOPY performed by Adore Carmen MD at [facility] COLONOSCOPY N/A 10/29/2021    COLONOSCOPY performed by Preeti Link MD at [address] [facility] [address] [address] HX HEENT  2012    tonsillectomy    HX LITHOTRIPSY      HX ORTHOPAEDIC      disc fusion 2010, right knee cyst removed    HX OTHER SURGICAL      NEUROLOGICAL PROCEDURE UNLISTED  1996, 2006    Cavernous brain angioma(s) removed    SURGERY,BRAIN/SPINE,W/COMPUTER        Family History   Problem Relation Age of Onset    Cancer Mother         breast    Breast Cancer Mother 76    Headache Mother     Migraines Mother     Cancer Father     Migraines Father     Diabetes Father         Most of Father's side    Alcohol abuse Father         Passed away  12    Breast Cancer Sister 48    Ovarian Cancer Maternal Aunt     Stroke Maternal Aunt       Social History     Tobacco Use    Smoking status: Former Smoker     Packs/day: 0.00     Years: 0.00     Pack years: 0.00     Quit date: 2016     Years since quittin.3    Smokeless tobacco: Never Used    Tobacco comment: on chantix,   Substance Use Topics    Alcohol use: No     Allergies   Allergen Reactions    Latex, Natural Rubber Rash    Aspirin Other (comments)     Excessive bleeding      Other Medication Other (comments)     Patient states intolerance to blood thinners due to angiomas    Codeine Palpitations    Hydralazine Other (comments)     Pt reports \"burning/tingling\" sensation in abd, flanks, and into head.  Imitrex [Sumatriptan] Palpitations and Other (comments)     thougt she was having an MI    Percocet [Oxycodone-Acetaminophen] Palpitations      Prior to Admission medications    Medication Sig Start Date End Date Taking? Authorizing Provider   ammonium lactate (LAC-HYDRIN) 12 % lotion Apply  to affected area as needed. rub in to affected area well    Provider, Historical   HYDROcodone-acetaminophen (NORCO) 5-325 mg per tablet Take 2 Tablets by mouth every four (4) hours as needed for Pain. PATIENT HAS RUN OUT OF PAIN MEDICATIONS, SURGEONS OFFICE. Provider, Historical   amLODIPine (NORVASC) 10 mg tablet Take 1 Tablet by mouth daily. 10/30/21   Khalif Glover MD   triamcinolone acetonide (KENALOG) 0.1 % topical cream Apply  to affected area two (2) times a day. use thin layer  Apply to legs  Patient not taking: Reported on 2022    Provider, Historical   ibuprofen (MOTRIN) 800 mg tablet Take 800 mg by mouth every eight (8) hours as needed for Pain.     Provider, Historical famotidine (PEPCID) 20 mg tablet Take 1 Tab by mouth two (2) times a day. Patient taking differently: Take 20 mg by mouth nightly. 3/11/20   Ashley Knight MD   pantoprazole (PROTONIX) 40 mg tablet Take 40 mg by mouth nightly. Provider, Historical   DULoxetine (CYMBALTA) 60 mg capsule Take 120 mg by mouth nightly. Other, MD Nirmal   topiramate (TOPAMAX) 25 mg tablet Take 25 mg by mouth nightly. Other, MD Nirmal   ALPRAZolam Clifm Combe) 1 mg tablet Take 1 mg by mouth three (3) times daily. Provider, Historical         Review of Systems:  Pertinent items are noted in HPI. Patient Vitals for the past 8 hrs:   BP Temp Pulse Resp SpO2 Height Weight   22 (!) 179/139 98.1 °F (36.7 °C) 87 18 98 % -- --   22 (!) 162/129 -- 86 13 (!) 89 % -- --   22 194 (!) 180/129 -- 95 22 96 % -- --   22 1618 (!) 168/129 97.5 °F (36.4 °C) 96 18 96 % 5' 4\" (1.626 m) 82.4 kg (181 lb 10.5 oz)     Temp (24hrs), Av.1 °F (36.7 °C), Min:97.5 °F (36.4 °C), Max:98.6 °F (37 °C)      Intake and Output:   No intake/output data recorded. Physical Exam:  EXAMINATION:     Appearance: well-developed NAD   Conjunctiva/Lids: conjunctivae and lids normal   External Ears/Nose: normal no lesions or deformities   Neck: trachea midline   Respiratory Effort: breathing easily   Lower Extremity: no edema   Abdomen/Flank: soft with R flank and lower quadrant tenderness without rebound or guarding   Liver/Spleen: no organomegaly   Hernia: no ventral hernia    Gait/Station: In bed without obvious weakness   Skin Inspection: warm and dry   Mood/Affect: normal            Assessment:     R mid to distal ureteral stone with bacteria in urine and irritative voiding    Plan:     Discussed options with the patient. With stone, pain, and likely UTI, we are going to proceed with cystoscopy and R ureteral stent insertion.   Risks and benefits reviewed including risk of inability to place stent, injury to ureter and need for future definitive stone treatment.

## 2022-04-20 NOTE — PROGRESS NOTES
Spiritual Care Assessment/Progress Note  [address]      NAME: Earnest Jurado      MRN: [id number]  AGE: 55 y.o.  SEX: female  Latter-day Affiliation: Cheondoism   Language: English     4/20/2022     Total Time (in minutes): 5     Spiritual Assessment begun in [facility] [phone] through conversation with:         [x]Patient        [] Family    [] Friend(s)        Reason for Consult: [facility]     Spiritual beliefs: (Please include comment if needed)     [x] Identifies with a joey tradition: Cheondoism        [x] Supported by a joey community: None does'nt drive            [] Claims no spiritual orientation:           [] Seeking spiritual identity:                [] Adheres to an individual form of spirituality:           [] Not able to assess:                           Identified resources for coping:      [x] Prayer                               [x] Music                  [] Guided Imagery     [x] Family/friends                 [] Pet visits     [] Devotional reading                         [] Unknown     [] Other:                                              Interventions offered during this visit: (See comments for more details)    Patient Interventions: Affirmation of joey,Communion (Cheondoism),Catharsis/review of pertinent events in supportive environment,Initial/Spiritual assessment, patient floor,Prayer (actual),Prayer (assurance of)           Plan of Care:     [x] Support spiritual and/or cultural needs    [] Support AMD and/or advance care planning process      [] Support grieving process   [] Coordinate Rites and/or Rituals    [] Coordination with community clergy   [] No spiritual needs identified at this time   [] Detailed Plan of Care below (See Comments)  [] Make referral to Music Therapy  [] Make referral to Pet Therapy     [] Make referral to Addiction services  [] Make referral to [facility]  [] Make referral to Spiritual Care Partner  [] No future visits requested        [] Contact Spiritual Care for further referrals     Comments: Mrs. Huy Courtney declined communion. Talked about growing up in [address] [place] [address] in the public school and never finished her classes for the Sacraments in the [employer]. She currently is here with her mother and she does not drive so she is not connected with a specific parish. She still has her spiritual practice of prayers. Gave her the card with the spiritual [facility] prayer on it so she can include this in her prayer.     SOFIA Anderson, RN, ACSW, LCSW   Page:  [phone]

## 2022-04-20 NOTE — PERIOP NOTES
Contour Soft Percuflex Material Ureteral Stent 6 F x 28 cm. Ref [id number], Lot [id number]. Expires 2024/10/10. Placed in Right Ureter.

## 2022-04-20 NOTE — BRIEF OP NOTE
Brief Postoperative Note    Patient: Andrei Wood  YOB: 1965  MRN: [id number]    Date of Procedure: 4/19/2022     Pre-Op Diagnosis: Right Ureteral Stone    Post-Op Diagnosis: Same as preoperative diagnosis.       Procedure(s):  CYSTOSCOPY RIGHT DOUBLE J URETERAL STENT INSERTION    Surgeon(s):  Suzanne Jones MD    Surgical Assistant: None    Anesthesia: General     Estimated Blood Loss (mL): Minimal    Complications: None    Specimens: * No specimens in log *     Implants: * No implants in log *    Drains: 6 by 29 R JJ stent    Findings: Obstructing large R mid ureteral stone with mildly purulent drainage with insertion    Electronically Signed by Cameron Robbins MD on 4/19/2022 at 10:39 PM

## 2022-04-20 NOTE — PROGRESS NOTES
TRANSFER - OUT REPORT:    Verbal report given to Ray RN(name) on Patito Panchal  being transferred to [facility](unit) for routine progression of care       Report consisted of patients Situation, Background, Assessment and   Recommendations(SBAR). Time Pre op antibiotic given:er -2030  Anesthesia Stop time: 1480  Hopkins Present on Transfer to floor:n  Order for Hopkins on Chart:n  Discharge Prescriptions with Chart:n    Information from the following report(s) SBAR, Kardex, ED Summary, Procedure Summary and MAR was reviewed with the receiving nurse. Opportunity for questions and clarification was provided. Is the patient on 02? NO       L/Min       Other     Is the patient on a monitor? NO    Is the nurse transporting with the patient? NO    Surgical Waiting Area notified of patient's transfer from PACU? NO      The following personal items collected during your admission accompanied patient upon transfer:   Dental Appliance: Dental Appliances: None  Vision: Visual Aid: None  Hearing Aid:    Jewelry: Jewelry: None  Clothing: Clothing: Other (comment) (belongings in Admire)  Other Valuables: Other Valuables: Purse (in pacu)  Valuables sent to safe:       Bag of clotheswith cell phone

## 2022-04-20 NOTE — PROGRESS NOTES
Pharmacist Note - Vancomycin Dosing    Consult provided for this 55 y.o. female for indication of UTI. Antibiotic regimen(s): Vanc  Patient on vancomycin PTA? NO     Recent Labs     22  1734 22  1518   WBC 8.5 9.4   CREA 0.98 1.01   BUN 12 12     Frequency of BMP: daily x 3  Height: 162.6 cm  Weight: 82.4 kg  Est CrCl: 67 ml/min; UO: -- ml/kg/hr  Temp (24hrs), Av.1 °F (36.7 °C), Min:97.5 °F (36.4 °C), Max:98.6 °F (37 °C)    Cultures:   urine - pending    MRSA Swab ordered (if applicable)? N/A    The plan below is expected to result in a target range of AUC/LUCIANA 400-500    Therapy will be initiated with a loading dose of 2000 mg IV x 1 to be followed by a maintenance dose of 1500 mg IV every 24 hours. Pharmacy to follow patient daily and order levels / make dose adjustments as appropriate. *Vancomycin has been dosed used Bayesian kinetics software to target an AUC/LUCIANA of 400-600, which provides adequate exposure for an assumed infection due to MRSA with an LUCIANA of 1 or less while reducing the risk of nephrotoxicity as seen with traditional trough based dosing goals.

## 2022-04-20 NOTE — OP NOTES
[address] [facility]  OPERATIVE REPORT    Name:  Rj ELIZABETH  MR#:  [id number]  :  1965  ACCOUNT #:  [de-identified]  DATE OF SERVICE:  2022      PREOPERATIVE DIAGNOSES:  Right ureteral stone and urinary tract infection. POSTOPERATIVE DIAGNOSES:  Right ureteral stone and urinary tract infection. PROCEDURES PERFORMED:  Cystoscopy with right retrograde pyelogram and right double-J stent insertion under fluoroscopy. SURGEON:  Arden Theodore MD    ASSISTANT:  None. ANESTHESIA:  General.    COMPLICATIONS:  None. SPECIMENS REMOVED:  None. IMPLANTS:  None    ESTIMATED BLOOD LOSS:  None. FINDINGS:  Obstructing large right mid ureteral stone and mildly purulent urine with stent insertion. DRAINS:  6 x 28 double-J stent. DISPOSITION:  PACU. CHIEF COMPLAINT AND REASON FOR PROCEDURE:  This is a 52-year-old female with a history of stones in the past.  She has been diagnosed with a large right UPJ stone and was scheduled for lithotripsy later this week. Unfortunately, she had worsening pain and was seen in [facility] today. Due to the level of discomfort, she went to the emergency room. Repeat CT imaging showed the stone was in the mid ureter. She had a significant elevation in blood pressure and was requiring narcotics at higher amounts to control pain. Urinalysis also appeared to be infected. Options for treatment were discussed. She elected to proceed with urgent stenting. Risks and benefits including bleeding and infection, risk of injury to the ureter as well as need for further definitive treatment at a later date discussed. PROCEDURE:  This patient was taken to the operating room and placed supine on the operating room table. General anesthesia was established, prepped and draped in the usual sterile fashion in lithotomy position. A 21-Malaysian cystoscope was inserted transurethrally into the patient's bladder.   No stones were noted within the bladder. The urine in the bladder was bloody and mildly purulent overall. The right ureteral orifice was identified. Fluoroscopically, there appeared to be a large over 1 cm mid ureteral stone. A 0.035 Sensor wire was placed by the stone. An Angiocath was placed over this up to the level of the renal pelvis. I removed the wire and brisk drainage was noted. This was mildly purulent. I shot a minimal amount of contrast to gauge overall ureteral length. The wire was then re-inserted and a 6 x 28 double-J stent was placed over the wire. A good curl was confirmed both proximally and distally. The stent continued to drain very well, again mildly purulent urine. The bladder was then drained. She was awakened and taken to PACU in stable condition. She tolerated the procedure well.         MD DEREK Greco/[email or web address]_UCHE/[email or web address]  D:  04/19/2022 23:13  T:  04/20/2022 4:35  JOB #:  [id number]

## 2022-04-20 NOTE — PROGRESS NOTES
Transition of Care: TBD; likely home with f/u with specialist    Transport Plan: TBD; possibly in car with family vs roundtrip    RUR: 10%    DX: obstructive uropathy    Main contact is mother- Alfredo Wen- [phone]    Discharge pending:  -patient is POD#1 stent placement for urology  -pending pain control  -patient continues on IV antibiotics  -pending medical progress and care recommendations    33 [phone] 74: this CM met with patient at bedside; she is alert and oriented x 3; patient states she lives at stated address with her mother who is has some dementia; patient is normally independent in her ADLs; patient states her sister lives in [place];  patient confirms her insurance as [employer]; patient has concerns about paying her medical bills; patient would like to be screened for medicaid; this CM sent request to [employer] to screen for medicaid    Reason for Admission:   obstructive uropathy                     RUR Score:  10%                   Plan for utilizing home health:          PCP: First and Last name:  Andra Oviedo MD     Name of Practice:    Are you a current patient: Yes/No: yes   Approximate date of last visit:    Can you participate in a virtual visit with your PCP: unknown                    Current Advanced Directive/Advance Care Plan: Full Code      Healthcare Decision Maker:   Click here to complete [address] including selection of the Healthcare Decision Maker Relationship (ie \"Primary\")                             Transition of Care Plan:    Likely home with f/u with specialist and transport in car with family or roundtrip

## 2022-04-20 NOTE — H&P
[address] [facility] [place]. [employer] [facility] [employer] [facility] Adult  Hospitalist Group  History and Physical    Date of Service:  4/19/2022  Primary Care Provider: João Smith MD  Source of information: The patient    Chief Complaint: Hypertension      History of Presenting Illness:   Karely Lobato is a 55 y.o. female with a pmhx HTN, anxiety and dperession who presents with right flank pain, and is being admitted for R obstructive hydronephrosis with UTI. She has been experiencing intermittent R flank pain since 11/2021. She was referred to urology by her PCP, and found to have R UPJ stone. Today at her preop appt for ESWL, she developed intractable pain with hematuria. She called 9-1-1 from the office, and was transported to [facility] for further evaluation. She denies fever, chills, nausea, or vomiting. In the ED,  BP was elevated to 168/129. Labs were significant for UA with trace leukocyte esterase, nitrites, 1+ bacteria, and large blood, and K+ 3.4. CT abdomen/pelvis showed 13 by 8mm calculus in right distal ureter with moderate hydroureteronephrosis. In the ED, she received rocephin, dilaudid, labetalol, and morphine. Urology was consulted, and she underwent cystoscopy with R ureteral stent placement. REVIEW OF SYSTEMS:  A comprehensive review of systems was negative except for that written in the History of Present Illness.      Past Medical History:   Diagnosis Date    Adverse effect of anesthesia 2010    It doesn't put me to sleep easy    Anxiety and depression     Cavernous hemangioma of intracranial structure ([facility] [address] 5/2/2011    Fibromyalgia     fibromyalgia    Hemorrhoids     Hiatal hernia with gastroesophageal reflux 04/2018    Hx of migraines     Hx of seizure disorder     Hypertension     Ill-defined condition 1998    Brain lesions    Incomplete right bundle branch block (RBBB) determined by electrocardiography 05/10/2018    Obese     CARLOS (obstructive sleep apnea)     no cpap    Seizures ([address] 6 months old    Since I was a baby      Past Surgical History:   Procedure Laterality Date    COLONOSCOPY N/A 6/3/2019    COLONOSCOPY performed by Taisha Griffith MD at [address] [facility] COLONOSCOPY N/A 10/29/2021    COLONOSCOPY performed by Paola Serra MD at [address] [facility] HX CHOLECYSTECTOMY      HX HEENT  2012    tonsillectomy    HX LITHOTRIPSY      HX ORTHOPAEDIC      disc fusion 2010, right knee cyst removed    HX OTHER SURGICAL     [address] [place] [address], 2006    Cavernous brain angioma(s) removed    SURGERY,BRAIN/SPINE,W/COMPUTER       Prior to Admission medications    Medication Sig Start Date End Date Taking? Authorizing Provider   ammonium lactate (LAC-HYDRIN) 12 % lotion Apply  to affected area as needed. rub in to affected area well    Provider, Historical   HYDROcodone-acetaminophen (NORCO) 5-325 mg per tablet Take 2 Tablets by mouth every four (4) hours as needed for Pain. PATIENT HAS RUN OUT OF PAIN MEDICATIONS, SURGEONS OFFICE. Provider, Historical   amLODIPine (NORVASC) 10 mg tablet Take 1 Tablet by mouth daily. 10/30/21   Juan J Escudero MD   triamcinolone acetonide (KENALOG) 0.1 % topical cream Apply  to affected area two (2) times a day. use thin layer  Apply to legs  Patient not taking: Reported on 4/19/2022    Provider, Historical   ibuprofen (MOTRIN) 800 mg tablet Take 800 mg by mouth every eight (8) hours as needed for Pain. Provider, Historical   famotidine (PEPCID) 20 mg tablet Take 1 Tab by mouth two (2) times a day. Patient taking differently: Take 20 mg by mouth nightly. 3/11/20   Germán Knight MD   pantoprazole (PROTONIX) 40 mg tablet Take 40 mg by mouth nightly. Provider, Historical   DULoxetine (CYMBALTA) 60 mg capsule Take 120 mg by mouth nightly. Other, MD Nirmal   topiramate (TOPAMAX) 25 mg tablet Take 25 mg by mouth nightly. Other, MD Nirmal   ALPRAZolam Christia Priestly) 1 mg tablet Take 1 mg by mouth three (3) times daily.     Provider, Historical     Allergies   Allergen Reactions    Latex, Natural Rubber Rash    Aspirin Other (comments)     Excessive bleeding      Other Medication Other (comments)     Patient states intolerance to blood thinners due to angiomas    Codeine Palpitations    Hydralazine Other (comments)     Pt reports \"burning/tingling\" sensation in abd, flanks, and into head.  Imitrex [Sumatriptan] Palpitations and Other (comments)     thougt she was having an MI    Percocet [Oxycodone-Acetaminophen] Palpitations      Family History   Problem Relation Age of Onset    Cancer Mother         breast    Breast Cancer Mother 76    Headache Mother     Migraines Mother     Cancer Father     Migraines Father     Diabetes Father         Most of Father's side    Alcohol abuse Father         Passed away  12    Breast Cancer Sister 48    Ovarian Cancer Maternal Aunt     Stroke Maternal Aunt       Social History:  reports that she quit smoking about 5 years ago. She smoked 0.00 packs per day for 0.00 years. She has never used smokeless tobacco. She reports that she does not drink alcohol and does not use drugs. Family and social history were personally reviewed, all pertinent and relevant details are outlined as above.     Objective:     Visit Vitals  BP (!) 174/130   Pulse 88   Temp 98.1 °F (36.7 °C)   Resp 20   Ht 5' 4\" (1.626 m)   Wt 82.4 kg (181 lb 10.5 oz)   LMP 05/04/2011   SpO2 95%   BMI 31.18 kg/m²      O2 Device: None (Room air)    PHYSICAL EXAM:   General: Alert x oriented x 3, awake, no acute distress, resting in bed, pleasant female, appears to be stated age  [de-identified]: PEERL, EOMI, moist mucus membranes  Neck: Supple, no JVD, no meningeal signs  Chest: Clear to auscultation bilaterally   CVS: RRR, S1 S2 heard, no murmurs/rubs/gallops  Abd: Soft, mild tenderness, non-distended, +bowel sounds   Ext: No clubbing, no cyanosis, no edema  Neuro/Psych: Pleasant mood and affect, CN 2-12 grossly intact, sensory grossly within normal limit, Strength 5/5 in all extremities  Cap refill: Brisk, less than 3 seconds  Pulses: 2+, symmetric in all extremities  Skin: Warm, dry, without rashes or lesions    Data Review: All diagnostic labs and studies have been reviewed. Abnormal Labs Reviewed   METABOLIC PANEL, COMPREHENSIVE - Abnormal; Notable for the following components:       Result Value    Potassium 3.4 (*)     Chloride 110 (*)     GFR est non-AA 59 (*)     AST (SGOT) 10 (*)     Alk. phosphatase 128 (*)     Protein, total 8.6 (*)     Globulin 4.8 (*)     A-G Ratio 0.8 (*)     All other components within normal limits   URINALYSIS W/MICROSCOPIC - Abnormal; Notable for the following components:    Appearance CLOUDY (*)     Protein 100 (*)     Ketone TRACE (*)     Blood LARGE (*)     Nitrites Positive (*)     Leukocyte Esterase TRACE (*)     RBC >100 (*)     Bacteria 1+ (*)     All other components within normal limits       All Micro Results     Procedure Component Value Units Date/Time    CULTURE, URINE [[id number]] Collected: 04/19/22 2015    Order Status: Completed Specimen: Urine from Clean catch Updated: 04/19/22 2055    URINE CULTURE HOLD SAMPLE [[id number]] Collected: 04/19/22 1750    Order Status: Completed Specimen: Urine from Serum Updated: 04/19/22 1754     Urine culture hold       Urine on hold in Microbiology dept for 2 days. If unpreserved urine is submitted, it cannot be used for addtional testing after 24 hours, recollection will be required. IMAGING:   CT ABD PELV WO CONT   Final Result   13 x 8 mm calculus in the distal ureter on the right with moderate   hydroureteronephrosis      Incidental and/or nonemergent findings are as described in detail above.           XR RETROGRADE PYELOGRAM    (Results Pending)        ECG/ECHO:    Results for orders placed or performed during the hospital encounter of 10/27/21   EKG, 12 LEAD, INITIAL   Result Value Ref Range    Ventricular Rate 88 BPM    Atrial Rate 88 BPM    P-R Interval 170 ms    QRS Duration 82 ms    Q-T Interval 382 ms    QTC Calculation (Bezet) 462 ms    Calculated P Axis 57 degrees    Calculated R Axis -33 degrees    Calculated T Axis 47 degrees    Diagnosis       Normal sinus rhythm  Possible Left atrial enlargement  Left axis deviation  Nonspecific ST abnormality  Abnormal ECG  When compared with ECG of 12-JUN-2020 19:29,  No significant change was found  Confirmed by Helga Duncan MD., Kristopher Ricci ([id number]) on 10/29/2021 7:15:58 PM          Assessment:   Given the patient's current clinical presentation, there is a high level of concern for decompensation if discharged from the emergency department. Complex decision making was performed, which includes reviewing the patient's available past medical records, laboratory results, and imaging studies. Active Problems:    Obstructive uropathy (4/19/2022)      Plan:   #Obstructive Nephrolithiasis  #Acute Cystitis  -UA with trace leukocyte esterase, nitrites, 1+ bacteria, and large blood  -CT abdomen/pelvis showed 13 by 8mm calculus in right distal ureter with moderate hydroureteronephrosis  -follow urine culture  -started on broad spectrum with vanc, and merrem  -tramadol for pain  -urology consulted, s/p R cystoscopy with ureteral stent placement    #HTN  -uncontrolled  -continue home norvasc  -start hctz    #Anxiety  #Depression  -continue home xanax, cymbalta    #Migraines  -continue topamax    #C-spine pain  -no focal deficit or trauma  -lidocaine patch      DIET: DIET NPO   ISOLATION PRECAUTIONS: There are currently no Active Isolations  CODE STATUS: Full Code   DVT PROPHYLAXIS: SCDs  FUNCTIONAL STATUS PRIOR TO HOSPITALIZATION: Fully active and ambulatory; able to carry on all self-care without restriction. EARLY MOBILITY ASSESSMENT: Recommend routine ambulation while hospitalized with the assistance of nursing staff  ANTICIPATED DISCHARGE: 24-48 hours.   EMERGENCY CONTACT/SURROGATE DECISION MAKER: Mireya Gayle (sister)    Signed By: Jaswant Whitt MD     April 19, 2022         Please note that this dictation may have been completed with Dragon, the computer voice recognition software. Quite often unanticipated grammatical, syntax, homophones, and other interpretive errors are inadvertently transcribed by the computer software. Please disregard these errors. Please excuse any errors that have escaped final proofreading.

## 2022-04-20 NOTE — ROUTINE PROCESS
TRANSFER - IN REPORT:    Verbal report received from [address] RN(name) on Elias Faustin  being received from ED(unit) for ordered procedure      Report consisted of patients Situation, Background, Assessment and   Recommendations(SBAR). Information from the following report(s) SBAR, Kardex, ED Summary, Procedure Summary, Intake/Output, MAR and Recent Results was reviewed with the receiving nurse. Opportunity for questions and clarification was provided. Assessment completed upon patients arrival to unit and care assumed.

## 2022-04-20 NOTE — ED NOTES
Patient wiped down with CHG wipes,given labetalol for blood pressure,consent signed and given to transporter

## 2022-04-20 NOTE — PROGRESS NOTES
[address] [facility] Adult  Hospitalist Group                                                                                          Hospitalist Progress Note  Raymundo Catherine MD  Answering service: [phone] OR [phone] from in house phone        Date of Service:  2022  NAME:  Randal Leggett  :  1965  MRN:  [id number]      Admission Summary:   Randal Leggett is a 55 y.o. female with a pmhx HTN, anxiety and dperession who presents with right flank pain, and is being admitted for R obstructive hydronephrosis with UTI. She has been experiencing intermittent R flank pain since 2021. She was referred to urology by her PCP, and found to have R UPJ stone. Today at her preop appt for ESWL, she developed intractable pain with hematuria. She called -- from the office, and was transported to [facility] for further evaluation. She denies fever, chills, nausea, or vomiting.     In the ED,  BP was elevated to 168/129. Labs were significant for UA with trace leukocyte esterase, nitrites, 1+ bacteria, and large blood, and K+ 3.4. CT abdomen/pelvis showed 13 by 8mm calculus in right distal ureter with moderate hydroureteronephrosis.      In the ED, she received rocephin, dilaudid, labetalol, and morphine. Urology was consulted, and she underwent cystoscopy with R ureteral stent placement.        Interval history / Subjective:   F/u Right flank pain   Has some tenderness on left flank as well  Assessment & Plan:     #Obstructive Nephrolithiasis  #Acute Cystitis  -UA with trace leukocyte esterase, nitrites, 1+ bacteria, and large blood  -CT abdomen/pelvis showed 13 by 8mm calculus in right distal ureter with moderate hydroureteronephrosis  -s/p R cystoscopy with ureteral stent placement   -follow urine culture  -Continue vanc, and merrem  -tramadol for pain  -Appreciate Urology     Left flank pain  -sec to ?  -reviewed CT abd, did not see anything suggestive  -If persists may have to repeat CT abd     #HTN  -uncontrolled  -continue home norvasc  -start hctz     #Anxiety  #Depression  -continue home xanax, cymbalta     #Migraines-continue topamax  #C-spine pain-no focal deficit or trauma-lidocaine patch      Regular diet       Code status: FULL CODE  Prophylaxis: heparin    Plan: Follow urine culture, likely discharge in 1-2 days  Care Plan discussed with: Patient  Anticipated Disposition: home     Hospital Problems  Date Reviewed: 4/19/2022          Codes Class Noted POA    Obstructive uropathy ICD-10-CM: N13.9  ICD-9-CM: 599.60  4/19/2022 Unknown                Review of Systems:   A comprehensive review of systems was negative except for that written in the HPI. Vital Signs:    Last 24hrs VS reviewed since prior progress note. Most recent are:  Visit Vitals  BP (!) 178/121   Pulse 86   Temp 97.8 °F (36.6 °C)   Resp 18   Ht 5' 4\" (1.626 m)   Wt 82.4 kg (181 lb 10.5 oz)   SpO2 99%   BMI 31.18 kg/m²         Intake/Output Summary (Last 24 hours) at 4/20/2022 8230  Last data filed at 4/19/2022 2242  Gross per 24 hour   Intake 600 ml   Output --   Net 600 ml        Physical Examination:     I had a face to face encounter with this patient and independently examined them on 4/20/2022 as outlined below:          Constitutional:  No acute distress   ENT:  Oral mucosa moist, oropharynx benign. Resp:  CTA bilaterally. No wheezing/rhonchi/rales. No accessory muscle use. CV:  Regular rhythm, normal rate, no murmurs, gallops, rubs    GI:  Soft, non distended, tenderness left flank. normoactive bowel sounds, no hepatosplenomegaly     Musculoskeletal:  No edema, warm, 2+ pulses throughout    Neurologic:  Moves all extremities.   AAOx3, CN II-XII reviewed            Data Review:    Review and/or order of clinical lab test      Labs:     Recent Labs     04/20/22 0134 04/19/22  1734   WBC 9.4 8.5   HGB 13.4 14.1   HCT 41.0 42.7    286     Recent Labs     04/20/22  0134 04/19/22  1734 04/19/22  1518   NA 141 139 140   K 3.6 3.4* 3.6   * 110* 108   CO2 22 23 26   BUN 12 12 12   CREA 1.01 0.98 1.01   * 90 98   CA 9.1 9.4 9.7     Recent Labs     04/19/22  1734 04/19/22  1518   ALT 22 24   * 132*   TBILI 0.4 0.4   TP 8.6* 8.1   ALB 3.8 4.0   GLOB 4.8* 4.1*     No results for input(s): INR, PTP, APTT, INREXT in the last 72 hours. No results for input(s): FE, TIBC, PSAT, FERR in the last 72 hours. Lab Results   Component Value Date/Time    Folate 18.9 05/30/2019 09:18 PM      No results for input(s): PH, PCO2, PO2 in the last 72 hours. No results for input(s): CPK, CKNDX, TROIQ in the last 72 hours.     No lab exists for component: CPKMB  Lab Results   Component Value Date/Time    Cholesterol, total 200 (H) 10/28/2021 01:07 AM    HDL Cholesterol 42 10/28/2021 01:07 AM    LDL, calculated 132.6 (H) 10/28/2021 01:07 AM    Triglyceride 127 10/28/2021 01:07 AM    CHOL/HDL Ratio 4.8 10/28/2021 01:07 AM     Lab Results   Component Value Date/Time    Glucose (POC) 110 (H) 06/14/2020 02:49 PM    Glucose (POC) 122 (H) 06/12/2020 08:23 PM    Glucose (POC) 93 09/30/2013 12:55 PM     Lab Results   Component Value Date/Time    Color BLOOD 04/19/2022 05:50 PM    Appearance CLOUDY (A) 04/19/2022 05:50 PM    Specific gravity 1.020 04/19/2022 05:50 PM    Specific gravity 1.008 04/19/2022 03:08 PM    pH (UA) 6.5 04/19/2022 05:50 PM    Protein 100 (A) 04/19/2022 05:50 PM    Glucose Negative 04/19/2022 05:50 PM    Ketone TRACE (A) 04/19/2022 05:50 PM    Bilirubin Negative 04/19/2022 03:08 PM    Urobilinogen 1.0 04/19/2022 05:50 PM    Nitrites Positive (A) 04/19/2022 05:50 PM    Leukocyte Esterase TRACE (A) 04/19/2022 05:50 PM    Epithelial cells FEW 04/19/2022 05:50 PM    Bacteria 1+ (A) 04/19/2022 05:50 PM    WBC 0-4 04/19/2022 05:50 PM    RBC >100 (H) 04/19/2022 05:50 PM         Medications Reviewed:     Current Facility-Administered Medications   Medication Dose Route Frequency    meropenem (MERREM) 1 g in 0.9% sodium chloride (MBP/ADV) 50 mL MBP  1 g IntraVENous Q8H    fentaNYL citrate (PF) injection 50 mcg  50 mcg IntraVENous Q4H PRN    melatonin tablet 3 mg  3 mg Oral QHS PRN    traMADoL (ULTRAM) tablet 50 mg  50 mg Oral Q6H PRN    hydroCHLOROthiazide (HYDRODIURIL) tablet 12.5 mg  12.5 mg Oral DAILY    lidocaine 4 % patch 1 Patch  1 Patch TransDERmal Q24H    lactated Ringers infusion  25 mL/hr IntraVENous CONTINUOUS    0.9% sodium chloride infusion  25 mL/hr IntraVENous CONTINUOUS    sodium chloride (NS) flush 5-40 mL  5-40 mL IntraVENous Q8H    sodium chloride (NS) flush 5-40 mL  5-40 mL IntraVENous PRN    lidocaine (PF) (XYLOCAINE) 10 mg/mL (1 %) injection 0.1 mL  0.1 mL SubCUTAneous PRN    ropivacaine (PF) (NAROPIN) 5 mg/mL (0.5 %) injection 30 mL  30 mL Peripheral Nerve Block PRN    sodium chloride (NS) flush 5-40 mL  5-40 mL IntraVENous Q8H    sodium chloride (NS) flush 5-40 mL  5-40 mL IntraVENous PRN    acetaminophen (TYLENOL) tablet 650 mg  650 mg Oral Q6H PRN    Or    acetaminophen (TYLENOL) suppository 650 mg  650 mg Rectal Q6H PRN    ondansetron (ZOFRAN ODT) tablet 4 mg  4 mg Oral Q8H PRN    Or    ondansetron (ZOFRAN) injection 4 mg  4 mg IntraVENous Q6H PRN    Vancomycin - pharmacy to dose   Other Rx Dosing/Monitoring    vancomycin (VANCOCIN) 1500 mg in  ml infusion  1,500 mg IntraVENous Q24H     ______________________________________________________________________  EXPECTED LENGTH OF STAY: - - -  ACTUAL LENGTH OF STAY:          1                 Ollie Salvador MD

## 2022-04-20 NOTE — PROGRESS NOTES
2523- Patient is asking for something for pain and to help her sleep. Perfect serve sent to Geoff Hernandez NP asking to get some medications on board. 0096- Order for Fentanyl 50mcg Q4 PRN placed by Geoff Hernandez NP. Order for PRN Melatonin also placed. 0123- Fentanyl given, patient refused Melatonin. Stated the Melatonin would not help her at all.      FABRICIO Palumbo served to make aware of patients hypertension. Was informed by PACU nurse that Yvonne Nieves MD is aware and that BP was hypertensive prior to admission. Message was read, no new orders at this time. Gavin Sanchez MD made aware of patients hypertension as well. Stated she would look into chart and place order for some medication. Patient is allergic to Hydralazine so will have to evaluate other medications. Patient is asymptomatic at this time, will continue to monitor.

## 2022-04-20 NOTE — ANESTHESIA POSTPROCEDURE EVALUATION
Post-Anesthesia Evaluation and Assessment    Patient: Doreen Portillo MRN: [id number]  SSN: [id number]    YOB: 1965  Age: 55 y.o. Sex: female      I have evaluated the patient and they are stable and ready for discharge from the PACU. Cardiovascular Function/Vital Signs  Visit Vitals  BP (!) 154/117 (BP 1 Location: Right upper arm, BP Patient Position: Sitting)   Pulse 74   Temp 36.7 °C (98.1 °F)   Resp 20   Ht 5' 4\" (1.626 m)   Wt 82.4 kg (181 lb 10.5 oz)   SpO2 98%   BMI 31.18 kg/m²       Patient is status post General anesthesia for Procedure(s):  CYSTOSCOPY RIGHT DOUBLE J URETERAL STENT INSERTION. Nausea/Vomiting: None    Postoperative hydration reviewed and adequate. Pain:  Pain Scale 1: Numeric (0 - 10) (04/20/22 0433)  Pain Intensity 1: 8 (04/20/22 0433)   Managed    Neurological Status: At baseline    Mental Status, Level of Consciousness: Alert and  oriented to person, place, and time    Pulmonary Status:   O2 Device: None (Room air) (04/20/22 0016)   Adequate oxygenation and airway patent    Complications related to anesthesia: None    Post-anesthesia assessment completed. No concerns    Signed By: Joseline Jamison MD     April 20, 2022              Procedure(s):  CYSTOSCOPY RIGHT DOUBLE J URETERAL STENT INSERTION.     general    <BSHSIANPOST>    INITIAL Post-op Vital signs:   Vitals Value Taken Time   /99 04/20/22 0016   Temp 36.7 °C (98 °F) 04/20/22 0016   Pulse 75 04/20/22 0016   Resp 18 04/20/22 0016   SpO2 99 % 04/20/22 0016

## 2022-04-20 NOTE — ROUTINE PROCESS
Bedside and Verbal shift change report given to Mick Mcdermott RN (oncoming nurse) by SNOW Bell (offgoing nurse). Report included the following information SBAR, Kardex, Intake/Output, MAR and Recent Results.

## 2022-04-20 NOTE — PROGRESS NOTES
Progress Note    Patient: Reina Ybarra MRN: [id number]  SSN: [id number]    YOB: 1965  Age: 55 y.o. Sex: female        ADMITTED:  2022 to Brent Villalba MD  for Obstructive uropathy [N13.9]         Reina Ybarra is 1 Day Post-Op Procedure(s):  CYSTOSCOPY RIGHT DOUBLE J URETERAL STENT INSERTION. F/U for obstructing R ureteral stone s/p stent. She complains of left flank pain today, described as sharp and intermittent. She denies stent colic on the right. Voiding yellow to pink UA. Denies dysuria, fevers or chills. AF, VSS, still hypertensive. WBC wnl. Hgb stable. Cr 1.01. UA + bacteria, nitrites. Culture pending. +Merrem, Vanc. Vitals:  Temp (24hrs), Av °F (36.7 °C), Min:97.5 °F (36.4 °C), Max:98.6 °F (37 °C)     Blood pressure (!) 166/104, pulse 86, temperature 97.8 °F (36.6 °C), resp. rate 18, height 5' 4\" (1.626 m), weight 82.4 kg (181 lb 10.5 oz), last menstrual period 2011, SpO2 99 %. I&O's:   1901 -  0700  In: 600 [I.V.:600]  Out: -    No intake/output data recorded.      Exam:   Physical Exam  General: NAD, pleasant  Respiratory: no distress, breathing easily, room air  Abdomen: soft, no distention; non-tender to palpation  : left CVA tenderness, voiding independently   Neuro: Appropriate, no focal neurological deficits  Skin: warm, dry  Extremities: moves all, full ROM     Labs:   Recent Labs     22  0134 22  1734 22  1518   WBC 9.4 8.5 9.4   HGB 13.4 14.1 13.8   HCT 41.0 42.7 42.9    286 319     Recent Labs     22  0134 22  1734 22  1518    139 140   K 3.6 3.4* 3.6   * 110* 108   CO2 22 23 26   * 90 98   BUN 12 12 12   CREA 1.01 0.98 1.01   CA 9.1 9.4 9.7        Cultures:      Imaging:       Assessment:     - 1 Day Post-Op Procedure(s):  CYSTOSCOPY RIGHT DOUBLE J URETERAL STENT INSERTION    Principal Problem:    Obstructive uropathy (2022)    R mid to distal ureteral stone with bacteria in urine and irritative voiding    Left flank pan   Plan:     - Follow culture and tailor abx if indicated. Outpt follow up scheduled for defintive stone tx. She understands the importance of follow up. - CT reviewed again regarding left flank pain. She has a punctate stone in the left kidney. Doubt her pain is 2/2  renal colic. However, if she were to pass this stone, she should be able to pass it easily w/o complications. Urology to sign off. Please call with questions.      Case discussed with Dr. Cruzito Cevallos   Signed By: Isadora Slater NP - April 20, 2022

## 2022-04-20 NOTE — ANESTHESIA PREPROCEDURE EVALUATION
Relevant Problems   RESPIRATORY SYSTEM   (+) CARLOS (obstructive sleep apnea)      NEUROLOGY   (+) Anxiety and depression   (+) CVA (cerebral vascular accident) ([facility] [address]      CARDIOVASCULAR   (+) HTN (hypertension)   (+) Malignant hypertensive urgency      GASTROINTESTINAL   (+) Hiatal hernia   (+) Hiatal hernia with gastroesophageal reflux      ENDOCRINE   (+) Severe obesity (HCC)       Anesthetic History   No history of anesthetic complications            Review of Systems / Medical History  Patient summary reviewed, nursing notes reviewed and pertinent labs reviewed    Pulmonary        Sleep apnea: No treatment           Neuro/Psych     seizures: well controlled    Psychiatric history    Comments: S/p cavernous hemangioma resection Cardiovascular    Hypertension: poorly controlled              Exercise tolerance: >4 METS     GI/Hepatic/Renal         Renal disease: stones       Endo/Other        Obesity     Other Findings              Physical Exam    Airway  Mallampati: II  TM Distance: > 6 cm  Neck ROM: normal range of motion   Mouth opening: Normal     Cardiovascular    Rhythm: regular  Rate: normal         Dental    Dentition: Poor dentition  Comments: Multiple missing, denies loose teeth   Pulmonary  Breath sounds clear to auscultation               Abdominal  GI exam deferred       Other Findings            Anesthetic Plan    ASA: 3  Anesthesia type: general          Induction: Intravenous  Anesthetic plan and risks discussed with: Patient

## 2022-04-20 NOTE — PROGRESS NOTES
0800: Verbal shift change report given to Cyn Jha (oncoming nurse) by Ray RN (offgoing nurse). Report included the following information SBAR, Kardex, MAR and Recent Results. 1000: Pt requesting Xanax TID; BP elevated, pt stating, 'I take Xanax three times a day and I am having an anxiety attack and pain\". Pt to be medicated for pain. S/w Dr. Juan Carlos Yeboah, okay to reorder home Xanax dose. Pt asking for coffee, asked by this RN to please refrain due to anxiety and BP; pt agreeable. 1500: Pt's BP still elevated; 186 SBP, rechecked at 156/99, HR 73; MD notified, MD stated will assess. Patient Vitals for the past 4 hrs:   Temp Pulse Resp BP SpO2   04/20/22 1455 98 °F (36.7 °C) 77 16 (!) 184/123 99 %   04/20/22 1112 -- 76 -- (!) 168/106 --       1545: Verbal shift change report given to Hina Arrieta (oncoming nurse) by Zara Ozuna RN (offgoing nurse). Report included the following information SBAR, Kardex, MAR and Recent Results.

## 2022-04-21 ENCOUNTER — APPOINTMENT (OUTPATIENT)
Dept: CT IMAGING | Age: 57
DRG: 661 | End: 2022-04-21
Attending: INTERNAL MEDICINE
Payer: MEDICARE

## 2022-04-21 VITALS
SYSTOLIC BLOOD PRESSURE: 139 MMHG | DIASTOLIC BLOOD PRESSURE: 95 MMHG | TEMPERATURE: 99 F | RESPIRATION RATE: 18 BRPM | HEART RATE: 82 BPM | BODY MASS INDEX: 31.01 KG/M2 | OXYGEN SATURATION: 98 % | WEIGHT: 181.66 LBS | HEIGHT: 64 IN

## 2022-04-21 LAB
ANION GAP SERPL CALC-SCNC: 9 MMOL/L (ref 5–15)
BASOPHILS # BLD: 0.1 K/UL (ref 0–0.1)
BASOPHILS NFR BLD: 1 % (ref 0–1)
BUN SERPL-MCNC: 18 MG/DL (ref 6–20)
BUN/CREAT SERPL: 19 (ref 12–20)
CALCIUM SERPL-MCNC: 9 MG/DL (ref 8.5–10.1)
CHLORIDE SERPL-SCNC: 110 MMOL/L (ref 97–108)
CO2 SERPL-SCNC: 22 MMOL/L (ref 21–32)
CREAT SERPL-MCNC: 0.95 MG/DL (ref 0.55–1.02)
DIFFERENTIAL METHOD BLD: NORMAL
EOSINOPHIL # BLD: 0.1 K/UL (ref 0–0.4)
EOSINOPHIL NFR BLD: 1 % (ref 0–7)
ERYTHROCYTE [DISTWIDTH] IN BLOOD BY AUTOMATED COUNT: 14.3 % (ref 11.5–14.5)
GLUCOSE SERPL-MCNC: 104 MG/DL (ref 65–100)
HCT VFR BLD AUTO: 37.5 % (ref 35–47)
HGB BLD-MCNC: 11.9 G/DL (ref 11.5–16)
IMM GRANULOCYTES # BLD AUTO: 0 K/UL (ref 0–0.04)
IMM GRANULOCYTES NFR BLD AUTO: 0 % (ref 0–0.5)
LYMPHOCYTES # BLD: 3.4 K/UL (ref 0.8–3.5)
LYMPHOCYTES NFR BLD: 37 % (ref 12–49)
MCH RBC QN AUTO: 29 PG (ref 26–34)
MCHC RBC AUTO-ENTMCNC: 31.7 G/DL (ref 30–36.5)
MCV RBC AUTO: 91.5 FL (ref 80–99)
MONOCYTES # BLD: 0.6 K/UL (ref 0–1)
MONOCYTES NFR BLD: 7 % (ref 5–13)
NEUTS SEG # BLD: 5 K/UL (ref 1.8–8)
NEUTS SEG NFR BLD: 54 % (ref 32–75)
NRBC # BLD: 0 K/UL (ref 0–0.01)
NRBC BLD-RTO: 0 PER 100 WBC
PLATELET # BLD AUTO: 259 K/UL (ref 150–400)
PMV BLD AUTO: 10.7 FL (ref 8.9–12.9)
POTASSIUM SERPL-SCNC: 3.1 MMOL/L (ref 3.5–5.1)
RBC # BLD AUTO: 4.1 M/UL (ref 3.8–5.2)
SODIUM SERPL-SCNC: 141 MMOL/L (ref 136–145)
WBC # BLD AUTO: 9.3 K/UL (ref 3.6–11)

## 2022-04-21 PROCEDURE — 74011250636 HC RX REV CODE- 250/636: Performed by: UROLOGY

## 2022-04-21 PROCEDURE — 74011250637 HC RX REV CODE- 250/637: Performed by: FAMILY MEDICINE

## 2022-04-21 PROCEDURE — 74011250637 HC RX REV CODE- 250/637: Performed by: INTERNAL MEDICINE

## 2022-04-21 PROCEDURE — 74011000636 HC RX REV CODE- 636: Performed by: INTERNAL MEDICINE

## 2022-04-21 PROCEDURE — 74011250637 HC RX REV CODE- 250/637: Performed by: HOSPITALIST

## 2022-04-21 PROCEDURE — 85025 COMPLETE CBC W/AUTO DIFF WBC: CPT

## 2022-04-21 PROCEDURE — 80048 BASIC METABOLIC PNL TOTAL CA: CPT

## 2022-04-21 PROCEDURE — 36415 COLL VENOUS BLD VENIPUNCTURE: CPT

## 2022-04-21 PROCEDURE — 74011250636 HC RX REV CODE- 250/636: Performed by: FAMILY MEDICINE

## 2022-04-21 PROCEDURE — 74177 CT ABD & PELVIS W/CONTRAST: CPT

## 2022-04-21 PROCEDURE — 74011000258 HC RX REV CODE- 258: Performed by: FAMILY MEDICINE

## 2022-04-21 PROCEDURE — 74011250636 HC RX REV CODE- 250/636: Performed by: NURSE PRACTITIONER

## 2022-04-21 RX ORDER — HYDROCHLOROTHIAZIDE 25 MG/1
25 TABLET ORAL DAILY
Status: DISCONTINUED | OUTPATIENT
Start: 2022-04-21 | End: 2022-04-21 | Stop reason: HOSPADM

## 2022-04-21 RX ORDER — CEPHALEXIN 250 MG/1
500 CAPSULE ORAL EVERY 6 HOURS
Status: DISCONTINUED | OUTPATIENT
Start: 2022-04-21 | End: 2022-04-21 | Stop reason: HOSPADM

## 2022-04-21 RX ORDER — AMLODIPINE BESYLATE 10 MG/1
10 TABLET ORAL DAILY
Qty: 30 TABLET | Refills: 0 | Status: SHIPPED | OUTPATIENT
Start: 2022-04-22 | End: 2022-05-13

## 2022-04-21 RX ORDER — CEPHALEXIN 500 MG/1
500 CAPSULE ORAL EVERY 6 HOURS
Qty: 16 CAPSULE | Refills: 0 | Status: SHIPPED | OUTPATIENT
Start: 2022-04-21 | End: 2022-04-25

## 2022-04-21 RX ORDER — POTASSIUM CHLORIDE 750 MG/1
40 TABLET, FILM COATED, EXTENDED RELEASE ORAL
Status: COMPLETED | OUTPATIENT
Start: 2022-04-21 | End: 2022-04-21

## 2022-04-21 RX ORDER — DULOXETIN HYDROCHLORIDE 60 MG/1
120 CAPSULE, DELAYED RELEASE ORAL
Qty: 30 CAPSULE | Refills: 0 | Status: SHIPPED
Start: 2022-04-21

## 2022-04-21 RX ADMIN — TRAMADOL HYDROCHLORIDE 50 MG: 50 TABLET, COATED ORAL at 11:23

## 2022-04-21 RX ADMIN — HYDROCHLOROTHIAZIDE 25 MG: 25 TABLET ORAL at 08:38

## 2022-04-21 RX ADMIN — FENTANYL CITRATE 50 MCG: 50 INJECTION INTRAMUSCULAR; INTRAVENOUS at 14:25

## 2022-04-21 RX ADMIN — VANCOMYCIN HYDROCHLORIDE 1500 MG: 10 INJECTION, POWDER, LYOPHILIZED, FOR SOLUTION INTRAVENOUS at 00:14

## 2022-04-21 RX ADMIN — IOPAMIDOL 100 ML: 755 INJECTION, SOLUTION INTRAVENOUS at 11:51

## 2022-04-21 RX ADMIN — FENTANYL CITRATE 50 MCG: 50 INJECTION INTRAMUSCULAR; INTRAVENOUS at 08:51

## 2022-04-21 RX ADMIN — ALPRAZOLAM 1 MG: 1 TABLET ORAL at 08:37

## 2022-04-21 RX ADMIN — AMLODIPINE BESYLATE 10 MG: 5 TABLET ORAL at 11:10

## 2022-04-21 RX ADMIN — MEROPENEM 1 G: 1 INJECTION, POWDER, FOR SOLUTION INTRAVENOUS at 14:10

## 2022-04-21 RX ADMIN — MEROPENEM 1 G: 1 INJECTION, POWDER, FOR SOLUTION INTRAVENOUS at 04:17

## 2022-04-21 RX ADMIN — POTASSIUM CHLORIDE 40 MEQ: 750 TABLET, EXTENDED RELEASE ORAL at 11:11

## 2022-04-21 NOTE — PROGRESS NOTES
2316- Patient is complaining that her legs hurt and that they feel \"numb. 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 When assessing patient, patient can feel this RN touching her legs, is able to move legs/wiggle toes, and is able to press down and pull up on this RNs hands with her feet. Also stating legs feel crampy and like they are \"blowing up. \" Perfect serve sent to Marta Severe, NP to make aware of the situation. 2321- Perfect serve read by Marta Severe, NP. No new orders placed at this time. 2900- Patient monitored throughout night, no longer complaining of this feeling in her legs. Stating that what we are doing for her she can do at home. She states she is not going to follow up outpatient with anything once discharged from the hospital and that she is ready to go home. Also stating she is in pain, when offered pain medications she is refusing because she states it is not going to do anything because it is such a small dose. Patient frustrated and states that she is ready to go home because \"doctors are not telling her any information. \"

## 2022-04-21 NOTE — PROGRESS NOTES
Transition of Care: TBD; likely home with f/u with specialist     Transport Plan: TBD; possibly in car with family vs roundtrip     RUR: 10%     DX: obstructive uropathy     Main contact is mother- Carola Horton- [phone]     Discharge pending:  -patient is POD#2 stent placement for urology  -pending pain control  -patient continues on IV antibiotics    1430: this CM noted that patient was screened by [employer] for medicaid application on 3/76/15    Medicaid LTSS Screening submitted for processing.      CM following  Ej Samson RN, CRM       NOTE: patient is alert and oriented x 3; patient states she lives at stated address with her mother who is has some dementia; patient is normally independent in her ADLs; patient states her sister lives in [place];  patient confirms her insurance as [employer]; patient has concerns about paying her medical bills; patient would like to be screened for medicaid; this CM sent request to [employer] to screen for medicaid

## 2022-04-21 NOTE — DISCHARGE SUMMARY
Discharge Summary     Patient:  Andrei Wood       MRN: [id number]       YOB: 1965       Age: 55 y.o. Date of admission:  4/19/2022    Date of discharge:  4/21/2022    Primary care provider: Dr. Jermaine Staples MD    Admitting provider:  Angela Morton MD    Discharging provider:  Fan Borjas [address] [phone]. If unavailable, call [phone] and ask the  to page the triage hospitalist.    Consultations  · IP CONSULT TO UROLOGY  · IP CONSULT TO HOSPITALIST    Procedures  · Procedure(s):  · CYSTOSCOPY RIGHT DOUBLE J URETERAL [facility]    Discharge destination: home. The patient is stable for discharge. Admission diagnosis  · Obstructive uropathy [N13.9]    Current Discharge Medication List      START taking these medications    Details   cephALEXin (KEFLEX) 500 mg capsule Take 1 Capsule by mouth every six (6) hours for 16 doses. Qty: 16 Capsule, Refills: 0  Start date: 4/21/2022, End date: 4/25/2022         CONTINUE these medications which have CHANGED    Details   amLODIPine (NORVASC) 10 mg tablet Take 1 Tablet by mouth daily. Qty: 30 Tablet, Refills: 0  Start date: 4/22/2022      DULoxetine (CYMBALTA) 60 mg capsule Take 2 Capsules by mouth nightly. Qty: 30 Capsule, Refills: 0  Start date: 4/21/2022         CONTINUE these medications which have NOT CHANGED    Details   famotidine (PEPCID) 20 mg tablet Take 1 Tab by mouth two (2) times a day. Qty: 60 Tab, Refills: 0      pantoprazole (PROTONIX) 40 mg tablet Take 40 mg by mouth nightly. topiramate (TOPAMAX) 25 mg tablet Take 25 mg by mouth nightly. ALPRAZolam (XANAX) 1 mg tablet Take 1 mg by mouth three (3) times daily.          STOP taking these medications       ammonium lactate (LAC-HYDRIN) 12 % lotion Comments:   Reason for Stopping:         HYDROcodone-acetaminophen (NORCO) 5-325 mg per tablet Comments:   Reason for Stopping:         triamcinolone acetonide (KENALOG) 0.1 % topical cream Comments:   Reason for Stopping:         ibuprofen (MOTRIN) 800 mg tablet Comments:   Reason for Stopping: Follow-up Information     Follow up With Specialties Details Why [facility]   follow up on 4/29/22 at 10:10 Am with Dr. Anthony Phillips [phone] Margy Mckeon 38  Gama Adhikari MD Internal Medicine In 1 week  [address]  [phone]            Final discharge diagnoses and brief hospital course  Farshad Quach a 55 y. o. female with a pmhx HTN, anxiety and dperession who presents with right flank pain, and is being admitted for R obstructive hydronephrosis with UTI.  She has been experiencing intermittent R flank pain since 11/2021. She was referred to urology by her PCP, and found to have R UPJ stone.  Today at her preop appt for ESWL, she developed intractable pain with hematuria. She called 9-1-1 from the office, and was transported to [facility] for further evaluation.  She denies fever, chills, nausea, or vomiting.     In the ED,  BP was elevated to 168/129.  Labs were significant for UA with trace leukocyte esterase, nitrites, 1+ bacteria, and large blood, and K+ 3.4. CT abdomen/pelvis showed 13 by 8mm calculus in right distal ureter with moderate hydroureteronephrosis.      In the ED, she received rocephin, dilaudid, labetalol, and morphine. Urology was consulted, and she underwent cystoscopy with R ureteral stent placement.     Obstructive Nephrolithiasis s/p R ureteral stent 4/19   -UA with trace leukocyte esterase, nitrites, 1+ bacteria, and large blood  -CT abdomen/pelvis showed 13 by 8mm calculus in right distal ureter with moderate hydroureteronephrosis  -s/p R cystoscopy with ureteral stent placement 4/19  - urine culture - negative   discontinued  vanc, and merrem  - IV fentanyl and po tramadol prn for pain  -Appreciate Urology input      #HTN  -uncontrolled  -continue home norvasc  -increased hctz     #Anxiety  #Depression  -continue home xanax, cymbalta     #Migraines-continue topamax  #C-spine pain-no focal deficit or trauma-lidocaine patch    # Multiple complaints:  Left flank pain worse than right side : rpt CT abd negative etiology for left side pain  Poor appetite and lost 100lbs in last year - pt declined GI eval  Pt states that her pain is going on for last 2 years   Denied nausea or vomiting  Also complained of bilateral leg numbness - likely neuropathy - already on cymbalta    Pt declined lidocaine patch and tramadol for pain control. Pt states \" Tramadol is a muscle relaxant. I don't want that\"    BP elevated but refused to take HCTZ at home    She changed her PCP recently     Patient unhappy with discharge plan stating that nobody can diagnose her symtoms. Discharge recommendations:  PCP f/u in 1 week  Need Pain management clinic referral  Urology f/u next week - stressed the importance to follow up with this appointment  Monitor BP     High risk for readmission       Physical examination at discharge  Visit Vitals  BP (!) 139/95   Pulse 82   Temp 99 °F (37.2 °C)   Resp 18   Ht 5' 4\" (1.626 m)   Wt 82.4 kg (181 lb 10.5 oz)   SpO2 98%   BMI 31.18 kg/m²     Constitutional:  No acute distress   ENT:  Oral mucosa moist, oropharynx benign. Resp:  CTA bilaterally. No wheezing/rhonchi/rales. No accessory muscle use. CV:  Regular rhythm, normal rate, no murmurs, gallops, rubs    GI:  Soft, non distended, tenderness left flank. normoactive bowel sounds, no hepatosplenomegaly     Musculoskeletal:  No edema, warm, 2+ pulses throughout    Neurologic:  Moves all extremities.   AAOx3, CN II-XII reviewed         Pertinent imaging studies:    Per EMR     Recent Labs     04/21/22  0527 04/20/22  0134 04/19/22  1734   WBC 9.3 9.4 8.5   HGB 11.9 13.4 14.1   HCT 37.5 41.0 42.7    284 286     Recent Labs 04/21/22  0527 04/20/22  0134 04/19/22  1734    141 139   K 3.1* 3.6 3.4*   * 112* 110*   CO2 22 22 23   BUN 18 12 12   CREA 0.95 1.01 0.98   * 109* 90   CA 9.0 9.1 9.4     Recent Labs     04/19/22  1734 04/19/22  1518   * 132*   TP 8.6* 8.1   ALB 3.8 4.0   GLOB 4.8* 4.1*     No results for input(s): INR, PTP, APTT, INREXT in the last 72 hours. No results for input(s): FE, TIBC, PSAT, FERR in the last 72 hours. No results for input(s): PH, PCO2, PO2 in the last 72 hours. No results for input(s): CPK, CKMB in the last 72 hours. No lab exists for component: TROPONINI  No components found for: [place]    Chronic Diagnoses:    Problem List as of 4/21/2022 Date Reviewed: 4/20/2022          Codes Class Noted - Resolved    * (Principal) Obstructive uropathy ICD-10-CM: N13.9  ICD-9-CM: 599.60  4/19/2022 - Present        Malignant hypertensive urgency ICD-10-CM: I16.0  ICD-9-CM: 401.0  10/27/2021 - Present        CVA (cerebral vascular accident) [facility]) ICD-10-CM: I63.9  ICD-9-CM: 434.91  6/12/2020 - Present        Chest pain at rest ICD-10-CM: R07.9  ICD-9-CM: 786.50  3/8/2020 - Present        Rectal bleeding ICD-10-CM: K62.5  ICD-9-CM: 569.3  5/30/2019 - Present        CARLOS (obstructive sleep apnea) ICD-10-CM: G47.33  ICD-9-CM: 327.23  Unknown - Present    Overview Signed 5/30/2019  7:55 PM by Naveed Humphries MD     no cpap             HTN (hypertension) ICD-10-CM: I10  ICD-9-CM: 401.9  Unknown - Present        Hx of seizure disorder ICD-10-CM: Z86.69  ICD-9-CM: V12.49  Unknown - Present        Fibromyalgia ICD-10-CM: M79.7  ICD-9-CM: 729.1  Unknown - Present    Overview Signed 5/30/2019  7:55 PM by Naveed Humphries MD     fibromyalgia             Anxiety and depression ICD-10-CM: F41.9, F32. A  ICD-9-CM: 300.00, 311  Unknown - Present        Hx of migraines ICD-10-CM: Z86.69  ICD-9-CM: V12.49  Unknown - Present        Severe obesity ([facility] [address] ICD-10-CM: E66.01  ICD-9-CM: 278.01  4/29/2019 - Present        Hiatal hernia ICD-10-CM: K44.9  ICD-9-CM: 553.3  5/18/2018 - Present        Incomplete right bundle branch block (RBBB) determined by electrocardiography ICD-10-CM: I45.10  ICD-9-CM: 426.4  5/10/2018 - Present        Hiatal hernia with gastroesophageal reflux ICD-10-CM: K44.9, K21.9  ICD-9-CM: 553.3, 530.81  4/1/2018 - Present        RESOLVED: Obese ICD-10-CM: E66.9  ICD-9-CM: 278.00  Unknown - 3/8/2020        RESOLVED: Elevated lactic acid level ICD-10-CM: R79.89  ICD-9-CM: 276.2  5/30/2019 - 3/8/2020        RESOLVED: Dehydration ICD-10-CM: E86.0  ICD-9-CM: 276.51  5/30/2019 - 3/8/2020        RESOLVED: Anemia ICD-10-CM: D64.9  ICD-9-CM: 285.9  5/30/2019 - 3/8/2020        RESOLVED: Hemorrhoids ICD-10-CM: K64.9  ICD-9-CM: 455.6  Unknown - 3/8/2020        RESOLVED: Depressive disorder, not elsewhere classified ICD-10-CM: F32.89  ICD-9-CM: 135  10/1/2013 - 5/30/2019        RESOLVED: Leukocytosis, unspecified ICD-10-CM: E50.872  ICD-9-CM: 288.60  10/1/2013 - 5/30/2019        RESOLVED: Hypokalemia ICD-10-CM: E87.6  ICD-9-CM: 276.8  10/1/2013 - 3/8/2020        RESOLVED: Slurred speech ICD-10-CM: R47.81  ICD-9-CM: 784.59  9/30/2013 - 5/30/2019        RESOLVED: Seizure ([address] (Chronic) ICD-10-CM: R56.9  ICD-9-CM: 780.39  5/2/2011 - 5/30/2019        RESOLVED: Migraine (Chronic) ICD-10-CM: B97.930  ICD-9-CM: 346.90  5/2/2011 - 5/30/2019        RESOLVED: Cavernous hemangioma of intracranial structure ([facility] [address] (Chronic) ICD-10-CM: Q17.57  ICD-9-CM: 228.02  5/2/2011 - 3/8/2020              Time spent on discharge related activities today greater than 30 minutes.       Signed:  Luigi Wynn MD                 Hospitalist, Internal Medicine      Cc: Tosha Mojica MD

## 2022-04-21 NOTE — DISCHARGE INSTRUCTIONS
Please bring this form with you to show your primary care provider at your follow-up appointment. Primary care provider:  Dr. Katherin Madden MD    Discharging provider:  Ashley Jauregui MD    You have been admitted to the hospital with the following diagnoses:  · Obstructive uropathy [N13.9]    FOLLOW-UP CARE RECOMMENDATIONS:    APPOINTMENTS:  · Follow-up with primary care provider, Dr. Katherin Madden MD  -  Please call [phone] shortly after discharge to set up an appointment to be seen in 1 week    · Urology in 1 week     FOLLOW-UP TESTS recommended: BMP in 1 week     PENDING TEST RESULTS:  At the time of your discharge the following test results are still pending: none   Please make sure you review these results with your outpatient follow-up provider(s). SYMPTOMS to watch for: chest pain, shortness of breath, fever, chills, nausea, vomiting, diarrhea, change in mentation, falling, weakness, bleeding. DIET/what to eat:  Regular Diet    ACTIVITY:  Activity as tolerated    What to do if new or unexpected symptoms occur? If you experience any of the above symptoms (or should other concerns or questions arise after discharge) please call your primary care physician. Return to the emergency room if you cannot get hold of your doctor. · It is very important that you keep your follow-up appointment(s). · Please bring discharge papers, medication list (and/or medication bottles) to your follow-up appointments for review by your outpatient provider(s). · Please check the list of medications and be sure it includes every medication (even non-prescription medications) that your provider wants you to take. · It is important that you take the medication exactly as they are prescribed. · Keep your medication in the bottles provided by the pharmacist and keep a list of the medication names, dosages, and times to be taken in your wallet.    · Do not take other medications without consulting your doctor. · If you have any questions about your medications or other instructions, please talk to your nurse or care provider before you leave the hospital.    I understand that if any problems occur once I am at home I am to contact my physician. These instructions were explained to me and I had the opportunity to ask questions.         Ureteral Stent Placement: What to Expect at [facility]     A ureteral (say \"you-REE-ter-ul\") stent is a thin, hollow tube that is placed in the ureter to help urine pass from the kidney into the bladder. Ureters are the tubes that connect the kidneys to the bladder. You may have a small amount of blood in your urine for 1 to 3 days after the procedure. While the stent is in place, you may have to urinate more often, feel a sudden need to urinate, or feel like you can't completely empty your bladder. You may feel some pain when you urinate or do strenuous activity. You also may notice a small amount of blood in your urine after strenuous activities. These side effects usually don't prevent people from doing their normal daily activities. You may have a thin string coming out of your urethra. Your urethra is the tube that carries urine from your bladder to outside your body. This string is attached to the stent. Try not to pull on the string. It will be used to pull out the stent when you no longer need it. After the procedure, urine may flow better from your kidneys to your bladder. A ureteral stent may be left in place for several days or for as long as several months. Your doctor will take it out when you no longer need it. Or, in some cases, it may be taken out at home. This care sheet gives you a general idea about how long it will take for you to recover. But each person recovers at a different pace. Follow the steps below to get better as quickly as possible. How can you care for yourself at home? Activity    · Rest when you feel tired.  Getting enough sleep will help you recover.     · Avoid strenuous activities, such as bicycle riding, jogging, weight lifting, or aerobic exercise, until your doctor says it is okay.     · Ask your doctor when you can drive again.     · Most people are able to return to work the day after the procedure. If your work requires intense activity, you may feel pain in your kidney area or get tired easily. If this happens, you may need to do less strenuous activities while the stent is in.     · Ask your doctor when it is okay for you to have sex. Diet    · You can eat your normal diet. If your stomach is upset, try bland, low-fat foods like plain rice, broiled chicken, toast, and yogurt.     · Drink plenty of fluids (unless your doctor tells you not to). Medicines    · Your doctor will tell you if and when you can restart your medicines. You will also get instructions about taking any new medicines.     · If you take aspirin or some other blood thinner, ask your doctor if and when to start taking it again. Make sure that you understand exactly what your doctor wants you to do.     · Be safe with medicines. Take pain medicines exactly as directed. ? If the doctor gave you a prescription medicine for pain, take it as prescribed. ? If you are not taking a prescription pain medicine, ask your doctor if you can take an over-the-counter medicine.     · If you think your pain medicine is making you sick to your stomach:  ? Take your medicine after meals (unless your doctor has told you not to). ? Ask your doctor for a different pain medicine.     · If your doctor prescribed antibiotics, take them as directed. Do not stop taking them just because you feel better. You need to take the full course of antibiotics. Follow-up care is a key part of your treatment and safety. Be sure to make and go to all appointments, and call your doctor if you are having problems.  It's also a good idea to know your test results and keep a list of the medicines you take. When should you call for help? Call 911 anytime you think you may need emergency care. For example, call if:    · You passed out (lost consciousness).     · You have severe trouble breathing.     · You have sudden chest pain and shortness of breath, or you cough up blood.     · You have severe belly pain. Call your doctor now or seek immediate medical care if:    · Part or all of the stent comes out of your urethra.     · You have pain that does not get better after you take pain medicine.     · You have symptoms of a urinary infection. For example:  ? You have blood or pus in your urine. ? You have pain in your back just below your rib cage. This is called flank pain. ? You have a fever, chills, or body aches. ? It hurts to urinate. ? You have groin or belly pain.     · You cannot control when you urinate, or you leak urine. Watch closely for changes in your health, and be sure to contact your doctor if you have any problems. Where can you learn more? Go to http://www.gray.com/  Enter B869 in the search box to learn more about \"Ureteral Stent Placement: What to Expect at Home. \"  Current as of: October 18, 2021               Content Version: 13.2  © 2006-2022 [employer]. Care instructions adapted under license by [employer] (which disclaims liability or warranty for this information).  If you have questions about a medical condition or this instruction, always ask your healthcare professional. [place] [address] any warranty or liability for your use of this information.

## 2022-04-21 NOTE — ROUTINE PROCESS
Bedside and Verbal shift change report given to Charmaine Rosenberg RN (oncoming nurse) by SNOW Bell (offgoing nurse). Report included the following information SBAR, Kardex, Intake/Output, MAR and Recent Results.

## 2022-05-10 NOTE — PERIOP NOTES
Unable to reach the patient to reschedule her for a PAT with H&P appt.   Left a message for Teri Adams at Dr. Muaricio Gomez office informing her that the patient will have a PAT done, but no H&P. DOS: 5/23/2022

## 2022-05-13 ENCOUNTER — HOSPITAL ENCOUNTER (OUTPATIENT)
Dept: PREADMISSION TESTING | Age: 57
Discharge: HOME | End: 2022-05-13
Payer: MEDICARE

## 2022-05-13 VITALS
TEMPERATURE: 97.5 F | DIASTOLIC BLOOD PRESSURE: 104 MMHG | BODY MASS INDEX: 30.75 KG/M2 | HEIGHT: 64 IN | HEART RATE: 82 BPM | WEIGHT: 180.12 LBS | RESPIRATION RATE: 14 BRPM | OXYGEN SATURATION: 97 % | SYSTOLIC BLOOD PRESSURE: 148 MMHG

## 2022-05-13 LAB
ALBUMIN SERPL-MCNC: 3.7 G/DL (ref 3.5–5)
ALBUMIN/GLOB SERPL: 0.9 {RATIO} (ref 1.1–2.2)
ALP SERPL-CCNC: 140 U/L (ref 45–117)
ALT SERPL-CCNC: 20 U/L (ref 12–78)
ANION GAP SERPL CALC-SCNC: 8 MMOL/L (ref 5–15)
APPEARANCE UR: ABNORMAL
AST SERPL-CCNC: 13 U/L (ref 15–37)
BACTERIA URNS QL MICRO: NEGATIVE /HPF
BASOPHILS # BLD: 0.1 K/UL (ref 0–0.1)
BASOPHILS NFR BLD: 1 % (ref 0–1)
BILIRUB SERPL-MCNC: 0.5 MG/DL (ref 0.2–1)
BILIRUB UR QL: NEGATIVE
BUN SERPL-MCNC: 14 MG/DL (ref 6–20)
BUN/CREAT SERPL: 13 (ref 12–20)
CALCIUM SERPL-MCNC: 9.4 MG/DL (ref 8.5–10.1)
CHLORIDE SERPL-SCNC: 106 MMOL/L (ref 97–108)
CO2 SERPL-SCNC: 27 MMOL/L (ref 21–32)
COLOR UR: YELLOW
CREAT SERPL-MCNC: 1.07 MG/DL (ref 0.55–1.02)
DIFFERENTIAL METHOD BLD: NORMAL
EOSINOPHIL # BLD: 0.3 K/UL (ref 0–0.4)
EOSINOPHIL NFR BLD: 4 % (ref 0–7)
EPITH CASTS URNS QL MICRO: ABNORMAL /LPF
ERYTHROCYTE [DISTWIDTH] IN BLOOD BY AUTOMATED COUNT: 14.5 % (ref 11.5–14.5)
GLOBULIN SER CALC-MCNC: 4.2 G/DL (ref 2–4)
GLUCOSE SERPL-MCNC: 111 MG/DL (ref 65–100)
GLUCOSE UR STRIP.AUTO-MCNC: NEGATIVE MG/DL
HCT VFR BLD AUTO: 43.7 % (ref 35–47)
HGB BLD-MCNC: 14 G/DL (ref 11.5–16)
HGB UR QL STRIP: ABNORMAL
IMM GRANULOCYTES # BLD AUTO: 0 K/UL (ref 0–0.04)
IMM GRANULOCYTES NFR BLD AUTO: 0 % (ref 0–0.5)
KETONES UR QL STRIP.AUTO: ABNORMAL MG/DL
LEUKOCYTE ESTERASE UR QL STRIP.AUTO: ABNORMAL
LYMPHOCYTES # BLD: 2.5 K/UL (ref 0.8–3.5)
LYMPHOCYTES NFR BLD: 30 % (ref 12–49)
MCH RBC QN AUTO: 30.2 PG (ref 26–34)
MCHC RBC AUTO-ENTMCNC: 32 G/DL (ref 30–36.5)
MCV RBC AUTO: 94.2 FL (ref 80–99)
MONOCYTES # BLD: 0.5 K/UL (ref 0–1)
MONOCYTES NFR BLD: 6 % (ref 5–13)
NEUTS SEG # BLD: 5.1 K/UL (ref 1.8–8)
NEUTS SEG NFR BLD: 59 % (ref 32–75)
NITRITE UR QL STRIP.AUTO: NEGATIVE
NRBC # BLD: 0 K/UL (ref 0–0.01)
NRBC BLD-RTO: 0 PER 100 WBC
PH UR STRIP: 5.5 [PH] (ref 5–8)
PLATELET # BLD AUTO: 289 K/UL (ref 150–400)
PMV BLD AUTO: 10.7 FL (ref 8.9–12.9)
POTASSIUM SERPL-SCNC: 3.7 MMOL/L (ref 3.5–5.1)
PROT SERPL-MCNC: 7.9 G/DL (ref 6.4–8.2)
PROT UR STRIP-MCNC: 300 MG/DL
RBC # BLD AUTO: 4.64 M/UL (ref 3.8–5.2)
RBC #/AREA URNS HPF: >100 /HPF (ref 0–5)
SODIUM SERPL-SCNC: 141 MMOL/L (ref 136–145)
SP GR UR REFRACTOMETRY: 1.01 (ref 1–1.03)
UA: UC IF INDICATED,UAUC: ABNORMAL
UROBILINOGEN UR QL STRIP.AUTO: 0.2 EU/DL (ref 0.2–1)
WBC # BLD AUTO: 8.5 K/UL (ref 3.6–11)
WBC URNS QL MICRO: ABNORMAL /HPF (ref 0–4)

## 2022-05-13 PROCEDURE — 87086 URINE CULTURE/COLONY COUNT: CPT

## 2022-05-13 PROCEDURE — 93005 ELECTROCARDIOGRAM TRACING: CPT

## 2022-05-13 PROCEDURE — 81001 URINALYSIS AUTO W/SCOPE: CPT

## 2022-05-13 PROCEDURE — 36415 COLL VENOUS BLD VENIPUNCTURE: CPT

## 2022-05-13 PROCEDURE — 85025 COMPLETE CBC W/AUTO DIFF WBC: CPT

## 2022-05-13 PROCEDURE — 80053 COMPREHEN METABOLIC PANEL: CPT

## 2022-05-13 RX ORDER — IBUPROFEN 800 MG/1
800 TABLET ORAL AS NEEDED
COMMUNITY

## 2022-05-13 RX ORDER — DOCUSATE SODIUM 100 MG/1
100 CAPSULE, LIQUID FILLED ORAL
COMMUNITY

## 2022-05-13 RX ORDER — VARENICLINE TARTRATE 1 MG/1
1 TABLET, FILM COATED ORAL
COMMUNITY

## 2022-05-13 RX ORDER — HYDROCODONE BITARTRATE AND ACETAMINOPHEN 5; 325 MG/1; MG/1
2 TABLET ORAL
COMMUNITY

## 2022-05-13 NOTE — PERIOP NOTES
[facility]) notified of latex allergy on 5/13 @ 1600 // JGS    Patient's diastolic BP elevated during several assessments at PAT meeting. I asked patient to monitor BP over weekend and to call PAT on Monday to discuss findings and receive further guidance if needed.

## 2022-05-13 NOTE — PERIOP NOTES
[address] , [address] [facility]   MAIN OR                                  [phone]   MAIN PRE OP                          [phone]                                                                                AMBULATORY PRE OP          [phone]  PRE-ADMISSION TESTING    [phone]     Surgery Date:  May 23 (Monday)      Is surgery arrival time given by surgeon? NO  If NO, [facility] staff will call you between 3 and 7pm the day before your surgery with your arrival time. (If your surgery is on a Monday, we will call you the Friday before.)    Call [phone] after 7pm Monday-Friday if you did not receive this call. INSTRUCTIONS BEFORE YOUR SURGERY   When You  Arrive Arrive at the [address] [facility] on the day of your surgery  Have your insurance card, photo ID, and any copayment (if needed)   Food   and   Drink NO food or drink after midnight the night before surgery    This means NO water, gum, mints, coffee, juice, etc.  No alcohol (beer, wine, liquor) 24 hours before and after surgery   Medications to   TAKE   Morning of Surgery MEDICATIONS TO TAKE THE MORNING OF SURGERY WITH A SIP OF WATER:    Alprazolam   Duloxetine   Famotidine   Hydrocodone   Pantoprazole   Topiramate    Hold day of surgery - Docusate, Ammonium Lactate, Chantix     Medications  To  STOP      7 days before surgery  Non-Steroidal anti-inflammatory Drugs (NSAID's): for example, Ibuprofen (Advil, Motrin), Naproxen (Aleve)   Aspirin, if taking for pain    Herbal supplements, vitamins, and fish oil   Other:  (Pain medications not listed above, including Tylenol may be taken)   Blood  Thinners  If you take  Aspirin, Plavix, Coumadin, or any blood-thinning or anti-blood clot medicine, talk to the doctor who prescribed the medications for pre-operative instructions.    Bathing Clothing  Jewelry  Valuables      If you shower the morning of surgery, please do not apply anything to your skin (lotions, powders, deodorant, or makeup, especially mascara)   Do not shave or trim anywhere 24 hours before surgery   Wear your hair loose or down; no pony-tails, buns, or metal hair clips   Wear loose, comfortable, clean clothes   Wear glasses instead of contacts   Leave money, valuables, and jewelry, including body piercings, at home       Going Home - or Spending the Night  SAME-DAY SURGERY: You must have a responsible adult drive you home and stay with you 24 hours after surgery   ADMITS: If your doctor is keeping you in the hospital after surgery, leave personal belongings/luggage in your car until you have a hospital room number. Hospital discharge time is 12 noon  Drivers must be here before 12 noon unless you are told differently   Special Instructions        Follow all instructions so your surgery wont be cancelled. Please, be on time. If a situation occurs and you are delayed the day of surgery, call [phone]. If your physical condition changes (like a fever, cold, flu, etc.) call your surgeon. Home medication(s) reviewed and verified via   LIST   VERBAL   during PAT appointment. The patient was contacted by     IN-PERSON  The patient verbalizes understanding of all instructions and    DOES NOT   need reinforcement.

## 2022-05-14 LAB
ATRIAL RATE: 72 BPM
BACTERIA SPEC CULT: NORMAL
CALCULATED P AXIS, ECG09: 60 DEGREES
CALCULATED R AXIS, ECG10: -25 DEGREES
CALCULATED T AXIS, ECG11: 46 DEGREES
DIAGNOSIS, 93000: NORMAL
P-R INTERVAL, ECG05: 172 MS
Q-T INTERVAL, ECG07: 406 MS
QRS DURATION, ECG06: 88 MS
QTC CALCULATION (BEZET), ECG08: 444 MS
SERVICE CMNT-IMP: NORMAL
VENTRICULAR RATE, ECG03: 72 BPM

## 2022-05-20 ENCOUNTER — ANESTHESIA EVENT (OUTPATIENT)
Dept: SURGERY | Age: 57
End: 2022-05-20
Payer: MEDICARE

## 2022-05-23 ENCOUNTER — ANESTHESIA (OUTPATIENT)
Dept: SURGERY | Age: 57
End: 2022-05-23
Payer: MEDICARE

## 2022-05-23 ENCOUNTER — APPOINTMENT (OUTPATIENT)
Dept: GENERAL RADIOLOGY | Age: 57
End: 2022-05-23
Attending: UROLOGY
Payer: MEDICARE

## 2022-05-23 ENCOUNTER — HOSPITAL ENCOUNTER (OUTPATIENT)
Age: 57
Setting detail: OUTPATIENT SURGERY
Discharge: HOME | End: 2022-05-23
Attending: UROLOGY | Admitting: UROLOGY
Payer: MEDICARE

## 2022-05-23 VITALS
SYSTOLIC BLOOD PRESSURE: 158 MMHG | WEIGHT: 176.59 LBS | TEMPERATURE: 98 F | HEART RATE: 76 BPM | RESPIRATION RATE: 14 BRPM | OXYGEN SATURATION: 94 % | BODY MASS INDEX: 30.31 KG/M2 | DIASTOLIC BLOOD PRESSURE: 100 MMHG

## 2022-05-23 DIAGNOSIS — N13.9 OBSTRUCTIVE UROPATHY: Primary | ICD-10-CM

## 2022-05-23 PROCEDURE — 74011000250 HC RX REV CODE- 250: Performed by: UROLOGY

## 2022-05-23 PROCEDURE — 74011250636 HC RX REV CODE- 250/636: Performed by: UROLOGY

## 2022-05-23 PROCEDURE — 76210000006 HC OR PH I REC 0.5 TO 1 HR: Performed by: UROLOGY

## 2022-05-23 PROCEDURE — 77030040922 HC BLNKT HYPOTHRM STRY -A

## 2022-05-23 PROCEDURE — 74011250636 HC RX REV CODE- 250/636: Performed by: NURSE ANESTHETIST, CERTIFIED REGISTERED

## 2022-05-23 PROCEDURE — 74011000250 HC RX REV CODE- 250: Performed by: NURSE ANESTHETIST, CERTIFIED REGISTERED

## 2022-05-23 PROCEDURE — 77030020143 HC AIRWY LARYN INTUB CGAS -A: Performed by: ANESTHESIOLOGY

## 2022-05-23 PROCEDURE — 74420 UROGRAPHY RTRGR +-KUB: CPT

## 2022-05-23 PROCEDURE — 2709999900 HC NON-CHARGEABLE SUPPLY: Performed by: UROLOGY

## 2022-05-23 PROCEDURE — 76060000033 HC ANESTHESIA 1 TO 1.5 HR: Performed by: UROLOGY

## 2022-05-23 PROCEDURE — 74011250636 HC RX REV CODE- 250/636: Performed by: ANESTHESIOLOGY

## 2022-05-23 PROCEDURE — C1758 CATHETER, URETERAL: HCPCS | Performed by: UROLOGY

## 2022-05-23 PROCEDURE — 76210000021 HC REC RM PH II 0.5 TO 1 HR: Performed by: UROLOGY

## 2022-05-23 PROCEDURE — C2617 STENT, NON-COR, TEM W/O DEL: HCPCS | Performed by: UROLOGY

## 2022-05-23 PROCEDURE — C1769 GUIDE WIRE: HCPCS | Performed by: UROLOGY

## 2022-05-23 PROCEDURE — 77030040361 HC SLV COMPR DVT MDII -B

## 2022-05-23 PROCEDURE — 76010000161 HC OR TIME 1 TO 1.5 HR INTENSV-TIER 1: Performed by: UROLOGY

## 2022-05-23 DEVICE — URETERAL STENT
Type: IMPLANTABLE DEVICE | Site: URETER | Status: NON-FUNCTIONAL
Brand: POLARIS™ ULTRA
Removed: 2022-07-21

## 2022-05-23 RX ORDER — SODIUM CHLORIDE 0.9 % (FLUSH) 0.9 %
5-40 SYRINGE (ML) INJECTION AS NEEDED
Status: DISCONTINUED | OUTPATIENT
Start: 2022-05-23 | End: 2022-05-23 | Stop reason: HOSPADM

## 2022-05-23 RX ORDER — DEXAMETHASONE SODIUM PHOSPHATE 4 MG/ML
INJECTION, SOLUTION INTRA-ARTICULAR; INTRALESIONAL; INTRAMUSCULAR; INTRAVENOUS; SOFT TISSUE AS NEEDED
Status: DISCONTINUED | OUTPATIENT
Start: 2022-05-23 | End: 2022-05-23 | Stop reason: HOSPADM

## 2022-05-23 RX ORDER — MIDAZOLAM HYDROCHLORIDE 1 MG/ML
INJECTION, SOLUTION INTRAMUSCULAR; INTRAVENOUS AS NEEDED
Status: DISCONTINUED | OUTPATIENT
Start: 2022-05-23 | End: 2022-05-23 | Stop reason: HOSPADM

## 2022-05-23 RX ORDER — LIDOCAINE HYDROCHLORIDE 10 MG/ML
0.1 INJECTION, SOLUTION EPIDURAL; INFILTRATION; INTRACAUDAL; PERINEURAL AS NEEDED
Status: DISCONTINUED | OUTPATIENT
Start: 2022-05-23 | End: 2022-05-23 | Stop reason: HOSPADM

## 2022-05-23 RX ORDER — NALOXONE HYDROCHLORIDE 0.4 MG/ML
0.2 INJECTION, SOLUTION INTRAMUSCULAR; INTRAVENOUS; SUBCUTANEOUS
Status: DISCONTINUED | OUTPATIENT
Start: 2022-05-23 | End: 2022-05-23 | Stop reason: HOSPADM

## 2022-05-23 RX ORDER — CEPHALEXIN 500 MG/1
500 CAPSULE ORAL 4 TIMES DAILY
Qty: 12 CAPSULE | Refills: 0 | Status: SHIPPED | OUTPATIENT
Start: 2022-05-23 | End: 2022-05-26

## 2022-05-23 RX ORDER — SODIUM CHLORIDE 0.9 % (FLUSH) 0.9 %
5-40 SYRINGE (ML) INJECTION EVERY 8 HOURS
Status: DISCONTINUED | OUTPATIENT
Start: 2022-05-23 | End: 2022-05-23 | Stop reason: HOSPADM

## 2022-05-23 RX ORDER — ONDANSETRON 2 MG/ML
INJECTION INTRAMUSCULAR; INTRAVENOUS AS NEEDED
Status: DISCONTINUED | OUTPATIENT
Start: 2022-05-23 | End: 2022-05-23 | Stop reason: HOSPADM

## 2022-05-23 RX ORDER — SODIUM CHLORIDE, SODIUM LACTATE, POTASSIUM CHLORIDE, CALCIUM CHLORIDE 600; 310; 30; 20 MG/100ML; MG/100ML; MG/100ML; MG/100ML
100 INJECTION, SOLUTION INTRAVENOUS CONTINUOUS
Status: DISCONTINUED | OUTPATIENT
Start: 2022-05-23 | End: 2022-05-23 | Stop reason: HOSPADM

## 2022-05-23 RX ORDER — FENTANYL CITRATE 50 UG/ML
INJECTION, SOLUTION INTRAMUSCULAR; INTRAVENOUS AS NEEDED
Status: DISCONTINUED | OUTPATIENT
Start: 2022-05-23 | End: 2022-05-23 | Stop reason: HOSPADM

## 2022-05-23 RX ORDER — LIDOCAINE HYDROCHLORIDE 20 MG/ML
JELLY TOPICAL AS NEEDED
Status: DISCONTINUED | OUTPATIENT
Start: 2022-05-23 | End: 2022-05-23 | Stop reason: HOSPADM

## 2022-05-23 RX ORDER — KETOROLAC TROMETHAMINE 30 MG/ML
INJECTION, SOLUTION INTRAMUSCULAR; INTRAVENOUS AS NEEDED
Status: DISCONTINUED | OUTPATIENT
Start: 2022-05-23 | End: 2022-05-23 | Stop reason: HOSPADM

## 2022-05-23 RX ORDER — FLUMAZENIL 0.1 MG/ML
0.2 INJECTION INTRAVENOUS
Status: DISCONTINUED | OUTPATIENT
Start: 2022-05-23 | End: 2022-05-23 | Stop reason: HOSPADM

## 2022-05-23 RX ORDER — HYDROMORPHONE HYDROCHLORIDE 1 MG/ML
.25-1 INJECTION, SOLUTION INTRAMUSCULAR; INTRAVENOUS; SUBCUTANEOUS
Status: DISCONTINUED | OUTPATIENT
Start: 2022-05-23 | End: 2022-05-23 | Stop reason: HOSPADM

## 2022-05-23 RX ORDER — OXYCODONE HYDROCHLORIDE 5 MG/1
5 TABLET ORAL AS NEEDED
Status: DISCONTINUED | OUTPATIENT
Start: 2022-05-23 | End: 2022-05-23 | Stop reason: HOSPADM

## 2022-05-23 RX ORDER — HYDROCODONE BITARTRATE AND ACETAMINOPHEN 5; 325 MG/1; MG/1
1 TABLET ORAL
Qty: 12 TABLET | Refills: 0 | Status: SHIPPED | OUTPATIENT
Start: 2022-05-23 | End: 2022-05-26

## 2022-05-23 RX ORDER — HYDROMORPHONE HYDROCHLORIDE 1 MG/ML
0.5 INJECTION, SOLUTION INTRAMUSCULAR; INTRAVENOUS; SUBCUTANEOUS ONCE
Status: COMPLETED | OUTPATIENT
Start: 2022-05-23 | End: 2022-05-23

## 2022-05-23 RX ORDER — LIDOCAINE HYDROCHLORIDE 20 MG/ML
INJECTION, SOLUTION EPIDURAL; INFILTRATION; INTRACAUDAL; PERINEURAL AS NEEDED
Status: DISCONTINUED | OUTPATIENT
Start: 2022-05-23 | End: 2022-05-23 | Stop reason: HOSPADM

## 2022-05-23 RX ORDER — SODIUM CHLORIDE, SODIUM LACTATE, POTASSIUM CHLORIDE, CALCIUM CHLORIDE 600; 310; 30; 20 MG/100ML; MG/100ML; MG/100ML; MG/100ML
125 INJECTION, SOLUTION INTRAVENOUS CONTINUOUS
Status: DISCONTINUED | OUTPATIENT
Start: 2022-05-23 | End: 2022-05-23 | Stop reason: HOSPADM

## 2022-05-23 RX ORDER — PROPOFOL 10 MG/ML
INJECTION, EMULSION INTRAVENOUS AS NEEDED
Status: DISCONTINUED | OUTPATIENT
Start: 2022-05-23 | End: 2022-05-23 | Stop reason: HOSPADM

## 2022-05-23 RX ORDER — DIPHENHYDRAMINE HYDROCHLORIDE 50 MG/ML
12.5 INJECTION, SOLUTION INTRAMUSCULAR; INTRAVENOUS AS NEEDED
Status: DISCONTINUED | OUTPATIENT
Start: 2022-05-23 | End: 2022-05-23 | Stop reason: HOSPADM

## 2022-05-23 RX ORDER — ONDANSETRON 2 MG/ML
4 INJECTION INTRAMUSCULAR; INTRAVENOUS AS NEEDED
Status: DISCONTINUED | OUTPATIENT
Start: 2022-05-23 | End: 2022-05-23 | Stop reason: HOSPADM

## 2022-05-23 RX ADMIN — CEFAZOLIN SODIUM 2 G: 1 POWDER, FOR SOLUTION INTRAMUSCULAR; INTRAVENOUS at 14:49

## 2022-05-23 RX ADMIN — LIDOCAINE HYDROCHLORIDE 50 MG: 20 INJECTION, SOLUTION EPIDURAL; INFILTRATION; INTRACAUDAL; PERINEURAL at 14:46

## 2022-05-23 RX ADMIN — HYDROMORPHONE HYDROCHLORIDE 0.5 MG: 1 INJECTION, SOLUTION INTRAMUSCULAR; INTRAVENOUS; SUBCUTANEOUS at 13:43

## 2022-05-23 RX ADMIN — FENTANYL CITRATE 100 MCG: 50 INJECTION, SOLUTION INTRAMUSCULAR; INTRAVENOUS at 14:46

## 2022-05-23 RX ADMIN — PROPOFOL 50 MG: 10 INJECTION, EMULSION INTRAVENOUS at 15:23

## 2022-05-23 RX ADMIN — FENTANYL CITRATE 50 MCG: 50 INJECTION, SOLUTION INTRAMUSCULAR; INTRAVENOUS at 15:03

## 2022-05-23 RX ADMIN — KETOROLAC TROMETHAMINE 15 MG: 30 INJECTION, SOLUTION INTRAMUSCULAR; INTRAVENOUS at 15:28

## 2022-05-23 RX ADMIN — PROPOFOL 200 MG: 10 INJECTION, EMULSION INTRAVENOUS at 14:46

## 2022-05-23 RX ADMIN — SODIUM CHLORIDE, POTASSIUM CHLORIDE, SODIUM LACTATE AND CALCIUM CHLORIDE 100 ML/HR: 600; 310; 30; 20 INJECTION, SOLUTION INTRAVENOUS at 16:00

## 2022-05-23 RX ADMIN — PROPOFOL 50 MG: 10 INJECTION, EMULSION INTRAVENOUS at 15:13

## 2022-05-23 RX ADMIN — DIPHENHYDRAMINE HYDROCHLORIDE 12.5 MG: 50 INJECTION, SOLUTION INTRAMUSCULAR; INTRAVENOUS at 16:09

## 2022-05-23 RX ADMIN — FENTANYL CITRATE 50 MCG: 50 INJECTION, SOLUTION INTRAMUSCULAR; INTRAVENOUS at 16:04

## 2022-05-23 RX ADMIN — MIDAZOLAM 2 MG: 1 INJECTION, SOLUTION INTRAMUSCULAR; INTRAVENOUS at 14:42

## 2022-05-23 RX ADMIN — LIDOCAINE HYDROCHLORIDE 50 MG: 20 INJECTION, SOLUTION EPIDURAL; INFILTRATION; INTRACAUDAL; PERINEURAL at 14:47

## 2022-05-23 RX ADMIN — ONDANSETRON HYDROCHLORIDE 4 MG: 2 SOLUTION INTRAMUSCULAR; INTRAVENOUS at 15:41

## 2022-05-23 RX ADMIN — SODIUM CHLORIDE, POTASSIUM CHLORIDE, SODIUM LACTATE AND CALCIUM CHLORIDE 125 ML/HR: 600; 310; 30; 20 INJECTION, SOLUTION INTRAVENOUS at 13:06

## 2022-05-23 RX ADMIN — MIDAZOLAM 2 MG: 1 INJECTION, SOLUTION INTRAMUSCULAR; INTRAVENOUS at 14:39

## 2022-05-23 RX ADMIN — DEXAMETHASONE SODIUM PHOSPHATE 4 MG: 4 INJECTION, SOLUTION INTRAMUSCULAR; INTRAVENOUS at 15:00

## 2022-05-23 NOTE — ANESTHESIA POSTPROCEDURE EVALUATION
Procedure(s):  CYSTOSCOPY, BILATERAL RETROGRADE PYELOGRAM, RIGHT URETEROSCOPY WITH QUANTA LASER LITHOTRIPSY, AND RIGHT STENT EXCHANGE (LATEX ALLERGY). general    Anesthesia Post Evaluation        Patient location during evaluation: PACU  Patient participation: complete - patient participated  Level of consciousness: sleepy but conscious  Pain management: adequate  Airway patency: patent  Anesthetic complications: no  Cardiovascular status: acceptable and stable  Respiratory status: acceptable and unassisted  Hydration status: acceptable  Comments: The patient was seen and evaluated in the post-operative period. The time of my evaluation may not match the time of this note. The patient denied uncontrolled pain or nausea, and there were no significant complications evident. Maribel Goss MD      Post anesthesia nausea and vomiting:  none  Final Post Anesthesia Temperature Assessment:  Normothermia (36.0-37.5 degrees C)      INITIAL Post-op Vital signs:   Vitals Value Taken Time   /98 05/23/22 1630   Temp 36.5 °C (97.7 °F) 05/23/22 1557   Pulse 77 05/23/22 1633   Resp 15 05/23/22 1633   SpO2 96 % 05/23/22 1633   Vitals shown include unvalidated device data.

## 2022-05-23 NOTE — BRIEF OP NOTE
Brief Postoperative Note    Patient: Teresita Howe  YOB: 1965  MRN: [id number]    Date of Procedure: 5/23/2022     Pre-Op Diagnosis: r ureteral STONE    Post-Op Diagnosis: Same as preoperative diagnosis. Procedure(s):  CYSTOSCOPY, BILATERAL RETROGRADE PYELOGRAM, RIGHT URETEROSCOPY WITH QUANTA LASER LITHOTRIPSY, AND RIGHT STENT EXCHANGE (LATEX ALLERGY)    Surgeon(s):  Kimberly Benitez MD    Surgical Assistant: None    Anesthesia: General     Estimated Blood Loss (mL): Minimal    Complications: None    Specimens: * No specimens in log *     Implants:   Implant Name Type Inv.  Item Serial No.  Lot No. LRB No. Used Action   STENT URET POLARIS ULTRA [id number] -- PERCUFLEX - SNA  STENT URET POLARIS ULTRA 5X24 -- PERCUFLEX NA [employer] [id number] 1 Implanted       Drains: * No LDAs found *    Findings: large stone lasered to dust    Electronically Signed by Umberto Erwin MD on 5/23/2022 at 3:55 PM

## 2022-05-23 NOTE — DISCHARGE INSTRUCTIONS
DISCHARGE SUMMARY from your Nurse      PATIENT INSTRUCTIONS    After general anesthesia or intravenous sedation, for 24 hours or while taking prescription Narcotics:  · Limit your activities  · Do not drive and operate hazardous machinery  · Do not make important personal or business decisions  · Do  not drink alcoholic beverages  · If you have not urinated within 8 hours after discharge, please contact your surgeon on call. Report the following to your surgeon:  · Excessive pain, swelling, redness or odor of or around the surgical area  · Temperature over 100.5  · Nausea and vomiting lasting longer than 4 hours or if unable to take medications  · Any signs of decreased circulation or nerve impairment to extremity: change in color, persistent  numbness, tingling, coldness or increase pain  · Any questions      GOOD HELP TO FIGHT AN INFECTION  Here are a few tip to help reduce the chance of getting an infection after surgery:   Wash Your Hands   Good handwashing is the most important thing you and your caregiver can do.  Wash before and after caring for any wounds. Dry your hand with a clean towel.  Wash with soap and water for at least 20 seconds. A TIP: sing the \"Happy Birthday\" song through one time while washing to help with the timing.  Use a hand  in between washings.  Shower   When your surgeon says it is OK to take a shower, use a new bar of antibacterial soap (if that is what you use, and keep that bar of soap ONLY for your use), or antibacterial body wash.  Use a clean wash cloth or sponge when you bathe.  Dry off with a clean towel  after every bath - be careful around any wounds, skin staples, sutures or surgical glue over/on wounds.  Do not enter swimming pools, hot tubs, lakes, rivers and/or ocean until wounds are healed and your doctor/surgeon says it is OK.  Use Clean Sheets   Sleep on freshly laundered sheets after your surgery.    Keep the surgery site covered with a clean, dry bandage (if instructed to do so). If the bandage becomes soiled, reapply a new, dry, clean bandage.  Do not allow pets to sleep with you while your wound is healing.  Lifestyle Modification and Controlling Your Blood Sugar   Smoking slows wound healing. Stop smoking and limit exposure to second-hand smoke.  High blood sugar slows wound healing. Eat a well-balanced diet to provide proper nutrition while healing   Monitor your blood sugar (if you are a diabetic) and take your medications as you are suppose to so you can control you blood sugar after surgery. COUGH AND DEEP BREATHE    Breathing deeply and coughing are very important exercises to do after surgery. Deep breathing and coughing open the little air tubes and air sacks in your lungs. You take deep breaths every day. You may not even notice - it is just something you do when you sigh or yawn. It is a natural exercise you do to keep these air passages open. After surgery, take deep breaths and cough, on purpose. DIRECTIONS:  · Take 10 to 15 slow deep breaths every hour while awake. · Breathe in deeply, and hold it for 2 seconds. · Exhale slowly through puckered lips, like blowing up a balloon. · After every 4th or 5th deep breath, hug your pillow to your chest or belly and give a hard, deep cough. Yes, it will probably hurt. But doing this exercise is a very important part of healing after surgery. Take your pain medicine to help you do this exercise without too much pain. Coughing and deep breathing help prevent bronchitis and pneumonia after surgery. If you had chest or belly surgery, use a pillow as a \"hug sara\" and hold it tightly to your chest or belly when you cough. ANKLE PUMPS    Ankle pumps increase the circulation of oxygenated blood to your lower extremities and decrease your risk for circulation problems such as blood clots.  They also stretch the muscles, tendons and ligaments in your foot and ankle, and prevent joint contracture in the ankle and foot, especially after surgeries on the legs. It is important to do ankle pump exercises regularly after surgery because immobility increases your risk for developing a blood clot. Your doctor may also have you take an Aspirin for the next few days as well. If your doctor did not ask you to take an Aspirin, consult with him before starting Aspirin therapy on your own. The exercise is quite simple. · Slowly point your foot forward, feeling the muscles on the top of your lower leg stretch, and hold this position for 5 seconds. · Next, pull your foot back toward you as far as possible, stretching the calf muscles, and hold that position for 5 seconds. · Repeat with the other foot. · Perform 10 repetitions every hour while awake for both ankles if possible (down and then up with the foot once is one repetition). You should feel gentle stretching of the muscles in your lower leg when doing this exercise. If you feel pain, or your range of motion is limited, don't push too hard. Only go the limit your joint and muscles will let you go. If you have increasing pain, progressively worsening leg warmth or swelling, STOP the exercise and call your doctor. MEDICATION AND   SIDE EFFECT GUIDE    [employer] [facility] [employer] MEDICATION AND SIDE EFFECT GUIDE was provided to the PATIENT AND CARE PROVIDER. Information provided includes instruction about drug purpose and common side effects for the following medications:   · Mahendra Coates  · [address]        These are general instructions for a healthy lifestyle:    *   Please give a list of your current medications to your Primary Care Provider. *   Please update this list whenever your medications are discontinued, doses are changed, or new medications (including over-the-counter products) are added.   *   Please carry medication information at all times in case of emergency situations. About Smoking  No smoking / No tobacco products  Avoid exposure to second hand smoke     Surgeon General's Warning:  Quitting smoking now greatly reduces serious risk to your health. Obesity, smoking, and sedentary lifestyle greatly increases your risk for illness and disease. A healthy diet, regular physical exercise & weight monitoring are important for maintaining a healthy lifestyle. Congestive Heart Failure  You may be retaining fluid if you have a history of heart failure or if you experience any of the following symptoms:  Weight gain of 3 pounds or more overnight or 5 pounds in a week, increased swelling in your hands or feet or shortness of breath while lying flat in bed. Please call your doctor as soon as you notice any of these symptoms; do not wait until your next office visit. Recognize signs and symptoms of STROKE:  F -  Face looks uneven  A -  Arms unable to move or move evenly  S -  Speech slurred or non-existent  T -  Time-call 911 as soon as signs and symptoms begin-DO NOT go          back to bed or wait to see if you get better-TIME IS BRAIN. Warning Signs of HEART ATTACK   Call 911 if you have these symptoms:     Chest discomfort. Most heart attacks involve discomfort in the center of the chest that lasts more than a few minutes, or that goes away and comes back. It can feel like uncomfortable pressure, squeezing, fullness, or pain.  Discomfort in other areas of the upper body. Symptoms can include pain or discomfort in one or both arms, the back, neck, jaw, or stomach.  Shortness of breath with or without chest discomfort.  Other signs may include breaking out in a cold sweat, nausea, or lightheadedness. Don't wait more than five minutes to call 911 - MINUTES MATTER! Fast action can save your life. Calling 911 is almost always the fastest way to get lifesaving treatment.  Emergency Medical Services staff can begin treatment when they arrive -- up to an hour sooner than if someone gets to the hospital by car. Learning About Coronavirus [phone])  Coronavirus [phone]): Overview  What is coronavirus (COVID-19)? The coronavirus disease (COVID-19) is caused by a virus. It is an illness that was first found in [place], [place], in December 2019. It has since spread worldwide. The virus can cause fever, cough, and trouble breathing. In severe cases, it can cause pneumonia and make it hard to breathe without help. It can cause death. Coronaviruses are a large group of viruses. They cause the common cold. They also cause more serious illnesses like Middle East respiratory syndrome (MERS) and severe acute respiratory syndrome (SARS). COVID-19 is caused by a novel coronavirus. That means it's a new type that has not been seen in people before. This virus spreads person-to-person through droplets from coughing and sneezing. It can also spread when you are close to someone who is infected. And it can spread when you touch something that has the virus on it, such as a doorknob or a tabletop. What can you do to protect yourself from coronavirus (COVID-19)? The best way to protect yourself from getting sick is to:  · Avoid areas where there is an outbreak. · Avoid contact with people who may be infected. · Wash your hands often with soap or alcohol-based hand sanitizers. · Avoid crowds and try to stay at least 6 feet away from other people. · Wash your hands often, especially after you cough or sneeze. Use soap and water, and scrub for at least 20 seconds. If soap and water aren't available, use an alcohol-based hand . · Avoid touching your mouth, nose, and eyes. What can you do to avoid spreading the virus to others? To help avoid spreading the virus to others:  · Cover your mouth with a tissue when you cough or sneeze. Then throw the tissue in the trash. · Use a disinfectant to clean things that you touch often.   · Stay home if you are sick or have been exposed to the virus. Don't go to school, work, or public areas. And don't use public transportation. · If you are sick:  ? Leave your home only if you need to get medical care. But call the doctor's office first so they know you're coming. And wear a face mask, if you have one.  ? If you have a face mask, wear it whenever you're around other people. It can help stop the spread of the virus when you cough or sneeze. ? Clean and disinfect your home every day. Use household  and disinfectant wipes or sprays. Take special care to clean things that you grab with your hands. These include doorknobs, remote controls, phones, and handles on your refrigerator and microwave. And don't forget countertops, tabletops, bathrooms, and computer keyboards. When to call for help  Call 911 anytime you think you may need emergency care. For example, call if:  · You have severe trouble breathing. (You can't talk at all.)  · You have constant chest pain or pressure. · You are severely dizzy or lightheaded. · You are confused or can't think clearly. · Your face and lips have a blue color. · You pass out (lose consciousness) or are very hard to wake up. Call your doctor now if you develop symptoms such as:  · Shortness of breath. · Fever. · Cough. If you need to get care, call ahead to the doctor's office for instructions before you go. Make sure you wear a face mask, if you have one, to prevent exposing other people to the virus. Where can you get the latest information? The following health organizations are tracking and studying this virus. Their websites contain the most up-to-date information. Tad Bottoms also learn what to do if you think you may have been exposed to the virus. · U.S. Centers for Disease Control and Prevention (CDC): The CDC provides updated news about the disease and travel advice. The website also tells you how to prevent the spread of infection.  www.cdc.gov  · World Health Organization [facility]): WHO offers information about the virus outbreaks. WHO also has travel advice. www.who.int  Current as of: April 1, 2020               Content Version: 12.4  © 2006-2020 Health[employer] Incorporated. Care instructions adapted under license by your healthcare professional. If you have questions about a medical condition or this instruction, always ask your healthcare professional. [address] any warranty or liability for your use of this information. The discharge information has been reviewed with the {PATIENT PARENT GUARDIAN:76568}. Any questions and concerns from the {PATIENT PARENT GUARDIAN:38663} have been addressed. The {PATIENT PARENT GUARDIAN:53982} verbalized understanding. CONTENTS FOUND IN YOUR DISCHARGE ENVELOPE:  [x]     Surgeon and Hospital Discharge Instructions  [x]     [facility] Care Provider Card  []     Medication & Side Effect Guide            (your newly prescribed medications have been marked/highlighted showing the most common side effects from   the classes of drugs on your prescriptions)  []     Medication Prescription(s) x *** ( [] These have been sent electronically to your pharmacy by your surgeon,   - OR -       your surgeon has already provided these to you during a previous/pre-op office visit)  []     [address]  []     Physical Therapy Prescription  []     Follow-up Appointment Cards  []     Surgery-related Pictures/Media  []     Pain block and/or block with On-Q Catheter from Anesthesia Service (information included in your instructions above)  []     Medical device information sheets/pamphlets from their    []     School/work excuse note. []     /parent work excuse note.       The following personal items collected during your admission are returned to you:   Dental Appliance: Dental Appliances: None  Vision: Visual Aid: None  Hearing Aid:    Jewelry: Jewelry: Watch (given to patients sister)  Clothing: Clothing: Other (comment) (street clothes to PACU)  Other Valuables:  Other Valuables: Purse (givent to patients sister)  Valuables sent to safe:

## 2022-05-23 NOTE — ANESTHESIA PREPROCEDURE EVALUATION
Relevant Problems   RESPIRATORY SYSTEM   (+) CARLOS (obstructive sleep apnea)      NEUROLOGY   (+) Anxiety and depression   (+) CVA (cerebral vascular accident) ([facility] [address]      CARDIOVASCULAR   (+) HTN (hypertension)   (+) Malignant hypertensive urgency      GASTROINTESTINAL   (+) Hiatal hernia   (+) Hiatal hernia with gastroesophageal reflux      ENDOCRINE   (+) Severe obesity (HCC)       Anesthetic History   No history of anesthetic complications            Review of Systems / Medical History  Patient summary reviewed and pertinent labs reviewed    Pulmonary        Sleep apnea: No treatment           Neuro/Psych     seizures: well controlled  CVA: no residual symptoms  Psychiatric history    Comments: S/p cavernous hemangioma resection Cardiovascular    Hypertension: poorly controlled            Pertinent negatives: No past MI, angina and CHF  Exercise tolerance: >4 METS     GI/Hepatic/Renal         Renal disease: stones       Endo/Other        Obesity  Pertinent negatives: No diabetes   Other Findings              Physical Exam    Airway  Mallampati: II  TM Distance: > 6 cm  Neck ROM: normal range of motion   Mouth opening: Normal     Cardiovascular    Rhythm: regular  Rate: normal         Dental    Dentition: Poor dentition  Comments: Multiple missing, denies loose teeth   Pulmonary  Breath sounds clear to auscultation               Abdominal  GI exam deferred       Other Findings            Anesthetic Plan    ASA: 3  Anesthesia type: general          Induction: Intravenous  Anesthetic plan and risks discussed with: Patient

## 2022-05-24 NOTE — OP NOTES
Florencio [facility]  OPERATIVE REPORT    Name:  Geovanni Dunham  MR#:  [id number]  :  1965  ACCOUNT #:  [de-identified]  DATE OF SERVICE:  2022    PREOPERATIVE DIAGNOSIS:  Right ureteral stone. POSTOPERATIVE DIAGNOSIS:  Right ureteral stone. PROCEDURES PERFORMED:  Cystoscopy, bilateral retrograde pyelogram, right ureteroscopy with Quanta laser, right double-J stent removal and right double-J stent replacement. SURGEON:  Dale Lanier MD    ASSISTANT:  None. ANESTHESIA:  General.    COMPLICATIONS:  None. SPECIMENS REMOVED:  None. IMPLANTS:  5 x 24 stent. ESTIMATED BLOOD LOSS:  Minimal.    INDICATIONS:  The patient with a large stone at the level of the vessels on the right with hydronephrosis above it. She was brought to the operating room to undergo ureteroscopy treatment of her stone, stent removal and replacement. FINDINGS AT THE TIME OF THE PROCEDURE:  Large stone with minimal edema at the level of the vessels with hydroureter above it with almost complete disintegration of the stone with a Quanta laser. PROCEDURE:  After consent was obtained, the patient was taken to the operating room. After adequate anesthesia was obtained, she was prepped and draped in the lithotomy position. The rigid cystoscope was inserted into the bladder per urethra. The bladder was unremarkable. No lesions were seen. There was a stent in place on the right. I then engaged the left ureter with the open-ended catheter, injected contrast retrograde. There was no evidence of mass filling defect or obstruction on the left. I then grasped the stent on the right, pulled it out through the meatus and cut the double-J loop off. I then threaded a Bentson wire up through the stent and then into the renal pelvis. The bladder was then drained. The scope was withdrawn. The stent was secured to the drapes as a safety wire.   I then used a rigid uteroscope, went in through the bladder and alongside the wire up into the ureter. At the level of the vessels, I encountered large stone. I then used a Quanta laser and was able to fragment the stone quite easily. It was fairly soft. Very little retropulsion of stone fragments was noted. Once I got the stone down to small pieces about a 1 mm or 2, I withdrew the ureteroscope and a lot of the stone and debris followed down the ureter and came out. I did that several times with a lot of the stone falling out into the bladder. I then took the scope back up to the level of the vessels where a lot of the fragments had pooled at the dilated ureter. I then used the dusting setting to break the remaining fragments into pieces that were well less than a millimeter. I went all the way up to the UPJ and did not see any additional stones. CT did not show any large fragments or large stones prior. We elected to terminate the procedure at this point. A retrograde was done that showed no extravasation. I then withdrew the ureteroscope, leaving the wire in place. I backloaded the cystoscope in over the wire. A 5 x 24 stent was then passed with the proximal end coiled in the renal pelvis and the distal end coiled within the bladder. A string was left tucked in the vagina. Overall, the patient tolerated the procedure well. She was awakened from anesthesia and taken to the recovery room in stable condition. PLAN:  We will keep her on antibiotics for a few days. I will see her back in a few days for stent removal via string. I do not think she needs a KUB.       Cherie Momin MD [id number]  D:  05/24/2022 13:57  T:  05/24/2022 19:14  JOB #:  [id number]

## 2022-06-08 ENCOUNTER — TRANSCRIBE ORDER (OUTPATIENT)
Dept: SCHEDULING | Age: 57
End: 2022-06-08

## 2022-06-08 ENCOUNTER — HOSPITAL ENCOUNTER (OUTPATIENT)
Dept: CT IMAGING | Age: 57
Discharge: HOME | End: 2022-06-08
Payer: MEDICARE

## 2022-06-08 DIAGNOSIS — R55 FAINTNESS: Primary | ICD-10-CM

## 2022-06-08 DIAGNOSIS — R55 FAINTNESS: ICD-10-CM

## 2022-06-08 PROCEDURE — 70450 CT HEAD/BRAIN W/O DYE: CPT

## 2022-07-16 NOTE — H&P
See 7/15/22 [place] u[facility] note below to serve as H&P. 7/15/22 urine culture pending. She was started on empiric bactrim. We will tailor antibiotics accordingly pending culture results. 7/15 KUB suggests faint 7mm stone fragments in right mid ureter adjacent to stone. I will discuss possibility of URS and basket extraction with patient in the preop area, vs stent removal alone. This plan may be affected by presence of infection and length of adequate antibiotic coverage as well. Juan Mackenzie is a 55year old female who presents today for \"Poss. UTI\". She returns for follow-up. She was last seen in the office on 4/7/2022 by Dr. Toni Myles. She has a history of kidney stones. She had a 13 mm right UPJ stone diagnosed on a CT spine on 3/10/2022. She was originally scheduled for right ESWL, but went to the ER for worsening pain and UTI and had a stent placed by Dr. Magalys Marinelli on 4/19/2022. She is status post right CRULS and stent exchange with Dr. Ismael Luna on 5/23/2022. Since then she has been refusing to come in for a postop visit because she does not want her stent pulled in the office. She is requesting to have this done under anesthesia. She presents today to Friday p.m. urgent clinic to rule out UTI. She reports left upper quadrant abdominal pain and left side pain. She denies fever, chills. KUB shows right double-J stent in good position. Problem: 13mm right UPJ stone  Onset: March  Timing: resolved  Severity: moderate  Context: seen on CT as above  Modifying Factors: s/p CRULS  Associated Symptoms: as above    PAST MEDICAL HISTORY:    Allergies: ASPIRIN (Critical)  * LATEX (Moderate)  DENIES: Shellfish, X-Ray Dye, Iodine. Medications:  Bactrim -160 mg tablet (sulfamethoxazole-trimethoprim) Take 1 tablet by mouth twice a day   Cipro 500 mg tablet (ciprofloxacin hcl) Take 1 tablet by mouth twice a day start today  hydrocodone-acetaminophen 5-325 mg tablet (hydrocodone-acetaminophen) Take 1 tablet by mouth every six to eight hours as needed for pain  famotidine 20 mg tablet (famotidine) once a day   topiramate 25 mg tablet (topiramate) once a day   duloxetine 60 mg capsule,delayed release(DR/EC) (duloxetine) 2 once a day   pantoprazole 40 mg tablet,delayed release (DR/EC) (pantoprazole) once a day   ibuprofen 800 mg tablet (ibuprofen) as needed   alprazolam 1 mg tablet (alprazolam) three times a day   lidocaine-prilocaine 2.5-2.5% cream (lidocaine-prilocaine) as needed   oxycodone-acetaminophen 5-325 mg tablet (oxycodone-acetaminophen) 1 tablet by mouth every six hours as needed for pain     Problems: Abdominal pain, left upper quadrant (ICD-789.02) (YZC58-I20.12)  Renal calculus (ICD-592.0) (XMW92-M62.0)  UTI (ICD-599.0) (WXO45-I54.0)  Diabetes Mellitus (ICD-250.00) (RRO82-U40.9)  Flank pain, right (ICD-789.09) (WZM03-J80.9)    Illnesses: Diabetes, High Blood Pressure, and Bowel Problems. DENIES: Heart Disease, Pacemaker/Defibrillator, Lung Disease, Stroke/Seizure, Kidney Problems, Bleeding Problems, HIV, Hepatitis, or Cancer. Surgeries: Kidney Stone Surgery,Colon Surgery,Gallbladder Surgery,Colonoscopy, andOther / Not Listed. Family History: Kidney Stones. DENIES: Kidney cancer, Kidney disease, Breast cancer, Uterine cancer, Cervical cancer, Ovarian cancer. Social History: Unemployed. Other. Smoking status: former smoker. Does not drink alcohol. System Review: Admits to: Dry Eyes, Dry Mouth, Shortness of Breath, Constipation, Involuntary Urine Loss, Lower Extremity Weakness, Dry Skin, Difficulty Walking, and Easy Bleeding. DENIES: Unexplained Weight Loss, Leg Swelling, Psychiatric Problems, Impaired Sex Drive, Rash.      EXAMINATION: Appearance: well-developed NAD Neck: supple Respiratory Effort: breathing easily Lower Extremity: no edema Skin Inspection: warm and dry Orientation: oriented to person; time and place Mood/Affect: normal       URINALYSIS from Voided specimen  Urine Dip: pH: 5.0, Bld: +++, LE: ++, Nit: Pos, Prot: ++, Ket: Neg, Gluc: Neg  Urine Micro: WBC: 6-10, RBC: 1-2, Bacteria: 11-25    Prescription(s) Today: Bactrim -160 mg tablet (sulfamethoxazole-trimethoprim) Take 1 tablet by mouth twice a day   #14 tablet x 0,  07/15/2022, Bogdan [facility], SIGNED    IMPRESSION:    1. UTI (JFP17-N24.0) - Deteriorated: Empiric Bactrim sent to pharmacy. Urine sent for culture. 2. RENAL CALCULUS (ZGH83-F40.0) - Resolved: She has had a right stent in place since her CRULS procedure in May. She is requesting a stent pull under anesthesia. We will get this scheduled. 3. ABDOMINAL PAIN - LEFT UPPER QUADRANT (KLZ44-I21.12) - New: She was reassured that her KUB shows no obvious calcifications on the left side. CT in March showed no stones on the left. PLAN: Discussed with Dr. Azalea Fuentes. All questions and concerns were addressed. The patient understands and agrees with the plan. She will follow up in ( ), sooner with any questions or problems.      cc: Pastor Aranda  Today's Services  E&M Service    Upcoming Orders  Return Office Visit - with  Annabella Cruz at previously scheduled appointment  Surgery 07/21/2022  7:30 AM   Urine Culture, Cysto w/ Stent Removal        Dr. Sukhdeep Medrano Physician      Electronically signed by Bogdan [facility] on 07/15/2022 at 2:30 PM    ________________________________________________________________________

## 2022-07-19 ENCOUNTER — HOSPITAL ENCOUNTER (OUTPATIENT)
Dept: PREADMISSION TESTING | Age: 57
Discharge: HOME | End: 2022-07-19
Payer: MEDICARE

## 2022-07-19 VITALS
WEIGHT: 175 LBS | BODY MASS INDEX: 29.88 KG/M2 | TEMPERATURE: 98.8 F | OXYGEN SATURATION: 100 % | RESPIRATION RATE: 14 BRPM | HEIGHT: 64 IN

## 2022-07-19 LAB
ALBUMIN SERPL-MCNC: 3.7 G/DL (ref 3.5–5)
ALBUMIN/GLOB SERPL: 0.9 {RATIO} (ref 1.1–2.2)
ALP SERPL-CCNC: 135 U/L (ref 45–117)
ALT SERPL-CCNC: 30 U/L (ref 12–78)
ANION GAP SERPL CALC-SCNC: 6 MMOL/L (ref 5–15)
APPEARANCE UR: ABNORMAL
AST SERPL-CCNC: 20 U/L (ref 15–37)
BACTERIA URNS QL MICRO: ABNORMAL /HPF
BASOPHILS # BLD: 0.1 K/UL (ref 0–0.1)
BASOPHILS NFR BLD: 1 % (ref 0–1)
BILIRUB SERPL-MCNC: 0.4 MG/DL (ref 0.2–1)
BILIRUB UR QL: NEGATIVE
BUN SERPL-MCNC: 16 MG/DL (ref 6–20)
BUN/CREAT SERPL: 15 (ref 12–20)
CALCIUM SERPL-MCNC: 9.6 MG/DL (ref 8.5–10.1)
CHLORIDE SERPL-SCNC: 111 MMOL/L (ref 97–108)
CO2 SERPL-SCNC: 24 MMOL/L (ref 21–32)
COLOR UR: ABNORMAL
CREAT SERPL-MCNC: 1.09 MG/DL (ref 0.55–1.02)
DIFFERENTIAL METHOD BLD: NORMAL
EOSINOPHIL # BLD: 0.2 K/UL (ref 0–0.4)
EOSINOPHIL NFR BLD: 3 % (ref 0–7)
EPITH CASTS URNS QL MICRO: ABNORMAL /LPF
ERYTHROCYTE [DISTWIDTH] IN BLOOD BY AUTOMATED COUNT: 13.7 % (ref 11.5–14.5)
GLOBULIN SER CALC-MCNC: 4.2 G/DL (ref 2–4)
GLUCOSE SERPL-MCNC: 110 MG/DL (ref 65–100)
GLUCOSE UR STRIP.AUTO-MCNC: NEGATIVE MG/DL
HCT VFR BLD AUTO: 41.4 % (ref 35–47)
HGB BLD-MCNC: 13.1 G/DL (ref 11.5–16)
HGB UR QL STRIP: ABNORMAL
HYALINE CASTS URNS QL MICRO: ABNORMAL /LPF (ref 0–2)
IMM GRANULOCYTES # BLD AUTO: 0 K/UL (ref 0–0.04)
IMM GRANULOCYTES NFR BLD AUTO: 0 % (ref 0–0.5)
KETONES UR QL STRIP.AUTO: NEGATIVE MG/DL
LEUKOCYTE ESTERASE UR QL STRIP.AUTO: ABNORMAL
LYMPHOCYTES # BLD: 1.9 K/UL (ref 0.8–3.5)
LYMPHOCYTES NFR BLD: 35 % (ref 12–49)
MCH RBC QN AUTO: 30 PG (ref 26–34)
MCHC RBC AUTO-ENTMCNC: 31.6 G/DL (ref 30–36.5)
MCV RBC AUTO: 94.7 FL (ref 80–99)
MONOCYTES # BLD: 0.4 K/UL (ref 0–1)
MONOCYTES NFR BLD: 8 % (ref 5–13)
NEUTS SEG # BLD: 2.9 K/UL (ref 1.8–8)
NEUTS SEG NFR BLD: 53 % (ref 32–75)
NITRITE UR QL STRIP.AUTO: NEGATIVE
NRBC # BLD: 0 K/UL (ref 0–0.01)
NRBC BLD-RTO: 0 PER 100 WBC
PH UR STRIP: 6 [PH] (ref 5–8)
PLATELET # BLD AUTO: 353 K/UL (ref 150–400)
PMV BLD AUTO: 10.2 FL (ref 8.9–12.9)
POTASSIUM SERPL-SCNC: 4 MMOL/L (ref 3.5–5.1)
PROT SERPL-MCNC: 7.9 G/DL (ref 6.4–8.2)
PROT UR STRIP-MCNC: >300 MG/DL
RBC # BLD AUTO: 4.37 M/UL (ref 3.8–5.2)
RBC #/AREA URNS HPF: >100 /HPF (ref 0–5)
SODIUM SERPL-SCNC: 141 MMOL/L (ref 136–145)
SP GR UR REFRACTOMETRY: 1.02 (ref 1–1.03)
UROBILINOGEN UR QL STRIP.AUTO: 1 EU/DL (ref 0.2–1)
WBC # BLD AUTO: 5.5 K/UL (ref 3.6–11)
WBC URNS QL MICRO: ABNORMAL /HPF (ref 0–4)

## 2022-07-19 PROCEDURE — 81001 URINALYSIS AUTO W/SCOPE: CPT

## 2022-07-19 PROCEDURE — 85025 COMPLETE CBC W/AUTO DIFF WBC: CPT

## 2022-07-19 PROCEDURE — 87086 URINE CULTURE/COLONY COUNT: CPT

## 2022-07-19 PROCEDURE — 36415 COLL VENOUS BLD VENIPUNCTURE: CPT

## 2022-07-19 PROCEDURE — 80053 COMPREHEN METABOLIC PANEL: CPT

## 2022-07-19 RX ORDER — SULFAMETHOXAZOLE AND TRIMETHOPRIM 800; 160 MG/1; MG/1
1 TABLET ORAL 2 TIMES DAILY
COMMUNITY

## 2022-07-19 RX ORDER — FAMOTIDINE 20 MG/1
20 TABLET, FILM COATED ORAL
COMMUNITY

## 2022-07-19 NOTE — PERIOP NOTES
Gopi Lua, RN   Registered Nurse      Patient Instructions   Signed   Date of Service:  07/19/22 1300          Summary                    []Hide copied text    []Gold for details      [address]                   [facility] [address] [address] [facility]  PRE-ADMISSION TESTING    [phone]      Surgery Date:   Thursday, 7/21/22  [facility] staff will call you between 3 and 7pm the day before your surgery with your arrival time. Call [phone] after 7pm Wednesday if you did not receive this call. INSTRUCTIONS BEFORE YOUR SURGERY   When You  Arrive Please proceed to the 2nd Floor Admitting Desk on the day of your surgery  Have your insurance card, photo ID, and any copayment (if needed)   Food   and   Drink NO food or drink after midnight the night before surgery    This means NO water, gum, mints, coffee, juice, etc.  No alcohol (beer, wine, liquor) 24 hours before or after surgery   Medications to   TAKE   Morning of Surgery    · Alprazolam & hydrocodone, if needed  · Tylenol/acetaminophen products may be taken for pain, if needed   Medications  To  STOP  7 days before surgery · Non-Steroidal Anti-Inflammatory Drugs (NSAIDs): Ibuprofen (Advil, Motrin), Naproxen (Aleve)  · Aspirin containing products for pain   · Herbal supplements, vitamins, and fish oil         Bathing Clothing  Jewelry  Valuables       · If you shower the morning of surgery, please do not apply anything to your skin (lotions, powders, deodorant, or makeup, especially mascara). · Do not shave or trim any body part 24 hours before surgery. · Leave money, valuables, and jewelry, including body piercings, at home. · Wear loose, comfortable clothes. · Wear glasses instead of contacts. · Wear your hair loose or down; no pony-tails, buns, or metal hair clips. Going Home You must have a responsible adult drive you home and stay with you 24 hours after surgery.    Special Instructions If a situation occurs and you are delayed the day of surgery, call [phone].     If your physical condition changes (like a fever, cold, flu, etc.) call your surgeon.         Home medication(s) reviewed and verified with patient's handwritten list during PAT appointment.     The patient was contacted  in person.    The patient verbalizes understanding of all instructions and does not  need reinforcement.                Electronically signed by Jayson Mullen RN at 07/19/22 1312        Note Details    Author Jayson Mullen RN File Time 07/19/22 1316   Author Type Registered Nurse Status Signed   Last  Jayson Mullen RN Service (none)   [address] # [de-identified] Admit Date 7/19/2022    PREADMISSION TESTING on 7/19/2022           PREADMISSION TESTING on 7/19/2022                Detailed Report            Note shared with patient

## 2022-07-19 NOTE — PATIENT INSTRUCTIONS
[address]                   [facility] [address], [address] [facility]  PRE-ADMISSION TESTING    [phone]     Surgery Date:   Thursday, 7/21/22  [facility] staff will call you between 3 and 7pm the day before your surgery with your arrival time. Call [phone] after 7pm Wednesday if you did not receive this call. INSTRUCTIONS BEFORE YOUR SURGERY   When You  Arrive Please proceed to the 2nd Floor Admitting Desk on the day of your surgery  Have your insurance card, photo ID, and any copayment (if needed)   Food   and   Drink NO food or drink after midnight the night before surgery    This means NO water, gum, mints, coffee, juice, etc.  No alcohol (beer, wine, liquor) 24 hours before or after surgery   Medications to   TAKE   Morning of Surgery   famotidine, duloxetine, & alprazolam  Tylenol/acetaminophen products may be taken for pain, if needed   Medications  To  STOP  7 days before surgery Non-Steroidal Anti-Inflammatory Drugs (NSAIDs): Ibuprofen (Advil, Motrin), Naproxen (Aleve)  Aspirin containing products for pain   Herbal supplements, vitamins, and fish oil       Bathing Clothing  Jewelry  Valuables     If you shower the morning of surgery, please do not apply anything to your skin (lotions, powders, deodorant, or makeup, especially mascara). Do not shave or trim any body part 24 hours before surgery. Leave money, valuables, and jewelry, including body piercings, at home. Wear loose, comfortable clothes. Wear glasses instead of contacts. Wear your hair loose or down; no pony-tails, buns, or metal hair clips. Going Home You must have a responsible adult drive you home and stay with you 24 hours after surgery. Special Instructions If a situation occurs and you are delayed the day of surgery, call [phone]. If your physical condition changes (like a fever, cold, flu, etc.) call your surgeon.        Home medication(s) reviewed and verified with patient's handwritten list during PAT appointment. The patient was contacted  in person. The patient verbalizes understanding of all instructions and does not  need reinforcement.

## 2022-07-20 ENCOUNTER — ANESTHESIA EVENT (OUTPATIENT)
Dept: SURGERY | Age: 57
End: 2022-07-20
Payer: MEDICARE

## 2022-07-20 LAB
BACTERIA SPEC CULT: NORMAL
SERVICE CMNT-IMP: NORMAL

## 2022-07-20 NOTE — PERIOP NOTES
CMP and UA results from yesterday's PAT appointment faxed to Dr. Alexandra Iglesias via EMR. Urine culture still pending at this time and noted on fax cover sheet, DOS 7/21/22.

## 2022-07-20 NOTE — PERIOP NOTES
This case was posted 7/18/22. Pt was scheduled for PAT on 7/19/22. Urine culture may not result prior to sx.

## 2022-07-21 ENCOUNTER — ANESTHESIA (OUTPATIENT)
Dept: SURGERY | Age: 57
End: 2022-07-21
Payer: MEDICARE

## 2022-07-21 ENCOUNTER — APPOINTMENT (OUTPATIENT)
Dept: GENERAL RADIOLOGY | Age: 57
End: 2022-07-21
Attending: UROLOGY
Payer: MEDICARE

## 2022-07-21 ENCOUNTER — HOSPITAL ENCOUNTER (OUTPATIENT)
Age: 57
Setting detail: OUTPATIENT SURGERY
Discharge: HOME | End: 2022-07-21
Attending: UROLOGY | Admitting: UROLOGY
Payer: MEDICARE

## 2022-07-21 VITALS
OXYGEN SATURATION: 95 % | BODY MASS INDEX: 30.54 KG/M2 | HEART RATE: 81 BPM | TEMPERATURE: 97.2 F | WEIGHT: 177.91 LBS | SYSTOLIC BLOOD PRESSURE: 103 MMHG | DIASTOLIC BLOOD PRESSURE: 46 MMHG | RESPIRATION RATE: 20 BRPM

## 2022-07-21 DIAGNOSIS — N13.9 OBSTRUCTIVE UROPATHY: Primary | ICD-10-CM

## 2022-07-21 PROCEDURE — C1758 CATHETER, URETERAL: HCPCS | Performed by: UROLOGY

## 2022-07-21 PROCEDURE — 74011250636 HC RX REV CODE- 250/636: Performed by: ANESTHESIOLOGY

## 2022-07-21 PROCEDURE — 77030020143 HC AIRWY LARYN INTUB CGAS -A: Performed by: ANESTHESIOLOGY

## 2022-07-21 PROCEDURE — 74011250637 HC RX REV CODE- 250/637: Performed by: ANESTHESIOLOGY

## 2022-07-21 PROCEDURE — 76210000020 HC REC RM PH II FIRST 0.5 HR: Performed by: UROLOGY

## 2022-07-21 PROCEDURE — 74011000250 HC RX REV CODE- 250: Performed by: NURSE ANESTHETIST, CERTIFIED REGISTERED

## 2022-07-21 PROCEDURE — 76060000032 HC ANESTHESIA 0.5 TO 1 HR: Performed by: UROLOGY

## 2022-07-21 PROCEDURE — C1769 GUIDE WIRE: HCPCS | Performed by: UROLOGY

## 2022-07-21 PROCEDURE — 74011000636 HC RX REV CODE- 636: Performed by: UROLOGY

## 2022-07-21 PROCEDURE — 76010000138 HC OR TIME 0.5 TO 1 HR: Performed by: UROLOGY

## 2022-07-21 PROCEDURE — 74011250636 HC RX REV CODE- 250/636: Performed by: NURSE ANESTHETIST, CERTIFIED REGISTERED

## 2022-07-21 PROCEDURE — 76210000006 HC OR PH I REC 0.5 TO 1 HR: Performed by: UROLOGY

## 2022-07-21 PROCEDURE — 74420 UROGRAPHY RTRGR +-KUB: CPT

## 2022-07-21 PROCEDURE — 74011000250 HC RX REV CODE- 250: Performed by: UROLOGY

## 2022-07-21 PROCEDURE — 77030040922 HC BLNKT HYPOTHRM STRY -A

## 2022-07-21 PROCEDURE — 2709999900 HC NON-CHARGEABLE SUPPLY: Performed by: UROLOGY

## 2022-07-21 PROCEDURE — 74011250636 HC RX REV CODE- 250/636: Performed by: UROLOGY

## 2022-07-21 PROCEDURE — 77030040361 HC SLV COMPR DVT MDII -B

## 2022-07-21 RX ORDER — FENTANYL CITRATE 50 UG/ML
INJECTION, SOLUTION INTRAMUSCULAR; INTRAVENOUS AS NEEDED
Status: DISCONTINUED | OUTPATIENT
Start: 2022-07-21 | End: 2022-07-21 | Stop reason: HOSPADM

## 2022-07-21 RX ORDER — LIDOCAINE HYDROCHLORIDE 20 MG/ML
JELLY TOPICAL AS NEEDED
Status: DISCONTINUED | OUTPATIENT
Start: 2022-07-21 | End: 2022-07-21 | Stop reason: HOSPADM

## 2022-07-21 RX ORDER — KETOROLAC TROMETHAMINE 15 MG/ML
INJECTION, SOLUTION INTRAMUSCULAR; INTRAVENOUS AS NEEDED
Status: DISCONTINUED | OUTPATIENT
Start: 2022-07-21 | End: 2022-07-21 | Stop reason: HOSPADM

## 2022-07-21 RX ORDER — SODIUM CHLORIDE 0.9 % (FLUSH) 0.9 %
5-40 SYRINGE (ML) INJECTION AS NEEDED
Status: DISCONTINUED | OUTPATIENT
Start: 2022-07-21 | End: 2022-07-21 | Stop reason: HOSPADM

## 2022-07-21 RX ORDER — ONDANSETRON 2 MG/ML
INJECTION INTRAMUSCULAR; INTRAVENOUS AS NEEDED
Status: DISCONTINUED | OUTPATIENT
Start: 2022-07-21 | End: 2022-07-21 | Stop reason: HOSPADM

## 2022-07-21 RX ORDER — PROPOFOL 10 MG/ML
INJECTION, EMULSION INTRAVENOUS AS NEEDED
Status: DISCONTINUED | OUTPATIENT
Start: 2022-07-21 | End: 2022-07-21 | Stop reason: HOSPADM

## 2022-07-21 RX ORDER — SODIUM CHLORIDE 0.9 % (FLUSH) 0.9 %
5-40 SYRINGE (ML) INJECTION EVERY 8 HOURS
Status: DISCONTINUED | OUTPATIENT
Start: 2022-07-21 | End: 2022-07-21 | Stop reason: HOSPADM

## 2022-07-21 RX ORDER — OXYBUTYNIN CHLORIDE 5 MG/1
5 TABLET ORAL
Qty: 20 TABLET | Refills: 0 | Status: SHIPPED | OUTPATIENT
Start: 2022-07-21

## 2022-07-21 RX ORDER — OXYCODONE HYDROCHLORIDE 5 MG/1
5 TABLET ORAL AS NEEDED
Status: DISCONTINUED | OUTPATIENT
Start: 2022-07-21 | End: 2022-07-21 | Stop reason: HOSPADM

## 2022-07-21 RX ORDER — LIDOCAINE HYDROCHLORIDE 10 MG/ML
0.1 INJECTION, SOLUTION EPIDURAL; INFILTRATION; INTRACAUDAL; PERINEURAL AS NEEDED
Status: DISCONTINUED | OUTPATIENT
Start: 2022-07-21 | End: 2022-07-21 | Stop reason: HOSPADM

## 2022-07-21 RX ORDER — DIPHENHYDRAMINE HYDROCHLORIDE 50 MG/ML
12.5 INJECTION, SOLUTION INTRAMUSCULAR; INTRAVENOUS AS NEEDED
Status: DISCONTINUED | OUTPATIENT
Start: 2022-07-21 | End: 2022-07-21 | Stop reason: HOSPADM

## 2022-07-21 RX ORDER — SODIUM CHLORIDE, SODIUM LACTATE, POTASSIUM CHLORIDE, CALCIUM CHLORIDE 600; 310; 30; 20 MG/100ML; MG/100ML; MG/100ML; MG/100ML
100 INJECTION, SOLUTION INTRAVENOUS CONTINUOUS
Status: DISCONTINUED | OUTPATIENT
Start: 2022-07-21 | End: 2022-07-21 | Stop reason: HOSPADM

## 2022-07-21 RX ORDER — SODIUM CHLORIDE, SODIUM LACTATE, POTASSIUM CHLORIDE, CALCIUM CHLORIDE 600; 310; 30; 20 MG/100ML; MG/100ML; MG/100ML; MG/100ML
125 INJECTION, SOLUTION INTRAVENOUS CONTINUOUS
Status: DISCONTINUED | OUTPATIENT
Start: 2022-07-21 | End: 2022-07-21 | Stop reason: HOSPADM

## 2022-07-21 RX ORDER — NALOXONE HYDROCHLORIDE 0.4 MG/ML
0.2 INJECTION, SOLUTION INTRAMUSCULAR; INTRAVENOUS; SUBCUTANEOUS
Status: DISCONTINUED | OUTPATIENT
Start: 2022-07-21 | End: 2022-07-21 | Stop reason: HOSPADM

## 2022-07-21 RX ORDER — FLUMAZENIL 0.1 MG/ML
0.2 INJECTION INTRAVENOUS
Status: DISCONTINUED | OUTPATIENT
Start: 2022-07-21 | End: 2022-07-21 | Stop reason: HOSPADM

## 2022-07-21 RX ORDER — ONDANSETRON 2 MG/ML
4 INJECTION INTRAMUSCULAR; INTRAVENOUS AS NEEDED
Status: DISCONTINUED | OUTPATIENT
Start: 2022-07-21 | End: 2022-07-21 | Stop reason: HOSPADM

## 2022-07-21 RX ORDER — MIDAZOLAM HYDROCHLORIDE 1 MG/ML
INJECTION, SOLUTION INTRAMUSCULAR; INTRAVENOUS AS NEEDED
Status: DISCONTINUED | OUTPATIENT
Start: 2022-07-21 | End: 2022-07-21 | Stop reason: HOSPADM

## 2022-07-21 RX ORDER — DEXAMETHASONE SODIUM PHOSPHATE 4 MG/ML
INJECTION, SOLUTION INTRA-ARTICULAR; INTRALESIONAL; INTRAMUSCULAR; INTRAVENOUS; SOFT TISSUE AS NEEDED
Status: DISCONTINUED | OUTPATIENT
Start: 2022-07-21 | End: 2022-07-21 | Stop reason: HOSPADM

## 2022-07-21 RX ORDER — LIDOCAINE HYDROCHLORIDE 20 MG/ML
INJECTION, SOLUTION EPIDURAL; INFILTRATION; INTRACAUDAL; PERINEURAL AS NEEDED
Status: DISCONTINUED | OUTPATIENT
Start: 2022-07-21 | End: 2022-07-21 | Stop reason: HOSPADM

## 2022-07-21 RX ORDER — HYDROMORPHONE HYDROCHLORIDE 1 MG/ML
.25-1 INJECTION, SOLUTION INTRAMUSCULAR; INTRAVENOUS; SUBCUTANEOUS
Status: DISCONTINUED | OUTPATIENT
Start: 2022-07-21 | End: 2022-07-21 | Stop reason: HOSPADM

## 2022-07-21 RX ADMIN — SODIUM CHLORIDE, POTASSIUM CHLORIDE, SODIUM LACTATE AND CALCIUM CHLORIDE 125 ML/HR: 600; 310; 30; 20 INJECTION, SOLUTION INTRAVENOUS at 06:36

## 2022-07-21 RX ADMIN — OXYCODONE 5 MG: 5 TABLET ORAL at 08:51

## 2022-07-21 RX ADMIN — DEXAMETHASONE SODIUM PHOSPHATE 8 MG: 4 INJECTION, SOLUTION INTRAMUSCULAR; INTRAVENOUS at 07:40

## 2022-07-21 RX ADMIN — MIDAZOLAM HYDROCHLORIDE 3 MG: 2 INJECTION, SOLUTION INTRAMUSCULAR; INTRAVENOUS at 07:25

## 2022-07-21 RX ADMIN — ONDANSETRON HYDROCHLORIDE 4 MG: 2 SOLUTION INTRAMUSCULAR; INTRAVENOUS at 07:23

## 2022-07-21 RX ADMIN — FENTANYL CITRATE 50 MCG: 50 INJECTION, SOLUTION INTRAMUSCULAR; INTRAVENOUS at 07:30

## 2022-07-21 RX ADMIN — FENTANYL CITRATE 50 MCG: 50 INJECTION, SOLUTION INTRAMUSCULAR; INTRAVENOUS at 08:06

## 2022-07-21 RX ADMIN — PROPOFOL 180 MG: 10 INJECTION, EMULSION INTRAVENOUS at 07:33

## 2022-07-21 RX ADMIN — CEFAZOLIN SODIUM 2 G: 1 POWDER, FOR SOLUTION INTRAMUSCULAR; INTRAVENOUS at 07:40

## 2022-07-21 RX ADMIN — FENTANYL CITRATE 50 MCG: 50 INJECTION, SOLUTION INTRAMUSCULAR; INTRAVENOUS at 07:25

## 2022-07-21 RX ADMIN — KETOROLAC TROMETHAMINE 15 MG: 15 INJECTION, SOLUTION INTRAMUSCULAR; INTRAVENOUS at 07:52

## 2022-07-21 RX ADMIN — LIDOCAINE HYDROCHLORIDE 60 MG: 20 INJECTION, SOLUTION EPIDURAL; INFILTRATION; INTRACAUDAL; PERINEURAL at 07:33

## 2022-07-21 NOTE — INTERVAL H&P NOTE
Update History & Physical    The Patient's History and Physical was reviewed with the patient and I examined the patient. There was no change. The surgical site was confirmed by the patient and me. Plan:  The risk, benefits, expected outcome, and alternative to the recommended procedure have been discussed with the patient. Patient understands and wants to proceed with the procedure.     Electronically signed by Tayla Garcia MD on 7/21/2022 at 7:00 AM

## 2022-07-21 NOTE — PERIOP NOTES
IV removed. Patents reporting 7/10 pain which is the chronic left flank pain she reported pre-op- patient given pain medication prior to d/c- instructed to call Dr. Irlanda Kruger- PCP if more medication needed- patient reported no pain from todays procedure. Discharge instructions given to patients sister with verbalized understanding- signed paper in chart- discharge instructions handed to patient per patient request. Patient escorted to second floor entrance in wheelchair- d/c'd in stable condition.

## 2022-07-21 NOTE — DISCHARGE INSTRUCTIONS
You do not currently have a follow up with our group and can follow up as needed. [address] [facility] ([phone] during business hours, or [phone] after hours) for fever >101F by mouth, uncontrolled nausea or vomiting, pain uncontrolled by medication, decreased urine output, persistent heavy blood in the urine or large clot passage; also call for any new or concerning symptoms. Driving should be avoided for at least 24 hours after surgery/anesthesia and longer if you are using narcotics. Do not drive while taking narcotic pain medications. Take all medications as prescribed. Daily walks are encouraged. Prolonged sitting or lying in bed should be avoided. You can expect to return to work as soon as 1 days following surgery, or as instructed. DISCHARGE SUMMARY from your Nurse      PATIENT INSTRUCTIONS    After general anesthesia or intravenous sedation, for 24 hours or while taking prescription Narcotics:  Limit your activities  Do not drive and operate hazardous machinery  Do not make important personal or business decisions  Do  not drink alcoholic beverages  If you have not urinated within 8 hours after discharge, please contact your surgeon on call. Report the following to your surgeon:  Excessive pain, swelling, redness or odor of or around the surgical area  Temperature over 100.5  Nausea and vomiting lasting longer than 4 hours or if unable to take medications  Any signs of decreased circulation or nerve impairment to extremity: change in color, persistent  numbness, tingling, coldness or increase pain  Any questions      GOOD HELP TO FIGHT AN INFECTION  Here are a few tip to help reduce the chance of getting an infection after surgery:  Wash Your Hands  Good handwashing is the most important thing you and your caregiver can do. Wash before and after caring for any wounds. Dry your hand with a clean towel. Wash with soap and water for at least 20 seconds.  A TIP: sing the \"Happy Birthday\" song through one time while washing to help with the timing. Use a hand  in between washings. Shower  When your surgeon says it is OK to take a shower, use a new bar of antibacterial soap (if that is what you use, and keep that bar of soap ONLY for your use), or antibacterial body wash. Use a clean wash cloth or sponge when you bathe. Dry off with a clean towel  after every bath - be careful around any wounds, skin staples, sutures or surgical glue over/on wounds. Do not enter swimming pools, hot tubs, lakes, rivers and/or ocean until wounds are healed and your doctor/surgeon says it is OK. Use Clean Sheets  Sleep on freshly laundered sheets after your surgery. Keep the surgery site covered with a clean, dry bandage (if instructed to do so). If the bandage becomes soiled, reapply a new, dry, clean bandage. Do not allow pets to sleep with you while your wound is healing. Lifestyle Modification and Controlling Your Blood Sugar  Smoking slows wound healing. Stop smoking and limit exposure to second-hand smoke. High blood sugar slows wound healing. Eat a well-balanced diet to provide proper nutrition while healing  Monitor your blood sugar (if you are a diabetic) and take your medications as you are suppose to so you can control you blood sugar after surgery. COUGH AND DEEP BREATHE    Breathing deeply and coughing are very important exercises to do after surgery. Deep breathing and coughing open the little air tubes and air sacks in your lungs. You take deep breaths every day. You may not even notice - it is just something you do when you sigh or yawn. It is a natural exercise you do to keep these air passages open. After surgery, take deep breaths and cough, on purpose. DIRECTIONS:  Take 10 to 15 slow deep breaths every hour while awake. Breathe in deeply, and hold it for 2 seconds. Exhale slowly through puckered lips, like blowing up a balloon.   After every 4th or 5th deep breath, hug your pillow to your chest or belly and give a hard, deep cough. Yes, it will probably hurt. But doing this exercise is a very important part of healing after surgery. Take your pain medicine to help you do this exercise without too much pain. Coughing and deep breathing help prevent bronchitis and pneumonia after surgery. If you had chest or belly surgery, use a pillow as a \"hug sara\" and hold it tightly to your chest or belly when you cough. ANKLE PUMPS    Ankle pumps increase the circulation of oxygenated blood to your lower extremities and decrease your risk for circulation problems such as blood clots. They also stretch the muscles, tendons and ligaments in your foot and ankle, and prevent joint contracture in the ankle and foot, especially after surgeries on the legs. It is important to do ankle pump exercises regularly after surgery because immobility increases your risk for developing a blood clot. Your doctor may also have you take an Aspirin for the next few days as well. If your doctor did not ask you to take an Aspirin, consult with him before starting Aspirin therapy on your own. The exercise is quite simple. Slowly point your foot forward, feeling the muscles on the top of your lower leg stretch, and hold this position for 5 seconds. Next, pull your foot back toward you as far as possible, stretching the calf muscles, and hold that position for 5 seconds. Repeat with the other foot. Perform 10 repetitions every hour while awake for both ankles if possible (down and then up with the foot once is one repetition). You should feel gentle stretching of the muscles in your lower leg when doing this exercise. If you feel pain, or your range of motion is limited, don't push too hard. Only go the limit your joint and muscles will let you go.   If you have increasing pain, progressively worsening leg warmth or swelling, STOP the exercise and call your doctor. MEDICATION AND   SIDE EFFECT GUIDE    The [facility] [employer] MEDICATION AND SIDE EFFECT GUIDE was provided to the PATIENT AND CARE PROVIDER. Information provided includes instruction about drug purpose and common side effects for the following medications:   Oxybutynin        These are general instructions for a healthy lifestyle:    *   Please give a list of your current medications to your Primary Care Provider. *   Please update this list whenever your medications are discontinued, doses are changed, or new medications (including over-the-counter products) are added. *   Please carry medication information at all times in case of emergency situations. About Smoking  No smoking / No tobacco products  Avoid exposure to second hand smoke     Surgeon General's Warning:  Quitting smoking now greatly reduces serious risk to your health. Obesity, smoking, and sedentary lifestyle greatly increases your risk for illness and disease. A healthy diet, regular physical exercise & weight monitoring are important for maintaining a healthy lifestyle. Congestive Heart Failure  You may be retaining fluid if you have a history of heart failure or if you experience any of the following symptoms:  Weight gain of 3 pounds or more overnight or 5 pounds in a week, increased swelling in your hands or feet or shortness of breath while lying flat in bed. Please call your doctor as soon as you notice any of these symptoms; do not wait until your next office visit. Recognize signs and symptoms of STROKE:  F -  Face looks uneven  A -  Arms unable to move or move evenly  S -  Speech slurred or non-existent  T -  Time-call 911 as soon as signs and symptoms begin-DO NOT go          back to bed or wait to see if you get better-TIME IS BRAIN. Warning Signs of HEART ATTACK   Call 911 if you have these symptoms:    Chest discomfort.  Most heart attacks involve discomfort in the center of the chest that lasts more than a few minutes, or that goes away and comes back. It can feel like uncomfortable pressure, squeezing, fullness, or pain. Discomfort in other areas of the upper body. Symptoms can include pain or discomfort in one or both arms, the back, neck, jaw, or stomach. Shortness of breath with or without chest discomfort. Other signs may include breaking out in a cold sweat, nausea, or lightheadedness. Don't wait more than five minutes to call 911 - MINUTES MATTER! Fast action can save your life. Calling 911 is almost always the fastest way to get lifesaving treatment. Emergency Medical Services staff can begin treatment when they arrive -- up to an hour sooner than if someone gets to the hospital by car. Learning About Coronavirus [phone])  Coronavirus [phone]): Overview  What is coronavirus (COVID-19)? The coronavirus disease (COVID-19) is caused by a virus. It is an illness that was first found in [place], [place], in December 2019. It has since spread worldwide. The virus can cause fever, cough, and trouble breathing. In severe cases, it can cause pneumonia and make it hard to breathe without help. It can cause death. Coronaviruses are a large group of viruses. They cause the common cold. They also cause more serious illnesses like Middle East respiratory syndrome (MERS) and severe acute respiratory syndrome (SARS). COVID-19 is caused by a novel coronavirus. That means it's a new type that has not been seen in people before. This virus spreads person-to-person through droplets from coughing and sneezing. It can also spread when you are close to someone who is infected. And it can spread when you touch something that has the virus on it, such as a doorknob or a tabletop. What can you do to protect yourself from coronavirus (COVID-19)? The best way to protect yourself from getting sick is to: Avoid areas where there is an outbreak.   Avoid contact with people who may be infected. Wash your hands often with soap or alcohol-based hand sanitizers. Avoid crowds and try to stay at least 6 feet away from other people. Wash your hands often, especially after you cough or sneeze. Use soap and water, and scrub for at least 20 seconds. If soap and water aren't available, use an alcohol-based hand . Avoid touching your mouth, nose, and eyes. What can you do to avoid spreading the virus to others? To help avoid spreading the virus to others:  Cover your mouth with a tissue when you cough or sneeze. Then throw the tissue in the trash. Use a disinfectant to clean things that you touch often. Stay home if you are sick or have been exposed to the virus. Don't go to school, work, or public areas. And don't use public transportation. If you are sick:  Leave your home only if you need to get medical care. But call the doctor's office first so they know you're coming. And wear a face mask, if you have one. If you have a face mask, wear it whenever you're around other people. It can help stop the spread of the virus when you cough or sneeze. Clean and disinfect your home every day. Use household  and disinfectant wipes or sprays. Take special care to clean things that you grab with your hands. These include doorknobs, remote controls, phones, and handles on your refrigerator and microwave. And don't forget countertops, tabletops, bathrooms, and computer keyboards. When to call for help  Call 911 anytime you think you may need emergency care. For example, call if:  You have severe trouble breathing. (You can't talk at all.)  You have constant chest pain or pressure. You are severely dizzy or lightheaded. You are confused or can't think clearly. Your face and lips have a blue color. You pass out (lose consciousness) or are very hard to wake up. Call your doctor now if you develop symptoms such as:  Shortness of breath. Fever. Cough.   If you need to get care, call ahead to the doctor's office for instructions before you go. Make sure you wear a face mask, if you have one, to prevent exposing other people to the virus. Where can you get the latest information? The following health organizations are tracking and studying this virus. Their websites contain the most up-to-date information. Dafne Rose also learn what to do if you think you may have been exposed to the virus. U.S. Centers for Disease Control and Prevention (CDC): The CDC provides updated news about the disease and travel advice. The website also tells you how to prevent the spread of infection. www.cdc.gov  World Health Organization [facility]): WHO offers information about the virus outbreaks. WHO also has travel advice. www.who.int  Current as of: April 1, 2020               Content Version: 12.4  © 8073-5901 [employer], Incorporated. Care instructions adapted under license by your healthcare professional. If you have questions about a medical condition or this instruction, always ask your healthcare professional. [place] [address] any warranty or liability for your use of this information. The discharge information has been reviewed with the caregiver. Any questions and concerns from the caregiver have been addressed. The caregiver verbalized understanding.         CONTENTS FOUND IN YOUR DISCHARGE ENVELOPE:  [x]     Surgeon and Hospital Discharge Instructions  [x]     [facility] Care Provider Card  [x]     Medication & Side Effect Guide            (your newly prescribed medications have been marked/highlighted showing the most common side effects from   the classes of drugs on your prescriptions)  [x]     Medication Prescription(s) x Oxybutynin ( [x] These have been sent electronically to your pharmacy by your surgeon,   - OR -       your surgeon has already provided these to you during a previous/pre-op office visit)  []     [address]  [] Physical Therapy Prescription  []     Follow-up Appointment Cards  []     Surgery-related Pictures/Media  []     Pain block and/or block with On-Q Catheter from Anesthesia Service (information included in your instructions above)  []     Medical device information sheets/pamphlets from their    []     School/work excuse note. []     /parent work excuse note. The following personal items collected during your admission are returned to you:   Dental Appliance: Dental Appliances: None  Vision:    Hearing Aid:    Jewelry: Jewelry: None  Clothing: Clothing: Footwear, Dress  Other Valuables:  Other Valuables: None  Valuables sent to safe:

## 2022-07-21 NOTE — ANESTHESIA PREPROCEDURE EVALUATION
Relevant Problems   RESPIRATORY SYSTEM   (+) CARLOS (obstructive sleep apnea)      NEUROLOGY   (+) Anxiety and depression   (+) CVA (cerebral vascular accident) ([facility] [address]      CARDIOVASCULAR   (+) HTN (hypertension)   (+) Malignant hypertensive urgency      GASTROINTESTINAL   (+) Hiatal hernia   (+) Hiatal hernia with gastroesophageal reflux      ENDOCRINE   (+) Severe obesity (HCC)       Anesthetic History   No history of anesthetic complications            Review of Systems / Medical History  Patient summary reviewed and pertinent labs reviewed    Pulmonary        Sleep apnea: No treatment           Neuro/Psych     seizures: well controlled  CVA: no residual symptoms  Psychiatric history    Comments: S/p cavernous hemangioma resection Cardiovascular    Hypertension: poorly controlled            Pertinent negatives: No past MI, angina and CHF  Exercise tolerance: >4 METS     GI/Hepatic/Renal         Renal disease: stones       Endo/Other        Obesity  Pertinent negatives: No diabetes   Other Findings              Physical Exam    Airway  Mallampati: II  TM Distance: > 6 cm  Neck ROM: normal range of motion   Mouth opening: Normal     Cardiovascular    Rhythm: regular  Rate: normal         Dental    Dentition: Poor dentition  Comments: Multiple missing, denies loose teeth   Pulmonary  Breath sounds clear to auscultation               Abdominal  GI exam deferred       Other Findings            Anesthetic Plan    ASA: 3  Anesthesia type: general          Induction: Intravenous  Anesthetic plan and risks discussed with: Patient

## 2022-07-21 NOTE — OP NOTES
Preoperative Diagnosis: History right ureteral stone, retained right ureteral stent, left abdominal/flank pain    Postoperative Diagnosis:  Same    Procedure(s) Performed:    1. Cystourethroscopy  2. Left retrograde pyelogram  3. Right ureteral stent removal  4. Right retrograde pyelogram    Surgeon:  Olegario Arriaga MD    Assistant(s):      Anesthesia:  General    Fluids:  See anesthesia record    Estimated blood loss:  <5 mL    Specimens:  None    Cultures:  None    Implants: None    Tubes/Lines/Drains:  None    Complications:  None    Indications: 55 y.o. female with a past medical history of right ureteral stone s/p right CRULS 5/23/22. She has a retained right ureteral stent, requesting GA for removal. Risks & benefits of the procedure discussed with the patient, who wishes to proceed. I met her in the preoperative area and she was complaining of new left abdominal pain despite prior imaging not suggestive of any urologic etiology on the left side, and we agreed to perform a left retrograde pyelogram at the same time. Findings:  Normal urethra. Partially duplicated left collecting system but no hydroureteronephrosis, filling defects nor contrast extravasation. Right ureteral stent removed. Stent irritation and mucosal edema in the bladder near the right trigone and posterior wall. Right retrograde pyelogram revealed no hydronephrosis or ureteral filling defects. Description:  The patient was correctly identified in the preop holding area where written informed consent as well as potential risks and complications were reviewed. They agreed. They were brought back to the operative suite. Once correct information was verified, general anesthesia was induced. They were then gently placed into dorsal lithotomy position with SCDs in place for VTE prophylaxis. They were prepped and draped in the usual sterile fashion and given appropriate preoperative antibiotics with Ancef.  She had been treated appropriately preoperatively with Bactrim for a positive urine culture. A surgical timeout was then performed. We inserted a 21F rigid cystoscope per urethra with copious lubrication and normal saline irrigation running. This demonstrated findings as described above. A normal urethra. Upon entering the bladder the urine was slightly cloudy. The bladder was filled and emptied in order to visualize better. I located the left ureteral orifice which was cannulated with a 5 [place] open-ended catheter. A retrograde pyelogram was performed revealing no ureteral filling defects or renal filling defects. There was no contrast extravasation. There was no hydroureteronephrosis noted. She had a partially duplicated collecting system with joining of the ureters in the proximal ureter. There did not appear to be any indication for ureteroscopy or upper tract endoscopic evaluation. I moved onto the right side. The patient had an indwelling right ureteral stent emanating with an appropriate curl within the bladder. The distal end of the stent was grasped with the flexible graspers and brought out the urethral meatus. The curl of the stent was cut allowing for acid of a Synthorxson wire into the right kidney through the stent. The stent was then entirely removed. The open-ended catheter was advanced over the wire into the right distal ureter. The wire was removed. A retrograde pyelogram was performed. This revealed no obvious ureteral filling defects, hydroureteronephrosis nor contrast extravasation. The open-ended catheter was removed. The cystoscope was replaced into the bladder. 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 There were no bladder tumors, stones or foreign bodies within the bladder appreciated.   The bladder was emptied and all instrumentation was removed. 2% viscous lidocaine Urojet was instilled in the urethra. The patient was woken up from anesthesia and taken to the recovery unit for routine postoperative care. Plan:  - Discharge from PACU when criteria met with prescriptions of oxybutynin. She should not need new narcotic medication after this procedure. Review of her medication list shows that she has hydrocodone as well as ibuprofen which should suffice  - She does not currently have follow-up with our group.   Follow-up as needed      Era Medina MD  [facility]

## 2022-07-21 NOTE — ANESTHESIA POSTPROCEDURE EVALUATION
Procedure(s):  CYSTOSCOPY STENT PULL WITH RETROGRADES. general    Anesthesia Post Evaluation        Patient location during evaluation: PACU  Level of consciousness: awake  Pain management: adequate  Airway patency: patent  Anesthetic complications: no  Cardiovascular status: acceptable  Respiratory status: acceptable  Hydration status: acceptable  Post anesthesia nausea and vomiting:  none      INITIAL Post-op Vital signs:   Vitals Value Taken Time   /84 07/21/22 0835   Temp 36.5 °C (97.7 °F) 07/21/22 0808   Pulse 73 07/21/22 0839   Resp 12 07/21/22 0839   SpO2 92 % 07/21/22 0839   Vitals shown include unvalidated device data.

## 2022-07-21 NOTE — BRIEF OP NOTE
Brief Postoperative Note    Patient: James Gomez  YOB: 1965  MRN: [id number]    Date of Procedure: 7/21/2022     Pre-Op Diagnosis: Retained right ureteral stent, history nephrolithiasis, left flank pain    Post-Op Diagnosis: Same as preoperative diagnosis. Procedure(s):  CYSTOSCOPY STENT PULL WITH RETROGRADES    Surgeon(s):  Carlos Miranda MD    Surgical Assistant: Surg Asst-1: Cris Galdamez    Anesthesia: General     Estimated Blood Loss (mL): Minimal    Complications: None    Specimens: * No specimens in log *     Implants:   Implant Name Type Inv. Item Serial No.  Lot No. LRB No. Used Action   STENT URET POLARIS ULTRA 5X24 -- PERCUFLEX - SNA  STENT URET POLARIS ULTRA 5X24 -- PERCUFLEX NA [id number] 1 Explanted       Drains: None    Findings: Normal urethra. Partially duplicated left collecting system without filling defect, hydro. Right ureteral stent removed. No filling defects or hydro on the right.     Electronically Signed by Griselda Officer, MD on 7/21/2022 at 8:11 AM

## 2023-04-04 LAB
STRESS BASELINE DIAS BP: 95 MMHG
STRESS BASELINE HR: 75 BPM
STRESS BASELINE SYS BP: 141 MMHG
STRESS ESTIMATED WORKLOAD: 1 METS
STRESS EXERCISE DUR MIN: NORMAL
STRESS PEAK DIAS BP: 123 MMHG
STRESS PEAK SYS BP: 169 MMHG
STRESS PERCENT HR ACHIEVED: 64 %
STRESS POST PEAK HR: 105 BPM
STRESS RATE PRESSURE PRODUCT: NORMAL BPM*MMHG
STRESS ST DEPRESSION: 0 MM
STRESS ST ELEVATION: 0 MM
STRESS TARGET HR: 165 BPM

## 2023-05-25 ENCOUNTER — TRANSCRIBE ORDERS (OUTPATIENT)
Facility: HOSPITAL | Age: 58
End: 2023-05-25

## 2023-05-25 DIAGNOSIS — Z12.31 ENCOUNTER FOR SCREENING MAMMOGRAM FOR MALIGNANT NEOPLASM OF BREAST: Primary | ICD-10-CM

## 2023-06-09 ENCOUNTER — HOSPITAL ENCOUNTER (OUTPATIENT)
Facility: HOSPITAL | Age: 58
Discharge: HOME | End: 2023-06-09
Payer: COMMERCIAL

## 2023-06-09 DIAGNOSIS — Z12.31 ENCOUNTER FOR SCREENING MAMMOGRAM FOR MALIGNANT NEOPLASM OF BREAST: ICD-10-CM

## 2023-06-09 PROCEDURE — 77067 SCR MAMMO BI INCL CAD: CPT

## 2023-06-29 ENCOUNTER — APPOINTMENT (OUTPATIENT)
Facility: HOSPITAL | Age: 58
End: 2023-06-29
Payer: COMMERCIAL

## 2023-06-29 ENCOUNTER — HOSPITAL ENCOUNTER (EMERGENCY)
Facility: HOSPITAL | Age: 58
Discharge: HOME | End: 2023-06-29
Attending: EMERGENCY MEDICINE
Payer: COMMERCIAL

## 2023-06-29 VITALS
HEIGHT: 64 IN | SYSTOLIC BLOOD PRESSURE: 156 MMHG | OXYGEN SATURATION: 99 % | DIASTOLIC BLOOD PRESSURE: 108 MMHG | RESPIRATION RATE: 16 BRPM | BODY MASS INDEX: 30.54 KG/M2 | HEART RATE: 85 BPM | TEMPERATURE: 98.4 F

## 2023-06-29 DIAGNOSIS — R07.9 ACUTE CHEST PAIN: Primary | ICD-10-CM

## 2023-06-29 LAB
ALBUMIN SERPL-MCNC: 3.7 G/DL (ref 3.5–5)
ALBUMIN/GLOB SERPL: 0.8 (ref 1.1–2.2)
ALP SERPL-CCNC: 110 U/L (ref 45–117)
ALT SERPL-CCNC: 20 U/L (ref 12–78)
ANION GAP SERPL CALC-SCNC: 4 MMOL/L (ref 5–15)
APPEARANCE UR: ABNORMAL
AST SERPL-CCNC: 28 U/L (ref 15–37)
BACTERIA URNS QL MICRO: ABNORMAL /HPF
BASOPHILS # BLD: 0.1 K/UL (ref 0–0.1)
BASOPHILS NFR BLD: 1 % (ref 0–1)
BILIRUB SERPL-MCNC: 0.3 MG/DL (ref 0.2–1)
BILIRUB UR QL: NEGATIVE
BUN SERPL-MCNC: 11 MG/DL (ref 6–20)
BUN/CREAT SERPL: 10 (ref 12–20)
CALCIUM SERPL-MCNC: 9.4 MG/DL (ref 8.5–10.1)
CHLORIDE SERPL-SCNC: 109 MMOL/L (ref 97–108)
CO2 SERPL-SCNC: 27 MMOL/L (ref 21–32)
COLOR UR: ABNORMAL
CREAT SERPL-MCNC: 1.08 MG/DL (ref 0.55–1.02)
DIFFERENTIAL METHOD BLD: NORMAL
EOSINOPHIL # BLD: 0.3 K/UL (ref 0–0.4)
EOSINOPHIL NFR BLD: 3 % (ref 0–7)
EPITH CASTS URNS QL MICRO: ABNORMAL /LPF
ERYTHROCYTE [DISTWIDTH] IN BLOOD BY AUTOMATED COUNT: 13.1 % (ref 11.5–14.5)
GLOBULIN SER CALC-MCNC: 4.6 G/DL (ref 2–4)
GLUCOSE SERPL-MCNC: 98 MG/DL (ref 65–100)
GLUCOSE UR STRIP.AUTO-MCNC: NEGATIVE MG/DL
HCT VFR BLD AUTO: 45.6 % (ref 35–47)
HGB BLD-MCNC: 14.2 G/DL (ref 11.5–16)
HGB UR QL STRIP: NEGATIVE
HYALINE CASTS URNS QL MICRO: ABNORMAL /LPF (ref 0–2)
IMM GRANULOCYTES # BLD AUTO: 0 K/UL (ref 0–0.04)
IMM GRANULOCYTES NFR BLD AUTO: 0 % (ref 0–0.5)
KETONES UR QL STRIP.AUTO: NEGATIVE MG/DL
LEUKOCYTE ESTERASE UR QL STRIP.AUTO: NEGATIVE
LYMPHOCYTES # BLD: 3.4 K/UL (ref 0.8–3.5)
LYMPHOCYTES NFR BLD: 38 % (ref 12–49)
MCH RBC QN AUTO: 29.9 PG (ref 26–34)
MCHC RBC AUTO-ENTMCNC: 31.1 G/DL (ref 30–36.5)
MCV RBC AUTO: 96 FL (ref 80–99)
MONOCYTES # BLD: 0.5 K/UL (ref 0–1)
MONOCYTES NFR BLD: 6 % (ref 5–13)
NEUTS SEG # BLD: 4.7 K/UL (ref 1.8–8)
NEUTS SEG NFR BLD: 52 % (ref 32–75)
NITRITE UR QL STRIP.AUTO: NEGATIVE
NRBC # BLD: 0 K/UL (ref 0–0.01)
NRBC BLD-RTO: 0 PER 100 WBC
PH UR STRIP: 7 (ref 5–8)
PLATELET # BLD AUTO: 255 K/UL (ref 150–400)
PMV BLD AUTO: 10.9 FL (ref 8.9–12.9)
POTASSIUM SERPL-SCNC: 5.1 MMOL/L (ref 3.5–5.1)
PROT SERPL-MCNC: 8.3 G/DL (ref 6.4–8.2)
PROT UR STRIP-MCNC: NEGATIVE MG/DL
RBC # BLD AUTO: 4.75 M/UL (ref 3.8–5.2)
RBC #/AREA URNS HPF: ABNORMAL /HPF (ref 0–5)
SODIUM SERPL-SCNC: 140 MMOL/L (ref 136–145)
SP GR UR REFRACTOMETRY: 1.01
TROPONIN I SERPL HS-MCNC: 6 NG/L (ref 0–51)
TROPONIN I SERPL HS-MCNC: 7 NG/L (ref 0–51)
UROBILINOGEN UR QL STRIP.AUTO: 0.2 EU/DL (ref 0.2–1)
WBC # BLD AUTO: 8.9 K/UL (ref 3.6–11)
WBC URNS QL MICRO: ABNORMAL /HPF (ref 0–4)

## 2023-06-29 PROCEDURE — 74176 CT ABD & PELVIS W/O CONTRAST: CPT

## 2023-06-29 PROCEDURE — 84484 ASSAY OF TROPONIN QUANT: CPT

## 2023-06-29 PROCEDURE — 36415 COLL VENOUS BLD VENIPUNCTURE: CPT

## 2023-06-29 PROCEDURE — 6360000002 HC RX W HCPCS: Performed by: EMERGENCY MEDICINE

## 2023-06-29 PROCEDURE — 96375 TX/PRO/DX INJ NEW DRUG ADDON: CPT

## 2023-06-29 PROCEDURE — 93005 ELECTROCARDIOGRAM TRACING: CPT | Performed by: EMERGENCY MEDICINE

## 2023-06-29 PROCEDURE — 80053 COMPREHEN METABOLIC PANEL: CPT

## 2023-06-29 PROCEDURE — 85025 COMPLETE CBC W/AUTO DIFF WBC: CPT

## 2023-06-29 PROCEDURE — 96374 THER/PROPH/DIAG INJ IV PUSH: CPT

## 2023-06-29 PROCEDURE — 71046 X-RAY EXAM CHEST 2 VIEWS: CPT

## 2023-06-29 PROCEDURE — 81001 URINALYSIS AUTO W/SCOPE: CPT

## 2023-06-29 PROCEDURE — 96376 TX/PRO/DX INJ SAME DRUG ADON: CPT

## 2023-06-29 PROCEDURE — 99285 EMERGENCY DEPT VISIT HI MDM: CPT

## 2023-06-29 RX ORDER — ONDANSETRON 2 MG/ML
4 INJECTION INTRAMUSCULAR; INTRAVENOUS ONCE
Status: COMPLETED | OUTPATIENT
Start: 2023-06-29 | End: 2023-06-29

## 2023-06-29 RX ORDER — OXYCODONE HYDROCHLORIDE 5 MG/1
5 TABLET ORAL EVERY 6 HOURS PRN
Qty: 12 TABLET | Refills: 0 | Status: SHIPPED | OUTPATIENT
Start: 2023-06-29 | End: 2023-07-02

## 2023-06-29 RX ORDER — NAPROXEN 500 MG/1
500 TABLET ORAL 2 TIMES DAILY
Qty: 60 TABLET | Refills: 0 | Status: SHIPPED | OUTPATIENT
Start: 2023-06-29

## 2023-06-29 RX ORDER — MORPHINE SULFATE 2 MG/ML
4 INJECTION, SOLUTION INTRAMUSCULAR; INTRAVENOUS
Status: COMPLETED | OUTPATIENT
Start: 2023-06-29 | End: 2023-06-29

## 2023-06-29 RX ORDER — CYCLOBENZAPRINE HCL 10 MG
10 TABLET ORAL 3 TIMES DAILY PRN
Qty: 21 TABLET | Refills: 0 | Status: SHIPPED | OUTPATIENT
Start: 2023-06-29 | End: 2023-07-09

## 2023-06-29 RX ADMIN — MORPHINE SULFATE 4 MG: 2 INJECTION, SOLUTION INTRAMUSCULAR; INTRAVENOUS at 21:43

## 2023-06-29 RX ADMIN — ONDANSETRON 4 MG: 2 INJECTION INTRAMUSCULAR; INTRAVENOUS at 21:43

## 2023-06-29 RX ADMIN — MORPHINE SULFATE 4 MG: 2 INJECTION, SOLUTION INTRAMUSCULAR; INTRAVENOUS at 23:04

## 2023-06-29 ASSESSMENT — PAIN DESCRIPTION - ORIENTATION
ORIENTATION: LEFT
ORIENTATION: LEFT
ORIENTATION: UPPER;LEFT

## 2023-06-29 ASSESSMENT — PAIN DESCRIPTION - LOCATION
LOCATION: CHEST
LOCATION: CHEST;FLANK
LOCATION: CHEST;FLANK

## 2023-06-29 ASSESSMENT — LIFESTYLE VARIABLES
HOW OFTEN DO YOU HAVE A DRINK CONTAINING ALCOHOL: NEVER
HOW MANY STANDARD DRINKS CONTAINING ALCOHOL DO YOU HAVE ON A TYPICAL DAY: PATIENT DOES NOT DRINK

## 2023-06-29 ASSESSMENT — PAIN SCALES - GENERAL
PAINLEVEL_OUTOF10: 6

## 2023-06-29 ASSESSMENT — PAIN DESCRIPTION - DESCRIPTORS
DESCRIPTORS: OTHER (COMMENT)
DESCRIPTORS: TIGHTNESS

## 2023-06-30 LAB
EKG ATRIAL RATE: 83 BPM
EKG DIAGNOSIS: NORMAL
EKG P AXIS: 55 DEGREES
EKG P-R INTERVAL: 176 MS
EKG Q-T INTERVAL: 398 MS
EKG QRS DURATION: 88 MS
EKG QTC CALCULATION (BAZETT): 467 MS
EKG R AXIS: -37 DEGREES
EKG T AXIS: 42 DEGREES
EKG VENTRICULAR RATE: 83 BPM

## 2023-08-01 ENCOUNTER — TRANSCRIBE ORDERS (OUTPATIENT)
Facility: HOSPITAL | Age: 58
End: 2023-08-01

## 2023-08-01 DIAGNOSIS — R91.1 LUNG NODULE, SOLITARY: Primary | ICD-10-CM

## 2023-08-01 DIAGNOSIS — R10.9 FLANK PAIN: ICD-10-CM

## 2023-12-12 ENCOUNTER — APPOINTMENT (OUTPATIENT)
Facility: HOSPITAL | Age: 58
End: 2023-12-12
Payer: COMMERCIAL

## 2023-12-12 ENCOUNTER — HOSPITAL ENCOUNTER (EMERGENCY)
Facility: HOSPITAL | Age: 58
Discharge: HOME | End: 2023-12-12
Attending: STUDENT IN AN ORGANIZED HEALTH CARE EDUCATION/TRAINING PROGRAM
Payer: COMMERCIAL

## 2023-12-12 VITALS
TEMPERATURE: 98.4 F | RESPIRATION RATE: 18 BRPM | HEIGHT: 64 IN | WEIGHT: 215.39 LBS | SYSTOLIC BLOOD PRESSURE: 148 MMHG | BODY MASS INDEX: 36.77 KG/M2 | DIASTOLIC BLOOD PRESSURE: 79 MMHG | HEART RATE: 78 BPM | OXYGEN SATURATION: 99 %

## 2023-12-12 DIAGNOSIS — R07.9 CHEST PAIN, UNSPECIFIED TYPE: Primary | ICD-10-CM

## 2023-12-12 DIAGNOSIS — R10.12 LEFT UPPER QUADRANT ABDOMINAL PAIN: ICD-10-CM

## 2023-12-12 LAB
ALBUMIN SERPL-MCNC: 3.6 G/DL (ref 3.5–5)
ALBUMIN/GLOB SERPL: 0.8 (ref 1.1–2.2)
ALP SERPL-CCNC: 117 U/L (ref 45–117)
ALT SERPL-CCNC: 11 U/L (ref 12–78)
ANION GAP SERPL CALC-SCNC: 5 MMOL/L (ref 5–15)
APPEARANCE UR: CLEAR
AST SERPL-CCNC: 5 U/L (ref 15–37)
BACTERIA URNS QL MICRO: NEGATIVE /HPF
BASOPHILS # BLD: 0.1 K/UL (ref 0–0.1)
BASOPHILS NFR BLD: 1 % (ref 0–1)
BILIRUB SERPL-MCNC: 0.2 MG/DL (ref 0.2–1)
BILIRUB UR QL: NEGATIVE
BUN SERPL-MCNC: 12 MG/DL (ref 6–20)
BUN/CREAT SERPL: 12 (ref 12–20)
CALCIUM SERPL-MCNC: 9.3 MG/DL (ref 8.5–10.1)
CHLORIDE SERPL-SCNC: 108 MMOL/L (ref 97–108)
CO2 SERPL-SCNC: 29 MMOL/L (ref 21–32)
COLOR UR: ABNORMAL
CREAT SERPL-MCNC: 1.02 MG/DL (ref 0.55–1.02)
DIFFERENTIAL METHOD BLD: NORMAL
EOSINOPHIL # BLD: 0.2 K/UL (ref 0–0.4)
EOSINOPHIL NFR BLD: 2 % (ref 0–7)
EPITH CASTS URNS QL MICRO: ABNORMAL /LPF
ERYTHROCYTE [DISTWIDTH] IN BLOOD BY AUTOMATED COUNT: 12.4 % (ref 11.5–14.5)
GLOBULIN SER CALC-MCNC: 4.5 G/DL (ref 2–4)
GLUCOSE SERPL-MCNC: 95 MG/DL (ref 65–100)
GLUCOSE UR STRIP.AUTO-MCNC: NEGATIVE MG/DL
HCT VFR BLD AUTO: 42.9 % (ref 35–47)
HGB BLD-MCNC: 13.8 G/DL (ref 11.5–16)
HGB UR QL STRIP: NEGATIVE
HYALINE CASTS URNS QL MICRO: ABNORMAL /LPF (ref 0–2)
IMM GRANULOCYTES # BLD AUTO: 0 K/UL (ref 0–0.04)
IMM GRANULOCYTES NFR BLD AUTO: 0 % (ref 0–0.5)
KETONES UR QL STRIP.AUTO: NEGATIVE MG/DL
LEUKOCYTE ESTERASE UR QL STRIP.AUTO: NEGATIVE
LYMPHOCYTES # BLD: 2.8 K/UL (ref 0.8–3.5)
LYMPHOCYTES NFR BLD: 34 % (ref 12–49)
MAGNESIUM SERPL-MCNC: 2.5 MG/DL (ref 1.6–2.4)
MCH RBC QN AUTO: 29.9 PG (ref 26–34)
MCHC RBC AUTO-ENTMCNC: 32.2 G/DL (ref 30–36.5)
MCV RBC AUTO: 92.9 FL (ref 80–99)
MONOCYTES # BLD: 0.6 K/UL (ref 0–1)
MONOCYTES NFR BLD: 7 % (ref 5–13)
NEUTS SEG # BLD: 4.7 K/UL (ref 1.8–8)
NEUTS SEG NFR BLD: 56 % (ref 32–75)
NITRITE UR QL STRIP.AUTO: NEGATIVE
NRBC # BLD: 0 K/UL (ref 0–0.01)
NRBC BLD-RTO: 0 PER 100 WBC
PH UR STRIP: 6.5 (ref 5–8)
PLATELET # BLD AUTO: 291 K/UL (ref 150–400)
PMV BLD AUTO: 10.1 FL (ref 8.9–12.9)
POTASSIUM SERPL-SCNC: 3.8 MMOL/L (ref 3.5–5.1)
PROT SERPL-MCNC: 8.1 G/DL (ref 6.4–8.2)
PROT UR STRIP-MCNC: NEGATIVE MG/DL
RBC # BLD AUTO: 4.62 M/UL (ref 3.8–5.2)
RBC #/AREA URNS HPF: ABNORMAL /HPF (ref 0–5)
SODIUM SERPL-SCNC: 142 MMOL/L (ref 136–145)
SP GR UR REFRACTOMETRY: 1.01
TROPONIN I SERPL HS-MCNC: 7 NG/L (ref 0–51)
TROPONIN I SERPL HS-MCNC: 9 NG/L (ref 0–51)
URINE CULTURE IF INDICATED: ABNORMAL
UROBILINOGEN UR QL STRIP.AUTO: 0.2 EU/DL (ref 0.2–1)
WBC # BLD AUTO: 8.3 K/UL (ref 3.6–11)
WBC URNS QL MICRO: ABNORMAL /HPF (ref 0–4)

## 2023-12-12 PROCEDURE — 81001 URINALYSIS AUTO W/SCOPE: CPT

## 2023-12-12 PROCEDURE — 96374 THER/PROPH/DIAG INJ IV PUSH: CPT

## 2023-12-12 PROCEDURE — 36415 COLL VENOUS BLD VENIPUNCTURE: CPT

## 2023-12-12 PROCEDURE — 80053 COMPREHEN METABOLIC PANEL: CPT

## 2023-12-12 PROCEDURE — 71046 X-RAY EXAM CHEST 2 VIEWS: CPT

## 2023-12-12 PROCEDURE — 83735 ASSAY OF MAGNESIUM: CPT

## 2023-12-12 PROCEDURE — 93005 ELECTROCARDIOGRAM TRACING: CPT | Performed by: STUDENT IN AN ORGANIZED HEALTH CARE EDUCATION/TRAINING PROGRAM

## 2023-12-12 PROCEDURE — 96375 TX/PRO/DX INJ NEW DRUG ADDON: CPT

## 2023-12-12 PROCEDURE — 6360000004 HC RX CONTRAST MEDICATION: Performed by: STUDENT IN AN ORGANIZED HEALTH CARE EDUCATION/TRAINING PROGRAM

## 2023-12-12 PROCEDURE — 99285 EMERGENCY DEPT VISIT HI MDM: CPT

## 2023-12-12 PROCEDURE — 85025 COMPLETE CBC W/AUTO DIFF WBC: CPT

## 2023-12-12 PROCEDURE — 2580000003 HC RX 258: Performed by: STUDENT IN AN ORGANIZED HEALTH CARE EDUCATION/TRAINING PROGRAM

## 2023-12-12 PROCEDURE — 74178 CT ABD&PLV WO CNTR FLWD CNTR: CPT

## 2023-12-12 PROCEDURE — 84484 ASSAY OF TROPONIN QUANT: CPT

## 2023-12-12 PROCEDURE — 6360000002 HC RX W HCPCS: Performed by: STUDENT IN AN ORGANIZED HEALTH CARE EDUCATION/TRAINING PROGRAM

## 2023-12-12 RX ORDER — PROCHLORPERAZINE EDISYLATE 5 MG/ML
10 INJECTION INTRAMUSCULAR; INTRAVENOUS ONCE
Status: COMPLETED | OUTPATIENT
Start: 2023-12-12 | End: 2023-12-12

## 2023-12-12 RX ORDER — DIPHENHYDRAMINE HYDROCHLORIDE 50 MG/ML
25 INJECTION INTRAMUSCULAR; INTRAVENOUS ONCE
Status: COMPLETED | OUTPATIENT
Start: 2023-12-12 | End: 2023-12-12

## 2023-12-12 RX ORDER — BUTALBITAL, ACETAMINOPHEN AND CAFFEINE 50; 325; 40 MG/1; MG/1; MG/1
1 TABLET ORAL
Status: DISCONTINUED | OUTPATIENT
Start: 2023-12-12 | End: 2023-12-13 | Stop reason: HOSPADM

## 2023-12-12 RX ORDER — 0.9 % SODIUM CHLORIDE 0.9 %
1000 INTRAVENOUS SOLUTION INTRAVENOUS ONCE
Status: COMPLETED | OUTPATIENT
Start: 2023-12-12 | End: 2023-12-12

## 2023-12-12 RX ADMIN — SODIUM CHLORIDE 1000 ML: 9 INJECTION, SOLUTION INTRAVENOUS at 19:47

## 2023-12-12 RX ADMIN — PROCHLORPERAZINE EDISYLATE 10 MG: 5 INJECTION INTRAMUSCULAR; INTRAVENOUS at 21:03

## 2023-12-12 RX ADMIN — DIPHENHYDRAMINE HYDROCHLORIDE 25 MG: 50 INJECTION INTRAMUSCULAR; INTRAVENOUS at 21:01

## 2023-12-12 RX ADMIN — IOPAMIDOL 100 ML: 755 INJECTION, SOLUTION INTRAVENOUS at 21:55

## 2023-12-12 ASSESSMENT — PAIN - FUNCTIONAL ASSESSMENT: PAIN_FUNCTIONAL_ASSESSMENT: 0-10

## 2023-12-12 ASSESSMENT — LIFESTYLE VARIABLES
HOW MANY STANDARD DRINKS CONTAINING ALCOHOL DO YOU HAVE ON A TYPICAL DAY: PATIENT DOES NOT DRINK
HOW OFTEN DO YOU HAVE A DRINK CONTAINING ALCOHOL: NEVER

## 2023-12-12 ASSESSMENT — PAIN SCALES - GENERAL: PAINLEVEL_OUTOF10: 8

## 2023-12-13 LAB
EKG ATRIAL RATE: 86 BPM
EKG DIAGNOSIS: NORMAL
EKG P AXIS: 44 DEGREES
EKG P-R INTERVAL: 174 MS
EKG Q-T INTERVAL: 384 MS
EKG QRS DURATION: 84 MS
EKG QTC CALCULATION (BAZETT): 459 MS
EKG R AXIS: -34 DEGREES
EKG T AXIS: 38 DEGREES
EKG VENTRICULAR RATE: 86 BPM

## 2023-12-13 NOTE — ED PROVIDER NOTES
EMERGENCY DEPARTMENT HISTORY AND PHYSICAL EXAM      Date: 12/12/2023  Patient Name: Mil Gonzalez    History of Presenting Illness     Chief Complaint   Patient presents with    Flank Pain     Pt c/o CP x one year, pt c/o SOB x 6 months, L flank pain x one year, and migraines./Dizziness chronic. Pt states her PCP retired and has seen a new doctor and this new doctor told her to come to ED to r/o \"appendix bleeding from the pain, and HTN. \"    Chest Pain    Dizziness         HPI: History From: patient, History limited by: none  Mil Gonzalez, 63 y.o. female presents to the ED with cc of many ongoing complaints. She reports vomiting for the past 3 weeks, she states that it looks like bile. She also states that over the last 4 months she has had left upper quadrant pain that radiates to the left flank into the left breast, achy. She also states that for the past 4 months she has had diarrhea that has been black in color. She reports a mild chronic cough, no fevers, no dysuria, no hematuria. Over the past 3 months she has noted some shortness of breath on exertion and diminished appetite. She does not take any blood thinners. There are no other complaints, changes, or physical findings at this time. PCP: Car Brizuela MD    No current facility-administered medications on file prior to encounter. Current Outpatient Medications on File Prior to Encounter   Medication Sig Dispense Refill    naproxen (NAPROSYN) 500 MG tablet Take 1 tablet by mouth 2 times daily 60 tablet 0    ALPRAZolam (XANAX) 1 MG tablet Take 1 mg by mouth 3 times daily. docusate (COLACE, DULCOLAX) 100 MG CAPS Take 100 mg by mouth 2 times daily as needed      DULoxetine (CYMBALTA) 60 MG extended release capsule Take 2 capsules by mouth nightly      famotidine (PEPCID) 20 MG tablet Take 20 mg by mouth nightly      HYDROcodone-acetaminophen (NORCO) 5-325 MG per tablet Take 2 tablets by mouth every 6 hours as needed.

## 2024-04-08 ENCOUNTER — TRANSCRIBE ORDERS (OUTPATIENT)
Facility: HOSPITAL | Age: 59
End: 2024-04-08

## 2024-04-08 DIAGNOSIS — R10.9 ABDOMINAL PAIN, UNSPECIFIED ABDOMINAL LOCATION: Primary | ICD-10-CM

## 2024-05-01 NOTE — PROGRESS NOTES
ADVANCED HEART FAILURE VAD Progress Note                                                                                                                         LVAD Inpatient Note    Date: 2024    Patient: Justine Stuart   : 1953 MRN: [id number]   Admit Date: 2024 LOS: 9       SUBJECTIVE: Patient seen and examined lying in bed this morning. No complaints from patient. Ready to go home when antibiotic situation is figured out     HPI: Justine Stuart is a 70 year old  female with PMHx significant for CHF s/p HM2 on 17 w/ STS, Type 1 DM, COPD/emphysema, and hx of Serratia and PSAR driveline infection being treated with IV ceftazidime, but due to her insurance change, she has no drug coverage and has been off for at least two weeks. Her ID office was aware and we had a plan to do daily dressing changes and monitor for worsening infection as there are no oral antibiotics available for use. She was set up in the wound clinic with Dr. Peraza and had one appointment, but no longer had insurance coverage for her follow up. Her daughter called reporting worsening circumferential redness around driveline site and tenderness. She also reported redness around PICC line. She is afebrile and denies other complaints at this time. She was told to report to the emergency department for infections work-up and treatmen     24: RLL Pneumonia and worsening DL infection IV Fortaz and po Zithromax for DL infection and PNA. INR 3.4 Hold Coumadin. Switched to Doxy and Cefepime. Pump off alarms from connection to grounded instead of ungrounded cable  24: IV Cefipime and Doxy. INR 3.7 will hold Coumadin Blood Cx NGTD. Wound culture prelim Gram Neg   24: currently no home antibiotic coverage. Working on hay care through advocate until she can buy a secondary insurance. /  24:: IV Cefepime and oral Doxy. Awaiting insurance coverage determination  24: HR 70s, MAPs 90s-120s, INR 2.2, BUN 27,  Gastrointestinal Progress Note    3/9/2020    Admit Date: 3/7/2020    Subjective:     New Complaints Today:  Yes    Pain: Patient complains of abdominal pain yes. The pain is located in the periumbilical area. The pain is described as pressure, and is 3/10 in intensity. Bowel Movements: None    Bleeding:  None    Current Facility-Administered Medications   Medication Dose Route Frequency    atorvastatin (LIPITOR) tablet 20 mg  20 mg Oral QHS    metoprolol tartrate (LOPRESSOR) tablet 12.5 mg  12.5 mg Oral Q12H    lisinopriL (PRINIVIL, ZESTRIL) tablet 10 mg  10 mg Oral DAILY    amLODIPine (NORVASC) tablet 10 mg  10 mg Oral DAILY    famotidine (PEPCID) tablet 20 mg  20 mg Oral BID    pantoprazole (PROTONIX) 40 mg in 0.9% sodium chloride 10 mL injection  40 mg IntraVENous Q12H    ALPRAZolam (XANAX) tablet 1 mg  1 mg Oral TID    busPIRone (BUSPAR) tablet 15 mg  15 mg Oral QHS    DULoxetine (CYMBALTA) capsule 120 mg  120 mg Oral QHS    gabapentin (NEURONTIN) capsule 400 mg  400 mg Oral TID    tiZANidine (ZANAFLEX) tablet 4 mg  4 mg Oral TID    topiramate (TOPAMAX) tablet 25 mg  25 mg Oral QHS    traZODone (DESYREL) tablet 100-200 mg  100-200 mg Oral QHS PRN    sodium chloride (NS) flush 5-40 mL  5-40 mL IntraVENous Q8H    sodium chloride (NS) flush 5-40 mL  5-40 mL IntraVENous PRN    acetaminophen (TYLENOL) tablet 650 mg  650 mg Oral Q4H PRN    diphenhydrAMINE (BENADRYL) capsule 25 mg  25 mg Oral Q4H PRN    ondansetron (ZOFRAN) injection 4 mg  4 mg IntraVENous Q4H PRN    enoxaparin (LOVENOX) injection 40 mg  40 mg SubCUTAneous Q24H    butalbital-acetaminophen-caffeine (FIORICET, ESGIC) -40 mg per tablet 1 Tab  1 Tab Oral Q6H PRN        Objective:     Blood pressure 120/89, pulse 80, temperature 98.2 °F (36.8 °C), resp. rate 18, height 5' 4\" (1.626 m), weight 106.1 kg (233 lb 14.5 oz), last menstrual period 05/04/2011, SpO2 95 %. No intake/output data recorded.     No intake/output data Cr 1.47, Na 138, WBC 9.5, Hgb 10.4, Plt 365, Net +195 cc/24hrs  4/28/24: HR 70s, MAPs 90s-100s, INR 2.3, BUN 27, Cr 1.60, Na 132, WBC 8.5, Hgb 9.1, Plt 270, Net +1460 cc/24hrs  4/29/24:  attempting to find IV antibiotic supplies for less money or will reach out to ID for different direction / antibiotic POC for discharge planning   4/30/24: Continues to wait for SW to find antibiotic supplies that are affordable for patient to [place] home.   5/1/24: no change stable for discharge when coverage of IV antibiotics figured out     REVIEW OF SYSTEMS:    Constitutional: Denies fever or chills. +fatigue -> chronic  HEENT: Denies vision changes. Denies any nose bleeds.   Respiratory: Denies orthopnea, PND, cough or SOB. +JIMENEZ, which is normal per the patient.   Cardiovascular:  Denies chest pain, palpitations, syncope, lightheadedness, dizziness. Denies LE edema.  Denies VAD alarms  GI:  Denies abdominal pain, bloating, early satiety, nausea, vomiting, diarrhea, constipation, bloody or dark stools. Last BM: \"yesterday\". Appetite is fair.  :  Denies dark colored urine, blood in urine. Denies urinary retention or dysuria. Denies nocturia.   Integument:  VAD driveline changed daily with wet kit and covered with hydrofera blue. Admits to increased redness, pain and drainage at driveline exit site for 2 weeks, silver nitrate per Dr. Peraza 4/23/24. No pain today  Neurologic: Denies headache. Denies numbness and tingling.    Past Medical History:   Diagnosis Date    Abnormal finding on thyroid function test     AF (atrial fibrillation) (CMD)     Ambulates with cane     Anemia     Blood transfusion without reported diagnosis     no bad reactions    Cardiomyopathy (CMD)     Cataract     Chronic kidney disease     Colon cancer screening     Congestive cardiac failure (CMD)     COPD (chronic obstructive pulmonary disease) (CMD)     Diabetes mellitus type 1 (CMD)     Diabetes type 2, uncontrolled     Diabetic retinopathy  recorded.     EXAM:  GENERAL: alert, cooperative, no distress, HEART: regular rate and rhythm, LUNGS: chest clear, no wheezing, rales, normal symmetric air entry, ABDOMEN:  Bowel sounds are normal, liver is not enlarged, spleen is not enlarged and EXTREMITY: no edema      Data Review    Recent Results (from the past 24 hour(s))   LIPID PANEL    Collection Time: 03/09/20  5:11 AM   Result Value Ref Range    LIPID PROFILE          Cholesterol, total 224 (H) <200 MG/DL    Triglyceride 204 (H) <150 MG/DL    HDL Cholesterol 41 MG/DL    LDL, calculated 142.2 (H) 0 - 100 MG/DL    VLDL, calculated 40.8 MG/DL    CHOL/HDL Ratio 5.5 (H) 0.0 - 5.0         Assessment:     Active Problems:    Hiatal hernia (5/18/2018)      Severe obesity ([address] (4/29/2019)      Rectal bleeding (5/30/2019)      CARLOS (obstructive sleep apnea) ()      Overview: no cpap      Incomplete right bundle branch block (RBBB) determined by electrocardiography (5/10/2018)      HTN (hypertension) ()      Hx of seizure disorder ()      Fibromyalgia ()      Overview: fibromyalgia      Anxiety and depression ()      Hx of migraines ()      Hiatal hernia with gastroesophageal reflux (4/1/2018)      Chest pain (3/8/2020)    obstipation    hemorrhoids    Plan:     1.  golytely upon return cardiac testing (CMD)     Dry eye syndrome     Emphysema of lung (CMD)     Essential (primary) hypertension     Gastroesophageal reflux disease     Hearing loss     High cholesterol     Joint pain, hip     Lichen planus     Low back pain     Menopausal and perimenopausal disorder     Myocardial infarction (CMD)     Osteoporosis     Papanicolaou smear     Personal history of nicotine dependence     Pleural effusion     Screening for colon cancer     Thyroid condition     Wears glasses     Wears hearing aid in both ears     Wears partial dentures           Past Surgical History:   Procedure Laterality Date    Embolization uterine fibroid      Enteroscopy      Hand neuroplasty      Insert intra-aortic balloon      Laminectomy      Left ventricular assist device  06/30/2017     2    Right heart cath            Family History   Problem Relation Age of Onset    Diabetes Mother     Congestive Heart Failure Mother     Heart disease Father     Stroke Father     Cancer Father         colon    Heart disease Sister     Cancer Sister         lung, heavy smoker    Heart disease Maternal Uncle           Social History     Socioeconomic History    Marital status:      Spouse name: Not on file    Number of children: 2    Years of education: Not on file    Highest education level: Not on file   Occupational History    Occupation:  - retired   Tobacco Use    Smoking status: Former    Smokeless tobacco: Never   Vaping Use    Vaping status: never used   Substance and Sexual Activity    Alcohol use: Yes     Alcohol/week: 1.0 standard drink of alcohol     Types: 1 Cans of beer per week     Comment: occasionally    Drug use: No    Sexual activity: Not Currently   Other Topics Concern    Not on file   Social History Narrative    Not on file     Social Determinants of Health     Financial Resource Strain: Low Risk  (4/22/2024)    Financial Resource Strain     Unable to Get: None   Food Insecurity: Low Risk  (4/22/2024)    Food Insecurity      Worried about Food: Never true     Food is Gone: Never true   Transportation Needs: Not At Risk (2024)    Transportation Needs     Lack of Reliable Transportation: No   Physical Activity: Insufficiently Active (2020)    Exercise Vital Sign     Days of Exercise per Week: 3 days     Minutes of Exercise per Session: 20 min   Stress: Low Risk  (3/30/2021)    Stress     How Stressed: Not on file   Social Connections: Low Risk  (2024)    Social Connections     Social Connectivity: 5 or more times a week   Interpersonal Safety: Not At Risk (2023)    Interpersonal Safety     Social Determinants: Intimate Partner Violence Past Fear: No     Social Determinants: Intimate Partner Violence Current Fear: No          OBJECTIVE:    Vitals with min/max:      Vital Last Value 24 Hour Range   Temperature 98.2 °F (36.8 °C) (24) Temp  Min: 97.2 °F (36.2 °C)  Max: 98.2 °F (36.8 °C)   Pulse 77 (24) Pulse  Min: 75  Max: 85   Respiratory 14 (24) Resp  Min: 14  Max: 17   Non-Invasive  Blood Pressure 139/74 (24 0400) BP  Min: 122/74  Max: 146/94   Pulse Oximetry 96 % (24) SpO2  Min: 96 %  Max: 99 %   Arterial   Blood Pressure   No data recorded     Intermittent palpable pulse. Able to get cuff pressures.     Weight    24 0400 24 0500 24 0500 24 0400   Weight: 46.3 kg (102 lb 1.2 oz) 47.9 kg (105 lb 9.6 oz) 47.9 kg (105 lb 9.6 oz) 48.3 kg (106 lb 7.7 oz)          Intake/Output Summary (Last 24 hours) at 2024 0804  Last data filed at 2024 0200  Gross per 24 hour   Intake 1560 ml   Output 600 ml   Net 960 ml               VAD INTERROGATION:  Device Type: Heartmate II  Implant Date:17  Flow: 3.9 Pump Speed: 8800 PI: 7.1 Power: 4.4   Low Speed Settin HCT: N/A     Back-up battery used 5 times for 0 minutes  Change Back-up Battery in 30  Months  Abnormal Trends: Pump off 24 from being connected to grounded cable with known  STS  Alarms (PC): Pump off and LFA all on 4/23/24 from above  Changes Made:None  Waveforms Sent: No (last sent 4/24/24)    MEDICATIONS:    ALLERGIES:   Allergen Reactions    Seasonal Runny Nose     Unknown    Codeine Other (See Comments)     She's not feeling great, can't remember the symptoms.    Spironolactone Other (See Comments)     Can't remember        Current Facility-Administered Medications   Medication Dose Route Frequency Provider Last Rate Last Admin    sodium chloride 0.9 % injection 2 mL  2 mL Intracatheter 2 times per day Qaddour, Noor, DO   2 mL at 04/30/24 2016    insulin lispro (ADMELOG, HumaLOG) injection 12 Units  12 Units Subcutaneous QAM AC Qaddour, Noor, DO        insulin lispro (ADMELOG, HumaLOG) injection 10 Units  10 Units Subcutaneous Daily before lunch Qaddour, Noor, DO        insulin lispro (ADMELOG, HumaLOG) injection 10 Units  10 Units Subcutaneous Daily before dinner Qaddour, Noor, DO   10 Units at 04/30/24 1807    insulin glargine (LANTUS) injection 16 Units  16 Units Subcutaneous Daily Qaddour, Noor, DO   16 Units at 04/30/24 0855    cefTAZidime (FORTAZ) 2,000 mg in sodium chloride 0.9 % 100 mL IVPB  2,000 mg Intravenous Q Evening Bryson Arce  mL/hr at 04/30/24 1837 2,000 mg at 04/30/24 1837    cholecalciferol (VITAMIN D) tablet 25 mcg  25 mcg Oral Daily Tony Tidwell MBBS   25 mcg at 04/30/24 0857    WARFARIN - PHARMACIST MONITORED Misc   Does not apply See Admin Instructions Petra Armstrong CNP        insulin lispro (ADMELOG,HumaLOG) - Correction Dose   Subcutaneous TID  Jazlyn Thapa MD   4 Units at 04/30/24 1230    amLODIPine (NORVASC) tablet 10 mg  10 mg Oral Daily Petra Armstrong CNP   10 mg at 04/30/24 0856    famotidine (PEPCID) tablet 40 mg  40 mg Oral Daily Onelia Kramer MD   40 mg at 04/30/24 0857    ferrous sulfate (65 mg Fe per 325 mg) tablet 325 mg  325 mg Oral BID Onelia Kramer MD   325 mg at 04/30/24 2015    [Held by  provider] furosemide (LASIX) tablet 20 mg  20 mg Oral Daily Onelia Kramer MD        hydrALAZINE (APRESOLINE) tablet 100 mg  100 mg Oral TID Onelia Kramer MD   100 mg at 05/01/24 0205    isosorbide dinitrate (ISORDIL) tablet 40 mg  40 mg Oral TID Onelia Kramer MD   40 mg at 05/01/24 0205    magnesium oxide (MAG-OX) tablet 400 mg  400 mg Oral TID Onelia Kramer MD   400 mg at 04/30/24 2015    polyethylene glycol (MIRALAX) packet 17 g  17 g Oral Daily Onelia Kramer MD   17 g at 04/23/24 0921    pravastatin (PRAVACHOL) tablet 40 mg  40 mg Oral Daily Onelia Kramer MD   40 mg at 04/30/24 0857    Potassium Standard Replacement Protocol (Levels 3.5 and lower)   Does not apply See Admin Instructions Onelia Kramer MD        Potassium Replacement (Levels 3.6 - 4)   Does not apply See Admin Instructions Onelia Kramer MD        Magnesium Standard Replacement Protocol   Does not apply See Admin Instructions Onelia Kramer MD        levothyroxine (SYNTHROID, LEVOTHROID) tablet 88 mcg  88 mcg Oral QAM AC Onelia Kramer MD   88 mcg at 05/01/24 0605    insulin lispro (ADMELOG,HumaLOG) - Correction Dose   Subcutaneous Nightly Jazlyn Thapa MD   2 Units at 04/22/24 2158    [Held by provider] megestrol (MEGACE) 40 mg/mL suspension 400 mg  400 mg Oral Nightly Onelia Kramer MD   400 mg at 04/28/24 2002        Current Facility-Administered Medications   Medication Dose Route Frequency Provider Last Rate Last Admin    sodium chloride 0.9 % flush bag 25 mL  25 mL Intravenous PRN Jovan Cantu DO 50 mL/hr at 04/30/24 1835 25 mL at 04/30/24 1835    albuterol inhaler 2 puff  2 puff Inhalation Q4H PRN Onelia Kramer MD        docusate sodium-sennosides (SENOKOT S) 50-8.6 MG 2 tablet  2 tablet Oral BID PRN Onelia Kramer MD        sodium chloride 0.9 % flush bag 25 mL  25 mL Intravenous PRN Onelia Kramer MD 25 mL/hr at  04/29/24 2053 25 mL at 04/29/24 2053    ondansetron (ZOFRAN) injection 4 mg  4 mg Intravenous BID PRN Onelia Kramer MD        prochlorperazine (COMPAZINE) tablet 5 mg  5 mg Oral Q4H PRN Onelia Kramer MD        aluminum-magnesium hydroxide-simethicone (MAALOX) 200-200-20 MG/5ML suspension 30 mL  30 mL Oral Q4H PRN Onelia Kramer MD        dextrose 50 % injection 25 g  25 g Intravenous PRN Onelia Kramer MD        dextrose 50 % injection 12.5 g  12.5 g Intravenous PRN Onelia Kramer MD        glucagon (GLUCAGEN) injection 1 mg  1 mg Intramuscular PRN Onelia Kramer MD        dextrose (GLUTOSE) 40 % gel 15 g  15 g Oral PRN Onelia Kramer MD        dextrose (GLUTOSE) 40 % gel 30 g  30 g Oral PRN Onelia Kramer MD        sodium chloride (NORMAL SALINE) 0.9 % bolus 500 mL  500 mL Intravenous PRN Onelia Karmer MD        polyethylene glycol (MIRALAX) packet 17 g  17 g Oral Daily PRN Onelia Kramer MD        melatonin tablet 3 mg  3 mg Oral Nightly PRN Onelia Kramer MD        acetaminophen (TYLENOL) tablet 650 mg  650 mg Oral Q6H PRN Onelia Kramer MD        sodium chloride 0.9% infusion   Intravenous Continuous PRN Roseann Man, CNP              PHYSICAL EXAMINATION:  Constitutional:  Alert and Oriented. Pt in no acute distress.  Cardiovascular: Chest auscultation reveals predominately VAD Tones. Intermittent palpable pulse. No JVD. No HJR.    Respiratory:  No respiratory distress. Non-labored breathing. Diminished lung sounds at bases, Crackles noted to RLL  GI: Abdomen soft, non-tender and non-distended.   Integumentary:  Warm. Dry. No erythema. No rash. Driveline dressing dry and intact. [place] intact.   Extremities: No BLE  All extremities warm and well perfused.  Neurologic:  Alert & oriented x 3. Normal motor function. Normal sensory function. No focal deficits noted.   Psychiatric: Cooperative, appropriate mood and  affect.      LABS, IMAGING, CARDIAC TESTING:  Labs:  Labs last 3:  CMP and Magnesium:  Recent Labs   Lab 05/01/24  0338 04/30/24  1838 04/30/24  1553 04/30/24  1459 04/30/24  0506 04/29/24  0341 04/28/24  0629   Glucose 179*  --   --   --  151* 160* 357*   Sodium 134*  --   --   --  136 135 132*   Potassium 3.8 4.0 4.0   < > 4.1 4.1 4.3   Chloride 103  --   --   --  103 104 102   Carbon Dioxide 27  --   --   --  27 27 25   Anion Gap 8  --   --   --  10 8 9   BUN 37*  --   --   --  35* 31* 27*   Creatinine 1.57*  --   --   --  1.75* 1.47* 1.60*   Calcium 9.4  --   --   --  9.6 9.6 9.3   Bilirubin, Total 0.4  --   --   --   --  0.4 0.4   GOT/AST 31  --   --   --   --  23 29   GPT 22  --   --   --   --  18 18   Alkaline Phosphatase 41*  --   --   --   --  44* 48   Protein, Total 5.5*  --   --   --   --  5.5* 5.8*   Albumin 2.4*  --   --   --  2.4* 2.4* 2.4*   Globulin 3.1  --   --   --   --  3.1 3.4   A/G Ratio 0.8*  --   --   --   --  0.8* 0.7*   Magnesium 2.0  --   --   --   --  1.9 1.8    < > = values in this interval not displayed.       CBC:  Recent Labs   Lab 05/01/24 0338 04/30/24  0506 04/29/24  0341 04/28/24  0629 04/27/24  0635   WBC 8.7 8.4 10.4 8.5 9.5   RBC 2.95* 2.90* 2.90* 2.98* 3.41*   HGB 9.0* 9.0* 8.7* 9.1* 10.4*   HCT 26.5* 26.2* 26.4* 26.9* 30.9*   MCV 89.8 90.3 91.0 90.3 90.6   MCH 30.5 31.0 30.0 30.5 30.5   MCHC 34.0 34.4 33.0 33.8 33.7   RDW-CV 13.3 13.2 12.9 12.7 12.7   RDW-SD 42.9 42.6 42.4 41.1 41.7    264 265 270 365   NRBC 0 0 0 0 0   Neutrophil, Percent  --   --  85 86 82   Lymphocytes, Percent  --   --  9 9 13   Mono, Percent  --   --  5 5 5   Eosinophils, Percent  --   --  0 0 0   Basophils, Percent  --   --  0 0 0   Immature Granulocytes  --   --  1 0 0   Absolute Neutrophils  --   --  8.9* 7.2 7.7   Absolute Lymphocytes  --   --  0.9* 0.8* 1.2   Absolute Monocytes  --   --  0.5 0.4 0.5   Absolute Eosinophils   --   --  0.0 0.0 0.0   Absolute Basophils  --   --  0.0 0.0 0.0    Absolute Immature Granulocytes  --   --  0.1 0.0 0.0       INR:  Recent Labs   Lab 05/01/24  0338 04/30/24  0506 04/29/24  0341   INR 1.8 1.9 2.2   Protime- PT 19.0* 20.1* 23.2*       LDH:  Recent Labs   Lab 05/01/24  0338 04/30/24  0506 04/29/24  0341   LD, Total 189 196 203       Cultures:  Drive Line Culture & Bacterial, Blood Cultures:    Recent Labs   Lab 04/23/24  1421 04/22/24  1612 04/22/24  1601   AANC Moderate Pseudomonas aeruginosa*  --   --    Culture, Blood or Bone Marrow  --  No Growth 5 Days. No Growth 5 Days.   Gram Stain Few Polymorphonuclear cells.*  Rare Epithelial cells.*  Few Gram negative bacilli.*  --   --          ECG:   No results found for this or any previous visit.      CXR:   Results for orders placed or performed during the hospital encounter of 04/22/24   XR CHEST AP OR PA    Narrative    Exam: XR CHEST AP OR PA.    Indication: picc placement R10.9 Unspecified abdominal painT82.7XXA  Infection and inflammatory reaction due to other cardiac and vascular  devices, implants and grafts, initial encounter (CMD)    Comparison: Yesterday.    Findings: Single view of the chest demonstrates stable  cardiomediastinal  silhouette. There is an LVAD device in place. There is a right upper  extremity catheter with its tip overlying the right atrium. Patchy right  basilar parenchymal opacities are noted. No definite pneumothorax. The  thoracic musculoskeletal structures and the upper abdomen are stable.      Impression    Impression: Patchy right lung basilar parenchymal opacities, represent  pneumonia.    Electronically Signed by: ANNA DORSEY MD   Signed on: 4/22/2024 11:14 PM   Workstation ID: [id number]        IMPRESSION AND PLAN:    Chronic Systolic Heart Failure S/P HMII Insertion/RCMAG due to cardiogenic shock on 6/30/17: RVAD removed 7/21/17. Patient deemed DT d/t multiple medical comorbidities.   - NYHA Class 3  - Stable pump function @ 8800. Known Short to Shield since 1/24/20: when  STS progresses to PTP, pt refused pump exchange. She may consider inotropes if she is a candidate. She has DNR paperwork as above.  - Dressing change: Daily wet kit with Alginate prn increased drainage   - MAP goal 65-85. If palpable pulse SBP < 120 goal. See below GDMT.   - INR goal 2 -2.5. Off ASA indefinitely   - Euvolemic off home Lasix 20mg QOD regimen  - ECHO: 4/24/24: report above. Mild/mod AI, aortic valve closed. Mild MR, moderate TR. LVEDD 4. 6 cm  - RHC: 8/8/23 RA 12 PA 30/10(17) PCWP 10 SVo2 67.8 CI 6.1 CI 4.3 Ty 1.6 CVP/PCWP 1.2  - LDH see next problem, no s/s hemolysis.   - ICD: NONE  GDMT  Diuretic: Lasix 20mg po QD >on HOLD since 4/23/24  ACE-I/ARB/ARNI: Stopped Lisinopril d/t LUH  Nitrates/Vasodilators: Hydralazine 100 mg PO TID & Isordil 40 mg TID  SGLT2-I: None  MRA: unable to tolerate (vomiting and hairloss)  BB: None  CCB: amlodipine 10 mg Daily  Other: Pravastatin 40 mg QD     PSAR (since 8/2022) and Serratia (since 8/2018) DL Infection:   - Was unable to get IV antibiotics at home for past two weeks, due to lack of Medicaid coverage  - CT C/A/P (4/22/24) no findings of abscess or cellulitis, reviewed by Dr. Jackson   - Patient does have Medicare, Medicaid and part D ->  closely following to determine IVAB coverage and discharge POC  - Daily wet dressing changes, with hydrofera blue or absorptive agent. Dr. Peraza saw patient and applied Silver Nitrate 4/23/24.   - ID recs as of 4/29/24  Stop cefepime and doxy  Resume iv ceftazidime daily, if home arrangements completed then ok with home.  If not and no other options from care management then could go home off abx though she will experience recurrent drainage and pain around DL. Unless fevers or other systemic complaints this is an option though she has been unable to tolerate in past.  Therefore very high risk for re-admission. Oral cipro not good option given risks and unlikely to be effective    Pneumonia:  -RLL, on  Doxycycline po  -Dr Sloan saw patient, recommends follow up CT in 6 weeks to rule out underlying mass, order placed in discharge orders  -Dr. Sloan recommends OP PFTs asap, ordered    Anorexia:  -Improving, on Megace     LUH on CKD: Cr improved to 1.5 today continue to encourage oral hydration   - Baseline creatinine 1.2  - Creatinine 1.75 (4/30/24) -> encouraged to push PO intake. Appears dry on exam   - Hydrated with 500 ml IVF 4/23/24, cont to reassess daily labs  - Nephrology following    RECOMMENDATIONS:  - Discontinue home Lasix 20 mg po QOD -> may need to resume as an outpatient   - Creatinine improved from 1.7 to 1.5 with increased oral intake. Appears dry on exam    - May need to consider 250 cc fluid bolus of richa saline in setting of hyponatremia   - ID recs as of 4/29/24  - Stop cefepime and doxy  - Resume iv ceftazidime daily, if home arrangements completed then ok with home.  If not and no other options from care management then could go home off abx though she will experience recurrent drainage and pain around DL. Unless fevers or other systemic complaints this is an option though she has been unable to tolerate in past. Therefore very high risk for re-admission.  Oral cipro not good option given risks and unlikely to be effective  - [facility]TA continues to work on finding affordable at home options for patient's antibiotics -> option care approved full coverage for patient to go home with IVAB therapies   - Daily wet dressing changes, with hydrofera blue or absorptive agent. Dr. Peraza saw patient and applied Silver Nitrate 4/23/24.   - Continue MDI and nebs per Pulmonary  - CT Chest ordered for 4-6 weeks post discharge  - PFTs ordered for OP post discharge  - [facility] for stridor when OP, if patient agreeable, info in discharge instructions  - Monitor MAPs if remain > 90 mmHg consider titration of GDMT  - Plan to dc when POC is completed by SW regarding home IV antibiotic coverage     Greater  than 50% of clinic visit included counseling and coordination of care, including education regarding device safety and when to notify VAD Coordinator on call, follow-up care, reducing the risk of adverse events with mechanical circulatory support, and communication with primary care provider and other members of multi-disciplinary team.      I attest that I spent 60 total minutes for this patient's encounter.     Shannan Wheeler, MSN, ACNP-[facility]/VAD Coordinator    Attending physician attestation:    I have seen and examined this patient with the advanced nurse practitioner.  I agree with the assessment and plan as described above and would like to emphasize the certain points:    This patient is of high medical complexity and is at high risk for morbidity and mortality due to the above medical conditions.       Rick Phelps MD, [facility]  Advanced Heart Failure, Transplant, Mechanical Circulatory Support

## 2025-05-27 ENCOUNTER — TRANSCRIBE ORDERS (OUTPATIENT)
Facility: HOSPITAL | Age: 60
End: 2025-05-27

## 2025-05-27 DIAGNOSIS — N64.4 BREAST PAIN IN FEMALE: Primary | ICD-10-CM

## 2025-06-12 ENCOUNTER — TRANSCRIBE ORDERS (OUTPATIENT)
Facility: HOSPITAL | Age: 60
End: 2025-06-12

## 2025-06-12 DIAGNOSIS — Z87.891 HISTORY OF NICOTINE DEPENDENCE: Primary | ICD-10-CM

## 2025-07-07 NOTE — PROGRESS NOTES
Sarecycline Counseling: Patient advised regarding possible photosensitivity and discoloration of the teeth, skin, lips, tongue and gums.  Patient instructed to avoid sunlight, if possible.  When exposed to sunlight, patients should wear protective clothing, sunglasses, and sunscreen.  The patient was instructed to call the office immediately if the following severe adverse effects occur:  hearing changes, easy bruising/bleeding, severe headache, or vision changes.  The patient verbalized understanding of the proper use and possible adverse effects of sarecycline.  All of the patient's questions and concerns were addressed. Spiritual Care Assessment/Progress Note  [address]      NAME: Irineo Gamble      MRN: [id number]  AGE: 49 y.o. SEX: female  Orthodoxy Affiliation: Faith   Language: English     3/10/2020     Total Time (in minutes): 5     Spiritual Assessment begun in [facility]  MED SURG 2 through conversation with:         []Patient        [x] Family    [] Friend(s)        Reason for Consult: [facility]     Spiritual beliefs: (Please include comment if needed)     [x] Identifies with a joey tradition:  Faith       [x] Supported by a joey community:            [] Claims no spiritual orientation:           [] Seeking spiritual identity:                [] Adheres to an individual form of spirituality:           [] Not able to assess:                           Identified resources for coping:      [x] Prayer                               [x] Music                  [] Guided Imagery     [x] Family/friends                 [] Pet visits     [] Devotional reading                         [] Unknown     [] Other:                                               Interventions offered during this visit: (See comments for more details)    Patient Interventions: Initial visit, Prayer (actual), Prayer (assurance of)     Family/Friend(s):  Affirmation of joey, Naming ceremony, Prayer (actual), Prayer (assurance of)     Plan of Care:     [x] Support spiritual and/or cultural needs    [] Support AMD and/or advance care planning process      [] Support grieving process   [] Coordinate Rites and/or Rituals    [] Coordination with community clergy   [] No spiritual needs identified at this time   [] Detailed Plan of Care below (See Comments)  [] Make referral to Music Therapy  [] Make referral to Pet Therapy     [] Make referral to Addiction services  [] Make referral to [facility]  [] Make referral to Spiritual Care Partner  [] No future visits requested        [] Follow up visits as needed     Comments: Mrs. Joana Calzada was out for Erythromycin Pregnancy And Lactation Text: This medication is Pregnancy Category B and is considered safe during pregnancy. It is also excreted in breast milk. tests.  She is Taoist and was visited by a Effektif. Her mother was in the room waiting for her return. She is concerned about her daughter and the test results. Prayer assured for her and her daughter.       SOFIA Zaman, RN, ACSW, LCSW   Page:  [phone]) Benzoyl Peroxide Pregnancy And Lactation Text: This medication is Pregnancy Category C. It is unknown if benzoyl peroxide is excreted in breast milk. Aklief Pregnancy And Lactation Text: It is unknown if this medication is safe to use during pregnancy.  It is unknown if this medication is excreted in breast milk.  Breastfeeding women should use the topical cream on the smallest area of the skin for the shortest time needed while breastfeeding.  Do not apply to nipple and areola. Tetracycline Counseling: Patient counseled regarding possible photosensitivity and increased risk for sunburn.  Patient instructed to avoid sunlight, if possible.  When exposed to sunlight, patients should wear protective clothing, sunglasses, and sunscreen.  The patient was instructed to call the office immediately if the following severe adverse effects occur:  hearing changes, easy bruising/bleeding, severe headache, or vision changes.  The patient verbalized understanding of the proper use and possible adverse effects of tetracycline.  All of the patient's questions and concerns were addressed. Patient understands to avoid pregnancy while on therapy due to potential birth defects. Tazorac Counseling:  Patient advised that medication is irritating and drying.  Patient may need to apply sparingly and wash off after an hour before eventually leaving it on overnight.  The patient verbalized understanding of the proper use and possible adverse effects of tazorac.  All of the patient's questions and concerns were addressed. Aklief counseling:  Patient advised to apply a pea-sized amount only at bedtime and wait 30 minutes after washing their face before applying.  If too drying, patient may add a non-comedogenic moisturizer.  The most commonly reported side effects including irritation, redness, scaling, dryness, stinging, burning, itching, and increased risk of sunburn.  The patient verbalized understanding of the proper use and possible adverse effects of retinoids.  All of the patient's questions and concerns were addressed. Topical Sulfur Applications Counseling: Topical Sulfur Counseling: Patient counseled that this medication may cause skin irritation or allergic reactions.  In the event of skin irritation, the patient was advised to reduce the amount of the drug applied or use it less frequently.   The patient verbalized understanding of the proper use and possible adverse effects of topical sulfur application.  All of the patient's questions and concerns were addressed. Topical Clindamycin Pregnancy And Lactation Text: This medication is Pregnancy Category B and is considered safe during pregnancy. It is unknown if it is excreted in breast milk. Birth Control Pills Counseling: Birth Control Pill Counseling: I discussed with the patient the potential side effects of OCPs including but not limited to increased risk of stroke, heart attack, thrombophlebitis, deep venous thrombosis, hepatic adenomas, breast changes, GI upset, headaches, and depression.  The patient verbalized understanding of the proper use and possible adverse effects of OCPs. All of the patient's questions and concerns were addressed. Spironolactone Pregnancy And Lactation Text: This medication can cause feminization of the male fetus and should be avoided during pregnancy. The active metabolite is also found in breast milk. Dapsone Counseling: I discussed with the patient the risks of dapsone including but not limited to hemolytic anemia, agranulocytosis, rashes, methemoglobinemia, kidney failure, peripheral neuropathy, headaches, GI upset, and liver toxicity.  Patients who start dapsone require monitoring including baseline LFTs and weekly CBCs for the first month, then every month thereafter.  The patient verbalized understanding of the proper use and possible adverse effects of dapsone.  All of the patient's questions and concerns were addressed. Benzoyl Peroxide Counseling: Patient counseled that medicine may cause skin irritation and bleach clothing.  In the event of skin irritation, the patient was advised to reduce the amount of the drug applied or use it less frequently.   The patient verbalized understanding of the proper use and possible adverse effects of benzoyl peroxide.  All of the patient's questions and concerns were addressed. Minocycline Pregnancy And Lactation Text: This medication is Pregnancy Category D and not consider safe during pregnancy. It is also excreted in breast milk. Erythromycin Counseling:  I discussed with the patient the risks of erythromycin including but not limited to GI upset, allergic reaction, drug rash, diarrhea, increase in liver enzymes, and yeast infections. Isotretinoin Counseling: Patient should get monthly blood tests, not donate blood, not drive at night if vision affected, not share medication, and not undergo elective surgery for 6 months after tx completed. Side effects reviewed, pt to contact office should one occur. High Dose Vitamin A Pregnancy And Lactation Text: High dose vitamin A therapy is contraindicated during pregnancy and breast feeding. Dapsone Pregnancy And Lactation Text: This medication is Pregnancy Category C and is not considered safe during pregnancy or breast feeding. Azithromycin Pregnancy And Lactation Text: This medication is considered safe during pregnancy and is also secreted in breast milk. Detail Level: Detailed Topical Sulfur Applications Pregnancy And Lactation Text: This medication is Pregnancy Category C and has an unknown safety profile during pregnancy. It is unknown if this topical medication is excreted in breast milk. Birth Control Pills Pregnancy And Lactation Text: This medication should be avoided if pregnant and for the first 30 days post-partum. Bactrim Counseling:  I discussed with the patient the risks of sulfa antibiotics including but not limited to GI upset, allergic reaction, drug rash, diarrhea, dizziness, photosensitivity, and yeast infections.  Rarely, more serious reactions can occur including but not limited to aplastic anemia, agranulocytosis, methemoglobinemia, blood dyscrasias, liver or kidney failure, lung infiltrates or desquamative/blistering drug rashes. Winlevi Counseling:  I discussed with the patient the risks of topical clascoterone including but not limited to erythema, scaling, itching, and stinging. Patient voiced their understanding. Include Pregnancy/Lactation Warning?: Add Automatically Based on Childbearing Potential and Patient Age Doxycycline Pregnancy And Lactation Text: This medication is Pregnancy Category D and not consider safe during pregnancy. It is also excreted in breast milk but is considered safe for shorter treatment courses. Azithromycin Counseling:  I discussed with the patient the risks of azithromycin including but not limited to GI upset, allergic reaction, drug rash, diarrhea, and yeast infections. Topical Retinoid Pregnancy And Lactation Text: This medication is Pregnancy Category C. It is unknown if this medication is excreted in breast milk. Winlevi Pregnancy And Lactation Text: This medication is considered safe during pregnancy and breastfeeding. Topical Clindamycin Counseling: Patient counseled that this medication may cause skin irritation or allergic reactions.  In the event of skin irritation, the patient was advised to reduce the amount of the drug applied or use it less frequently.   The patient verbalized understanding of the proper use and possible adverse effects of clindamycin.  All of the patient's questions and concerns were addressed. Use Enhanced Medication Counseling?: No Spironolactone Counseling: Patient advised regarding risks of diarrhea, abdominal pain, hyperkalemia, birth defects (for female patients), liver toxicity and renal toxicity. The patient may need blood work to monitor liver and kidney function and potassium levels while on therapy. The patient verbalized understanding of the proper use and possible adverse effects of spironolactone.  All of the patient's questions and concerns were addressed. Azelaic Acid Counseling: Patient counseled that medicine may cause skin irritation and to avoid applying near the eyes.  In the event of skin irritation, the patient was advised to reduce the amount of the drug applied or use it less frequently.   The patient verbalized understanding of the proper use and possible adverse effects of azelaic acid.  All of the patient's questions and concerns were addressed. Azelaic Acid Pregnancy And Lactation Text: This medication is considered safe during pregnancy and breast feeding. High Dose Vitamin A Counseling: Side effects reviewed, pt to contact office should one occur. Isotretinoin Pregnancy And Lactation Text: This medication is Pregnancy Category X and is considered extremely dangerous during pregnancy. It is unknown if it is excreted in breast milk. Doxycycline Counseling:  Patient counseled regarding possible photosensitivity and increased risk for sunburn.  Patient instructed to avoid sunlight, if possible.  When exposed to sunlight, patients should wear protective clothing, sunglasses, and sunscreen.  The patient was instructed to call the office immediately if the following severe adverse effects occur:  hearing changes, easy bruising/bleeding, severe headache, or vision changes.  The patient verbalized understanding of the proper use and possible adverse effects of doxycycline.  All of the patient's questions and concerns were addressed. Topical Retinoid counseling:  Patient advised to apply a pea-sized amount only at bedtime and wait 30 minutes after washing their face before applying.  If too drying, patient may add a non-comedogenic moisturizer. The patient verbalized understanding of the proper use and possible adverse effects of retinoids.  All of the patient's questions and concerns were addressed. Bactrim Pregnancy And Lactation Text: This medication is Pregnancy Category D and is known to cause fetal risk.  It is also excreted in breast milk. Minocycline Counseling: Patient advised regarding possible photosensitivity and discoloration of the teeth, skin, lips, tongue and gums.  Patient instructed to avoid sunlight, if possible.  When exposed to sunlight, patients should wear protective clothing, sunglasses, and sunscreen.  The patient was instructed to call the office immediately if the following severe adverse effects occur:  hearing changes, easy bruising/bleeding, severe headache, or vision changes.  The patient verbalized understanding of the proper use and possible adverse effects of minocycline.  All of the patient's questions and concerns were addressed. Tazorac Pregnancy And Lactation Text: This medication is not safe during pregnancy. It is unknown if this medication is excreted in breast milk. Detail Level: Zone

## (undated) DEVICE — SYR 10ML LUER LOK 1/5ML GRAD --

## (undated) DEVICE — 3M™ TEGADERM™ TRANSPARENT FILM DRESSING FRAME STYLE, 1624W, 2-3/8 IN X 2-3/4 IN (6 CM X 7 CM), 100/CT 4CT/CASE: Brand: 3M™ TEGADERM™

## (undated) DEVICE — MASTISOL ADHESIVE LIQ 2/3ML

## (undated) DEVICE — SOLIDIFIER MEDC 1200ML -- CONVERT TO 356117

## (undated) DEVICE — Z DISCONTINUEDSOLUTION PREP 2OZ 10% POVIDONE IOD SCR CAP BTL

## (undated) DEVICE — CYSTO/BLADDER IRRIGATION SET, REGULATING CLAMP

## (undated) DEVICE — STAPLER INT DIA5MM 25 ABSRB STRP FIX DISP FOR HERN MESH

## (undated) DEVICE — Z DISCONTINUED NO SUB IDED TROCAR LAP DIA8MM STD LEN BLDELSS TAPR TIP THRD N OPT VW

## (undated) DEVICE — BAG BELONG PT PERS CLEAR HANDL

## (undated) DEVICE — SPONGE GZ W4XL4IN COT 12 PLY TYP VII WVN C FLD DSGN

## (undated) DEVICE — INFECTION CONTROL KIT SYS

## (undated) DEVICE — GLOVE SURG SZ 65 L12IN FNGR THK94MIL STD WHT LTX FREE

## (undated) DEVICE — FORCEPS BX L240CM JAW DIA2.8MM L CAP W/ NDL MIC MESH TOOTH

## (undated) DEVICE — 1200 GUARD II KIT W/5MM TUBE W/O VAC TUBE: Brand: GUARDIAN

## (undated) DEVICE — GLOVE ORANGE PI 7 1/2   MSG9075

## (undated) DEVICE — 3M™ DURAPORE™ SURGICAL TAPE 1538-3, 3 INCH X 10 YARD (7,5CM X 9,1M), 4 ROLLS/BOX: Brand: 3M™ DURAPORE™

## (undated) DEVICE — REM POLYHESIVE ADULT PATIENT RETURN ELECTRODE: Brand: VALLEYLAB

## (undated) DEVICE — KENDALL RADIOLUCENT FOAM MONITORING ELECTRODE -RECTANGULAR SHAPE: Brand: KENDALL

## (undated) DEVICE — SPONGE HEMOSTAT CELLULS 4X8IN -- SURGICEL

## (undated) DEVICE — DRAPE,REIN 53X77,STERILE: Brand: MEDLINE

## (undated) DEVICE — KIT SURG PREP POVIDONE IOD PRESATURATED PAINT WET FOR UNIV

## (undated) DEVICE — SUTURE VCRL SZ 2-0 L18IN ABSRB UD L26MM CP-2 1/2 CIR REV J762D

## (undated) DEVICE — CONTAINER SPEC 20 ML LID NEUT BUFF FORMALIN 10 % POLYPR STS

## (undated) DEVICE — TUBING, SUCTION, 1/4" X 12', STRAIGHT: Brand: MEDLINE

## (undated) DEVICE — SOLUTION IRRIG 3000ML 0.9% SOD CHL USP UROMATIC PLAS CONT

## (undated) DEVICE — STERILE POLYISOPRENE POWDER-FREE SURGICAL GLOVES: Brand: PROTEXIS

## (undated) DEVICE — SOLUTION IRRIGATION H2O 0797305] ICU MEDICAL INC]

## (undated) DEVICE — CUFF RMFG BP INF SZ 11 DISP -- LAWSON OEM ITEM 238915

## (undated) DEVICE — LIGHT HANDLE: Brand: DEVON

## (undated) DEVICE — SHEAR RMFG HARMONIC FOCUS 9CM -- OEM ITEM L#322125

## (undated) DEVICE — TAPE,CLOTH/SILK,CURAD,3"X10YD,LF,40/CS: Brand: CURAD

## (undated) DEVICE — TELFA NON-ADHERENT ABSORBENT DRESSING: Brand: TELFA

## (undated) DEVICE — SURGICAL PROCEDURE KIT GEN LAPAROSCOPY LF

## (undated) DEVICE — TUBING INSUFLTN 10FT LUER -- CONVERT TO ITEM 368568

## (undated) DEVICE — OPEN-END URETERAL CATHETER: Brand: COOK

## (undated) DEVICE — (D)PREP SKN CHLRAPRP APPL 26ML -- CONVERT TO ITEM 371833

## (undated) DEVICE — SIMPLICITY FLUFF UNDERPAD 23X36, MODERATE: Brand: SIMPLICITY

## (undated) DEVICE — AGENT HEMSTAT 3GM PURIFIED PLNT STARCH PWD ABSRB ARISTA AH

## (undated) DEVICE — TOWEL SURG W17XL27IN STD BLU COT NONFENESTRATED PREWASHED

## (undated) DEVICE — BITEBLOCK ENDOSCP 60FR MAXI WHT POLYETH STURDY W/ VELC WVN

## (undated) DEVICE — CYSTO-SMH: Brand: MEDLINE INDUSTRIES, INC.

## (undated) DEVICE — TRAY CATH 16F DRN BG LTX -- CONVERT TO ITEM 363158

## (undated) DEVICE — SUTURE MCRYL SZ 4-0 L27IN ABSRB UD L19MM PS-2 1/2 CIR PRIM Y426H

## (undated) DEVICE — BAG PRESSURE INFUSION 3 W STOPCOCK 3000 CC PREMIERPRO DISP

## (undated) DEVICE — SOLUTION IRRIG 1000ML STRL H2O USP PLAS POUR BTL

## (undated) DEVICE — SOL IRRIGATION INJ NACL 0.9% 500ML BTL

## (undated) DEVICE — CYSTO-SFMC: Brand: MEDLINE INDUSTRIES, INC.

## (undated) DEVICE — JELLY,LUBE,STERILE,FLIP TOP,TUBE,4-OZ: Brand: MEDLINE

## (undated) DEVICE — KIT COLON W/ 1.1OZ LUB AND 2 END

## (undated) DEVICE — SURGICAL PROCEDURE PACK BASIN MAJ SET CUST NO CAUT

## (undated) DEVICE — DEVON™ KNEE AND BODY STRAP 60" X 3" (1.5 M X 7.6 CM): Brand: DEVON

## (undated) DEVICE — PILLOW POS AD L7IN R FOAM HD REST INTUB SLOT DISP

## (undated) DEVICE — Device

## (undated) DEVICE — BRIEF INCONT STRETCHABLE MESH LG

## (undated) DEVICE — SEALER ENDOSCP L37CM NANO COAT BLNT TIP LAP DIV

## (undated) DEVICE — CATH IV AUTOGRD BC PNK 20GA 25 -- INSYTE

## (undated) DEVICE — Device: Brand: DEFENDO VALVE AND CONNECTOR KIT

## (undated) DEVICE — SUTURE ENDOLOOP SZ 0 L18IN ABSRB VLT LIG SLDE KNOT VCRL

## (undated) DEVICE — SUT ETHBND 0 36IN SH DA GRN --

## (undated) DEVICE — NEEDLE HYPO 22GA L1.5IN BLK S STL HUB POLYPR SHLD REG BVL

## (undated) DEVICE — ELECTRODE,RADIOTRANSLUCENT,FOAM,3PK: Brand: MEDLINE

## (undated) DEVICE — SUPER SPONGES,MEDIUM: Brand: DERMACEA

## (undated) DEVICE — COVER LT HNDL BLU PLAS

## (undated) DEVICE — SUT ETHLN 3-0 18IN PS2 BLK --

## (undated) DEVICE — SET ADMIN 16ML TBNG L100IN 2 Y INJ SITE IV PIGGY BK DISP

## (undated) DEVICE — BAG SPEC BIOHZRD 10 X 10 IN --

## (undated) DEVICE — GUIDEWIRE ENDOSCP L150CM DIA0.035IN TIP L15CM BENT PTFE

## (undated) DEVICE — CANN NASAL O2 CAPNOGRAPHY AD -- FILTERLINE

## (undated) DEVICE — SUTURE SZ 0 27IN 5/8 CIR UR-6  TAPER PT VIOLET ABSRB VICRYL J603H

## (undated) DEVICE — COVER LT HNDL PLAS RIG 1 PER PK

## (undated) DEVICE — OCCLUSIVE GAUZE STRIP,3% BISMUTH TRIBROMOPHENATE IN PETROLATUM BLEND: Brand: XEROFORM

## (undated) DEVICE — DRAIN WND PENRS RADPQ 0.5X18IN --

## (undated) DEVICE — ROCKER SWITCH PENCIL BLADE ELECTRODE, HOLSTER: Brand: EDGE

## (undated) DEVICE — (D)STRIP SKN CLSR 0.5X4IN WHT --

## (undated) DEVICE — CONTAINER,SPECIMEN,3OZ,OR STRL: Brand: MEDLINE

## (undated) DEVICE — DBD-PACK,LAPAROTOMY,2 REINFORCED GOWNS: Brand: MEDLINE

## (undated) DEVICE — 4-PORT MANIFOLD: Brand: NEPTUNE 2

## (undated) DEVICE — GLOVE ORANGE PI 8   MSG9080

## (undated) DEVICE — PAD,ABDOMINAL,5"X9",ST,LF,25/BX: Brand: MEDLINE INDUSTRIES, INC.

## (undated) DEVICE — DEVICE TRNSF SPIK STL 2008S] MICROTEK MEDICAL INC]

## (undated) DEVICE — CATHETER IV 18GA L1.25IN FEP STR HUB INTROCAN SFTY

## (undated) DEVICE — TROCAR: Brand: KII FIOS FIRST ENTRY

## (undated) DEVICE — ARGYLE FRAZIER SURGICAL SUCTION INSTRUMENT 10 FR/CH (3.3 MM): Brand: ARGYLE

## (undated) DEVICE — POSITIONER HD REST FOAM CMFRT TCH

## (undated) DEVICE — ADULT SPO2 SENSOR: Brand: NELLCOR

## (undated) DEVICE — STRAP,POSITIONING,KNEE/BODY,FOAM,4X60": Brand: MEDLINE

## (undated) DEVICE — SOLUTION IV 500ML 0.9% SOD CHL FLX CONT

## (undated) DEVICE — 3M™ CUROS™ DISINFECTING CAP FOR NEEDLELESS CONNECTORS 270/CARTON 20 CARTONS/CASE CFF1-270: Brand: CUROS™

## (undated) DEVICE — CANNULA CUSH AD W/ 14FT TBG

## (undated) DEVICE — TROCAR: Brand: KII SLEEVE

## (undated) DEVICE — SUTURE VCRL SZ 2-0 L18IN ABSRB VLT L26MM CT-2 1/2 CIR J726D

## (undated) DEVICE — KENDALL SCD EXPRESS SLEEVES, KNEE LENGTH, MEDIUM: Brand: KENDALL SCD

## (undated) DEVICE — MATERNITY KNIT PANTS,SEAMLESS: Brand: WINGS

## (undated) DEVICE — SUT CHRMC 0 27IN UR6 BRN --

## (undated) DEVICE — VISUALIZATION SYSTEM: Brand: CLEARIFY